# Patient Record
Sex: FEMALE | Race: WHITE | NOT HISPANIC OR LATINO | Employment: OTHER | URBAN - NONMETROPOLITAN AREA
[De-identification: names, ages, dates, MRNs, and addresses within clinical notes are randomized per-mention and may not be internally consistent; named-entity substitution may affect disease eponyms.]

---

## 2017-02-06 RX ORDER — GABAPENTIN 100 MG/1
CAPSULE ORAL
Qty: 90 CAPSULE | Refills: 0 | Status: SHIPPED | OUTPATIENT
Start: 2017-02-06 | End: 2017-03-03 | Stop reason: SDUPTHER

## 2017-02-16 RX ORDER — PAROXETINE 10 MG/1
TABLET, FILM COATED ORAL
Qty: 90 TABLET | Refills: 0 | OUTPATIENT
Start: 2017-02-16

## 2017-02-16 RX ORDER — HYDROCHLOROTHIAZIDE 12.5 MG/1
TABLET ORAL
Qty: 90 TABLET | Refills: 0 | OUTPATIENT
Start: 2017-02-16

## 2017-03-03 ENCOUNTER — OFFICE VISIT (OUTPATIENT)
Dept: CARDIOLOGY | Facility: CLINIC | Age: 62
End: 2017-03-03

## 2017-03-03 VITALS
SYSTOLIC BLOOD PRESSURE: 144 MMHG | DIASTOLIC BLOOD PRESSURE: 76 MMHG | OXYGEN SATURATION: 98 % | HEIGHT: 63 IN | HEART RATE: 65 BPM | WEIGHT: 287.6 LBS | BODY MASS INDEX: 50.96 KG/M2

## 2017-03-03 DIAGNOSIS — E78.2 MIXED HYPERLIPIDEMIA: ICD-10-CM

## 2017-03-03 DIAGNOSIS — R51.9 FREQUENT HEADACHES: ICD-10-CM

## 2017-03-03 DIAGNOSIS — F41.9 ANXIETY: ICD-10-CM

## 2017-03-03 DIAGNOSIS — I10 ESSENTIAL HYPERTENSION: Primary | ICD-10-CM

## 2017-03-03 DIAGNOSIS — F32.9 REACTIVE DEPRESSION: ICD-10-CM

## 2017-03-03 DIAGNOSIS — R41.3 MEMORY LOSS: ICD-10-CM

## 2017-03-03 DIAGNOSIS — E11.9 TYPE 2 DIABETES MELLITUS WITHOUT COMPLICATION, WITHOUT LONG-TERM CURRENT USE OF INSULIN (HCC): ICD-10-CM

## 2017-03-03 PROCEDURE — 99214 OFFICE O/P EST MOD 30 MIN: CPT | Performed by: INTERNAL MEDICINE

## 2017-03-03 RX ORDER — GABAPENTIN 100 MG/1
100 CAPSULE ORAL DAILY
Qty: 90 CAPSULE | Refills: 0 | Status: SHIPPED | OUTPATIENT
Start: 2017-03-03 | End: 2017-10-09

## 2017-03-03 RX ORDER — HYDROCHLOROTHIAZIDE 12.5 MG/1
12.5 TABLET ORAL DAILY
Qty: 90 TABLET | Refills: 0 | Status: SHIPPED | OUTPATIENT
Start: 2017-03-03 | End: 2017-06-11 | Stop reason: SDUPTHER

## 2017-03-03 RX ORDER — METOPROLOL SUCCINATE 100 MG/1
100 TABLET, EXTENDED RELEASE ORAL DAILY
Qty: 90 TABLET | Refills: 0 | Status: SHIPPED | OUTPATIENT
Start: 2017-03-03 | End: 2017-06-07 | Stop reason: SDUPTHER

## 2017-03-03 RX ORDER — ERGOCALCIFEROL 1.25 MG/1
50000 CAPSULE ORAL WEEKLY
Qty: 12 CAPSULE | Refills: 1 | Status: SHIPPED | OUTPATIENT
Start: 2017-03-03 | End: 2017-08-05 | Stop reason: SDUPTHER

## 2017-03-03 RX ORDER — VALSARTAN 160 MG/1
160 TABLET ORAL 2 TIMES DAILY
Qty: 180 TABLET | Refills: 0 | Status: SHIPPED | OUTPATIENT
Start: 2017-03-03 | End: 2017-03-20 | Stop reason: HOSPADM

## 2017-03-03 RX ORDER — IBUPROFEN 200 MG
200 TABLET ORAL EVERY 6 HOURS PRN
COMMUNITY
End: 2017-03-23

## 2017-03-03 RX ORDER — ATORVASTATIN CALCIUM 40 MG/1
40 TABLET, FILM COATED ORAL DAILY
Qty: 90 TABLET | Refills: 3 | Status: SHIPPED | OUTPATIENT
Start: 2017-03-03 | End: 2017-10-09 | Stop reason: SDUPTHER

## 2017-03-03 RX ORDER — PROMETHAZINE HYDROCHLORIDE 25 MG/1
25 TABLET ORAL EVERY 6 HOURS PRN
COMMUNITY
End: 2017-03-20

## 2017-03-03 RX ORDER — PAROXETINE 10 MG/1
10 TABLET, FILM COATED ORAL EVERY MORNING
Qty: 90 TABLET | Refills: 0 | Status: SHIPPED | OUTPATIENT
Start: 2017-03-03 | End: 2017-06-07 | Stop reason: SDUPTHER

## 2017-03-03 NOTE — PROGRESS NOTES
subjective     Chief Complaint   Patient presents with   • Leg Swelling   • Migraine     History of Present Illness  Hyperlipidemia  Patient has not been able to lose any weight. She is trying to diet but is having a lot of difficulty.  Her sugar is uncontrolled and along with that lipids are also significantly abnormal.  Patient is taking Lipitor 20 mg daily that'll be increased.  She is not having any side effect of Lipitor.    HYPERTENSION  Ronna Wayne has long-standing essential hypertension for years. she is taking medications regularly. There are no medication side effects. Blood pressure is very well controlled. There has been no headache nausea chest pain. There has been no syncopal or presyncopal episode. she denies episodes of hypo-tension or accelerated hypertension.  Patient does have some headaches she has history of migraine but part of the headache could be due to blood pressure.  Patient was advised to check blood pressure closely and may need to adjust medications.  Patient thinks her blood pressure has been doing fairly well.      GERD  Ronna Wayne has gastroesophageal reflux disorder however she significantly better on medications. There is no nausea or vomiting. No hematemesis or melena. No abdominal pain. Mild epigastric heartburns are noted. Appetite is good bowel movements are normal.    Obesity  Patient is quite a bit overweight.  She is trying to lose weight but has not been very successful.  Patient thinks most of it is swelling.  There is some dependent edema but that is probably secondary to obesity.    She is diabetic and seeing endocrinologist.  Her sugar is running quite high hemoglobin A1c is over 10 and blood sugar over 200.    Patient Active Problem List   Diagnosis   • Frequent headaches   • Memory loss   • Essential hypertension   • Hyperlipidemia   • Diabetes mellitus type 2 insulin-dependent   • Sleep apnea   • Glaucoma   • Anxiety   • GERD (gastroesophageal reflux  disease)   • Morbid obesity   • Depression       Social History   Substance Use Topics   • Smoking status: Former Smoker     Packs/day: 1.50     Years: 8.00     Types: Cigarettes     Quit date: 1976   • Smokeless tobacco: Never Used   • Alcohol use No      Comment: former social drinker not had anything in 25 + years       Allergies   Allergen Reactions   • Codeine    • Sulfa Antibiotics          Current Outpatient Prescriptions:   •  atorvastatin (LIPITOR) 40 MG tablet, Take 1 tablet by mouth Daily., Disp: 90 tablet, Rfl: 3  •  dexlansoprazole (DEXILANT) 60 MG capsule, Take 60 mg by mouth daily., Disp: , Rfl:   •  gabapentin (NEURONTIN) 100 MG capsule, Take 1 capsule by mouth Daily., Disp: 90 capsule, Rfl: 0  •  hydrochlorothiazide (HYDRODIURIL) 12.5 MG tablet, Take 1 tablet by mouth Daily., Disp: 90 tablet, Rfl: 0  •  ibuprofen (ADVIL,MOTRIN) 200 MG tablet, Take 200 mg by mouth Every 6 (Six) Hours As Needed for mild pain (1-3)., Disp: , Rfl:   •  insulin aspart (NovoLOG) 100 UNIT/ML injection, Inject  under the skin 3 (three) times a day before meals. 10 units at breakfast 12 units at lunch and 14 units at dinner  , Disp: , Rfl:   •  insulin detemir (LEVEMIR) 100 UNIT/ML injection, Inject 50 Units under the skin Every Night., Disp: , Rfl:   •  Lactobacillus (REPHRESH PRO-B) capsule, Take  by mouth daily., Disp: , Rfl:   •  metoprolol succinate XL (TOPROL-XL) 100 MG 24 hr tablet, Take 1 tablet by mouth Daily., Disp: 90 tablet, Rfl: 0  •  PARoxetine (PAXIL) 10 MG tablet, Take 1 tablet by mouth Every Morning., Disp: 90 tablet, Rfl: 0  •  promethazine (PHENERGAN) 25 MG tablet, Take 25 mg by mouth Every 6 (Six) Hours As Needed for nausea or vomiting., Disp: , Rfl:   •  saccharomyces boulardii (FLORASTOR) 250 MG capsule, Take 250 mg by mouth daily., Disp: , Rfl:   •  SAXagliptin-MetFORMIN ER (KOMBIGLYZE XR) 2.5-1000 MG tablet sustained-release 24 hour, Take 2.5-1,000 mg by mouth 2 (Two) Times a Day., Disp: , Rfl:   •   "valsartan (DIOVAN) 160 MG tablet, Take 1 tablet by mouth 2 (Two) Times a Day., Disp: 180 tablet, Rfl: 0  •  vitamin D (ERGOCALCIFEROL) 85688 UNITS capsule capsule, Take 1 capsule by mouth 1 (One) Time Per Week., Disp: 12 capsule, Rfl: 1      The following portions of the patient's history were reviewed and updated as appropriate: allergies, current medications, past family history, past medical history, past social history, past surgical history and problem list.    Review of Systems   Constitution: Positive for weakness, malaise/fatigue and weight gain.   HENT: Negative.    Eyes: Negative.    Cardiovascular: Positive for leg swelling.   Respiratory: Negative.    Hematologic/Lymphatic: Negative.    Skin: Negative.    Musculoskeletal: Positive for arthritis, back pain and myalgias.   Gastrointestinal: Negative.    Genitourinary: Negative.    Psychiatric/Behavioral: The patient is nervous/anxious.           Objective:     Visit Vitals   • /76 (BP Location: Left arm, Patient Position: Sitting)   • Pulse 65   • Ht 63\" (160 cm)   • Wt 287 lb 9.6 oz (130 kg)   • SpO2 98%   • BMI 50.95 kg/m2     Physical Exam   Constitutional: She appears well-developed and well-nourished.   Obesity   HENT:   Head: Normocephalic and atraumatic.   Mouth/Throat: Oropharynx is clear and moist.   Eyes: Conjunctivae and EOM are normal. Pupils are equal, round, and reactive to light. No scleral icterus.   Neck: Normal range of motion. Neck supple. No JVD present. No tracheal deviation present. No thyromegaly present.   Cardiovascular: Normal rate, regular rhythm, normal heart sounds and intact distal pulses.  Exam reveals no friction rub.    No murmur heard.  Pulmonary/Chest: Effort normal and breath sounds normal. No respiratory distress. She has no wheezes. She has no rales. She exhibits no tenderness.   Abdominal: Soft. Bowel sounds are normal. She exhibits no distension and no mass. There is no tenderness. There is no rebound and no " guarding.   Musculoskeletal: Normal range of motion. She exhibits no edema, tenderness or deformity.   Lymphadenopathy:     She has no cervical adenopathy.   Neurological: She is alert. She has normal reflexes. No cranial nerve deficit. She exhibits normal muscle tone. Coordination normal.   Skin: Skin is warm and dry.   Psychiatric: She has a normal mood and affect. Her behavior is normal. Judgment and thought content normal.         Lab Review      Procedures     I personally viewed and interpreted the patient's LAB data         Assessment:     1. Essential hypertension    2. Mixed hyperlipidemia    3. Frequent headaches    4. Memory loss    5. Type 2 diabetes mellitus without complication, without long-term current use of insulin    6. Anxiety    7. Reactive depression          Plan:      Blood pressure is fairly well controlled.  It is 144/76 today which is mildly elevated most of the time it is below 140 systolic.  Patient will continue current medications which is Diovan hydrochlorothiazide and metoprolol.    Patient is quite obese  Weight loss was again emphasized.  Patient also has some generalized edema    Hyperlipidemia  Cholesterol is 132 with LDL of 51.  Triglyceride elevated because very poorly controlled diabetes and obesity.    Vitamin D is low  Patient was advised to DC vitamin D3 and start vitamin D2 50,000 weekly    Hemoglobin A1c is 10.4 and blood sugar is 201.  Patient is planning to see an endocrinologist next week.  Patient may be better off with the Victoza.    Aggressive risk factor modification and weight loss was stressed.  Vitamin B12 is normal without B12 replacement    Patient also says that eye doctor told her she has allergies.  She was advised to take Zyrtec 5 mg daily when necessary.  She was advised to stop it if she gets dry eyes.      Return in about 3 months (around 6/3/2017).

## 2017-03-16 ENCOUNTER — HOSPITAL ENCOUNTER (EMERGENCY)
Facility: HOSPITAL | Age: 62
Discharge: HOME | End: 2017-03-16
Attending: EMERGENCY MEDICINE | Admitting: EMERGENCY MEDICINE

## 2017-03-16 VITALS
HEIGHT: 63 IN | TEMPERATURE: 98.8 F | OXYGEN SATURATION: 94 % | BODY MASS INDEX: 50.68 KG/M2 | DIASTOLIC BLOOD PRESSURE: 94 MMHG | RESPIRATION RATE: 18 BRPM | WEIGHT: 286 LBS | HEART RATE: 81 BPM | SYSTOLIC BLOOD PRESSURE: 141 MMHG

## 2017-03-16 DIAGNOSIS — M43.6 TORTICOLLIS: Primary | ICD-10-CM

## 2017-03-16 LAB
BASOPHILS # BLD AUTO: 0.06 10*3/MM3 (ref 0–0.3)
BASOPHILS NFR BLD AUTO: 0.6 % (ref 0–2)
DEPRECATED RDW RBC AUTO: 44.3 FL (ref 37–54)
EOSINOPHIL # BLD AUTO: 0.06 10*3/MM3 (ref 0–0.7)
EOSINOPHIL NFR BLD AUTO: 0.6 % (ref 0–5)
ERYTHROCYTE [DISTWIDTH] IN BLOOD BY AUTOMATED COUNT: 12.8 % (ref 11.5–14.5)
HCT VFR BLD AUTO: 41.2 % (ref 37–47)
HGB BLD-MCNC: 13.6 G/DL (ref 12–16)
IMM GRANULOCYTES # BLD: 0.03 10*3/MM3 (ref 0–0.03)
IMM GRANULOCYTES NFR BLD: 0.3 % (ref 0–0.5)
LYMPHOCYTES # BLD AUTO: 3.77 10*3/MM3 (ref 1–3)
LYMPHOCYTES NFR BLD AUTO: 38 % (ref 21–51)
MCH RBC QN AUTO: 32.3 PG (ref 27–33)
MCHC RBC AUTO-ENTMCNC: 33 G/DL (ref 33–37)
MCV RBC AUTO: 97.9 FL (ref 80–94)
MONOCYTES # BLD AUTO: 0.97 10*3/MM3 (ref 0.1–0.9)
MONOCYTES NFR BLD AUTO: 9.8 % (ref 0–10)
NEUTROPHILS # BLD AUTO: 5.03 10*3/MM3 (ref 1.4–6.5)
NEUTROPHILS NFR BLD AUTO: 50.7 % (ref 30–70)
PLATELET # BLD AUTO: 201 10*3/MM3 (ref 130–400)
PMV BLD AUTO: 10.6 FL (ref 6–10)
RBC # BLD AUTO: 4.21 10*6/MM3 (ref 4.2–5.4)
WBC NRBC COR # BLD: 9.92 10*3/MM3 (ref 4.5–12.5)

## 2017-03-16 PROCEDURE — 99284 EMERGENCY DEPT VISIT MOD MDM: CPT

## 2017-03-16 PROCEDURE — 25010000002 HYDROMORPHONE PER 4 MG: Performed by: EMERGENCY MEDICINE

## 2017-03-16 PROCEDURE — 96372 THER/PROPH/DIAG INJ SC/IM: CPT

## 2017-03-16 PROCEDURE — 25010000002 KETOROLAC TROMETHAMINE PER 15 MG: Performed by: EMERGENCY MEDICINE

## 2017-03-16 PROCEDURE — 85025 COMPLETE CBC W/AUTO DIFF WBC: CPT | Performed by: EMERGENCY MEDICINE

## 2017-03-16 RX ORDER — ONDANSETRON 4 MG/1
4 TABLET, ORALLY DISINTEGRATING ORAL ONCE
Status: COMPLETED | OUTPATIENT
Start: 2017-03-16 | End: 2017-03-16

## 2017-03-16 RX ORDER — CYCLOBENZAPRINE HCL 10 MG
10 TABLET ORAL ONCE
Status: COMPLETED | OUTPATIENT
Start: 2017-03-16 | End: 2017-03-16

## 2017-03-16 RX ORDER — KETOROLAC TROMETHAMINE 30 MG/ML
60 INJECTION, SOLUTION INTRAMUSCULAR; INTRAVENOUS ONCE
Status: COMPLETED | OUTPATIENT
Start: 2017-03-16 | End: 2017-03-16

## 2017-03-16 RX ORDER — CYCLOBENZAPRINE HCL 10 MG
10 TABLET ORAL 3 TIMES DAILY PRN
Qty: 15 TABLET | Refills: 0 | Status: SHIPPED | OUTPATIENT
Start: 2017-03-16 | End: 2017-03-20

## 2017-03-16 RX ORDER — NAPROXEN 500 MG/1
500 TABLET ORAL 2 TIMES DAILY PRN
Qty: 12 TABLET | Refills: 0 | Status: SHIPPED | OUTPATIENT
Start: 2017-03-16 | End: 2020-09-01

## 2017-03-16 RX ADMIN — HYDROMORPHONE HYDROCHLORIDE 1 MG: 1 INJECTION, SOLUTION INTRAMUSCULAR; INTRAVENOUS; SUBCUTANEOUS at 20:52

## 2017-03-16 RX ADMIN — ONDANSETRON 4 MG: 4 TABLET, ORALLY DISINTEGRATING ORAL at 20:49

## 2017-03-16 RX ADMIN — KETOROLAC TROMETHAMINE 60 MG: 60 INJECTION, SOLUTION INTRAMUSCULAR at 20:50

## 2017-03-16 RX ADMIN — CYCLOBENZAPRINE HYDROCHLORIDE 10 MG: 10 TABLET, FILM COATED ORAL at 19:46

## 2017-03-16 NOTE — ED PROVIDER NOTES
Subjective   Patient is a 62 y.o. female presenting with neck pain.   History provided by:  Patient  Neck Pain   Pain location:  R side  Quality:  Aching and cramping  Pain severity:  Moderate  Onset quality:  Gradual  Duration:  2 days  Timing:  Constant  Context comment:  Awoke 2 days ago, neck sore ever since  Worsened by:  Twisting and bending  Associated symptoms: no bladder incontinence, no chest pain, no fever, no leg pain and no tingling        Review of Systems   Constitutional: Negative.  Negative for fever.   HENT: Negative.         Pain right neck     Respiratory: Negative.    Cardiovascular: Negative.  Negative for chest pain.   Gastrointestinal: Negative.  Negative for abdominal pain.   Endocrine: Negative.    Genitourinary: Negative.  Negative for bladder incontinence and dysuria.   Musculoskeletal: Positive for neck pain.   Skin: Negative.    Neurological: Negative.  Negative for tingling.   Psychiatric/Behavioral: Negative.    All other systems reviewed and are negative.      Past Medical History   Diagnosis Date   • Anxiety    • B12 deficiency    • BPV (benign positional vertigo)    • Diabetes mellitus    • Diarrhea    • Fibromyalgia    • GERD (gastroesophageal reflux disease)    • Glaucoma    • Headache    • History of EKG 06/04/2015     BORDERLINE   • Hyperlipidemia    • Hypertension    • Obesity    • Sleep apnea    • Weakness of left arm        Allergies   Allergen Reactions   • Codeine    • Sulfa Antibiotics        Past Surgical History   Procedure Laterality Date   • Gallbladder surgery     • Urethra surgery     • Cataract extraction Bilateral    • Cholecystectomy     • Appendectomy         Family History   Problem Relation Age of Onset   • Thyroid disease Mother    • Diabetes Mother    • Hyperlipidemia Father    • Hypertension Father    • Heart attack Father    • Alcohol abuse Maternal Aunt    • Cancer Maternal Grandfather      PROSTATE   • Stroke Paternal Grandmother    • Stroke Paternal  Grandfather    • Hypertension Other    • Hypertension Sister    • Breast cancer Sister    • Obesity Sister    • Hypertension Sister    • Hyperlipidemia Sister    • Hyperlipidemia Sister    • Hypertension Sister    • Arthritis Sister    • Obesity Sister    • Thyroid disease Sister        Social History     Social History   • Marital status:      Spouse name: N/A   • Number of children: N/A   • Years of education: N/A     Social History Main Topics   • Smoking status: Former Smoker     Packs/day: 1.50     Years: 8.00     Types: Cigarettes     Quit date: 1976   • Smokeless tobacco: Never Used   • Alcohol use No      Comment: former social drinker not had anything in 25 + years   • Drug use: No   • Sexual activity: Not Asked     Other Topics Concern   • None     Social History Narrative   • None           Objective   Physical Exam   Constitutional: She is oriented to person, place, and time. She appears well-developed and well-nourished. No distress.   HENT:   Head: Normocephalic and atraumatic.   Right Ear: External ear normal.   Left Ear: External ear normal.   Nose: Nose normal.   Tender rigth neck     Eyes: Conjunctivae and EOM are normal. Pupils are equal, round, and reactive to light.   Neck: Normal range of motion. Neck supple. No JVD present. No tracheal deviation present.   Cardiovascular: Normal rate, regular rhythm and normal heart sounds.    No murmur heard.  Pulmonary/Chest: Effort normal and breath sounds normal. No respiratory distress. She has no wheezes.   Abdominal: Soft. Bowel sounds are normal. There is no tenderness.   Musculoskeletal: Normal range of motion. She exhibits no edema or deformity.   Neurological: She is alert and oriented to person, place, and time. No cranial nerve deficit.   Skin: Skin is warm and dry. No rash noted. She is not diaphoretic. No erythema. No pallor.   Psychiatric: She has a normal mood and affect. Her behavior is normal. Thought content normal.   Nursing note  and vitals reviewed.      Procedures         ED Course  ED Course                  MDM    Final diagnoses:   Torticollis            Topher Logan MD  03/17/17 [id number]

## 2017-03-20 ENCOUNTER — OFFICE VISIT (OUTPATIENT)
Dept: CARDIOLOGY | Facility: CLINIC | Age: 62
End: 2017-03-20

## 2017-03-20 VITALS
OXYGEN SATURATION: 98 % | HEIGHT: 63 IN | SYSTOLIC BLOOD PRESSURE: 136 MMHG | HEART RATE: 70 BPM | WEIGHT: 289.4 LBS | BODY MASS INDEX: 51.28 KG/M2 | DIASTOLIC BLOOD PRESSURE: 74 MMHG

## 2017-03-20 DIAGNOSIS — E78.2 MIXED HYPERLIPIDEMIA: ICD-10-CM

## 2017-03-20 DIAGNOSIS — R51.9 FREQUENT HEADACHES: ICD-10-CM

## 2017-03-20 DIAGNOSIS — F41.9 ANXIETY: ICD-10-CM

## 2017-03-20 DIAGNOSIS — I10 ESSENTIAL HYPERTENSION: Primary | ICD-10-CM

## 2017-03-20 DIAGNOSIS — E11.9 TYPE 2 DIABETES MELLITUS WITHOUT COMPLICATION, WITHOUT LONG-TERM CURRENT USE OF INSULIN (HCC): ICD-10-CM

## 2017-03-20 DIAGNOSIS — E66.01 MORBID OBESITY, UNSPECIFIED OBESITY TYPE (HCC): ICD-10-CM

## 2017-03-20 DIAGNOSIS — K21.9 GASTROESOPHAGEAL REFLUX DISEASE WITHOUT ESOPHAGITIS: ICD-10-CM

## 2017-03-20 DIAGNOSIS — G47.33 OBSTRUCTIVE SLEEP APNEA SYNDROME: ICD-10-CM

## 2017-03-20 PROCEDURE — 99213 OFFICE O/P EST LOW 20 MIN: CPT | Performed by: INTERNAL MEDICINE

## 2017-03-20 RX ORDER — CYCLOBENZAPRINE HCL 10 MG
5 TABLET ORAL 3 TIMES DAILY PRN
Qty: 15 TABLET | Refills: 0 | Status: SHIPPED | OUTPATIENT
Start: 2017-03-20 | End: 2017-10-09

## 2017-03-20 RX ORDER — CEFDINIR 300 MG/1
300 CAPSULE ORAL 2 TIMES DAILY
COMMUNITY
End: 2017-10-09

## 2017-03-20 NOTE — PROGRESS NOTES
subjective     Chief Complaint   Patient presents with   • Neck Pain     recently went to ER     History of Present Illness  Patient is 62 years old white female who recently went to the emergency room where she was found to have white high blood pressure.  Blood pressure was around 192/106.  She was advised to take Diovan HCT.  Apparently she had not been taking it for quite some time after she took the medication her blood pressure returned to normal but she had quite fast heartbeat and she was quite concerned after that she did not take any more medication    Patient also had the stiffness in the neck she was diagnosed as having torticollis and was given Flexeril she has been taking Flexeril 10 mg 3 times a day.  She complains of headaches.  Patient then went to see walk-in clinic and she was told that she should be checked for meningitis.  There is no neck stiffness no fever no chills.  No neurological signs.     Hyperlipidemia  Patient has not been able to lose any weight. She is trying to diet but is having a lot of difficulty.  Her sugar is uncontrolled and along with that lipids are also significantly abnormal.  Patient is taking Lipitor 20 mg daily that'll be increased.  She is not having any side effect of Lipitor.     HYPERTENSION  Ronna Wayne has long-standing essential hypertension for years. she is taking medications regularly. There are no medication side effects. Blood pressure is very well controlled. There has been no headache nausea chest pain. There has been no syncopal or presyncopal episode. she denies episodes of hypo-tension or accelerated hypertension. Patient does have some headaches she has history of migraine but part of the headache could be due to blood pressure. Patient was advised to check blood pressure closely and may need to adjust medications. Patient thinks her blood pressure has been doing fairly well.      GERD  Ronna Wayne has gastroesophageal reflux disorder  however she significantly better on medications. There is no nausea or vomiting. No hematemesis or melena. No abdominal pain. Mild epigastric heartburns are noted. Appetite is good bowel movements are normal.     Obesity  Patient is quite a bit overweight. She is trying to lose weight but has not been very successful. Patient thinks most of it is swelling. There is some dependent edema but that is probably secondary to obesity.     She is diabetic and seeing endocrinologist. Her sugar is running quite high hemoglobin A1c is over 10 and blood sugar 168.  Patient is seeing Dr. Abrams    Patient Active Problem List   Diagnosis   • Frequent headaches   • Memory loss   • Essential hypertension   • Hyperlipidemia   • Diabetes mellitus type 2 insulin-dependent   • Sleep apnea   • Glaucoma   • Anxiety   • GERD (gastroesophageal reflux disease)   • Morbid obesity   • Depression       Social History   Substance Use Topics   • Smoking status: Former Smoker     Packs/day: 1.50     Years: 8.00     Types: Cigarettes     Quit date: 1976   • Smokeless tobacco: Never Used   • Alcohol use No      Comment: former social drinker not had anything in 25 + years       Allergies   Allergen Reactions   • Codeine    • Sulfa Antibiotics          Current Outpatient Prescriptions:   •  atorvastatin (LIPITOR) 40 MG tablet, Take 1 tablet by mouth Daily., Disp: 90 tablet, Rfl: 3  •  cefdinir (OMNICEF) 300 MG capsule, Take 300 mg by mouth 2 (Two) Times a Day., Disp: , Rfl:   •  cyclobenzaprine (FLEXERIL) 10 MG tablet, Take 0.5 tablets by mouth 3 (Three) Times a Day As Needed for Muscle Spasms for up to 15 doses., Disp: 15 tablet, Rfl: 0  •  dexlansoprazole (DEXILANT) 60 MG capsule, Take 60 mg by mouth daily., Disp: , Rfl:   •  gabapentin (NEURONTIN) 100 MG capsule, Take 1 capsule by mouth Daily., Disp: 90 capsule, Rfl: 0  •  hydrochlorothiazide (HYDRODIURIL) 12.5 MG tablet, Take 1 tablet by mouth Daily., Disp: 90 tablet, Rfl: 0  •  ibuprofen  (ADVIL,MOTRIN) 200 MG tablet, Take 200 mg by mouth Every 6 (Six) Hours As Needed for mild pain (1-3)., Disp: , Rfl:   •  insulin aspart (NovoLOG) 100 UNIT/ML injection, Inject  under the skin 3 (three) times a day before meals. 10 units at breakfast 12 units at lunch and 14 units at dinner  , Disp: , Rfl:   •  insulin detemir (LEVEMIR) 100 UNIT/ML injection, Inject 65 Units under the skin Every Night., Disp: , Rfl:   •  Lactobacillus (REPHRESH PRO-B) capsule, Take  by mouth daily., Disp: , Rfl:   •  metoprolol succinate XL (TOPROL-XL) 100 MG 24 hr tablet, Take 1 tablet by mouth Daily., Disp: 90 tablet, Rfl: 0  •  naproxen (EC NAPROSYN) 500 MG EC tablet, Take 1 tablet by mouth 2 (Two) Times a Day As Needed for Mild Pain (1-3)., Disp: 12 tablet, Rfl: 0  •  PARoxetine (PAXIL) 10 MG tablet, Take 1 tablet by mouth Every Morning., Disp: 90 tablet, Rfl: 0  •  saccharomyces boulardii (FLORASTOR) 250 MG capsule, Take 250 mg by mouth daily., Disp: , Rfl:   •  SAXagliptin-MetFORMIN ER (KOMBIGLYZE XR) 2.5-1000 MG tablet sustained-release 24 hour, Take 2.5-1,000 mg by mouth 2 (Two) Times a Day., Disp: , Rfl:   •  vitamin D (ERGOCALCIFEROL) 82253 UNITS capsule capsule, Take 1 capsule by mouth 1 (One) Time Per Week., Disp: 12 capsule, Rfl: 1      The following portions of the patient's history were reviewed and updated as appropriate: allergies, current medications, past family history, past medical history, past social history, past surgical history and problem list.    Review of Systems   Constitution: Positive for weight gain.   HENT: Positive for headaches.    Eyes: Negative.    Cardiovascular: Negative.    Respiratory: Negative.    Skin: Negative.    Musculoskeletal: Positive for arthritis, muscle cramps, myalgias, neck pain and stiffness.   Gastrointestinal: Negative.    Genitourinary: Negative.    Neurological: Positive for dizziness and loss of balance.   Psychiatric/Behavioral: The patient is nervous/anxious.          "  Objective:     Visit Vitals   • /74 (BP Location: Left arm, Patient Position: Sitting)   • Pulse 70   • Ht 63\" (160 cm)   • Wt 289 lb 6.4 oz (131 kg)   • SpO2 98%   • BMI 51.26 kg/m2     Physical Exam   Constitutional: She appears well-developed and well-nourished.   HENT:   Head: Normocephalic and atraumatic.   Mouth/Throat: Oropharynx is clear and moist.   Eyes: Conjunctivae and EOM are normal. Pupils are equal, round, and reactive to light. No scleral icterus.   Neck: Normal range of motion. Neck supple. No JVD present. No tracheal deviation present. No thyromegaly present.   Cardiovascular: Normal rate, regular rhythm, normal heart sounds and intact distal pulses.  Exam reveals no friction rub.    No murmur heard.  Pulmonary/Chest: Effort normal and breath sounds normal. No respiratory distress. She has no wheezes. She has no rales. She exhibits no tenderness.   Abdominal: Soft. Bowel sounds are normal. She exhibits no distension and no mass. There is no tenderness. There is no rebound and no guarding.   Musculoskeletal: Normal range of motion. She exhibits no edema, tenderness or deformity.   Lymphadenopathy:     She has no cervical adenopathy.   Neurological: She is alert. She has normal reflexes. No cranial nerve deficit. She exhibits normal muscle tone. Coordination normal.   Skin: Skin is warm and dry.   Psychiatric: She has a normal mood and affect. Her behavior is normal. Judgment and thought content normal.         Lab Review  Lab Results   Component Value Date     05/28/2016    K 4.2 05/28/2016     05/28/2016    BUN 14 05/28/2016    CREATININE 0.81 05/28/2016    GLUCOSE 168 (H) 05/28/2016    CALCIUM 9.6 05/28/2016    ALT 46 (H) 05/28/2016    ALKPHOS 75 05/28/2016    LABIL2 1.0 (L) 05/28/2016     Lab Results   Component Value Date    CKTOTAL 36 05/28/2016     Lab Results   Component Value Date    WBC 9.92 03/16/2017    HGB 13.6 03/16/2017    HCT 41.2 03/16/2017     03/16/2017 "       Lab Results   Component Value Date    CHLPL 226 (H) 05/28/2016     Lab Results   Component Value Date    TRIG 195 (H) 05/28/2016    HDL 48 (L) 05/28/2016    VLDL 39 (H) 05/28/2016         Procedures     I personally viewed and interpreted the patient's LAB data         Assessment:     1. Essential hypertension    2. Mixed hyperlipidemia    3. Anxiety    4. Type 2 diabetes mellitus without complication, without long-term current use of insulin    5. Frequent headaches    6. Gastroesophageal reflux disease without esophagitis    7. Morbid obesity, unspecified obesity type    8. Obstructive sleep apnea syndrome          Plan:      blood pressure is still elevated.  It is 144/90  Patient was advised to take Diovan 40 mg daily and then adjusted depending on the blood pressure readings.    Flexeril was decreased to 5 mg 3 times a day and then she will stop it because of extrapyramidal symptom possibility    CT scan of the head was arranged    Phenergan was discontinued and patient was advised to take Zofran if needed but she stated that she does not need any nausea medicine.    weight loss was again stressed.        No Follow-up on file.

## 2017-03-22 ENCOUNTER — HOSPITAL ENCOUNTER (OUTPATIENT)
Dept: CT IMAGING | Facility: HOSPITAL | Age: 62
Discharge: HOME | End: 2017-03-22
Attending: INTERNAL MEDICINE | Admitting: INTERNAL MEDICINE

## 2017-03-22 DIAGNOSIS — R51.9 FREQUENT HEADACHES: ICD-10-CM

## 2017-03-22 PROCEDURE — 70450 CT HEAD/BRAIN W/O DYE: CPT

## 2017-03-22 PROCEDURE — 70450 CT HEAD/BRAIN W/O DYE: CPT | Performed by: RADIOLOGY

## 2017-03-24 ENCOUNTER — TELEPHONE (OUTPATIENT)
Dept: CARDIOLOGY | Facility: CLINIC | Age: 62
End: 2017-03-24

## 2017-05-19 ENCOUNTER — OFFICE VISIT (OUTPATIENT)
Dept: CARDIOLOGY | Facility: CLINIC | Age: 62
End: 2017-05-19

## 2017-05-19 VITALS
WEIGHT: 282 LBS | DIASTOLIC BLOOD PRESSURE: 85 MMHG | HEART RATE: 70 BPM | HEIGHT: 63 IN | BODY MASS INDEX: 49.96 KG/M2 | OXYGEN SATURATION: 97 % | SYSTOLIC BLOOD PRESSURE: 150 MMHG

## 2017-05-19 DIAGNOSIS — R51.9 FREQUENT HEADACHES: ICD-10-CM

## 2017-05-19 DIAGNOSIS — E66.01 MORBID OBESITY, UNSPECIFIED OBESITY TYPE (HCC): ICD-10-CM

## 2017-05-19 DIAGNOSIS — K21.9 GASTROESOPHAGEAL REFLUX DISEASE WITHOUT ESOPHAGITIS: ICD-10-CM

## 2017-05-19 DIAGNOSIS — E78.2 MIXED HYPERLIPIDEMIA: ICD-10-CM

## 2017-05-19 DIAGNOSIS — E11.9 TYPE 2 DIABETES MELLITUS WITHOUT COMPLICATION, WITHOUT LONG-TERM CURRENT USE OF INSULIN (HCC): ICD-10-CM

## 2017-05-19 DIAGNOSIS — F32.9 REACTIVE DEPRESSION: ICD-10-CM

## 2017-05-19 DIAGNOSIS — F41.9 ANXIETY: ICD-10-CM

## 2017-05-19 DIAGNOSIS — G47.33 OBSTRUCTIVE SLEEP APNEA SYNDROME: ICD-10-CM

## 2017-05-19 DIAGNOSIS — I10 ESSENTIAL HYPERTENSION: Primary | ICD-10-CM

## 2017-05-19 PROCEDURE — 99214 OFFICE O/P EST MOD 30 MIN: CPT | Performed by: INTERNAL MEDICINE

## 2017-05-19 RX ORDER — VALSARTAN 160 MG/1
80 TABLET ORAL DAILY
Qty: 90 TABLET | Refills: 3 | COMMUNITY
Start: 2017-05-19 | End: 2017-10-09 | Stop reason: SDUPTHER

## 2017-06-07 RX ORDER — METOPROLOL SUCCINATE 100 MG/1
TABLET, EXTENDED RELEASE ORAL
Qty: 90 TABLET | Refills: 0 | Status: SHIPPED | OUTPATIENT
Start: 2017-06-07 | End: 2017-10-09 | Stop reason: SDUPTHER

## 2017-06-07 RX ORDER — PAROXETINE 10 MG/1
TABLET, FILM COATED ORAL
Qty: 90 TABLET | Refills: 0 | Status: SHIPPED | OUTPATIENT
Start: 2017-06-07 | End: 2017-10-09 | Stop reason: SDUPTHER

## 2017-06-12 RX ORDER — HYDROCHLOROTHIAZIDE 12.5 MG/1
TABLET ORAL
Qty: 90 TABLET | Refills: 0 | Status: SHIPPED | OUTPATIENT
Start: 2017-06-12 | End: 2017-10-09 | Stop reason: SDUPTHER

## 2017-08-07 RX ORDER — PAROXETINE 10 MG/1
TABLET, FILM COATED ORAL
Qty: 90 TABLET | Refills: 0 | OUTPATIENT
Start: 2017-08-07

## 2017-08-07 RX ORDER — ERGOCALCIFEROL 1.25 MG/1
CAPSULE ORAL
Qty: 12 CAPSULE | Refills: 0 | Status: SHIPPED | OUTPATIENT
Start: 2017-08-07 | End: 2018-03-22 | Stop reason: SDUPTHER

## 2017-08-28 RX ORDER — METOPROLOL SUCCINATE 100 MG/1
TABLET, EXTENDED RELEASE ORAL
Qty: 90 TABLET | Refills: 0 | OUTPATIENT
Start: 2017-08-28

## 2017-09-11 RX ORDER — HYDROCHLOROTHIAZIDE 12.5 MG/1
TABLET ORAL
Qty: 90 TABLET | Refills: 0 | OUTPATIENT
Start: 2017-09-11

## 2017-10-09 ENCOUNTER — OFFICE VISIT (OUTPATIENT)
Dept: CARDIOLOGY | Facility: CLINIC | Age: 62
End: 2017-10-09

## 2017-10-09 VITALS
DIASTOLIC BLOOD PRESSURE: 82 MMHG | HEIGHT: 63 IN | HEART RATE: 59 BPM | BODY MASS INDEX: 48.69 KG/M2 | WEIGHT: 274.8 LBS | SYSTOLIC BLOOD PRESSURE: 136 MMHG | OXYGEN SATURATION: 96 %

## 2017-10-09 DIAGNOSIS — I10 ESSENTIAL HYPERTENSION: Primary | ICD-10-CM

## 2017-10-09 DIAGNOSIS — Z79.4 TYPE 2 DIABETES MELLITUS WITHOUT COMPLICATION, WITH LONG-TERM CURRENT USE OF INSULIN (HCC): ICD-10-CM

## 2017-10-09 DIAGNOSIS — F32.9 REACTIVE DEPRESSION: ICD-10-CM

## 2017-10-09 DIAGNOSIS — E11.9 TYPE 2 DIABETES MELLITUS WITHOUT COMPLICATION, WITH LONG-TERM CURRENT USE OF INSULIN (HCC): ICD-10-CM

## 2017-10-09 DIAGNOSIS — E78.2 MIXED HYPERLIPIDEMIA: ICD-10-CM

## 2017-10-09 DIAGNOSIS — IMO0001 CLASS 3 OBESITY WITHOUT SERIOUS COMORBIDITY WITH BODY MASS INDEX (BMI) OF 45.0 TO 49.9 IN ADULT, UNSPECIFIED OBESITY TYPE: ICD-10-CM

## 2017-10-09 PROBLEM — B37.31 VAGINAL CANDIDIASIS: Status: ACTIVE | Noted: 2017-10-09

## 2017-10-09 PROCEDURE — 99214 OFFICE O/P EST MOD 30 MIN: CPT | Performed by: INTERNAL MEDICINE

## 2017-10-09 RX ORDER — METOPROLOL SUCCINATE 100 MG/1
100 TABLET, EXTENDED RELEASE ORAL DAILY
Qty: 90 TABLET | Refills: 0 | Status: SHIPPED | OUTPATIENT
Start: 2017-10-09 | End: 2018-02-23 | Stop reason: SDUPTHER

## 2017-10-09 RX ORDER — ATORVASTATIN CALCIUM 40 MG/1
20 TABLET, FILM COATED ORAL DAILY
Qty: 90 TABLET | Refills: 3 | OUTPATIENT
Start: 2017-10-09 | End: 2020-09-01

## 2017-10-09 RX ORDER — ATORVASTATIN CALCIUM 40 MG/1
40 TABLET, FILM COATED ORAL DAILY
Qty: 90 TABLET | Refills: 3 | Status: SHIPPED | OUTPATIENT
Start: 2017-10-09 | End: 2017-10-09

## 2017-10-09 RX ORDER — VALSARTAN 160 MG/1
80 TABLET ORAL DAILY
Qty: 90 TABLET | Refills: 3 | COMMUNITY
Start: 2017-10-09 | End: 2018-03-22 | Stop reason: SDUPTHER

## 2017-10-09 RX ORDER — HYDROCHLOROTHIAZIDE 12.5 MG/1
12.5 TABLET ORAL DAILY
Qty: 90 TABLET | Refills: 0 | Status: SHIPPED | OUTPATIENT
Start: 2017-10-09 | End: 2018-02-16 | Stop reason: SDUPTHER

## 2017-10-09 RX ORDER — FLUCONAZOLE 200 MG/1
200 TABLET ORAL DAILY
Qty: 6 TABLET | Refills: 3 | Status: SHIPPED | OUTPATIENT
Start: 2017-10-09 | End: 2020-09-01

## 2017-10-09 RX ORDER — DEXLANSOPRAZOLE 60 MG/1
60 CAPSULE, DELAYED RELEASE ORAL DAILY
Qty: 30 CAPSULE | Refills: 3 | Status: SHIPPED | OUTPATIENT
Start: 2017-10-09 | End: 2018-01-25 | Stop reason: SDUPTHER

## 2017-10-09 RX ORDER — PAROXETINE 10 MG/1
10 TABLET, FILM COATED ORAL EVERY MORNING
Qty: 90 TABLET | Refills: 0 | Status: SHIPPED | OUTPATIENT
Start: 2017-10-09 | End: 2020-09-01

## 2017-10-09 NOTE — PROGRESS NOTES
subjective     Chief Complaint   Patient presents with   • Follow-up   • Hypertension   • Fatigue   • Diabetes     History of Present Illness  Patient complains of being tired sleepy and lack of energy.  She is taking Neurontin and Paxil that could be causing it.    She used to take Flexeril which was stopped.    Patient is diabetic and states that sugar is running very high it has been over 470 at home.  She follows the diabetic specialist in [place].  She was advised to contact them meanwhile medications were adjusted.  Patient needs to lose weight.    Hypertension  Patient states that her blood pressure is is much better with valsartan 80 mg daily, Toprol- daily and hydrochlorothiazide 12.5 mg daily.  She is checking her blood pressure closely and is running around 130 to 140 systolic.  Patient is asymptomatic.  She denies any headaches.       Hyperlipidemia  Patient has not been able to lose any weight. She is trying to diet but is having a lot of difficulty. lipids are also significantly abnormal.  Patient is taking Lipitor 40 mg daily.it was increased last visit from 20 mg.  Patient is tolerating it very well .She is not having any side effect of Lipitor.  Cholesterol good at 107.     Obesity  Patient is quite a bit overweight. She is trying to lose weight but has not been very successful. Patient thinks most of it is swelling. There is some dependent edema but that is probably secondary to obesity.    Past Surgical History:   Procedure Laterality Date   • APPENDECTOMY     • CATARACT EXTRACTION Bilateral    • CHOLECYSTECTOMY     • GALLBLADDER SURGERY     • URETHRA SURGERY       Family History   Problem Relation Age of Onset   • Thyroid disease Mother    • Diabetes Mother    • Hyperlipidemia Father    • Hypertension Father    • Heart attack Father    • Alcohol abuse Maternal Aunt    • Cancer Maternal Grandfather      PROSTATE   • Stroke Paternal Grandmother    • Stroke Paternal Grandfather    •  Hypertension Other    • Hypertension Sister    • Breast cancer Sister    • Obesity Sister    • Hypertension Sister    • Hyperlipidemia Sister    • Hyperlipidemia Sister    • Hypertension Sister    • Arthritis Sister    • Obesity Sister    • Thyroid disease Sister      Past Medical History:   Diagnosis Date   • Anxiety    • B12 deficiency    • BPV (benign positional vertigo)    • Diabetes mellitus    • Diarrhea    • Fibromyalgia    • GERD (gastroesophageal reflux disease)    • Glaucoma    • Headache    • History of EKG 06/04/2015    BORDERLINE   • Hyperlipidemia    • Hypertension    • Obesity    • Sleep apnea    • Weakness of left arm      Patient Active Problem List   Diagnosis   • Frequent headaches   • Memory loss   • Essential hypertension   • Hyperlipidemia   • Diabetes mellitus type 2 insulin-dependent   • Sleep apnea   • Glaucoma   • Anxiety   • GERD (gastroesophageal reflux disease)   • Morbid obesity   • Depression   • Vaginal candidiasis       Social History   Substance Use Topics   • Smoking status: Former Smoker     Packs/day: 1.50     Years: 8.00     Types: Cigarettes     Quit date: 1976   • Smokeless tobacco: Never Used   • Alcohol use No      Comment: former social drinker not had anything in 25 + years       Allergies   Allergen Reactions   • Codeine    • Sulfa Antibiotics        Current Outpatient Prescriptions on File Prior to Visit   Medication Sig   • insulin aspart (NovoLOG) 100 UNIT/ML injection Inject  under the skin 3 (three) times a day before meals. 10 units at breakfast 12 units at lunch and 14 units at dinner     • insulin detemir (LEVEMIR) 100 UNIT/ML injection Inject 65 Units under the skin Every Night.   • Lactobacillus (REPHRESH PRO-B) capsule Take  by mouth daily.   • naproxen (EC NAPROSYN) 500 MG EC tablet Take 1 tablet by mouth 2 (Two) Times a Day As Needed for Mild Pain (1-3).   • saccharomyces boulardii (FLORASTOR) 250 MG capsule Take 250 mg by mouth daily.   • vitamin D  "(ERGOCALCIFEROL) 96301 UNITS capsule capsule TAKE 1 CAPSULE BY MOUTH 1 TIME EVERY WEEK     No current facility-administered medications on file prior to visit.          The following portions of the patient's history were reviewed and updated as appropriate: allergies, current medications, past family history, past medical history, past social history, past surgical history and problem list.    Review of Systems   Constitution: Positive for weakness, malaise/fatigue and weight gain.   HENT: Negative.  Negative for congestion.    Eyes: Negative.    Cardiovascular: Positive for dyspnea on exertion and leg swelling. Negative for chest pain, cyanosis, irregular heartbeat, near-syncope, orthopnea, palpitations, paroxysmal nocturnal dyspnea and syncope.   Respiratory: Positive for shortness of breath. Negative for sputum production and wheezing.    Hematologic/Lymphatic: Negative.    Skin: Negative.    Musculoskeletal: Positive for arthritis, back pain, myalgias and stiffness.   Gastrointestinal: Negative.    Genitourinary: Negative.    Neurological: Negative for headaches.   Psychiatric/Behavioral: The patient is nervous/anxious.           Objective:     /82 (BP Location: Left arm, Patient Position: Sitting)  Pulse 59  Ht 63\" (160 cm)  Wt 274 lb 12.8 oz (125 kg)  SpO2 96%  BMI 48.68 kg/m2  Physical Exam   Constitutional: She appears well-developed and well-nourished. No distress.   Obesity   HENT:   Head: Normocephalic and atraumatic.   Mouth/Throat: Oropharynx is clear and moist. No oropharyngeal exudate.   Eyes: Conjunctivae and EOM are normal. Pupils are equal, round, and reactive to light. No scleral icterus.   Neck: Normal range of motion. Neck supple. No JVD present. No tracheal deviation present. No thyromegaly present.   Cardiovascular: Normal rate, regular rhythm, normal heart sounds and intact distal pulses.  PMI is not displaced.  Exam reveals no gallop, no friction rub and no decreased pulses.  "   No murmur heard.  Pulses:       Carotid pulses are 3+ on the right side, and 3+ on the left side.       Radial pulses are 3+ on the right side, and 3+ on the left side.   Pulmonary/Chest: Effort normal and breath sounds normal. No respiratory distress. She has no wheezes. She has no rales. She exhibits no tenderness.   Abdominal: Soft. Bowel sounds are normal. She exhibits no distension, no abdominal bruit and no mass. There is no splenomegaly or hepatomegaly. There is no tenderness. There is no rebound and no guarding.   Musculoskeletal: Normal range of motion. She exhibits no edema, tenderness or deformity.   Lymphadenopathy:     She has no cervical adenopathy.   Neurological: She is alert. She has normal reflexes. No cranial nerve deficit. She exhibits normal muscle tone. Coordination normal.   Skin: Skin is warm and dry. No rash noted. She is not diaphoretic. No erythema.   Psychiatric: She has a normal mood and affect. Her behavior is normal. Judgment and thought content normal.         Lab Review        Procedures       I personally viewed and interpreted the patient's LAB data         Assessment:     1. Essential hypertension    2. Mixed hyperlipidemia    3. Class 3 obesity without serious comorbidity with body mass index (BMI) of 45.0 to 49.9 in adult, unspecified obesity type    4. Type 2 diabetes mellitus without complication, with long-term current use of insulin    5. Reactive depression          Plan:   Blood pressure is very well controlled patient will continue current dose of the Diovan, hydrochlorothiazide and metoprolol.  Lipids much better   decrease lipitor 20  Weight loss was again discussed.    Patient was advised to DC Neurontin that could be causing her to be sleepy she or he has stopped Flexeril.    Diabetic control is poor with hemoglobin A1c over 10.  She was encouraged to return to an endocrinologist for further discussion.  Meanwhile she needs to lose weight and medications adjustment  was discussed.  Follow-up scheduled        No Follow-up on file.

## 2017-12-22 RX ORDER — METOPROLOL SUCCINATE 100 MG/1
TABLET, EXTENDED RELEASE ORAL
Qty: 90 TABLET | Refills: 0 | Status: SHIPPED | OUTPATIENT
Start: 2017-12-22 | End: 2020-09-30

## 2018-01-25 RX ORDER — DEXLANSOPRAZOLE 60 MG/1
CAPSULE, DELAYED RELEASE ORAL
Qty: 30 CAPSULE | Refills: 0 | Status: SHIPPED | OUTPATIENT
Start: 2018-01-25 | End: 2018-03-14 | Stop reason: CLARIF

## 2018-02-01 RX ORDER — METOPROLOL SUCCINATE 100 MG/1
TABLET, EXTENDED RELEASE ORAL
Qty: 90 TABLET | Refills: 0 | OUTPATIENT
Start: 2018-02-01

## 2018-02-16 RX ORDER — HYDROCHLOROTHIAZIDE 12.5 MG/1
TABLET ORAL
Qty: 90 TABLET | Refills: 0 | Status: SHIPPED | OUTPATIENT
Start: 2018-02-16 | End: 2018-05-02 | Stop reason: SDUPTHER

## 2018-02-23 RX ORDER — METOPROLOL SUCCINATE 100 MG/1
100 TABLET, EXTENDED RELEASE ORAL DAILY
Qty: 30 TABLET | Refills: 0 | Status: SHIPPED | OUTPATIENT
Start: 2018-02-23 | End: 2018-03-18 | Stop reason: SDUPTHER

## 2018-02-28 ENCOUNTER — OFFICE VISIT (OUTPATIENT)
Dept: CARDIOLOGY | Facility: CLINIC | Age: 63
End: 2018-02-28

## 2018-02-28 VITALS
DIASTOLIC BLOOD PRESSURE: 82 MMHG | BODY MASS INDEX: 48.9 KG/M2 | HEART RATE: 81 BPM | WEIGHT: 276 LBS | OXYGEN SATURATION: 94 % | HEIGHT: 63 IN | SYSTOLIC BLOOD PRESSURE: 138 MMHG

## 2018-02-28 DIAGNOSIS — K21.9 GASTROESOPHAGEAL REFLUX DISEASE WITHOUT ESOPHAGITIS: ICD-10-CM

## 2018-02-28 DIAGNOSIS — IMO0001 CLASS 3 OBESITY WITHOUT SERIOUS COMORBIDITY WITH BODY MASS INDEX (BMI) OF 45.0 TO 49.9 IN ADULT, UNSPECIFIED OBESITY TYPE: ICD-10-CM

## 2018-02-28 DIAGNOSIS — E78.2 MIXED HYPERLIPIDEMIA: ICD-10-CM

## 2018-02-28 DIAGNOSIS — I10 ESSENTIAL HYPERTENSION: Primary | ICD-10-CM

## 2018-02-28 DIAGNOSIS — Z79.4 TYPE 2 DIABETES MELLITUS WITHOUT COMPLICATION, WITH LONG-TERM CURRENT USE OF INSULIN (HCC): ICD-10-CM

## 2018-02-28 DIAGNOSIS — E11.9 TYPE 2 DIABETES MELLITUS WITHOUT COMPLICATION, WITH LONG-TERM CURRENT USE OF INSULIN (HCC): ICD-10-CM

## 2018-02-28 PROCEDURE — 99213 OFFICE O/P EST LOW 20 MIN: CPT | Performed by: INTERNAL MEDICINE

## 2018-02-28 RX ORDER — INSULIN GLARGINE 300 U/ML
INJECTION, SOLUTION SUBCUTANEOUS
Refills: 11 | COMMUNITY
Start: 2018-02-26 | End: 2020-09-01

## 2018-02-28 RX ORDER — DULAGLUTIDE 1.5 MG/.5ML
INJECTION, SOLUTION SUBCUTANEOUS
Refills: 11 | COMMUNITY
Start: 2018-02-26 | End: 2020-09-01 | Stop reason: SDUPTHER

## 2018-02-28 NOTE — PROGRESS NOTES
subjective     Chief Complaint   Patient presents with   • Follow-up   • Hypertension   • Hyperlipidemia   • Diabetes     History of Present Illness    Patient is 63 years old white female who is a here for follow-up.  She is quite a bit overweight and has a lot of trouble trying to control it.  Patient is not very active due to aching and hurting all over.  Patient has fibromyalgia and cannot do exercise.    Blood pressure is fairly well controlled.  She is taking Diovan and metoprolol.    She has hyperlipidemia and is taking Lipitor.  It causes her muscle aches but she is tolerating it very well.  She is able to take only 20 mg daily.    She is also diabetic under care of for the diabetic specialist.  He recently had lab work including A1c by report is not available as yet.      Patient was supposed to have lab work for me also including lipid panel and CMP but she did not get a chance to have the labs checked.    Anxiety and depression.  She is requiring Paxil.  Not much better but she is coping with it.  Does not wish to have psychiatric counseling.    GI symptoms are better with the dexilant.  Patient still wishes to have Diflucan available because she uses it only when necessary basis for vagina candidiasis      Past Surgical History:   Procedure Laterality Date   • APPENDECTOMY     • CATARACT EXTRACTION Bilateral    • CHOLECYSTECTOMY     • GALLBLADDER SURGERY     • URETHRA SURGERY       Family History   Problem Relation Age of Onset   • Thyroid disease Mother    • Diabetes Mother    • Hyperlipidemia Father    • Hypertension Father    • Heart attack Father    • Alcohol abuse Maternal Aunt    • Cancer Maternal Grandfather      PROSTATE   • Stroke Paternal Grandmother    • Stroke Paternal Grandfather    • Hypertension Other    • Hypertension Sister    • Breast cancer Sister    • Obesity Sister    • Hypertension Sister    • Hyperlipidemia Sister    • Hyperlipidemia Sister    • Hypertension Sister    • Arthritis  Sister    • Obesity Sister    • Thyroid disease Sister      Past Medical History:   Diagnosis Date   • Anxiety    • B12 deficiency    • BPV (benign positional vertigo)    • Diabetes mellitus    • Diarrhea    • Fibromyalgia    • GERD (gastroesophageal reflux disease)    • Glaucoma    • Headache    • History of EKG 06/04/2015    BORDERLINE   • Hyperlipidemia    • Hypertension    • Obesity    • Sleep apnea    • Weakness of left arm      Patient Active Problem List   Diagnosis   • Frequent headaches   • Memory loss   • Essential hypertension   • Hyperlipidemia   • Diabetes mellitus type 2 insulin-dependent   • Sleep apnea   • Glaucoma   • Anxiety   • GERD (gastroesophageal reflux disease)   • Morbid obesity   • Depression   • Vaginal candidiasis       Social History   Substance Use Topics   • Smoking status: Former Smoker     Packs/day: 1.50     Years: 8.00     Types: Cigarettes     Quit date: 1976   • Smokeless tobacco: Never Used   • Alcohol use No      Comment: former social drinker not had anything in 25 + years       Allergies   Allergen Reactions   • Codeine GI Intolerance     Increased heart rate     • Sulfa Antibiotics GI Intolerance     Heart rate increase        Current Outpatient Prescriptions on File Prior to Visit   Medication Sig   • atorvastatin (LIPITOR) 40 MG tablet Take 0.5 tablets by mouth Daily.   • DEXILANT 60 MG capsule TAKE ONE CAPSULE BY MOUTH EVERY DAY   • fluconazole (DIFLUCAN) 200 MG tablet Take 1 tablet by mouth Daily.   • hydrochlorothiazide (HYDRODIURIL) 12.5 MG tablet TAKE 1 TABLET BY MOUTH DAILY   • insulin aspart (NovoLOG) 100 UNIT/ML injection Inject  under the skin 3 (three) times a day before meals. 10 units at breakfast 12 units at lunch and 14 units at dinner     • insulin detemir (LEVEMIR) 100 UNIT/ML injection Inject 65 Units under the skin Every Night.   • Lactobacillus (REPHRESH PRO-B) capsule Take  by mouth daily.   • metFORMIN (GLUCOPHAGE) 1000 MG tablet Take 1 tablet by mouth  "2 (Two) Times a Day With Meals.   • metoprolol succinate XL (TOPROL-XL) 100 MG 24 hr tablet TAKE 1 TABLET BY MOUTH DAILY   • metoprolol succinate XL (TOPROL-XL) 100 MG 24 hr tablet Take 1 tablet by mouth Daily.   • naproxen (EC NAPROSYN) 500 MG EC tablet Take 1 tablet by mouth 2 (Two) Times a Day As Needed for Mild Pain (1-3).   • PARoxetine (PAXIL) 10 MG tablet Take 1 tablet by mouth Every Morning.   • saccharomyces boulardii (FLORASTOR) 250 MG capsule Take 250 mg by mouth daily.   • valsartan (DIOVAN) 160 MG tablet Take 0.5 tablets by mouth Daily.   • vitamin D (ERGOCALCIFEROL) 35924 UNITS capsule capsule TAKE 1 CAPSULE BY MOUTH 1 TIME EVERY WEEK   • [DISCONTINUED] Dulaglutide (TRULICITY) 0.75 MG/0.5ML solution pen-injector Inject 0.5 mL under the skin 1 (One) Time Per Week.     No current facility-administered medications on file prior to visit.          The following portions of the patient's history were reviewed and updated as appropriate: allergies, current medications, past family history, past medical history, past social history, past surgical history and problem list.    Review of Systems   Constitution: Positive for weakness, malaise/fatigue and weight gain.   HENT: Negative.  Negative for congestion.    Eyes: Negative.    Cardiovascular: Positive for dyspnea on exertion. Negative for chest pain, cyanosis, irregular heartbeat, leg swelling, near-syncope, orthopnea, palpitations, paroxysmal nocturnal dyspnea and syncope.   Respiratory: Negative.  Negative for shortness of breath.    Hematologic/Lymphatic: Negative.    Skin: Negative.    Musculoskeletal: Positive for arthritis, back pain and myalgias.   Gastrointestinal: Negative.    Genitourinary: Negative.    Neurological: Negative for headaches.   Psychiatric/Behavioral: Positive for depression. The patient is nervous/anxious.           Objective:     /82  Pulse 81  Ht 160 cm (62.99\")  Wt 125 kg (276 lb)  SpO2 94%  BMI 48.9 kg/m2  Physical " Exam   Constitutional: She appears well-developed and well-nourished. No distress.   HENT:   Head: Normocephalic and atraumatic.   Mouth/Throat: Oropharynx is clear and moist. No oropharyngeal exudate.   Eyes: Conjunctivae and EOM are normal. Pupils are equal, round, and reactive to light. No scleral icterus.   Neck: Normal range of motion. Neck supple. No JVD present. No tracheal deviation present. No thyromegaly present.   Cardiovascular: Normal rate, regular rhythm, normal heart sounds and intact distal pulses.  PMI is not displaced.  Exam reveals no gallop, no friction rub and no decreased pulses.    No murmur heard.  Pulses:       Carotid pulses are 3+ on the right side, and 3+ on the left side.       Radial pulses are 3+ on the right side, and 3+ on the left side.   Pulmonary/Chest: Effort normal and breath sounds normal. No respiratory distress. She has no wheezes. She has no rales. She exhibits no tenderness.   Abdominal: Soft. Bowel sounds are normal. She exhibits no distension, no abdominal bruit and no mass. There is no splenomegaly or hepatomegaly. There is no tenderness. There is no rebound and no guarding.   Musculoskeletal: Normal range of motion. She exhibits no edema, tenderness or deformity.   Lymphadenopathy:     She has no cervical adenopathy.   Neurological: She is alert. She has normal reflexes. No cranial nerve deficit. She exhibits normal muscle tone. Coordination normal.   Skin: Skin is warm and dry. No rash noted. She is not diaphoretic. No erythema.   Psychiatric: She has a normal mood and affect. Her behavior is normal. Judgment and thought content normal.         Lab Review      Procedures       I personally viewed and interpreted the patient's LAB data         Assessment:     1. Essential hypertension    2. Mixed hyperlipidemia    3. Class 3 obesity without serious comorbidity with body mass index (BMI) of 45.0 to 49.9 in adult, unspecified obesity type    4. Type 2 diabetes mellitus  without complication, with long-term current use of insulin    5. Gastroesophageal reflux disease without esophagitis          Plan:      Blood pressure is very well controlled.  Patient will continue Diovan, hydrochlorothiazide and Toprol.    Palpitations  also controlled with Toprol.  No change in therapy.    She is tolerating Lipitor.  No lab work available.  She will have lab work done today or tomorrow.  Dexilant, Diflucan and Paxil was also continued.    Weight loss and healthy lifestyle again emphasized.  Follow-up scheduled        Return in about 3 months (around 5/28/2018).

## 2018-03-14 ENCOUNTER — TELEPHONE (OUTPATIENT)
Dept: CARDIOLOGY | Facility: CLINIC | Age: 63
End: 2018-03-14

## 2018-03-14 DIAGNOSIS — K21.9 GASTROESOPHAGEAL REFLUX DISEASE WITHOUT ESOPHAGITIS: Primary | ICD-10-CM

## 2018-03-14 RX ORDER — PANTOPRAZOLE SODIUM 40 MG/1
40 TABLET, DELAYED RELEASE ORAL DAILY
Qty: 90 TABLET | Refills: 0 | Status: SHIPPED | OUTPATIENT
Start: 2018-03-14 | End: 2018-06-11 | Stop reason: SDUPTHER

## 2018-03-14 RX ORDER — PANTOPRAZOLE SODIUM 40 MG/1
40 GRANULE, DELAYED RELEASE ORAL
Qty: 90 EACH | Refills: 0 | Status: CANCELLED | OUTPATIENT
Start: 2018-03-14

## 2018-03-14 NOTE — TELEPHONE ENCOUNTER
----- Message from Agatha Miller MD sent at 3/14/2018  1:05 PM EDT -----  Regarding: RE: Meds   Protonix 40 mg daily and GI consult with her GI doctor  ----- Message -----  From: Francheska Mcneil MA  Sent: 3/14/2018  12:59 PM  To: Agatha Miller MD  Subject: Meds                                             Insurance will not pay for Dexilant. What would you like to change it to?

## 2018-03-19 RX ORDER — METOPROLOL SUCCINATE 100 MG/1
100 TABLET, EXTENDED RELEASE ORAL DAILY
Qty: 30 TABLET | Refills: 5 | Status: SHIPPED | OUTPATIENT
Start: 2018-03-19 | End: 2020-09-01 | Stop reason: SDUPTHER

## 2018-03-22 RX ORDER — VALSARTAN 160 MG/1
80 TABLET ORAL DAILY
Qty: 45 TABLET | Refills: 0 | Status: SHIPPED | OUTPATIENT
Start: 2018-03-22 | End: 2020-09-01

## 2018-03-22 RX ORDER — ERGOCALCIFEROL 1.25 MG/1
50000 CAPSULE ORAL WEEKLY
Qty: 12 CAPSULE | Refills: 0 | Status: SHIPPED | OUTPATIENT
Start: 2018-03-22 | End: 2018-05-30 | Stop reason: SDUPTHER

## 2018-05-02 RX ORDER — HYDROCHLOROTHIAZIDE 12.5 MG/1
TABLET ORAL
Qty: 90 TABLET | Refills: 0 | Status: SHIPPED | OUTPATIENT
Start: 2018-05-02 | End: 2018-08-26 | Stop reason: SDUPTHER

## 2018-05-30 RX ORDER — ERGOCALCIFEROL 1.25 MG/1
CAPSULE ORAL
Qty: 12 CAPSULE | Refills: 0 | Status: SHIPPED | OUTPATIENT
Start: 2018-05-30 | End: 2018-11-20 | Stop reason: SDUPTHER

## 2018-06-11 RX ORDER — PANTOPRAZOLE SODIUM 40 MG/1
40 TABLET, DELAYED RELEASE ORAL DAILY
Qty: 90 TABLET | Refills: 0 | Status: SHIPPED | OUTPATIENT
Start: 2018-06-11 | End: 2020-09-01

## 2018-08-27 RX ORDER — HYDROCHLOROTHIAZIDE 12.5 MG/1
TABLET ORAL
Qty: 30 TABLET | Refills: 0 | Status: SHIPPED | OUTPATIENT
Start: 2018-08-27 | End: 2020-09-17 | Stop reason: SDUPTHER

## 2018-09-28 RX ORDER — METOPROLOL SUCCINATE 100 MG/1
TABLET, EXTENDED RELEASE ORAL
Qty: 30 TABLET | Refills: 3 | Status: SHIPPED | OUTPATIENT
Start: 2018-09-28 | End: 2020-12-28 | Stop reason: SDUPTHER

## 2018-11-21 RX ORDER — ERGOCALCIFEROL 1.25 MG/1
CAPSULE ORAL
Qty: 4 CAPSULE | Refills: 0 | Status: SHIPPED | OUTPATIENT
Start: 2018-11-21 | End: 2019-02-05 | Stop reason: SDUPTHER

## 2019-01-21 RX ORDER — ERGOCALCIFEROL 1.25 MG/1
CAPSULE ORAL
Qty: 4 CAPSULE | Refills: 0 | OUTPATIENT
Start: 2019-01-21

## 2019-01-28 RX ORDER — ERGOCALCIFEROL 1.25 MG/1
CAPSULE ORAL
Qty: 4 CAPSULE | Refills: 0 | OUTPATIENT
Start: 2019-01-28

## 2019-02-04 RX ORDER — ERGOCALCIFEROL 1.25 MG/1
CAPSULE ORAL
Qty: 4 CAPSULE | Refills: 0 | OUTPATIENT
Start: 2019-02-04

## 2019-02-05 RX ORDER — ERGOCALCIFEROL 1.25 MG/1
CAPSULE ORAL
Qty: 4 CAPSULE | Refills: 0 | Status: SHIPPED | OUTPATIENT
Start: 2019-02-05 | End: 2019-02-26 | Stop reason: SDUPTHER

## 2019-02-25 RX ORDER — ERGOCALCIFEROL 1.25 MG/1
CAPSULE ORAL
Qty: 4 CAPSULE | Refills: 0 | OUTPATIENT
Start: 2019-02-25

## 2019-02-27 RX ORDER — ERGOCALCIFEROL 1.25 MG/1
CAPSULE ORAL
Qty: 4 CAPSULE | Refills: 0 | Status: SHIPPED | OUTPATIENT
Start: 2019-02-27 | End: 2019-03-25 | Stop reason: SDUPTHER

## 2019-03-18 RX ORDER — ERGOCALCIFEROL 1.25 MG/1
CAPSULE ORAL
Qty: 4 CAPSULE | Refills: 0 | OUTPATIENT
Start: 2019-03-18

## 2019-03-25 RX ORDER — ERGOCALCIFEROL 1.25 MG/1
CAPSULE ORAL
Qty: 4 CAPSULE | Refills: 0 | Status: SHIPPED | OUTPATIENT
Start: 2019-03-25 | End: 2020-09-30

## 2019-04-22 RX ORDER — ERGOCALCIFEROL 1.25 MG/1
CAPSULE ORAL
Qty: 4 CAPSULE | Refills: 0 | OUTPATIENT
Start: 2019-04-22

## 2020-09-01 ENCOUNTER — OFFICE VISIT (OUTPATIENT)
Dept: FAMILY MEDICINE CLINIC | Facility: CLINIC | Age: 65
End: 2020-09-01

## 2020-09-01 VITALS
SYSTOLIC BLOOD PRESSURE: 180 MMHG | WEIGHT: 273 LBS | BODY MASS INDEX: 48.37 KG/M2 | HEART RATE: 58 BPM | DIASTOLIC BLOOD PRESSURE: 100 MMHG | OXYGEN SATURATION: 97 % | TEMPERATURE: 96.9 F | HEIGHT: 63 IN

## 2020-09-01 DIAGNOSIS — E78.2 MIXED HYPERLIPIDEMIA: ICD-10-CM

## 2020-09-01 DIAGNOSIS — R51.9 FREQUENT HEADACHES: ICD-10-CM

## 2020-09-01 DIAGNOSIS — Z79.4 TYPE 2 DIABETES MELLITUS WITH HYPERGLYCEMIA, WITH LONG-TERM CURRENT USE OF INSULIN (HCC): ICD-10-CM

## 2020-09-01 DIAGNOSIS — Z79.4 TYPE 2 DIABETES MELLITUS WITH DIABETIC NEUROPATHY, WITH LONG-TERM CURRENT USE OF INSULIN (HCC): ICD-10-CM

## 2020-09-01 DIAGNOSIS — I10 ESSENTIAL HYPERTENSION: Primary | ICD-10-CM

## 2020-09-01 DIAGNOSIS — E55.9 VITAMIN D DEFICIENCY: ICD-10-CM

## 2020-09-01 DIAGNOSIS — I63.49 CEREBROVASCULAR ACCIDENT (CVA) DUE TO EMBOLISM OF OTHER CEREBRAL ARTERY (HCC): ICD-10-CM

## 2020-09-01 DIAGNOSIS — K21.9 GASTROESOPHAGEAL REFLUX DISEASE WITHOUT ESOPHAGITIS: ICD-10-CM

## 2020-09-01 DIAGNOSIS — E11.65 TYPE 2 DIABETES MELLITUS WITH HYPERGLYCEMIA, WITH LONG-TERM CURRENT USE OF INSULIN (HCC): ICD-10-CM

## 2020-09-01 DIAGNOSIS — E66.01 CLASS 3 SEVERE OBESITY DUE TO EXCESS CALORIES WITH SERIOUS COMORBIDITY IN ADULT, UNSPECIFIED BMI (HCC): ICD-10-CM

## 2020-09-01 DIAGNOSIS — E11.40 TYPE 2 DIABETES MELLITUS WITH DIABETIC NEUROPATHY, WITH LONG-TERM CURRENT USE OF INSULIN (HCC): ICD-10-CM

## 2020-09-01 PROBLEM — R29.898 WEAKNESS OF LEFT ARM: Status: ACTIVE | Noted: 2020-09-01

## 2020-09-01 PROBLEM — B37.31 VAGINAL CANDIDIASIS: Status: RESOLVED | Noted: 2017-10-09 | Resolved: 2020-09-01

## 2020-09-01 PROBLEM — I63.9 CEREBROVASCULAR ACCIDENT (CVA) DUE TO EMBOLISM: Status: ACTIVE | Noted: 2020-09-01

## 2020-09-01 PROCEDURE — 99204 OFFICE O/P NEW MOD 45 MIN: CPT | Performed by: NURSE PRACTITIONER

## 2020-09-01 RX ORDER — LOSARTAN POTASSIUM 50 MG/1
50 TABLET ORAL DAILY
COMMUNITY
End: 2020-11-24 | Stop reason: SDUPTHER

## 2020-09-01 RX ORDER — ATORVASTATIN CALCIUM 20 MG/1
20 TABLET, FILM COATED ORAL DAILY
COMMUNITY
End: 2021-03-29 | Stop reason: SDUPTHER

## 2020-09-01 RX ORDER — ASPIRIN 81 MG/1
81 TABLET ORAL DAILY
COMMUNITY
End: 2021-03-29 | Stop reason: SDUPTHER

## 2020-09-01 RX ORDER — INSULIN ASPART 100 [IU]/ML
15 INJECTION, SUSPENSION SUBCUTANEOUS 3 TIMES DAILY
COMMUNITY
End: 2020-09-30

## 2020-09-01 RX ORDER — INSULIN GLARGINE 100 [IU]/ML
68 INJECTION, SOLUTION SUBCUTANEOUS NIGHTLY
COMMUNITY
End: 2020-12-08 | Stop reason: SDUPTHER

## 2020-09-01 RX ORDER — PANTOPRAZOLE SODIUM 40 MG/1
40 TABLET, DELAYED RELEASE ORAL DAILY
COMMUNITY
End: 2020-09-30 | Stop reason: SDUPTHER

## 2020-09-01 NOTE — ASSESSMENT & PLAN NOTE
Check bp at least daily and keep a log. Report any elevation over 150/90 or less than 100/60. Eat a low salt diet. Work on weight loss.

## 2020-09-01 NOTE — PROGRESS NOTES
Subjective   Ronna Wayne is a 65 y.o. female.     No chief complaint on file.     Diabetes  GERD  Hypertension    History of Present Illness:  Patient has been staying in [facility] for 2 years to visit with her son and grandsons.    Stroke one year ago in April. Says she feels tired all the time and hungry all the time. Wants to sleep more than usual. Not seeing cardiology at this time or neurology. Not had Lipitor for over a month.     Dm -not controlled.  States she does not always take all of her medication because she is afraid her sugar will be low.  At times her blood sugar is 400.  She is not following a diabetic diet at this time.  Her currently prescribed medications include NovoLog 70/30 15 units 3 times daily with meals, Basaglar 70 units daily, metformin 1000 mg twice daily.  She does report that she feels tired and weak all the time.  States she can just sit down and fall asleep.  She does need to go avoid often.  Thirsty all the time.  No recent labs.  Not been to the doctor in a while.    Morbid obesity-patient reports that her weight is fairly stable around 270-275 pounds.  Her BMI is greater than 48.  She does not follow a particular diet.    Essential hypertension-blood pressure is elevated today upon initial reading.  Patient reports that she takes Cozaar, metoprolol.  She does eat a high salt high fat diet.  Does have a blood pressure monitor at home but has not really been checking it.    GERD-worse after a large meal    Frequent headaches- usually after she eats.  All over kind of pressure feeling.        The following portions of the patient's history and ROS were reviewed and updated as appropriate per provider:  Allergies, current medications, past family history, past medical history, past social history, past surgical history and problem list.    Review of Systems   Constitutional: Positive for activity change and fatigue. Negative for appetite change, chills and fever.  "  HENT: Negative for congestion, sinus pressure, sinus pain, sneezing, sore throat and trouble swallowing.    Eyes: Negative for pain, discharge and itching.   Respiratory: Negative for cough, chest tightness, shortness of breath and wheezing.    Cardiovascular: Positive for leg swelling (Feet swell when she is up on them for any length of time). Negative for chest pain and palpitations.   Gastrointestinal: Negative for abdominal pain, anal bleeding, blood in stool, constipation, diarrhea and vomiting.   Endocrine: Positive for polydipsia, polyphagia and polyuria. Negative for cold intolerance and heat intolerance.   Genitourinary: Negative for difficulty urinating, dysuria, frequency and urgency.   Musculoskeletal: Positive for arthralgias and back pain. Negative for myalgias and neck pain.   Skin: Negative.    Allergic/Immunologic: Positive for environmental allergies and immunocompromised state. Negative for food allergies.   Neurological: Negative for dizziness, seizures, speech difficulty, light-headedness and headaches.   Hematological: Negative.    Psychiatric/Behavioral: Positive for sleep disturbance (Sleepy all the time). Negative for dysphoric mood, self-injury and suicidal ideas.       Objective     Repeat bp 160/90     /100   Pulse 58   Temp 96.9 °F (36.1 °C) (Temporal)   Ht 160 cm (63\")   Wt 124 kg (273 lb)   SpO2 97%   BMI 48.36 kg/m²   Admission on 03/16/2017, Discharged on 03/16/2017   Component Date Value Ref Range Status   • WBC 03/16/2017 9.92  4.50 - 12.50 10*3/mm3 Final   • RBC 03/16/2017 4.21  4.20 - 5.40 10*6/mm3 Final   • Hemoglobin 03/16/2017 13.6  12.0 - 16.0 g/dL Final   • Hematocrit 03/16/2017 41.2  37.0 - 47.0 % Final   • MCV 03/16/2017 97.9* 80.0 - 94.0 fL Final   • MCH 03/16/2017 32.3  27.0 - 33.0 pg Final   • MCHC 03/16/2017 33.0  33.0 - 37.0 g/dL Final   • RDW 03/16/2017 12.8  11.5 - 14.5 % Final   • RDW-SD 03/16/2017 44.3  37.0 - 54.0 fl Final   • MPV 03/16/2017 10.6* " 6.0 - 10.0 fL Final   • Platelets 03/16/2017 201  130 - 400 10*3/mm3 Final   • Neutrophil % 03/16/2017 50.7  30.0 - 70.0 % Final   • Lymphocyte % 03/16/2017 38.0  21.0 - 51.0 % Final   • Monocyte % 03/16/2017 9.8  0.0 - 10.0 % Final   • Eosinophil % 03/16/2017 0.6  0.0 - 5.0 % Final   • Basophil % 03/16/2017 0.6  0.0 - 2.0 % Final   • Immature Grans % 03/16/2017 0.3  0.0 - 0.5 % Final   • Neutrophils, Absolute 03/16/2017 5.03  1.40 - 6.50 10*3/mm3 Final   • Lymphocytes, Absolute 03/16/2017 3.77* 1.00 - 3.00 10*3/mm3 Final   • Monocytes, Absolute 03/16/2017 0.97* 0.10 - 0.90 10*3/mm3 Final   • Eosinophils, Absolute 03/16/2017 0.06  0.00 - 0.70 10*3/mm3 Final   • Basophils, Absolute 03/16/2017 0.06  0.00 - 0.30 10*3/mm3 Final   • Immature Grans, Absolute 03/16/2017 0.03  0.00 - 0.03 10*3/mm3 Final       Physical Exam   Constitutional: She is oriented to person, place, and time. She appears well-developed and well-nourished. She is cooperative. She does not have a sickly appearance. She does not appear ill. No distress.   Elevated blood pressure on initial reading at 180/100.  Reevaluated per provider at 160/90 following sitting in exam room for 15 minutes.   HENT:   Head: Atraumatic. Hair is normal.   Right Ear: Hearing, tympanic membrane, external ear and ear canal normal.   Left Ear: Hearing, tympanic membrane, external ear and ear canal normal.   Nose: Nose normal. Right sinus exhibits no frontal sinus tenderness. Left sinus exhibits no frontal sinus tenderness.   Mouth/Throat: Oropharynx is clear and moist. No oropharyngeal exudate.   Mask removed for brief oral exam   Eyes: Pupils are equal, round, and reactive to light. Conjunctivae and lids are normal. Right eye exhibits no discharge. Left eye exhibits no discharge. Right conjunctiva is not injected. Left conjunctiva is not injected.   Neck: Neck supple. No spinous process tenderness and no muscular tenderness present. Carotid bruit is not present. No  thyromegaly present.   Cardiovascular: Normal rate, regular rhythm, normal heart sounds and normal pulses.   No murmur heard.  Pulmonary/Chest: Effort normal and breath sounds normal. No respiratory distress. She has no wheezes. She exhibits no tenderness.   Abdominal: Soft. Normal appearance and bowel sounds are normal. She exhibits no mass. There is no tenderness.   Musculoskeletal:        Right ankle: She exhibits swelling. She exhibits normal pulse.        Left ankle: She exhibits swelling. She exhibits normal pulse.        Lumbar back: She exhibits tenderness.   No stiffness in hips, low back and knees.  Mild nonpitting edema in both feet and ankles    Ronna had a diabetic foot exam performed today.  Vascular Status -  Her right foot exhibits abnormal foot edema. Her right foot exhibits normal foot vasculature . Her left foot exhibits abnormal foot edema. Her left foot exhibits normal foot vasculature .  Skin Integrity  -  Her right foot skin is intact.Her left foot skin is intact..  Lymphadenopathy:        Head (right side): No submental and no submandibular adenopathy present.        Head (left side): No submental and no submandibular adenopathy present.     She has no cervical adenopathy.   Neurological: She is alert and oriented to person, place, and time. She is not disoriented. No cranial nerve deficit or sensory deficit.   Skin: Skin is warm, dry and intact. Capillary refill takes less than 2 seconds. No rash noted. She is not diaphoretic. No cyanosis or erythema. No pallor. Nails show no clubbing.   Psychiatric: She has a normal mood and affect. Her speech is normal and behavior is normal. Judgment and thought content normal. Cognition and memory are normal.       Assessment/Plan      Problem List Items Addressed This Visit        Cardiovascular and Mediastinum    Essential hypertension - Primary    Current Assessment & Plan     Check bp at least daily and keep a log. Report any elevation over 150/90  or less than 100/60. Eat a low salt diet. Work on weight loss.          Relevant Medications    losartan (COZAAR) 50 MG tablet    aspirin 81 MG EC tablet    Other Relevant Orders    Ambulatory Referral to Cardiology    CBC & Differential    Comprehensive Metabolic Panel    TSH    Hemoglobin A1c    Vitamin D 25 Hydroxy    Vitamin B12    Lipid Panel    MicroAlbumin, Urine, Random - Urine, Clean Catch    Hyperlipidemia    Relevant Medications    atorvastatin (LIPITOR) 20 MG tablet    Other Relevant Orders    CBC & Differential    Comprehensive Metabolic Panel    TSH    Hemoglobin A1c    Vitamin D 25 Hydroxy    Vitamin B12    Lipid Panel    MicroAlbumin, Urine, Random - Urine, Clean Catch    Cerebrovascular accident (CVA) due to embolism (CMS/[facility])       Digestive    GERD (gastroesophageal reflux disease)    Relevant Medications    pantoprazole (PROTONIX) 40 MG EC tablet    Probiotic Product (PRO-BIOTIC BLEND PO)    Other Relevant Orders    CBC & Differential    Comprehensive Metabolic Panel    TSH    Hemoglobin A1c    Vitamin D 25 Hydroxy    Vitamin B12    Lipid Panel    MicroAlbumin, Urine, Random - Urine, Clean Catch    Morbid obesity    Current Assessment & Plan     Obesity is unchanged.  Discussed the patient's BMI.  The BMI is above average; BMI management plan is completed.  General weight loss/lifestyle modification strategies discussed (elicit support from others; identify saboteurs; non-food rewards, etc).  Informal exercise measures discussed, e.g. taking stairs instead of elevator.         Relevant Orders    CBC & Differential    Comprehensive Metabolic Panel    TSH    Hemoglobin A1c    Vitamin D 25 Hydroxy    Vitamin B12    Lipid Panel    MicroAlbumin, Urine, Random - Urine, Clean Catch       Endocrine    Type 2 diabetes mellitus with hyperglycemia, with long-term current use of insulin (CMS/[facility])    Current Assessment & Plan     Diabetes is worsening.   Reminded to bring in blood sugar diary at next  visit.  Dietary recommendations for ADA diet.  Regular aerobic exercise.  Discussed ways to avoid symptomatic hypoglycemia.  Discussed sick day management.  Discussed foot care.  Diabetes educator referral.  Diabetes will be reassessed a few weeks.    Avoid potatoes, sweets and breads           Relevant Medications    insulin aspart prot-insulin aspart (novoLOG 70/30) (70-30) 100 UNIT/ML injection    Insulin Glargine (BASAGLAR KWIKPEN) 100 UNIT/ML injection pen    aspirin 81 MG EC tablet    Empagliflozin (Jardiance) 10 MG tablet       Nervous and Auditory    Frequent headaches      Other Visit Diagnoses     Vitamin D deficiency        Relevant Orders    Vitamin D 25 Hydroxy          Has bp machine and will start keeping a daily log. Will report any reading > 140/90. Start back on HCTZ. Low salt diet recommended.        Patient's Body mass index is 48.36 kg/m². BMI is above normal parameters. Recommendations include: exercise counseling and nutrition counseling.    Ronna Wayne  reports that she quit smoking about 44 years ago. Her smoking use included cigarettes. She has a 12.00 pack-year smoking history. She has never used smokeless tobacco.. I have educated her on the risk of diseases from using tobacco products such as cancer, COPD and heart diease.     Pt has been educated/instructed on diabetic care and protocols.  Diabetic diet instructions provided.  Medication regimen reviewed.  Discussed possible side effects and interactions of current medications.  Sick day rules reviewed. Continue to monitor blood sugar and report abnormal readings as discussed today. Understanding stated by patient.     Pt has been instructed today regarding low fat heart smart diet. Weight management and routine exercise has been recommended. Avoid high fat foods, starchy foods and processed foods. Increase lean meats, fresh vegetables and fresh fruits.      I have discussed diagnosis in detail today allowing time for questions  and answers. Patient is aware of reasons to seek urgent or emergent medical care as well as reasons to return to the clinic for evaluation. Possible side effects, interactions and progression of symptoms discussed as well. Patient / family states understanding.   Emotional support and active listening provided.       Refer for diabetic education.  Strict diabetic diet encouraged.  1800-calorie a day diabetic diet sheet provided.  Carb counting with diabetes education sheet provided.  Fasting labs this week.  Add Jardiance.      Like to see her back in 2-4 weeks, sooner if needed.  We will request medical records and further adjust her medications as needed.          This document has been electronically signed by:  BOSTON Estrada, NP-C

## 2020-09-01 NOTE — ASSESSMENT & PLAN NOTE
Diabetes is worsening.   Reminded to bring in blood sugar diary at next visit.  Dietary recommendations for ADA diet.  Regular aerobic exercise.  Discussed ways to avoid symptomatic hypoglycemia.  Discussed sick day management.  Discussed foot care.  Diabetes educator referral.  Diabetes will be reassessed a few weeks.    Avoid potatoes, sweets and breads

## 2020-09-04 ENCOUNTER — LAB (OUTPATIENT)
Dept: FAMILY MEDICINE CLINIC | Facility: CLINIC | Age: 65
End: 2020-09-04

## 2020-09-04 DIAGNOSIS — K21.9 GASTROESOPHAGEAL REFLUX DISEASE WITHOUT ESOPHAGITIS: ICD-10-CM

## 2020-09-04 DIAGNOSIS — I10 ESSENTIAL HYPERTENSION: ICD-10-CM

## 2020-09-04 DIAGNOSIS — E66.01 CLASS 3 SEVERE OBESITY DUE TO EXCESS CALORIES WITH SERIOUS COMORBIDITY IN ADULT, UNSPECIFIED BMI (HCC): ICD-10-CM

## 2020-09-04 DIAGNOSIS — E78.2 MIXED HYPERLIPIDEMIA: ICD-10-CM

## 2020-09-04 DIAGNOSIS — E55.9 VITAMIN D DEFICIENCY: ICD-10-CM

## 2020-09-04 DIAGNOSIS — Z79.4 TYPE 2 DIABETES MELLITUS WITH DIABETIC NEUROPATHY, WITH LONG-TERM CURRENT USE OF INSULIN (HCC): ICD-10-CM

## 2020-09-04 DIAGNOSIS — E11.40 TYPE 2 DIABETES MELLITUS WITH DIABETIC NEUROPATHY, WITH LONG-TERM CURRENT USE OF INSULIN (HCC): ICD-10-CM

## 2020-09-04 LAB
25(OH)D3 SERPL-MCNC: 27.9 NG/ML (ref 30–100)
ALBUMIN SERPL-MCNC: 3.8 G/DL (ref 3.5–5.2)
ALBUMIN UR-MCNC: 2.4 MG/DL
ALBUMIN/GLOB SERPL: 1.1 G/DL
ALP SERPL-CCNC: 79 U/L (ref 39–117)
ALT SERPL W P-5'-P-CCNC: 24 U/L (ref 1–33)
ANION GAP SERPL CALCULATED.3IONS-SCNC: 12.2 MMOL/L (ref 5–15)
AST SERPL-CCNC: 36 U/L (ref 1–32)
BASOPHILS # BLD AUTO: 0.04 10*3/MM3 (ref 0–0.2)
BASOPHILS NFR BLD AUTO: 0.7 % (ref 0–1.5)
BILIRUB SERPL-MCNC: 0.7 MG/DL (ref 0–1.2)
BUN SERPL-MCNC: 23 MG/DL (ref 8–23)
BUN/CREAT SERPL: 20.2 (ref 7–25)
CALCIUM SPEC-SCNC: 9.7 MG/DL (ref 8.6–10.5)
CHLORIDE SERPL-SCNC: 104 MMOL/L (ref 98–107)
CHOLEST SERPL-MCNC: 188 MG/DL (ref 0–200)
CO2 SERPL-SCNC: 21.8 MMOL/L (ref 22–29)
CREAT SERPL-MCNC: 1.14 MG/DL (ref 0.57–1)
DEPRECATED RDW RBC AUTO: 48.1 FL (ref 37–54)
EOSINOPHIL # BLD AUTO: 0.06 10*3/MM3 (ref 0–0.4)
EOSINOPHIL NFR BLD AUTO: 1 % (ref 0.3–6.2)
ERYTHROCYTE [DISTWIDTH] IN BLOOD BY AUTOMATED COUNT: 13.7 % (ref 12.3–15.4)
GFR SERPL CREATININE-BSD FRML MDRD: 48 ML/MIN/1.73
GLOBULIN UR ELPH-MCNC: 3.6 GM/DL
GLUCOSE SERPL-MCNC: 94 MG/DL (ref 65–99)
HBA1C MFR BLD: 13.1 % (ref 4.8–5.6)
HCT VFR BLD AUTO: 38.6 % (ref 34–46.6)
HDLC SERPL-MCNC: 44 MG/DL (ref 40–60)
HGB BLD-MCNC: 13.1 G/DL (ref 12–15.9)
IMM GRANULOCYTES # BLD AUTO: 0.02 10*3/MM3 (ref 0–0.05)
IMM GRANULOCYTES NFR BLD AUTO: 0.3 % (ref 0–0.5)
LDLC SERPL CALC-MCNC: 113 MG/DL (ref 0–100)
LDLC/HDLC SERPL: 2.57 {RATIO}
LYMPHOCYTES # BLD AUTO: 2.66 10*3/MM3 (ref 0.7–3.1)
LYMPHOCYTES NFR BLD AUTO: 43.9 % (ref 19.6–45.3)
MCH RBC QN AUTO: 32.4 PG (ref 26.6–33)
MCHC RBC AUTO-ENTMCNC: 33.9 G/DL (ref 31.5–35.7)
MCV RBC AUTO: 95.5 FL (ref 79–97)
MONOCYTES # BLD AUTO: 0.79 10*3/MM3 (ref 0.1–0.9)
MONOCYTES NFR BLD AUTO: 13 % (ref 5–12)
NEUTROPHILS NFR BLD AUTO: 2.49 10*3/MM3 (ref 1.7–7)
NEUTROPHILS NFR BLD AUTO: 41.1 % (ref 42.7–76)
NRBC BLD AUTO-RTO: 0 /100 WBC (ref 0–0.2)
PLATELET # BLD AUTO: 151 10*3/MM3 (ref 140–450)
PMV BLD AUTO: 10.9 FL (ref 6–12)
POTASSIUM SERPL-SCNC: 4.6 MMOL/L (ref 3.5–5.2)
PROT SERPL-MCNC: 7.4 G/DL (ref 6–8.5)
RBC # BLD AUTO: 4.04 10*6/MM3 (ref 3.77–5.28)
SODIUM SERPL-SCNC: 138 MMOL/L (ref 136–145)
TRIGL SERPL-MCNC: 154 MG/DL (ref 0–150)
TSH SERPL DL<=0.05 MIU/L-ACNC: 2.4 UIU/ML (ref 0.27–4.2)
VIT B12 BLD-MCNC: 751 PG/ML (ref 211–946)
VLDLC SERPL-MCNC: 30.8 MG/DL (ref 5–40)
WBC # BLD AUTO: 6.06 10*3/MM3 (ref 3.4–10.8)

## 2020-09-04 PROCEDURE — 82043 UR ALBUMIN QUANTITATIVE: CPT | Performed by: NURSE PRACTITIONER

## 2020-09-04 PROCEDURE — 85025 COMPLETE CBC W/AUTO DIFF WBC: CPT | Performed by: NURSE PRACTITIONER

## 2020-09-04 PROCEDURE — 83036 HEMOGLOBIN GLYCOSYLATED A1C: CPT | Performed by: NURSE PRACTITIONER

## 2020-09-04 PROCEDURE — 82607 VITAMIN B-12: CPT | Performed by: NURSE PRACTITIONER

## 2020-09-04 PROCEDURE — 80061 LIPID PANEL: CPT | Performed by: NURSE PRACTITIONER

## 2020-09-04 PROCEDURE — 84443 ASSAY THYROID STIM HORMONE: CPT | Performed by: NURSE PRACTITIONER

## 2020-09-04 PROCEDURE — 80053 COMPREHEN METABOLIC PANEL: CPT | Performed by: NURSE PRACTITIONER

## 2020-09-04 PROCEDURE — 82306 VITAMIN D 25 HYDROXY: CPT | Performed by: NURSE PRACTITIONER

## 2020-09-08 ENCOUNTER — OFFICE VISIT (OUTPATIENT)
Dept: FAMILY MEDICINE CLINIC | Facility: CLINIC | Age: 65
End: 2020-09-08

## 2020-09-08 VITALS
HEIGHT: 63 IN | SYSTOLIC BLOOD PRESSURE: 122 MMHG | DIASTOLIC BLOOD PRESSURE: 70 MMHG | HEART RATE: 65 BPM | TEMPERATURE: 96.6 F | WEIGHT: 273 LBS | OXYGEN SATURATION: 95 % | BODY MASS INDEX: 48.37 KG/M2

## 2020-09-08 DIAGNOSIS — E09.65 DRUG OR CHEMICAL INDUCED DIABETES MELLITUS, UNCONTROLLED, WITH HYPERGLYCEMIA (HCC): ICD-10-CM

## 2020-09-08 DIAGNOSIS — E78.2 MIXED HYPERLIPIDEMIA: ICD-10-CM

## 2020-09-08 DIAGNOSIS — E11.65 TYPE 2 DIABETES MELLITUS WITH HYPERGLYCEMIA, WITH LONG-TERM CURRENT USE OF INSULIN (HCC): Primary | Chronic | ICD-10-CM

## 2020-09-08 DIAGNOSIS — R94.4 DECREASED CALCULATED GFR: Primary | ICD-10-CM

## 2020-09-08 DIAGNOSIS — R39.9 LOWER URINARY TRACT SYMPTOMS (LUTS): ICD-10-CM

## 2020-09-08 DIAGNOSIS — I10 ESSENTIAL HYPERTENSION: Chronic | ICD-10-CM

## 2020-09-08 DIAGNOSIS — Z79.4 TYPE 2 DIABETES MELLITUS WITH HYPERGLYCEMIA, WITH LONG-TERM CURRENT USE OF INSULIN (HCC): Primary | Chronic | ICD-10-CM

## 2020-09-08 LAB
BILIRUB BLD-MCNC: NEGATIVE MG/DL
CLARITY, POC: CLEAR
COLOR UR: YELLOW
GLUCOSE UR STRIP-MCNC: ABNORMAL MG/DL
KETONES UR QL: NEGATIVE
LEUKOCYTE EST, POC: NEGATIVE
NITRITE UR-MCNC: NEGATIVE MG/ML
PH UR: 6 [PH] (ref 5–8)
PROT UR STRIP-MCNC: NEGATIVE MG/DL
RBC # UR STRIP: NEGATIVE /UL
SP GR UR: 1.02 (ref 1–1.03)
UROBILINOGEN UR QL: NORMAL

## 2020-09-08 PROCEDURE — 99214 OFFICE O/P EST MOD 30 MIN: CPT | Performed by: NURSE PRACTITIONER

## 2020-09-08 PROCEDURE — 81003 URINALYSIS AUTO W/O SCOPE: CPT | Performed by: NURSE PRACTITIONER

## 2020-09-08 NOTE — PROGRESS NOTES
History of Present Illness   Ronna Wayne is a 65 y.o. female who presents to the office today pertaining to her DM, type 2. In addition, she has HTN and Dyslipidemia. She is also c/o urinary tract symptoms today.    Diabetes   She presents for her initial diabetes visit. She has type 2 diabetes mellitus. No MedicAlert identification noted. The initial diagnosis of diabetes was made 10 years ago. Hypoglycemia symptoms include dizziness, headaches, hunger, mood changes, nervousness/anxiousness, sleepiness and tremors. Pertinent negatives for hypoglycemia include no confusion, seizures or speech difficulty. Associated symptoms include fatigue, foot paresthesias, polydipsia, polyphagia, polyuria, visual change and weakness. Pertinent negatives for diabetes include no weight loss. Pertinent negatives for hypoglycemia complications include no blackouts, no hospitalization, no nocturnal hypoglycemia, no required assistance and no required glucagon injection. Symptoms are worsening. Diabetic complications include heart disease and peripheral neuropathy. Pertinent negatives for diabetic complications include no impotence or nephropathy. Risk factors for coronary artery disease include dyslipidemia, family history, hypertension, obesity and sedentary lifestyle. Current diabetic treatment includes diet, insulin injections and oral agent (dual therapy). She is compliant with treatment most of the time. She is currently taking insulin pre-breakfast, pre-lunch, pre-dinner and at bedtime. Insulin injections are given by patient. Rotation sites for injection include the abdominal wall. Her weight is increasing steadily. She is following a diabetic diet. Meal planning includes carbohydrate counting. She has not had a previous visit with a dietitian. She never participates in exercise. She monitors blood glucose at home 3-4 x per day. Blood glucose monitoring compliance is fair. Her home blood glucose trend is fluctuating  "minimally. Her breakfast blood glucose is taken between 7-8 am. Her breakfast blood glucose range is generally  mg/dl. Her lunch blood glucose is taken between 10-11 am. Her lunch blood glucose range is generally 110-130 mg/dl. Her dinner blood glucose is taken between 4-5 pm. Her dinner blood glucose range is generally  mg/dl. Her highest blood glucose is >200 mg/dl. Her overall blood glucose range is >200 mg/dl. She does not see a podiatrist.Eye exam is not current.   Lab Results   Component Value Date    HGBA1C 13.10 (H) 09/04/2020     Hypertension   The current episode started more than 1 year ago. The problem is controlled. Associated symptoms include headaches, malaise/fatigue and peripheral edema. Risk factors for coronary artery disease include diabetes mellitus, dyslipidemia, obesity and post-menopausal state. Current antihypertension treatment includes angiotensin blockers.   Lab Results   Component Value Date    CREATININE 1.14 (H) 09/04/2020     Hyperlipidemia   The current episode started more than 1 year ago. Current antihyperlipidemic treatment includes statins. Risk factors for coronary artery disease include diabetes mellitus, dyslipidemia, hypertension, obesity and post-menopausal.   Lab Results   Component Value Date    CHOL 188 09/04/2020    CHLPL 226 (H) 05/28/2016    TRIG 154 (H) 09/04/2020    HDL 44 09/04/2020     (H) 09/04/2020     Vitals:    09/08/20 1102   BP: 122/70   BP Location: Left arm   Patient Position: Sitting   Cuff Size: Adult   Pulse: 65   Temp: 96.6 °F (35.9 °C)   TempSrc: Temporal   SpO2: 95%   Weight: 124 kg (273 lb)   Height: 160 cm (62.99\")      The following portions of the patient's history were reviewed and updated as appropriate: allergies, current medications, past family history, past medical history, past social history, past surgical history and problem list.    Review of Systems   Constitutional: Positive for activity change (Trying to increase), " fatigue and malaise/fatigue. Negative for weight loss.   Eyes: Positive for visual disturbance.   Cardiovascular: Positive for leg swelling.   Gastrointestinal: Positive for nausea. Negative for vomiting.   Endocrine: Positive for polydipsia, polyphagia and polyuria.   Genitourinary: Positive for dysuria, frequency and urgency. Negative for decreased urine volume, flank pain and impotence.   Musculoskeletal: Positive for arthralgias.   Skin: Negative for color change and rash.   Neurological: Positive for dizziness, tremors, weakness, numbness and headaches. Negative for seizures and speech difficulty.   Hematological: Bruises/bleeds easily.   Psychiatric/Behavioral: Negative for confusion. The patient is nervous/anxious.      Physical Exam   Constitutional: She is oriented to person, place, and time. She appears well-developed and well-nourished. No distress.   Pleasant; in good spirits.Wearing appropriate face mask   HENT:   Head: Normocephalic.   Nose: Nose normal.   Eyes: Pupils are equal, round, and reactive to light. Conjunctivae are normal. Right eye exhibits no discharge. Left eye exhibits no discharge.   Neck: Neck supple. No JVD present. No thyromegaly present.   Cardiovascular: Normal rate, regular rhythm and normal heart sounds. Exam reveals no friction rub.   No murmur heard.  Pulmonary/Chest: Effort normal and breath sounds normal. No respiratory distress. She has no wheezes. She has no rales.   Abdominal: Soft. Bowel sounds are normal. She exhibits no distension. There is no tenderness. There is no rebound and no guarding.   Musculoskeletal: She exhibits edema (Bilateral ankles).   Lymphadenopathy:     She has no cervical adenopathy.   Neurological: She is alert and oriented to person, place, and time.   Skin: Skin is warm and dry. Capillary refill takes less than 2 seconds. No rash noted. No erythema.   Psychiatric: She has a normal mood and affect. Her behavior is normal. Judgment and thought  content normal.   Vitals reviewed.    Results for orders placed or performed in visit on 09/08/20   POC Urinalysis Dipstick, Automated   Result Value Ref Range    Color Yellow Yellow, Straw, Dark Yellow, Nichelle    Clarity, UA Clear Clear    Specific Gravity  1.025 1.005 - 1.030    pH, Urine 6.0 5.0 - 8.0    Leukocytes Negative Negative    Nitrite, UA Negative Negative    Protein, POC Negative Negative mg/dL    Glucose, UA 3+ (A) Negative, 1000 mg/dL (3+) mg/dL    Ketones, UA Negative Negative    Urobilinogen, UA Normal Normal    Bilirubin Negative Negative    Blood, UA Negative Negative       Assessment/Plan     Patient's Body mass index is 48.37 kg/m². BMI is above normal parameters. Recommendations include: exercise counseling and nutrition counseling.   (Normal BMI:  18.5-24.9, OW 25-29.9, Obesity 30 or greater)    Problems Addressed this Visit        Cardiovascular and Mediastinum    Essential hypertension    Hyperlipidemia       Endocrine    Type 2 diabetes mellitus with hyperglycemia, with long-term current use of insulin (CMS/[facility]) - Primary      Other Visit Diagnoses     Lower urinary tract symptoms (LUTS)        Reviewed Urinalysis with her    Relevant Orders    POC Urinalysis Dipstick, Automated (Completed)      Findings and recommendations discussed with Ronna. Spent time with her discussing her current diabetes self management skills and the focus she would like for today. She is having a difficult time with her Carbohydrates and choices. Provided her resources including Making a Healthy Nutrition Plan. Discussed Carbohydrates and Non Carbohydrate foods with her. Her desire is to make lifestyle changes at this time. I recommended 30 Grams of Carbohydrates per her desire to shed pounds. She is very motivated. Blood pressure is at goal today. Lipid panel is not but expect improvement due to her lifestyle modifications and Cardiovascular risk reduction factors she is focusing on to change. Reviewed her  Urinalysis and discussed impact of Jardiance which she just started on her urine. She will f/u with her PCP at the end of September and with me in October; sooner if problems/concerns occur.     This document has been electronically signed by BOSTON Mena, TIFFANY-BC, SHIRLENEE  September 10, 2020 16:46

## 2020-09-08 NOTE — PATIENT INSTRUCTIONS
Type 2 Diabetes Mellitus, Self Care, Adult  When you have type 2 diabetes (type 2 diabetes mellitus), you must make sure your blood sugar (glucose) stays in a healthy range. You can do this with:  · Nutrition.  · Exercise.  · Lifestyle changes.  · Medicines or insulin, if needed.  · Support from your doctors and others.  How to stay aware of blood sugar    · Check your blood sugar level every day, as often as told.  · Have your A1c (hemoglobin A1c) level checked two or more times a year. Have it checked more often if your doctor tells you to.  Your doctor will set personal treatment goals for you. Generally, you should have these blood sugar levels:  · Before meals (preprandial):  mg/dL (4.4-7.2 mmol/L).  · After meals (postprandial): below 180 mg/dL (10 mmol/L).  · A1c level: less than 7%.  How to manage high and low blood sugar  Signs of high blood sugar  High blood sugar is called hyperglycemia. Know the signs of high blood sugar. Signs may include:  · Feeling:  ? Thirsty.  ? Hungry.  ? Very tired.  · Needing to pee (urinate) more than usual.  · Blurry vision.  Signs of low blood sugar  Low blood sugar is called hypoglycemia. This is when blood sugar is at or below 70 mg/dL (3.9 mmol/L). Signs may include:  · Feeling:  ? Hungry.  ? Worried or nervous (anxious).  ? Sweaty and clammy.  ? Confused.  ? Dizzy.  ? Sleepy.  ? Sick to your stomach (nauseous).  · Having:  ? A fast heartbeat.  ? A headache.  ? A change in your vision.  ? Jerky movements that you cannot control (seizure).  ? Tingling or no feeling (numbness) around your mouth, lips, or tongue.  · Having trouble with:  ? Moving (coordination).  ? Sleeping.  ? Passing out (fainting).  ? Getting upset easily (irritability).  Treating low blood sugar  To treat low blood sugar, eat or drink something sugary right away. If you can think clearly and swallow safely, follow the 15:15 rule:  · Take 15 grams of a fast-acting carb (carbohydrate). Talk with your  doctor about how much you should take.  · Some fast-acting carbs are:  ? Sugar tablets (glucose pills). Take 3-4 pills.  ? 6-8 pieces of hard candy.  ? 4-6 oz (120-150 mL) of fruit juice.  ? 4-6 oz (120-150 mL) of regular (not diet) soda.  ? 1 Tbsp (15 mL) honey or sugar.  · Check your blood sugar 15 minutes after you take the carb.  · If your blood sugar is still at or below 70 mg/dL (3.9 mmol/L), take 15 grams of a carb again.  · If your blood sugar does not go above 70 mg/dL (3.9 mmol/L) after 3 tries, get help right away.  · After your blood sugar goes back to normal, eat a meal or a snack within 1 hour.  Treating very low blood sugar  If your blood sugar is at or below 54 mg/dL (3 mmol/L), you have very low blood sugar (severe hypoglycemia). This is an emergency. Do not wait to see if the symptoms will go away. Get medical help right away. Call your local emergency services (911 in the [place]  If you have very low blood sugar and you cannot eat or drink, you may need a glucagon shot (injection). A family member or friend should learn how to check your blood sugar and how to give you a glucagon shot. Ask your doctor if you need to have a glucagon shot kit at home.  Follow these instructions at home:  Medicine  · Take insulin and diabetes medicines as told.  · If your doctor says you should take more or less insulin and medicines, do this exactly as told.  · Do not run out of insulin or medicines.  Having diabetes can raise your risk for other long-term conditions. These include heart disease and kidney disease. Your doctor may prescribe medicines to help you not have these problems.  Food    · Make healthy food choices. These include:  ? Chicken, fish, egg whites, and beans.  ? Oats, whole wheat, bulgur, brown rice, quinoa, and millet.  ? Fresh fruits and vegetables.  ? Low-fat dairy products.  ? Nuts, avocado, olive oil, and canola oil.  · Meet with a  (dietitian). He or she can help you make an  eating plan that is right for you.  · Follow instructions from your doctor about what you cannot eat or drink.  · Drink enough fluid to keep your pee (urine) pale yellow.  · Keep track of carbs that you eat. Do this by reading food labels and learning food serving sizes.  · Follow your sick day plan when you cannot eat or drink normally. Make this plan with your doctor so it is ready to use.  Activity  · Exercise 3 or more times a week.  · Do not go more than 2 days without exercising.  · Talk with your doctor before you start a new exercise. Your doctor may need to tell you to change:  ? How much insulin or medicines you take.  ? How much food you eat.  Lifestyle  · Do not use any tobacco products. These include cigarettes, chewing tobacco, and e-cigarettes. If you need help quitting, ask your doctor.  · Ask your doctor how much alcohol is safe for you.  · Learn to deal with stress. If you need help with this, ask your doctor.  Body care    · Stay up to date with your shots (immunizations).  · Have your eyes and feet checked by a doctor as often as told.  · Check your skin and feet every day. Check for cuts, bruises, redness, blisters, or sores.  · Brush your teeth and gums two times a day. Floss one or more times a day.  · Go to the dentist one or more times every 6 months.  · Stay at a healthy weight.  General instructions  · Take over-the-counter and prescription medicines only as told by your doctor.  · Share your diabetes care plan with:  ? Your work or school.  ? People you live with.  · Carry a card or wear jewelry that says you have diabetes.  · Keep all follow-up visits as told by your doctor. This is important.  Questions to ask your doctor  · Do I need to meet with a diabetes educator?  · Where can I find a support group for people with diabetes?  Where to find more information  To learn more about diabetes, visit:  · American Diabetes Association: www.diabetes.org  · American Association of Diabetes  Educators: www.diabeteseducator.org  Summary  · When you have type 2 diabetes, you must make sure your blood sugar (glucose) stays in a healthy range.  · Check your blood sugar every day, as often as told.  · Having diabetes can raise your risk for other conditions. Your doctor may prescribe medicines to help you not have these problems.  · Keep all follow-up visits as told by your doctor. This is important.  This information is not intended to replace advice given to you by your health care provider. Make sure you discuss any questions you have with your health care provider.  Document Released: 04/10/2017 Document Revised: 06/10/2019 Document Reviewed: 01/20/2017  [employer] Patient Education © 2020 [employer]    Hypoglycemia  Hypoglycemia is when the sugar (glucose) level in your blood is too low. Signs of low blood sugar may include:  · Feeling:  ? Hungry.  ? Worried or nervous (anxious).  ? Sweaty and clammy.  ? Confused.  ? Dizzy.  ? Sleepy.  ? Sick to your stomach (nauseous).  · Having:  ? A fast heartbeat.  ? A headache.  ? A change in your vision.  ? Tingling or no feeling (numbness) around your mouth, lips, or tongue.  ? Jerky movements that you cannot control (seizure).  · Having trouble with:  ? Moving (coordination).  ? Sleeping.  ? Passing out (fainting).  ? Getting upset easily (irritability).  Low blood sugar can happen to people who have diabetes and people who do not have diabetes. Low blood sugar can happen quickly, and it can be an emergency.  Treating low blood sugar  Low blood sugar is often treated by eating or drinking something sugary right away, such as:  · Fruit juice, 4-6 oz (120-150 mL).  · Regular soda (not diet soda), 4-6 oz (120-150 mL).  · Low-fat milk, 4 oz (120 mL).  · Several pieces of hard candy.  · Sugar or honey, 1 Tbsp (15 mL).  Treating low blood sugar if you have diabetes  If you can think clearly and swallow safely, follow the 15:15 rule:  · Take 15 grams of a fast-acting  carb (carbohydrate). Talk with your doctor about how much you should take.  · Always keep a source of fast-acting carb with you, such as:  ? Sugar tablets (glucose pills). Take 3-4 pills.  ? 6-8 pieces of hard candy.  ? 4-6 oz (120-150 mL) of fruit juice.  ? 4-6 oz (120-150 mL) of regular (not diet) soda.  ? 1 Tbsp (15 mL) honey or sugar.  · Check your blood sugar 15 minutes after you take the carb.  · If your blood sugar is still at or below 70 mg/dL (3.9 mmol/L), take 15 grams of a carb again.  · If your blood sugar does not go above 70 mg/dL (3.9 mmol/L) after 3 tries, get help right away.  · After your blood sugar goes back to normal, eat a meal or a snack within 1 hour.    Treating very low blood sugar  If your blood sugar is at or below 54 mg/dL (3 mmol/L), you have very low blood sugar (severe hypoglycemia). This may also cause:  · Passing out.  · Jerky movements you cannot control (seizure).  · Losing consciousness (coma).  This is an emergency. Do not wait to see if the symptoms will go away. Get medical help right away. Call your local emergency services (911 in the [place] Do not drive yourself to the hospital.  If you have very low blood sugar and you cannot eat or drink, you may need a glucagon shot (injection). A family member or friend should learn how to check your blood sugar and how to give you a glucagon shot. Ask your doctor if you need to have a glucagon shot kit at home.  Follow these instructions at home:  General instructions  · Take over-the-counter and prescription medicines only as told by your doctor.  · Stay aware of your blood sugar as told by your doctor.  · Limit alcohol intake to no more than 1 drink a day for nonpregnant women and 2 drinks a day for men. One drink equals 12 oz of beer (355 mL), 5 oz of wine (148 mL), or 1½ oz of hard liquor (44 mL).  · Keep all follow-up visits as told by your doctor. This is important.  If you have diabetes:    · Follow your diabetes care plan as  told by your doctor. Make sure you:  ? Know the signs of low blood sugar.  ? Take your medicines as told.  ? Follow your exercise and meal plan.  ? Eat on time. Do not skip meals.  ? Check your blood sugar as often as told by your doctor. Always check it before and after exercise.  ? Follow your sick day plan when you cannot eat or drink normally. Make this plan ahead of time with your doctor.  · Share your diabetes care plan with:  ? Your work or school.  ? People you live with.  · Check your pee (urine) for ketones:  ? When you are sick.  ? As told by your doctor.  · Carry a card or wear jewelry that says you have diabetes.  Contact a doctor if:  · You have trouble keeping your blood sugar in your target range.  · You have low blood sugar often.  Get help right away if:  · You still have symptoms after you eat or drink something sugary.  · Your blood sugar is at or below 54 mg/dL (3 mmol/L).  · You have jerky movements that you cannot control.  · You pass out.  These symptoms may be an emergency. Do not wait to see if the symptoms will go away. Get medical help right away. Call your local emergency services (911 in the [place] Do not drive yourself to the hospital.  Summary  · Hypoglycemia happens when the level of sugar (glucose) in your blood is too low.  · Low blood sugar can happen to people who have diabetes and people who do not have diabetes. Low blood sugar can happen quickly, and it can be an emergency.  · Make sure you know the signs of low blood sugar and know how to treat it.  · Always keep a source of sugar (fast-acting carb) with you to treat low blood sugar.  This information is not intended to replace advice given to you by your health care provider. Make sure you discuss any questions you have with your health care provider.  Document Released: 03/14/2011 Document Revised: 04/09/2020 Document Reviewed: 01/20/2017  [employer] Patient Education © 2020 [employer]

## 2020-09-16 DIAGNOSIS — E09.65 DRUG OR CHEMICAL INDUCED DIABETES MELLITUS, UNCONTROLLED, WITH HYPERGLYCEMIA (HCC): ICD-10-CM

## 2020-09-16 DIAGNOSIS — E09.65 DRUG OR CHEMICAL INDUCED DIABETES MELLITUS, UNCONTROLLED, WITH HYPERGLYCEMIA (HCC): Primary | ICD-10-CM

## 2020-09-16 RX ORDER — LANCETS
EACH MISCELLANEOUS
Qty: 204 EACH | Refills: 6 | Status: SHIPPED | OUTPATIENT
Start: 2020-09-16 | End: 2020-09-16 | Stop reason: SDUPTHER

## 2020-09-16 RX ORDER — LANCETS
EACH MISCELLANEOUS
Qty: 200 EACH | Refills: 6 | Status: SHIPPED | OUTPATIENT
Start: 2020-09-16 | End: 2020-09-17 | Stop reason: SDUPTHER

## 2020-09-17 DIAGNOSIS — I10 ESSENTIAL HYPERTENSION: ICD-10-CM

## 2020-09-17 DIAGNOSIS — Z79.4 TYPE 2 DIABETES MELLITUS WITH HYPERGLYCEMIA, WITH LONG-TERM CURRENT USE OF INSULIN (HCC): Primary | ICD-10-CM

## 2020-09-17 DIAGNOSIS — E09.65 DRUG OR CHEMICAL INDUCED DIABETES MELLITUS, UNCONTROLLED, WITH HYPERGLYCEMIA (HCC): ICD-10-CM

## 2020-09-17 DIAGNOSIS — E11.65 TYPE 2 DIABETES MELLITUS WITH HYPERGLYCEMIA, WITH LONG-TERM CURRENT USE OF INSULIN (HCC): Primary | ICD-10-CM

## 2020-09-17 RX ORDER — LANCETS
EACH MISCELLANEOUS
Qty: 200 EACH | Refills: 6 | Status: ON HOLD | OUTPATIENT
Start: 2020-09-17 | End: 2021-05-02

## 2020-09-17 RX ORDER — HYDROCHLOROTHIAZIDE 12.5 MG/1
12.5 TABLET ORAL DAILY
Qty: 30 TABLET | Refills: 3 | Status: SHIPPED | OUTPATIENT
Start: 2020-09-17 | End: 2021-01-18

## 2020-09-22 ENCOUNTER — TRANSCRIBE ORDERS (OUTPATIENT)
Dept: ADMINISTRATIVE | Facility: HOSPITAL | Age: 65
End: 2020-09-22

## 2020-09-22 DIAGNOSIS — N18.9 CHRONIC KIDNEY DISEASE, UNSPECIFIED CKD STAGE: Primary | ICD-10-CM

## 2020-09-30 ENCOUNTER — OFFICE VISIT (OUTPATIENT)
Dept: FAMILY MEDICINE CLINIC | Facility: CLINIC | Age: 65
End: 2020-09-30

## 2020-09-30 VITALS
TEMPERATURE: 96.8 F | HEIGHT: 63 IN | DIASTOLIC BLOOD PRESSURE: 80 MMHG | SYSTOLIC BLOOD PRESSURE: 122 MMHG | HEART RATE: 55 BPM | WEIGHT: 271 LBS | BODY MASS INDEX: 48.02 KG/M2 | OXYGEN SATURATION: 91 %

## 2020-09-30 DIAGNOSIS — Z12.31 ENCOUNTER FOR SCREENING MAMMOGRAM FOR MALIGNANT NEOPLASM OF BREAST: ICD-10-CM

## 2020-09-30 DIAGNOSIS — Z79.4 TYPE 2 DIABETES MELLITUS WITH HYPERGLYCEMIA, WITH LONG-TERM CURRENT USE OF INSULIN (HCC): Primary | ICD-10-CM

## 2020-09-30 DIAGNOSIS — E55.9 VITAMIN D DEFICIENCY: ICD-10-CM

## 2020-09-30 DIAGNOSIS — E66.01 CLASS 3 SEVERE OBESITY DUE TO EXCESS CALORIES WITH SERIOUS COMORBIDITY IN ADULT, UNSPECIFIED BMI (HCC): ICD-10-CM

## 2020-09-30 DIAGNOSIS — H40.10X0 OPEN-ANGLE GLAUCOMA OF BOTH EYES, UNSPECIFIED GLAUCOMA STAGE, UNSPECIFIED OPEN-ANGLE GLAUCOMA TYPE: ICD-10-CM

## 2020-09-30 DIAGNOSIS — Z00.00 ENCOUNTER FOR MEDICARE ANNUAL WELLNESS EXAM: ICD-10-CM

## 2020-09-30 DIAGNOSIS — E11.65 TYPE 2 DIABETES MELLITUS WITH HYPERGLYCEMIA, WITH LONG-TERM CURRENT USE OF INSULIN (HCC): Primary | ICD-10-CM

## 2020-09-30 DIAGNOSIS — Z12.39 SCREENING FOR BREAST CANCER: ICD-10-CM

## 2020-09-30 DIAGNOSIS — I10 ESSENTIAL HYPERTENSION: ICD-10-CM

## 2020-09-30 DIAGNOSIS — E78.2 MIXED HYPERLIPIDEMIA: ICD-10-CM

## 2020-09-30 DIAGNOSIS — K21.9 GASTROESOPHAGEAL REFLUX DISEASE WITHOUT ESOPHAGITIS: ICD-10-CM

## 2020-09-30 DIAGNOSIS — Z12.11 SCREENING FOR COLON CANCER: ICD-10-CM

## 2020-09-30 DIAGNOSIS — Z23 ENCOUNTER FOR VACCINATION: ICD-10-CM

## 2020-09-30 PROCEDURE — G0439 PPPS, SUBSEQ VISIT: HCPCS | Performed by: NURSE PRACTITIONER

## 2020-09-30 PROCEDURE — 90732 PPSV23 VACC 2 YRS+ SUBQ/IM: CPT | Performed by: NURSE PRACTITIONER

## 2020-09-30 PROCEDURE — G0009 ADMIN PNEUMOCOCCAL VACCINE: HCPCS | Performed by: NURSE PRACTITIONER

## 2020-09-30 RX ORDER — SODIUM BICARBONATE 325 MG/1
325 TABLET ORAL 2 TIMES DAILY
COMMUNITY
End: 2020-11-24

## 2020-09-30 RX ORDER — SODIUM BICARBONATE 325 MG/1
325 TABLET ORAL 4 TIMES DAILY
COMMUNITY
End: 2020-11-24

## 2020-09-30 RX ORDER — PANTOPRAZOLE SODIUM 40 MG/1
40 TABLET, DELAYED RELEASE ORAL DAILY
Qty: 90 TABLET | Refills: 3 | Status: SHIPPED | OUTPATIENT
Start: 2020-09-30 | End: 2021-03-29 | Stop reason: SDUPTHER

## 2020-10-06 ENCOUNTER — HOSPITAL ENCOUNTER (OUTPATIENT)
Dept: ULTRASOUND IMAGING | Facility: HOSPITAL | Age: 65
Discharge: HOME | End: 2020-10-06
Admitting: INTERNAL MEDICINE

## 2020-10-06 DIAGNOSIS — N18.9 CHRONIC KIDNEY DISEASE, UNSPECIFIED CKD STAGE: ICD-10-CM

## 2020-10-06 PROCEDURE — 76775 US EXAM ABDO BACK WALL LIM: CPT | Performed by: RADIOLOGY

## 2020-10-06 PROCEDURE — 76775 US EXAM ABDO BACK WALL LIM: CPT

## 2020-10-21 ENCOUNTER — FLU SHOT (OUTPATIENT)
Dept: FAMILY MEDICINE CLINIC | Facility: CLINIC | Age: 65
End: 2020-10-21

## 2020-10-21 DIAGNOSIS — Z23 NEED FOR INFLUENZA VACCINATION: ICD-10-CM

## 2020-10-21 PROCEDURE — 90694 VACC AIIV4 NO PRSRV 0.5ML IM: CPT | Performed by: NURSE PRACTITIONER

## 2020-10-21 PROCEDURE — G0008 ADMIN INFLUENZA VIRUS VAC: HCPCS | Performed by: NURSE PRACTITIONER

## 2020-10-27 ENCOUNTER — TRANSCRIBE ORDERS (OUTPATIENT)
Dept: ADMINISTRATIVE | Facility: HOSPITAL | Age: 65
End: 2020-10-27

## 2020-10-27 ENCOUNTER — LAB (OUTPATIENT)
Dept: LAB | Facility: HOSPITAL | Age: 65
End: 2020-10-27

## 2020-10-27 DIAGNOSIS — N18.9 CHRONIC KIDNEY DISEASE, UNSPECIFIED CKD STAGE: Primary | ICD-10-CM

## 2020-10-27 DIAGNOSIS — N18.9 CHRONIC KIDNEY DISEASE, UNSPECIFIED CKD STAGE: ICD-10-CM

## 2020-10-27 LAB
ALBUMIN SERPL-MCNC: 3.9 G/DL (ref 3.5–5.2)
ALBUMIN UR-MCNC: 1.7 MG/DL
ALBUMIN/GLOB SERPL: 1.1 G/DL
ALP SERPL-CCNC: 75 U/L (ref 39–117)
ALT SERPL W P-5'-P-CCNC: 25 U/L (ref 1–33)
ANION GAP SERPL CALCULATED.3IONS-SCNC: 9.1 MMOL/L (ref 5–15)
AST SERPL-CCNC: 33 U/L (ref 1–32)
BACTERIA UR QL AUTO: ABNORMAL /HPF
BILIRUB SERPL-MCNC: 0.5 MG/DL (ref 0–1.2)
BILIRUB UR QL STRIP: NEGATIVE
BUN SERPL-MCNC: 19 MG/DL (ref 8–23)
BUN/CREAT SERPL: 21.3 (ref 7–25)
CALCIUM SPEC-SCNC: 9.3 MG/DL (ref 8.6–10.5)
CHLORIDE SERPL-SCNC: 105 MMOL/L (ref 98–107)
CLARITY UR: CLEAR
CO2 SERPL-SCNC: 25.9 MMOL/L (ref 22–29)
COLOR UR: YELLOW
CREAT SERPL-MCNC: 0.89 MG/DL (ref 0.57–1)
CREAT UR-MCNC: 97 MG/DL
CREAT UR-MCNC: 97 MG/DL
GFR SERPL CREATININE-BSD FRML MDRD: 64 ML/MIN/1.73
GLOBULIN UR ELPH-MCNC: 3.5 GM/DL
GLUCOSE SERPL-MCNC: 155 MG/DL (ref 65–99)
GLUCOSE UR STRIP-MCNC: ABNORMAL MG/DL
HGB UR QL STRIP.AUTO: NEGATIVE
HYALINE CASTS UR QL AUTO: ABNORMAL /LPF
KETONES UR QL STRIP: NEGATIVE
LEUKOCYTE ESTERASE UR QL STRIP.AUTO: ABNORMAL
MICROALBUMIN/CREAT UR: 17.5 MG/G
NITRITE UR QL STRIP: NEGATIVE
PH UR STRIP.AUTO: 6 [PH] (ref 5–8)
POTASSIUM SERPL-SCNC: 4.4 MMOL/L (ref 3.5–5.2)
PROT SERPL-MCNC: 7.4 G/DL (ref 6–8.5)
PROT UR QL STRIP: ABNORMAL
PROT UR-MCNC: 17 MG/DL
PROT/CREAT UR: 175.3 MG/G CREA (ref 0–200)
RBC # UR: ABNORMAL /HPF
REF LAB TEST METHOD: ABNORMAL
SODIUM SERPL-SCNC: 140 MMOL/L (ref 136–145)
SP GR UR STRIP: >=1.03 (ref 1–1.03)
SQUAMOUS #/AREA URNS HPF: ABNORMAL /HPF
UROBILINOGEN UR QL STRIP: ABNORMAL
WBC UR QL AUTO: ABNORMAL /HPF

## 2020-10-27 PROCEDURE — 84156 ASSAY OF PROTEIN URINE: CPT

## 2020-10-27 PROCEDURE — 80053 COMPREHEN METABOLIC PANEL: CPT

## 2020-10-27 PROCEDURE — 82043 UR ALBUMIN QUANTITATIVE: CPT

## 2020-10-27 PROCEDURE — 82570 ASSAY OF URINE CREATININE: CPT

## 2020-10-27 PROCEDURE — 36415 COLL VENOUS BLD VENIPUNCTURE: CPT

## 2020-10-27 PROCEDURE — 81001 URINALYSIS AUTO W/SCOPE: CPT

## 2020-10-28 ENCOUNTER — OFFICE VISIT (OUTPATIENT)
Dept: FAMILY MEDICINE CLINIC | Facility: CLINIC | Age: 65
End: 2020-10-28

## 2020-10-28 VITALS
HEART RATE: 61 BPM | DIASTOLIC BLOOD PRESSURE: 80 MMHG | SYSTOLIC BLOOD PRESSURE: 130 MMHG | OXYGEN SATURATION: 93 % | TEMPERATURE: 97.3 F | BODY MASS INDEX: 48.9 KG/M2 | HEIGHT: 63 IN | WEIGHT: 276 LBS

## 2020-10-28 DIAGNOSIS — Z79.4 TYPE 2 DIABETES MELLITUS WITH DIABETIC NEUROPATHY, WITH LONG-TERM CURRENT USE OF INSULIN (HCC): Primary | Chronic | ICD-10-CM

## 2020-10-28 DIAGNOSIS — I10 ESSENTIAL HYPERTENSION: Chronic | ICD-10-CM

## 2020-10-28 DIAGNOSIS — E78.2 MIXED HYPERLIPIDEMIA: Chronic | ICD-10-CM

## 2020-10-28 DIAGNOSIS — E11.40 TYPE 2 DIABETES MELLITUS WITH DIABETIC NEUROPATHY, WITH LONG-TERM CURRENT USE OF INSULIN (HCC): Primary | Chronic | ICD-10-CM

## 2020-10-28 PROCEDURE — 99214 OFFICE O/P EST MOD 30 MIN: CPT | Performed by: NURSE PRACTITIONER

## 2020-10-28 RX ORDER — EMPAGLIFLOZIN 10 MG/1
20 TABLET, FILM COATED ORAL EVERY MORNING
Qty: 30 TABLET | Refills: 5 | COMMUNITY
Start: 2020-10-28 | End: 2020-11-10 | Stop reason: ALTCHOICE

## 2020-10-28 RX ORDER — PROCHLORPERAZINE 25 MG/1
1 SUPPOSITORY RECTAL DAILY
Qty: 1 DEVICE | Refills: 0 | Status: SHIPPED | OUTPATIENT
Start: 2020-10-28 | End: 2021-04-15

## 2020-10-28 RX ORDER — PROCHLORPERAZINE 25 MG/1
SUPPOSITORY RECTAL
Qty: 3 EACH | Refills: 3 | Status: SHIPPED | OUTPATIENT
Start: 2020-10-28 | End: 2021-04-15

## 2020-10-28 NOTE — PROGRESS NOTES
History of Present Illness   Ronna Wayne is a 65 y.o. female who presents to the office today pertaining to her DM, type 2. In addition, she has HTN and Dyslipidemia. She does bring in a log of her blood sugars and blood pressure. These will be reviewed and scanned into her chart.     Diabetes   She presents for follow up diabetes visit. She has type 2 diabetes mellitus.  The initial diagnosis of diabetes was made 10 years ago. Hypoglycemia symptoms include dizziness, headaches, hunger, mood changes, nervousness/anxiousness, sleepiness and tremors. Pertinent negatives for hypoglycemia include no confusion, seizures or speech difficulty. Associated symptoms include fatigue, foot paresthesias, polydipsia, polyphagia, polyuria, visual change and weakness. Pertinent negatives for diabetes include no weight loss. Pertinent negatives for hypoglycemia complications include no blackouts, no hospitalization, no nocturnal hypoglycemia, no required assistance and no required glucagon injection.  Diabetic complications include heart disease and peripheral neuropathy. Risk factors for coronary artery disease include dyslipidemia, family history, hypertension, obesity and sedentary lifestyle. Current diabetes treatment includes diet, insulin injections and oral agents. She is compliant with treatment most of the time. She is currently taking insulin pre-breakfast, pre-lunch, pre-dinner and at bedtime. Insulin injections are given by patient. Rotation sites for injection include the abdominal wall.  Meal planning includes carbohydrate counting. She never participates in exercise. She monitors blood glucose at home 4 x per day. Blood glucose monitoring compliance is good although it is very painful for her.  Her home blood glucose trend is fluctuating minimally. Her breakfast blood glucose is taken between 7-8 am. Her breakfast blood glucose range is generally  mg/dl. Her lunch blood glucose is taken between 10-11  "am. Her lunch blood glucose range is generally 110-130 mg/dl. Her dinner blood glucose is taken between 4-5 pm. Her dinner blood glucose range is generally  mg/dl. Her highest blood glucose is >200 mg/dl. She does not see a podiatrist.   Lab Results   Component Value Date    HGBA1C 13.10 (H) 09/04/2020     Hypertension   The current episode started more than 1 year ago. The problem is controlled. Associated symptoms include headaches, malaise/fatigue and peripheral edema. Risk factors for coronary artery disease include diabetes mellitus, dyslipidemia, obesity and post-menopausal state. Current antihypertension treatment includes angiotensin blockers.   Lab Results   Component Value Date    CREATININE 1.14 (H) 09/04/2020     Hyperlipidemia   The current episode started more than 1 year ago. Current antihyperlipidemic treatment includes statins. Risk factors for coronary artery disease include diabetes mellitus, dyslipidemia, hypertension, obesity and post-menopausal.   Lab Results   Component Value Date    CHOL 188 09/04/2020    TRIG 154 (H) 09/04/2020    HDL 44 09/04/2020     (H) 09/04/2020     Vitals:    10/28/20 1055   BP: 130/80   BP Location: Right arm   Patient Position: Sitting   Cuff Size: Adult   Pulse: 61   Temp: 97.3 °F (36.3 °C)   TempSrc: Temporal   SpO2: 93%   Weight: 125 kg (276 lb)   Height: 160 cm (62.99\")      The following portions of the patient's history were reviewed and updated as appropriate: allergies, current medications, past family history, past medical history, past social history, past surgical history and problem list.    Review of Systems   Constitutional: Positive for fatigue. Negative for activity change, appetite change, chills, diaphoresis and fever.   HENT: Negative.    Eyes: Positive for visual disturbance.   Respiratory: Negative for cough, shortness of breath and wheezing.    Cardiovascular: Positive for leg swelling. Negative for chest pain and palpitations.   "   Gastrointestinal: Negative for nausea and vomiting.   Endocrine: Positive for polydipsia, polyphagia and polyuria. Negative for cold intolerance and heat intolerance.   Musculoskeletal: Positive for arthralgias.   Skin: Negative for color change and rash.   Neurological: Positive for dizziness, tremors, weakness, numbness and headache. Negative for seizures, speech difficulty and confusion.   Psychiatric/Behavioral: Positive for sleep disturbance. The patient is nervous/anxious.       Physical Exam  Vitals signs reviewed.   Constitutional:       General: She is not in acute distress.     Appearance: She is well-developed.      Comments: Pleasant; in good spirits. Wearing appropriate face covering.    HENT:      Head: Normocephalic.      Nose: Nose normal.   Eyes:      General: No scleral icterus.     Conjunctiva/sclera: Conjunctivae normal.      Pupils: Pupils are equal, round, and reactive to light.   Neck:      Musculoskeletal: Neck supple.      Thyroid: No thyromegaly.      Vascular: No JVD.   Cardiovascular:      Rate and Rhythm: Normal rate and regular rhythm.      Heart sounds: Normal heart sounds. No murmur. No friction rub.   Pulmonary:      Effort: Pulmonary effort is normal. No respiratory distress.      Breath sounds: Normal breath sounds. No wheezing or rales.   Abdominal:      General: Bowel sounds are normal. There is no distension.      Palpations: Abdomen is soft.      Tenderness: There is no abdominal tenderness. There is no guarding or rebound.   Musculoskeletal:      Right lower leg: No edema.      Left lower leg: No edema.   Lymphadenopathy:      Cervical: No cervical adenopathy.   Skin:     General: Skin is warm and dry.      Capillary Refill: Capillary refill takes less than 2 seconds.      Findings: No erythema or rash.   Neurological:      Mental Status: She is alert and oriented to person, place, and time.   Psychiatric:         Mood and Affect: Mood normal.         Speech: Speech normal.          Behavior: Behavior is cooperative.         Thought Content: Thought content normal.         Cognition and Memory: Cognition normal.         Judgment: Judgment normal.       Comprehensive Metabolic Panel    Specimen: Blood   Result Value Ref Range    Glucose 155 (H) 65 - 99 mg/dL    BUN 19 8 - 23 mg/dL    Creatinine 0.89 0.57 - 1.00 mg/dL    Sodium 140 136 - 145 mmol/L    Potassium 4.4 3.5 - 5.2 mmol/L    Chloride 105 98 - 107 mmol/L    CO2 25.9 22.0 - 29.0 mmol/L    Calcium 9.3 8.6 - 10.5 mg/dL    Total Protein 7.4 6.0 - 8.5 g/dL    Albumin 3.90 3.50 - 5.20 g/dL    ALT (SGPT) 25 1 - 33 U/L    AST (SGOT) 33 (H) 1 - 32 U/L    Alkaline Phosphatase 75 39 - 117 U/L    Total Bilirubin 0.5 0.0 - 1.2 mg/dL    eGFR Non African Amer 64 >60 mL/min/1.73    Globulin 3.5 gm/dL    A/G Ratio 1.1 g/dL    BUN/Creatinine Ratio 21.3 7.0 - 25.0    Anion Gap 9.1 5.0 - 15.0 mmol/L    Urobilinogen, UA 0.2 E.U./dL 0.2 - 1.0 E.U./dL     Assessment/Plan     Problems Addressed this Visit        Cardiovascular and Mediastinum    Essential hypertension    Hyperlipidemia      Other Visit Diagnoses     Type 2 diabetes mellitus with diabetic neuropathy, with long-term current use of insulin (CMS/[facility])  (Chronic)   -  Primary    Findings and recommendations discussed with Ronna. Treatment options reviewed. She is tolerating the Jardiance very well at this time.     Relevant Medications    Continuous Blood Gluc Sensor (Dexcom G6 Sensor)    Continuous Blood Gluc  (Dexcom G6 ) device    Empagliflozin (Jardiance) 10 MG tablet      Diagnoses       Codes Comments    Type 2 diabetes mellitus with diabetic neuropathy, with long-term current use of insulin (CMS/[facility])    -  Primary ICD-10-CM: E11.40, Z79.4  ICD-9-CM: 250.60, 357.2, V58.67 Findings and recommendations discussed with Ronna. Treatment options reviewed. She is tolerating the Jardiance very well at this time.     Essential hypertension     ICD-10-CM: I10  ICD-9-CM: 401.9   She will continue her Cozaar and Lopressor. In addition, she is takinga low dose of HCTZ which may be discontinued with increase in Jardiance.      Mixed hyperlipidemia     ICD-10-CM: E78.2  ICD-9-CM: 272.2 Lipitor to be continued        Findings and recommendations discussed with Ronna. Treatment options reviewed. She is tolerating the Jardiance very well at this time. Nephrology is wanting her to increase this. Samples of Jardiance 10 mg given that she can add to her current dose of 10 mg and see if she can tolerate it. She has discontinued the Metformin. I feel she is an excellent candidate for a DexCom which was ordered. We also discussed OmniPod insulin pump therapy. At this time, her dose if quite high for this but will consider. She will continue her Cozaar and Lopressor. In addition, she is taking a low dose of HCTZ which may be discontinued with increase in Jardiance.  Cardiovascular risk factor reduction including nutrition and exercise as much as tolerated encouraged.She will f/u with me in November; sooner if problems/concerns occur.     This document has been electronically signed by BOSTON Mena, TIFFANY-BC, CDE  October 28, 2020 11:18 EDT

## 2020-11-11 DIAGNOSIS — Z79.4 TYPE 2 DIABETES MELLITUS WITH HYPERGLYCEMIA, WITH LONG-TERM CURRENT USE OF INSULIN (HCC): Primary | ICD-10-CM

## 2020-11-11 DIAGNOSIS — E11.65 TYPE 2 DIABETES MELLITUS WITH HYPERGLYCEMIA, WITH LONG-TERM CURRENT USE OF INSULIN (HCC): Primary | ICD-10-CM

## 2020-11-11 RX ORDER — EMPAGLIFLOZIN 25 MG/1
1 TABLET, FILM COATED ORAL EVERY MORNING
Qty: 30 TABLET | Refills: 3 | Status: SHIPPED | OUTPATIENT
Start: 2020-11-11 | End: 2021-01-05

## 2020-11-24 ENCOUNTER — OFFICE VISIT (OUTPATIENT)
Dept: FAMILY MEDICINE CLINIC | Facility: CLINIC | Age: 65
End: 2020-11-24

## 2020-11-24 VITALS
HEART RATE: 69 BPM | HEIGHT: 63 IN | DIASTOLIC BLOOD PRESSURE: 90 MMHG | SYSTOLIC BLOOD PRESSURE: 150 MMHG | TEMPERATURE: 96.4 F | OXYGEN SATURATION: 98 % | WEIGHT: 272 LBS | BODY MASS INDEX: 48.2 KG/M2

## 2020-11-24 DIAGNOSIS — I10 ESSENTIAL HYPERTENSION: ICD-10-CM

## 2020-11-24 DIAGNOSIS — E78.2 MIXED HYPERLIPIDEMIA: Chronic | ICD-10-CM

## 2020-11-24 DIAGNOSIS — I10 ESSENTIAL HYPERTENSION: Chronic | ICD-10-CM

## 2020-11-24 DIAGNOSIS — Z79.4 TYPE 2 DIABETES MELLITUS WITH HYPERGLYCEMIA, WITH LONG-TERM CURRENT USE OF INSULIN (HCC): Primary | Chronic | ICD-10-CM

## 2020-11-24 DIAGNOSIS — E11.65 TYPE 2 DIABETES MELLITUS WITH HYPERGLYCEMIA, WITH LONG-TERM CURRENT USE OF INSULIN (HCC): Primary | Chronic | ICD-10-CM

## 2020-11-24 PROCEDURE — 99214 OFFICE O/P EST MOD 30 MIN: CPT | Performed by: NURSE PRACTITIONER

## 2020-11-24 RX ORDER — LOSARTAN POTASSIUM 50 MG/1
50 TABLET ORAL DAILY
Qty: 30 TABLET | Refills: 5 | Status: SHIPPED | OUTPATIENT
Start: 2020-11-24 | End: 2021-03-29 | Stop reason: SDUPTHER

## 2020-11-24 NOTE — PROGRESS NOTES
"  History of Present Illness   Ronna Wayne is a 65 y.o. female who presents to the office today pertaining to her DM, type 2. In addition, she has HTN and Dyslipidemia. She does not bring in a log of her blood sugars and blood pressure readings in today but reports her am blood sugars continue to be over 150 mg/dL. Her Jardiance was recently increased to 25 mg. Her blood pressure readings have been running \"good\". She has not taken her blood pressure medications this morning plus she did not sleep well.     Diabetes   She presents for follow up diabetes visit. She has type 2 diabetes mellitus.  The initial diagnosis of diabetes was made 10 years ago. .Risk factors for coronary artery disease include dyslipidemia, family history, hypertension, obesity and sedentary lifestyle. Current diabetes treatment includes diet, insulin injections and oral agents. She is compliant with treatment most of the time. She is currently taking insulin(70/30) pre-breakfast, pre-lunch, pre-dinner and at bedtime. Insulin injections are given by patient. Rotation sites for injection include the abdominal wall.  Meal planning includes carbohydrate counting. She never participates in exercise. She monitors blood glucose at home 4 x per day. Blood glucose monitoring compliance is good although it is very painful for her.  Her home blood glucose trend is fluctuating minimally. Her breakfast blood glucose is taken between 7-8 am. Her breakfast blood glucose range is generally above 150 mg/dl. Her highest blood glucose is >200 mg/dl. She does not see a podiatrist. She has not had any communication with [facility].  Lab Results   Component Value Date    HGBA1C 13.10 (H) 09/04/2020     Hypertension   The current episode started more than 1 year ago. The problem is controlled. Associated symptoms include headaches, malaise/fatigue and peripheral edema. Risk factors for coronary artery disease include diabetes mellitus, dyslipidemia, obesity and " "post-menopausal state. Current antihypertension treatment includes angiotensin blockers. Home blood pressure have been in range.   Lab Results   Component Value Date    CREATININE 1.14 (H) 09/04/2020     Hyperlipidemia   The current episode started more than 1 year ago. Current antihyperlipidemic treatment includes statins. Risk factors for coronary artery disease include diabetes mellitus, dyslipidemia, hypertension, obesity and post-menopausal.   Lab Results   Component Value Date    CHOL 188 09/04/2020    TRIG 154 (H) 09/04/2020    HDL 44 09/04/2020     (H) 09/04/2020     Vitals:    11/24/20 0851   BP: 150/90   Pulse: 69   Temp: 96.4 °F (35.8 °C)   TempSrc: Temporal   SpO2: 98%   Weight: 123 kg (272 lb)   Height: 160 cm (62.99\")        The following portions of the patient's history were reviewed and updated as appropriate: allergies, current medications, past family history, past medical history, past social history, past surgical history and problem list.    Review of Systems   Constitutional: Positive for appetite change and fatigue. Negative for activity change, chills and fever.   Eyes: Positive for visual disturbance. Negative for pain, discharge, redness and itching.   Respiratory: Negative for cough, shortness of breath and wheezing.    Cardiovascular: Positive for leg swelling (Intermittent). Negative for chest pain and palpitations.   Gastrointestinal: Positive for GERD. Negative for nausea and vomiting.   Endocrine: Positive for polyphagia. Negative for cold intolerance, heat intolerance, polydipsia and polyuria.   Musculoskeletal: Positive for arthralgias.   Skin: Negative for color change and rash.   Neurological: Positive for weakness and headache. Negative for dizziness, syncope, speech difficulty and light-headedness.   Psychiatric/Behavioral: Positive for sleep disturbance and stress. The patient is nervous/anxious.       Physical Exam  Vitals signs reviewed.   Constitutional:       " General: She is not in acute distress.     Appearance: She is well-developed.      Comments: Pleasant; wearing appropriate face covering   HENT:      Head: Normocephalic.      Nose: Nose normal.   Eyes:      General: No scleral icterus.        Right eye: No discharge.         Left eye: No discharge.      Conjunctiva/sclera: Conjunctivae normal.      Pupils: Pupils are equal, round, and reactive to light.   Neck:      Musculoskeletal: Neck supple.      Thyroid: No thyromegaly.      Vascular: No JVD.   Cardiovascular:      Rate and Rhythm: Normal rate and regular rhythm.      Heart sounds: Normal heart sounds. No murmur. No friction rub.   Pulmonary:      Effort: Pulmonary effort is normal. No respiratory distress.      Breath sounds: Normal breath sounds. No decreased breath sounds, wheezing, rhonchi or rales.   Abdominal:      General: Bowel sounds are normal. There is no distension.      Palpations: Abdomen is soft.      Tenderness: There is no abdominal tenderness. There is no guarding or rebound.   Musculoskeletal:      Right lower leg: No edema.      Left lower leg: No edema.      Comments: Gait is slow and cautious   Lymphadenopathy:      Cervical: No cervical adenopathy.   Skin:     General: Skin is warm and dry.      Capillary Refill: Capillary refill takes less than 2 seconds.      Findings: No erythema or rash.   Neurological:      Mental Status: She is alert and oriented to person, place, and time.   Psychiatric:         Mood and Affect: Mood normal.         Speech: Speech normal.         Behavior: Behavior is cooperative.         Thought Content: Thought content normal.         Judgment: Judgment normal.       Assessment/Plan     Problems Addressed this Visit        Cardiovascular and Mediastinum    Essential hypertension    Hyperlipidemia       Endocrine    Type 2 diabetes mellitus with hyperglycemia, with long-term current use of insulin (CMS/[facility]) - Primary      Diagnoses       Codes Comments    Type 2  diabetes mellitus with hyperglycemia, with long-term current use of insulin (CMS/[facility])    -  Primary ICD-10-CM: E11.65, Z79.4  ICD-9-CM: 250.00, 790.29, V58.67 Findings and recommendations discussed with Ronna. She increase Basaglar to 68 units. Provided her the [facility] # for her to contact them    Essential hypertension     ICD-10-CM: I10  ICD-9-CM: 401.9 Reports her bp was 117/70  at home    Mixed hyperlipidemia     ICD-10-CM: E78.2  ICD-9-CM: 272.2 Continue Lipitor for cardiovascular risk reduction benefits.      Findings and recommendations discussed with Ronna. She increase Basaglar to 68 units. Provided her the [facility] # for her to contact them. Discussed insulin pump and information regarding OmniPod provided which she will consider.  Reports her bp was 117/70  at home Continue Lipitor for cardiovascular risk reduction benefits.She will f/u with me in December; with labs prior to her appointment sooner if problems/concerns occur.     This document has been electronically signed by BOSTON Mena, TIFFANY-BC, AGGIE  November 24, 2020 12:07 EST

## 2020-11-24 NOTE — PATIENT INSTRUCTIONS
Type 2 Diabetes Mellitus, Self Care, Adult  Caring for yourself after you have been diagnosed with type 2 diabetes (type 2 diabetes mellitus) means keeping your blood sugar (glucose) under control with a balance of:  · Nutrition.  · Exercise.  · Lifestyle changes.  · Medicines or insulin, if necessary.  · Support from your team of health care providers and others.  The following information explains what you need to know to manage your diabetes at home.  What are the risks?  Having diabetes can put you at risk for other long-term (chronic) conditions, such as heart disease and kidney disease. Your health care provider may prescribe medicines to help prevent complications from diabetes. These medicines may include:  · Aspirin.  · Medicine to lower cholesterol.  · Medicine to control blood pressure.  How to monitor blood glucose    · Check your blood glucose every day, as often as told by your health care provider.  · Have your A1c (hemoglobin A1c) level checked two or more times a year, or as often as told by your health care provider.  Your health care provider will set individualized treatment goals for you. Generally, the goal of treatment is to maintain the following blood glucose levels:  · Before meals (preprandial):  mg/dL (4.4-7.2 mmol/L).  · After meals (postprandial): below 180 mg/dL (10 mmol/L).  · A1c level: less than 7%.  How to manage hyperglycemia and hypoglycemia  Hyperglycemia symptoms  Hyperglycemia, also called high blood glucose, occurs when blood glucose is too high. Make sure you know the early signs of hyperglycemia, such as:  · Increased thirst.  · Hunger.  · Feeling very tired.  · Needing to urinate more often than usual.  · Blurry vision.  Hypoglycemia symptoms  Hypoglycemia, also called low blood glucose, occurs with a blood glucose level at or below 70 mg/dL (3.9 mmol/L). The risk for hypoglycemia increases during or after exercise, during sleep, during illness, and when skipping  meals or not eating for a long time (fasting).  It is important to know the symptoms of hypoglycemia and treat it right away. Always have a 15-gram rapid-acting carbohydrate snack with you to treat low blood glucose. Family members and close friends should also know the symptoms and should understand how to treat hypoglycemia, in case you are not able to treat yourself. Symptoms may include:  · Hunger.  · Anxiety.  · Sweating and feeling clammy.  · Confusion.  · Dizziness or feeling light-headed.  · Sleepiness.  · Nausea.  · Increased heart rate.  · Headache.  · Blurry vision.  · Irritability.  · A change in coordination.  · Tingling or numbness around the mouth, lips, or tongue.  · Restless sleep.  · Fainting.  · Seizure.  Treating hypoglycemia  If you are alert and able to swallow safely, follow the 15:15 rule:  · Take 15 grams of a rapid-acting carbohydrate. Talk with your health care provider about how much you should take.  · Rapid-acting options include:  ? Glucose pills (take 15 grams).  ? 6-8 pieces of hard candy.  ? 4-6 oz (120-150 mL) of fruit juice.  ? 4-6 oz (120-150 mL) of regular (not diet) soda.  ? 1 Tbsp (15 mL) honey or sugar.  · Check your blood glucose 15 minutes after you take the carbohydrate.  · If the repeat blood glucose level is still at or below 70 mg/dL (3.9 mmol/L), take 15 grams of a carbohydrate again.  · If your blood glucose level does not increase above 70 mg/dL (3.9 mmol/L) after 3 tries, seek emergency medical care.  · After your blood glucose level returns to normal, eat a meal or a snack within 1 hour.  Treating severe hypoglycemia  Severe hypoglycemia is when your blood glucose level is at or below 54 mg/dL (3 mmol/L). Severe hypoglycemia is an emergency. Do not wait to see if the symptoms will go away. Get medical help right away. Call your local emergency services (911 in the [place]  If you have severe hypoglycemia and you cannot eat or drink, you may need an injection of  glucagon. A family member or close friend should learn how to check your blood glucose and how to give you a glucagon injection. Ask your health care provider if you need to have an emergency glucagon injection kit available.  Severe hypoglycemia may need to be treated in a hospital. The treatment may include getting glucose through an IV. You may also need treatment for the cause of your hypoglycemia.  Follow these instructions at home:  Take diabetes medicines as told  · If your health care provider prescribed insulin or diabetes medicines, take them every day.  · Do not run out of insulin or other diabetes medicines that you take. Plan ahead so you always have these available.  · If you use insulin, adjust your dosage based on how physically active you are and what foods you eat. Your health care provider will tell you how to adjust your dosage.  Make healthy food choices    The things that you eat and drink affect your blood glucose and your insulin dosage. Making good choices helps to control your diabetes and prevent other health problems. A healthy meal plan includes eating lean proteins, complex carbohydrates, fresh fruits and vegetables, low-fat dairy products, and healthy fats.  Make an appointment to see a diet and nutrition specialist (registered dietitian) to help you create an eating plan that is right for you. Make sure that you:  · Follow instructions from your health care provider about eating or drinking restrictions.  · Drink enough fluid to keep your urine pale yellow.  · Keep a record of the carbohydrates that you eat. Do this by reading food labels and learning the standard serving sizes of foods.  · Follow your sick day plan whenever you cannot eat or drink as usual. Make this plan in advance with your health care provider.    Stay active  Exercise regularly, as told by your health care provider. This may include:  · Stretching and doing strength exercises, such as yoga or weightlifting, 2 or  more times a week.  · Doing 150 minutes or more of moderate-intensity or vigorous-intensity exercise each week. This could be brisk walking, biking, or water aerobics.  ? Spread out your activity over 3 or more days of the week.  ? Do not go more than 2 days in a row without doing some kind of physical activity.  When you start a new exercise or activity, work with your health care provider to adjust your insulin, medicines, or food intake as needed.  Make healthy lifestyle choices  · Do not use any tobacco products, such as cigarettes, chewing tobacco, and e-cigarettes. If you need help quitting, ask your health care provider.  · If your health care provider says that alcohol is safe for you, limit alcohol intake to no more than 1 drink per day for nonpregnant women and 2 drinks per day for men. One drink equals 12 oz of beer (355 mL), 5 oz of wine (148 mL), or 1½ oz of hard liquor (44 mL).  · Learn to manage stress. If you need help with this, ask your health care provider.  Care for your body    · Keep your immunizations up to date. In addition to getting vaccinations as told by your health care provider, it is recommended that you get vaccinated against the following illnesses:  ? The flu (influenza). Get a flu shot every year.  ? Pneumonia.  ? Hepatitis B.  · Schedule an eye exam soon after your diagnosis, and then one time every year after that.  · Check your skin and feet every day for cuts, bruises, redness, blisters, or sores. Schedule a foot exam with your health care provider once every year.  · Brush your teeth and gums two times a day, and floss one or more times a day. Visit your dentist one or more times every 6 months.  · Maintain a healthy weight.  General instructions  · Take over-the-counter and prescription medicines only as told by your health care provider.  · Share your diabetes management plan with people in your workplace, school, and household.  · Carry a medical alert card or wear medical  alert [place].  · Keep all follow-up visits as told by your health care provider. This is important.  Questions to ask your health care provider  · Do I need to meet with a diabetes educator?  · Where can I find a support group for people with diabetes?  Where to find more information  For more information about diabetes, visit:  · American Diabetes Association (ADA): www.diabetes.org  · American Association of Diabetes Educators (AADE): www.diabeteseducator.org  Summary  · Caring for yourself after you have been diagnosed with (type 2 diabetes mellitus) means keeping your blood sugar (glucose) under control with a balance of nutrition, exercise, lifestyle changes, and medicine.  · Check your blood glucose every day, as often as told by your health care provider.  · Having diabetes can put you at risk for other long-term (chronic) conditions, such as heart disease and kidney disease. Your health care provider may prescribe medicines to help prevent complications from diabetes.  · Keep all follow-up visits as told by your health care provider. This is important.  This information is not intended to replace advice given to you by your health care provider. Make sure you discuss any questions you have with your health care provider.  Document Revised: 06/10/2019 Document Reviewed: 01/20/2017  [employer] Patient Education © 2020 [employer]  INCREASE BASAGLAR TO 68 UNITS AND THEN IN ONE WEEK IF YOUR BLOOD SUGARS CONTINUE TO BE ABOVE 150 MG IN THE MORNING INCREASE TO 70 UNITS.   CALL [facility]

## 2020-11-24 NOTE — TELEPHONE ENCOUNTER
----- Message from Ronna Wayne sent at 11/24/2020  1:50 PM EST -----  Regarding: Non-Urgent Medical Question  Contact: [phone]  I need a prescription for Losartan potassium 50 MG Tabs.  I'm out and no refill on my current prescription. Than you

## 2020-12-03 ENCOUNTER — APPOINTMENT (OUTPATIENT)
Dept: MAMMOGRAPHY | Facility: HOSPITAL | Age: 65
End: 2020-12-03

## 2020-12-08 ENCOUNTER — LAB (OUTPATIENT)
Dept: FAMILY MEDICINE CLINIC | Facility: CLINIC | Age: 65
End: 2020-12-08

## 2020-12-08 DIAGNOSIS — I10 ESSENTIAL HYPERTENSION: ICD-10-CM

## 2020-12-08 DIAGNOSIS — Z79.4 TYPE 2 DIABETES MELLITUS WITH HYPERGLYCEMIA, WITH LONG-TERM CURRENT USE OF INSULIN (HCC): ICD-10-CM

## 2020-12-08 DIAGNOSIS — E55.9 VITAMIN D DEFICIENCY: ICD-10-CM

## 2020-12-08 DIAGNOSIS — E78.2 MIXED HYPERLIPIDEMIA: ICD-10-CM

## 2020-12-08 DIAGNOSIS — E66.01 CLASS 3 SEVERE OBESITY DUE TO EXCESS CALORIES WITH SERIOUS COMORBIDITY IN ADULT, UNSPECIFIED BMI (HCC): ICD-10-CM

## 2020-12-08 DIAGNOSIS — E11.40 TYPE 2 DIABETES MELLITUS WITH DIABETIC NEUROPATHY, WITH LONG-TERM CURRENT USE OF INSULIN (HCC): ICD-10-CM

## 2020-12-08 DIAGNOSIS — E11.65 TYPE 2 DIABETES MELLITUS WITH HYPERGLYCEMIA, WITH LONG-TERM CURRENT USE OF INSULIN (HCC): ICD-10-CM

## 2020-12-08 DIAGNOSIS — H40.10X0 OPEN-ANGLE GLAUCOMA OF BOTH EYES, UNSPECIFIED GLAUCOMA STAGE, UNSPECIFIED OPEN-ANGLE GLAUCOMA TYPE: ICD-10-CM

## 2020-12-08 DIAGNOSIS — Z79.4 TYPE 2 DIABETES MELLITUS WITH DIABETIC NEUROPATHY, WITH LONG-TERM CURRENT USE OF INSULIN (HCC): ICD-10-CM

## 2020-12-08 DIAGNOSIS — K21.9 GASTROESOPHAGEAL REFLUX DISEASE WITHOUT ESOPHAGITIS: ICD-10-CM

## 2020-12-08 LAB
25(OH)D3 SERPL-MCNC: 30.3 NG/ML (ref 30–100)
ALBUMIN SERPL-MCNC: 3.8 G/DL (ref 3.5–5.2)
ALBUMIN/GLOB SERPL: 1 G/DL
ALP SERPL-CCNC: 83 U/L (ref 39–117)
ALT SERPL W P-5'-P-CCNC: 19 U/L (ref 1–33)
ANION GAP SERPL CALCULATED.3IONS-SCNC: 12.4 MMOL/L (ref 5–15)
AST SERPL-CCNC: 26 U/L (ref 1–32)
BASOPHILS # BLD AUTO: 0.07 10*3/MM3 (ref 0–0.2)
BASOPHILS NFR BLD AUTO: 1 % (ref 0–1.5)
BILIRUB SERPL-MCNC: 0.5 MG/DL (ref 0–1.2)
BUN SERPL-MCNC: 28 MG/DL (ref 8–23)
BUN/CREAT SERPL: 27.2 (ref 7–25)
CALCIUM SPEC-SCNC: 9.5 MG/DL (ref 8.6–10.5)
CHLORIDE SERPL-SCNC: 99 MMOL/L (ref 98–107)
CHOLEST SERPL-MCNC: 153 MG/DL (ref 0–200)
CO2 SERPL-SCNC: 24.6 MMOL/L (ref 22–29)
CREAT SERPL-MCNC: 1.03 MG/DL (ref 0.57–1)
DEPRECATED RDW RBC AUTO: 45.5 FL (ref 37–54)
EOSINOPHIL # BLD AUTO: 0.09 10*3/MM3 (ref 0–0.4)
EOSINOPHIL NFR BLD AUTO: 1.3 % (ref 0.3–6.2)
ERYTHROCYTE [DISTWIDTH] IN BLOOD BY AUTOMATED COUNT: 13.4 % (ref 12.3–15.4)
GFR SERPL CREATININE-BSD FRML MDRD: 54 ML/MIN/1.73
GLOBULIN UR ELPH-MCNC: 3.9 GM/DL
GLUCOSE SERPL-MCNC: 178 MG/DL (ref 65–99)
HBA1C MFR BLD: 7.66 % (ref 4.8–5.6)
HCT VFR BLD AUTO: 39.2 % (ref 34–46.6)
HDLC SERPL-MCNC: 53 MG/DL (ref 40–60)
HGB BLD-MCNC: 13 G/DL (ref 12–15.9)
IMM GRANULOCYTES # BLD AUTO: 0.02 10*3/MM3 (ref 0–0.05)
IMM GRANULOCYTES NFR BLD AUTO: 0.3 % (ref 0–0.5)
LDLC SERPL CALC-MCNC: 71 MG/DL (ref 0–100)
LDLC/HDLC SERPL: 1.22 {RATIO}
LYMPHOCYTES # BLD AUTO: 2.98 10*3/MM3 (ref 0.7–3.1)
LYMPHOCYTES NFR BLD AUTO: 44 % (ref 19.6–45.3)
MCH RBC QN AUTO: 31.1 PG (ref 26.6–33)
MCHC RBC AUTO-ENTMCNC: 33.2 G/DL (ref 31.5–35.7)
MCV RBC AUTO: 93.8 FL (ref 79–97)
MONOCYTES # BLD AUTO: 0.86 10*3/MM3 (ref 0.1–0.9)
MONOCYTES NFR BLD AUTO: 12.7 % (ref 5–12)
NEUTROPHILS NFR BLD AUTO: 2.75 10*3/MM3 (ref 1.7–7)
NEUTROPHILS NFR BLD AUTO: 40.7 % (ref 42.7–76)
NRBC BLD AUTO-RTO: 0 /100 WBC (ref 0–0.2)
PLATELET # BLD AUTO: 152 10*3/MM3 (ref 140–450)
PMV BLD AUTO: 11.1 FL (ref 6–12)
POTASSIUM SERPL-SCNC: 4.4 MMOL/L (ref 3.5–5.2)
PROT SERPL-MCNC: 7.7 G/DL (ref 6–8.5)
RBC # BLD AUTO: 4.18 10*6/MM3 (ref 3.77–5.28)
SODIUM SERPL-SCNC: 136 MMOL/L (ref 136–145)
TRIGL SERPL-MCNC: 176 MG/DL (ref 0–150)
TSH SERPL DL<=0.05 MIU/L-ACNC: 5.04 UIU/ML (ref 0.27–4.2)
VIT B12 BLD-MCNC: 640 PG/ML (ref 211–946)
VLDLC SERPL-MCNC: 29 MG/DL (ref 5–40)
WBC # BLD AUTO: 6.77 10*3/MM3 (ref 3.4–10.8)

## 2020-12-08 PROCEDURE — 85025 COMPLETE CBC W/AUTO DIFF WBC: CPT | Performed by: NURSE PRACTITIONER

## 2020-12-08 PROCEDURE — 80053 COMPREHEN METABOLIC PANEL: CPT | Performed by: NURSE PRACTITIONER

## 2020-12-08 PROCEDURE — 82306 VITAMIN D 25 HYDROXY: CPT | Performed by: NURSE PRACTITIONER

## 2020-12-08 PROCEDURE — 80061 LIPID PANEL: CPT | Performed by: NURSE PRACTITIONER

## 2020-12-08 PROCEDURE — 83036 HEMOGLOBIN GLYCOSYLATED A1C: CPT | Performed by: NURSE PRACTITIONER

## 2020-12-08 PROCEDURE — 82607 VITAMIN B-12: CPT | Performed by: NURSE PRACTITIONER

## 2020-12-08 PROCEDURE — 84443 ASSAY THYROID STIM HORMONE: CPT | Performed by: NURSE PRACTITIONER

## 2020-12-08 RX ORDER — INSULIN GLARGINE 100 [IU]/ML
68 INJECTION, SOLUTION SUBCUTANEOUS NIGHTLY
Qty: 30 PEN | Refills: 11 | Status: SHIPPED | OUTPATIENT
Start: 2020-12-08 | End: 2021-03-29

## 2020-12-08 NOTE — TELEPHONE ENCOUNTER
----- Message from Ronna Wayne sent at 12/7/2020 10:20 PM EST -----  Regarding: Prescription Question  Contact: [phone]  I need a prescription for my Basaglar and novolog I have one pen left thank you

## 2020-12-09 DIAGNOSIS — Z79.4 TYPE 2 DIABETES MELLITUS WITH HYPERGLYCEMIA, WITH LONG-TERM CURRENT USE OF INSULIN (HCC): Primary | ICD-10-CM

## 2020-12-09 DIAGNOSIS — E11.65 TYPE 2 DIABETES MELLITUS WITH HYPERGLYCEMIA, WITH LONG-TERM CURRENT USE OF INSULIN (HCC): Primary | ICD-10-CM

## 2020-12-09 DIAGNOSIS — E55.9 VITAMIN D DEFICIENCY: ICD-10-CM

## 2020-12-09 DIAGNOSIS — E11.65 TYPE 2 DIABETES MELLITUS WITH HYPERGLYCEMIA, WITH LONG-TERM CURRENT USE OF INSULIN (HCC): ICD-10-CM

## 2020-12-09 DIAGNOSIS — I10 ESSENTIAL HYPERTENSION: Primary | ICD-10-CM

## 2020-12-09 DIAGNOSIS — Z79.4 TYPE 2 DIABETES MELLITUS WITH HYPERGLYCEMIA, WITH LONG-TERM CURRENT USE OF INSULIN (HCC): ICD-10-CM

## 2020-12-09 DIAGNOSIS — E78.2 MIXED HYPERLIPIDEMIA: ICD-10-CM

## 2020-12-09 RX ORDER — LEVOTHYROXINE SODIUM 0.07 MG/1
75 TABLET ORAL DAILY
Qty: 90 TABLET | Refills: 1 | Status: SHIPPED | OUTPATIENT
Start: 2020-12-09 | End: 2021-03-29

## 2020-12-09 RX ORDER — ICOSAPENT ETHYL 1000 MG/1
CAPSULE ORAL
Qty: 120 CAPSULE | Refills: 5 | Status: SHIPPED | OUTPATIENT
Start: 2020-12-09 | End: 2021-03-29 | Stop reason: SDUPTHER

## 2020-12-23 ENCOUNTER — TELEPHONE (OUTPATIENT)
Dept: FAMILY MEDICINE CLINIC | Facility: CLINIC | Age: 65
End: 2020-12-23

## 2020-12-23 NOTE — TELEPHONE ENCOUNTER
Called and patient was made aware ----- Message from Herman Patel MD sent at 12/21/2020  9:25 AM EST -----  Regarding: FW: Non-Urgent Medical Question  Contact: [phone]  As long as she has not experienced any side effects I think it's ok for her to continue the muscle relaxant short term  ----- Message -----  From: Petty Richardson  Sent: 12/21/2020   8:07 AM EST  To: Herman Patel MD  Subject: FW: Non-Urgent Medical Question                  [facility] out of office, patient wanting to know if muscle relaxers are safe to take with current medication   ----- Message -----  From: Ronna Wayne  Sent: 12/20/2020   3:06 PM EST  To: Joaquín Dillon [facility]  Subject: Non-Urgent Medical Question                      I have a crick in my neck I had this before and the ER DR said to use a heat pad and gave me muscle relaxers I only took a few until it got better I was wondering if I could take some with the meds I am on now

## 2020-12-28 RX ORDER — METOPROLOL SUCCINATE 100 MG/1
100 TABLET, EXTENDED RELEASE ORAL DAILY
Qty: 30 TABLET | Refills: 0 | Status: SHIPPED | OUTPATIENT
Start: 2020-12-28 | End: 2021-01-26 | Stop reason: SDUPTHER

## 2020-12-28 NOTE — TELEPHONE ENCOUNTER
----- Message from Ronna Wayne sent at 12/24/2020 12:21 PM EST -----  Regarding: Non-Urgent Medical Question  Contact: [phone]  I'm out of my blood pressure meds could you please send in new prescription metoprolol succ Er 100 mg.   [address] [facility] [place] [phone].   thank you

## 2021-01-05 DIAGNOSIS — Z79.4 TYPE 2 DIABETES MELLITUS WITH HYPERGLYCEMIA, WITH LONG-TERM CURRENT USE OF INSULIN (HCC): Primary | ICD-10-CM

## 2021-01-05 DIAGNOSIS — E11.65 TYPE 2 DIABETES MELLITUS WITH HYPERGLYCEMIA, WITH LONG-TERM CURRENT USE OF INSULIN (HCC): Primary | ICD-10-CM

## 2021-01-05 RX ORDER — DAPAGLIFLOZIN 10 MG/1
10 TABLET, FILM COATED ORAL EVERY MORNING
Qty: 30 TABLET | Refills: 0 | Status: SHIPPED | OUTPATIENT
Start: 2021-01-05 | End: 2021-01-23 | Stop reason: SDUPTHER

## 2021-01-17 DIAGNOSIS — I10 ESSENTIAL HYPERTENSION: ICD-10-CM

## 2021-01-18 RX ORDER — HYDROCHLOROTHIAZIDE 12.5 MG/1
TABLET ORAL
Qty: 30 TABLET | Refills: 2 | Status: SHIPPED | OUTPATIENT
Start: 2021-01-18 | End: 2021-01-26 | Stop reason: SDUPTHER

## 2021-01-22 RX ORDER — EMPAGLIFLOZIN 25 MG/1
25 TABLET, FILM COATED ORAL ONCE
Qty: 1 TABLET | Refills: 0 | COMMUNITY
Start: 2021-01-22 | End: 2021-01-22

## 2021-01-23 RX ORDER — EMPAGLIFLOZIN 25 MG/1
25 TABLET, FILM COATED ORAL DAILY
Qty: 30 TABLET | Refills: 5 | COMMUNITY
Start: 2021-01-23 | End: 2021-02-19 | Stop reason: CLARIF

## 2021-01-26 DIAGNOSIS — I10 ESSENTIAL HYPERTENSION: ICD-10-CM

## 2021-01-26 RX ORDER — METOPROLOL SUCCINATE 100 MG/1
100 TABLET, EXTENDED RELEASE ORAL DAILY
Qty: 30 TABLET | Refills: 2 | Status: SHIPPED | OUTPATIENT
Start: 2021-01-26 | End: 2021-03-29 | Stop reason: SDUPTHER

## 2021-01-26 RX ORDER — HYDROCHLOROTHIAZIDE 12.5 MG/1
12.5 TABLET ORAL DAILY
Qty: 30 TABLET | Refills: 2 | Status: SHIPPED | OUTPATIENT
Start: 2021-01-26 | End: 2021-03-29 | Stop reason: SDUPTHER

## 2021-02-19 DIAGNOSIS — Z79.4 TYPE 2 DIABETES MELLITUS WITH HYPERGLYCEMIA, WITH LONG-TERM CURRENT USE OF INSULIN (HCC): Primary | ICD-10-CM

## 2021-02-19 DIAGNOSIS — E11.65 TYPE 2 DIABETES MELLITUS WITH HYPERGLYCEMIA, WITH LONG-TERM CURRENT USE OF INSULIN (HCC): Primary | ICD-10-CM

## 2021-02-19 RX ORDER — DAPAGLIFLOZIN 10 MG/1
1 TABLET, FILM COATED ORAL EVERY MORNING
Qty: 42 TABLET | Refills: 0 | COMMUNITY
Start: 2021-02-19 | End: 2021-03-29

## 2021-02-22 DIAGNOSIS — Z23 IMMUNIZATION DUE: ICD-10-CM

## 2021-02-24 ENCOUNTER — LAB (OUTPATIENT)
Dept: LAB | Facility: HOSPITAL | Age: 66
End: 2021-02-24

## 2021-02-24 ENCOUNTER — TRANSCRIBE ORDERS (OUTPATIENT)
Dept: ADMINISTRATIVE | Facility: HOSPITAL | Age: 66
End: 2021-02-24

## 2021-02-24 DIAGNOSIS — N18.9 CHRONIC KIDNEY DISEASE, UNSPECIFIED CKD STAGE: Primary | ICD-10-CM

## 2021-02-24 DIAGNOSIS — N18.9 CHRONIC KIDNEY DISEASE, UNSPECIFIED CKD STAGE: ICD-10-CM

## 2021-02-24 LAB
ALBUMIN SERPL-MCNC: 3.85 G/DL (ref 3.5–5.2)
ALBUMIN UR-MCNC: 1.5 MG/DL
ALBUMIN/GLOB SERPL: 1 G/DL
ALP SERPL-CCNC: 93 U/L (ref 39–117)
ALT SERPL W P-5'-P-CCNC: 23 U/L (ref 1–33)
ANION GAP SERPL CALCULATED.3IONS-SCNC: 12.2 MMOL/L (ref 5–15)
AST SERPL-CCNC: 28 U/L (ref 1–32)
BACTERIA UR QL AUTO: ABNORMAL /HPF
BILIRUB SERPL-MCNC: 0.6 MG/DL (ref 0–1.2)
BILIRUB UR QL STRIP: NEGATIVE
BUN SERPL-MCNC: 21 MG/DL (ref 8–23)
BUN/CREAT SERPL: 21.6 (ref 7–25)
CALCIUM SPEC-SCNC: 9.8 MG/DL (ref 8.6–10.5)
CHLORIDE SERPL-SCNC: 101 MMOL/L (ref 98–107)
CLARITY UR: CLEAR
CO2 SERPL-SCNC: 24.8 MMOL/L (ref 22–29)
COLOR UR: YELLOW
CREAT SERPL-MCNC: 0.97 MG/DL (ref 0.57–1)
CREAT UR-MCNC: 82.6 MG/DL
CREAT UR-MCNC: 82.6 MG/DL
GFR SERPL CREATININE-BSD FRML MDRD: 57 ML/MIN/1.73
GLOBULIN UR ELPH-MCNC: 4 GM/DL
GLUCOSE SERPL-MCNC: 178 MG/DL (ref 65–99)
GLUCOSE UR STRIP-MCNC: ABNORMAL MG/DL
HGB UR QL STRIP.AUTO: NEGATIVE
HYALINE CASTS UR QL AUTO: ABNORMAL /LPF
KETONES UR QL STRIP: NEGATIVE
LEUKOCYTE ESTERASE UR QL STRIP.AUTO: NEGATIVE
MICROALBUMIN/CREAT UR: 18.2 MG/G
NITRITE UR QL STRIP: NEGATIVE
PH UR STRIP.AUTO: 5.5 [PH] (ref 5–8)
POTASSIUM SERPL-SCNC: 3.7 MMOL/L (ref 3.5–5.2)
PROT SERPL-MCNC: 7.8 G/DL (ref 6–8.5)
PROT UR QL STRIP: NEGATIVE
PROT UR-MCNC: 18 MG/DL
PROT/CREAT UR: 217.9 MG/G CREA (ref 0–200)
RBC # UR: ABNORMAL /HPF
REF LAB TEST METHOD: ABNORMAL
SODIUM SERPL-SCNC: 138 MMOL/L (ref 136–145)
SP GR UR STRIP: >1.03 (ref 1–1.03)
SQUAMOUS #/AREA URNS HPF: ABNORMAL /HPF
UROBILINOGEN UR QL STRIP: ABNORMAL
WBC UR QL AUTO: ABNORMAL /HPF

## 2021-02-24 PROCEDURE — 82570 ASSAY OF URINE CREATININE: CPT

## 2021-02-24 PROCEDURE — 84156 ASSAY OF PROTEIN URINE: CPT

## 2021-02-24 PROCEDURE — 82043 UR ALBUMIN QUANTITATIVE: CPT

## 2021-02-24 PROCEDURE — 80053 COMPREHEN METABOLIC PANEL: CPT

## 2021-02-24 PROCEDURE — 81001 URINALYSIS AUTO W/SCOPE: CPT

## 2021-02-24 PROCEDURE — 36415 COLL VENOUS BLD VENIPUNCTURE: CPT

## 2021-03-08 ENCOUNTER — PATIENT MESSAGE (OUTPATIENT)
Dept: FAMILY MEDICINE CLINIC | Facility: CLINIC | Age: 66
End: 2021-03-08

## 2021-03-08 ENCOUNTER — IMMUNIZATION (OUTPATIENT)
Dept: VACCINE CLINIC | Facility: HOSPITAL | Age: 66
End: 2021-03-08

## 2021-03-08 DIAGNOSIS — Z23 IMMUNIZATION DUE: ICD-10-CM

## 2021-03-08 PROCEDURE — 0001A: CPT | Performed by: INTERNAL MEDICINE

## 2021-03-08 PROCEDURE — 91300 HC SARSCOV02 VAC 30MCG/0.3ML IM: CPT | Performed by: INTERNAL MEDICINE

## 2021-03-09 ENCOUNTER — APPOINTMENT (OUTPATIENT)
Dept: GENERAL RADIOLOGY | Facility: HOSPITAL | Age: 66
End: 2021-03-09

## 2021-03-09 ENCOUNTER — HOSPITAL ENCOUNTER (EMERGENCY)
Facility: HOSPITAL | Age: 66
Discharge: HOME | End: 2021-03-09
Attending: FAMILY MEDICINE | Admitting: FAMILY MEDICINE

## 2021-03-09 ENCOUNTER — APPOINTMENT (OUTPATIENT)
Dept: CT IMAGING | Facility: HOSPITAL | Age: 66
End: 2021-03-09

## 2021-03-09 VITALS
TEMPERATURE: 98.5 F | BODY MASS INDEX: 47.31 KG/M2 | HEART RATE: 53 BPM | WEIGHT: 267 LBS | HEIGHT: 63 IN | DIASTOLIC BLOOD PRESSURE: 70 MMHG | RESPIRATION RATE: 18 BRPM | OXYGEN SATURATION: 96 % | SYSTOLIC BLOOD PRESSURE: 138 MMHG

## 2021-03-09 DIAGNOSIS — I47.1 SVT (SUPRAVENTRICULAR TACHYCARDIA) (HCC): Primary | ICD-10-CM

## 2021-03-09 LAB
ALBUMIN SERPL-MCNC: 3.8 G/DL (ref 3.5–5.2)
ALBUMIN/GLOB SERPL: 0.9 G/DL
ALP SERPL-CCNC: 90 U/L (ref 39–117)
ALT SERPL W P-5'-P-CCNC: 20 U/L (ref 1–33)
ANION GAP SERPL CALCULATED.3IONS-SCNC: 15 MMOL/L (ref 5–15)
APTT PPP: 31.4 SECONDS (ref 25.6–35.3)
AST SERPL-CCNC: 27 U/L (ref 1–32)
BASOPHILS # BLD AUTO: 0.07 10*3/MM3 (ref 0–0.2)
BASOPHILS NFR BLD AUTO: 1 % (ref 0–1.5)
BILIRUB SERPL-MCNC: 0.7 MG/DL (ref 0–1.2)
BILIRUB UR QL STRIP: NEGATIVE
BUN SERPL-MCNC: 22 MG/DL (ref 8–23)
BUN/CREAT SERPL: 18.5 (ref 7–25)
CALCIUM SPEC-SCNC: 9.8 MG/DL (ref 8.6–10.5)
CHLORIDE SERPL-SCNC: 102 MMOL/L (ref 98–107)
CLARITY UR: CLEAR
CO2 SERPL-SCNC: 21 MMOL/L (ref 22–29)
COLOR UR: YELLOW
CREAT SERPL-MCNC: 1.19 MG/DL (ref 0.57–1)
DEPRECATED RDW RBC AUTO: 47.5 FL (ref 37–54)
EOSINOPHIL # BLD AUTO: 0.08 10*3/MM3 (ref 0–0.4)
EOSINOPHIL NFR BLD AUTO: 1.1 % (ref 0.3–6.2)
ERYTHROCYTE [DISTWIDTH] IN BLOOD BY AUTOMATED COUNT: 14 % (ref 12.3–15.4)
GFR SERPL CREATININE-BSD FRML MDRD: 45 ML/MIN/1.73
GLOBULIN UR ELPH-MCNC: 4.1 GM/DL
GLUCOSE SERPL-MCNC: 210 MG/DL (ref 65–99)
GLUCOSE UR STRIP-MCNC: ABNORMAL MG/DL
HCT VFR BLD AUTO: 46.1 % (ref 34–46.6)
HGB BLD-MCNC: 15.1 G/DL (ref 12–15.9)
HGB UR QL STRIP.AUTO: NEGATIVE
IMM GRANULOCYTES # BLD AUTO: 0.01 10*3/MM3 (ref 0–0.05)
IMM GRANULOCYTES NFR BLD AUTO: 0.1 % (ref 0–0.5)
INR PPP: 1.09 (ref 0.9–1.1)
KETONES UR QL STRIP: NEGATIVE
LEUKOCYTE ESTERASE UR QL STRIP.AUTO: NEGATIVE
LIPASE SERPL-CCNC: 59 U/L (ref 13–60)
LYMPHOCYTES # BLD AUTO: 2.87 10*3/MM3 (ref 0.7–3.1)
LYMPHOCYTES NFR BLD AUTO: 40.5 % (ref 19.6–45.3)
MCH RBC QN AUTO: 30.8 PG (ref 26.6–33)
MCHC RBC AUTO-ENTMCNC: 32.8 G/DL (ref 31.5–35.7)
MCV RBC AUTO: 93.9 FL (ref 79–97)
MONOCYTES # BLD AUTO: 0.7 10*3/MM3 (ref 0.1–0.9)
MONOCYTES NFR BLD AUTO: 9.9 % (ref 5–12)
NEUTROPHILS NFR BLD AUTO: 3.36 10*3/MM3 (ref 1.7–7)
NEUTROPHILS NFR BLD AUTO: 47.4 % (ref 42.7–76)
NITRITE UR QL STRIP: NEGATIVE
NRBC BLD AUTO-RTO: 0 /100 WBC (ref 0–0.2)
NT-PROBNP SERPL-MCNC: 240.4 PG/ML (ref 0–900)
PH UR STRIP.AUTO: 5.5 [PH] (ref 5–8)
PLATELET # BLD AUTO: 166 10*3/MM3 (ref 140–450)
PMV BLD AUTO: 10.7 FL (ref 6–12)
POTASSIUM SERPL-SCNC: 3.9 MMOL/L (ref 3.5–5.2)
PROT SERPL-MCNC: 7.9 G/DL (ref 6–8.5)
PROT UR QL STRIP: NEGATIVE
PROTHROMBIN TIME: 14 SECONDS (ref 11.9–14.1)
QT INTERVAL: 282 MS
QTC INTERVAL: 468 MS
RBC # BLD AUTO: 4.91 10*6/MM3 (ref 3.77–5.28)
SODIUM SERPL-SCNC: 138 MMOL/L (ref 136–145)
SP GR UR STRIP: 1.02 (ref 1–1.03)
TROPONIN T SERPL-MCNC: <0.01 NG/ML (ref 0–0.03)
TROPONIN T SERPL-MCNC: <0.01 NG/ML (ref 0–0.03)
TSH SERPL DL<=0.05 MIU/L-ACNC: 1.47 UIU/ML (ref 0.27–4.2)
UROBILINOGEN UR QL STRIP: ABNORMAL
WBC # BLD AUTO: 7.09 10*3/MM3 (ref 3.4–10.8)

## 2021-03-09 PROCEDURE — 93010 ELECTROCARDIOGRAM REPORT: CPT | Performed by: INTERNAL MEDICINE

## 2021-03-09 PROCEDURE — 93005 ELECTROCARDIOGRAM TRACING: CPT | Performed by: FAMILY MEDICINE

## 2021-03-09 PROCEDURE — 96374 THER/PROPH/DIAG INJ IV PUSH: CPT

## 2021-03-09 PROCEDURE — 25010000002 ADENOSINE PER 6 MG: Performed by: FAMILY MEDICINE

## 2021-03-09 PROCEDURE — 85025 COMPLETE CBC W/AUTO DIFF WBC: CPT | Performed by: FAMILY MEDICINE

## 2021-03-09 PROCEDURE — 84443 ASSAY THYROID STIM HORMONE: CPT | Performed by: FAMILY MEDICINE

## 2021-03-09 PROCEDURE — 85730 THROMBOPLASTIN TIME PARTIAL: CPT | Performed by: FAMILY MEDICINE

## 2021-03-09 PROCEDURE — 85610 PROTHROMBIN TIME: CPT | Performed by: FAMILY MEDICINE

## 2021-03-09 PROCEDURE — 81003 URINALYSIS AUTO W/O SCOPE: CPT | Performed by: FAMILY MEDICINE

## 2021-03-09 PROCEDURE — 25010000002 IOPAMIDOL 61 % SOLUTION: Performed by: FAMILY MEDICINE

## 2021-03-09 PROCEDURE — 74177 CT ABD & PELVIS W/CONTRAST: CPT | Performed by: RADIOLOGY

## 2021-03-09 PROCEDURE — 99284 EMERGENCY DEPT VISIT MOD MDM: CPT

## 2021-03-09 PROCEDURE — 83690 ASSAY OF LIPASE: CPT | Performed by: FAMILY MEDICINE

## 2021-03-09 PROCEDURE — 36415 COLL VENOUS BLD VENIPUNCTURE: CPT

## 2021-03-09 PROCEDURE — 71045 X-RAY EXAM CHEST 1 VIEW: CPT

## 2021-03-09 PROCEDURE — 80053 COMPREHEN METABOLIC PANEL: CPT | Performed by: FAMILY MEDICINE

## 2021-03-09 PROCEDURE — 83880 ASSAY OF NATRIURETIC PEPTIDE: CPT | Performed by: FAMILY MEDICINE

## 2021-03-09 PROCEDURE — 71045 X-RAY EXAM CHEST 1 VIEW: CPT | Performed by: RADIOLOGY

## 2021-03-09 PROCEDURE — 74177 CT ABD & PELVIS W/CONTRAST: CPT

## 2021-03-09 PROCEDURE — 84484 ASSAY OF TROPONIN QUANT: CPT | Performed by: FAMILY MEDICINE

## 2021-03-09 RX ORDER — SODIUM CHLORIDE 0.9 % (FLUSH) 0.9 %
10 SYRINGE (ML) INJECTION AS NEEDED
Status: DISCONTINUED | OUTPATIENT
Start: 2021-03-09 | End: 2021-03-09 | Stop reason: HOSPADM

## 2021-03-09 RX ORDER — ADENOSINE 3 MG/ML
6 INJECTION, SOLUTION INTRAVENOUS ONCE
Status: COMPLETED | OUTPATIENT
Start: 2021-03-09 | End: 2021-03-09

## 2021-03-09 RX ORDER — ADENOSINE 3 MG/ML
12 INJECTION, SOLUTION INTRAVENOUS ONCE
Status: DISCONTINUED | OUTPATIENT
Start: 2021-03-09 | End: 2021-03-09

## 2021-03-09 RX ADMIN — SODIUM CHLORIDE 1000 ML: 9 INJECTION, SOLUTION INTRAVENOUS at 11:22

## 2021-03-09 RX ADMIN — IOPAMIDOL 88 ML: 612 INJECTION, SOLUTION INTRAVENOUS at 14:04

## 2021-03-09 RX ADMIN — ADENOSINE 6 MG: 3 INJECTION, SOLUTION INTRAVENOUS at 11:22

## 2021-03-09 NOTE — ED PROVIDER NOTES
Subjective   67 yo female with history of fibromyalgia, GerD, HTN, HLD, sleep apnes prsentss with palpitations. Pt reports that 4 years ago while living in [place] she had an episode of SVT  Was followed by cardiology but hasnt established care since moving down here. Woke up and felt the same way she did when it happened before      History provided by:  Patient  Palpitations  Palpitations quality:  Regular  Onset quality:  Sudden  Timing:  Constant  Progression:  Unchanged  Chronicity:  Recurrent  Relieved by:  Nothing  Worsened by:  Nothing  Ineffective treatments:  None tried  Associated symptoms: no chest pain    Risk factors: diabetes mellitus and hx of thyroid disease        Review of Systems   Constitutional: Negative.  Negative for fever.   HENT: Negative.    Respiratory: Negative.    Cardiovascular: Positive for palpitations. Negative for chest pain.   Gastrointestinal: Negative.  Negative for abdominal pain.   Endocrine: Negative.    Genitourinary: Negative.  Negative for dysuria.   Skin: Negative.    Neurological: Negative.    Psychiatric/Behavioral: Negative.    All other systems reviewed and are negative.      Past Medical History:   Diagnosis Date   • Anxiety    • B12 deficiency    • BPV (benign positional vertigo)    • Diarrhea    • Fibromyalgia    • GERD (gastroesophageal reflux disease)    • Glaucoma    • Headache    • History of EKG 06/04/2015    BORDERLINE   • Hyperlipidemia    • Hypertension    • Obesity    • Sleep apnea    • Weakness of left arm        Allergies   Allergen Reactions   • Codeine GI Intolerance     Increased heart rate     • Sulfa Antibiotics GI Intolerance     Heart rate increase        Past Surgical History:   Procedure Laterality Date   • APPENDECTOMY     • CATARACT EXTRACTION Bilateral    • CHOLECYSTECTOMY     • GALLBLADDER SURGERY     • URETHRA SURGERY         Family History   Problem Relation Age of Onset   • Thyroid disease Mother    • Diabetes Mother    • Hyperlipidemia  Father    • Hypertension Father    • Heart attack Father    • Alcohol abuse Maternal Aunt    • Cancer Maternal Grandfather         PROSTATE   • Stroke Paternal Grandmother    • Stroke Paternal Grandfather    • Hypertension Other    • Hypertension Sister    • Breast cancer Sister    • Obesity Sister    • Hypertension Sister    • Hyperlipidemia Sister    • Hyperlipidemia Sister    • Hypertension Sister    • Arthritis Sister    • Obesity Sister    • Thyroid disease Sister        Social History     Socioeconomic History   • Marital status:      Spouse name: Not on file   • Number of children: Not on file   • Years of education: Not on file   • Highest education level: Not on file   Tobacco Use   • Smoking status: Former Smoker     Packs/day: 1.50     Years: 8.00     Pack years: 12.00     Types: Cigarettes     Quit date:      Years since quittin.2   • Smokeless tobacco: Never Used   Substance and Sexual Activity   • Alcohol use: No     Comment: former social drinker not had anything in 25 + years   • Drug use: No           Objective   Physical Exam  Vitals and nursing note reviewed.   Constitutional:       Appearance: Normal appearance. She is obese.   HENT:      Head: Normocephalic.      Right Ear: Tympanic membrane normal.      Left Ear: Tympanic membrane normal.      Nose: Nose normal.      Mouth/Throat:      Mouth: Mucous membranes are moist.   Eyes:      Extraocular Movements: Extraocular movements intact.      Pupils: Pupils are equal, round, and reactive to light.   Cardiovascular:      Rate and Rhythm: Regular rhythm. Tachycardia present.      Pulses: Normal pulses.   Pulmonary:      Effort: Pulmonary effort is normal.   Abdominal:      General: Abdomen is flat.   Musculoskeletal:      Cervical back: Normal range of motion.   Skin:     General: Skin is warm.      Capillary Refill: Capillary refill takes less than 2 seconds.   Neurological:      General: No focal deficit present.      Mental  Status: She is alert and oriented to person, place, and time.   Psychiatric:         Mood and Affect: Mood normal.         Behavior: Behavior normal.         Thought Content: Thought content normal.         Judgment: Judgment normal.         Chemical Cardioversion    Date/Time: 3/9/2021 11:23 AM  Performed by: Blanca Denton DO  Authorized by: Blanca Denton DO     Consent:     Consent obtained:  Verbal and written    Consent given by:  Patient    Risks discussed:  Death, induced arrhythmia and pain    Alternatives discussed:  No treatment  Pre-procedure details:     Cardioversion basis:  Emergent    Rhythm:  Supraventricular tachycardia  Attempt one:     Cardioversion medication:  Adenosine 6mg      Medication outcome:  Conversion to normal sinus rhythm  Post-procedure details:     Patient status:  Awake    Patient tolerance of procedure:  Tolerated well, no immediate complications               ED Course  ED Course as of Mar 10 0724   Tue Mar 09, 2021   1110 KG interpretation time is 1109 supraventricular tachycardia 166 bpm QRS durations 84 QT is 282 QTC is 468 there is ST changes however felt secondary to rate    []   1132 EKG interpretation time is 1132 normal sinus rhythm 78 bpm QRS duration is 86 QT is 390 QTC is 444 no evidence of acute ST elevation or depression    []      ED Course User Index  [MH] Blanca Denton DO                                           MDM  Number of Diagnoses or Management Options  SVT (supraventricular tachycardia) (CMS/HCC): new and requires workup     Amount and/or Complexity of Data Reviewed  Clinical lab tests: ordered and reviewed  Tests in the radiology section of CPT®: ordered and reviewed  Tests in the medicine section of CPT®: reviewed and ordered  Independent visualization of images, tracings, or specimens: yes    Risk of Complications, Morbidity, and/or Mortality  Presenting problems: high  Diagnostic procedures: high  Management options:  high    Critical Care  Total time providing critical care: 30-74 minutes      Final diagnoses:   SVT (supraventricular tachycardia) (CMS/[facility])            Blanca Denton DO  03/10/21 0724

## 2021-03-09 NOTE — ED NOTES
PROVIDER AT BEDSIDE,  PT RECEIVED ADENOSINE 6MG RAPID ADMINISTRATION. PT RESPONDED CONVERTING TO NSR WITH RATE OF 78.  PT IMMEDIATELY PAIN FREE, SOB SUBSIDED WITH DECREASING HEART RATE.      Monet Villeda, RN  03/09/21 2320

## 2021-03-10 LAB
QT INTERVAL: 390 MS
QTC INTERVAL: 444 MS

## 2021-03-22 ENCOUNTER — LAB (OUTPATIENT)
Dept: FAMILY MEDICINE CLINIC | Facility: CLINIC | Age: 66
End: 2021-03-22

## 2021-03-22 DIAGNOSIS — Z79.4 TYPE 2 DIABETES MELLITUS WITH HYPERGLYCEMIA, WITH LONG-TERM CURRENT USE OF INSULIN (HCC): ICD-10-CM

## 2021-03-22 DIAGNOSIS — E55.9 VITAMIN D DEFICIENCY: ICD-10-CM

## 2021-03-22 DIAGNOSIS — I10 ESSENTIAL HYPERTENSION: ICD-10-CM

## 2021-03-22 DIAGNOSIS — E11.65 TYPE 2 DIABETES MELLITUS WITH HYPERGLYCEMIA, WITH LONG-TERM CURRENT USE OF INSULIN (HCC): ICD-10-CM

## 2021-03-22 DIAGNOSIS — E78.2 MIXED HYPERLIPIDEMIA: ICD-10-CM

## 2021-03-22 LAB
25(OH)D3 SERPL-MCNC: 24.5 NG/ML
ALBUMIN SERPL-MCNC: 3.5 G/DL (ref 3.5–5.2)
ALBUMIN UR-MCNC: 3.2 MG/DL
ALBUMIN/GLOB SERPL: 1 G/DL
ALP SERPL-CCNC: 80 U/L (ref 39–117)
ALT SERPL W P-5'-P-CCNC: 19 U/L (ref 1–33)
ANION GAP SERPL CALCULATED.3IONS-SCNC: 8.1 MMOL/L (ref 5–15)
AST SERPL-CCNC: 29 U/L (ref 1–32)
BASOPHILS # BLD AUTO: 0.05 10*3/MM3 (ref 0–0.2)
BASOPHILS NFR BLD AUTO: 0.8 % (ref 0–1.5)
BILIRUB SERPL-MCNC: 0.6 MG/DL (ref 0–1.2)
BUN SERPL-MCNC: 17 MG/DL (ref 8–23)
BUN/CREAT SERPL: 23.3 (ref 7–25)
CALCIUM SPEC-SCNC: 9.1 MG/DL (ref 8.6–10.5)
CHLORIDE SERPL-SCNC: 102 MMOL/L (ref 98–107)
CHOLEST SERPL-MCNC: 223 MG/DL (ref 0–200)
CO2 SERPL-SCNC: 25.9 MMOL/L (ref 22–29)
CREAT SERPL-MCNC: 0.73 MG/DL (ref 0.57–1)
DEPRECATED RDW RBC AUTO: 50.3 FL (ref 37–54)
EOSINOPHIL # BLD AUTO: 0.08 10*3/MM3 (ref 0–0.4)
EOSINOPHIL NFR BLD AUTO: 1.3 % (ref 0.3–6.2)
ERYTHROCYTE [DISTWIDTH] IN BLOOD BY AUTOMATED COUNT: 14.2 % (ref 12.3–15.4)
GFR SERPL CREATININE-BSD FRML MDRD: 80 ML/MIN/1.73
GLOBULIN UR ELPH-MCNC: 3.6 GM/DL
GLUCOSE SERPL-MCNC: 216 MG/DL (ref 65–99)
HBA1C MFR BLD: 8.79 % (ref 4.8–5.6)
HCT VFR BLD AUTO: 40.5 % (ref 34–46.6)
HDLC SERPL-MCNC: 60 MG/DL (ref 40–60)
HGB BLD-MCNC: 13.1 G/DL (ref 12–15.9)
LDLC SERPL CALC-MCNC: 139 MG/DL (ref 0–100)
LDLC/HDLC SERPL: 2.26 {RATIO}
LYMPHOCYTES # BLD AUTO: 3.01 10*3/MM3 (ref 0.7–3.1)
LYMPHOCYTES NFR BLD AUTO: 47.4 % (ref 19.6–45.3)
MCH RBC QN AUTO: 31.2 PG (ref 26.6–33)
MCHC RBC AUTO-ENTMCNC: 32.3 G/DL (ref 31.5–35.7)
MCV RBC AUTO: 96.4 FL (ref 79–97)
MONOCYTES # BLD AUTO: 0.75 10*3/MM3 (ref 0.1–0.9)
MONOCYTES NFR BLD AUTO: 11.8 % (ref 5–12)
NEUTROPHILS NFR BLD AUTO: 2.45 10*3/MM3 (ref 1.7–7)
NEUTROPHILS NFR BLD AUTO: 38.5 % (ref 42.7–76)
PLATELET # BLD AUTO: 129 10*3/MM3 (ref 140–450)
PMV BLD AUTO: 11.5 FL (ref 6–12)
POTASSIUM SERPL-SCNC: 4.1 MMOL/L (ref 3.5–5.2)
PROT SERPL-MCNC: 7.1 G/DL (ref 6–8.5)
RBC # BLD AUTO: 4.2 10*6/MM3 (ref 3.77–5.28)
SODIUM SERPL-SCNC: 136 MMOL/L (ref 136–145)
TRIGL SERPL-MCNC: 136 MG/DL (ref 0–150)
TSH SERPL DL<=0.05 MIU/L-ACNC: 4.24 UIU/ML (ref 0.27–4.2)
VIT B12 BLD-MCNC: 690 PG/ML (ref 211–946)
VLDLC SERPL-MCNC: 24 MG/DL (ref 5–40)
WBC # BLD AUTO: 6.35 10*3/MM3 (ref 3.4–10.8)

## 2021-03-22 PROCEDURE — 82306 VITAMIN D 25 HYDROXY: CPT | Performed by: NURSE PRACTITIONER

## 2021-03-22 PROCEDURE — 82607 VITAMIN B-12: CPT | Performed by: NURSE PRACTITIONER

## 2021-03-22 PROCEDURE — 84443 ASSAY THYROID STIM HORMONE: CPT | Performed by: NURSE PRACTITIONER

## 2021-03-22 PROCEDURE — 83036 HEMOGLOBIN GLYCOSYLATED A1C: CPT | Performed by: NURSE PRACTITIONER

## 2021-03-22 PROCEDURE — 85025 COMPLETE CBC W/AUTO DIFF WBC: CPT | Performed by: NURSE PRACTITIONER

## 2021-03-22 PROCEDURE — 80061 LIPID PANEL: CPT | Performed by: NURSE PRACTITIONER

## 2021-03-22 PROCEDURE — 80053 COMPREHEN METABOLIC PANEL: CPT | Performed by: NURSE PRACTITIONER

## 2021-03-22 PROCEDURE — 82043 UR ALBUMIN QUANTITATIVE: CPT | Performed by: NURSE PRACTITIONER

## 2021-03-29 ENCOUNTER — TELEPHONE (OUTPATIENT)
Dept: FAMILY MEDICINE CLINIC | Facility: CLINIC | Age: 66
End: 2021-03-29

## 2021-03-29 ENCOUNTER — OFFICE VISIT (OUTPATIENT)
Dept: FAMILY MEDICINE CLINIC | Facility: CLINIC | Age: 66
End: 2021-03-29

## 2021-03-29 ENCOUNTER — IMMUNIZATION (OUTPATIENT)
Dept: VACCINE CLINIC | Facility: HOSPITAL | Age: 66
End: 2021-03-29

## 2021-03-29 VITALS — BODY MASS INDEX: 47.31 KG/M2 | WEIGHT: 267 LBS | RESPIRATION RATE: 18 BRPM | HEIGHT: 63 IN

## 2021-03-29 DIAGNOSIS — G47.33 OBSTRUCTIVE SLEEP APNEA SYNDROME: ICD-10-CM

## 2021-03-29 DIAGNOSIS — K21.00 GASTROESOPHAGEAL REFLUX DISEASE WITH ESOPHAGITIS WITHOUT HEMORRHAGE: ICD-10-CM

## 2021-03-29 DIAGNOSIS — E55.9 VITAMIN D DEFICIENCY: ICD-10-CM

## 2021-03-29 DIAGNOSIS — E11.65 TYPE 2 DIABETES MELLITUS WITH HYPERGLYCEMIA, WITH LONG-TERM CURRENT USE OF INSULIN (HCC): ICD-10-CM

## 2021-03-29 DIAGNOSIS — K21.9 GASTROESOPHAGEAL REFLUX DISEASE WITHOUT ESOPHAGITIS: ICD-10-CM

## 2021-03-29 DIAGNOSIS — Z79.4 TYPE 2 DIABETES MELLITUS WITH DIABETIC NEUROPATHY, WITH LONG-TERM CURRENT USE OF INSULIN (HCC): ICD-10-CM

## 2021-03-29 DIAGNOSIS — E66.01 CLASS 2 SEVERE OBESITY DUE TO EXCESS CALORIES WITH SERIOUS COMORBIDITY IN ADULT, UNSPECIFIED BMI (HCC): ICD-10-CM

## 2021-03-29 DIAGNOSIS — E11.40 TYPE 2 DIABETES MELLITUS WITH DIABETIC NEUROPATHY, WITH LONG-TERM CURRENT USE OF INSULIN (HCC): ICD-10-CM

## 2021-03-29 DIAGNOSIS — E78.2 MIXED HYPERLIPIDEMIA: ICD-10-CM

## 2021-03-29 DIAGNOSIS — Z79.4 TYPE 2 DIABETES MELLITUS WITH HYPERGLYCEMIA, WITH LONG-TERM CURRENT USE OF INSULIN (HCC): ICD-10-CM

## 2021-03-29 DIAGNOSIS — I10 ESSENTIAL HYPERTENSION: Primary | ICD-10-CM

## 2021-03-29 PROCEDURE — 91300 HC SARSCOV02 VAC 30MCG/0.3ML IM: CPT | Performed by: INTERNAL MEDICINE

## 2021-03-29 PROCEDURE — 99214 OFFICE O/P EST MOD 30 MIN: CPT | Performed by: NURSE PRACTITIONER

## 2021-03-29 PROCEDURE — 0002A: CPT | Performed by: INTERNAL MEDICINE

## 2021-03-29 RX ORDER — ASPIRIN 81 MG/1
81 TABLET ORAL DAILY
Qty: 90 TABLET | Refills: 3 | Status: SHIPPED | OUTPATIENT
Start: 2021-03-29 | End: 2023-02-06

## 2021-03-29 RX ORDER — ATORVASTATIN CALCIUM 20 MG/1
20 TABLET, FILM COATED ORAL DAILY
Qty: 90 TABLET | Refills: 3 | Status: ON HOLD | OUTPATIENT
Start: 2021-03-29 | End: 2021-08-05

## 2021-03-29 RX ORDER — INSULIN GLARGINE 100 [IU]/ML
INJECTION, SOLUTION SUBCUTANEOUS
Qty: 30 PEN | Refills: 11 | Status: ON HOLD | OUTPATIENT
Start: 2021-03-29 | End: 2021-05-02

## 2021-03-29 RX ORDER — EMPAGLIFLOZIN 25 MG/1
1 TABLET, FILM COATED ORAL EVERY MORNING
Qty: 30 TABLET | Refills: 11 | Status: SHIPPED | OUTPATIENT
Start: 2021-03-29 | End: 2021-04-13 | Stop reason: SDUPTHER

## 2021-03-29 RX ORDER — HYDROCHLOROTHIAZIDE 12.5 MG/1
12.5 TABLET ORAL DAILY
Qty: 30 TABLET | Refills: 2 | Status: SHIPPED | OUTPATIENT
Start: 2021-03-29 | End: 2021-04-26 | Stop reason: SDUPTHER

## 2021-03-29 RX ORDER — LOSARTAN POTASSIUM 50 MG/1
50 TABLET ORAL DAILY
Qty: 30 TABLET | Refills: 5 | Status: ON HOLD | OUTPATIENT
Start: 2021-03-29 | End: 2022-07-07

## 2021-03-29 RX ORDER — METOPROLOL SUCCINATE 100 MG/1
100 TABLET, EXTENDED RELEASE ORAL DAILY
Qty: 30 TABLET | Refills: 2 | Status: SHIPPED | OUTPATIENT
Start: 2021-03-29 | End: 2021-04-26 | Stop reason: SDUPTHER

## 2021-03-29 RX ORDER — PANTOPRAZOLE SODIUM 40 MG/1
40 TABLET, DELAYED RELEASE ORAL DAILY
Qty: 90 TABLET | Refills: 3 | Status: ON HOLD | OUTPATIENT
Start: 2021-03-29 | End: 2021-08-05

## 2021-03-29 RX ORDER — LEVOTHYROXINE SODIUM 0.1 MG/1
100 TABLET ORAL DAILY
Qty: 90 TABLET | Refills: 3 | Status: SHIPPED | OUTPATIENT
Start: 2021-03-29 | End: 2021-06-23 | Stop reason: DRUGHIGH

## 2021-03-29 RX ORDER — ICOSAPENT ETHYL 1000 MG/1
CAPSULE ORAL
Qty: 120 CAPSULE | Refills: 5 | Status: ON HOLD | OUTPATIENT
Start: 2021-03-29 | End: 2021-05-02

## 2021-03-29 NOTE — PROGRESS NOTES
Subjective   Ronna Wayne is a 66 y.o. female.     No chief complaint on file.    Chief complaint  Go over labs  Diabetes      History of Present Illness:  Female multiple chronic health conditions patient has hypertension, hyperlipidemia, uncontrolled type 2 diabetes, morbid obesity, glaucoma, GERD, anxiety, frequent headaches, previous CVA with some memory loss and sleep apnea.  Patient is morbidly obese with a current weight of 267 pounds and BMI greater than 47.  She does not follow any certain diet.  Patient's diabetes is uncontrolled.  She was having better control with her insulin and Jardiance.  Was unable to afford Jardiance as it was over $400 a month.  She was then given some samples of Farxiga which caused her to have some urinary symptoms and pressure.  Patient stopped Farxiga as it was causing side effects and she could not afford it either as it was going to be over 400 hours a month with her insurance as well.  She has since had a fasting glucose levels over 200 and A1c well over 8.  Patient does have hyperlipidemia and hypertension.  Her blood pressure has been little better controlled.  She does watch her blood pressure closely and is compliant with her medications.  Her GERD is moderately controlled with Protonix.  She does have exacerbation if she eats certain foods that are spicy or misses a dose of Protonix.  Headaches are worse when her glucose is not controlled or if her blood pressure is up.  Not daily.  She does have sleep apnea with CPAP.    The following portions of the patient's history and ROS were reviewed and updated as appropriate per provider:  Allergies, current medications, past family history, past medical history, past social history, past surgical history and problem list.    Review of Systems   Constitutional: Negative for activity change, appetite change, chills, fatigue and fever.   HENT: Negative for ear pain, facial swelling, hearing loss, sinus pressure, sore  "throat, trouble swallowing and voice change.    Eyes: Negative for pain, discharge and visual disturbance.   Respiratory: Negative for apnea, cough, chest tightness, shortness of breath and wheezing.    Cardiovascular: Negative for chest pain, palpitations and leg swelling.   Gastrointestinal: Negative for abdominal pain, blood in stool, constipation, diarrhea, nausea and vomiting.   Endocrine: Negative.    Genitourinary: Positive for frequency. Negative for difficulty urinating, dysuria and flank pain.   Musculoskeletal: Positive for arthralgias, back pain and joint swelling (Feet at times). Negative for neck stiffness.   Skin: Negative.  Negative for color change.   Allergic/Immunologic: Positive for environmental allergies. Negative for food allergies and immunocompromised state.   Neurological: Positive for numbness (Feet). Negative for dizziness and headaches.   Hematological: Negative.    Psychiatric/Behavioral: Negative for confusion, dysphoric mood, self-injury and suicidal ideas. The patient is not nervous/anxious.        Objective     Resp 18   Ht 160 cm (63\")   Wt 121 kg (267 lb)   BMI 47.30 kg/m²   Lab on 03/22/2021   Component Date Value Ref Range Status   • Glucose 03/22/2021 216* 65 - 99 mg/dL Final   • BUN 03/22/2021 17  8 - 23 mg/dL Final   • Creatinine 03/22/2021 0.73  0.57 - 1.00 mg/dL Final   • Sodium 03/22/2021 136  136 - 145 mmol/L Final   • Potassium 03/22/2021 4.1  3.5 - 5.2 mmol/L Final   • Chloride 03/22/2021 102  98 - 107 mmol/L Final   • CO2 03/22/2021 25.9  22.0 - 29.0 mmol/L Final   • Calcium 03/22/2021 9.1  8.6 - 10.5 mg/dL Final   • Total Protein 03/22/2021 7.1  6.0 - 8.5 g/dL Final   • Albumin 03/22/2021 3.50  3.50 - 5.20 g/dL Final   • ALT (SGPT) 03/22/2021 19  1 - 33 U/L Final   • AST (SGOT) 03/22/2021 29  1 - 32 U/L Final   • Alkaline Phosphatase 03/22/2021 80  39 - 117 U/L Final   • Total Bilirubin 03/22/2021 0.6  0.0 - 1.2 mg/dL Final   • eGFR Non African Amer 03/22/2021 80  " >60 mL/min/1.73 Final   • Globulin 03/22/2021 3.6  gm/dL Final   • A/G Ratio 03/22/2021 1.0  g/dL Final   • BUN/Creatinine Ratio 03/22/2021 23.3  7.0 - 25.0 Final   • Anion Gap 03/22/2021 8.1  5.0 - 15.0 mmol/L Final   • TSH 03/22/2021 4.240* 0.270 - 4.200 uIU/mL Final   • Hemoglobin A1C 03/22/2021 8.79* 4.80 - 5.60 % Final   • 25 Hydroxy, Vitamin D 03/22/2021 24.5  ng/ml Final   • Vitamin B-12 03/22/2021 690  211 - 946 pg/mL Final   • Total Cholesterol 03/22/2021 223* 0 - 200 mg/dL Final   • Triglycerides 03/22/2021 136  0 - 150 mg/dL Final   • HDL Cholesterol 03/22/2021 60  40 - 60 mg/dL Final   • LDL Cholesterol  03/22/2021 139* 0 - 100 mg/dL Final   • VLDL Cholesterol 03/22/2021 24  5 - 40 mg/dL Final   • LDL/HDL Ratio 03/22/2021 2.26   Final   • Microalbumin, Urine 03/22/2021 3.2  mg/dL Final   • WBC 03/22/2021 6.35  3.40 - 10.80 10*3/mm3 Final   • RBC 03/22/2021 4.20  3.77 - 5.28 10*6/mm3 Final   • Hemoglobin 03/22/2021 13.1  12.0 - 15.9 g/dL Final   • Hematocrit 03/22/2021 40.5  34.0 - 46.6 % Final   • MCV 03/22/2021 96.4  79.0 - 97.0 fL Final   • MCH 03/22/2021 31.2  26.6 - 33.0 pg Final   • MCHC 03/22/2021 32.3  31.5 - 35.7 g/dL Final   • RDW 03/22/2021 14.2  12.3 - 15.4 % Final   • RDW-SD 03/22/2021 50.3  37.0 - 54.0 fl Final   • MPV 03/22/2021 11.5  6.0 - 12.0 fL Final   • Platelets 03/22/2021 129* 140 - 450 10*3/mm3 Final   • Neutrophil % 03/22/2021 38.5* 42.7 - 76.0 % Final   • Lymphocyte % 03/22/2021 47.4* 19.6 - 45.3 % Final   • Monocyte % 03/22/2021 11.8  5.0 - 12.0 % Final   • Eosinophil % 03/22/2021 1.3  0.3 - 6.2 % Final   • Basophil % 03/22/2021 0.8  0.0 - 1.5 % Final   • Neutrophils, Absolute 03/22/2021 2.45  1.70 - 7.00 10*3/mm3 Final   • Lymphocytes, Absolute 03/22/2021 3.01  0.70 - 3.10 10*3/mm3 Final   • Monocytes, Absolute 03/22/2021 0.75  0.10 - 0.90 10*3/mm3 Final   • Eosinophils, Absolute 03/22/2021 0.08  0.00 - 0.40 10*3/mm3 Final   • Basophils, Absolute 03/22/2021 0.05  0.00 - 0.20  10*3/mm3 Final       Physical Exam  Vitals reviewed.   Constitutional:       General: She is not in acute distress.     Appearance: Normal appearance. She is obese. She is not ill-appearing, toxic-appearing or diaphoretic.   HENT:      Head: Atraumatic.      Right Ear: Hearing and external ear normal.      Left Ear: Hearing and external ear normal.      Nose:      Comments: External nose normal      Mouth/Throat:      Lips: Pink. No lesions.   Eyes:      General: Lids are normal. Vision grossly intact. Gaze aligned appropriately.   Pulmonary:      Effort: No accessory muscle usage or respiratory distress.   Musculoskeletal:         General: Normal range of motion.      Cervical back: Normal range of motion.   Skin:     Coloration: Skin is not cyanotic or pale.      Nails: There is no clubbing.   Neurological:      Mental Status: She is alert and oriented to person, place, and time.   Psychiatric:         Attention and Perception: Attention normal.         Mood and Affect: Mood and affect normal.         Speech: Speech normal.         Behavior: Behavior normal. Behavior is cooperative.         Thought Content: Thought content normal.         Cognition and Memory: Cognition and memory normal.         Assessment/Plan     Problem List Items Addressed This Visit        Cardiac and Vasculature    Essential hypertension - Primary    Relevant Medications    aspirin 81 MG EC tablet    hydroCHLOROthiazide (HYDRODIURIL) 12.5 MG tablet    losartan (COZAAR) 50 MG tablet    metoprolol succinate XL (TOPROL-XL) 100 MG 24 hr tablet    Other Relevant Orders    CBC & Differential    Comprehensive Metabolic Panel    TSH    Hemoglobin A1c    Vitamin D 25 Hydroxy    Vitamin B12    Lipid Panel    MicroAlbumin, Urine, Random - Urine, Clean Catch    Hyperlipidemia    Relevant Medications    atorvastatin (LIPITOR) 20 MG tablet    icosapent ethyl (Vascepa) 1 g capsule capsule    Other Relevant Orders    CBC & Differential    Comprehensive  Metabolic Panel    TSH    Hemoglobin A1c    Vitamin D 25 Hydroxy    Vitamin B12    Lipid Panel    MicroAlbumin, Urine, Random - Urine, Clean Catch       Endocrine and Metabolic    Type 2 diabetes mellitus with hyperglycemia, with long-term current use of insulin (CMS/[facility])    Relevant Medications    Insulin Glargine (BASAGLAR KWIKPEN) 100 UNIT/ML injection pen    Empagliflozin (Jardiance) 25 MG tablet    aspirin 81 MG EC tablet    insulin aspart protamine & aspart (NOVOLOG) 70/30 100 unit/mL    Other Relevant Orders    CBC & Differential    Comprehensive Metabolic Panel    TSH    Hemoglobin A1c    Vitamin D 25 Hydroxy    Vitamin B12    Lipid Panel    MicroAlbumin, Urine, Random - Urine, Clean Catch    Morbid obesity    Relevant Orders    CBC & Differential    Comprehensive Metabolic Panel    TSH    Hemoglobin A1c    Vitamin D 25 Hydroxy    Vitamin B12    Lipid Panel    MicroAlbumin, Urine, Random - Urine, Clean Catch       Gastrointestinal Abdominal     GERD (gastroesophageal reflux disease)    Relevant Medications    pantoprazole (PROTONIX) 40 MG EC tablet    Other Relevant Orders    CBC & Differential    Comprehensive Metabolic Panel    TSH    Hemoglobin A1c    Vitamin D 25 Hydroxy    Vitamin B12    Lipid Panel    MicroAlbumin, Urine, Random - Urine, Clean Catch    CBC & Differential    Comprehensive Metabolic Panel    TSH    Hemoglobin A1c    Vitamin D 25 Hydroxy    Vitamin B12    Lipid Panel    MicroAlbumin, Urine, Random - Urine, Clean Catch       Sleep    Sleep apnea    Relevant Orders    CBC & Differential    Comprehensive Metabolic Panel    TSH    Hemoglobin A1c    Vitamin D 25 Hydroxy    Vitamin B12    Lipid Panel    MicroAlbumin, Urine, Random - Urine, Clean Catch      Other Visit Diagnoses     Type 2 diabetes mellitus with diabetic neuropathy, with long-term current use of insulin (CMS/[facility])        Relevant Medications    Insulin Glargine (BASAGLAR KWIKPEN) 100 UNIT/ML injection pen    Empagliflozin  (Jardiance) 25 MG tablet    aspirin 81 MG EC tablet    insulin aspart protamine & aspart (NOVOLOG) 70/30 100 unit/mL    Other Relevant Orders    CBC & Differential    Comprehensive Metabolic Panel    TSH    Hemoglobin A1c    Vitamin D 25 Hydroxy    Vitamin B12    Lipid Panel    MicroAlbumin, Urine, Random - Urine, Clean Catch    Vitamin D deficiency        Relevant Orders    Vitamin D 25 Hydroxy        I have reviewed medication list and discussed with patient the possible side effects and interactions.  We have discussed purpose for medication, route, dosage, frequency.  Refill routine medications.    Recent labs reviewed and discussed with patient.    Pt has been educated/instructed on diabetic care and protocols.  Diabetic diet instructions provided.  Medication regimen reviewed.  Discussed possible side effects and interactions of current medications.  Sick day rules reviewed. Continue to monitor blood sugar and report abnormal readings as discussed today. Understanding stated by patient.       Pt has been instructed today regarding low fat heart smart diet. Weight management and routine exercise has been recommended. Avoid high fat foods, starchy foods and processed foods. Increase lean meats, fresh vegetables and fresh fruits.          Patient's Body mass index is 47.3 kg/m². BMI is above normal parameters. Recommendations include: exercise counseling and nutrition counseling.    We are going to increase her Synthroid up to 100 mcg daily.  She has just filled a 75 mcg bottle and wishes to complete that bottle before increasing medication due to cost.  I have agreed to this as long as she monitors for signs and symptoms of worsening hypothyroidism.       I have advised staff to work on a preauthorization for Jardiance 25 mg daily.  Patient had much better glucose control with this medication.  Patient stopped Farxiga due to to some urinary symptoms.  Farxiga was not helping to decrease her blood sugar.   Patient's A1c is above 8 and her fasting glucose ranging above 200.  I have made some insulin adjustments today.  We will keep her on 68 units of Basaglar at bedtime and add an additional 20 units of Basaglar in the morning.  Have advised patient to let us know if for some reason she cannot obtain her medications as we may have samples.  Patient states that Jardiance was going to be $400 a month and at times her insulin is also almost $400 for her copayment each month.  Patient is retired and on a fixed income and cannot afford this cost.    I have discussed diagnosis in detail today allowing time for questions and answers. Patient is aware of reasons to seek urgent or emergent medical care as well as reasons to return to the clinic for evaluation. Possible side effects, interactions and progression of symptoms discussed as well. Patient / family states understanding.   Emotional support and active listening provided.       Follow up in 3 months. Routine labs fasting one week prior to next office visit. Return sooner if needed.       This was an audio and video enabled telemedicine encounter.        This document has been electronically signed by:  BOSTON Estrada, NP-C

## 2021-03-29 NOTE — TELEPHONE ENCOUNTER
Pt is aware of this information.       ----- Message from BOSTON Bocanegra sent at 3/29/2021  2:03 PM EDT -----  Please let the patient know, she thinks is $400 so she did not get it and her glucose is running well over 200 fasting.  ----- Message -----  From: Jackelyn Gerard MA  Sent: 3/29/2021   1:43 PM EDT  To: BOSTON Bocanegra    No PA required. Copay is 35 dollars.  ----- Message -----  From: Sinai Sierra APRN  Sent: 3/29/2021  12:37 PM EDT  To: Jackelyn Gerard MA    Please get Wilfredo GRESHAM for her uncontrolled dm. She did well with samples in the past. Now that is off her sugar is very much elevated. She can not take farxiga due to urinary retention and problems.            Niacinamide Pregnancy And Lactation Text: These medications are considered safe during pregnancy.

## 2021-04-13 ENCOUNTER — OFFICE VISIT (OUTPATIENT)
Dept: FAMILY MEDICINE CLINIC | Facility: CLINIC | Age: 66
End: 2021-04-13

## 2021-04-13 VITALS
BODY MASS INDEX: 48.02 KG/M2 | WEIGHT: 271 LBS | HEIGHT: 63 IN | TEMPERATURE: 96.9 F | SYSTOLIC BLOOD PRESSURE: 140 MMHG | DIASTOLIC BLOOD PRESSURE: 72 MMHG | HEART RATE: 64 BPM | OXYGEN SATURATION: 97 %

## 2021-04-13 DIAGNOSIS — Z79.4 TYPE 2 DIABETES MELLITUS WITH HYPERGLYCEMIA, WITH LONG-TERM CURRENT USE OF INSULIN (HCC): ICD-10-CM

## 2021-04-13 DIAGNOSIS — E11.65 TYPE 2 DIABETES MELLITUS WITH HYPERGLYCEMIA, WITH LONG-TERM CURRENT USE OF INSULIN (HCC): ICD-10-CM

## 2021-04-13 DIAGNOSIS — Z13.820 ENCOUNTER FOR SCREENING FOR OSTEOPOROSIS: ICD-10-CM

## 2021-04-13 DIAGNOSIS — Z12.31 ENCOUNTER FOR SCREENING MAMMOGRAM FOR MALIGNANT NEOPLASM OF BREAST: ICD-10-CM

## 2021-04-13 DIAGNOSIS — Z86.010 HISTORY OF COLON POLYPS: Primary | ICD-10-CM

## 2021-04-13 DIAGNOSIS — R93.7 ABNORMAL BONE DENSITY SCREENING: ICD-10-CM

## 2021-04-13 DIAGNOSIS — Z01.419 ENCOUNTER FOR ROUTINE GYNECOLOGICAL EXAMINATION WITH PAPANICOLAOU SMEAR OF CERVIX: ICD-10-CM

## 2021-04-13 DIAGNOSIS — E11.40 TYPE 2 DIABETES MELLITUS WITH DIABETIC NEUROPATHY, WITH LONG-TERM CURRENT USE OF INSULIN (HCC): ICD-10-CM

## 2021-04-13 DIAGNOSIS — Z79.4 TYPE 2 DIABETES MELLITUS WITH DIABETIC NEUROPATHY, WITH LONG-TERM CURRENT USE OF INSULIN (HCC): ICD-10-CM

## 2021-04-13 PROCEDURE — 99214 OFFICE O/P EST MOD 30 MIN: CPT | Performed by: NURSE PRACTITIONER

## 2021-04-13 RX ORDER — EMPAGLIFLOZIN 25 MG/1
1 TABLET, FILM COATED ORAL EVERY MORNING
Qty: 14 TABLET | Refills: 0 | COMMUNITY
Start: 2021-04-13 | End: 2021-06-29 | Stop reason: SDUPTHER

## 2021-04-13 NOTE — PATIENT INSTRUCTIONS
Type 2 Diabetes Mellitus, Self Care, Adult  Caring for yourself after you have been diagnosed with type 2 diabetes (type 2 diabetes mellitus) means keeping your blood sugar (glucose) under control with a balance of:  · Nutrition.  · Exercise.  · Lifestyle changes.  · Medicines or insulin, if necessary.  · Support from your team of health care providers and others.  The following information explains what you need to know to manage your diabetes at home.  What are the risks?  Having diabetes can put you at risk for other long-term (chronic) conditions, such as heart disease and kidney disease. Your health care provider may prescribe medicines to help prevent complications from diabetes. These medicines may include:  · Aspirin.  · Medicine to lower cholesterol.  · Medicine to control blood pressure.  How to monitor blood glucose    · Check your blood glucose every day, as often as told by your health care provider.  · Have your A1c (hemoglobin A1c) level checked two or more times a year, or as often as told by your health care provider.  Your health care provider will set individualized treatment goals for you. Generally, the goal of treatment is to maintain the following blood glucose levels:  · Before meals (preprandial):  mg/dL (4.4-7.2 mmol/L).  · After meals (postprandial): below 180 mg/dL (10 mmol/L).  · A1c level: less than 7%.  How to manage hyperglycemia and hypoglycemia  Hyperglycemia symptoms  Hyperglycemia, also called high blood glucose, occurs when blood glucose is too high. Make sure you know the early signs of hyperglycemia, such as:  · Increased thirst.  · Hunger.  · Feeling very tired.  · Needing to urinate more often than usual.  · Blurry vision.  Hypoglycemia symptoms  Hypoglycemia, also called low blood glucose, occurs with a blood glucose level at or below 70 mg/dL (3.9 mmol/L). The risk for hypoglycemia increases during or after exercise, during sleep, during illness, and when skipping  meals or not eating for a long time (fasting).  It is important to know the symptoms of hypoglycemia and treat it right away. Always have a 15-gram rapid-acting carbohydrate snack with you to treat low blood glucose. Family members and close friends should also know the symptoms and should understand how to treat hypoglycemia, in case you are not able to treat yourself. Symptoms may include:  · Hunger.  · Anxiety.  · Sweating and feeling clammy.  · Confusion.  · Dizziness or feeling light-headed.  · Sleepiness.  · Nausea.  · Increased heart rate.  · Headache.  · Blurry vision.  · Irritability.  · A change in coordination.  · Tingling or numbness around the mouth, lips, or tongue.  · Restless sleep.  · Fainting.  · Seizure.  Treating hypoglycemia  If you are alert and able to swallow safely, follow the 15:15 rule:  · Take 15 grams of a rapid-acting carbohydrate. Talk with your health care provider about how much you should take.  · Rapid-acting options include:  ? Glucose pills (take 15 grams).  ? 6-8 pieces of hard candy.  ? 4-6 oz (120-150 mL) of fruit juice.  ? 4-6 oz (120-150 mL) of regular (not diet) soda.  ? 1 Tbsp (15 mL) honey or sugar.  · Check your blood glucose 15 minutes after you take the carbohydrate.  · If the repeat blood glucose level is still at or below 70 mg/dL (3.9 mmol/L), take 15 grams of a carbohydrate again.  · If your blood glucose level does not increase above 70 mg/dL (3.9 mmol/L) after 3 tries, seek emergency medical care.  · After your blood glucose level returns to normal, eat a meal or a snack within 1 hour.  Treating severe hypoglycemia  Severe hypoglycemia is when your blood glucose level is at or below 54 mg/dL (3 mmol/L). Severe hypoglycemia is an emergency. Do not wait to see if the symptoms will go away. Get medical help right away. Call your local emergency services (911 in the [place]  If you have severe hypoglycemia and you cannot eat or drink, you may need an injection of  glucagon. A family member or close friend should learn how to check your blood glucose and how to give you a glucagon injection. Ask your health care provider if you need to have an emergency glucagon injection kit available.  Severe hypoglycemia may need to be treated in a hospital. The treatment may include getting glucose through an IV. You may also need treatment for the cause of your hypoglycemia.  Follow these instructions at home:  Take diabetes medicines as told  · If your health care provider prescribed insulin or diabetes medicines, take them every day.  · Do not run out of insulin or other diabetes medicines that you take. Plan ahead so you always have these available.  · If you use insulin, adjust your dosage based on how physically active you are and what foods you eat. Your health care provider will tell you how to adjust your dosage.  Make healthy food choices    The things that you eat and drink affect your blood glucose and your insulin dosage. Making good choices helps to control your diabetes and prevent other health problems. A healthy meal plan includes eating lean proteins, complex carbohydrates, fresh fruits and vegetables, low-fat dairy products, and healthy fats.  Make an appointment to see a diet and nutrition specialist (registered dietitian) to help you create an eating plan that is right for you. Make sure that you:  · Follow instructions from your health care provider about eating or drinking restrictions.  · Drink enough fluid to keep your urine pale yellow.  · Keep a record of the carbohydrates that you eat. Do this by reading food labels and learning the standard serving sizes of foods.  · Follow your sick day plan whenever you cannot eat or drink as usual. Make this plan in advance with your health care provider.    Stay active  Exercise regularly, as told by your health care provider. This may include:  · Stretching and doing strength exercises, such as yoga or weightlifting, 2 or  more times a week.  · Doing 150 minutes or more of moderate-intensity or vigorous-intensity exercise each week. This could be brisk walking, biking, or water aerobics.  ? Spread out your activity over 3 or more days of the week.  ? Do not go more than 2 days in a row without doing some kind of physical activity.  When you start a new exercise or activity, work with your health care provider to adjust your insulin, medicines, or food intake as needed.  Make healthy lifestyle choices  · Do not use any tobacco products, such as cigarettes, chewing tobacco, and e-cigarettes. If you need help quitting, ask your health care provider.  · If your health care provider says that alcohol is safe for you, limit alcohol intake to no more than 1 drink per day for nonpregnant women and 2 drinks per day for men. One drink equals 12 oz of beer (355 mL), 5 oz of wine (148 mL), or 1½ oz of hard liquor (44 mL).  · Learn to manage stress. If you need help with this, ask your health care provider.  Care for your body    · Keep your immunizations up to date. In addition to getting vaccinations as told by your health care provider, it is recommended that you get vaccinated against the following illnesses:  ? The flu (influenza). Get a flu shot every year.  ? Pneumonia.  ? Hepatitis B.  · Schedule an eye exam soon after your diagnosis, and then one time every year after that.  · Check your skin and feet every day for cuts, bruises, redness, blisters, or sores. Schedule a foot exam with your health care provider once every year.  · Brush your teeth and gums two times a day, and floss one or more times a day. Visit your dentist one or more times every 6 months.  · Maintain a healthy weight.  General instructions  · Take over-the-counter and prescription medicines only as told by your health care provider.  · Share your diabetes management plan with people in your workplace, school, and household.  · Carry a medical alert card or wear medical  alert [place].  · Keep all follow-up visits as told by your health care provider. This is important.  Questions to ask your health care provider  · Do I need to meet with a diabetes educator?  · Where can I find a support group for people with diabetes?  Where to find more information  For more information about diabetes, visit:  · American Diabetes Association (ADA): www.diabetes.org  · American Association of Diabetes Educators (AADE): www.diabeteseducator.org  Summary  · Caring for yourself after you have been diagnosed with (type 2 diabetes mellitus) means keeping your blood sugar (glucose) under control with a balance of nutrition, exercise, lifestyle changes, and medicine.  · Check your blood glucose every day, as often as told by your health care provider.  · Having diabetes can put you at risk for other long-term (chronic) conditions, such as heart disease and kidney disease. Your health care provider may prescribe medicines to help prevent complications from diabetes.  · Keep all follow-up visits as told by your health care provider. This is important.  This information is not intended to replace advice given to you by your health care provider. Make sure you discuss any questions you have with your health care provider.    INCREASE BASAGLAR TO 70 UNITS AT NIGHT  INCREASE MEAL TIME  INSULIN BY TWO UNITS   Document Revised: 06/10/2019 Document Reviewed: 01/20/2017  Elsevier Patient Education © 2020 [employer]

## 2021-04-13 NOTE — PROGRESS NOTES
History of Present Illness  Ronna Wayne is a 66 y.o. female who presents to the office today pertaining to her DM, type 2. In addition, she has HTN and Dyslipidemia. Today, she is c/o left lower abdominal pain which started over a month ago. Ronna did have an ED visit on 03/09/2021 for SVT. This visit has been reviewed.   Diabetes  She has type 2 diabetes mellitus. No MedicAlert identification noted. The initial diagnosis of diabetes was made 8 years ago. Hypoglycemia symptoms include dizziness, headaches, hunger, mood changes, nervousness/anxiousness, sleepiness and tremors. Associated symptoms include fatigue, foot paresthesias, polydipsia, polyphagia, polyuria, visual change and weakness. Pertinent negatives for diabetes include no blurred vision, no chest pain, no foot ulcerations and no weight loss. Pertinent negatives for hypoglycemia complications include no blackouts, no hospitalization, no nocturnal hypoglycemia, no required assistance and no required glucagon injection. Symptoms are worsening. Diabetic complications include a CVA, nephropathy, peripheral neuropathy and PVD. Pertinent negatives for diabetic complications include no impotence or retinopathy. Risk factors for coronary artery disease include dyslipidemia, family history, hypertension, obesity and sedentary lifestyle. Current diabetic treatment includes diet, insulin injections and oral agent (monotherapy). She is compliant with treatment most of the time. She is currently taking insulin pre-breakfast, pre-lunch, pre-dinner and at bedtime. Insulin injections are given by patient. Rotation sites for injection include the abdominal wall. Her weight is fluctuating minimally. She is following a diabetic diet. Meal planning includes ADA exchanges, avoidance of concentrated sweets and carbohydrate counting. She has not had a previous visit with a dietitian. She rarely participates in exercise. She monitors blood glucose at home 3-4 x per  day. Blood glucose monitoring compliance is fair. Her home blood glucose trend is fluctuating minimally. Her breakfast blood glucose is taken between 6-7 am. Her breakfast blood glucose range is generally 140-180 mg/dl. Her lunch blood glucose is taken between 12-1 pm. Her lunch blood glucose range is generally 140-180 mg/dl. Her dinner blood glucose is taken between 3-4 pm. Her dinner blood glucose range is generally 180-200 mg/dl. Her highest blood glucose is 180-200 mg/dl. Her overall blood glucose range is >200 mg/dl. She does not see a podiatrist.Eye exam is current.   Lab Results   Component Value Date    HGBA1C 8.79 (H) 03/22/2021     Hypertension   The current episode started more than 1 year ago. The problem is controlled. Associated symptoms include headaches, malaise/fatigue and peripheral edema. Risk factors for coronary artery disease include diabetes mellitus, dyslipidemia, obesity and post-menopausal state. Current antihypertension treatment includes angiotensin blockers. Home blood pressure have been in range.     Lab Results   Component Value Date    GLUCOSE 216 (H) 03/22/2021    BUN 17 03/22/2021    CREATININE 0.73 03/22/2021    EGFRIFNONA 80 03/22/2021    BCR 23.3 03/22/2021    K 4.1 03/22/2021    CO2 25.9 03/22/2021    CALCIUM 9.1 03/22/2021    ALBUMIN 3.50 03/22/2021    AST 29 03/22/2021    ALT 19 03/22/2021     Dyslipidemia   The current episode started more than 1 year ago. Current antihyperlipidemic treatment includes statins. Risk factors for coronary artery disease include diabetes mellitus, dyslipidemia, hypertension, obesity and post-menopausal.     Lab Results   Component Value Date    CHOL 223 (H) 03/22/2021    TRIG 136 03/22/2021    HDL 60 03/22/2021     (H) 03/22/2021     Abdominal Pain  This is a new problem. The current episode started more than 1 month ago. The pain is located in the LLQ.  Pertinent negatives include no diarrhea, fever, nausea, vomiting or weight loss. She  "does have Colon polyps and is past due for a colonoscopy.      The following portions of the patient's history were reviewed and updated as appropriate: allergies, current medications, past family history, past medical history, past social history, past surgical history and problem list.    Review of Systems   Constitutional: Positive for fatigue. Negative for activity change, appetite change, chills, fever, unexpected weight change and weight loss.   Eyes: Positive for visual disturbance. Negative for blurred vision.   Respiratory: Negative for cough, shortness of breath and wheezing.    Cardiovascular: Positive for leg swelling. Negative for chest pain and palpitations.   Gastrointestinal: Positive for abdominal pain. Negative for constipation, diarrhea, nausea and vomiting.   Endocrine: Positive for polydipsia, polyphagia and polyuria. Negative for cold intolerance and heat intolerance.   Genitourinary: Negative for impotence.   Skin: Negative for color change, pallor and rash.   Neurological: Positive for dizziness, weakness and headaches. Negative for seizures, speech difficulty and light-headedness.   Hematological: Negative for adenopathy. Bruises/bleeds easily.   Psychiatric/Behavioral: Positive for sleep disturbance. Negative for confusion, decreased concentration and suicidal ideas.   All other systems reviewed and are negative.    Vital signs:  /72 (BP Location: Left arm, Patient Position: Sitting, Cuff Size: Adult)   Pulse 64   Temp 96.9 °F (36.1 °C) (Temporal)   Ht 160 cm (62.99\")   Wt 123 kg (271 lb)   SpO2 97%   BMI 48.02 kg/m²     Physical Exam  Vitals reviewed.   Constitutional:       General: She is not in acute distress.     Appearance: She is well-developed.      Comments: Pleasant; Wearing appropriate face covering   HENT:      Head: Normocephalic.      Nose: Nose normal.   Eyes:      General: No scleral icterus.        Right eye: No discharge.         Left eye: No discharge.      " Conjunctiva/sclera: Conjunctivae normal.   Neck:      Thyroid: No thyromegaly.      Vascular: No JVD.   Cardiovascular:      Rate and Rhythm: Normal rate and regular rhythm.      Heart sounds: Normal heart sounds. No murmur heard.   No friction rub.   Pulmonary:      Effort: Pulmonary effort is normal. No respiratory distress.      Breath sounds: Normal breath sounds. No decreased breath sounds, wheezing, rhonchi or rales.   Abdominal:      General: Bowel sounds are normal. There is no distension.      Palpations: Abdomen is soft.      Tenderness: There is abdominal tenderness in the left upper quadrant. There is no guarding or rebound.   Musculoskeletal:      Cervical back: Neck supple.   Lymphadenopathy:      Cervical: No cervical adenopathy.   Skin:     General: Skin is warm and dry.      Capillary Refill: Capillary refill takes less than 2 seconds.   Neurological:      Mental Status: She is alert and oriented to person, place, and time.      Cranial Nerves: Cranial nerves are intact.   Psychiatric:         Mood and Affect: Mood and affect normal.         Speech: Speech normal.         Behavior: Behavior is cooperative.         Thought Content: Thought content normal.         Cognition and Memory: Cognition normal.       Assessment/Plan     Diagnoses and all orders for this visit:    1. History of colon polyps (Primary)  -     Ambulatory Referral to Gastroenterology    2. Type 2 diabetes mellitus with diabetic neuropathy, with long-term current use of insulin (CMS/HCC)  -     Empagliflozin (Jardiance) 25 MG tablet; Take 25 mg by mouth Every Morning.  Dispense: 14 tablet; Refill: 0    3. Type 2 diabetes mellitus with hyperglycemia, with long-term current use of insulin (CMS/HCC)  -     Empagliflozin (Jardiance) 25 MG tablet; Take 25 mg by mouth Every Morning.  Dispense: 14 tablet; Refill: 0    4. Encounter for screening mammogram for malignant neoplasm of breast  -     Mammo Screening Bilateral With CAD;  Future    5. Encounter for screening for osteoporosis    6. Abnormal bone density screening  -     DEXA Bone Density Axial; Future    7. Encounter for routine gynecological examination with Papanicolaou smear of cervix  -     Ambulatory Referral to Gynecology    Follow Up: In June; sooner if problems/concerns occur    Findings and recommendations discussed with Ronna. Treatment options reviewed. Referral to Gastroenterology for Colonoscopy and recommendations. Referral to GYN for routine Pap. Mammogram and DEXA ordered. Cardiovascular risk reduction modifications reinforced including nutrition and activity recommendations. She will follow up in June sooner if problems/concerns occur.She will increase her Basaglar to 70 units nightly and increase her Meal time insulin by two units.     Ronna  was given instructions and counseling regarding her condition or for health maintenance advice. Please see specific information pulled into the AVS if appropriate    This document has been electronically signed by:

## 2021-04-26 DIAGNOSIS — I10 ESSENTIAL HYPERTENSION: Primary | ICD-10-CM

## 2021-04-26 RX ORDER — HYDROCHLOROTHIAZIDE 12.5 MG/1
12.5 TABLET ORAL DAILY
Qty: 30 TABLET | Refills: 2 | Status: SHIPPED | OUTPATIENT
Start: 2021-04-26 | End: 2021-05-03 | Stop reason: HOSPADM

## 2021-04-26 RX ORDER — METOPROLOL SUCCINATE 100 MG/1
100 TABLET, EXTENDED RELEASE ORAL DAILY
Qty: 30 TABLET | Refills: 2 | Status: SHIPPED | OUTPATIENT
Start: 2021-04-26 | End: 2021-11-02

## 2021-05-01 ENCOUNTER — HOSPITAL ENCOUNTER (OUTPATIENT)
Facility: HOSPITAL | Age: 66
Setting detail: OBSERVATION
Discharge: HOME | End: 2021-05-03
Attending: FAMILY MEDICINE | Admitting: INTERNAL MEDICINE

## 2021-05-01 ENCOUNTER — APPOINTMENT (OUTPATIENT)
Dept: GENERAL RADIOLOGY | Facility: HOSPITAL | Age: 66
End: 2021-05-01

## 2021-05-01 DIAGNOSIS — E83.42 HYPOMAGNESEMIA: ICD-10-CM

## 2021-05-01 DIAGNOSIS — I47.1 SVT (SUPRAVENTRICULAR TACHYCARDIA) (HCC): Primary | ICD-10-CM

## 2021-05-01 LAB
ALBUMIN SERPL-MCNC: 3.63 G/DL (ref 3.5–5.2)
ALBUMIN/GLOB SERPL: 0.9 G/DL
ALP SERPL-CCNC: 107 U/L (ref 39–117)
ALT SERPL W P-5'-P-CCNC: 29 U/L (ref 1–33)
ANION GAP SERPL CALCULATED.3IONS-SCNC: 14.4 MMOL/L (ref 5–15)
AST SERPL-CCNC: 47 U/L (ref 1–32)
BASOPHILS # BLD AUTO: 0.06 10*3/MM3 (ref 0–0.2)
BASOPHILS NFR BLD AUTO: 0.7 % (ref 0–1.5)
BILIRUB SERPL-MCNC: 0.7 MG/DL (ref 0–1.2)
BUN SERPL-MCNC: 20 MG/DL (ref 8–23)
BUN/CREAT SERPL: 16.4 (ref 7–25)
CALCIUM SPEC-SCNC: 9.4 MG/DL (ref 8.6–10.5)
CHLORIDE SERPL-SCNC: 101 MMOL/L (ref 98–107)
CO2 SERPL-SCNC: 19.6 MMOL/L (ref 22–29)
CREAT SERPL-MCNC: 1.22 MG/DL (ref 0.57–1)
DEPRECATED RDW RBC AUTO: 50.9 FL (ref 37–54)
EOSINOPHIL # BLD AUTO: 0.09 10*3/MM3 (ref 0–0.4)
EOSINOPHIL NFR BLD AUTO: 1 % (ref 0.3–6.2)
ERYTHROCYTE [DISTWIDTH] IN BLOOD BY AUTOMATED COUNT: 13.8 % (ref 12.3–15.4)
GFR SERPL CREATININE-BSD FRML MDRD: 44 ML/MIN/1.73
GLOBULIN UR ELPH-MCNC: 4.3 GM/DL
GLUCOSE SERPL-MCNC: 152 MG/DL (ref 65–99)
HCT VFR BLD AUTO: 45.9 % (ref 34–46.6)
HGB BLD-MCNC: 14.6 G/DL (ref 12–15.9)
IMM GRANULOCYTES # BLD AUTO: 0.02 10*3/MM3 (ref 0–0.05)
IMM GRANULOCYTES NFR BLD AUTO: 0.2 % (ref 0–0.5)
LYMPHOCYTES # BLD AUTO: 4.04 10*3/MM3 (ref 0.7–3.1)
LYMPHOCYTES NFR BLD AUTO: 45 % (ref 19.6–45.3)
MCH RBC QN AUTO: 31.7 PG (ref 26.6–33)
MCHC RBC AUTO-ENTMCNC: 31.8 G/DL (ref 31.5–35.7)
MCV RBC AUTO: 99.6 FL (ref 79–97)
MONOCYTES # BLD AUTO: 1.12 10*3/MM3 (ref 0.1–0.9)
MONOCYTES NFR BLD AUTO: 12.5 % (ref 5–12)
NEUTROPHILS NFR BLD AUTO: 3.64 10*3/MM3 (ref 1.7–7)
NEUTROPHILS NFR BLD AUTO: 40.6 % (ref 42.7–76)
NRBC BLD AUTO-RTO: 0 /100 WBC (ref 0–0.2)
NT-PROBNP SERPL-MCNC: 779.7 PG/ML (ref 0–900)
PLATELET # BLD AUTO: 147 10*3/MM3 (ref 140–450)
PMV BLD AUTO: 10.5 FL (ref 6–12)
POTASSIUM SERPL-SCNC: 4.4 MMOL/L (ref 3.5–5.2)
PROT SERPL-MCNC: 7.9 G/DL (ref 6–8.5)
RBC # BLD AUTO: 4.61 10*6/MM3 (ref 3.77–5.28)
SODIUM SERPL-SCNC: 135 MMOL/L (ref 136–145)
T4 FREE SERPL-MCNC: 1.49 NG/DL (ref 0.93–1.7)
TROPONIN T SERPL-MCNC: <0.01 NG/ML (ref 0–0.03)
TSH SERPL DL<=0.05 MIU/L-ACNC: 0.46 UIU/ML (ref 0.27–4.2)
WBC # BLD AUTO: 8.97 10*3/MM3 (ref 3.4–10.8)

## 2021-05-01 PROCEDURE — 84443 ASSAY THYROID STIM HORMONE: CPT | Performed by: FAMILY MEDICINE

## 2021-05-01 PROCEDURE — 83880 ASSAY OF NATRIURETIC PEPTIDE: CPT | Performed by: FAMILY MEDICINE

## 2021-05-01 PROCEDURE — 93005 ELECTROCARDIOGRAM TRACING: CPT | Performed by: FAMILY MEDICINE

## 2021-05-01 PROCEDURE — 80053 COMPREHEN METABOLIC PANEL: CPT | Performed by: FAMILY MEDICINE

## 2021-05-01 PROCEDURE — 84439 ASSAY OF FREE THYROXINE: CPT | Performed by: FAMILY MEDICINE

## 2021-05-01 PROCEDURE — 80074 ACUTE HEPATITIS PANEL: CPT | Performed by: PHYSICIAN ASSISTANT

## 2021-05-01 PROCEDURE — 83735 ASSAY OF MAGNESIUM: CPT | Performed by: HOSPITALIST

## 2021-05-01 PROCEDURE — 99284 EMERGENCY DEPT VISIT MOD MDM: CPT

## 2021-05-01 PROCEDURE — 85025 COMPLETE CBC W/AUTO DIFF WBC: CPT | Performed by: FAMILY MEDICINE

## 2021-05-01 PROCEDURE — 25010000002 ADENOSINE PER 6 MG

## 2021-05-01 PROCEDURE — 71045 X-RAY EXAM CHEST 1 VIEW: CPT

## 2021-05-01 PROCEDURE — 36415 COLL VENOUS BLD VENIPUNCTURE: CPT

## 2021-05-01 PROCEDURE — 85379 FIBRIN DEGRADATION QUANT: CPT | Performed by: FAMILY MEDICINE

## 2021-05-01 PROCEDURE — 85610 PROTHROMBIN TIME: CPT | Performed by: FAMILY MEDICINE

## 2021-05-01 PROCEDURE — 96374 THER/PROPH/DIAG INJ IV PUSH: CPT

## 2021-05-01 PROCEDURE — 84484 ASSAY OF TROPONIN QUANT: CPT | Performed by: FAMILY MEDICINE

## 2021-05-01 PROCEDURE — 83036 HEMOGLOBIN GLYCOSYLATED A1C: CPT | Performed by: HOSPITALIST

## 2021-05-01 RX ORDER — ADENOSINE 3 MG/ML
6 INJECTION, SOLUTION INTRAVENOUS ONCE
Status: COMPLETED | OUTPATIENT
Start: 2021-05-01 | End: 2021-05-01

## 2021-05-01 RX ORDER — SODIUM CHLORIDE 0.9 % (FLUSH) 0.9 %
10 SYRINGE (ML) INJECTION AS NEEDED
Status: DISCONTINUED | OUTPATIENT
Start: 2021-05-01 | End: 2021-05-03 | Stop reason: HOSPADM

## 2021-05-01 RX ORDER — ADENOSINE 3 MG/ML
INJECTION, SOLUTION INTRAVENOUS
Status: COMPLETED
Start: 2021-05-01 | End: 2021-05-01

## 2021-05-01 RX ADMIN — SODIUM CHLORIDE 1000 ML: 9 INJECTION, SOLUTION INTRAVENOUS at 23:37

## 2021-05-01 RX ADMIN — ADENOSINE 6 MG: 3 INJECTION, SOLUTION INTRAVENOUS at 22:38

## 2021-05-02 ENCOUNTER — APPOINTMENT (OUTPATIENT)
Dept: NUCLEAR MEDICINE | Facility: HOSPITAL | Age: 66
End: 2021-05-02

## 2021-05-02 ENCOUNTER — APPOINTMENT (OUTPATIENT)
Dept: CARDIOLOGY | Facility: HOSPITAL | Age: 66
End: 2021-05-02

## 2021-05-02 ENCOUNTER — APPOINTMENT (OUTPATIENT)
Dept: ULTRASOUND IMAGING | Facility: HOSPITAL | Age: 66
End: 2021-05-02

## 2021-05-02 ENCOUNTER — APPOINTMENT (OUTPATIENT)
Dept: CT IMAGING | Facility: HOSPITAL | Age: 66
End: 2021-05-02

## 2021-05-02 PROBLEM — I47.10 SVT (SUPRAVENTRICULAR TACHYCARDIA): Status: ACTIVE | Noted: 2021-05-02

## 2021-05-02 PROBLEM — I47.1 SVT (SUPRAVENTRICULAR TACHYCARDIA): Status: ACTIVE | Noted: 2021-05-02

## 2021-05-02 LAB
ALBUMIN SERPL-MCNC: 3.44 G/DL (ref 3.5–5.2)
ALBUMIN/GLOB SERPL: 0.9 G/DL
ALP SERPL-CCNC: 88 U/L (ref 39–117)
ALT SERPL W P-5'-P-CCNC: 28 U/L (ref 1–33)
ANION GAP SERPL CALCULATED.3IONS-SCNC: 10.7 MMOL/L (ref 5–15)
AST SERPL-CCNC: 47 U/L (ref 1–32)
BASOPHILS # BLD AUTO: 0.08 10*3/MM3 (ref 0–0.2)
BASOPHILS NFR BLD AUTO: 0.8 % (ref 0–1.5)
BH CV ECHO MEAS - ACS: 1.3 CM
BH CV ECHO MEAS - AO MAX PG: 18.1 MMHG
BH CV ECHO MEAS - AO MEAN PG: 10 MMHG
BH CV ECHO MEAS - AO ROOT AREA (BSA CORRECTED): 1.4
BH CV ECHO MEAS - AO ROOT AREA: 8 CM^2
BH CV ECHO MEAS - AO ROOT DIAM: 3.2 CM
BH CV ECHO MEAS - AO V2 MAX: 213 CM/SEC
BH CV ECHO MEAS - AO V2 MEAN: 142 CM/SEC
BH CV ECHO MEAS - AO V2 VTI: 56.7 CM
BH CV ECHO MEAS - BSA(HAYCOCK): 2.4 M^2
BH CV ECHO MEAS - BSA: 2.2 M^2
BH CV ECHO MEAS - BZI_BMI: 48.7 KILOGRAMS/M^2
BH CV ECHO MEAS - BZI_METRIC_HEIGHT: 160 CM
BH CV ECHO MEAS - BZI_METRIC_WEIGHT: 124.7 KG
BH CV ECHO MEAS - EDV(CUBED): 103.8 ML
BH CV ECHO MEAS - EDV(MOD-SP4): 58.3 ML
BH CV ECHO MEAS - EDV(TEICH): 102.4 ML
BH CV ECHO MEAS - EF(CUBED): 91.1 %
BH CV ECHO MEAS - EF(MOD-SP4): 72.6 %
BH CV ECHO MEAS - EF(TEICH): 85.9 %
BH CV ECHO MEAS - ESV(CUBED): 9.3 ML
BH CV ECHO MEAS - ESV(MOD-SP4): 16 ML
BH CV ECHO MEAS - ESV(TEICH): 14.4 ML
BH CV ECHO MEAS - FS: 55.3 %
BH CV ECHO MEAS - IVS/LVPW: 1
BH CV ECHO MEAS - IVSD: 1.1 CM
BH CV ECHO MEAS - LA DIMENSION: 3.3 CM
BH CV ECHO MEAS - LA/AO: 1
BH CV ECHO MEAS - LV DIASTOLIC VOL/BSA (35-75): 26.3 ML/M^2
BH CV ECHO MEAS - LV MASS(C)D: 187.5 GRAMS
BH CV ECHO MEAS - LV MASS(C)DI: 84.7 GRAMS/M^2
BH CV ECHO MEAS - LV SYSTOLIC VOL/BSA (12-30): 7.2 ML/M^2
BH CV ECHO MEAS - LVIDD: 4.7 CM
BH CV ECHO MEAS - LVIDS: 2.1 CM
BH CV ECHO MEAS - LVLD AP4: 7.2 CM
BH CV ECHO MEAS - LVLS AP4: 5.6 CM
BH CV ECHO MEAS - LVOT AREA (M): 3.5 CM^2
BH CV ECHO MEAS - LVOT AREA: 3.5 CM^2
BH CV ECHO MEAS - LVOT DIAM: 2.1 CM
BH CV ECHO MEAS - LVPWD: 1.1 CM
BH CV ECHO MEAS - MV A MAX VEL: 69.2 CM/SEC
BH CV ECHO MEAS - MV E MAX VEL: 122 CM/SEC
BH CV ECHO MEAS - MV E/A: 1.8
BH CV ECHO MEAS - RAP SYSTOLE: 10 MMHG
BH CV ECHO MEAS - RVSP: 34.8 MMHG
BH CV ECHO MEAS - SI(AO): 206 ML/M^2
BH CV ECHO MEAS - SI(CUBED): 42.7 ML/M^2
BH CV ECHO MEAS - SI(MOD-SP4): 19.1 ML/M^2
BH CV ECHO MEAS - SI(TEICH): 39.7 ML/M^2
BH CV ECHO MEAS - SV(AO): 456 ML
BH CV ECHO MEAS - SV(CUBED): 94.6 ML
BH CV ECHO MEAS - SV(MOD-SP4): 42.3 ML
BH CV ECHO MEAS - SV(TEICH): 88 ML
BH CV ECHO MEAS - TR MAX VEL: 249 CM/SEC
BH CV NUCLEAR PRIOR STUDY: 3
BH CV REST NUCLEAR ISOTOPE DOSE: 10.8 MCI
BH CV STRESS BP STAGE 1: NORMAL
BH CV STRESS BP STAGE 2: NORMAL
BH CV STRESS COMMENTS STAGE 1: NORMAL
BH CV STRESS COMMENTS STAGE 2: NORMAL
BH CV STRESS DOSE REGADENOSON STAGE 1: 0.4
BH CV STRESS DURATION MIN STAGE 1: 0
BH CV STRESS DURATION MIN STAGE 2: 4
BH CV STRESS DURATION SEC STAGE 1: 10
BH CV STRESS DURATION SEC STAGE 2: 0
BH CV STRESS HR STAGE 1: 68
BH CV STRESS HR STAGE 2: 60
BH CV STRESS NUCLEAR ISOTOPE DOSE: 32.1 MCI
BH CV STRESS PROTOCOL 1: NORMAL
BH CV STRESS RECOVERY BP: NORMAL MMHG
BH CV STRESS RECOVERY HR: 52 BPM
BH CV STRESS STAGE 1: 1
BH CV STRESS STAGE 2: 2
BILIRUB SERPL-MCNC: 0.7 MG/DL (ref 0–1.2)
BUN SERPL-MCNC: 20 MG/DL (ref 8–23)
BUN/CREAT SERPL: 19.6 (ref 7–25)
CALCIUM SPEC-SCNC: 9.1 MG/DL (ref 8.6–10.5)
CHLORIDE SERPL-SCNC: 104 MMOL/L (ref 98–107)
CO2 SERPL-SCNC: 20.3 MMOL/L (ref 22–29)
CREAT SERPL-MCNC: 1.02 MG/DL (ref 0.57–1)
D DIMER PPP FEU-MCNC: 0.64 MCGFEU/ML (ref 0–0.5)
DEPRECATED RDW RBC AUTO: 52.2 FL (ref 37–54)
EOSINOPHIL # BLD AUTO: 0.08 10*3/MM3 (ref 0–0.4)
EOSINOPHIL NFR BLD AUTO: 0.8 % (ref 0.3–6.2)
ERYTHROCYTE [DISTWIDTH] IN BLOOD BY AUTOMATED COUNT: 14 % (ref 12.3–15.4)
FLUAV RNA RESP QL NAA+PROBE: NOT DETECTED
FLUBV RNA RESP QL NAA+PROBE: NOT DETECTED
GFR SERPL CREATININE-BSD FRML MDRD: 54 ML/MIN/1.73
GLOBULIN UR ELPH-MCNC: 3.8 GM/DL
GLUCOSE BLDC GLUCOMTR-MCNC: 127 MG/DL (ref 70–130)
GLUCOSE BLDC GLUCOMTR-MCNC: 182 MG/DL (ref 70–130)
GLUCOSE BLDC GLUCOMTR-MCNC: 217 MG/DL (ref 70–130)
GLUCOSE BLDC GLUCOMTR-MCNC: 223 MG/DL (ref 70–130)
GLUCOSE SERPL-MCNC: 136 MG/DL (ref 65–99)
HBA1C MFR BLD: 8.3 % (ref 4.8–5.6)
HCT VFR BLD AUTO: 44.7 % (ref 34–46.6)
HGB BLD-MCNC: 14.1 G/DL (ref 12–15.9)
HOLD SPECIMEN: NORMAL
HOLD SPECIMEN: NORMAL
IMM GRANULOCYTES # BLD AUTO: 0.03 10*3/MM3 (ref 0–0.05)
IMM GRANULOCYTES NFR BLD AUTO: 0.3 % (ref 0–0.5)
INR PPP: 1.05 (ref 0.9–1.1)
LV EF 2D ECHO EST: 55 %
LV EF NUC BP: 81 %
LYMPHOCYTES # BLD AUTO: 4.27 10*3/MM3 (ref 0.7–3.1)
LYMPHOCYTES NFR BLD AUTO: 42.4 % (ref 19.6–45.3)
MAGNESIUM SERPL-MCNC: 1.3 MG/DL (ref 1.6–2.4)
MAXIMAL PREDICTED HEART RATE: 154 BPM
MAXIMAL PREDICTED HEART RATE: 154 BPM
MCH RBC QN AUTO: 31.8 PG (ref 26.6–33)
MCHC RBC AUTO-ENTMCNC: 31.5 G/DL (ref 31.5–35.7)
MCV RBC AUTO: 100.9 FL (ref 79–97)
MONOCYTES # BLD AUTO: 1.11 10*3/MM3 (ref 0.1–0.9)
MONOCYTES NFR BLD AUTO: 11 % (ref 5–12)
NEUTROPHILS NFR BLD AUTO: 4.5 10*3/MM3 (ref 1.7–7)
NEUTROPHILS NFR BLD AUTO: 44.7 % (ref 42.7–76)
NRBC BLD AUTO-RTO: 0 /100 WBC (ref 0–0.2)
PERCENT MAX PREDICTED HR: 44.16 %
PLATELET # BLD AUTO: 153 10*3/MM3 (ref 140–450)
PMV BLD AUTO: 10.6 FL (ref 6–12)
POTASSIUM SERPL-SCNC: 4.5 MMOL/L (ref 3.5–5.2)
PROT SERPL-MCNC: 7.2 G/DL (ref 6–8.5)
PROTHROMBIN TIME: 13.5 SECONDS (ref 11.9–14.1)
RBC # BLD AUTO: 4.43 10*6/MM3 (ref 3.77–5.28)
SARS-COV-2 RNA RESP QL NAA+PROBE: NOT DETECTED
SODIUM SERPL-SCNC: 135 MMOL/L (ref 136–145)
STRESS BASELINE BP: NORMAL MMHG
STRESS BASELINE HR: 52 BPM
STRESS PERCENT HR: 52 %
STRESS POST PEAK BP: NORMAL MMHG
STRESS POST PEAK HR: 68 BPM
STRESS TARGET HR: 131 BPM
STRESS TARGET HR: 131 BPM
TROPONIN T SERPL-MCNC: <0.01 NG/ML (ref 0–0.03)
WBC # BLD AUTO: 10.07 10*3/MM3 (ref 3.4–10.8)
WHOLE BLOOD HOLD SPECIMEN: NORMAL
WHOLE BLOOD HOLD SPECIMEN: NORMAL

## 2021-05-02 PROCEDURE — 82962 GLUCOSE BLOOD TEST: CPT

## 2021-05-02 PROCEDURE — 93970 EXTREMITY STUDY: CPT | Performed by: RADIOLOGY

## 2021-05-02 PROCEDURE — A9500 TC99M SESTAMIBI: HCPCS | Performed by: INTERNAL MEDICINE

## 2021-05-02 PROCEDURE — 94799 UNLISTED PULMONARY SVC/PX: CPT

## 2021-05-02 PROCEDURE — 80053 COMPREHEN METABOLIC PANEL: CPT | Performed by: HOSPITALIST

## 2021-05-02 PROCEDURE — G0378 HOSPITAL OBSERVATION PER HR: HCPCS

## 2021-05-02 PROCEDURE — 94760 N-INVAS EAR/PLS OXIMETRY 1: CPT

## 2021-05-02 PROCEDURE — 85025 COMPLETE CBC W/AUTO DIFF WBC: CPT | Performed by: HOSPITALIST

## 2021-05-02 PROCEDURE — 0 IOPAMIDOL PER 1 ML: Performed by: INTERNAL MEDICINE

## 2021-05-02 PROCEDURE — 93306 TTE W/DOPPLER COMPLETE: CPT | Performed by: INTERNAL MEDICINE

## 2021-05-02 PROCEDURE — 94660 CPAP INITIATION&MGMT: CPT

## 2021-05-02 PROCEDURE — 84484 ASSAY OF TROPONIN QUANT: CPT | Performed by: FAMILY MEDICINE

## 2021-05-02 PROCEDURE — 0 TECHNETIUM SESTAMIBI: Performed by: INTERNAL MEDICINE

## 2021-05-02 PROCEDURE — 96372 THER/PROPH/DIAG INJ SC/IM: CPT

## 2021-05-02 PROCEDURE — 25010000002 HEPARIN (PORCINE) PER 1000 UNITS: Performed by: HOSPITALIST

## 2021-05-02 PROCEDURE — 78452 HT MUSCLE IMAGE SPECT MULT: CPT

## 2021-05-02 PROCEDURE — 93017 CV STRESS TEST TRACING ONLY: CPT

## 2021-05-02 PROCEDURE — 99220 PR INITIAL OBSERVATION CARE/DAY 70 MINUTES: CPT | Performed by: PHYSICIAN ASSISTANT

## 2021-05-02 PROCEDURE — C9803 HOPD COVID-19 SPEC COLLECT: HCPCS

## 2021-05-02 PROCEDURE — 93018 CV STRESS TEST I&R ONLY: CPT | Performed by: INTERNAL MEDICINE

## 2021-05-02 PROCEDURE — 93005 ELECTROCARDIOGRAM TRACING: CPT | Performed by: FAMILY MEDICINE

## 2021-05-02 PROCEDURE — 25010000002 REGADENOSON 0.4 MG/5ML SOLUTION: Performed by: INTERNAL MEDICINE

## 2021-05-02 PROCEDURE — 87636 SARSCOV2 & INF A&B AMP PRB: CPT | Performed by: FAMILY MEDICINE

## 2021-05-02 PROCEDURE — 93306 TTE W/DOPPLER COMPLETE: CPT

## 2021-05-02 PROCEDURE — 78452 HT MUSCLE IMAGE SPECT MULT: CPT | Performed by: INTERNAL MEDICINE

## 2021-05-02 PROCEDURE — 63710000001 INSULIN ASPART PER 5 UNITS: Performed by: HOSPITALIST

## 2021-05-02 PROCEDURE — 99204 OFFICE O/P NEW MOD 45 MIN: CPT | Performed by: PHYSICIAN ASSISTANT

## 2021-05-02 PROCEDURE — 93970 EXTREMITY STUDY: CPT

## 2021-05-02 PROCEDURE — 71275 CT ANGIOGRAPHY CHEST: CPT

## 2021-05-02 RX ORDER — LOSARTAN POTASSIUM 50 MG/1
50 TABLET ORAL DAILY
Status: CANCELLED | OUTPATIENT
Start: 2021-05-02

## 2021-05-02 RX ORDER — INSULIN ASPART 100 [IU]/ML
12-17 INJECTION, SUSPENSION SUBCUTANEOUS TAKE AS DIRECTED
Status: CANCELLED | OUTPATIENT
Start: 2021-05-02

## 2021-05-02 RX ORDER — LOSARTAN POTASSIUM 50 MG/1
50 TABLET ORAL DAILY
Status: DISCONTINUED | OUTPATIENT
Start: 2021-05-02 | End: 2021-05-03 | Stop reason: HOSPADM

## 2021-05-02 RX ORDER — PANTOPRAZOLE SODIUM 40 MG/1
40 TABLET, DELAYED RELEASE ORAL DAILY
Status: DISCONTINUED | OUTPATIENT
Start: 2021-05-02 | End: 2021-05-03 | Stop reason: HOSPADM

## 2021-05-02 RX ORDER — METOPROLOL SUCCINATE 50 MG/1
100 TABLET, EXTENDED RELEASE ORAL DAILY
Status: DISCONTINUED | OUTPATIENT
Start: 2021-05-02 | End: 2021-05-03 | Stop reason: HOSPADM

## 2021-05-02 RX ORDER — MAGNESIUM SULFATE HEPTAHYDRATE 40 MG/ML
2 INJECTION, SOLUTION INTRAVENOUS AS NEEDED
Status: DISCONTINUED | OUTPATIENT
Start: 2021-05-02 | End: 2021-05-03 | Stop reason: HOSPADM

## 2021-05-02 RX ORDER — NITROGLYCERIN 0.4 MG/1
0.4 TABLET SUBLINGUAL
Status: DISCONTINUED | OUTPATIENT
Start: 2021-05-02 | End: 2021-05-03 | Stop reason: HOSPADM

## 2021-05-02 RX ORDER — SODIUM CHLORIDE 0.9 % (FLUSH) 0.9 %
10 SYRINGE (ML) INJECTION EVERY 12 HOURS SCHEDULED
Status: DISCONTINUED | OUTPATIENT
Start: 2021-05-02 | End: 2021-05-03 | Stop reason: HOSPADM

## 2021-05-02 RX ORDER — ACETAMINOPHEN 325 MG/1
650 TABLET ORAL EVERY 6 HOURS PRN
Status: DISCONTINUED | OUTPATIENT
Start: 2021-05-02 | End: 2021-05-03 | Stop reason: HOSPADM

## 2021-05-02 RX ORDER — HEPARIN SODIUM 5000 [USP'U]/ML
5000 INJECTION, SOLUTION INTRAVENOUS; SUBCUTANEOUS EVERY 12 HOURS SCHEDULED
Status: DISCONTINUED | OUTPATIENT
Start: 2021-05-02 | End: 2021-05-03 | Stop reason: HOSPADM

## 2021-05-02 RX ORDER — LEVOTHYROXINE SODIUM 0.1 MG/1
100 TABLET ORAL DAILY
Status: DISCONTINUED | OUTPATIENT
Start: 2021-05-02 | End: 2021-05-03 | Stop reason: HOSPADM

## 2021-05-02 RX ORDER — ATORVASTATIN CALCIUM 20 MG/1
20 TABLET, FILM COATED ORAL DAILY
Status: CANCELLED | OUTPATIENT
Start: 2021-05-02

## 2021-05-02 RX ORDER — METOPROLOL SUCCINATE 50 MG/1
100 TABLET, EXTENDED RELEASE ORAL DAILY
Status: CANCELLED | OUTPATIENT
Start: 2021-05-02

## 2021-05-02 RX ORDER — INSULIN ASPART 100 [IU]/ML
12-17 INJECTION, SUSPENSION SUBCUTANEOUS TAKE AS DIRECTED
COMMUNITY
End: 2021-06-29

## 2021-05-02 RX ORDER — HYDROCHLOROTHIAZIDE 12.5 MG/1
12.5 TABLET ORAL DAILY
Status: DISCONTINUED | OUTPATIENT
Start: 2021-05-02 | End: 2021-05-03

## 2021-05-02 RX ORDER — HYDROCHLOROTHIAZIDE 12.5 MG/1
12.5 TABLET ORAL DAILY
Status: CANCELLED | OUTPATIENT
Start: 2021-05-02

## 2021-05-02 RX ORDER — INSULIN GLARGINE 100 [IU]/ML
70 INJECTION, SOLUTION SUBCUTANEOUS EVERY MORNING
COMMUNITY
End: 2021-05-05 | Stop reason: SDUPTHER

## 2021-05-02 RX ORDER — ASPIRIN 81 MG/1
81 TABLET ORAL DAILY
Status: CANCELLED | OUTPATIENT
Start: 2021-05-02

## 2021-05-02 RX ORDER — PANTOPRAZOLE SODIUM 40 MG/1
40 TABLET, DELAYED RELEASE ORAL DAILY
Status: CANCELLED | OUTPATIENT
Start: 2021-05-02

## 2021-05-02 RX ORDER — LEVOTHYROXINE SODIUM 0.1 MG/1
100 TABLET ORAL DAILY
Status: CANCELLED | OUTPATIENT
Start: 2021-05-02

## 2021-05-02 RX ORDER — ASPIRIN 81 MG/1
81 TABLET ORAL DAILY
Status: DISCONTINUED | OUTPATIENT
Start: 2021-05-02 | End: 2021-05-03 | Stop reason: HOSPADM

## 2021-05-02 RX ORDER — ATORVASTATIN CALCIUM 40 MG/1
40 TABLET, FILM COATED ORAL NIGHTLY
Status: DISCONTINUED | OUTPATIENT
Start: 2021-05-02 | End: 2021-05-03 | Stop reason: HOSPADM

## 2021-05-02 RX ORDER — MELATONIN
1000 DAILY
Status: CANCELLED | OUTPATIENT
Start: 2021-05-02

## 2021-05-02 RX ORDER — OMEGA-3S/DHA/EPA/FISH OIL/D3 300MG-1000
1000 CAPSULE ORAL DAILY
Status: DISCONTINUED | OUTPATIENT
Start: 2021-05-02 | End: 2021-05-03 | Stop reason: HOSPADM

## 2021-05-02 RX ORDER — SODIUM CHLORIDE 0.9 % (FLUSH) 0.9 %
10 SYRINGE (ML) INJECTION AS NEEDED
Status: DISCONTINUED | OUTPATIENT
Start: 2021-05-02 | End: 2021-05-03 | Stop reason: HOSPADM

## 2021-05-02 RX ORDER — MAGNESIUM SULFATE 1 G/100ML
1 INJECTION INTRAVENOUS AS NEEDED
Status: DISCONTINUED | OUTPATIENT
Start: 2021-05-02 | End: 2021-05-03 | Stop reason: HOSPADM

## 2021-05-02 RX ORDER — MAGNESIUM SULFATE HEPTAHYDRATE 40 MG/ML
4 INJECTION, SOLUTION INTRAVENOUS AS NEEDED
Status: DISCONTINUED | OUTPATIENT
Start: 2021-05-02 | End: 2021-05-03 | Stop reason: HOSPADM

## 2021-05-02 RX ORDER — DEXTROSE MONOHYDRATE 25 G/50ML
25 INJECTION, SOLUTION INTRAVENOUS
Status: DISCONTINUED | OUTPATIENT
Start: 2021-05-02 | End: 2021-05-03 | Stop reason: HOSPADM

## 2021-05-02 RX ORDER — NICOTINE POLACRILEX 4 MG
15 LOZENGE BUCCAL
Status: DISCONTINUED | OUTPATIENT
Start: 2021-05-02 | End: 2021-05-03 | Stop reason: HOSPADM

## 2021-05-02 RX ORDER — MELATONIN
2000 DAILY
COMMUNITY

## 2021-05-02 RX ADMIN — ATORVASTATIN CALCIUM 40 MG: 40 TABLET, FILM COATED ORAL at 21:13

## 2021-05-02 RX ADMIN — ACETAMINOPHEN 650 MG: 325 TABLET, FILM COATED ORAL at 15:08

## 2021-05-02 RX ADMIN — TECHNETIUM TC 99M SESTAMIBI 1 DOSE: 1 INJECTION INTRAVENOUS at 11:55

## 2021-05-02 RX ADMIN — ASPIRIN 81 MG: 81 TABLET, COATED ORAL at 13:13

## 2021-05-02 RX ADMIN — INSULIN ASPART 3 UNITS: 100 INJECTION, SOLUTION INTRAVENOUS; SUBCUTANEOUS at 17:47

## 2021-05-02 RX ADMIN — IOPAMIDOL 70 ML: 755 INJECTION, SOLUTION INTRAVENOUS at 09:08

## 2021-05-02 RX ADMIN — REGADENOSON 0.4 MG: 0.08 INJECTION, SOLUTION INTRAVENOUS at 11:55

## 2021-05-02 RX ADMIN — HEPARIN SODIUM 5000 UNITS: 5000 INJECTION INTRAVENOUS; SUBCUTANEOUS at 21:13

## 2021-05-02 RX ADMIN — SODIUM CHLORIDE, PRESERVATIVE FREE 10 ML: 5 INJECTION INTRAVENOUS at 21:13

## 2021-05-02 RX ADMIN — TECHNETIUM TC 99M SESTAMIBI 1 DOSE: 1 INJECTION INTRAVENOUS at 10:35

## 2021-05-03 ENCOUNTER — READMISSION MANAGEMENT (OUTPATIENT)
Dept: CALL CENTER | Facility: HOSPITAL | Age: 66
End: 2021-05-03

## 2021-05-03 VITALS
RESPIRATION RATE: 18 BRPM | BODY MASS INDEX: 48.27 KG/M2 | SYSTOLIC BLOOD PRESSURE: 142 MMHG | HEIGHT: 63 IN | TEMPERATURE: 97.7 F | HEART RATE: 58 BPM | OXYGEN SATURATION: 96 % | WEIGHT: 272.4 LBS | DIASTOLIC BLOOD PRESSURE: 65 MMHG

## 2021-05-03 LAB
ALBUMIN SERPL-MCNC: 3.06 G/DL (ref 3.5–5.2)
ALBUMIN/GLOB SERPL: 0.9 G/DL
ALP SERPL-CCNC: 65 U/L (ref 39–117)
ALT SERPL W P-5'-P-CCNC: 21 U/L (ref 1–33)
ANION GAP SERPL CALCULATED.3IONS-SCNC: 10.7 MMOL/L (ref 5–15)
AST SERPL-CCNC: 30 U/L (ref 1–32)
BASOPHILS # BLD AUTO: 0.07 10*3/MM3 (ref 0–0.2)
BASOPHILS NFR BLD AUTO: 1.1 % (ref 0–1.5)
BILIRUB SERPL-MCNC: 0.7 MG/DL (ref 0–1.2)
BUN SERPL-MCNC: 18 MG/DL (ref 8–23)
BUN/CREAT SERPL: 22 (ref 7–25)
CALCIUM SPEC-SCNC: 8.8 MG/DL (ref 8.6–10.5)
CHLORIDE SERPL-SCNC: 108 MMOL/L (ref 98–107)
CO2 SERPL-SCNC: 21.3 MMOL/L (ref 22–29)
CREAT SERPL-MCNC: 0.82 MG/DL (ref 0.57–1)
DEPRECATED RDW RBC AUTO: 50.4 FL (ref 37–54)
EOSINOPHIL # BLD AUTO: 0.09 10*3/MM3 (ref 0–0.4)
EOSINOPHIL NFR BLD AUTO: 1.5 % (ref 0.3–6.2)
ERYTHROCYTE [DISTWIDTH] IN BLOOD BY AUTOMATED COUNT: 13.8 % (ref 12.3–15.4)
GFR SERPL CREATININE-BSD FRML MDRD: 70 ML/MIN/1.73
GLOBULIN UR ELPH-MCNC: 3.3 GM/DL
GLUCOSE BLDC GLUCOMTR-MCNC: 157 MG/DL (ref 70–130)
GLUCOSE BLDC GLUCOMTR-MCNC: 245 MG/DL (ref 70–130)
GLUCOSE SERPL-MCNC: 118 MG/DL (ref 65–99)
HAV IGM SERPL QL IA: NORMAL
HBV CORE IGM SERPL QL IA: NORMAL
HBV SURFACE AG SERPL QL IA: NORMAL
HCT VFR BLD AUTO: 39.4 % (ref 34–46.6)
HCV AB SER DONR QL: NORMAL
HGB BLD-MCNC: 12.3 G/DL (ref 12–15.9)
IMM GRANULOCYTES # BLD AUTO: 0.02 10*3/MM3 (ref 0–0.05)
IMM GRANULOCYTES NFR BLD AUTO: 0.3 % (ref 0–0.5)
LYMPHOCYTES # BLD AUTO: 2.83 10*3/MM3 (ref 0.7–3.1)
LYMPHOCYTES NFR BLD AUTO: 46.5 % (ref 19.6–45.3)
MAGNESIUM SERPL-MCNC: 1.3 MG/DL (ref 1.6–2.4)
MAGNESIUM SERPL-MCNC: 1.7 MG/DL (ref 1.6–2.4)
MCH RBC QN AUTO: 31.4 PG (ref 26.6–33)
MCHC RBC AUTO-ENTMCNC: 31.2 G/DL (ref 31.5–35.7)
MCV RBC AUTO: 100.5 FL (ref 79–97)
MONOCYTES # BLD AUTO: 0.72 10*3/MM3 (ref 0.1–0.9)
MONOCYTES NFR BLD AUTO: 11.8 % (ref 5–12)
NEUTROPHILS NFR BLD AUTO: 2.36 10*3/MM3 (ref 1.7–7)
NEUTROPHILS NFR BLD AUTO: 38.8 % (ref 42.7–76)
NRBC BLD AUTO-RTO: 0 /100 WBC (ref 0–0.2)
PLATELET # BLD AUTO: 123 10*3/MM3 (ref 140–450)
PMV BLD AUTO: 10.7 FL (ref 6–12)
POTASSIUM SERPL-SCNC: 3.8 MMOL/L (ref 3.5–5.2)
PROT SERPL-MCNC: 6.4 G/DL (ref 6–8.5)
RBC # BLD AUTO: 3.92 10*6/MM3 (ref 3.77–5.28)
SODIUM SERPL-SCNC: 140 MMOL/L (ref 136–145)
WBC # BLD AUTO: 6.09 10*3/MM3 (ref 3.4–10.8)

## 2021-05-03 PROCEDURE — 25010000002 MAGNESIUM SULFATE IN D5W 1G/100ML (PREMIX) 1-5 GM/100ML-% SOLUTION: Performed by: INTERNAL MEDICINE

## 2021-05-03 PROCEDURE — G0378 HOSPITAL OBSERVATION PER HR: HCPCS

## 2021-05-03 PROCEDURE — 94760 N-INVAS EAR/PLS OXIMETRY 1: CPT

## 2021-05-03 PROCEDURE — 94660 CPAP INITIATION&MGMT: CPT

## 2021-05-03 PROCEDURE — 80053 COMPREHEN METABOLIC PANEL: CPT | Performed by: INTERNAL MEDICINE

## 2021-05-03 PROCEDURE — 99217 PR OBSERVATION CARE DISCHARGE MANAGEMENT: CPT | Performed by: PHYSICIAN ASSISTANT

## 2021-05-03 PROCEDURE — 83735 ASSAY OF MAGNESIUM: CPT | Performed by: INTERNAL MEDICINE

## 2021-05-03 PROCEDURE — 63710000001 INSULIN DETEMIR PER 5 UNITS: Performed by: INTERNAL MEDICINE

## 2021-05-03 PROCEDURE — 96372 THER/PROPH/DIAG INJ SC/IM: CPT

## 2021-05-03 PROCEDURE — 63710000001 INSULIN ASPART PER 5 UNITS: Performed by: HOSPITALIST

## 2021-05-03 PROCEDURE — 94799 UNLISTED PULMONARY SVC/PX: CPT

## 2021-05-03 PROCEDURE — 99214 OFFICE O/P EST MOD 30 MIN: CPT | Performed by: NURSE PRACTITIONER

## 2021-05-03 PROCEDURE — 83735 ASSAY OF MAGNESIUM: CPT | Performed by: PHYSICIAN ASSISTANT

## 2021-05-03 PROCEDURE — 85025 COMPLETE CBC W/AUTO DIFF WBC: CPT | Performed by: INTERNAL MEDICINE

## 2021-05-03 PROCEDURE — 25010000002 HEPARIN (PORCINE) PER 1000 UNITS: Performed by: HOSPITALIST

## 2021-05-03 PROCEDURE — 82962 GLUCOSE BLOOD TEST: CPT

## 2021-05-03 RX ORDER — MAGNESIUM SULFATE 1 G/100ML
1 INJECTION INTRAVENOUS
Status: COMPLETED | OUTPATIENT
Start: 2021-05-03 | End: 2021-05-03

## 2021-05-03 RX ADMIN — MAGNESIUM SULFATE HEPTAHYDRATE 1 G: 1 INJECTION, SOLUTION INTRAVENOUS at 08:00

## 2021-05-03 RX ADMIN — PANTOPRAZOLE SODIUM 40 MG: 40 TABLET, DELAYED RELEASE ORAL at 08:01

## 2021-05-03 RX ADMIN — CANAGLIFLOZIN 300 MG: 300 TABLET, FILM COATED ORAL at 08:01

## 2021-05-03 RX ADMIN — CHOLECALCIFEROL TAB 10 MCG (400 UNIT) 1000 UNITS: 10 TAB at 09:52

## 2021-05-03 RX ADMIN — MAGNESIUM GLUCONATE 500 MG ORAL TABLET 400 MG: 500 TABLET ORAL at 08:00

## 2021-05-03 RX ADMIN — INSULIN DETEMIR 10 UNITS: 100 INJECTION, SOLUTION SUBCUTANEOUS at 08:07

## 2021-05-03 RX ADMIN — INSULIN ASPART 2 UNITS: 100 INJECTION, SOLUTION INTRAVENOUS; SUBCUTANEOUS at 08:01

## 2021-05-03 RX ADMIN — MAGNESIUM SULFATE HEPTAHYDRATE 1 G: 1 INJECTION, SOLUTION INTRAVENOUS at 09:51

## 2021-05-03 RX ADMIN — SODIUM CHLORIDE, PRESERVATIVE FREE 10 ML: 5 INJECTION INTRAVENOUS at 08:01

## 2021-05-03 RX ADMIN — HEPARIN SODIUM 5000 UNITS: 5000 INJECTION INTRAVENOUS; SUBCUTANEOUS at 08:01

## 2021-05-03 RX ADMIN — LEVOTHYROXINE SODIUM 100 MCG: 100 TABLET ORAL at 08:01

## 2021-05-03 RX ADMIN — ACETAMINOPHEN 650 MG: 325 TABLET, FILM COATED ORAL at 06:24

## 2021-05-03 RX ADMIN — MAGNESIUM SULFATE HEPTAHYDRATE 1 G: 1 INJECTION, SOLUTION INTRAVENOUS at 11:00

## 2021-05-03 RX ADMIN — LOSARTAN POTASSIUM 50 MG: 50 TABLET, FILM COATED ORAL at 08:01

## 2021-05-03 RX ADMIN — INSULIN ASPART 3 UNITS: 100 INJECTION, SOLUTION INTRAVENOUS; SUBCUTANEOUS at 10:59

## 2021-05-03 RX ADMIN — ASPIRIN 81 MG: 81 TABLET, COATED ORAL at 08:01

## 2021-05-04 ENCOUNTER — TRANSITIONAL CARE MANAGEMENT TELEPHONE ENCOUNTER (OUTPATIENT)
Dept: CALL CENTER | Facility: HOSPITAL | Age: 66
End: 2021-05-04

## 2021-05-04 NOTE — OUTREACH NOTE
Prep Survey      Responses   [facility] facility patient discharged from?  [facility]   Is LACE score < 7 ?  Yes   Emergency Room discharge w/ pulse ox?  No   Eligibility  [facility]   Date of Admission  05/01/21   Date of Discharge  05/03/21   Discharge Disposition  Home or Self Care   Discharge diagnosis  recurrent SVT, cardiomegaly, cirrhosis, hypomagnesemia   Does the patient have one of the following disease processes/diagnoses(primary or secondary)?  Other   Does the patient have Home health ordered?  No   Is there a DME ordered?  No   Prep survey completed?  Yes          Ofelia Michelle RN

## 2021-05-04 NOTE — OUTREACH NOTE
Call Center TCM Note      Responses   [facility] patient discharged from?  [facility]   Does the patient have one of the following disease processes/diagnoses(primary or secondary)?  Other   TCM attempt successful?  Yes   Call start time  0931   Call end time  0934   Discharge diagnosis  recurrent SVT, cardiomegaly, cirrhosis, hypomagnesemia   Person spoke with today (if not patient) and relationship  Patient   Meds reviewed with patient/caregiver?  Yes   Is the patient having any side effects they believe may be caused by any medication additions or changes?  No   Does the patient have all medications ordered at discharge?  Yes   Is the patient taking all medications as directed (includes completed medication regime)?  Yes   Does the patient have a primary care provider?   Yes   Does the patient have an appointment with their PCP within 7 days of discharge?  Yes   Comments regarding PCP  Hospital DC FU 5/6/21 at 3:45   Has the patient kept scheduled appointments due by today?  N/A   Psychosocial issues?  No   Did the patient receive a copy of their discharge instructions?  Yes   Nursing interventions  Reviewed instructions with patient   What is the patient's perception of their health status since discharge?  Improving   Is the patient/caregiver able to teach back signs and symptoms related to disease process for when to call PCP?  Yes   Is the patient/caregiver able to teach back signs and symptoms related to disease process for when to call 911?  Yes   Is the patient/caregiver able to teach back the hierarchy of who to call/visit for symptoms/problems? PCP, Specialist, Home health nurse, Urgent Care, ED, 911  Yes   If the patient is a current smoker, are they able to teach back resources for cessation?  Not a smoker   TCM call completed?  Yes   Wrap up additional comments  Pt states she is doing well, and shares she is up moving around. Pt has hosp. fu appt 5/6/21. No questions/concerns at this time.           Corinna Shah RN    5/4/2021, 09:36 EDT

## 2021-05-05 DIAGNOSIS — Z79.4 TYPE 2 DIABETES MELLITUS WITH HYPERGLYCEMIA, WITH LONG-TERM CURRENT USE OF INSULIN (HCC): Primary | ICD-10-CM

## 2021-05-05 DIAGNOSIS — E11.65 TYPE 2 DIABETES MELLITUS WITH HYPERGLYCEMIA, WITH LONG-TERM CURRENT USE OF INSULIN (HCC): Primary | ICD-10-CM

## 2021-05-05 LAB
QT INTERVAL: 278 MS
QT INTERVAL: 368 MS
QTC INTERVAL: 414 MS
QTC INTERVAL: 461 MS

## 2021-05-05 RX ORDER — INSULIN GLARGINE 100 [IU]/ML
70 INJECTION, SOLUTION SUBCUTANEOUS EVERY MORNING
Qty: 7 PEN | Refills: 2 | Status: SHIPPED | OUTPATIENT
Start: 2021-05-05 | End: 2021-06-29

## 2021-05-06 ENCOUNTER — OFFICE VISIT (OUTPATIENT)
Dept: FAMILY MEDICINE CLINIC | Facility: CLINIC | Age: 66
End: 2021-05-06

## 2021-05-06 DIAGNOSIS — I47.1 SVT (SUPRAVENTRICULAR TACHYCARDIA) (HCC): Chronic | ICD-10-CM

## 2021-05-06 DIAGNOSIS — K21.00 GASTROESOPHAGEAL REFLUX DISEASE WITH ESOPHAGITIS WITHOUT HEMORRHAGE: Chronic | ICD-10-CM

## 2021-05-06 DIAGNOSIS — E78.5 DYSLIPIDEMIA: Chronic | ICD-10-CM

## 2021-05-06 DIAGNOSIS — E11.65 TYPE 2 DIABETES MELLITUS WITH HYPERGLYCEMIA, WITH LONG-TERM CURRENT USE OF INSULIN (HCC): Chronic | ICD-10-CM

## 2021-05-06 DIAGNOSIS — Z79.4 TYPE 2 DIABETES MELLITUS WITH HYPERGLYCEMIA, WITH LONG-TERM CURRENT USE OF INSULIN (HCC): Chronic | ICD-10-CM

## 2021-05-06 DIAGNOSIS — I10 ESSENTIAL HYPERTENSION: Chronic | ICD-10-CM

## 2021-05-06 DIAGNOSIS — Z09 HOSPITAL DISCHARGE FOLLOW-UP: Primary | ICD-10-CM

## 2021-05-06 DIAGNOSIS — E83.42 HYPOMAGNESEMIA: ICD-10-CM

## 2021-05-06 PROCEDURE — 99496 TRANSJ CARE MGMT HIGH F2F 7D: CPT | Performed by: NURSE PRACTITIONER

## 2021-05-06 PROCEDURE — 80053 COMPREHEN METABOLIC PANEL: CPT | Performed by: NURSE PRACTITIONER

## 2021-05-06 PROCEDURE — 36415 COLL VENOUS BLD VENIPUNCTURE: CPT | Performed by: NURSE PRACTITIONER

## 2021-05-06 PROCEDURE — 83735 ASSAY OF MAGNESIUM: CPT | Performed by: NURSE PRACTITIONER

## 2021-05-07 LAB
ALBUMIN SERPL-MCNC: 3.8 G/DL (ref 3.5–5.2)
ALBUMIN/GLOB SERPL: 1 G/DL
ALP SERPL-CCNC: 81 U/L (ref 39–117)
ALT SERPL W P-5'-P-CCNC: 24 U/L (ref 1–33)
ANION GAP SERPL CALCULATED.3IONS-SCNC: 11.8 MMOL/L (ref 5–15)
AST SERPL-CCNC: 36 U/L (ref 1–32)
BILIRUB SERPL-MCNC: 0.5 MG/DL (ref 0–1.2)
BUN SERPL-MCNC: 16 MG/DL (ref 8–23)
BUN/CREAT SERPL: 16.2 (ref 7–25)
CALCIUM SPEC-SCNC: 9.3 MG/DL (ref 8.6–10.5)
CHLORIDE SERPL-SCNC: 106 MMOL/L (ref 98–107)
CO2 SERPL-SCNC: 23.2 MMOL/L (ref 22–29)
CREAT SERPL-MCNC: 0.99 MG/DL (ref 0.57–1)
GFR SERPL CREATININE-BSD FRML MDRD: 56 ML/MIN/1.73
GLOBULIN UR ELPH-MCNC: 3.7 GM/DL
GLUCOSE SERPL-MCNC: 105 MG/DL (ref 65–99)
MAGNESIUM SERPL-MCNC: 1.4 MG/DL (ref 1.6–2.4)
POTASSIUM SERPL-SCNC: 4.1 MMOL/L (ref 3.5–5.2)
PROT SERPL-MCNC: 7.5 G/DL (ref 6–8.5)
SODIUM SERPL-SCNC: 141 MMOL/L (ref 136–145)

## 2021-05-09 VITALS
RESPIRATION RATE: 14 BRPM | BODY MASS INDEX: 48.37 KG/M2 | OXYGEN SATURATION: 97 % | TEMPERATURE: 97.1 F | WEIGHT: 273 LBS | HEIGHT: 63 IN | HEART RATE: 55 BPM | DIASTOLIC BLOOD PRESSURE: 66 MMHG | SYSTOLIC BLOOD PRESSURE: 126 MMHG

## 2021-05-09 PROBLEM — E55.9 VITAMIN D DEFICIENCY: Status: ACTIVE | Noted: 2021-05-09

## 2021-05-11 ENCOUNTER — HOSPITAL ENCOUNTER (OUTPATIENT)
Dept: MAMMOGRAPHY | Facility: HOSPITAL | Age: 66
Discharge: HOME | End: 2021-05-11

## 2021-05-11 ENCOUNTER — HOSPITAL ENCOUNTER (OUTPATIENT)
Dept: BONE DENSITY | Facility: HOSPITAL | Age: 66
Discharge: HOME | End: 2021-05-11

## 2021-05-11 DIAGNOSIS — I47.1 SVT (SUPRAVENTRICULAR TACHYCARDIA) (HCC): Primary | ICD-10-CM

## 2021-05-11 DIAGNOSIS — Z12.31 ENCOUNTER FOR SCREENING MAMMOGRAM FOR MALIGNANT NEOPLASM OF BREAST: ICD-10-CM

## 2021-05-11 DIAGNOSIS — R93.7 ABNORMAL BONE DENSITY SCREENING: ICD-10-CM

## 2021-05-11 PROCEDURE — 77067 SCR MAMMO BI INCL CAD: CPT

## 2021-05-11 PROCEDURE — 77080 DXA BONE DENSITY AXIAL: CPT | Performed by: RADIOLOGY

## 2021-05-11 PROCEDURE — 77063 BREAST TOMOSYNTHESIS BI: CPT

## 2021-05-11 PROCEDURE — 77063 BREAST TOMOSYNTHESIS BI: CPT | Performed by: RADIOLOGY

## 2021-05-11 PROCEDURE — 77067 SCR MAMMO BI INCL CAD: CPT | Performed by: RADIOLOGY

## 2021-05-11 PROCEDURE — 77080 DXA BONE DENSITY AXIAL: CPT

## 2021-05-12 ENCOUNTER — HOSPITAL ENCOUNTER (OUTPATIENT)
Dept: ULTRASOUND IMAGING | Facility: HOSPITAL | Age: 66
Discharge: HOME | End: 2021-05-12

## 2021-05-12 ENCOUNTER — HOSPITAL ENCOUNTER (OUTPATIENT)
Dept: MAMMOGRAPHY | Facility: HOSPITAL | Age: 66
Discharge: HOME | End: 2021-05-12

## 2021-05-12 DIAGNOSIS — R92.8 ABNORMAL MAMMOGRAM: ICD-10-CM

## 2021-05-12 DIAGNOSIS — R92.8 ABNORMAL MAMMOGRAM OF RIGHT BREAST: ICD-10-CM

## 2021-05-12 PROCEDURE — G0279 TOMOSYNTHESIS, MAMMO: HCPCS | Performed by: RADIOLOGY

## 2021-05-12 PROCEDURE — 76642 ULTRASOUND BREAST LIMITED: CPT | Performed by: RADIOLOGY

## 2021-05-12 PROCEDURE — G0279 TOMOSYNTHESIS, MAMMO: HCPCS

## 2021-05-12 PROCEDURE — 77065 DX MAMMO INCL CAD UNI: CPT | Performed by: RADIOLOGY

## 2021-05-12 PROCEDURE — 77065 DX MAMMO INCL CAD UNI: CPT

## 2021-05-12 PROCEDURE — 76642 ULTRASOUND BREAST LIMITED: CPT

## 2021-05-13 ENCOUNTER — LAB (OUTPATIENT)
Dept: FAMILY MEDICINE CLINIC | Facility: CLINIC | Age: 66
End: 2021-05-13

## 2021-05-13 DIAGNOSIS — R79.9 ABNORMAL BLOOD CHEMISTRY: ICD-10-CM

## 2021-05-13 PROCEDURE — 36415 COLL VENOUS BLD VENIPUNCTURE: CPT | Performed by: NURSE PRACTITIONER

## 2021-05-13 PROCEDURE — 83735 ASSAY OF MAGNESIUM: CPT | Performed by: NURSE PRACTITIONER

## 2021-05-15 DIAGNOSIS — E83.42 HYPOMAGNESEMIA: Primary | ICD-10-CM

## 2021-05-18 RX ORDER — MAGNESIUM OXIDE 400 MG/1
400 TABLET ORAL 2 TIMES DAILY
Qty: 60 TABLET | Refills: 0 | Status: SHIPPED | OUTPATIENT
Start: 2021-05-18 | End: 2021-06-01

## 2021-05-19 ENCOUNTER — HOSPITAL ENCOUNTER (OUTPATIENT)
Dept: MAMMOGRAPHY | Facility: HOSPITAL | Age: 66
Discharge: HOME | End: 2021-05-19

## 2021-05-19 ENCOUNTER — HOSPITAL ENCOUNTER (OUTPATIENT)
Dept: ULTRASOUND IMAGING | Facility: HOSPITAL | Age: 66
Discharge: HOME | End: 2021-05-19

## 2021-05-19 DIAGNOSIS — R92.8 ABNORMAL MAMMOGRAM OF RIGHT BREAST: ICD-10-CM

## 2021-05-19 PROCEDURE — 77065 DX MAMMO INCL CAD UNI: CPT | Performed by: RADIOLOGY

## 2021-05-19 PROCEDURE — A4648 IMPLANTABLE TISSUE MARKER: HCPCS

## 2021-05-19 PROCEDURE — 19083 BX BREAST 1ST LESION US IMAG: CPT | Performed by: RADIOLOGY

## 2021-05-21 LAB — LAB AP CASE REPORT: NORMAL

## 2021-05-24 ENCOUNTER — TELEPHONE (OUTPATIENT)
Dept: MAMMOGRAPHY | Facility: HOSPITAL | Age: 66
End: 2021-05-24

## 2021-05-27 DIAGNOSIS — Z79.4 TYPE 2 DIABETES MELLITUS WITH HYPERGLYCEMIA, WITH LONG-TERM CURRENT USE OF INSULIN (HCC): Primary | ICD-10-CM

## 2021-05-27 DIAGNOSIS — E11.65 TYPE 2 DIABETES MELLITUS WITH HYPERGLYCEMIA, WITH LONG-TERM CURRENT USE OF INSULIN (HCC): Primary | ICD-10-CM

## 2021-06-01 ENCOUNTER — LAB (OUTPATIENT)
Dept: FAMILY MEDICINE CLINIC | Facility: CLINIC | Age: 66
End: 2021-06-01

## 2021-06-01 DIAGNOSIS — E83.42 HYPOMAGNESEMIA: ICD-10-CM

## 2021-06-01 LAB — MAGNESIUM SERPL-MCNC: 1.8 MG/DL (ref 1.6–2.4)

## 2021-06-01 PROCEDURE — 83735 ASSAY OF MAGNESIUM: CPT | Performed by: NURSE PRACTITIONER

## 2021-06-02 ENCOUNTER — OFFICE VISIT (OUTPATIENT)
Dept: SURGERY | Facility: CLINIC | Age: 66
End: 2021-06-02

## 2021-06-02 ENCOUNTER — NURSE NAVIGATOR (OUTPATIENT)
Dept: ONCOLOGY | Facility: CLINIC | Age: 66
End: 2021-06-02

## 2021-06-02 VITALS
SYSTOLIC BLOOD PRESSURE: 110 MMHG | DIASTOLIC BLOOD PRESSURE: 78 MMHG | BODY MASS INDEX: 49.61 KG/M2 | HEART RATE: 60 BPM | WEIGHT: 280 LBS | HEIGHT: 63 IN

## 2021-06-02 DIAGNOSIS — Z17.0 MALIGNANT NEOPLASM OF UPPER-OUTER QUADRANT OF RIGHT BREAST IN FEMALE, ESTROGEN RECEPTOR POSITIVE (HCC): Primary | ICD-10-CM

## 2021-06-02 DIAGNOSIS — C50.411 MALIGNANT NEOPLASM OF UPPER-OUTER QUADRANT OF RIGHT BREAST IN FEMALE, ESTROGEN RECEPTOR POSITIVE (HCC): Primary | ICD-10-CM

## 2021-06-02 PROCEDURE — 99203 OFFICE O/P NEW LOW 30 MIN: CPT | Performed by: SURGERY

## 2021-06-02 NOTE — PROGRESS NOTES
Nurse Navigator met patient on this date during consultation with Dr. Parrish.  Patient is a 65 y/o female diagnosed with breast cancer.  The Breast Cancer Treatment Handbook provided.  Questions answered and reassurance given.  Introduced patient to the Nurse Navigator role and contact information provided.  Patient aware to call with any questions or concerns.  Nurse Navigator will continue to follow and assist as needed.

## 2021-06-02 NOTE — PROGRESS NOTES
Subjective   Ronna Wayne is a 66 y.o. female is being seen for consultation today at the request of Mague Acosta APRN    Ronna Wayne is a 66 y.o. female With newly diagnosed right breast cancer.  In the upper outer quadrant of the breast she was noted on mammogram to have a 4 cm mass.  Biopsy shows ER positive CT positive HER-2 negative breast cancer.  The cancer is invasive mammary carcinoma with mixed ductal and lobular features.  It is easily palpable and there is no evidence of muscle involvement or howard disease on examination.  No skin change or nipple discharge.  She has a family history of a sister with breast cancer 10 years ago.      Past Medical History:   Diagnosis Date   • Anxiety    • B12 deficiency    • BPV (benign positional vertigo)    • Diarrhea    • Fibromyalgia    • GERD (gastroesophageal reflux disease)    • Glaucoma    • Headache    • Hyperlipidemia    • Obesity    • Sleep apnea    • Weakness of left arm        Family History   Problem Relation Age of Onset   • Thyroid disease Mother    • Diabetes Mother    • Hyperlipidemia Father    • Hypertension Father    • Heart attack Father    • Alcohol abuse Maternal Aunt    • Cancer Maternal Grandfather         PROSTATE   • Stroke Paternal Grandmother    • Stroke Paternal Grandfather    • Hypertension Other    • Hypertension Sister    • Breast cancer Sister 40   • Obesity Sister    • Hypertension Sister    • Hyperlipidemia Sister    • Hyperlipidemia Sister    • Hypertension Sister    • Arthritis Sister    • Obesity Sister    • Thyroid disease Sister        Social History     Socioeconomic History   • Marital status:      Spouse name: Not on file   • Number of children: Not on file   • Years of education: Not on file   • Highest education level: Not on file   Tobacco Use   • Smoking status: Former Smoker     Packs/day: 1.50     Years: 8.00     Pack years: 12.00     Types: Cigarettes     Quit date: 1976     Years since  "quittin.4   • Smokeless tobacco: Never Used   Vaping Use   • Vaping Use: Never used   Substance and Sexual Activity   • Alcohol use: No     Comment: former social drinker not had anything in 25 + years   • Drug use: No       Past Surgical History:   Procedure Laterality Date   • APPENDECTOMY     • BREAST CYST ASPIRATION     • CATARACT EXTRACTION Bilateral    • CHOLECYSTECTOMY     • GALLBLADDER SURGERY     • URETHRA SURGERY         Review of Systems   Constitutional: Negative for activity change, appetite change, chills and fever.   HENT: Negative for sore throat and trouble swallowing.    Eyes: Negative for visual disturbance.   Respiratory: Negative for cough and shortness of breath.    Cardiovascular: Negative for chest pain and palpitations.   Gastrointestinal: Negative for abdominal distention, abdominal pain, blood in stool, constipation, diarrhea, nausea and vomiting.   Endocrine: Negative for cold intolerance and heat intolerance.   Genitourinary: Negative for dysuria.   Musculoskeletal: Negative for joint swelling.   Skin: Negative for color change, rash and wound.   Allergic/Immunologic: Negative for immunocompromised state.   Neurological: Negative for dizziness, seizures, weakness and headaches.   Hematological: Negative for adenopathy. Does not bruise/bleed easily.   Psychiatric/Behavioral: Negative for agitation and confusion.         /78   Pulse 60   Ht 160 cm (62.99\")   Wt 127 kg (280 lb)   BMI 49.61 kg/m²   Objective   Physical Exam  Vitals reviewed.   Constitutional:       Appearance: She is well-developed.   HENT:      Head: Normocephalic and atraumatic.   Eyes:      General: No scleral icterus.     Conjunctiva/sclera: Conjunctivae normal.      Pupils: Pupils are equal, round, and reactive to light.   Neck:      Thyroid: No thyromegaly.      Vascular: No JVD.      Trachea: No tracheal deviation.   Cardiovascular:      Rate and Rhythm: Normal rate and regular rhythm.      Heart " sounds: No murmur heard.   No friction rub. No gallop.    Pulmonary:      Effort: Pulmonary effort is normal. No retractions.      Breath sounds: Normal breath sounds.   Chest:      Chest wall: No tenderness.      Breasts:         Right: Mass present.            Comments: 4 cm firm mass upper outer quadrant right breast  Abdominal:      General: Bowel sounds are normal. There is no distension.      Palpations: Abdomen is soft. There is no hepatomegaly, splenomegaly or mass.      Tenderness: There is no abdominal tenderness.      Hernia: No hernia is present.   Musculoskeletal:         General: No deformity. Normal range of motion.      Cervical back: Neck supple.   Lymphadenopathy:      Cervical: No cervical adenopathy.   Skin:     General: Skin is warm and dry.      Findings: No rash.   Neurological:      Mental Status: She is alert and oriented to person, place, and time.               Assessment   Diagnoses and all orders for this visit:    1. Malignant neoplasm of upper-outer quadrant of right breast in female, estrogen receptor positive (CMS/HCC) (Primary)  -     MRI Breast Bilateral With & Without Contrast; Future      Ronna Wayne is a 66 y.o. female with 4 cm upper outer quadrant ER positive [place] positive HER-2 negative cancer of the right breast.  Her cancer has mixed ductal and lobular features.  Due to the lobular features the patient will undergo MRI of the breast to see true extent of mass to aid in surgical planning.  She will be presented at tumor board next week and following tumor board discussion we will move forward with continued management of her newly diagnosed breast cancer.    Patient's Body mass index is 49.61 kg/m². indicating that she is morbidly obese (BMI > 40 or > 35 with obesity - related health condition). Obesity-related health conditions include the following: hypertension, diabetes mellitus, dyslipidemias and GERD. Obesity is unchanged. BMI is is above average; BMI management  plan is completed. We discussed portion control and increasing exercise..

## 2021-06-09 ENCOUNTER — OFFICE VISIT (OUTPATIENT)
Dept: SURGERY | Facility: CLINIC | Age: 66
End: 2021-06-09

## 2021-06-09 VITALS — HEIGHT: 63 IN | WEIGHT: 280 LBS | BODY MASS INDEX: 49.61 KG/M2

## 2021-06-09 DIAGNOSIS — C50.411 MALIGNANT NEOPLASM OF UPPER-OUTER QUADRANT OF RIGHT BREAST IN FEMALE, ESTROGEN RECEPTOR POSITIVE (HCC): Primary | ICD-10-CM

## 2021-06-09 DIAGNOSIS — E83.42 HYPOMAGNESEMIA: Primary | ICD-10-CM

## 2021-06-09 DIAGNOSIS — Z79.4 TYPE 2 DIABETES MELLITUS WITH HYPERGLYCEMIA, WITH LONG-TERM CURRENT USE OF INSULIN (HCC): ICD-10-CM

## 2021-06-09 DIAGNOSIS — Z17.0 MALIGNANT NEOPLASM OF UPPER-OUTER QUADRANT OF RIGHT BREAST IN FEMALE, ESTROGEN RECEPTOR POSITIVE (HCC): Primary | ICD-10-CM

## 2021-06-09 DIAGNOSIS — E11.65 TYPE 2 DIABETES MELLITUS WITH HYPERGLYCEMIA, WITH LONG-TERM CURRENT USE OF INSULIN (HCC): ICD-10-CM

## 2021-06-09 PROCEDURE — 99213 OFFICE O/P EST LOW 20 MIN: CPT | Performed by: SURGERY

## 2021-06-09 NOTE — PROGRESS NOTES
Subjective   Ronna Wayne is a 66 y.o. female is being seen for consultation today at the request of Mague Acosta APRN    Ronna Wayne is a 66 y.o. female With newly diagnosed right breast cancer.  In the upper outer quadrant of the breast she was noted on mammogram to have a 4 cm mass.  Biopsy shows ER positive [place] positive HER-2 negative breast cancer.  The cancer is invasive mammary carcinoma with mixed ductal and lobular features.  It is easily palpable and there is no evidence of muscle involvement or howard disease on examination.  No skin change or nipple discharge.  She has a family history of a sister with breast cancer 10 years ago.      Past Medical History:   Diagnosis Date   • Anxiety    • B12 deficiency    • BPV (benign positional vertigo)    • Diarrhea    • Fibromyalgia    • GERD (gastroesophageal reflux disease)    • Glaucoma    • Headache    • Hyperlipidemia    • Obesity    • Sleep apnea    • Weakness of left arm        Family History   Problem Relation Age of Onset   • Thyroid disease Mother    • Diabetes Mother    • Hyperlipidemia Father    • Hypertension Father    • Heart attack Father    • Alcohol abuse Maternal Aunt    • Cancer Maternal Grandfather         PROSTATE   • Stroke Paternal Grandmother    • Stroke Paternal Grandfather    • Hypertension Other    • Hypertension Sister    • Breast cancer Sister 40   • Obesity Sister    • Hypertension Sister    • Hyperlipidemia Sister    • Hyperlipidemia Sister    • Hypertension Sister    • Arthritis Sister    • Obesity Sister    • Thyroid disease Sister        Social History     Socioeconomic History   • Marital status:      Spouse name: Not on file   • Number of children: Not on file   • Years of education: Not on file   • Highest education level: Not on file   Tobacco Use   • Smoking status: Former Smoker     Packs/day: 1.50     Years: 8.00     Pack years: 12.00     Types: Cigarettes     Quit date: 1976     Years since  "quittin.4   • Smokeless tobacco: Never Used   Vaping Use   • Vaping Use: Never used   Substance and Sexual Activity   • Alcohol use: No     Comment: former social drinker not had anything in 25 + years   • Drug use: No       Past Surgical History:   Procedure Laterality Date   • APPENDECTOMY     • BREAST CYST ASPIRATION     • CATARACT EXTRACTION Bilateral    • CHOLECYSTECTOMY     • GALLBLADDER SURGERY     • URETHRA SURGERY         Review of Systems   Constitutional: Negative for activity change, appetite change, chills and fever.   HENT: Negative for sore throat and trouble swallowing.    Eyes: Negative for visual disturbance.   Respiratory: Negative for cough and shortness of breath.    Cardiovascular: Negative for chest pain and palpitations.   Gastrointestinal: Negative for abdominal distention, abdominal pain, blood in stool, constipation, diarrhea, nausea and vomiting.   Endocrine: Negative for cold intolerance and heat intolerance.   Genitourinary: Negative for dysuria.   Musculoskeletal: Negative for joint swelling.   Skin: Negative for color change, rash and wound.   Allergic/Immunologic: Negative for immunocompromised state.   Neurological: Negative for dizziness, seizures, weakness and headaches.   Hematological: Negative for adenopathy. Does not bruise/bleed easily.   Psychiatric/Behavioral: Negative for agitation and confusion.         Ht 160 cm (62.99\")   Wt 127 kg (280 lb)   BMI 49.61 kg/m²   Objective   Physical Exam  Vitals reviewed.   Constitutional:       Appearance: She is well-developed.   HENT:      Head: Normocephalic and atraumatic.   Eyes:      General: No scleral icterus.     Conjunctiva/sclera: Conjunctivae normal.      Pupils: Pupils are equal, round, and reactive to light.   Neck:      Thyroid: No thyromegaly.      Vascular: No JVD.      Trachea: No tracheal deviation.   Cardiovascular:      Rate and Rhythm: Normal rate and regular rhythm.      Heart sounds: No murmur heard.   No " friction rub. No gallop.    Pulmonary:      Effort: Pulmonary effort is normal. No retractions.      Breath sounds: Normal breath sounds.   Chest:      Chest wall: No tenderness.      Breasts:         Right: Mass present.            Comments: 4 cm firm mass upper outer quadrant right breast  Abdominal:      General: Bowel sounds are normal. There is no distension.      Palpations: Abdomen is soft. There is no hepatomegaly, splenomegaly or mass.      Tenderness: There is no abdominal tenderness.      Hernia: No hernia is present.   Musculoskeletal:         General: No deformity. Normal range of motion.      Cervical back: Neck supple.   Lymphadenopathy:      Cervical: No cervical adenopathy.   Skin:     General: Skin is warm and dry.      Findings: No rash.   Neurological:      Mental Status: She is alert and oriented to person, place, and time.               Assessment   Diagnoses and all orders for this visit:    1. Malignant neoplasm of upper-outer quadrant of right breast in female, estrogen receptor positive (CMS/HCC) (Primary)      Ronna Wayne is a 66 y.o. female with 4 cm upper outer quadrant ER positive [place] positive HER-2 negative cancer of the right breast.  Her cancer has mixed ductal and lobular features.  Due to the lobular features the patient will undergo MRI of the breast to see true extent of mass to aid in surgical planning.  MRI is scheduled for later this month and following MRI final management will be determined.    Patient's Body mass index is 49.61 kg/m². indicating that she is morbidly obese (BMI > 40 or > 35 with obesity - related health condition). Obesity-related health conditions include the following: hypertension, diabetes mellitus, dyslipidemias and GERD. Obesity is unchanged. BMI is is above average; BMI management plan is completed. We discussed portion control and increasing exercise..

## 2021-06-19 ENCOUNTER — APPOINTMENT (OUTPATIENT)
Dept: GENERAL RADIOLOGY | Facility: HOSPITAL | Age: 66
End: 2021-06-19

## 2021-06-19 ENCOUNTER — HOSPITAL ENCOUNTER (EMERGENCY)
Facility: HOSPITAL | Age: 66
Discharge: HOME | End: 2021-06-19
Attending: FAMILY MEDICINE | Admitting: FAMILY MEDICINE

## 2021-06-19 VITALS
WEIGHT: 280 LBS | HEIGHT: 63 IN | BODY MASS INDEX: 49.61 KG/M2 | HEART RATE: 54 BPM | DIASTOLIC BLOOD PRESSURE: 67 MMHG | SYSTOLIC BLOOD PRESSURE: 133 MMHG | RESPIRATION RATE: 20 BRPM | TEMPERATURE: 98 F | OXYGEN SATURATION: 99 %

## 2021-06-19 DIAGNOSIS — E83.42 HYPOMAGNESEMIA: ICD-10-CM

## 2021-06-19 DIAGNOSIS — I47.1 SVT (SUPRAVENTRICULAR TACHYCARDIA) (HCC): Primary | ICD-10-CM

## 2021-06-19 LAB
ALBUMIN SERPL-MCNC: 3.78 G/DL (ref 3.5–5.2)
ALBUMIN/GLOB SERPL: 1 G/DL
ALP SERPL-CCNC: 81 U/L (ref 39–117)
ALT SERPL W P-5'-P-CCNC: 20 U/L (ref 1–33)
ANION GAP SERPL CALCULATED.3IONS-SCNC: 11.2 MMOL/L (ref 5–15)
AST SERPL-CCNC: 28 U/L (ref 1–32)
BASOPHILS # BLD AUTO: 0.04 10*3/MM3 (ref 0–0.2)
BASOPHILS NFR BLD AUTO: 0.6 % (ref 0–1.5)
BILIRUB SERPL-MCNC: 0.8 MG/DL (ref 0–1.2)
BUN SERPL-MCNC: 14 MG/DL (ref 8–23)
BUN/CREAT SERPL: 14.9 (ref 7–25)
CALCIUM SPEC-SCNC: 9.4 MG/DL (ref 8.6–10.5)
CHLORIDE SERPL-SCNC: 101 MMOL/L (ref 98–107)
CO2 SERPL-SCNC: 23.8 MMOL/L (ref 22–29)
CREAT SERPL-MCNC: 0.94 MG/DL (ref 0.57–1)
DEPRECATED RDW RBC AUTO: 47.8 FL (ref 37–54)
EOSINOPHIL # BLD AUTO: 0.05 10*3/MM3 (ref 0–0.4)
EOSINOPHIL NFR BLD AUTO: 0.8 % (ref 0.3–6.2)
ERYTHROCYTE [DISTWIDTH] IN BLOOD BY AUTOMATED COUNT: 13.3 % (ref 12.3–15.4)
FLUAV RNA RESP QL NAA+PROBE: NOT DETECTED
FLUBV RNA RESP QL NAA+PROBE: NOT DETECTED
GFR SERPL CREATININE-BSD FRML MDRD: 60 ML/MIN/1.73
GLOBULIN UR ELPH-MCNC: 3.9 GM/DL
GLUCOSE SERPL-MCNC: 218 MG/DL (ref 65–99)
HCT VFR BLD AUTO: 41.9 % (ref 34–46.6)
HGB BLD-MCNC: 14.1 G/DL (ref 12–15.9)
HOLD SPECIMEN: NORMAL
HOLD SPECIMEN: NORMAL
IMM GRANULOCYTES # BLD AUTO: 0.01 10*3/MM3 (ref 0–0.05)
IMM GRANULOCYTES NFR BLD AUTO: 0.2 % (ref 0–0.5)
INR PPP: 1.05 (ref 0.9–1.1)
LYMPHOCYTES # BLD AUTO: 3.18 10*3/MM3 (ref 0.7–3.1)
LYMPHOCYTES NFR BLD AUTO: 47.8 % (ref 19.6–45.3)
MAGNESIUM SERPL-MCNC: 1.3 MG/DL (ref 1.6–2.4)
MAGNESIUM SERPL-MCNC: 1.9 MG/DL (ref 1.6–2.4)
MCH RBC QN AUTO: 32.3 PG (ref 26.6–33)
MCHC RBC AUTO-ENTMCNC: 33.7 G/DL (ref 31.5–35.7)
MCV RBC AUTO: 95.9 FL (ref 79–97)
MONOCYTES # BLD AUTO: 0.76 10*3/MM3 (ref 0.1–0.9)
MONOCYTES NFR BLD AUTO: 11.4 % (ref 5–12)
NEUTROPHILS NFR BLD AUTO: 2.61 10*3/MM3 (ref 1.7–7)
NEUTROPHILS NFR BLD AUTO: 39.2 % (ref 42.7–76)
NRBC BLD AUTO-RTO: 0 /100 WBC (ref 0–0.2)
NT-PROBNP SERPL-MCNC: 297.5 PG/ML (ref 0–900)
PLATELET # BLD AUTO: 148 10*3/MM3 (ref 140–450)
PMV BLD AUTO: 10.9 FL (ref 6–12)
POTASSIUM SERPL-SCNC: 4.1 MMOL/L (ref 3.5–5.2)
PROT SERPL-MCNC: 7.7 G/DL (ref 6–8.5)
PROTHROMBIN TIME: 14.1 SECONDS (ref 12.8–14.5)
QT INTERVAL: 292 MS
QT INTERVAL: 300 MS
QT INTERVAL: 382 MS
QTC INTERVAL: 420 MS
QTC INTERVAL: 472 MS
QTC INTERVAL: 492 MS
RBC # BLD AUTO: 4.37 10*6/MM3 (ref 3.77–5.28)
SARS-COV-2 RNA RESP QL NAA+PROBE: NOT DETECTED
SODIUM SERPL-SCNC: 136 MMOL/L (ref 136–145)
T4 FREE SERPL-MCNC: 1.6 NG/DL (ref 0.93–1.7)
TROPONIN T SERPL-MCNC: <0.01 NG/ML (ref 0–0.03)
TROPONIN T SERPL-MCNC: <0.01 NG/ML (ref 0–0.03)
TSH SERPL DL<=0.05 MIU/L-ACNC: 0.22 UIU/ML (ref 0.27–4.2)
WBC # BLD AUTO: 6.65 10*3/MM3 (ref 3.4–10.8)
WHOLE BLOOD HOLD SPECIMEN: NORMAL

## 2021-06-19 PROCEDURE — 87636 SARSCOV2 & INF A&B AMP PRB: CPT | Performed by: FAMILY MEDICINE

## 2021-06-19 PROCEDURE — 25010000002 MAGNESIUM SULFATE 2 GM/50ML SOLUTION: Performed by: FAMILY MEDICINE

## 2021-06-19 PROCEDURE — 85610 PROTHROMBIN TIME: CPT | Performed by: FAMILY MEDICINE

## 2021-06-19 PROCEDURE — 96366 THER/PROPH/DIAG IV INF ADDON: CPT

## 2021-06-19 PROCEDURE — 83735 ASSAY OF MAGNESIUM: CPT | Performed by: FAMILY MEDICINE

## 2021-06-19 PROCEDURE — 84443 ASSAY THYROID STIM HORMONE: CPT | Performed by: FAMILY MEDICINE

## 2021-06-19 PROCEDURE — 84439 ASSAY OF FREE THYROXINE: CPT | Performed by: FAMILY MEDICINE

## 2021-06-19 PROCEDURE — 99283 EMERGENCY DEPT VISIT LOW MDM: CPT

## 2021-06-19 PROCEDURE — 93010 ELECTROCARDIOGRAM REPORT: CPT | Performed by: SPECIALIST

## 2021-06-19 PROCEDURE — 93005 ELECTROCARDIOGRAM TRACING: CPT | Performed by: FAMILY MEDICINE

## 2021-06-19 PROCEDURE — 83880 ASSAY OF NATRIURETIC PEPTIDE: CPT | Performed by: FAMILY MEDICINE

## 2021-06-19 PROCEDURE — 71045 X-RAY EXAM CHEST 1 VIEW: CPT

## 2021-06-19 PROCEDURE — 96375 TX/PRO/DX INJ NEW DRUG ADDON: CPT

## 2021-06-19 PROCEDURE — 25010000002 ADENOSINE PER 6 MG: Performed by: FAMILY MEDICINE

## 2021-06-19 PROCEDURE — 84484 ASSAY OF TROPONIN QUANT: CPT | Performed by: FAMILY MEDICINE

## 2021-06-19 PROCEDURE — 80053 COMPREHEN METABOLIC PANEL: CPT | Performed by: FAMILY MEDICINE

## 2021-06-19 PROCEDURE — 96365 THER/PROPH/DIAG IV INF INIT: CPT

## 2021-06-19 PROCEDURE — 85025 COMPLETE CBC W/AUTO DIFF WBC: CPT | Performed by: FAMILY MEDICINE

## 2021-06-19 PROCEDURE — 71045 X-RAY EXAM CHEST 1 VIEW: CPT | Performed by: RADIOLOGY

## 2021-06-19 PROCEDURE — 93005 ELECTROCARDIOGRAM TRACING: CPT | Performed by: PHYSICIAN ASSISTANT

## 2021-06-19 RX ORDER — ADENOSINE 3 MG/ML
6 INJECTION, SOLUTION INTRAVENOUS ONCE
Status: COMPLETED | OUTPATIENT
Start: 2021-06-19 | End: 2021-06-19

## 2021-06-19 RX ORDER — SODIUM CHLORIDE 0.9 % (FLUSH) 0.9 %
10 SYRINGE (ML) INJECTION AS NEEDED
Status: DISCONTINUED | OUTPATIENT
Start: 2021-06-19 | End: 2021-06-19 | Stop reason: HOSPADM

## 2021-06-19 RX ORDER — MAGNESIUM SULFATE HEPTAHYDRATE 40 MG/ML
2 INJECTION, SOLUTION INTRAVENOUS ONCE
Status: COMPLETED | OUTPATIENT
Start: 2021-06-19 | End: 2021-06-19

## 2021-06-19 RX ADMIN — ADENOSINE 6 MG: 3 INJECTION, SOLUTION INTRAVENOUS at 13:08

## 2021-06-19 RX ADMIN — MAGNESIUM SULFATE HEPTAHYDRATE 2 G: 40 INJECTION, SOLUTION INTRAVENOUS at 15:09

## 2021-06-19 NOTE — ED NOTES
Post cardioversion EKG was done at 1311 and was given to Dr. Madrigal at bedside.      Zion Henriquez  06/19/21 9284

## 2021-06-19 NOTE — ED NOTES
Repeat EKG done at 1304 and given to Dr. Madrigal at bedside.      Zion Henriquez  06/19/21 5892

## 2021-06-19 NOTE — ED PROVIDER NOTES
Subjective   66-year-old female with history of SVT hyperlipidemia presents the emergency room complaints of palpitations.  Patient reports that she was seen in May for similar symptoms.  She states she was noted that time to have low magnesium.  She states that today she was sitting at home when she started feeling her heart race.  She states she feels short of breath with symptoms.  She denies nausea vomiting fever chills cough congestion.  She denies chest pain.  She feels like she is having palpitations described as a fast heart.  She does report that she did not take her water pill today she states that she felt her magnesium was going low she therefore did not take medication today.      Palpitations  Palpitations quality:  Fast  Onset quality:  Sudden  Timing:  Constant  Progression:  Unchanged  Chronicity:  Recurrent  Relieved by:  Nothing  Worsened by:  Nothing  Associated symptoms: shortness of breath    Associated symptoms: no chest pain, no cough, no diaphoresis, no leg pain, no lower extremity edema, no nausea, no near-syncope, no syncope, no vomiting and no weakness        Review of Systems   Constitutional: Negative for diaphoresis.   Respiratory: Positive for shortness of breath. Negative for cough.    Cardiovascular: Positive for palpitations. Negative for chest pain, syncope and near-syncope.   Gastrointestinal: Negative for nausea and vomiting.   Neurological: Negative for weakness.   All other systems reviewed and are negative.      Past Medical History:   Diagnosis Date   • Anxiety    • B12 deficiency    • BPV (benign positional vertigo)    • Diarrhea    • Fibromyalgia    • GERD (gastroesophageal reflux disease)    • Glaucoma    • Headache    • Hyperlipidemia    • Obesity    • Sleep apnea    • Weakness of left arm        Allergies   Allergen Reactions   • Codeine GI Intolerance     Increased heart rate     • Sulfa Antibiotics GI Intolerance     Heart rate increase        Past Surgical History:    Procedure Laterality Date   • APPENDECTOMY     • BREAST CYST ASPIRATION     • CATARACT EXTRACTION Bilateral    • CHOLECYSTECTOMY     • GALLBLADDER SURGERY     • URETHRA SURGERY         Family History   Problem Relation Age of Onset   • Thyroid disease Mother    • Diabetes Mother    • Hyperlipidemia Father    • Hypertension Father    • Heart attack Father    • Alcohol abuse Maternal Aunt    • Cancer Maternal Grandfather         PROSTATE   • Stroke Paternal Grandmother    • Stroke Paternal Grandfather    • Hypertension Other    • Hypertension Sister    • Breast cancer Sister 40   • Obesity Sister    • Hypertension Sister    • Hyperlipidemia Sister    • Hyperlipidemia Sister    • Hypertension Sister    • Arthritis Sister    • Obesity Sister    • Thyroid disease Sister        Social History     Socioeconomic History   • Marital status:      Spouse name: Not on file   • Number of children: Not on file   • Years of education: Not on file   • Highest education level: Not on file   Tobacco Use   • Smoking status: Former Smoker     Packs/day: 1.50     Years: 8.00     Pack years: 12.00     Types: Cigarettes     Quit date:      Years since quittin.4   • Smokeless tobacco: Never Used   Vaping Use   • Vaping Use: Never used   Substance and Sexual Activity   • Alcohol use: No     Comment: former social drinker not had anything in 25 + years   • Drug use: No           Objective   Physical Exam  Vitals and nursing note reviewed.   HENT:      Head: Normocephalic and atraumatic.      Comments: Moist mucous membranes     Nose: Nose normal.      Mouth/Throat:      Mouth: Mucous membranes are moist.   Eyes:      Conjunctiva/sclera: Conjunctivae normal.      Pupils: Pupils are equal, round, and reactive to light.   Neck:      Comments: No JVD.  Cardiovascular:      Rate and Rhythm: Tachycardia present.      Heart sounds: No murmur heard.     Pulmonary:      Effort: Pulmonary effort is normal.      Breath sounds: Normal  breath sounds. No wheezing, rhonchi or rales.   Abdominal:      General: Bowel sounds are normal.      Palpations: Abdomen is soft.      Tenderness: There is no abdominal tenderness. There is no guarding.   Musculoskeletal:      Cervical back: Neck supple.      Right lower leg: Edema present.      Left lower leg: Edema present.      Comments: Nonpitting edema lower extremities   Skin:     General: Skin is warm and dry.   Neurological:      Mental Status: She is alert and oriented to person, place, and time.      Cranial Nerves: No cranial nerve deficit.      Sensory: No sensory deficit.   Psychiatric:         Mood and Affect: Mood normal.         Chemical Cardioversion    Date/Time: 6/19/2021 1:21 PM  Performed by: Star Madrigal MD  Authorized by: Star Madrigal MD     Consent:     Consent obtained:  Verbal    Consent given by:  Patient    Risks discussed:  Death    Alternatives discussed:  No treatment, anti-coagulation medication, electrical cardioversion, delayed treatment, observation and alternative treatment  Pre-procedure details:     Cardioversion basis:  Emergent    Rhythm:  Supraventricular tachycardia  Attempt one:     Cardioversion medication:  Adenosine 6mg      Medication outcome:  Conversion to normal sinus rhythm  Post-procedure details:     Patient status:  Awake    Patient tolerance of procedure:  Tolerated well, no immediate complications               ED Course  ED Course as of Jun 20 0557   Sat Jun 19, 2021   1259 EKG SVT ventricular 162 QRS 92 .    [BB]   1313 No to be in SVT upon his presentation to the emergency room.  Patient was seen here in May for similar symptoms and converted with 6 mg of adenosine.  Have placed patient on cardiac monitoring subsequently given 6 mg of adenosine with subsequent conversion to normal sinus rhythm.    [BB]   1355 Magnesium 1.3 CMP unremarkable BNP unremarkable TSH 0.221 troponin unremarkable    [BB]   1441 Patient has remained in sinus  rhythm.  Patient to receive IV magnesium patient magnesium is low in the past when she went into SVT replacing IV at this time.    [BB]   1504 XR Chest 1 View    Result Date: 6/19/2021  No evidence of active or acute cardiopulmonary disease on today's chest radiograph.  This report was finalized on 6/19/2021 1:46 PM by Dr. Robbie Yadav MD.          [BB]   1629 Patient negative troponin x2 sets.    [BB]   1651 Have spoken to Dr. Andrade with cardiology and discussed case he states given patient's recent stress test and echo that were unremarkable as well as patient's history of SVT patient can be given IV magnesium and recheck if magnesium improves patient can be discharged home to follow-up this week.  He does not feel patient needs to be admitted to the hospital patient with 2 sets cardiac enzymes returned negative.    [BB]   1838 Patient repeat magnesium 1.9.  Patient hemodynamically stable.  Patient is to follow-up with Dr. Andrade with cardiology.  Have discussed to resume her magnesium to twice a day dosing.  Have discussed warning signs symptoms that warrant return to the emergency room patient verbalized understanding    [BB]      ED Course User Index  [BB] Star Madrigal MD                                           [facility]    Final diagnoses:   SVT (supraventricular tachycardia) (CMS/[facility])   Hypomagnesemia       ED Disposition  ED Disposition     ED Disposition Condition Comment    Discharge Stable           Mague Acosta, APRN  [address] [facility] [address] [phone]          Branden Andrade MD  [address] [facility] [address] [phone]    In 2 days           Medication List      Changed    magnesium oxide 400 tablet tablet  Commonly known as: MAG-OX  Take 1 tablet by mouth 2 (Two) Times a Day for 30 days.  What changed: when to take this           Where to Get Your Medications      You can get these medications from any pharmacy    Bring a paper prescription for each of these  medications  · magnesium oxide 400 tablet tablet          Star Madrigal MD  06/20/21 0584

## 2021-06-22 ENCOUNTER — OFFICE VISIT (OUTPATIENT)
Dept: SURGERY | Facility: CLINIC | Age: 66
End: 2021-06-22

## 2021-06-22 ENCOUNTER — HOSPITAL ENCOUNTER (OUTPATIENT)
Dept: MRI IMAGING | Facility: HOSPITAL | Age: 66
Discharge: HOME | End: 2021-06-22
Admitting: SURGERY

## 2021-06-22 ENCOUNTER — LAB (OUTPATIENT)
Dept: FAMILY MEDICINE CLINIC | Facility: CLINIC | Age: 66
End: 2021-06-22

## 2021-06-22 VITALS — HEIGHT: 63 IN | BODY MASS INDEX: 49.61 KG/M2 | WEIGHT: 280 LBS

## 2021-06-22 DIAGNOSIS — K21.00 GASTROESOPHAGEAL REFLUX DISEASE WITH ESOPHAGITIS WITHOUT HEMORRHAGE: ICD-10-CM

## 2021-06-22 DIAGNOSIS — C50.411 MALIGNANT NEOPLASM OF UPPER-OUTER QUADRANT OF RIGHT BREAST IN FEMALE, ESTROGEN RECEPTOR POSITIVE (HCC): ICD-10-CM

## 2021-06-22 DIAGNOSIS — I10 ESSENTIAL HYPERTENSION: ICD-10-CM

## 2021-06-22 DIAGNOSIS — Z17.0 MALIGNANT NEOPLASM OF UPPER-OUTER QUADRANT OF RIGHT BREAST IN FEMALE, ESTROGEN RECEPTOR POSITIVE (HCC): ICD-10-CM

## 2021-06-22 DIAGNOSIS — K21.9 GASTROESOPHAGEAL REFLUX DISEASE WITHOUT ESOPHAGITIS: ICD-10-CM

## 2021-06-22 DIAGNOSIS — G47.33 OBSTRUCTIVE SLEEP APNEA SYNDROME: ICD-10-CM

## 2021-06-22 DIAGNOSIS — E66.01 CLASS 2 SEVERE OBESITY DUE TO EXCESS CALORIES WITH SERIOUS COMORBIDITY IN ADULT, UNSPECIFIED BMI (HCC): ICD-10-CM

## 2021-06-22 DIAGNOSIS — Z79.4 TYPE 2 DIABETES MELLITUS WITH DIABETIC NEUROPATHY, WITH LONG-TERM CURRENT USE OF INSULIN (HCC): ICD-10-CM

## 2021-06-22 DIAGNOSIS — C50.411 MALIGNANT NEOPLASM OF UPPER-OUTER QUADRANT OF RIGHT BREAST IN FEMALE, ESTROGEN RECEPTOR POSITIVE (HCC): Primary | ICD-10-CM

## 2021-06-22 DIAGNOSIS — E11.65 TYPE 2 DIABETES MELLITUS WITH HYPERGLYCEMIA, WITH LONG-TERM CURRENT USE OF INSULIN (HCC): ICD-10-CM

## 2021-06-22 DIAGNOSIS — Z79.4 TYPE 2 DIABETES MELLITUS WITH HYPERGLYCEMIA, WITH LONG-TERM CURRENT USE OF INSULIN (HCC): ICD-10-CM

## 2021-06-22 DIAGNOSIS — E55.9 VITAMIN D DEFICIENCY: ICD-10-CM

## 2021-06-22 DIAGNOSIS — Z17.0 MALIGNANT NEOPLASM OF UPPER-OUTER QUADRANT OF RIGHT BREAST IN FEMALE, ESTROGEN RECEPTOR POSITIVE (HCC): Primary | ICD-10-CM

## 2021-06-22 DIAGNOSIS — E11.40 TYPE 2 DIABETES MELLITUS WITH DIABETIC NEUROPATHY, WITH LONG-TERM CURRENT USE OF INSULIN (HCC): ICD-10-CM

## 2021-06-22 DIAGNOSIS — E78.2 MIXED HYPERLIPIDEMIA: ICD-10-CM

## 2021-06-22 DIAGNOSIS — E83.42 HYPOMAGNESEMIA: ICD-10-CM

## 2021-06-22 LAB
25(OH)D3 SERPL-MCNC: 25 NG/ML
ALBUMIN SERPL-MCNC: 3.9 G/DL (ref 3.5–5.2)
ALBUMIN UR-MCNC: 1.9 MG/DL
ALBUMIN/GLOB SERPL: 1.3 G/DL
ALP SERPL-CCNC: 67 U/L (ref 39–117)
ALT SERPL W P-5'-P-CCNC: 24 U/L (ref 1–33)
ANION GAP SERPL CALCULATED.3IONS-SCNC: 6.9 MMOL/L (ref 5–15)
AST SERPL-CCNC: 30 U/L (ref 1–32)
BILIRUB SERPL-MCNC: 0.8 MG/DL (ref 0–1.2)
BUN SERPL-MCNC: 13 MG/DL (ref 8–23)
BUN/CREAT SERPL: 16.5 (ref 7–25)
CALCIUM SPEC-SCNC: 9.1 MG/DL (ref 8.6–10.5)
CHLORIDE SERPL-SCNC: 106 MMOL/L (ref 98–107)
CHOLEST SERPL-MCNC: 192 MG/DL (ref 0–200)
CO2 SERPL-SCNC: 26.1 MMOL/L (ref 22–29)
CREAT SERPL-MCNC: 0.79 MG/DL (ref 0.57–1)
GFR SERPL CREATININE-BSD FRML MDRD: 73 ML/MIN/1.73
GLOBULIN UR ELPH-MCNC: 3 GM/DL
GLUCOSE SERPL-MCNC: 94 MG/DL (ref 65–99)
HBA1C MFR BLD: 7.2 % (ref 4.8–5.6)
HDLC SERPL-MCNC: 50 MG/DL (ref 40–60)
LDLC SERPL CALC-MCNC: 121 MG/DL (ref 0–100)
LDLC/HDLC SERPL: 2.36 {RATIO}
MAGNESIUM SERPL-MCNC: 1.7 MG/DL (ref 1.6–2.4)
POTASSIUM SERPL-SCNC: 4.4 MMOL/L (ref 3.5–5.2)
PROT SERPL-MCNC: 6.9 G/DL (ref 6–8.5)
SODIUM SERPL-SCNC: 139 MMOL/L (ref 136–145)
TRIGL SERPL-MCNC: 119 MG/DL (ref 0–150)
TSH SERPL DL<=0.05 MIU/L-ACNC: 0.2 UIU/ML (ref 0.27–4.2)
VLDLC SERPL-MCNC: 21 MG/DL (ref 5–40)

## 2021-06-22 PROCEDURE — 77049 MRI BREAST C-+ W/CAD BI: CPT | Performed by: RADIOLOGY

## 2021-06-22 PROCEDURE — 99213 OFFICE O/P EST LOW 20 MIN: CPT | Performed by: SURGERY

## 2021-06-22 PROCEDURE — 0 GADOBENATE DIMEGLUMINE 529 MG/ML SOLUTION: Performed by: SURGERY

## 2021-06-22 PROCEDURE — 80053 COMPREHEN METABOLIC PANEL: CPT | Performed by: NURSE PRACTITIONER

## 2021-06-22 PROCEDURE — 83735 ASSAY OF MAGNESIUM: CPT | Performed by: NURSE PRACTITIONER

## 2021-06-22 PROCEDURE — 84443 ASSAY THYROID STIM HORMONE: CPT | Performed by: NURSE PRACTITIONER

## 2021-06-22 PROCEDURE — 82306 VITAMIN D 25 HYDROXY: CPT | Performed by: NURSE PRACTITIONER

## 2021-06-22 PROCEDURE — C8908 MRI W/O FOL W/CONT, BREAST,: HCPCS

## 2021-06-22 PROCEDURE — 83036 HEMOGLOBIN GLYCOSYLATED A1C: CPT | Performed by: NURSE PRACTITIONER

## 2021-06-22 PROCEDURE — A9577 INJ MULTIHANCE: HCPCS | Performed by: SURGERY

## 2021-06-22 PROCEDURE — A9577 INJ MULTIHANCE: HCPCS

## 2021-06-22 PROCEDURE — 82043 UR ALBUMIN QUANTITATIVE: CPT | Performed by: NURSE PRACTITIONER

## 2021-06-22 PROCEDURE — C8937 CAD BREAST MRI: HCPCS

## 2021-06-22 PROCEDURE — 0 GADOBENATE DIMEGLUMINE 529 MG/ML SOLUTION

## 2021-06-22 PROCEDURE — 80061 LIPID PANEL: CPT | Performed by: NURSE PRACTITIONER

## 2021-06-22 RX ADMIN — GADOBENATE DIMEGLUMINE 20 ML: 529 INJECTION, SOLUTION INTRAVENOUS at 13:28

## 2021-06-22 NOTE — PROGRESS NOTES
Subjective   Ronna Wayne is a 66 y.o. female is being seen for consultation today at the request of Mague Acosta APRN    Ronna Wayne is a 66 y.o. female With newly diagnosed right breast cancer.  In the upper outer quadrant of the breast she was noted on mammogram to have a 4 cm mass.  Biopsy shows ER positive MS positive HER-2 negative breast cancer.  The cancer is invasive mammary carcinoma with mixed ductal and lobular features.  It is easily palpable and there is no evidence of muscle involvement or howard disease on examination.  No skin change or nipple discharge.  She has a family history of a sister with breast cancer 10 years ago.      Past Medical History:   Diagnosis Date   • Anxiety    • B12 deficiency    • BPV (benign positional vertigo)    • Diarrhea    • Fibromyalgia    • GERD (gastroesophageal reflux disease)    • Glaucoma    • Headache    • Hyperlipidemia    • Obesity    • Sleep apnea    • Weakness of left arm        Family History   Problem Relation Age of Onset   • Thyroid disease Mother    • Diabetes Mother    • Hyperlipidemia Father    • Hypertension Father    • Heart attack Father    • Alcohol abuse Maternal Aunt    • Cancer Maternal Grandfather         PROSTATE   • Stroke Paternal Grandmother    • Stroke Paternal Grandfather    • Hypertension Other    • Hypertension Sister    • Breast cancer Sister 40   • Obesity Sister    • Hypertension Sister    • Hyperlipidemia Sister    • Hyperlipidemia Sister    • Hypertension Sister    • Arthritis Sister    • Obesity Sister    • Thyroid disease Sister        Social History     Socioeconomic History   • Marital status:      Spouse name: Not on file   • Number of children: Not on file   • Years of education: Not on file   • Highest education level: Not on file   Tobacco Use   • Smoking status: Former Smoker     Packs/day: 1.50     Years: 8.00     Pack years: 12.00     Types: Cigarettes     Quit date: 1976     Years since  "quittin.5   • Smokeless tobacco: Never Used   Vaping Use   • Vaping Use: Never used   Substance and Sexual Activity   • Alcohol use: No     Comment: former social drinker not had anything in 25 + years   • Drug use: No       Past Surgical History:   Procedure Laterality Date   • APPENDECTOMY     • BREAST CYST ASPIRATION     • CATARACT EXTRACTION Bilateral    • CHOLECYSTECTOMY     • GALLBLADDER SURGERY     • URETHRA SURGERY         Review of Systems   Constitutional: Negative for activity change, appetite change, chills and fever.   HENT: Negative for sore throat and trouble swallowing.    Eyes: Negative for visual disturbance.   Respiratory: Negative for cough and shortness of breath.    Cardiovascular: Negative for chest pain and palpitations.   Gastrointestinal: Negative for abdominal distention, abdominal pain, blood in stool, constipation, diarrhea, nausea and vomiting.   Endocrine: Negative for cold intolerance and heat intolerance.   Genitourinary: Negative for dysuria.   Musculoskeletal: Negative for joint swelling.   Skin: Negative for color change, rash and wound.   Allergic/Immunologic: Negative for immunocompromised state.   Neurological: Negative for dizziness, seizures, weakness and headaches.   Hematological: Negative for adenopathy. Does not bruise/bleed easily.   Psychiatric/Behavioral: Negative for agitation and confusion.         Ht 160 cm (62.99\")   Wt 127 kg (280 lb)   BMI 49.61 kg/m²   Objective   Physical Exam  Vitals reviewed.   Constitutional:       Appearance: She is well-developed.   HENT:      Head: Normocephalic and atraumatic.   Eyes:      General: No scleral icterus.     Conjunctiva/sclera: Conjunctivae normal.      Pupils: Pupils are equal, round, and reactive to light.   Neck:      Thyroid: No thyromegaly.      Vascular: No JVD.      Trachea: No tracheal deviation.   Cardiovascular:      Rate and Rhythm: Normal rate and regular rhythm.      Heart sounds: No murmur heard.   No " friction rub. No gallop.    Pulmonary:      Effort: Pulmonary effort is normal. No retractions.      Breath sounds: Normal breath sounds.   Chest:      Chest wall: No tenderness.      Breasts:         Right: Mass present.            Comments: 4 cm firm mass upper outer quadrant right breast  Abdominal:      General: Bowel sounds are normal. There is no distension.      Palpations: Abdomen is soft. There is no hepatomegaly, splenomegaly or mass.      Tenderness: There is no abdominal tenderness.      Hernia: No hernia is present.   Musculoskeletal:         General: No deformity. Normal range of motion.      Cervical back: Neck supple.   Lymphadenopathy:      Cervical: No cervical adenopathy.   Skin:     General: Skin is warm and dry.      Findings: No rash.   Neurological:      Mental Status: She is alert and oriented to person, place, and time.               Assessment   There are no diagnoses linked to this encounter.  Ronna Wayne is a 66 y.o. female with 4 cm upper outer quadrant ER positive [place] positive HER-2 negative cancer of the right breast.  Her cancer has mixed ductal and lobular features.  MRI has been performed but final read is pending.  Initial view shows no real contraindication to lumpectomy and radiation.  This will be our tentative plan but she has recently developed some cardiac arrhythmias likely representing some form of tachycardia and will require evaluation by cardiology for this on 9 July.  She will follow-up after that appointment and we will schedule surgery.    Patient's Body mass index is 49.61 kg/m². indicating that she is morbidly obese (BMI > 40 or > 35 with obesity - related health condition). Obesity-related health conditions include the following: hypertension, diabetes mellitus, dyslipidemias and GERD. Obesity is unchanged. BMI is is above average; BMI management plan is completed. We discussed portion control and increasing exercise..

## 2021-06-23 DIAGNOSIS — E03.9 HYPOTHYROIDISM, UNSPECIFIED TYPE: Primary | ICD-10-CM

## 2021-06-23 RX ORDER — LEVOTHYROXINE SODIUM 88 UG/1
88 TABLET ORAL DAILY
Qty: 30 TABLET | Refills: 1 | Status: SHIPPED | OUTPATIENT
Start: 2021-06-23 | End: 2021-08-30

## 2021-06-24 ENCOUNTER — TELEPHONE (OUTPATIENT)
Dept: FAMILY MEDICINE CLINIC | Facility: CLINIC | Age: 66
End: 2021-06-24

## 2021-06-24 NOTE — TELEPHONE ENCOUNTER
----- Message from BOSTON Joseph sent at 6/23/2021  8:50 AM EDT -----  Please let Ronna know her Magnesium is good at 1.7. Her Thyroid is low so I would like to reduce her Synthroid to 88 mcg. I will send that to her pharmacy Will go other labs at her visit on the 29 th     I have spoke to ronna and told her results

## 2021-06-29 ENCOUNTER — OFFICE VISIT (OUTPATIENT)
Dept: FAMILY MEDICINE CLINIC | Facility: CLINIC | Age: 66
End: 2021-06-29

## 2021-06-29 VITALS
SYSTOLIC BLOOD PRESSURE: 140 MMHG | DIASTOLIC BLOOD PRESSURE: 70 MMHG | OXYGEN SATURATION: 98 % | HEART RATE: 56 BPM | TEMPERATURE: 96.9 F | HEIGHT: 63 IN | WEIGHT: 276 LBS | BODY MASS INDEX: 48.9 KG/M2

## 2021-06-29 DIAGNOSIS — E55.9 VITAMIN D DEFICIENCY: ICD-10-CM

## 2021-06-29 DIAGNOSIS — Z79.4 TYPE 2 DIABETES MELLITUS WITH HYPERGLYCEMIA, WITH LONG-TERM CURRENT USE OF INSULIN (HCC): ICD-10-CM

## 2021-06-29 DIAGNOSIS — E83.42 HYPOMAGNESEMIA: ICD-10-CM

## 2021-06-29 DIAGNOSIS — I47.1 SVT (SUPRAVENTRICULAR TACHYCARDIA) (HCC): Chronic | ICD-10-CM

## 2021-06-29 DIAGNOSIS — Z79.4 TYPE 2 DIABETES MELLITUS WITH DIABETIC NEUROPATHY, WITH LONG-TERM CURRENT USE OF INSULIN (HCC): ICD-10-CM

## 2021-06-29 DIAGNOSIS — E78.5 DYSLIPIDEMIA: Chronic | ICD-10-CM

## 2021-06-29 DIAGNOSIS — I10 ESSENTIAL HYPERTENSION: Chronic | ICD-10-CM

## 2021-06-29 DIAGNOSIS — E11.40 TYPE 2 DIABETES MELLITUS WITH DIABETIC NEUROPATHY, WITH LONG-TERM CURRENT USE OF INSULIN (HCC): ICD-10-CM

## 2021-06-29 DIAGNOSIS — E11.65 TYPE 2 DIABETES MELLITUS WITH HYPERGLYCEMIA, WITH LONG-TERM CURRENT USE OF INSULIN (HCC): ICD-10-CM

## 2021-06-29 DIAGNOSIS — Z79.4 TYPE 2 DIABETES MELLITUS WITH HYPERGLYCEMIA, WITH LONG-TERM CURRENT USE OF INSULIN (HCC): Primary | ICD-10-CM

## 2021-06-29 DIAGNOSIS — E11.65 TYPE 2 DIABETES MELLITUS WITH HYPERGLYCEMIA, WITH LONG-TERM CURRENT USE OF INSULIN (HCC): Primary | ICD-10-CM

## 2021-06-29 DIAGNOSIS — K21.00 GASTROESOPHAGEAL REFLUX DISEASE WITH ESOPHAGITIS WITHOUT HEMORRHAGE: Chronic | ICD-10-CM

## 2021-06-29 DIAGNOSIS — E03.8 OTHER SPECIFIED HYPOTHYROIDISM: Chronic | ICD-10-CM

## 2021-06-29 PROCEDURE — 99214 OFFICE O/P EST MOD 30 MIN: CPT | Performed by: NURSE PRACTITIONER

## 2021-06-29 RX ORDER — INSULIN GLARGINE 100 [IU]/ML
75 INJECTION, SOLUTION SUBCUTANEOUS EVERY MORNING
Qty: 7 PEN | Refills: 2 | COMMUNITY
Start: 2021-06-29 | End: 2021-07-14

## 2021-07-01 ENCOUNTER — PRIOR AUTHORIZATION (OUTPATIENT)
Dept: FAMILY MEDICINE CLINIC | Facility: CLINIC | Age: 66
End: 2021-07-01

## 2021-07-01 PROBLEM — E03.8 OTHER SPECIFIED HYPOTHYROIDISM: Status: ACTIVE | Noted: 2021-07-01

## 2021-07-01 RX ORDER — INSULIN LISPRO 100 [IU]/ML
INJECTION, SOLUTION INTRAVENOUS; SUBCUTANEOUS
Qty: 5 PEN | Refills: 1 | Status: SHIPPED | OUTPATIENT
Start: 2021-07-01 | End: 2021-07-01

## 2021-07-02 ENCOUNTER — TELEPHONE (OUTPATIENT)
Dept: FAMILY MEDICINE CLINIC | Facility: CLINIC | Age: 66
End: 2021-07-02

## 2021-07-02 DIAGNOSIS — Z79.4 TYPE 2 DIABETES MELLITUS WITH HYPERGLYCEMIA, WITH LONG-TERM CURRENT USE OF INSULIN (HCC): Primary | ICD-10-CM

## 2021-07-02 DIAGNOSIS — E11.65 TYPE 2 DIABETES MELLITUS WITH HYPERGLYCEMIA, WITH LONG-TERM CURRENT USE OF INSULIN (HCC): Primary | ICD-10-CM

## 2021-07-02 NOTE — TELEPHONE ENCOUNTER
"RICO FROM [facility] STATED THE INSTRUCTIONS THAT WERE SENT WITH THIS PRESCRIPTION WERE JUST \"PER SCALE.\" RICO STATED THEY NEED CLARIFICATION ON WHAT THE SCALE IS BEFORE THEY ARE ABLE TO FILL THIS PRESCRIPTION"

## 2021-07-02 NOTE — TELEPHONE ENCOUNTER
"Caller: [facility] - [place], [place] - [address] [facility] - [phone]  - [phone]     Relationship: Pharmacy    Best call back number: [phone]    Which medication are you concerned about: insulin aspart protamine & aspart (NOVOLOG) 70/30 100 unit/mL      Who prescribed you this medication: GERMAN ZURITA    What are your concerns: RICO FROM [facility] STATED THE INSTRUCTIONS THAT WERE SENT WITH THIS PRESCRIPTION WERE JUST \"PER SCALE.\" RICO STATED THEY NEED CLARIFICATION ON WHAT THE SCALE IS BEFORE THEY ARE ABLE TO FILL THIS PRESCRIPTION    "

## 2021-07-07 NOTE — PROGRESS NOTES
Cardiac Electrophysiology Outpatient Consult Note            [facility]    Consult Note     Ronna Wayne  [id number]  07/09/2021  [phone]     Primary Care Physician: Mague Acosta APRN    Referred By: Esdras Márquez PA-C    Subjective     Chief Complaint:   Diagnoses and all orders for this visit:    1. SVT (supraventricular tachycardia) (CMS/[facility]) (Primary)    2. Obstructive sleep apnea syndrome    3. Class 3 severe obesity due to excess calories with serious comorbidity and body mass index (BMI) of 45.0 to 49.9 in adult (CMS/[facility])      Chief Complaint   Patient presents with   • SVT (supraventricular tachycardia)     Problem List:      1. Paroxysmal SVT  a. History of recurrent SVT in the past requiring multiple ED presentations  b. Documented SVT with ED presentation 3/9/2021 requiring adenosine administration for conversion.  c. Recurrent SVT 5/1/2021 requiring ED presentation with IV adenosine administration for conversion.  d. Echo 5/2/2020: EF 51-55%, no significant VHD, LA 3.3 cm  e. Stress MPS 5/2/2021 without evidence of ischemia, EF 70%  f. Recurrent SVT 6/19/2020: Requiring ED presentation with IV adenosine administration for conversion.  2. Essential hypertension  3. Diabetes mellitus  4. Dyslipidemia  5. Hypothyroidism  6. Chronic renal insufficiency  7. Anxiety  8. CVA  9. GERD  10. Obesity  11. Sleep apnea  12. Right Breast cancer diagnosed 2021      History of Present Illness: Ms. Ronna Wayne is a 66-year-old female patient is seen in EP consultation today at the request of MP Moseley for further evaluation of her recurrent PSVT requiring frequent ED presentations.  Upon evaluation today patient reports initial episode of SVT occurred approximately 3 years ago [place].  Patient denied recurrent episodes until the last 6 months for which she has had to present to the ED multiple  times for IV adenosine administration.  She reports sudden onset tach palpitations that can last up to 10 minutes in duration not taking her to the ER.  She reports episodes occur approximately once every couple weeks currently.  Patient denies associated dizziness, presyncope, or syncope during episodes but does endorse increased shortness of breath.  Patient admits to history of sleep apnea has been compliant with her CPAP mask now for many years without regular sleep medicine follow-up.  She also admits that she currently wakes up several times during the night due to shortness of breath having to take her mask off and get a drink of water.  Patient additionally endorses a 20 pound weight gain over the past year.  Patient additionally reports she was recently diagnosed with right breast cancer with plan for upcoming lumpectomy with concern for her recurrent heart arrhythmia preventing her from having surgery.  Patient's blood pressure is elevated in office today with reports of controlled blood pressure 120-130 mmHg on a regular basis at home.  Patient with noted sinus bradycardia on EKG in office today which has limited beta-blocker therapy for her recurrent palpitations in the past.      Review of Systems:   Constitutional: No fevers or chills, no recent weight gain or weight loss or fatigue  Eyes: No visual loss, blurred vision, double vision, yellow sclerae.  ENT: No headaches, hearing loss, vertigo, congestion or sore throat.   Cardiovascular: Per HPI  Respiratory: No cough or wheezing, no sputum production, no hematemesis   Gastrointestinal: No abdominal pain, no nausea, vomiting, constipation, diarrhea, melena.   Genitourinary: No dysuria, hematuria or increased frequency.  Musculoskeletal:  No gait disturbance, weakness or joint pain or stiffness  Integumentary: No rashes, urticaria, ulcers or sores.   Neurological: No headache, dizziness, syncope, paralysis, ataxia  Psychiatric: No anxiety, or  depression  Endocrine: No diaphoresis, cold or heat intolerance. No polyuria or polydipsia.   Hematologic/Lymphatic: No anemia, abnormal bruising or bleeding.       Past Medical History:   Past Medical History:   Diagnosis Date   • Anxiety    • B12 deficiency    • BPV (benign positional vertigo)    • Breast cancer (CMS/HCC) 2021   • Diarrhea    • Fibromyalgia    • GERD (gastroesophageal reflux disease)    • Glaucoma    • Hyperlipidemia    • Obesity    • Sleep apnea    • Weakness of left arm        Past Surgical History:   Past Surgical History:   Procedure Laterality Date   • APPENDECTOMY     • BREAST CYST ASPIRATION     • CATARACT EXTRACTION Bilateral    • CHOLECYSTECTOMY     • GALLBLADDER SURGERY     • URETHRA SURGERY         Family History:   Family History   Problem Relation Age of Onset   • Thyroid disease Mother    • Diabetes Mother    • Hyperlipidemia Father    • Hypertension Father    • Heart attack Father    • Alcohol abuse Maternal Aunt    • Cancer Maternal Grandfather         PROSTATE   • Stroke Paternal Grandmother    • Stroke Paternal Grandfather    • Hypertension Other    • Hypertension Sister    • Breast cancer Sister 40   • Obesity Sister    • Hypertension Sister    • Hyperlipidemia Sister    • Hyperlipidemia Sister    • Hypertension Sister    • Arthritis Sister    • Obesity Sister    • Thyroid disease Sister        Social History:   Social History     Socioeconomic History   • Marital status:      Spouse name: Not on file   • Number of children: Not on file   • Years of education: Not on file   • Highest education level: Not on file   Tobacco Use   • Smoking status: Former Smoker     Packs/day: 1.50     Years: 8.00     Pack years: 12.00     Types: Cigarettes     Quit date:      Years since quittin.5   • Smokeless tobacco: Never Used   Vaping Use   • Vaping Use: Never used   Substance and Sexual Activity   • Alcohol use: No     Comment: former social drinker not had anything in 25  + years   • Drug use: No       Medications:     Current Outpatient Medications:   •  aspirin 81 MG EC tablet, Take 1 tablet by mouth Daily., Disp: 90 tablet, Rfl: 3  •  atorvastatin (LIPITOR) 20 MG tablet, Take 1 tablet by mouth Daily., Disp: 90 tablet, Rfl: 3  •  cholecalciferol (VITAMIN D3) 25 MCG (1000 UT) tablet, Take 1,000 Units by mouth Daily., Disp: , Rfl:   •  empagliflozin (Jardiance) 25 MG tablet tablet, Take 1 tablet by mouth Every Morning., Disp: 35 tablet, Rfl: 0  •  glucose blood test strip, 1 each by Other route 4 (Four) Times a Day., Disp: 150 each, Rfl: 12  •  hydroCHLOROthiazide (HYDRODIURIL) 12.5 MG tablet, Take 1 tablet by mouth As Needed., Disp: , Rfl:   •  insulin aspart protamine & aspart (NOVOLOG) 70/30 100 unit/mL, Maximum daily dose 50 units, Disp: 5 pen, Rfl: 5  •  Insulin Glargine (BASAGLAR KWIKPEN) 100 UNIT/ML injection pen, Inject 75 Units under the skin into the appropriate area as directed Every Morning., Disp: 7 pen, Rfl: 2  •  Insulin Pen Needle 32G X 4 MM misc, 1 Device 3 (Three) Times a Day., Disp: 100 each, Rfl: 2  •  levothyroxine (SYNTHROID, LEVOTHROID) 88 MCG tablet, Take 1 tablet by mouth Daily., Disp: 30 tablet, Rfl: 1  •  losartan (COZAAR) 50 MG tablet, Take 1 tablet by mouth Daily., Disp: 30 tablet, Rfl: 5  •  magnesium oxide (MAG-OX) 400 tablet tablet, Take 1 tablet by mouth 2 (Two) Times a Day for 30 days., Disp: 30 tablet, Rfl: 0  •  metoprolol succinate XL (TOPROL-XL) 100 MG 24 hr tablet, Take 1 tablet by mouth Daily., Disp: 30 tablet, Rfl: 2  •  pantoprazole (PROTONIX) 40 MG EC tablet, Take 1 tablet by mouth Daily. (Patient taking differently: Take 40 mg by mouth As Needed.), Disp: 90 tablet, Rfl: 3    Allergies:   Allergies   Allergen Reactions   • Codeine GI Intolerance     Increased heart rate     • Sulfa Antibiotics GI Intolerance     Heart rate increase        Objective   Vital Signs:   Vitals:    07/09/21 1148   BP: 158/84   BP Location: Left arm   Patient  "Position: Sitting   Pulse: 57   SpO2: 95%   Weight: 125 kg (276 lb)   Height: 160 cm (63\")       PHYSICAL EXAM  General appearance: Awake, alert, cooperative  Head: Normocephalic, without obvious abnormality, atraumatic  Eyes: Conjunctivae/corneas clear, EOMs intact  Neck: no adenopathy, no carotid bruit, no JVD and thyroid: not enlarged  Lungs: clear to auscultation bilaterally and no rhonchi or crackles\", ' symmetric  Heart: regular rate and rhythm, S1, S2 normal, no murmur, click, rub or gallop  Abdomen: Soft, non-tender, bowel sounds normal,  no organomegaly  Extremities: extremities normal, atraumatic, no cyanosis or edema  Skin: Skin color, turgor normal, no rashes or lesions  Neurologic: Grossly normal       Lab Results   Component Value Date    GLUCOSE 94 06/22/2021    CALCIUM 9.1 06/22/2021     06/22/2021    K 4.4 06/22/2021    CO2 26.1 06/22/2021     06/22/2021    BUN 13 06/22/2021    CREATININE 0.79 06/22/2021    EGFRIFNONA 73 06/22/2021    BCR 16.5 06/22/2021    ANIONGAP 6.9 06/22/2021     Lab Results   Component Value Date    WBC 6.65 06/19/2021    HGB 14.1 06/19/2021    HCT 41.9 06/19/2021    MCV 95.9 06/19/2021     06/19/2021     Lab Results   Component Value Date    INR 1.05 06/19/2021    INR 1.05 05/01/2021    INR 1.09 03/09/2021    PROTIME 14.1 06/19/2021    PROTIME 13.5 05/01/2021    PROTIME 14.0 03/09/2021     Lab Results   Component Value Date    TSH 0.199 (L) 06/22/2021       Cardiac Testing:      I personally viewed and interpreted the patient's EKG/Telemetry/lab data      ECG 12 Lead    Date/Time: 7/9/2021 12:22 PM  Performed by: Tad Al APRN  Authorized by: Tad Al APRN   Comparison: compared with previous ECG from 6/19/2021  Similar to previous ECG  Rhythm: sinus bradycardia  BPM: 51              Assessment & Plan    Diagnoses and all orders for this visit:    1. SVT (supraventricular tachycardia) (CMS/HCC) (Primary)    2. Obstructive sleep " apnea syndrome    3. Class 3 severe obesity due to excess calories with serious comorbidity and body mass index (BMI) of 45.0 to 49.9 in adult (CMS/[facility])         Diagnosis Plan   1. SVT (supraventricular tachycardia) (CMS/[facility])   Long history of SVT.  Has been having episodes up to 2 times a week for at least a decade.    A classic history for paroxysmal supraventricular tachycardia.    Recent diagnosis of right-sided breast cancer which sounds to be stage I from reports from the patient.  She is to undergo surgical excision.    This patient is entertaining a noncardiac surgery which is low risk.  She is a low risk patient for this surgery.  She should proceed without further testing.    Her history of paroxysmal supraventricular tachycardia does not confer an increased risk of perioperative cardiovascular complication.  Proceed without delay.    Follow up with me in 6 months   2. Obstructive sleep apnea syndrome   unrelated to SVT likely.  Continue treatment.   3. Class 3 severe obesity due to excess calories with serious comorbidity and body mass index (BMI) of 45.0 to 49.9 in adult (CMS/[facility])    readdress later on after breast cancer surgery.   4.    Body mass index is 48.89 kg/m².    I spent 29 minutes in consultation with this patient which included more than 65% of this time in direct face-to-face counseling, physical examination and discussion of my assessment and findings and shared decision making with the patient.  The remainder of the time not spent face to face was performing one, some or all of the following actions:  preparing to see this patient ( eg. Review of tests),  ordering medications, tests or procedures ), care coordination, discussion of the plan with other healthcare providers, documenting clinical information in Epic well as independently interpreting results and communicating results to patient, family and or caregiver.  All time noted occurred on the date of service.    Follow Up:         Scribed for Israel Steele DO by Tad Al, APRN . 7/9/2021  12:08 EDT       Thank you for allowing me to participate in the care of your patient. Please to not hesitate to contact me with additional questions or concerns.      Israel Steele DO, FAC, RS  Cardiac Electrophysiologist  [facility]

## 2021-07-09 ENCOUNTER — OFFICE VISIT (OUTPATIENT)
Dept: CARDIOLOGY | Facility: CLINIC | Age: 66
End: 2021-07-09

## 2021-07-09 VITALS
HEIGHT: 63 IN | SYSTOLIC BLOOD PRESSURE: 158 MMHG | WEIGHT: 276 LBS | DIASTOLIC BLOOD PRESSURE: 84 MMHG | HEART RATE: 57 BPM | OXYGEN SATURATION: 95 % | BODY MASS INDEX: 48.9 KG/M2

## 2021-07-09 DIAGNOSIS — I47.1 SVT (SUPRAVENTRICULAR TACHYCARDIA) (HCC): Primary | ICD-10-CM

## 2021-07-09 DIAGNOSIS — G47.33 OBSTRUCTIVE SLEEP APNEA SYNDROME: ICD-10-CM

## 2021-07-09 DIAGNOSIS — E66.01 CLASS 3 SEVERE OBESITY DUE TO EXCESS CALORIES WITH SERIOUS COMORBIDITY AND BODY MASS INDEX (BMI) OF 45.0 TO 49.9 IN ADULT (HCC): ICD-10-CM

## 2021-07-09 PROCEDURE — 93000 ELECTROCARDIOGRAM COMPLETE: CPT | Performed by: NURSE PRACTITIONER

## 2021-07-09 PROCEDURE — 99214 OFFICE O/P EST MOD 30 MIN: CPT | Performed by: INTERNAL MEDICINE

## 2021-07-09 RX ORDER — HYDROCHLOROTHIAZIDE 12.5 MG/1
12.5 TABLET ORAL DAILY PRN
COMMUNITY
Start: 2021-05-18 | End: 2022-03-08 | Stop reason: SDUPTHER

## 2021-07-13 ENCOUNTER — OFFICE VISIT (OUTPATIENT)
Dept: SURGERY | Facility: CLINIC | Age: 66
End: 2021-07-13

## 2021-07-13 VITALS — WEIGHT: 276 LBS | HEIGHT: 63 IN | BODY MASS INDEX: 48.9 KG/M2

## 2021-07-13 DIAGNOSIS — C50.411 MALIGNANT NEOPLASM OF UPPER-OUTER QUADRANT OF RIGHT BREAST IN FEMALE, ESTROGEN RECEPTOR POSITIVE (HCC): Primary | ICD-10-CM

## 2021-07-13 DIAGNOSIS — Z17.0 MALIGNANT NEOPLASM OF UPPER-OUTER QUADRANT OF RIGHT BREAST IN FEMALE, ESTROGEN RECEPTOR POSITIVE (HCC): Primary | ICD-10-CM

## 2021-07-13 PROCEDURE — 99214 OFFICE O/P EST MOD 30 MIN: CPT | Performed by: SURGERY

## 2021-07-13 NOTE — PROGRESS NOTES
Subjective   Ronna Wayne is a 66 y.o. female is being seen for consultation today at the request of Mague Acosta APRN    Ronna Wayne is a 66 y.o. female With newly diagnosed right breast cancer.  In the upper outer quadrant of the breast she was noted on mammogram to have a 4 cm mass.  Biopsy shows ER positive SC positive HER-2 negative breast cancer.  The cancer is invasive mammary carcinoma with mixed ductal and lobular features.  It is easily palpable and there is no evidence of muscle involvement or howard disease on examination.  No skin change or nipple discharge.  She has a family history of a sister with breast cancer 10 years ago.      Past Medical History:   Diagnosis Date   • Anxiety    • B12 deficiency    • BPV (benign positional vertigo)    • Diarrhea    • Fibromyalgia    • GERD (gastroesophageal reflux disease)    • Glaucoma    • Headache    • Hyperlipidemia    • Obesity    • Sleep apnea    • Weakness of left arm        Family History   Problem Relation Age of Onset   • Thyroid disease Mother    • Diabetes Mother    • Hyperlipidemia Father    • Hypertension Father    • Heart attack Father    • Alcohol abuse Maternal Aunt    • Cancer Maternal Grandfather         PROSTATE   • Stroke Paternal Grandmother    • Stroke Paternal Grandfather    • Hypertension Other    • Hypertension Sister    • Breast cancer Sister 40   • Obesity Sister    • Hypertension Sister    • Hyperlipidemia Sister    • Hyperlipidemia Sister    • Hypertension Sister    • Arthritis Sister    • Obesity Sister    • Thyroid disease Sister        Social History     Socioeconomic History   • Marital status:      Spouse name: Not on file   • Number of children: Not on file   • Years of education: Not on file   • Highest education level: Not on file   Tobacco Use   • Smoking status: Former Smoker     Packs/day: 1.50     Years: 8.00     Pack years: 12.00     Types: Cigarettes     Quit date: 1976     Years since  "quittin.5   • Smokeless tobacco: Never Used   Vaping Use   • Vaping Use: Never used   Substance and Sexual Activity   • Alcohol use: No     Comment: former social drinker not had anything in 25 + years   • Drug use: No       Past Surgical History:   Procedure Laterality Date   • APPENDECTOMY     • BREAST CYST ASPIRATION     • CATARACT EXTRACTION Bilateral    • CHOLECYSTECTOMY     • GALLBLADDER SURGERY     • URETHRA SURGERY         Review of Systems   Constitutional: Negative for activity change, appetite change, chills and fever.   HENT: Negative for sore throat and trouble swallowing.    Eyes: Negative for visual disturbance.   Respiratory: Negative for cough and shortness of breath.    Cardiovascular: Negative for chest pain and palpitations.   Gastrointestinal: Negative for abdominal distention, abdominal pain, blood in stool, constipation, diarrhea, nausea and vomiting.   Endocrine: Negative for cold intolerance and heat intolerance.   Genitourinary: Negative for dysuria.   Musculoskeletal: Negative for joint swelling.   Skin: Negative for color change, rash and wound.   Allergic/Immunologic: Negative for immunocompromised state.   Neurological: Negative for dizziness, seizures, weakness and headaches.   Hematological: Negative for adenopathy. Does not bruise/bleed easily.   Psychiatric/Behavioral: Negative for agitation and confusion.         Ht 160 cm (62.99\")   Wt 127 kg (280 lb)   BMI 49.61 kg/m²   Objective   Physical Exam  Vitals reviewed.   Constitutional:       Appearance: She is well-developed.   HENT:      Head: Normocephalic and atraumatic.   Eyes:      General: No scleral icterus.     Conjunctiva/sclera: Conjunctivae normal.      Pupils: Pupils are equal, round, and reactive to light.   Neck:      Thyroid: No thyromegaly.      Vascular: No JVD.      Trachea: No tracheal deviation.   Cardiovascular:      Rate and Rhythm: Normal rate and regular rhythm.      Heart sounds: No murmur heard.   No " friction rub. No gallop.    Pulmonary:      Effort: Pulmonary effort is normal. No retractions.      Breath sounds: Normal breath sounds.   Chest:      Chest wall: No tenderness.      Breasts:         Right: Mass present.            Comments: 4 cm firm mass upper outer quadrant right breast  Abdominal:      General: Bowel sounds are normal. There is no distension.      Palpations: Abdomen is soft. There is no hepatomegaly, splenomegaly or mass.      Tenderness: There is no abdominal tenderness.      Hernia: No hernia is present.   Musculoskeletal:         General: No deformity. Normal range of motion.      Cervical back: Neck supple.   Lymphadenopathy:      Cervical: No cervical adenopathy.   Skin:     General: Skin is warm and dry.      Findings: No rash.   Neurological:      Mental Status: She is alert and oriented to person, place, and time.               Assessment   There are no diagnoses linked to this encounter.  Ronna Wayne is a 66 y.o. female with 4 cm upper outer quadrant ER positive IA positive HER-2 negative cancer of the right breast.  Her cancer has mixed ductal and lobular features.  MRI has been performed but final read is pending.  Initial view shows no real contraindication to lumpectomy and radiation.  Cardiac clearance has been obtained and she will undergo right breast lumpectomy with sentinel lymph node biopsy.    Patient's Body mass index is 49.61 kg/m². indicating that she is morbidly obese (BMI > 40 or > 35 with obesity - related health condition). Obesity-related health conditions include the following: hypertension, diabetes mellitus, dyslipidemias and GERD. Obesity is unchanged. BMI is is above average; BMI management plan is completed. We discussed portion control and increasing exercise..

## 2021-07-14 DIAGNOSIS — E11.65 TYPE 2 DIABETES MELLITUS WITH HYPERGLYCEMIA, WITH LONG-TERM CURRENT USE OF INSULIN (HCC): ICD-10-CM

## 2021-07-14 DIAGNOSIS — Z79.4 TYPE 2 DIABETES MELLITUS WITH HYPERGLYCEMIA, WITH LONG-TERM CURRENT USE OF INSULIN (HCC): ICD-10-CM

## 2021-07-14 DIAGNOSIS — Z01.812 ENCOUNTER FOR PREPROCEDURE SCREENING LABORATORY TESTING FOR COVID-19: Primary | ICD-10-CM

## 2021-07-14 DIAGNOSIS — Z17.0 MALIGNANT NEOPLASM OF UPPER-OUTER QUADRANT OF RIGHT BREAST IN FEMALE, ESTROGEN RECEPTOR POSITIVE (HCC): Primary | ICD-10-CM

## 2021-07-14 DIAGNOSIS — C50.411 MALIGNANT NEOPLASM OF UPPER-OUTER QUADRANT OF RIGHT BREAST IN FEMALE, ESTROGEN RECEPTOR POSITIVE (HCC): Primary | ICD-10-CM

## 2021-07-14 DIAGNOSIS — Z20.822 ENCOUNTER FOR PREPROCEDURE SCREENING LABORATORY TESTING FOR COVID-19: Primary | ICD-10-CM

## 2021-07-14 RX ORDER — INSULIN GLARGINE 100 [IU]/ML
INJECTION, SOLUTION SUBCUTANEOUS
Qty: 15 ML | Refills: 1 | Status: ON HOLD | OUTPATIENT
Start: 2021-07-14 | End: 2021-08-05

## 2021-07-14 NOTE — TELEPHONE ENCOUNTER
The refill listed from June is a sample that was provided to the patient. So she does need a prescription sent to the pharmacy.

## 2021-07-15 ENCOUNTER — OFFICE VISIT (OUTPATIENT)
Dept: GASTROENTEROLOGY | Facility: CLINIC | Age: 66
End: 2021-07-15

## 2021-07-15 VITALS
HEART RATE: 51 BPM | HEIGHT: 63 IN | WEIGHT: 276 LBS | BODY MASS INDEX: 48.9 KG/M2 | DIASTOLIC BLOOD PRESSURE: 79 MMHG | SYSTOLIC BLOOD PRESSURE: 161 MMHG

## 2021-07-15 DIAGNOSIS — Z86.010 PERSONAL HISTORY OF COLONIC POLYPS: Primary | ICD-10-CM

## 2021-07-15 DIAGNOSIS — R10.32 LEFT LOWER QUADRANT ABDOMINAL PAIN: ICD-10-CM

## 2021-07-15 PROCEDURE — 99204 OFFICE O/P NEW MOD 45 MIN: CPT | Performed by: INTERNAL MEDICINE

## 2021-07-15 RX ORDER — POLYETHYLENE GLYCOL 3350 17 G/17G
POWDER, FOR SOLUTION ORAL
Qty: 510 G | Refills: 0 | Status: ON HOLD | OUTPATIENT
Start: 2021-07-15 | End: 2021-08-05

## 2021-07-15 NOTE — PROGRESS NOTES
Subjective     Ronna Wayne is a 66 y.o. female who presents to the office today as a consultation from BOSTON Mena for evaluation of Abdominal Pain        History of Present Illness:  The patient presents for evaluation of abdominal pain in the left lower quadrant.  She describes a burning pain and tearing pain which lasted about one month.  This occurred April of this year.  It has gradually gotten better to the point that it has disappeared.  She states her last colonoscopy was over 3 years ago. She has regular bowel movements.  She will occasionally have diarrhea since her gallbladder was removed.  No BRBPR.  She was diagnosed recently with breast cancer.  In August, she will have a lumpectomy and undergo radiation therapy.  She has history of colon polyps.  No family history of colon cancer.      Review of Systems:  Review of Systems   Constitutional: Negative for fever.   HENT: Negative for trouble swallowing.    Eyes: Negative.    Respiratory: Negative for chest tightness.    Cardiovascular: Negative for chest pain.   Gastrointestinal: Positive for abdominal distention and diarrhea. Negative for abdominal pain, anal bleeding, blood in stool, constipation, nausea and vomiting.   Endocrine: Negative.    Genitourinary: Negative for difficulty urinating.   Musculoskeletal: Positive for back pain.   Skin: Negative.    Allergic/Immunologic: Negative for environmental allergies and food allergies.   Neurological: Positive for headaches.   Hematological: Does not bruise/bleed easily.   Psychiatric/Behavioral: Negative.        Past Medical History:  Past Medical History:   Diagnosis Date   • Anxiety    • B12 deficiency    • BPV (benign positional vertigo)    • Breast cancer (CMS/[facility]) 05/2021   • Diarrhea    • Fibromyalgia    • GERD (gastroesophageal reflux disease)    • Glaucoma    • Hyperlipidemia    • Obesity    • Sleep apnea    • Weakness of left arm        Past Surgical History:  Past  Surgical History:   Procedure Laterality Date   • APPENDECTOMY     • BREAST CYST ASPIRATION     • CATARACT EXTRACTION Bilateral    • CHOLECYSTECTOMY     • GALLBLADDER SURGERY     • URETHRA SURGERY         Family History:  Family History   Problem Relation Age of Onset   • Thyroid disease Mother    • Diabetes Mother    • Hyperlipidemia Father    • Hypertension Father    • Heart attack Father    • Alcohol abuse Maternal Aunt    • Cancer Maternal Grandfather         PROSTATE   • Stroke Paternal Grandmother    • Stroke Paternal Grandfather    • Hypertension Other    • Hypertension Sister    • Breast cancer Sister 40   • Obesity Sister    • Hypertension Sister    • Hyperlipidemia Sister    • Hyperlipidemia Sister    • Hypertension Sister    • Arthritis Sister    • Obesity Sister    • Thyroid disease Sister        Social History:  Social History     Socioeconomic History   • Marital status:      Spouse name: Not on file   • Number of children: Not on file   • Years of education: Not on file   • Highest education level: Not on file   Tobacco Use   • Smoking status: Former Smoker     Packs/day: 1.50     Years: 8.00     Pack years: 12.00     Types: Cigarettes     Quit date:      Years since quittin.5   • Smokeless tobacco: Never Used   Vaping Use   • Vaping Use: Never used   Substance and Sexual Activity   • Alcohol use: No     Comment: former social drinker not had anything in 25 + years   • Drug use: No       Current Medication List:    Current Outpatient Medications:   •  aspirin 81 MG EC tablet, Take 1 tablet by mouth Daily., Disp: 90 tablet, Rfl: 3  •  atorvastatin (LIPITOR) 20 MG tablet, Take 1 tablet by mouth Daily., Disp: 90 tablet, Rfl: 3  •  cholecalciferol (VITAMIN D3) 25 MCG (1000 UT) tablet, Take 1,000 Units by mouth Daily., Disp: , Rfl:   •  empagliflozin (Jardiance) 25 MG tablet tablet, Take 1 tablet by mouth Every Morning., Disp: 35 tablet, Rfl: 0  •  glucose blood test strip, 1 each by Other  "route 4 (Four) Times a Day., Disp: 150 each, Rfl: 12  •  hydroCHLOROthiazide (HYDRODIURIL) 12.5 MG tablet, Take 1 tablet by mouth As Needed., Disp: , Rfl:   •  insulin aspart protamine & aspart (NOVOLOG) 70/30 100 unit/mL, Maximum daily dose 50 units, Disp: 5 pen, Rfl: 5  •  Insulin Glargine (BASAGLAR KWIKPEN) 100 UNIT/ML injection pen, INJECT 70 UNITS UNDER THE SKIN INTO THE APPROPRIATE AREA AS DIRECTED EVERY MORNING, Disp: 15 mL, Rfl: 1  •  Insulin Pen Needle 32G X 4 MM misc, 1 Device 3 (Three) Times a Day., Disp: 100 each, Rfl: 2  •  levothyroxine (SYNTHROID, LEVOTHROID) 88 MCG tablet, Take 1 tablet by mouth Daily., Disp: 30 tablet, Rfl: 1  •  losartan (COZAAR) 50 MG tablet, Take 1 tablet by mouth Daily., Disp: 30 tablet, Rfl: 5  •  magnesium oxide (MAG-OX) 400 tablet tablet, Take 1 tablet by mouth 2 (Two) Times a Day for 30 days., Disp: 30 tablet, Rfl: 0  •  metoprolol succinate XL (TOPROL-XL) 100 MG 24 hr tablet, Take 1 tablet by mouth Daily., Disp: 30 tablet, Rfl: 2  •  pantoprazole (PROTONIX) 40 MG EC tablet, Take 1 tablet by mouth Daily. (Patient taking differently: Take 40 mg by mouth As Needed.), Disp: 90 tablet, Rfl: 3  •  polyethylene glycol (MIRALAX) 17 GM/SCOOP powder, Take 255 g of MiraLAX with 32 ounces liquid then repeat 8 hours later for MiraLAX cleanout., Disp: 510 g, Rfl: 0    Allergies:   Codeine and Sulfa antibiotics    Vitals:  /79 (BP Location: Left arm, Patient Position: Sitting, Cuff Size: Adult)   Pulse 51   Ht 160 cm (63\")   Wt 125 kg (276 lb)   BMI 48.89 kg/m²     Physical Exam:  Physical Exam  Constitutional:       Appearance: She is obese.   HENT:      Head: Normocephalic and atraumatic.      Nose: Nose normal. No congestion or rhinorrhea.   Eyes:      General: No scleral icterus.     Extraocular Movements: Extraocular movements intact.      Conjunctiva/sclera: Conjunctivae normal.      Pupils: Pupils are equal, round, and reactive to light.   Cardiovascular:      Rate and " Rhythm: Normal rate and regular rhythm.      Pulses: Normal pulses.      Heart sounds: Normal heart sounds.   Pulmonary:      Effort: Pulmonary effort is normal.      Breath sounds: Normal breath sounds.   Abdominal:      General: Abdomen is flat. Bowel sounds are normal. There is no distension.      Palpations: Abdomen is soft. There is no shifting dullness, fluid wave, hepatomegaly, splenomegaly, mass or pulsatile mass.      Tenderness: There is no abdominal tenderness. There is no guarding or rebound.      Hernia: No hernia is present.      Comments: Abdominal scar   Musculoskeletal:         General: No swelling or tenderness.      Cervical back: Normal range of motion and neck supple.   Skin:     General: Skin is warm and dry.      Coloration: Skin is not jaundiced.   Neurological:      General: No focal deficit present.      Mental Status: She is alert and oriented to person, place, and time.   Psychiatric:         Mood and Affect: Mood normal.         Behavior: Behavior normal.         Results Review:  Lab Results:   Lab on 06/22/2021   Component Date Value Ref Range Status   • Glucose 06/22/2021 94  65 - 99 mg/dL Final   • BUN 06/22/2021 13  8 - 23 mg/dL Final   • Creatinine 06/22/2021 0.79  0.57 - 1.00 mg/dL Final   • Sodium 06/22/2021 139  136 - 145 mmol/L Final   • Potassium 06/22/2021 4.4  3.5 - 5.2 mmol/L Final   • Chloride 06/22/2021 106  98 - 107 mmol/L Final   • CO2 06/22/2021 26.1  22.0 - 29.0 mmol/L Final   • Calcium 06/22/2021 9.1  8.6 - 10.5 mg/dL Final   • Total Protein 06/22/2021 6.9  6.0 - 8.5 g/dL Final   • Albumin 06/22/2021 3.90  3.50 - 5.20 g/dL Final   • ALT (SGPT) 06/22/2021 24  1 - 33 U/L Final   • AST (SGOT) 06/22/2021 30  1 - 32 U/L Final   • Alkaline Phosphatase 06/22/2021 67  39 - 117 U/L Final   • Total Bilirubin 06/22/2021 0.8  0.0 - 1.2 mg/dL Final   • eGFR Non  Amer 06/22/2021 73  >60 mL/min/1.73 Final   • Globulin 06/22/2021 3.0  gm/dL Final   • A/G Ratio 06/22/2021 1.3   g/dL Final   • BUN/Creatinine Ratio 06/22/2021 16.5  7.0 - 25.0 Final   • Anion Gap 06/22/2021 6.9  5.0 - 15.0 mmol/L Final   • TSH 06/22/2021 0.199* 0.270 - 4.200 uIU/mL Final   • Hemoglobin A1C 06/22/2021 7.20* 4.80 - 5.60 % Final   • 25 Hydroxy, Vitamin D 06/22/2021 25.0  ng/ml Final   • Total Cholesterol 06/22/2021 192  0 - 200 mg/dL Final   • Triglycerides 06/22/2021 119  0 - 150 mg/dL Final   • HDL Cholesterol 06/22/2021 50  40 - 60 mg/dL Final   • LDL Cholesterol  06/22/2021 121* 0 - 100 mg/dL Final   • VLDL Cholesterol 06/22/2021 21  5 - 40 mg/dL Final   • LDL/HDL Ratio 06/22/2021 2.36   Final   • Microalbumin, Urine 06/22/2021 1.9  mg/dL Final   • Magnesium 06/22/2021 1.7  1.6 - 2.4 mg/dL Final   Admission on 06/19/2021, Discharged on 06/19/2021   Component Date Value Ref Range Status   • QT Interval 06/19/2021 382  ms Final   • QTC Interval 06/19/2021 420  ms Final   • Glucose 06/19/2021 218* 65 - 99 mg/dL Final   • BUN 06/19/2021 14  8 - 23 mg/dL Final   • Creatinine 06/19/2021 0.94  0.57 - 1.00 mg/dL Final   • Sodium 06/19/2021 136  136 - 145 mmol/L Final   • Potassium 06/19/2021 4.1  3.5 - 5.2 mmol/L Final   • Chloride 06/19/2021 101  98 - 107 mmol/L Final   • CO2 06/19/2021 23.8  22.0 - 29.0 mmol/L Final   • Calcium 06/19/2021 9.4  8.6 - 10.5 mg/dL Final   • Total Protein 06/19/2021 7.7  6.0 - 8.5 g/dL Final   • Albumin 06/19/2021 3.78  3.50 - 5.20 g/dL Final   • ALT (SGPT) 06/19/2021 20  1 - 33 U/L Final   • AST (SGOT) 06/19/2021 28  1 - 32 U/L Final   • Alkaline Phosphatase 06/19/2021 81  39 - 117 U/L Final   • Total Bilirubin 06/19/2021 0.8  0.0 - 1.2 mg/dL Final   • eGFR Non  Amer 06/19/2021 60* >60 mL/min/1.73 Final   • Globulin 06/19/2021 3.9  gm/dL Final   • A/G Ratio 06/19/2021 1.0  g/dL Final   • BUN/Creatinine Ratio 06/19/2021 14.9  7.0 - 25.0 Final   • Anion Gap 06/19/2021 11.2  5.0 - 15.0 mmol/L Final   • proBNP 06/19/2021 297.5  0.0 - 900.0 pg/mL Final   • Troponin T 06/19/2021  <0.010  0.000 - 0.030 ng/mL Final   • Protime 06/19/2021 14.1  12.8 - 14.5 Seconds Final    Note new Reference Range   • INR 06/19/2021 1.05  0.90 - 1.10 Final   • Magnesium 06/19/2021 1.3* 1.6 - 2.4 mg/dL Final   • TSH 06/19/2021 0.221* 0.270 - 4.200 uIU/mL Final   • Free T4 06/19/2021 1.60  0.93 - 1.70 ng/dL Final   • Extra Tube 06/19/2021 Hold for add-ons.   Final    Auto resulted.   • Extra Tube 06/19/2021 hold for add-on   Final    Auto resulted   • Extra Tube 06/19/2021 Hold for add-ons.   Final    Auto resulted.   • WBC 06/19/2021 6.65  3.40 - 10.80 10*3/mm3 Final   • RBC 06/19/2021 4.37  3.77 - 5.28 10*6/mm3 Final   • Hemoglobin 06/19/2021 14.1  12.0 - 15.9 g/dL Final   • Hematocrit 06/19/2021 41.9  34.0 - 46.6 % Final   • MCV 06/19/2021 95.9  79.0 - 97.0 fL Final   • MCH 06/19/2021 32.3  26.6 - 33.0 pg Final   • MCHC 06/19/2021 33.7  31.5 - 35.7 g/dL Final   • RDW 06/19/2021 13.3  12.3 - 15.4 % Final   • RDW-SD 06/19/2021 47.8  37.0 - 54.0 fl Final   • MPV 06/19/2021 10.9  6.0 - 12.0 fL Final   • Platelets 06/19/2021 148  140 - 450 10*3/mm3 Final   • Neutrophil % 06/19/2021 39.2* 42.7 - 76.0 % Final   • Lymphocyte % 06/19/2021 47.8* 19.6 - 45.3 % Final   • Monocyte % 06/19/2021 11.4  5.0 - 12.0 % Final   • Eosinophil % 06/19/2021 0.8  0.3 - 6.2 % Final   • Basophil % 06/19/2021 0.6  0.0 - 1.5 % Final   • Immature Grans % 06/19/2021 0.2  0.0 - 0.5 % Final   • Neutrophils, Absolute 06/19/2021 2.61  1.70 - 7.00 10*3/mm3 Final   • Lymphocytes, Absolute 06/19/2021 3.18* 0.70 - 3.10 10*3/mm3 Final   • Monocytes, Absolute 06/19/2021 0.76  0.10 - 0.90 10*3/mm3 Final   • Eosinophils, Absolute 06/19/2021 0.05  0.00 - 0.40 10*3/mm3 Final   • Basophils, Absolute 06/19/2021 0.04  0.00 - 0.20 10*3/mm3 Final   • Immature Grans, Absolute 06/19/2021 0.01  0.00 - 0.05 10*3/mm3 Final   • nRBC 06/19/2021 0.0  0.0 - 0.2 /100 WBC Final   • QT Interval 06/19/2021 300  ms Final   • QTC Interval 06/19/2021 492  ms Final   • QT  Interval 06/19/2021 292  ms Final   • QTC Interval 06/19/2021 472  ms Final   • COVID19 06/19/2021 Not Detected  Not Detected - Ref. Range Final   • Influenza A PCR 06/19/2021 Not Detected  Not Detected Final   • Influenza B PCR 06/19/2021 Not Detected  Not Detected Final   • Troponin T 06/19/2021 <0.010  0.000 - 0.030 ng/mL Final   • Magnesium 06/19/2021 1.9  1.6 - 2.4 mg/dL Final       Assessment/Plan     Visit Diagnoses:    ICD-10-CM ICD-9-CM   1. Personal history of colonic polyps  Z86.010 V12.72   2. Left lower quadrant abdominal pain  R10.32 789.04       Plan:  The patient will undergo colonoscopy due to personal history of colon polyps.  She has not had a colonoscopy in at least 6 years per her report.  She was getting 1 every 5 years per her report.  There is no family history colon cancer.  She did have an episode of left lower quadrant abdominal pain that lasted approximately 1 month but has gone away now.  She was recently diagnosed with breast cancer and undergo a lumpectomy with radiation therapy starting in August.    COLONOSCOPY (N/A)      MEDICATION ISSUES:  Discussed medication options and treatment plan of prescribed medication as well as the risks, benefits, and side effects including potential falls, possible impaired driving and metabolic adversities among others. Patient is agreeable to call the office with any worsening of symptoms or onset of side effects. Patient is agreeable to call 911 or go to the nearest ER should he/she begin having SI/HI.     MEDS ORDERED DURING VISIT:  New Medications Ordered This Visit   Medications   • polyethylene glycol (MIRALAX) 17 GM/SCOOP powder     Sig: Take 255 g of MiraLAX with 32 ounces liquid then repeat 8 hours later for MiraLAX cleanout.     Dispense:  510 g     Refill:  0       No follow-ups on file.             This document has been electronically signed by Marta Odonnell MD   July 15, 2021 10:18 EDT        Part of this note may be an  electronic transcription/translation of spoken language to printed text using the Dragon Dictation System.

## 2021-07-20 ENCOUNTER — LAB (OUTPATIENT)
Dept: FAMILY MEDICINE CLINIC | Facility: CLINIC | Age: 66
End: 2021-07-20

## 2021-07-20 DIAGNOSIS — E83.42 HYPOMAGNESEMIA: ICD-10-CM

## 2021-07-20 DIAGNOSIS — E03.8 OTHER SPECIFIED HYPOTHYROIDISM: Chronic | ICD-10-CM

## 2021-07-20 PROCEDURE — 84443 ASSAY THYROID STIM HORMONE: CPT | Performed by: NURSE PRACTITIONER

## 2021-07-20 PROCEDURE — 83735 ASSAY OF MAGNESIUM: CPT | Performed by: NURSE PRACTITIONER

## 2021-07-21 DIAGNOSIS — E83.42 HYPOMAGNESEMIA: Primary | ICD-10-CM

## 2021-07-21 LAB
MAGNESIUM SERPL-MCNC: 1.5 MG/DL (ref 1.6–2.4)
TSH SERPL DL<=0.05 MIU/L-ACNC: 1.27 UIU/ML (ref 0.27–4.2)

## 2021-07-21 RX ORDER — MAGNESIUM OXIDE 400 MG/1
400 TABLET ORAL 3 TIMES DAILY
Qty: 90 TABLET | Refills: 3 | Status: SHIPPED | OUTPATIENT
Start: 2021-07-21 | End: 2021-12-20

## 2021-07-28 ENCOUNTER — OFFICE VISIT (OUTPATIENT)
Dept: CARDIOLOGY | Facility: CLINIC | Age: 66
End: 2021-07-28

## 2021-07-28 VITALS
HEIGHT: 63 IN | SYSTOLIC BLOOD PRESSURE: 157 MMHG | HEART RATE: 55 BPM | WEIGHT: 279.8 LBS | TEMPERATURE: 97.3 F | DIASTOLIC BLOOD PRESSURE: 76 MMHG | BODY MASS INDEX: 49.58 KG/M2

## 2021-07-28 DIAGNOSIS — I47.1 SVT (SUPRAVENTRICULAR TACHYCARDIA) (HCC): Primary | ICD-10-CM

## 2021-07-28 DIAGNOSIS — I10 ESSENTIAL HYPERTENSION: ICD-10-CM

## 2021-07-28 DIAGNOSIS — E78.2 MIXED HYPERLIPIDEMIA: ICD-10-CM

## 2021-07-28 PROCEDURE — 99213 OFFICE O/P EST LOW 20 MIN: CPT | Performed by: PHYSICIAN ASSISTANT

## 2021-07-28 RX ORDER — AMLODIPINE BESYLATE 5 MG/1
5 TABLET ORAL DAILY
Qty: 30 TABLET | Refills: 11 | Status: ON HOLD | OUTPATIENT
Start: 2021-07-28 | End: 2022-07-07

## 2021-07-28 NOTE — PROGRESS NOTES
Mague Acosta, APRN  Ronna Wayne  1955 07/28/2021    Patient Active Problem List   Diagnosis   • Frequent headaches   • Memory loss   • Essential hypertension   • Hyperlipidemia   • Type 2 diabetes mellitus with hyperglycemia, with long-term current use of insulin (CMS/HCC)   • Obstructive sleep apnea syndrome   • Glaucoma   • Anxiety   • GERD (gastroesophageal reflux disease)   • Morbid obesity   • Weakness of left arm   • Cerebrovascular accident (CVA) due to embolism (CMS/HCC)   • SVT (supraventricular tachycardia) (CMS/HCC)   • Vitamin D deficiency   • B12 deficiency   • Malignant neoplasm of upper-outer quadrant of right breast in female, estrogen receptor positive (CMS/HCC)   • Other specified hypothyroidism       Dear Mague Acosta APRN:    Subjective     History of Present Illness:    Chief Complaint   Patient presents with   • Follow-up     pt was theresa in the ER on 6/19/21 and is here for follow up and states she is doing well. Bt was diagnosed with breast cancer in May and has surgery on 8/5/21   • Med Management     verbal       Ronna Wayne is a pleasant 66 y.o. female with a past medical history significant for recurrent paroxysmal SVT, essential hypertension, diabetes mellitus, history of CVA, and sleep apnea.  She was also recently diagnosed with right-sided breast cancer with scheduled lumpectomy on 8/5/2021.  She comes in today for routine cardiology follow-up.    Since patient was seen in the hospital she did follow-up with electrophysiology who is now following her for her SVT it appears that they are just monitoring her for now at least until her lumpectomy is performed. From cardiac standpoint she does deny any chest pains or shortness of breath. She denies any major episodes of SVT since she was last seen by EP. She also denies any dizziness, or syncope.    Allergies   Allergen Reactions   • Codeine GI Intolerance     Increased heart rate     • Sulfa  Antibiotics GI Intolerance     Heart rate increase    :      Current Outpatient Medications:   •  cholecalciferol (VITAMIN D3) 25 MCG (1000 UT) tablet, Take 1,000 Units by mouth Daily., Disp: , Rfl:   •  empagliflozin (Jardiance) 25 MG tablet tablet, Take 1 tablet by mouth Every Morning., Disp: 35 tablet, Rfl: 0  •  glucose blood test strip, 1 each by Other route 4 (Four) Times a Day., Disp: 150 each, Rfl: 12  •  insulin aspart protamine & aspart (NOVOLOG) 70/30 100 unit/mL, Maximum daily dose 50 units, Disp: 5 pen, Rfl: 5  •  Insulin Glargine (BASAGLAR KWIKPEN) 100 UNIT/ML injection pen, INJECT 70 UNITS UNDER THE SKIN INTO THE APPROPRIATE AREA AS DIRECTED EVERY MORNING, Disp: 15 mL, Rfl: 1  •  Insulin Pen Needle 32G X 4 MM misc, 1 Device 3 (Three) Times a Day., Disp: 100 each, Rfl: 2  •  levothyroxine (SYNTHROID, LEVOTHROID) 88 MCG tablet, Take 1 tablet by mouth Daily., Disp: 30 tablet, Rfl: 1  •  losartan (COZAAR) 50 MG tablet, Take 1 tablet by mouth Daily., Disp: 30 tablet, Rfl: 5  •  magnesium oxide (MAG-OX) 400 MG tablet, Take 1 tablet by mouth 3 (Three) Times a Day., Disp: 90 tablet, Rfl: 3  •  metoprolol succinate XL (TOPROL-XL) 100 MG 24 hr tablet, Take 1 tablet by mouth Daily., Disp: 30 tablet, Rfl: 2  •  pantoprazole (PROTONIX) 40 MG EC tablet, Take 1 tablet by mouth Daily. (Patient taking differently: Take 40 mg by mouth As Needed.), Disp: 90 tablet, Rfl: 3  •  polyethylene glycol (MIRALAX) 17 GM/SCOOP powder, Take 255 g of MiraLAX with 32 ounces liquid then repeat 8 hours later for MiraLAX cleanout., Disp: 510 g, Rfl: 0  •  amLODIPine (NORVASC) 5 MG tablet, Take 1 tablet by mouth Daily., Disp: 30 tablet, Rfl: 11  •  aspirin 81 MG EC tablet, Take 1 tablet by mouth Daily., Disp: 90 tablet, Rfl: 3  •  atorvastatin (LIPITOR) 20 MG tablet, Take 1 tablet by mouth Daily., Disp: 90 tablet, Rfl: 3  •  hydroCHLOROthiazide (HYDRODIURIL) 12.5 MG tablet, Take 1 tablet by mouth As Needed., Disp: , Rfl:     The  "following portions of the patient's history were reviewed and updated as appropriate: allergies, current medications, past family history, past medical history, past social history, past surgical history and problem list.    Social History     Tobacco Use   • Smoking status: Former Smoker     Packs/day: 1.50     Years: 8.00     Pack years: 12.00     Types: Cigarettes     Quit date:      Years since quittin.6   • Smokeless tobacco: Never Used   Vaping Use   • Vaping Use: Never used   Substance Use Topics   • Alcohol use: No     Comment: former social drinker not had anything in 25 + years   • Drug use: No         Objective   Vitals:    21 1046   BP: 157/76   Pulse: 55   Temp: 97.3 °F (36.3 °C)   Weight: 127 kg (279 lb 12.8 oz)   Height: 160 cm (62.99\")     Body mass index is 49.58 kg/m².    Constitutional:       General: Not in acute distress.     Appearance: Healthy appearance. Well-developed and not in distress. Not diaphoretic.   Eyes:      Conjunctiva/sclera: Conjunctivae normal.      Pupils: Pupils are equal, round, and reactive to light.   HENT:      Head: Normocephalic and atraumatic.   Neck:      Vascular: No carotid bruit or JVD.   Pulmonary:      Effort: Pulmonary effort is normal. No respiratory distress.      Breath sounds: Normal breath sounds.   Cardiovascular:      Normal rate. Regular rhythm.      Murmurs: There is a grade 3/6 harsh midsystolic murmur at the URSB, radiating to the neck.   Skin:     General: Skin is cool.   Neurological:      Mental Status: Alert, oriented to person, place, and time and oriented to person, place and time.         Lab Results   Component Value Date     2021    K 4.4 2021     2021    CO2 26.1 2021    BUN 13 2021    CREATININE 0.79 2021    GLUCOSE 94 2021    CALCIUM 9.1 2021    AST 30 2021    ALT 24 2021    ALKPHOS 67 2021    LABIL2 1.0 (L) 2016     Lab Results   Component " Value Date    CKTOTAL 36 05/28/2016     Lab Results   Component Value Date    WBC 6.65 06/19/2021    HGB 14.1 06/19/2021    HCT 41.9 06/19/2021     06/19/2021     Lab Results   Component Value Date    INR 1.05 06/19/2021    INR 1.05 05/01/2021    INR 1.09 03/09/2021     Lab Results   Component Value Date    MG 1.5 (L) 07/20/2021     Lab Results   Component Value Date    TSH 1.270 07/20/2021    CHLPL 226 (H) 05/28/2016    TRIG 119 06/22/2021    HDL 50 06/22/2021     (H) 06/22/2021      No results found for: BNP    During this visit the following were done:  Labs Reviewed []    Labs Ordered []    Radiology Reports Reviewed []    Radiology Ordered []    PCP Records Reviewed []    Referring Provider Records Reviewed []    ER Records Reviewed []    Hospital Records Reviewed []    History Obtained From Family []    Radiology Images Reviewed []    Other Reviewed []    Records Requested []       Procedures    Assessment/Plan    Diagnosis Plan   1. SVT (supraventricular tachycardia) (CMS/HCC)     2. Mixed hyperlipidemia     3. Essential hypertension              Recommendations:  1. Paroxysmal SVT  1. Denies any significant symptoms since she was last seen by EP who is now following her now. We will continue to monitor until after lumpectomy.  2. Essential hypertension  1. Patient no longer on HCTZ due to hypomagnesemia blood pressure has been elevated so we will start amlodipine 5 mg daily.      Return in about 6 months (around 1/28/2022).    As always, I appreciate very much the opportunity to participate in the cardiovascular care of your patients.      With Best Regards,    Esdras Márquez PA-C

## 2021-07-29 ENCOUNTER — LAB (OUTPATIENT)
Dept: FAMILY MEDICINE CLINIC | Facility: CLINIC | Age: 66
End: 2021-07-29

## 2021-07-30 ENCOUNTER — TELEPHONE (OUTPATIENT)
Dept: SURGERY | Facility: CLINIC | Age: 66
End: 2021-07-30

## 2021-08-03 ENCOUNTER — LAB (OUTPATIENT)
Dept: LAB | Facility: HOSPITAL | Age: 66
End: 2021-08-03

## 2021-08-03 ENCOUNTER — PRE-ADMISSION TESTING (OUTPATIENT)
Dept: PREADMISSION TESTING | Facility: HOSPITAL | Age: 66
End: 2021-08-03

## 2021-08-03 DIAGNOSIS — Z01.812 ENCOUNTER FOR PREPROCEDURE SCREENING LABORATORY TESTING FOR COVID-19: ICD-10-CM

## 2021-08-03 DIAGNOSIS — Z20.822 ENCOUNTER FOR PREPROCEDURE SCREENING LABORATORY TESTING FOR COVID-19: ICD-10-CM

## 2021-08-03 LAB
ANION GAP SERPL CALCULATED.3IONS-SCNC: 11.3 MMOL/L (ref 5–15)
BUN SERPL-MCNC: 18 MG/DL (ref 8–23)
BUN/CREAT SERPL: 22.2 (ref 7–25)
CALCIUM SPEC-SCNC: 9.9 MG/DL (ref 8.6–10.5)
CHLORIDE SERPL-SCNC: 102 MMOL/L (ref 98–107)
CO2 SERPL-SCNC: 24.7 MMOL/L (ref 22–29)
CREAT SERPL-MCNC: 0.81 MG/DL (ref 0.57–1)
DEPRECATED RDW RBC AUTO: 48.5 FL (ref 37–54)
ERYTHROCYTE [DISTWIDTH] IN BLOOD BY AUTOMATED COUNT: 13 % (ref 12.3–15.4)
GFR SERPL CREATININE-BSD FRML MDRD: 71 ML/MIN/1.73
GLUCOSE SERPL-MCNC: 220 MG/DL (ref 65–99)
HCT VFR BLD AUTO: 39.9 % (ref 34–46.6)
HGB BLD-MCNC: 13.5 G/DL (ref 12–15.9)
MCH RBC QN AUTO: 34 PG (ref 26.6–33)
MCHC RBC AUTO-ENTMCNC: 33.8 G/DL (ref 31.5–35.7)
MCV RBC AUTO: 100.5 FL (ref 79–97)
PLATELET # BLD AUTO: 143 10*3/MM3 (ref 140–450)
PMV BLD AUTO: 10.9 FL (ref 6–12)
POTASSIUM SERPL-SCNC: 4.8 MMOL/L (ref 3.5–5.2)
RBC # BLD AUTO: 3.97 10*6/MM3 (ref 3.77–5.28)
SODIUM SERPL-SCNC: 138 MMOL/L (ref 136–145)
WBC # BLD AUTO: 7.42 10*3/MM3 (ref 3.4–10.8)

## 2021-08-03 PROCEDURE — 36415 COLL VENOUS BLD VENIPUNCTURE: CPT

## 2021-08-03 PROCEDURE — C9803 HOPD COVID-19 SPEC COLLECT: HCPCS | Performed by: SURGERY

## 2021-08-03 PROCEDURE — 85027 COMPLETE CBC AUTOMATED: CPT

## 2021-08-03 PROCEDURE — U0004 COV-19 TEST NON-CDC HGH THRU: HCPCS | Performed by: SURGERY

## 2021-08-03 PROCEDURE — U0005 INFEC AGEN DETEC AMPLI PROBE: HCPCS | Performed by: SURGERY

## 2021-08-03 PROCEDURE — 80048 BASIC METABOLIC PNL TOTAL CA: CPT

## 2021-08-03 NOTE — DISCHARGE INSTRUCTIONS
TAKE the following medications the morning of surgery:    All heart or blood pressure medications    Please discontinue all blood thinners and anticoagulants (except aspirin) prior to surgery as per your surgeon and cardiologist instructions.  Aspirin may be continued up to the day prior to surgery.    HOLD all diabetic medications the morning of surgery as order by physician.    Please follow instructions on use of prep cloths provided by nurse. Return instruction sheet to pre-op nurse on day of surgery.    Arrival time for surgery on 8/5/21 will be given to you by Dr. Parrish's office.    A RESPONSIBLE PERSON MUST REMAIN IN THE WAITING ROOM DURING YOUR PROCEDURE AND A RESPONSIBLE  MUST BE AVAILABLE UPON YOUR DISCHARGE.    General Instructions:  • Do NOT eat or drink after midnight 8/4/21 which includes water, mints, or gum.  • You may brush your teeth. Dental appliances that are removable must be taken out day of surgery.  • Do NOT smoke, chew tobacco, or drink alcohol within 24 hours prior to surgery.  • Bring medications in original bottles, any inhalers and if applicable your C-PAP/BI-PAP machine  • Bring any papers given to you in the doctor’s office  • Wear clean, comfortable clothes and socks  • Do NOT wear contact lenses or make-up or dark nail polish.  Bring a case for your glasses if applicable.  • Bring crutches or walker if applicable  • Leave all other valuables and jewelry at home  • If you were given a blood bank armband, continue to wear it until discharged.    Preventing a Surgical Site Infection:  • Shower the night before surgery (unless instructed otherwise) using a fresh bar of anti-bacterial soap (such as Dial) and clean washcloth.  Dry with a clean towel and dress in clean clothing.  • For 2 to 3 days before surgery, avoid shaving with a razor near where you will have surgery because the razor can irritate skin and make it easier to develop an infection.  Ask your surgeon if you will be  receiving antibiotics prior to surgery.  • Make sure you, your family, and all healthcare providers clean their hands with soap and water or an alcohol-based hand  before caring for you or your wound.  • If at all possible, quit smoking as many days before surgery as you can.    Day of Surgery:  Upon arrival, a pre-op nurse and anesthesiologist will review your health history, obtain vital signs, and answer questions you may have.  The only belongings needed at this time will be your home medications and if applicable you C-PAP/BI-PAP machine.  If you are staying overnight, your family can leave the rest of your belongings in the car and bring them to your room later.  A pre-op nurse will start an IV and you may receive medication in preparation for surgery.  Due to patient privacy and limited space, only one member of your family will be able to accompany you in the pre-op area.  While you are in surgery your family should notify the waiting room  if they leave the waiting room area and provide a contact number.  Please be aware that surgery does come with discomfort.  We want to make every effort to control your discomfort so please discuss any uncontrolled symptoms with your nurse.  Your doctor will most likely have prescribed pain medications.  If you are going home after surgery you will receive individualized written care instructions before being discharged.  A responsible adult must drive you to and from the hospital on the day of surgery and stay with you for 24 hours.  If you are staying overnight following surgery, you will be transported to your hospital room following the recovery period.

## 2021-08-04 LAB — SARS-COV-2 RNA PNL SPEC NAA+PROBE: NOT DETECTED

## 2021-08-05 ENCOUNTER — ANESTHESIA EVENT (OUTPATIENT)
Dept: PERIOP | Facility: HOSPITAL | Age: 66
End: 2021-08-05

## 2021-08-05 ENCOUNTER — HOSPITAL ENCOUNTER (OUTPATIENT)
Facility: HOSPITAL | Age: 66
Discharge: HOME | End: 2021-08-06
Attending: SURGERY | Admitting: SURGERY

## 2021-08-05 ENCOUNTER — ANESTHESIA (OUTPATIENT)
Dept: PERIOP | Facility: HOSPITAL | Age: 66
End: 2021-08-05

## 2021-08-05 ENCOUNTER — HOSPITAL ENCOUNTER (OUTPATIENT)
Dept: NUCLEAR MEDICINE | Facility: HOSPITAL | Age: 66
Discharge: HOME | End: 2021-08-05

## 2021-08-05 DIAGNOSIS — Z17.0 MALIGNANT NEOPLASM OF UPPER-OUTER QUADRANT OF RIGHT BREAST IN FEMALE, ESTROGEN RECEPTOR POSITIVE (HCC): ICD-10-CM

## 2021-08-05 DIAGNOSIS — Z17.0 MALIGNANT NEOPLASM OF UPPER-OUTER QUADRANT OF RIGHT BREAST IN FEMALE, ESTROGEN RECEPTOR POSITIVE: ICD-10-CM

## 2021-08-05 DIAGNOSIS — C50.411 MALIGNANT NEOPLASM OF UPPER-OUTER QUADRANT OF RIGHT BREAST IN FEMALE, ESTROGEN RECEPTOR POSITIVE: ICD-10-CM

## 2021-08-05 DIAGNOSIS — C50.411 MALIGNANT NEOPLASM OF UPPER-OUTER QUADRANT OF RIGHT BREAST IN FEMALE, ESTROGEN RECEPTOR POSITIVE (HCC): ICD-10-CM

## 2021-08-05 LAB
DEPRECATED RDW RBC AUTO: 51.9 FL (ref 37–54)
DEPRECATED RDW RBC AUTO: 53.4 FL (ref 37–54)
ERYTHROCYTE [DISTWIDTH] IN BLOOD BY AUTOMATED COUNT: 13 % (ref 12.3–15.4)
ERYTHROCYTE [DISTWIDTH] IN BLOOD BY AUTOMATED COUNT: 13.1 % (ref 12.3–15.4)
GLUCOSE BLDC GLUCOMTR-MCNC: 157 MG/DL (ref 70–130)
GLUCOSE BLDC GLUCOMTR-MCNC: 162 MG/DL (ref 70–130)
GLUCOSE BLDC GLUCOMTR-MCNC: 209 MG/DL (ref 70–130)
GLUCOSE BLDC GLUCOMTR-MCNC: 211 MG/DL (ref 70–130)
GLUCOSE BLDC GLUCOMTR-MCNC: 232 MG/DL (ref 70–130)
HCT VFR BLD AUTO: 35.8 % (ref 34–46.6)
HCT VFR BLD AUTO: 36.2 % (ref 34–46.6)
HGB BLD-MCNC: 11 G/DL (ref 12–15.9)
HGB BLD-MCNC: 11.1 G/DL (ref 12–15.9)
MCH RBC QN AUTO: 33.4 PG (ref 26.6–33)
MCH RBC QN AUTO: 33.8 PG (ref 26.6–33)
MCHC RBC AUTO-ENTMCNC: 30.4 G/DL (ref 31.5–35.7)
MCHC RBC AUTO-ENTMCNC: 31 G/DL (ref 31.5–35.7)
MCV RBC AUTO: 107.8 FL (ref 79–97)
MCV RBC AUTO: 111.4 FL (ref 79–97)
PLATELET # BLD AUTO: 121 10*3/MM3 (ref 140–450)
PLATELET # BLD AUTO: 137 10*3/MM3 (ref 140–450)
PMV BLD AUTO: 10.7 FL (ref 6–12)
PMV BLD AUTO: 10.8 FL (ref 6–12)
RBC # BLD AUTO: 3.25 10*6/MM3 (ref 3.77–5.28)
RBC # BLD AUTO: 3.32 10*6/MM3 (ref 3.77–5.28)
WBC # BLD AUTO: 11.58 10*3/MM3 (ref 3.4–10.8)
WBC # BLD AUTO: 13.1 10*3/MM3 (ref 3.4–10.8)

## 2021-08-05 PROCEDURE — 25010000003 CEFAZOLIN PER 500 MG: Performed by: SURGERY

## 2021-08-05 PROCEDURE — 25010000002 ONDANSETRON PER 1 MG: Performed by: SURGERY

## 2021-08-05 PROCEDURE — 63710000001 INSULIN ASPART PER 5 UNITS: Performed by: SURGERY

## 2021-08-05 PROCEDURE — G0378 HOSPITAL OBSERVATION PER HR: HCPCS

## 2021-08-05 PROCEDURE — 25010000002 FENTANYL CITRATE (PF) 50 MCG/ML SOLUTION: Performed by: NURSE ANESTHETIST, CERTIFIED REGISTERED

## 2021-08-05 PROCEDURE — 82962 GLUCOSE BLOOD TEST: CPT

## 2021-08-05 PROCEDURE — A9541 TC99M SULFUR COLLOID: HCPCS | Performed by: SURGERY

## 2021-08-05 PROCEDURE — 38792 RA TRACER ID OF SENTINL NODE: CPT

## 2021-08-05 PROCEDURE — 19302 P-MASTECTOMY W/LN REMOVAL: CPT | Performed by: SURGERY

## 2021-08-05 PROCEDURE — 88331 PATH CONSLTJ SURG 1 BLK 1SPC: CPT | Performed by: PATHOLOGY

## 2021-08-05 PROCEDURE — C1889 IMPLANT/INSERT DEVICE, NOC: HCPCS | Performed by: SURGERY

## 2021-08-05 PROCEDURE — 85027 COMPLETE CBC AUTOMATED: CPT | Performed by: SURGERY

## 2021-08-05 PROCEDURE — 25010000002 ONDANSETRON PER 1 MG: Performed by: NURSE ANESTHETIST, CERTIFIED REGISTERED

## 2021-08-05 PROCEDURE — 10140 I&D HMTMA SEROMA/FLUID COLLJ: CPT | Performed by: SURGERY

## 2021-08-05 PROCEDURE — 25010000002 KETOROLAC TROMETHAMINE PER 15 MG: Performed by: NURSE ANESTHETIST, CERTIFIED REGISTERED

## 2021-08-05 PROCEDURE — 0 TECHNETIUM FILTERED SULFUR COLLOID: Performed by: SURGERY

## 2021-08-05 PROCEDURE — 88307 TISSUE EXAM BY PATHOLOGIST: CPT | Performed by: SURGERY

## 2021-08-05 PROCEDURE — 25010000002 PROPOFOL 10 MG/ML EMULSION: Performed by: NURSE ANESTHETIST, CERTIFIED REGISTERED

## 2021-08-05 PROCEDURE — 25010000002 SUCCINYLCHOLINE PER 20 MG: Performed by: NURSE ANESTHETIST, CERTIFIED REGISTERED

## 2021-08-05 PROCEDURE — 88342 IMHCHEM/IMCYTCHM 1ST ANTB: CPT | Performed by: SURGERY

## 2021-08-05 PROCEDURE — 38792 RA TRACER ID OF SENTINL NODE: CPT | Performed by: SURGERY

## 2021-08-05 DEVICE — LIGACLIP MCA MULTIPLE CLIP APPLIERS, 20 SMALL CLIPS
Type: IMPLANTABLE DEVICE | Site: BREAST | Status: FUNCTIONAL
Brand: LIGACLIP

## 2021-08-05 DEVICE — LIGACLIP MCA MULTIPLE CLIP APPLIERS, 20 MEDIUM CLIPS
Type: IMPLANTABLE DEVICE | Site: AXILLA | Status: FUNCTIONAL
Brand: LIGACLIP

## 2021-08-05 DEVICE — ABSORBABLE HEMOSTAT (OXIDIZED REGENERATED CELLULOSE, U.S.P.)
Type: IMPLANTABLE DEVICE | Site: BREAST | Status: FUNCTIONAL
Brand: SURGICEL

## 2021-08-05 RX ORDER — ATORVASTATIN CALCIUM 20 MG/1
20 TABLET, FILM COATED ORAL DAILY
Status: DISCONTINUED | OUTPATIENT
Start: 2021-08-05 | End: 2021-08-06 | Stop reason: HOSPADM

## 2021-08-05 RX ORDER — OXYCODONE HYDROCHLORIDE AND ACETAMINOPHEN 5; 325 MG/1; MG/1
1 TABLET ORAL ONCE AS NEEDED
Status: DISCONTINUED | OUTPATIENT
Start: 2021-08-05 | End: 2021-08-05 | Stop reason: HOSPADM

## 2021-08-05 RX ORDER — SODIUM CHLORIDE 0.9 % (FLUSH) 0.9 %
10 SYRINGE (ML) INJECTION AS NEEDED
Status: DISCONTINUED | OUTPATIENT
Start: 2021-08-05 | End: 2021-08-05 | Stop reason: HOSPADM

## 2021-08-05 RX ORDER — MEPERIDINE HYDROCHLORIDE 25 MG/ML
12.5 INJECTION INTRAMUSCULAR; INTRAVENOUS; SUBCUTANEOUS
Status: DISCONTINUED | OUTPATIENT
Start: 2021-08-05 | End: 2021-08-05 | Stop reason: HOSPADM

## 2021-08-05 RX ORDER — PANTOPRAZOLE SODIUM 40 MG/1
40 TABLET, DELAYED RELEASE ORAL DAILY
Status: DISCONTINUED | OUTPATIENT
Start: 2021-08-05 | End: 2021-08-06 | Stop reason: HOSPADM

## 2021-08-05 RX ORDER — ACETAMINOPHEN 325 MG/1
650 TABLET ORAL ONCE
Status: CANCELLED | OUTPATIENT
Start: 2021-08-05 | End: 2021-08-05

## 2021-08-05 RX ORDER — ONDANSETRON 2 MG/ML
INJECTION INTRAMUSCULAR; INTRAVENOUS AS NEEDED
Status: DISCONTINUED | OUTPATIENT
Start: 2021-08-05 | End: 2021-08-05 | Stop reason: SURG

## 2021-08-05 RX ORDER — HYDROCHLOROTHIAZIDE 12.5 MG/1
12.5 TABLET ORAL DAILY
Status: DISCONTINUED | OUTPATIENT
Start: 2021-08-05 | End: 2021-08-06 | Stop reason: HOSPADM

## 2021-08-05 RX ORDER — SODIUM CHLORIDE 0.9 % (FLUSH) 0.9 %
10 SYRINGE (ML) INJECTION EVERY 12 HOURS SCHEDULED
Status: DISCONTINUED | OUTPATIENT
Start: 2021-08-05 | End: 2021-08-05 | Stop reason: HOSPADM

## 2021-08-05 RX ORDER — SODIUM CHLORIDE, SODIUM LACTATE, POTASSIUM CHLORIDE, CALCIUM CHLORIDE 600; 310; 30; 20 MG/100ML; MG/100ML; MG/100ML; MG/100ML
100 INJECTION, SOLUTION INTRAVENOUS ONCE AS NEEDED
Status: DISCONTINUED | OUTPATIENT
Start: 2021-08-05 | End: 2021-08-05 | Stop reason: HOSPADM

## 2021-08-05 RX ORDER — MIDAZOLAM HYDROCHLORIDE 1 MG/ML
0.5 INJECTION INTRAMUSCULAR; INTRAVENOUS
Status: DISCONTINUED | OUTPATIENT
Start: 2021-08-05 | End: 2021-08-05 | Stop reason: HOSPADM

## 2021-08-05 RX ORDER — ACETAMINOPHEN 325 MG/1
650 TABLET ORAL EVERY 4 HOURS PRN
Status: DISCONTINUED | OUTPATIENT
Start: 2021-08-05 | End: 2021-08-06 | Stop reason: HOSPADM

## 2021-08-05 RX ORDER — FENTANYL CITRATE 50 UG/ML
50 INJECTION, SOLUTION INTRAMUSCULAR; INTRAVENOUS
Status: DISCONTINUED | OUTPATIENT
Start: 2021-08-05 | End: 2021-08-05 | Stop reason: HOSPADM

## 2021-08-05 RX ORDER — ACETAMINOPHEN 650 MG/1
650 SUPPOSITORY RECTAL EVERY 4 HOURS PRN
Status: DISCONTINUED | OUTPATIENT
Start: 2021-08-05 | End: 2021-08-06 | Stop reason: HOSPADM

## 2021-08-05 RX ORDER — SODIUM CHLORIDE, SODIUM LACTATE, POTASSIUM CHLORIDE, CALCIUM CHLORIDE 600; 310; 30; 20 MG/100ML; MG/100ML; MG/100ML; MG/100ML
125 INJECTION, SOLUTION INTRAVENOUS ONCE
Status: COMPLETED | OUTPATIENT
Start: 2021-08-05 | End: 2021-08-05

## 2021-08-05 RX ORDER — PROPOFOL 10 MG/ML
VIAL (ML) INTRAVENOUS AS NEEDED
Status: DISCONTINUED | OUTPATIENT
Start: 2021-08-05 | End: 2021-08-05 | Stop reason: SURG

## 2021-08-05 RX ORDER — INSULIN ASPART 100 [IU]/ML
5 INJECTION, SUSPENSION SUBCUTANEOUS
Status: CANCELLED | OUTPATIENT
Start: 2021-08-06

## 2021-08-05 RX ORDER — LIDOCAINE HYDROCHLORIDE AND EPINEPHRINE 10; 10 MG/ML; UG/ML
INJECTION, SOLUTION INFILTRATION; PERINEURAL AS NEEDED
Status: DISCONTINUED | OUTPATIENT
Start: 2021-08-05 | End: 2021-08-05 | Stop reason: HOSPADM

## 2021-08-05 RX ORDER — DROPERIDOL 2.5 MG/ML
0.62 INJECTION, SOLUTION INTRAMUSCULAR; INTRAVENOUS ONCE AS NEEDED
Status: DISCONTINUED | OUTPATIENT
Start: 2021-08-05 | End: 2021-08-05 | Stop reason: HOSPADM

## 2021-08-05 RX ORDER — LIDOCAINE HYDROCHLORIDE 20 MG/ML
INJECTION, SOLUTION INFILTRATION; PERINEURAL AS NEEDED
Status: DISCONTINUED | OUTPATIENT
Start: 2021-08-05 | End: 2021-08-05 | Stop reason: SURG

## 2021-08-05 RX ORDER — MAGNESIUM HYDROXIDE 1200 MG/15ML
LIQUID ORAL AS NEEDED
Status: DISCONTINUED | OUTPATIENT
Start: 2021-08-05 | End: 2021-08-05 | Stop reason: HOSPADM

## 2021-08-05 RX ORDER — HYDROCODONE BITARTRATE AND ACETAMINOPHEN 5; 325 MG/1; MG/1
1 TABLET ORAL EVERY 4 HOURS PRN
Status: DISCONTINUED | OUTPATIENT
Start: 2021-08-05 | End: 2021-08-06 | Stop reason: HOSPADM

## 2021-08-05 RX ORDER — NICOTINE POLACRILEX 4 MG
15 LOZENGE BUCCAL
Status: DISCONTINUED | OUTPATIENT
Start: 2021-08-05 | End: 2021-08-06 | Stop reason: HOSPADM

## 2021-08-05 RX ORDER — IBUPROFEN 400 MG/1
400 TABLET ORAL
Status: DISCONTINUED | OUTPATIENT
Start: 2021-08-05 | End: 2021-08-06 | Stop reason: HOSPADM

## 2021-08-05 RX ORDER — ONDANSETRON 2 MG/ML
4 INJECTION INTRAMUSCULAR; INTRAVENOUS AS NEEDED
Status: DISCONTINUED | OUTPATIENT
Start: 2021-08-05 | End: 2021-08-05 | Stop reason: HOSPADM

## 2021-08-05 RX ORDER — LEVOTHYROXINE SODIUM 88 UG/1
88 TABLET ORAL DAILY
Status: DISCONTINUED | OUTPATIENT
Start: 2021-08-05 | End: 2021-08-06 | Stop reason: HOSPADM

## 2021-08-05 RX ORDER — FENTANYL CITRATE 50 UG/ML
INJECTION, SOLUTION INTRAMUSCULAR; INTRAVENOUS AS NEEDED
Status: DISCONTINUED | OUTPATIENT
Start: 2021-08-05 | End: 2021-08-05 | Stop reason: SURG

## 2021-08-05 RX ORDER — KETOROLAC TROMETHAMINE 30 MG/ML
INJECTION, SOLUTION INTRAMUSCULAR; INTRAVENOUS AS NEEDED
Status: DISCONTINUED | OUTPATIENT
Start: 2021-08-05 | End: 2021-08-05 | Stop reason: SURG

## 2021-08-05 RX ORDER — FAMOTIDINE 10 MG/ML
INJECTION, SOLUTION INTRAVENOUS AS NEEDED
Status: DISCONTINUED | OUTPATIENT
Start: 2021-08-05 | End: 2021-08-05 | Stop reason: SURG

## 2021-08-05 RX ORDER — CEFAZOLIN SODIUM 2 G/50ML
2 SOLUTION INTRAVENOUS ONCE
Status: CANCELLED | OUTPATIENT
Start: 2021-08-05 | End: 2021-08-05

## 2021-08-05 RX ORDER — INSULIN ASPART 100 [IU]/ML
8 INJECTION, SUSPENSION SUBCUTANEOUS
COMMUNITY
End: 2022-10-30

## 2021-08-05 RX ORDER — LOSARTAN POTASSIUM 50 MG/1
50 TABLET ORAL DAILY
Status: DISCONTINUED | OUTPATIENT
Start: 2021-08-05 | End: 2021-08-06 | Stop reason: HOSPADM

## 2021-08-05 RX ORDER — SODIUM CHLORIDE, SODIUM LACTATE, POTASSIUM CHLORIDE, CALCIUM CHLORIDE 600; 310; 30; 20 MG/100ML; MG/100ML; MG/100ML; MG/100ML
100 INJECTION, SOLUTION INTRAVENOUS CONTINUOUS
Status: DISCONTINUED | OUTPATIENT
Start: 2021-08-05 | End: 2021-08-06 | Stop reason: HOSPADM

## 2021-08-05 RX ORDER — INSULIN ASPART 100 [IU]/ML
5 INJECTION, SUSPENSION SUBCUTANEOUS
COMMUNITY
End: 2022-10-30

## 2021-08-05 RX ORDER — IPRATROPIUM BROMIDE AND ALBUTEROL SULFATE 2.5; .5 MG/3ML; MG/3ML
3 SOLUTION RESPIRATORY (INHALATION) ONCE AS NEEDED
Status: DISCONTINUED | OUTPATIENT
Start: 2021-08-05 | End: 2021-08-05 | Stop reason: HOSPADM

## 2021-08-05 RX ORDER — ONDANSETRON 4 MG/1
4 TABLET, FILM COATED ORAL EVERY 6 HOURS PRN
Status: DISCONTINUED | OUTPATIENT
Start: 2021-08-05 | End: 2021-08-06 | Stop reason: HOSPADM

## 2021-08-05 RX ORDER — AMLODIPINE BESYLATE 5 MG/1
5 TABLET ORAL DAILY
Status: DISCONTINUED | OUTPATIENT
Start: 2021-08-05 | End: 2021-08-06 | Stop reason: HOSPADM

## 2021-08-05 RX ORDER — SODIUM CHLORIDE, SODIUM LACTATE, POTASSIUM CHLORIDE, CALCIUM CHLORIDE 600; 310; 30; 20 MG/100ML; MG/100ML; MG/100ML; MG/100ML
INJECTION, SOLUTION INTRAVENOUS CONTINUOUS PRN
Status: DISCONTINUED | OUTPATIENT
Start: 2021-08-05 | End: 2021-08-05 | Stop reason: SURG

## 2021-08-05 RX ORDER — INSULIN ASPART 100 [IU]/ML
10 INJECTION, SUSPENSION SUBCUTANEOUS
COMMUNITY
End: 2021-12-27 | Stop reason: SDUPTHER

## 2021-08-05 RX ORDER — SUCCINYLCHOLINE CHLORIDE 20 MG/ML
INJECTION INTRAMUSCULAR; INTRAVENOUS AS NEEDED
Status: DISCONTINUED | OUTPATIENT
Start: 2021-08-05 | End: 2021-08-05 | Stop reason: SURG

## 2021-08-05 RX ORDER — SODIUM CHLORIDE, SODIUM LACTATE, POTASSIUM CHLORIDE, CALCIUM CHLORIDE 600; 310; 30; 20 MG/100ML; MG/100ML; MG/100ML; MG/100ML
125 INJECTION, SOLUTION INTRAVENOUS ONCE
Status: DISCONTINUED | OUTPATIENT
Start: 2021-08-05 | End: 2021-08-05 | Stop reason: HOSPADM

## 2021-08-05 RX ORDER — INSULIN GLARGINE 100 [IU]/ML
75 INJECTION, SOLUTION SUBCUTANEOUS DAILY
COMMUNITY
End: 2021-10-04

## 2021-08-05 RX ORDER — ONDANSETRON 2 MG/ML
4 INJECTION INTRAMUSCULAR; INTRAVENOUS EVERY 6 HOURS PRN
Status: DISCONTINUED | OUTPATIENT
Start: 2021-08-05 | End: 2021-08-06 | Stop reason: HOSPADM

## 2021-08-05 RX ORDER — ASPIRIN 81 MG/1
81 TABLET ORAL DAILY
Status: DISCONTINUED | OUTPATIENT
Start: 2021-08-05 | End: 2021-08-06 | Stop reason: HOSPADM

## 2021-08-05 RX ORDER — HEPARIN SODIUM 5000 [USP'U]/ML
5000 INJECTION, SOLUTION INTRAVENOUS; SUBCUTANEOUS EVERY 8 HOURS SCHEDULED
Status: DISCONTINUED | OUTPATIENT
Start: 2021-08-06 | End: 2021-08-06 | Stop reason: HOSPADM

## 2021-08-05 RX ORDER — KETOROLAC TROMETHAMINE 30 MG/ML
30 INJECTION, SOLUTION INTRAMUSCULAR; INTRAVENOUS EVERY 6 HOURS PRN
Status: DISCONTINUED | OUTPATIENT
Start: 2021-08-05 | End: 2021-08-05 | Stop reason: HOSPADM

## 2021-08-05 RX ORDER — METOPROLOL SUCCINATE 50 MG/1
100 TABLET, EXTENDED RELEASE ORAL DAILY
Status: DISCONTINUED | OUTPATIENT
Start: 2021-08-05 | End: 2021-08-06 | Stop reason: HOSPADM

## 2021-08-05 RX ORDER — INSULIN ASPART 100 [IU]/ML
8 INJECTION, SUSPENSION SUBCUTANEOUS
Status: CANCELLED | OUTPATIENT
Start: 2021-08-05

## 2021-08-05 RX ORDER — INSULIN ASPART 100 [IU]/ML
10 INJECTION, SUSPENSION SUBCUTANEOUS
Status: CANCELLED | OUTPATIENT
Start: 2021-08-05

## 2021-08-05 RX ORDER — DEXTROSE MONOHYDRATE 25 G/50ML
25 INJECTION, SOLUTION INTRAVENOUS
Status: DISCONTINUED | OUTPATIENT
Start: 2021-08-05 | End: 2021-08-06 | Stop reason: HOSPADM

## 2021-08-05 RX ADMIN — FENTANYL CITRATE 25 MCG: 50 INJECTION INTRAMUSCULAR; INTRAVENOUS at 11:24

## 2021-08-05 RX ADMIN — LIDOCAINE HYDROCHLORIDE 40 MG: 20 INJECTION, SOLUTION INFILTRATION; PERINEURAL at 10:05

## 2021-08-05 RX ADMIN — EPHEDRINE SULFATE 10 MG: 50 INJECTION, SOLUTION INTRAVENOUS at 17:30

## 2021-08-05 RX ADMIN — FENTANYL CITRATE 25 MCG: 50 INJECTION INTRAMUSCULAR; INTRAVENOUS at 11:07

## 2021-08-05 RX ADMIN — EPHEDRINE SULFATE 10 MG: 50 INJECTION, SOLUTION INTRAVENOUS at 17:20

## 2021-08-05 RX ADMIN — EPHEDRINE SULFATE 10 MG: 50 INJECTION, SOLUTION INTRAVENOUS at 17:21

## 2021-08-05 RX ADMIN — FENTANYL CITRATE 25 MCG: 50 INJECTION INTRAMUSCULAR; INTRAVENOUS at 17:40

## 2021-08-05 RX ADMIN — FENTANYL CITRATE 50 MCG: 50 INJECTION INTRAMUSCULAR; INTRAVENOUS at 11:54

## 2021-08-05 RX ADMIN — FENTANYL CITRATE 25 MCG: 50 INJECTION INTRAMUSCULAR; INTRAVENOUS at 16:08

## 2021-08-05 RX ADMIN — SUCCINYLCHOLINE CHLORIDE 180 MG: 20 INJECTION, SOLUTION INTRAMUSCULAR; INTRAVENOUS at 16:59

## 2021-08-05 RX ADMIN — FENTANYL CITRATE 100 MCG: 50 INJECTION INTRAMUSCULAR; INTRAVENOUS at 10:05

## 2021-08-05 RX ADMIN — FAMOTIDINE 20 MG: 10 INJECTION INTRAVENOUS at 10:05

## 2021-08-05 RX ADMIN — MAGNESIUM GLUCONATE 500 MG ORAL TABLET 400 MG: 500 TABLET ORAL at 22:19

## 2021-08-05 RX ADMIN — CEFAZOLIN 1 G: 1 INJECTION, POWDER, FOR SOLUTION INTRAVENOUS at 10:02

## 2021-08-05 RX ADMIN — ONDANSETRON 4 MG: 2 INJECTION INTRAMUSCULAR; INTRAVENOUS at 14:48

## 2021-08-05 RX ADMIN — SODIUM CHLORIDE, POTASSIUM CHLORIDE, SODIUM LACTATE AND CALCIUM CHLORIDE: 600; 310; 30; 20 INJECTION, SOLUTION INTRAVENOUS at 10:00

## 2021-08-05 RX ADMIN — SODIUM CHLORIDE, POTASSIUM CHLORIDE, SODIUM LACTATE AND CALCIUM CHLORIDE: 600; 310; 30; 20 INJECTION, SOLUTION INTRAVENOUS at 16:27

## 2021-08-05 RX ADMIN — SODIUM CHLORIDE, POTASSIUM CHLORIDE, SODIUM LACTATE AND CALCIUM CHLORIDE 125 ML/HR: 600; 310; 30; 20 INJECTION, SOLUTION INTRAVENOUS at 08:32

## 2021-08-05 RX ADMIN — PROPOFOL 120 MG: 10 INJECTION, EMULSION INTRAVENOUS at 16:59

## 2021-08-05 RX ADMIN — INSULIN ASPART 4 UNITS: 100 INJECTION, SOLUTION INTRAVENOUS; SUBCUTANEOUS at 19:26

## 2021-08-05 RX ADMIN — HYDROCODONE BITARTRATE AND ACETAMINOPHEN 1 TABLET: 5; 325 TABLET ORAL at 14:43

## 2021-08-05 RX ADMIN — TECHNETIUM TC 99M SULFUR COLLOID 1 DOSE: KIT at 09:05

## 2021-08-05 RX ADMIN — IBUPROFEN 400 MG: 400 TABLET, FILM COATED ORAL at 22:19

## 2021-08-05 RX ADMIN — KETOROLAC TROMETHAMINE 30 MG: 30 INJECTION, SOLUTION INTRAMUSCULAR at 10:49

## 2021-08-05 RX ADMIN — PROPOFOL 150 MG: 10 INJECTION, EMULSION INTRAVENOUS at 10:05

## 2021-08-05 RX ADMIN — FENTANYL CITRATE 50 MCG: 50 INJECTION INTRAMUSCULAR; INTRAVENOUS at 16:51

## 2021-08-05 RX ADMIN — ONDANSETRON 4 MG: 2 INJECTION INTRAMUSCULAR; INTRAVENOUS at 21:39

## 2021-08-05 RX ADMIN — ONDANSETRON 4 MG: 2 INJECTION INTRAMUSCULAR; INTRAVENOUS at 10:05

## 2021-08-05 RX ADMIN — EPHEDRINE SULFATE 10 MG: 50 INJECTION, SOLUTION INTRAVENOUS at 16:57

## 2021-08-05 NOTE — ANESTHESIA PROCEDURE NOTES
Airway  Urgency: elective    Date/Time: 8/5/2021 5:00 PM  End Time:8/5/2021 5:00 PM  Airway not difficult    General Information and Staff    Patient location during procedure: OR  CRNA: Isaac Ferrer CRNA    Indications and Patient Condition  Indications for airway management: airway protection    Preoxygenated: yes  MILS maintained throughout  Mask difficulty assessment: 0 - not attempted    Final Airway Details  Final airway type: endotracheal airway      Successful airway: ETT  Cuffed: yes   Successful intubation technique: direct laryngoscopy and RSI  Facilitating devices/methods: cricoid pressure  Endotracheal tube insertion site: oral  Blade: Shashi  Blade size: 3  ETT size (mm): 7.0  Cormack-Lehane Classification: grade IIa - partial view of glottis  Placement verified by: chest auscultation, capnometry and palpation of cuff   Cuff volume (mL): 8  Measured from: lips  ETT/EBT  to lips (cm): 22  Number of attempts at approach: 1  Assessment: lips, teeth, and gum same as pre-op and atraumatic intubation

## 2021-08-05 NOTE — ANESTHESIA PREPROCEDURE EVALUATION
Anesthesia Evaluation     Patient summary reviewed and Nursing notes reviewed   history of anesthetic complications: PONV               Airway   Mallampati: II  TM distance: >3 FB  Neck ROM: full  Dental - normal exam     Pulmonary     breath sounds clear to auscultation  (+) sleep apnea on CPAP,   Cardiovascular   Exercise tolerance: good (4-7 METS)    Rhythm: regular  Rate: normal    (+) hypertension, hyperlipidemia,       Neuro/Psych  (+) CVA (residual left hand tremor 2017), headaches,     GI/Hepatic/Renal/Endo    (+) obesity, morbid obesity, GERD,  renal disease, diabetes mellitus type 1,     Musculoskeletal     Abdominal     Abdomen: soft.   Substance History      OB/GYN          Other      history of cancer        Phys Exam Other: Echo May 2021 shows ejection fraction of 51 to 55%              Anesthesia Plan    ASA 3     general     intravenous induction     Anesthetic plan, all risks, benefits, and alternatives have been provided, discussed and informed consent has been obtained with: patient.    Plan discussed with CRNA.

## 2021-08-05 NOTE — ANESTHESIA POSTPROCEDURE EVALUATION
Patient: Ronna Wayne    Procedure Summary     Date: 08/05/21 Room / Location: [facility] OR 02 /  [facility] OR    Anesthesia Start: 1000 Anesthesia Stop: 1200    Procedure: BREAST LUMPECTOMY WITH SENTINEL NODE BIOPSY (Right Breast) Diagnosis:       Malignant neoplasm of upper-outer quadrant of right breast in female, estrogen receptor positive (CMS/HCC)      (Malignant neoplasm of upper-outer quadrant of right breast in female, estrogen receptor positive (CMS/HCC) [C50.411, Z17.0])    Surgeons: Lee Parrish MD Provider: Stuart Gray MD    Anesthesia Type: general ASA Status: 3          Anesthesia Type: general    Vitals  No vitals data found for the desired time range.          Post Anesthesia Care and Evaluation    Patient location during evaluation: PHASE II  Patient participation: complete - patient participated  Level of consciousness: awake and alert  Pain score: 0  Pain management: adequate  Airway patency: patent  Anesthetic complications: No anesthetic complications    Cardiovascular status: acceptable  Respiratory status: acceptable  Hydration status: acceptable

## 2021-08-05 NOTE — ANESTHESIA PREPROCEDURE EVALUATION
Anesthesia Evaluation     Patient summary reviewed and Nursing notes reviewed   history of anesthetic complications: PONV  NPO Solid Status: Waived due to emergency  NPO Liquid Status: Waived due to emergency           Airway   Mallampati: II  TM distance: >3 FB  Neck ROM: full  Dental - normal exam     Pulmonary     breath sounds clear to auscultation  (+) sleep apnea on CPAP,   Cardiovascular   Exercise tolerance: good (4-7 METS)    Rhythm: regular  Rate: normal    (+) hypertension, hyperlipidemia,       Neuro/Psych  (+) CVA (residual left hand tremor 2017), headaches,     GI/Hepatic/Renal/Endo    (+) obesity, morbid obesity, GERD,  renal disease, diabetes mellitus type 1,     Musculoskeletal     Abdominal     Abdomen: soft.   Substance History      OB/GYN          Other      history of cancer        Phys Exam Other: Echo May 2021 shows ejection fraction of 51 to 55%                  Anesthesia Plan    ASA 3 - emergent     general     intravenous induction     Anesthetic plan, all risks, benefits, and alternatives have been provided, discussed and informed consent has been obtained with: patient.    Plan discussed with CRNA.

## 2021-08-06 ENCOUNTER — READMISSION MANAGEMENT (OUTPATIENT)
Dept: CALL CENTER | Facility: HOSPITAL | Age: 66
End: 2021-08-06

## 2021-08-06 ENCOUNTER — NURSE NAVIGATOR (OUTPATIENT)
Dept: ONCOLOGY | Facility: HOSPITAL | Age: 66
End: 2021-08-06

## 2021-08-06 VITALS
DIASTOLIC BLOOD PRESSURE: 50 MMHG | HEIGHT: 63 IN | SYSTOLIC BLOOD PRESSURE: 120 MMHG | OXYGEN SATURATION: 97 % | WEIGHT: 288 LBS | HEART RATE: 80 BPM | TEMPERATURE: 97.9 F | RESPIRATION RATE: 16 BRPM | BODY MASS INDEX: 51.03 KG/M2

## 2021-08-06 LAB
DEPRECATED RDW RBC AUTO: 50.3 FL (ref 37–54)
ERYTHROCYTE [DISTWIDTH] IN BLOOD BY AUTOMATED COUNT: 13.3 % (ref 12.3–15.4)
GLUCOSE BLDC GLUCOMTR-MCNC: 208 MG/DL (ref 70–130)
GLUCOSE BLDC GLUCOMTR-MCNC: 252 MG/DL (ref 70–130)
HCT VFR BLD AUTO: 29.1 % (ref 34–46.6)
HGB BLD-MCNC: 9.2 G/DL (ref 12–15.9)
MCH RBC QN AUTO: 33.3 PG (ref 26.6–33)
MCHC RBC AUTO-ENTMCNC: 31.6 G/DL (ref 31.5–35.7)
MCV RBC AUTO: 105.4 FL (ref 79–97)
PLATELET # BLD AUTO: 131 10*3/MM3 (ref 140–450)
PMV BLD AUTO: 10.8 FL (ref 6–12)
RBC # BLD AUTO: 2.76 10*6/MM3 (ref 3.77–5.28)
WBC # BLD AUTO: 9.36 10*3/MM3 (ref 3.4–10.8)

## 2021-08-06 PROCEDURE — 25010000002 ONDANSETRON PER 1 MG: Performed by: SURGERY

## 2021-08-06 PROCEDURE — 99024 POSTOP FOLLOW-UP VISIT: CPT | Performed by: SURGERY

## 2021-08-06 PROCEDURE — G0378 HOSPITAL OBSERVATION PER HR: HCPCS

## 2021-08-06 PROCEDURE — 85027 COMPLETE CBC AUTOMATED: CPT | Performed by: SURGERY

## 2021-08-06 PROCEDURE — 94799 UNLISTED PULMONARY SVC/PX: CPT

## 2021-08-06 PROCEDURE — 63710000001 INSULIN ASPART PER 5 UNITS: Performed by: SURGERY

## 2021-08-06 PROCEDURE — 25010000002 HEPARIN (PORCINE) PER 1000 UNITS: Performed by: SURGERY

## 2021-08-06 PROCEDURE — 82962 GLUCOSE BLOOD TEST: CPT

## 2021-08-06 RX ORDER — TRAMADOL HYDROCHLORIDE 50 MG/1
50 TABLET ORAL EVERY 8 HOURS PRN
Qty: 8 TABLET | Refills: 0 | Status: ON HOLD | OUTPATIENT
Start: 2021-08-06 | End: 2022-07-07

## 2021-08-06 RX ORDER — ACETAMINOPHEN 325 MG/1
650 TABLET ORAL EVERY 4 HOURS PRN
Qty: 30 TABLET | Refills: 0 | Status: ON HOLD | OUTPATIENT
Start: 2021-08-06 | End: 2022-12-18

## 2021-08-06 RX ORDER — IBUPROFEN 600 MG/1
600 TABLET ORAL EVERY 6 HOURS PRN
Qty: 30 TABLET | Refills: 0 | Status: ON HOLD | OUTPATIENT
Start: 2021-08-06 | End: 2022-12-18

## 2021-08-06 RX ADMIN — IBUPROFEN 400 MG: 400 TABLET, FILM COATED ORAL at 08:09

## 2021-08-06 RX ADMIN — ONDANSETRON 4 MG: 2 INJECTION INTRAMUSCULAR; INTRAVENOUS at 03:03

## 2021-08-06 RX ADMIN — HYDROCODONE BITARTRATE AND ACETAMINOPHEN 1 TABLET: 5; 325 TABLET ORAL at 01:12

## 2021-08-06 RX ADMIN — ATORVASTATIN CALCIUM 20 MG: 20 TABLET, FILM COATED ORAL at 08:09

## 2021-08-06 RX ADMIN — ASPIRIN 81 MG: 81 TABLET, COATED ORAL at 12:11

## 2021-08-06 RX ADMIN — IBUPROFEN 400 MG: 400 TABLET, FILM COATED ORAL at 01:12

## 2021-08-06 RX ADMIN — LEVOTHYROXINE SODIUM 88 MCG: 88 TABLET ORAL at 08:09

## 2021-08-06 RX ADMIN — PANTOPRAZOLE SODIUM 40 MG: 40 TABLET, DELAYED RELEASE ORAL at 08:09

## 2021-08-06 RX ADMIN — ONDANSETRON 4 MG: 2 INJECTION INTRAMUSCULAR; INTRAVENOUS at 09:26

## 2021-08-06 RX ADMIN — INSULIN ASPART 4 UNITS: 100 INJECTION, SOLUTION INTRAVENOUS; SUBCUTANEOUS at 12:08

## 2021-08-06 RX ADMIN — INSULIN ASPART 6 UNITS: 100 INJECTION, SOLUTION INTRAVENOUS; SUBCUTANEOUS at 08:09

## 2021-08-06 RX ADMIN — HEPARIN SODIUM 5000 UNITS: 5000 INJECTION INTRAVENOUS; SUBCUTANEOUS at 14:25

## 2021-08-06 NOTE — ANESTHESIA POSTPROCEDURE EVALUATION
Patient: Ronna Wayne    Procedure Summary     Date: 08/05/21 Room / Location: [facility] OR  /  [facility] OR    Anesthesia Start: 1651 Anesthesia Stop: 1748    Procedure: HEMATOMA EVACUATION TRUNK (Right Abdomen) Diagnosis:       Malignant neoplasm of upper-outer quadrant of right breast in female, estrogen receptor positive (CMS/HCC)      (Malignant neoplasm of upper-outer quadrant of right breast in female, estrogen receptor positive (CMS/HCC) [C50.411, Z17.0])    Surgeons: Lee Parrish MD Provider: Alvaro Titus MD    Anesthesia Type: general ASA Status: 3 - Emergent          Anesthesia Type: general    Vitals  Vitals Value Taken Time   /62 08/05/21 1824   Temp 97 °F (36.1 °C) 08/05/21 1824   Pulse 61 08/05/21 1824   Resp 14 08/05/21 1824   SpO2 99 % 08/05/21 1824           Post Anesthesia Care and Evaluation    Patient location during evaluation: bedside  Patient participation: complete - patient participated  Level of consciousness: awake and alert  Pain score: 1  Pain management: adequate  Airway patency: patent  Anesthetic complications: No anesthetic complications  PONV Status: none  Cardiovascular status: acceptable  Respiratory status: acceptable  Hydration status: acceptable

## 2021-08-07 NOTE — OUTREACH NOTE
Prep Survey      Responses   [facility] facility patient discharged from?  [facility]   Is LACE score < 7 ?  No   Emergency Room discharge w/ pulse ox?  No   Eligibility  [facility]   Date of Admission  08/05/21   Date of Discharge  08/06/21   Discharge Disposition  Home or Self Care   Discharge diagnosis  malignant neoplasm of righ breast, breast lumpectomy/axillary dissection & post op hematoma evacuation   Does the patient have one of the following disease processes/diagnoses(primary or secondary)?  General Surgery   Does the patient have Home health ordered?  No   Is there a DME ordered?  No   Prep survey completed?  Yes          Ofelia Michelle RN

## 2021-08-09 ENCOUNTER — TRANSITIONAL CARE MANAGEMENT TELEPHONE ENCOUNTER (OUTPATIENT)
Dept: CALL CENTER | Facility: HOSPITAL | Age: 66
End: 2021-08-09

## 2021-08-09 NOTE — OUTREACH NOTE
Call Center TCM Note      Responses   [facility] patient discharged from?  [facility]   Does the patient have one of the following disease processes/diagnoses(primary or secondary)?  General Surgery   TCM attempt successful?  No [Verbal release for self only]   Unsuccessful attempts  Attempt 1          Laurel Hutchinson RN    8/9/2021, 13:55 EDT

## 2021-08-09 NOTE — OUTREACH NOTE
Call Center TCM Note      Responses   [facility] patient discharged from?  [facility]   Does the patient have one of the following disease processes/diagnoses(primary or secondary)?  General Surgery   TCM attempt successful?  No   Unsuccessful attempts  Attempt 2          Laurel Hutchinson RN    8/9/2021, 16:56 EDT

## 2021-08-10 ENCOUNTER — TRANSITIONAL CARE MANAGEMENT TELEPHONE ENCOUNTER (OUTPATIENT)
Dept: CALL CENTER | Facility: HOSPITAL | Age: 66
End: 2021-08-10

## 2021-08-10 ENCOUNTER — HOSPITAL ENCOUNTER (OUTPATIENT)
Facility: HOSPITAL | Age: 66
Setting detail: HOSPITAL OUTPATIENT SURGERY
End: 2021-08-10
Attending: INTERNAL MEDICINE | Admitting: INTERNAL MEDICINE

## 2021-08-10 PROBLEM — Z86.0100 PERSONAL HISTORY OF COLONIC POLYPS: Status: ACTIVE | Noted: 2021-08-10

## 2021-08-10 PROBLEM — Z86.010 PERSONAL HISTORY OF COLONIC POLYPS: Status: ACTIVE | Noted: 2021-08-10

## 2021-08-10 NOTE — OUTREACH NOTE
Call Center TCM Note      Responses   [facility] patient discharged from?  [facility]   Does the patient have one of the following disease processes/diagnoses(primary or secondary)?  General Surgery   TCM attempt successful?  No [Verbal release for self only]   Unsuccessful attempts  Attempt 3          Laurel Hutchinson RN    8/10/2021, 12:48 EDT

## 2021-08-17 ENCOUNTER — OFFICE VISIT (OUTPATIENT)
Dept: SURGERY | Facility: CLINIC | Age: 66
End: 2021-08-17

## 2021-08-17 VITALS — WEIGHT: 288 LBS | BODY MASS INDEX: 51.03 KG/M2 | HEIGHT: 63 IN

## 2021-08-17 DIAGNOSIS — Z17.0 MALIGNANT NEOPLASM OF UPPER-OUTER QUADRANT OF RIGHT BREAST IN FEMALE, ESTROGEN RECEPTOR POSITIVE (HCC): Primary | ICD-10-CM

## 2021-08-17 DIAGNOSIS — C50.411 MALIGNANT NEOPLASM OF UPPER-OUTER QUADRANT OF RIGHT BREAST IN FEMALE, ESTROGEN RECEPTOR POSITIVE (HCC): Primary | ICD-10-CM

## 2021-08-17 PROCEDURE — 99024 POSTOP FOLLOW-UP VISIT: CPT | Performed by: SURGERY

## 2021-08-17 NOTE — PROGRESS NOTES
Subjective   Ronna Wayne is a 66 y.o. female  is here today for follow-up.         Ronna Wayne is a 66 y.o. female here for follow up after right breast lumpectomy with sentinel lymph node biopsy was positive requiring level 1 and 2 axillary howard dissection.  Postoperatively her course was complicated by postoperative day 0 hematoma development for which she was taken back to the operating room and ligation of a single bleeding vessel was performed.  The patient is doing well and her incisions have healed nicely.  VICKI drainage is serous and her drain will be removed in the office today due to being in place for 12 days.                        Assessment     Diagnoses and all orders for this visit:    1. Malignant neoplasm of upper-outer quadrant of right breast in female, estrogen receptor positive (CMS/HCC) (Primary)      Ronna Wayne is a 66 y.o. female status post right breast lumpectomy with axillary howard dissection.  The patient had a single 2.5 mm howard met with extranodal extension and the remaining lymph nodes were negative for malignancy.  Final excision margins on the lumpectomy specimen were negative.  The patient is doing well and will be referred for adjuvant hormone therapy and radiation therapy as well as possible chemotherapy.  Follow-up in 1 week.

## 2021-08-25 ENCOUNTER — OFFICE VISIT (OUTPATIENT)
Dept: SURGERY | Facility: CLINIC | Age: 66
End: 2021-08-25

## 2021-08-25 ENCOUNTER — CONSULT (OUTPATIENT)
Dept: ONCOLOGY | Facility: CLINIC | Age: 66
End: 2021-08-25

## 2021-08-25 VITALS
WEIGHT: 276.2 LBS | HEIGHT: 63 IN | TEMPERATURE: 96.9 F | HEART RATE: 68 BPM | BODY MASS INDEX: 48.94 KG/M2 | SYSTOLIC BLOOD PRESSURE: 148 MMHG | DIASTOLIC BLOOD PRESSURE: 71 MMHG | RESPIRATION RATE: 18 BRPM | OXYGEN SATURATION: 99 %

## 2021-08-25 VITALS — WEIGHT: 276 LBS | BODY MASS INDEX: 48.9 KG/M2 | HEIGHT: 63 IN

## 2021-08-25 DIAGNOSIS — Z17.0 MALIGNANT NEOPLASM OF UPPER-OUTER QUADRANT OF RIGHT BREAST IN FEMALE, ESTROGEN RECEPTOR POSITIVE (HCC): Primary | ICD-10-CM

## 2021-08-25 DIAGNOSIS — C50.411 MALIGNANT NEOPLASM OF UPPER-OUTER QUADRANT OF RIGHT BREAST IN FEMALE, ESTROGEN RECEPTOR POSITIVE (HCC): Primary | ICD-10-CM

## 2021-08-25 PROCEDURE — 99024 POSTOP FOLLOW-UP VISIT: CPT | Performed by: SURGERY

## 2021-08-25 PROCEDURE — 99205 OFFICE O/P NEW HI 60 MIN: CPT | Performed by: INTERNAL MEDICINE

## 2021-08-25 RX ORDER — IBUPROFEN 600 MG/1
600 TABLET ORAL EVERY 8 HOURS PRN
Qty: 60 TABLET | Refills: 2 | Status: SHIPPED | OUTPATIENT
Start: 2021-08-25 | End: 2022-04-08

## 2021-08-25 NOTE — PROGRESS NOTES
Name:  Ronna Wayne  :  1955  Date:  2021     REFERRING PHYSICIAN  Lee Parrish MD    PRIMARY CARE PROVIDER  Mague Acosta APRN    REASON FOR CONSULTATION  1. Malignant neoplasm of upper-outer quadrant of right breast in female, estrogen receptor positive (CMS/HCC)      CHIEF COMPLAINT  None.    Dear Lee,    HISTORY OF PRESENT ILLNESS:   Thank you for referring Ms. Wayne to our medical oncology clinic. As you are aware, she is a pleasant, 66 y.o., white female with a history of multiple medical problems, including hypertension, diabetes and sleep apnea who was diagnosed with an ER positive (95%), NM positive (95%), HER2/abida negative (1+ by IHC), invasive, mammary carcinoma (with mixed ductal and lobular features) of the upper, outer quadrant of the right breast in late Spring 2021. She was referred to you, and you performed a successful, right-sided lumpectomy on 2021. A sentinel node was positive; however, an additional twelve, excised, right-sided, axillary lymph nodes were uninvolved (). Final, surgical staging was consistent with stage [id number] disease (IIB). She has an appointment scheduled with [facility] radiation oncology in early September. She is now also referred to our clinic for further evaluation and management.    INTERIM HISTORY:  Ms. Wayne presents to clinic today for initial consultation by herself. She confirms the above history. Other than developing a post-operative hematoma (which has since resolved), she has recovered overall uneventfully from her lumpectomy a few weeks ago. She has no specific complaints in clinic today.    Past Medical History:   Diagnosis Date   • Anxiety    • B12 deficiency    • BPV (benign positional vertigo)    • Breast cancer (CMS/HCC) 2021   • Diabetes mellitus (CMS/HCC)    • Diarrhea    • Disease of thyroid gland    • Elevated cholesterol    • Fibromyalgia    • GERD (gastroesophageal reflux disease)    • Glaucoma    •  Heart murmur    • Hyperlipidemia    • Hypertension    • Kidney disease    • Obesity    • Peptic ulceration    • PONV (postoperative nausea and vomiting)    • Sleep apnea     c-pap   • Stroke (CMS/HCC)    • Weakness of left arm        Past Surgical History:   Procedure Laterality Date   • ABDOMINAL SURGERY     • APPENDECTOMY     • BREAST CYST ASPIRATION     • BREAST LUMPECTOMY WITH SENTINEL NODE BIOPSY Right 2021    Procedure: BREAST LUMPECTOMY WITH SENTINEL NODE BIOPSY;  Surgeon: Lee Parrish MD;  Location: [facility] OR;  Service: General;  Laterality: Right;   • CATARACT EXTRACTION Bilateral    • CHOLECYSTECTOMY     • COLONOSCOPY     • ENDOSCOPY     • HEMATOMA EVACUATION TRUNK Right 2021    Procedure: HEMATOMA EVACUATION TRUNK;  Surgeon: Lee Parrish MD;  Location: [facility] OR;  Service: General;  Laterality: Right;   • URETHRA SURGERY         Social History     Socioeconomic History   • Marital status:      Spouse name: Not on file   • Number of children: Not on file   • Years of education: Not on file   • Highest education level: Not on file   Tobacco Use   • Smoking status: Former Smoker     Packs/day: 1.50     Years: 8.00     Pack years: 12.00     Types: Cigarettes     Quit date:      Years since quittin.6   • Smokeless tobacco: Never Used   Vaping Use   • Vaping Use: Never used   Substance and Sexual Activity   • Alcohol use: No     Comment: former social drinker not had anything in 25 + years   • Drug use: No   • Sexual activity: Defer       Family History   Problem Relation Age of Onset   • Thyroid disease Mother    • Diabetes Mother    • Hyperlipidemia Father    • Hypertension Father    • Heart attack Father    • Alcohol abuse Maternal Aunt    • Cancer Maternal Grandfather         PROSTATE   • Stroke Paternal Grandmother    • Stroke Paternal Grandfather    • Hypertension Other    • Hypertension Sister    • Breast cancer Sister 40   • Obesity Sister    • Hypertension  Sister    • Hyperlipidemia Sister    • Hyperlipidemia Sister    • Hypertension Sister    • Arthritis Sister    • Obesity Sister    • Thyroid disease Sister        Allergies   Allergen Reactions   • Codeine GI Intolerance     Increased heart rate     • Sulfa Antibiotics GI Intolerance     Heart rate increase        Current Outpatient Medications   Medication Sig Dispense Refill   • acetaminophen (TYLENOL) 325 MG tablet Take 2 tablets by mouth Every 4 (Four) Hours As Needed for Mild Pain . 30 tablet 0   • amLODIPine (NORVASC) 5 MG tablet Take 1 tablet by mouth Daily. 30 tablet 11   • aspirin 81 MG EC tablet Take 1 tablet by mouth Daily. 90 tablet 3   • empagliflozin (Jardiance) 25 MG tablet tablet Take 1 tablet by mouth Every Morning. 35 tablet 0   • hydroCHLOROthiazide (HYDRODIURIL) 12.5 MG tablet Take 12.5 mg by mouth Daily As Needed (swelling).     • ibuprofen (ADVIL,MOTRIN) 600 MG tablet Take 1 tablet by mouth Every 6 (Six) Hours As Needed for Mild Pain . 30 tablet 0   • insulin aspart prot-insulin aspart (novoLOG 70/30) (70-30) 100 UNIT/ML injection Inject 5 Units under the skin into the appropriate area as directed Daily With Breakfast.     • insulin aspart prot-insulin aspart (novoLOG 70/30) (70-30) 100 UNIT/ML injection Inject 8 Units under the skin into the appropriate area as directed Daily With Lunch.     • insulin aspart prot-insulin aspart (novoLOG 70/30) (70-30) 100 UNIT/ML injection Inject 10 Units under the skin into the appropriate area as directed Daily With Dinner.     • Insulin Glargine (BASAGLAR KWIKPEN) 100 UNIT/ML injection pen Inject 75 Units under the skin into the appropriate area as directed Daily.     • levothyroxine (SYNTHROID, LEVOTHROID) 88 MCG tablet Take 1 tablet by mouth Daily. 30 tablet 1   • losartan (COZAAR) 50 MG tablet Take 1 tablet by mouth Daily. 30 tablet 5   • magnesium oxide (MAG-OX) 400 MG tablet Take 1 tablet by mouth 3 (Three) Times a Day. 90 tablet 3   • metoprolol  "succinate XL (TOPROL-XL) 100 MG 24 hr tablet Take 1 tablet by mouth Daily. 30 tablet 2   • traMADol (ULTRAM) 50 MG tablet Take 1 tablet by mouth Every 8 (Eight) Hours As Needed for Moderate Pain . 8 tablet 0   • cholecalciferol (VITAMIN D3) 25 MCG (1000 UT) tablet Take 1,000 Units by mouth Daily.       No current facility-administered medications for this visit.     REVIEW OF SYSTEMS  CONSTITUTIONAL:  No fever, chills, night sweats or fatigue.  EYES:  No blurry vision, diplopia or other vision changes.  ENT:  No hearing loss, nosebleeds or sore throat.  CARDIOVASCULAR:  No palpitations, arrhythmia, syncopal episodes or edema.  PULMONARY:  No hemoptysis, wheezing, chronic cough or shortness of breath.  BREASTS: As per the HPI above.  GASTROINTESTINAL:  No nausea or vomiting.  No constipation or diarrhea.  No abdominal pain.  GENITOURINARY:  No hematuria, kidney stones or frequent urination.  MUSCULOSKELETAL:  No joint or back pains.  INTEGUMENTARY: No rashes or pruritus.  ENDOCRINE:  No excessive thirst or hot flashes.  HEMATOLOGIC:  No history of free bleeding, spontaneous bleeding or clotting.  IMMUNOLOGIC:  No allergies or frequent infections.  NEUROLOGIC: No numbness, tingling, seizures or weakness.  PSYCHIATRIC:  No anxiety or depression.    PHYSICAL EXAMINATION  /71   Pulse 68   Temp 96.9 °F (36.1 °C) (Temporal)   Resp 18   Ht 160 cm (63\")   Wt 125 kg (276 lb 3.2 oz)   SpO2 99%   BMI 48.93 kg/m²     Pain Score:  Pain Score    08/25/21 1334   PainSc: 0-No pain       PHQ-Score Total:  PHQ-9 Total Score: 17    GENERAL:  A well-developed, well-nourished, obese, white female in no acute distress.  HEENT:  Pupils equally round and reactive to light.  Extraocular muscles intact.  CARDIOVASCULAR:  Regular rate and rhythm.  No murmurs, gallops or rubs.  LUNGS:  Clear to auscultation bilaterally.  BREASTS: Deferred today. Status post right-sided lumpectomy on 08/05/2021.  ABDOMEN:  Soft, nontender, " nondistended with positive bowel sounds.  EXTREMITIES:  No clubbing, cyanosis or edema bilaterally.  SKIN:  No rashes or petechiae.  NEURO:  Cranial nerves grossly intact.  No focal deficits.  PSYCH:  Alert and oriented x3.    LABORATORY  Lab Results   Component Value Date    WBC 9.36 08/06/2021    HGB 9.2 (L) 08/06/2021    HCT 29.1 (L) 08/06/2021    .4 (H) 08/06/2021     (L) 08/06/2021    NEUTROABS 2.61 06/19/2021       Lab Results   Component Value Date     08/03/2021    K 4.8 08/03/2021     08/03/2021    CO2 24.7 08/03/2021    BUN 18 08/03/2021    CREATININE 0.81 08/03/2021    GLUCOSE 220 (H) 08/03/2021    CALCIUM 9.9 08/03/2021    AST 30 06/22/2021    ALT 24 06/22/2021    ALKPHOS 67 06/22/2021    BILITOT 0.8 06/22/2021    PROTEINTOT 6.9 06/22/2021    ALBUMIN 3.90 06/22/2021     CBC (08/06/2021): WBCs: 9.36; HgB: 9.2; Hct: 29.1; platelets: 131    IMAGING  MRI breast bilateral with and without contrast (06/22/2021):  Impression:  1) Negative left breast MRI.  2) Biopsy proven malignancy in the right 9:00 region measuring 3.7 cm. There are no additional worrisome findings in the right breast.    PATHOLOGY  Breast, lumpectomy, right (08/05/2021):  Invasive mammary carcinoma with ductal and lobular features, margins uninvolved. Atypical, lobular hyperplasia with involvement of ducts. Dense stromal fibrosis. Greatest dimension of invasive focus: 31 mm. ER positive (95%), SC positive (95%), HER2 negative (1+ by IHC). [id number] (stage IIB).    Cosmos node, right #1 (08/05/2021):  Metastatic mammary carcinoma, 2.5 cmm extent, with extranodal extension (1/1).    Axillary contents, right (08/05/2021):  No malignancy identified in twelve axillary lymph nodes (0/12).    IMPRESSION AND PLAN  Ms. Wayne is a 66 y.o., white female with:  Breast carcinoma: Diagnosed in late Spring 2021 and status post a right-sided lumpectomy with right axillary evaluation on 08/05/2021. The final, surgical pathology  was consistent with stage IIB ([id number]), ER positive (95%), KS positive (95%), HER2 negative (1+ by IHC), invasive, mammary carcinoma (with ductal and lobular features) of the right breast. The surgical margins were clear; and, while a sentinel node was involved, an additional twelve lymph nodes were all negative for metastasis (1/13). She has been recovered well from her procedure over the course of the past ~three weeks. I had a long discussion with the patient in clinic today regarding this diagnosis and its prognosis. In short, her surgery went extremely well; and her prognosis is very good. That said, adjuvant therapy is now recommended in order to maximize her chances of cure. As she had a lumpectomy, whole breast radiation is definitely indicated (and she has an initial consultation with [facility] radiation oncology scheduled in early September). As her tumor is very strongly ER/KS positive, adjuvant endocrine therapy (with an aromatase inhibitor such as Arimidex, given her postmenopausal status) is also definitely indicated (which is typically started after radiation). However, prior to proceeding with these therapies, she is agreeable to having her lumpectomy specimen sent off for a 21-gene RT-PCR assay (MammaPrint), as whether or not chemotherapy will be recommended as the initial step in her adjuvant treatment, before XRT and Arimidex, will depend on these results (and whether or not her tumor is found to be high risk). We will see her back in clinic in two weeks with the MammaPrint results, at which time we will finalize the adjuvant treatment plans. The patient was in agreement with these plans.    It is a pleasure to participate in Ms. Wayne's care. Please do not hesitate to call with any questions or concerns that you may have.    A total of 60 minutes were spent coordinating this patient’s care in clinic today; more than 50% of this time was face-to-face with the patient, reviewing her medical history  and counseling on the current treatment and followup plan. All questions were answered to her satisfaction.    FOLLOW UP  MammaPrint requested on patient's recent lumpectomy specimen(s). With surgery as previously planned. With [facility] radiation oncology early next month for initial consultation, as previously planned. Return to our clinic in 2 weeks (with the results of MammaPrint).            This document was electronically signed by LEYDI Teresa MD August 25, 2021 14:01 EDT      CC: MD Marbin Ramirez MD Sherelene S. Fortney, APRN

## 2021-08-26 ENCOUNTER — PATIENT ROUNDING (BHMG ONLY) (OUTPATIENT)
Dept: ONCOLOGY | Facility: CLINIC | Age: 66
End: 2021-08-26

## 2021-08-26 NOTE — PROGRESS NOTES
August 26, 2021    Hello, may I speak with Ronna Wayne?    My name is Blanca Galvez    I am  with [facility]  [address] [facility]  [place] [id number] [phone].    Before we get started may I verify your date of birth? 1955    I am calling to officially welcome you to our practice and ask about your recent visit. Is this a good time to talk? yes    Tell me about your visit with us. What things went well?  She was very pleased with her visit, Dr. Teresa was very thorough and all of the staff was very nice.         We're always looking for ways to make our patients' experiences even better. Do you have recommendations on ways we may improve?  no    Overall were you satisfied with your first visit to our practice? yes       I appreciate you taking the time to speak with me today. Is there anything else I can do for you? no      Thank you, and have a great day.

## 2021-08-26 NOTE — PROGRESS NOTES
Subjective   Ronna Wayne is a 66 y.o. female  is here today for follow-up.         Ronna Wayne is a 66 y.o. female here for follow up after right breast lumpectomy with sentinel lymph node biopsy was positive requiring level 1 and 2 axillary howard dissection.  Postoperatively her course was complicated by postoperative day 0 hematoma development for which she was taken back to the operating room and ligation of a single bleeding vessel was performed.  The patient is doing well and her incisions have healed nicely.                          Assessment     Diagnoses and all orders for this visit:    1. Malignant neoplasm of upper-outer quadrant of right breast in female, estrogen receptor positive (CMS/HCC) (Primary)    Other orders  -     ibuprofen (ADVIL,MOTRIN) 600 MG tablet; Take 1 tablet by mouth Every 8 (Eight) Hours As Needed for Moderate Pain .  Dispense: 60 tablet; Refill: 2      Ronna Wayne is a 66 y.o. female status post right breast lumpectomy with axillary howard dissection.  The patient had a single 2.5 mm howard met with extranodal extension and the remaining lymph nodes were negative for malignancy.  Final excision margins on the lumpectomy specimen were negative.  The patient is doing well and will be referred for adjuvant hormone therapy and radiation therapy as well as possible chemotherapy.  Follow-up in 3 months.  She will return sooner if MammaPrint and discussion lead to adjuvant chemotherapy.

## 2021-08-30 ENCOUNTER — TELEPHONE (OUTPATIENT)
Dept: ONCOLOGY | Facility: CLINIC | Age: 66
End: 2021-08-30

## 2021-08-30 DIAGNOSIS — E03.9 HYPOTHYROIDISM, UNSPECIFIED TYPE: ICD-10-CM

## 2021-08-30 RX ORDER — LEVOTHYROXINE SODIUM 88 UG/1
TABLET ORAL
Qty: 30 TABLET | Refills: 1 | Status: SHIPPED | OUTPATIENT
Start: 2021-08-30 | End: 2021-11-30 | Stop reason: SDUPTHER

## 2021-08-30 NOTE — TELEPHONE ENCOUNTER
SUMMER CALLING FROM [place]      BEST CALL BACK NUMBER:[phone]      NEEDING TO VERIFY PT ,     VERIFIED WITH 1955      WITH MRN [id number]

## 2021-09-01 ENCOUNTER — OFFICE VISIT (OUTPATIENT)
Dept: RADIATION ONCOLOGY | Facility: HOSPITAL | Age: 66
End: 2021-09-01

## 2021-09-02 LAB
LAB AP CASE REPORT: NORMAL
Lab: NORMAL
PATH REPORT.GROSS SPEC: NORMAL

## 2021-09-08 ENCOUNTER — CONSULT (OUTPATIENT)
Dept: RADIATION ONCOLOGY | Facility: HOSPITAL | Age: 66
End: 2021-09-08

## 2021-09-08 VITALS
OXYGEN SATURATION: 95 % | DIASTOLIC BLOOD PRESSURE: 72 MMHG | SYSTOLIC BLOOD PRESSURE: 167 MMHG | WEIGHT: 282 LBS | BODY MASS INDEX: 49.96 KG/M2 | HEART RATE: 60 BPM | RESPIRATION RATE: 18 BRPM | TEMPERATURE: 97.3 F | HEIGHT: 63 IN

## 2021-09-08 DIAGNOSIS — C50.411 MALIGNANT NEOPLASM OF UPPER-OUTER QUADRANT OF RIGHT BREAST IN FEMALE, ESTROGEN RECEPTOR POSITIVE (HCC): ICD-10-CM

## 2021-09-08 DIAGNOSIS — Z17.0 MALIGNANT NEOPLASM OF UPPER-OUTER QUADRANT OF RIGHT BREAST IN FEMALE, ESTROGEN RECEPTOR POSITIVE (HCC): ICD-10-CM

## 2021-09-08 PROCEDURE — G0463 HOSPITAL OUTPT CLINIC VISIT: HCPCS | Performed by: RADIOLOGY

## 2021-09-08 PROCEDURE — 99204 OFFICE O/P NEW MOD 45 MIN: CPT | Performed by: RADIOLOGY

## 2021-09-08 NOTE — PROGRESS NOTES
New Patient Office Consult      Patient Name: Ronna Wayne  : 1955   MRN: [id number]     Requesting Physician: Lee Parrish MD    Chief Complaint:    Chief Complaint   Patient presents with   • Breast Cancer     right      Staging: Malignant neoplasm of upper-outer quadrant of right breast in female, estrogen receptor positive (CMS/HCC)  - IIB  - pT2, pN1, cM0       History of Present Illness: Ronna Wayne is a pleasant 66 y.o. female who is here today for consultation regarding radiation therapy to the right breast. 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     2021- right sided lumpectomy performed. 1 sentinel node was positive, therefore 12 more were biopsied and were all uninvolved (). Staging at this point was determined to be IIB ([id number]). She had a single 2.5 mm howard met with extranodal extension and the remaining lymph nodes were negative for malignancy. Final excision margins on the lumpectomy specimen were negative.    2021- patient MammaPrint results determined her to be low risk.       Subjective      Review of Systems:   Review of Systems   Constitutional: Negative.  Negative for fatigue.   HENT: Negative.    Eyes: Negative.    Respiratory: Negative.  Negative for cough, chest tightness and shortness of breath.    Cardiovascular: Negative.  Negative for chest pain.   Gastrointestinal: Negative.  Negative for nausea and vomiting.   Endocrine: Negative.    Genitourinary: Negative.    Musculoskeletal: Negative.    Skin: Positive for color change.        Surgical scar to right nipple and right axilla   Neurological: Negative.    Hematological: Negative.    Psychiatric/Behavioral: Negative.      Review of Systems   Constitutional: Negative.  Negative for fatigue.    HENT:  Negative.    Eyes: Negative.    Respiratory: Negative.  Negative for chest tightness, cough and shortness of breath.    Cardiovascular: Negative.  Negative for chest pain.   Gastrointestinal: Negative.  Negative for nausea and vomiting.   Endocrine: Negative.    Genitourinary: Negative.     Musculoskeletal: Negative.    Skin: Positive for color change.        Surgical scar to right nipple and right axilla   Neurological: Negative.    Hematological: Negative.    Psychiatric/Behavioral: Negative.        I have reviewed and confirmed the accuracy of the ROS as documented by the MA/LPN/RN BOSTON Damon     Past Oncology History:   Oncology/Hematology History   Malignant neoplasm of upper-outer quadrant of right breast in female, estrogen receptor positive (CMS/HCC)   6/2/2021 Initial Diagnosis    Malignant neoplasm of upper-outer quadrant of right breast in female, estrogen receptor positive (CMS/HCC)     8/25/2021 Cancer Staged    Staging form: Breast, AJCC 8th Edition  - Pathologic: pT2, pN1, cM0 - Signed by LEYDI Teresa MD on 8/25/2021          Past Radiation History:  No previous exposures to ionizing radiation therapy and there are not relative contraindications including connective tissue disorder.     Past Medical History:   Past Medical History:   Diagnosis Date   • Anxiety    • B12 deficiency    • BPV (benign positional vertigo)    • Breast cancer (CMS/HCC) 05/2021   • Diabetes mellitus (CMS/HCC)    • Diarrhea    • Disease of thyroid gland    • Elevated cholesterol    • Fibromyalgia    • GERD (gastroesophageal reflux disease)    • Glaucoma    • Heart murmur    • Hyperlipidemia    • Hypertension    • Kidney disease    • Obesity    • Peptic ulceration    • PONV (postoperative nausea and vomiting)    • Sleep apnea     c-pap   • Stroke (CMS/HCC)    • Weakness of left arm        Past Surgical History:   Past Surgical History:   Procedure Laterality Date   • ABDOMINAL SURGERY     •  APPENDECTOMY     • BREAST CYST ASPIRATION     • BREAST LUMPECTOMY WITH SENTINEL NODE BIOPSY Right 2021    Procedure: BREAST LUMPECTOMY WITH SENTINEL NODE BIOPSY;  Surgeon: Lee Parrish MD;  Location:  [facility];  Service: General;  Laterality: Right;   • CATARACT EXTRACTION Bilateral    • CHOLECYSTECTOMY     • COLONOSCOPY     • ENDOSCOPY     • HEMATOMA EVACUATION TRUNK Right 2021    Procedure: HEMATOMA EVACUATION TRUNK;  Surgeon: Lee Parrish MD;  Location:  [facility];  Service: General;  Laterality: Right;   • URETHRA SURGERY         Family History:   Family History   Problem Relation Age of Onset   • Thyroid disease Mother    • Diabetes Mother    • Hyperlipidemia Father    • Hypertension Father    • Heart attack Father    • Alcohol abuse Maternal Aunt    • Cancer Maternal Grandfather         PROSTATE   • Stroke Paternal Grandmother    • Stroke Paternal Grandfather    • Hypertension Other    • Hypertension Sister    • Breast cancer Sister 40   • Obesity Sister    • Hypertension Sister    • Hyperlipidemia Sister    • Hyperlipidemia Sister    • Hypertension Sister    • Arthritis Sister    • Obesity Sister    • Thyroid disease Sister        Social History:   Social History     Socioeconomic History   • Marital status:      Spouse name: Not on file   • Number of children: Not on file   • Years of education: Not on file   • Highest education level: Not on file   Tobacco Use   • Smoking status: Former Smoker     Packs/day: 1.50     Years: 8.00     Pack years: 12.00     Types: Cigarettes     Quit date:      Years since quittin.7   • Smokeless tobacco: Never Used   Vaping Use   • Vaping Use: Never used   Substance and Sexual Activity   • Alcohol use: No     Comment: former social drinker not had anything in 25 + years   • Drug use: No   • Sexual activity: Defer       Medications:     Current Outpatient Medications:   •  amLODIPine (NORVASC) 5 MG tablet, Take 1 tablet by mouth Daily.,  Disp: 30 tablet, Rfl: 11  •  aspirin 81 MG EC tablet, Take 1 tablet by mouth Daily., Disp: 90 tablet, Rfl: 3  •  cholecalciferol (VITAMIN D3) 25 MCG (1000 UT) tablet, Take 1,000 Units by mouth Daily., Disp: , Rfl:   •  empagliflozin (Jardiance) 25 MG tablet tablet, Take 1 tablet by mouth Every Morning., Disp: 35 tablet, Rfl: 0  •  hydroCHLOROthiazide (HYDRODIURIL) 12.5 MG tablet, Take 12.5 mg by mouth Daily As Needed (swelling)., Disp: , Rfl:   •  ibuprofen (ADVIL,MOTRIN) 600 MG tablet, Take 1 tablet by mouth Every 6 (Six) Hours As Needed for Mild Pain ., Disp: 30 tablet, Rfl: 0  •  ibuprofen (ADVIL,MOTRIN) 600 MG tablet, Take 1 tablet by mouth Every 8 (Eight) Hours As Needed for Moderate Pain ., Disp: 60 tablet, Rfl: 2  •  insulin aspart prot-insulin aspart (novoLOG 70/30) (70-30) 100 UNIT/ML injection, Inject 5 Units under the skin into the appropriate area as directed Daily With Breakfast., Disp: , Rfl:   •  insulin aspart prot-insulin aspart (novoLOG 70/30) (70-30) 100 UNIT/ML injection, Inject 8 Units under the skin into the appropriate area as directed Daily With Lunch., Disp: , Rfl:   •  insulin aspart prot-insulin aspart (novoLOG 70/30) (70-30) 100 UNIT/ML injection, Inject 10 Units under the skin into the appropriate area as directed Daily With Dinner., Disp: , Rfl:   •  Insulin Glargine (BASAGLAR KWIKPEN) 100 UNIT/ML injection pen, Inject 75 Units under the skin into the appropriate area as directed Daily., Disp: , Rfl:   •  levothyroxine (SYNTHROID, LEVOTHROID) 88 MCG tablet, TAKE ONE TABLET BY MOUTH DAILY, Disp: 30 tablet, Rfl: 1  •  losartan (COZAAR) 50 MG tablet, Take 1 tablet by mouth Daily., Disp: 30 tablet, Rfl: 5  •  magnesium oxide (MAG-OX) 400 MG tablet, Take 1 tablet by mouth 3 (Three) Times a Day., Disp: 90 tablet, Rfl: 3  •  metoprolol succinate XL (TOPROL-XL) 100 MG 24 hr tablet, Take 1 tablet by mouth Daily., Disp: 30 tablet, Rfl: 2  •  acetaminophen (TYLENOL) 325 MG tablet, Take 2 tablets  by mouth Every 4 (Four) Hours As Needed for Mild Pain ., Disp: 30 tablet, Rfl: 0  •  traMADol (ULTRAM) 50 MG tablet, Take 1 tablet by mouth Every 8 (Eight) Hours As Needed for Moderate Pain ., Disp: 8 tablet, Rfl: 0    Allergies:   Allergies   Allergen Reactions   • Codeine GI Intolerance     Increased heart rate     • Sulfa Antibiotics GI Intolerance     Heart rate increase        KPS: 90%     Results Review:   The following data was reviewed by: BOSTON Damon on 09/08/2021:  Common labs    Common Labsle 8/3/21 8/3/21 8/5/21 8/5/21 8/6/21    1205 1205 1553 2003    Glucose  220 (A)      BUN  18      Creatinine  0.81      eGFR Non African Am  71      Sodium  138      Potassium  4.8      Chloride  102      Calcium  9.9      WBC 7.42  13.10 (A) 11.58 (A) 9.36   Hemoglobin 13.5  11.1 (A) 11.0 (A) 9.2 (A)   Hematocrit 39.9  35.8 36.2 29.1 (A)   Platelets 143  137 (A) 121 (A) 131 (A)   (A) Abnormal value            Imaging:    Diagnostic Mammo 5/12/2021  IMPRESSION:  Findings highly suspicious for malignancy in the right  breast     BI-RADS CATEGORY:  5, HIGHLY SUGGESTIVE OF MALIGNANCY     RECOMMENDATION:  Recommend ultrasound-guided core biopsy of the right  breast.      MRI Breast Bilateral 6/22/2021  FINDINGS: There is minimal bilateral background contrast enhancement and  normal vascular enhancement.     There are no worrisome areas of contrast enhancement in the left breast.     Correlating to the biopsy-proven malignancy in the anterior right 9:00  region is an approximately 3.2 x 3.7 cm irregular rapidly enhancing mass  associated with artifact from a postbiopsy marking clip. There are no  additional worrisome areas of contrast enhancement in the right breast.     There is no axillary adenopathy by MRI criteria and no chest wall  abnormality is seen.       Pathology:  5/19/2021 8/5/2021 8/31/2021        Objective     Physical Exam:  Physical Exam  Vitals reviewed.   Constitutional:   "     General: She is not in acute distress.     Appearance: Normal appearance. She is normal weight. She is not ill-appearing.   HENT:      Head: Normocephalic.      Nose: Nose normal.      Mouth/Throat:      Mouth: Mucous membranes are moist.      Pharynx: Oropharynx is clear.   Eyes:      Extraocular Movements: Extraocular movements intact.      Conjunctiva/sclera: Conjunctivae normal.      Pupils: Pupils are equal, round, and reactive to light.   Cardiovascular:      Rate and Rhythm: Normal rate and regular rhythm.      Pulses: Normal pulses.   Pulmonary:      Effort: Pulmonary effort is normal. No respiratory distress.   Chest:      Breasts:         Left: Normal.      Comments: Surgical scar noted to the right breast and right axilla.                          Right breast larger than the left  Abdominal:      General: Abdomen is flat. There is no distension.      Palpations: Abdomen is soft. There is no mass.   Musculoskeletal:         General: No swelling or tenderness. Normal range of motion.      Cervical back: Normal range of motion.   Skin:     General: Skin is warm and dry.      Capillary Refill: Capillary refill takes less than 2 seconds.   Neurological:      General: No focal deficit present.      Mental Status: She is alert and oriented to person, place, and time. Mental status is at baseline.   Psychiatric:         Mood and Affect: Mood normal.         Behavior: Behavior normal.         Thought Content: Thought content normal.         Judgment: Judgment normal.         Vital Signs:   Vitals:    09/08/21 1049   BP: 167/72   Pulse: 60   Resp: 18   Temp: 97.3 °F (36.3 °C)   TempSrc: Temporal   SpO2: 95%   Weight: 128 kg (282 lb)   Height: 160 cm (63\")   PainSc: 0-No pain     Body mass index is 49.95 kg/m².       Assessment / Plan      Assessment/Plan:   Ms. Wayne is a 66 year old F with stage IIB ([id number]), ER positive (95%), VT positive (95%), HER2 negative (1+ by IHC), invasive, mammary carcinoma " (with ductal and lobular features) of the right breast. The surgical margins were clear; 1 sentinel node was involved with MARILYN, an additional twelve lymph nodes were all negative for metastasis (1/13).     Mammaprint showed low risk score. Spoke to , MARILYN is around 2mm, given her existing lymph edema, we agreed to hold radiation to her axilla.      Breast radiation is indicated, we will offer whole breast irradiation (WBI), which is the most frequently used type of radiation delivered for these tumors.  We will offer her a course of radiation to be 50 Gy with 2 Gray per fraction for 25 treatments to her right breast. Followed by a 10 Gray/2Gy per fraction boost to the lumpectomy cavity.     We have discussed about the side effects, which include but not limit to short term side effects such as tiredness and weakness, swelling of the breast, skin changes, and telangiectasia. Especially in her case she will be subject to increase chance of lymphedema in her left arm with her axillary dissection and radiation therapy.    We also recommended her to visit a physical therapist to address the issue of existing lymphedema. Referral was placed.       Follow Up:   CT SIM scheduled for September 27th, 2021  Covid swab to be performed on September 24th, 2021      Scribed for Dr. Marbin Prado MD by BOSTON Zapata 9/8/2021  11:41 EDT

## 2021-09-09 ENCOUNTER — OFFICE VISIT (OUTPATIENT)
Dept: ONCOLOGY | Facility: CLINIC | Age: 66
End: 2021-09-09

## 2021-09-09 VITALS
SYSTOLIC BLOOD PRESSURE: 168 MMHG | DIASTOLIC BLOOD PRESSURE: 74 MMHG | TEMPERATURE: 97.7 F | RESPIRATION RATE: 18 BRPM | BODY MASS INDEX: 49.68 KG/M2 | WEIGHT: 280.4 LBS | OXYGEN SATURATION: 96 % | HEIGHT: 63 IN | HEART RATE: 57 BPM

## 2021-09-09 DIAGNOSIS — C50.411 MALIGNANT NEOPLASM OF UPPER-OUTER QUADRANT OF RIGHT BREAST IN FEMALE, ESTROGEN RECEPTOR POSITIVE (HCC): Primary | ICD-10-CM

## 2021-09-09 DIAGNOSIS — Z17.0 MALIGNANT NEOPLASM OF UPPER-OUTER QUADRANT OF RIGHT BREAST IN FEMALE, ESTROGEN RECEPTOR POSITIVE (HCC): Primary | ICD-10-CM

## 2021-09-09 PROCEDURE — 99214 OFFICE O/P EST MOD 30 MIN: CPT | Performed by: INTERNAL MEDICINE

## 2021-09-09 NOTE — PROGRESS NOTES
Name:  Ronna Wayne  :  1955  Date:  2021     REFERRING PHYSICIAN  Lee Parrish MD    PRIMARY CARE PROVIDER  Mague Acosta APRN    REASON FOR FOLLOWUP  1. Malignant neoplasm of upper-outer quadrant of right breast in female, estrogen receptor positive (CMS/HCC)      CHIEF COMPLAINT  None.    Dear Lee,    HISTORY OF PRESENT ILLNESS:   I saw Ms. Wayne in follow up today in our medical oncology clinic. As you are aware, she is a pleasant, 66 y.o., white female with a history of multiple medical problems, including hypertension, diabetes and sleep apnea who was diagnosed with an ER positive (95%), VT positive (95%), HER2/abida negative (1+ by IHC), invasive, mammary carcinoma (with mixed ductal and lobular features) of the upper, outer quadrant of the right breast in late Spring 2021. She was referred to you, and you performed a successful, right-sided lumpectomy on 2021. A sentinel node was positive; however, an additional twelve, excised, right-sided, axillary lymph nodes were uninvolved (). Final, surgical staging was consistent with stage [id number] disease (IIB). She was subsequently referred to our clinic (and radiation oncology) for further evaluation and management.    INTERIM HISTORY:  Ms. Wayne returns to clinic today for followup by herself. Other than developing a post-operative hematoma (which has since resolved), she has recovered overall uneventfully from her lumpectomy last month. She again has no specific complaints.    Past Medical History:   Diagnosis Date   • Anxiety    • B12 deficiency    • BPV (benign positional vertigo)    • Breast cancer (CMS/HCC) 2021   • Diabetes mellitus (CMS/HCC)    • Diarrhea    • Disease of thyroid gland    • Elevated cholesterol    • Fibromyalgia    • GERD (gastroesophageal reflux disease)    • Glaucoma    • Heart murmur    • Hyperlipidemia    • Hypertension    • Kidney disease    • Obesity    • Peptic ulceration    •  PONV (postoperative nausea and vomiting)    • Sleep apnea     c-pap   • Stroke (CMS/HCC)    • Weakness of left arm        Past Surgical History:   Procedure Laterality Date   • ABDOMINAL SURGERY     • APPENDECTOMY     • BREAST CYST ASPIRATION     • BREAST LUMPECTOMY WITH SENTINEL NODE BIOPSY Right 2021    Procedure: BREAST LUMPECTOMY WITH SENTINEL NODE BIOPSY;  Surgeon: Lee Parrish MD;  Location: [facility] OR;  Service: General;  Laterality: Right;   • CATARACT EXTRACTION Bilateral    • CHOLECYSTECTOMY     • COLONOSCOPY     • ENDOSCOPY     • HEMATOMA EVACUATION TRUNK Right 2021    Procedure: HEMATOMA EVACUATION TRUNK;  Surgeon: Lee Parrish MD;  Location: [facility] OR;  Service: General;  Laterality: Right;   • URETHRA SURGERY         Social History     Socioeconomic History   • Marital status:      Spouse name: Not on file   • Number of children: Not on file   • Years of education: Not on file   • Highest education level: Not on file   Tobacco Use   • Smoking status: Former Smoker     Packs/day: 1.50     Years: 8.00     Pack years: 12.00     Types: Cigarettes     Quit date:      Years since quittin.7   • Smokeless tobacco: Never Used   Vaping Use   • Vaping Use: Never used   Substance and Sexual Activity   • Alcohol use: No     Comment: former social drinker not had anything in 25 + years   • Drug use: No   • Sexual activity: Defer       Family History   Problem Relation Age of Onset   • Thyroid disease Mother    • Diabetes Mother    • Hyperlipidemia Father    • Hypertension Father    • Heart attack Father    • Alcohol abuse Maternal Aunt    • Cancer Maternal Grandfather         PROSTATE   • Stroke Paternal Grandmother    • Stroke Paternal Grandfather    • Hypertension Other    • Hypertension Sister    • Breast cancer Sister 40   • Obesity Sister    • Hypertension Sister    • Hyperlipidemia Sister    • Hyperlipidemia Sister    • Hypertension Sister    • Arthritis Sister    •  Obesity Sister    • Thyroid disease Sister        Allergies   Allergen Reactions   • Codeine GI Intolerance     Increased heart rate     • Sulfa Antibiotics GI Intolerance     Heart rate increase        Current Outpatient Medications   Medication Sig Dispense Refill   • acetaminophen (TYLENOL) 325 MG tablet Take 2 tablets by mouth Every 4 (Four) Hours As Needed for Mild Pain . 30 tablet 0   • amLODIPine (NORVASC) 5 MG tablet Take 1 tablet by mouth Daily. 30 tablet 11   • aspirin 81 MG EC tablet Take 1 tablet by mouth Daily. 90 tablet 3   • cholecalciferol (VITAMIN D3) 25 MCG (1000 UT) tablet Take 1,000 Units by mouth Daily.     • empagliflozin (Jardiance) 25 MG tablet tablet Take 1 tablet by mouth Every Morning. 35 tablet 0   • hydroCHLOROthiazide (HYDRODIURIL) 12.5 MG tablet Take 12.5 mg by mouth Daily As Needed (swelling).     • ibuprofen (ADVIL,MOTRIN) 600 MG tablet Take 1 tablet by mouth Every 6 (Six) Hours As Needed for Mild Pain . 30 tablet 0   • ibuprofen (ADVIL,MOTRIN) 600 MG tablet Take 1 tablet by mouth Every 8 (Eight) Hours As Needed for Moderate Pain . 60 tablet 2   • insulin aspart prot-insulin aspart (novoLOG 70/30) (70-30) 100 UNIT/ML injection Inject 5 Units under the skin into the appropriate area as directed Daily With Breakfast.     • insulin aspart prot-insulin aspart (novoLOG 70/30) (70-30) 100 UNIT/ML injection Inject 8 Units under the skin into the appropriate area as directed Daily With Lunch.     • insulin aspart prot-insulin aspart (novoLOG 70/30) (70-30) 100 UNIT/ML injection Inject 10 Units under the skin into the appropriate area as directed Daily With Dinner.     • Insulin Glargine (BASAGLAR KWIKPEN) 100 UNIT/ML injection pen Inject 75 Units under the skin into the appropriate area as directed Daily.     • levothyroxine (SYNTHROID, LEVOTHROID) 88 MCG tablet TAKE ONE TABLET BY MOUTH DAILY 30 tablet 1   • losartan (COZAAR) 50 MG tablet Take 1 tablet by mouth Daily. 30 tablet 5   •  "magnesium oxide (MAG-OX) 400 MG tablet Take 1 tablet by mouth 3 (Three) Times a Day. 90 tablet 3   • metoprolol succinate XL (TOPROL-XL) 100 MG 24 hr tablet Take 1 tablet by mouth Daily. 30 tablet 2   • traMADol (ULTRAM) 50 MG tablet Take 1 tablet by mouth Every 8 (Eight) Hours As Needed for Moderate Pain . 8 tablet 0     No current facility-administered medications for this visit.     REVIEW OF SYSTEMS  CONSTITUTIONAL:  No fever, chills, night sweats or fatigue.  EYES:  No blurry vision, diplopia or other vision changes.  ENT:  No hearing loss, nosebleeds or sore throat.  CARDIOVASCULAR:  No palpitations, arrhythmia, syncopal episodes or edema.  PULMONARY:  No hemoptysis, wheezing, chronic cough or shortness of breath.  BREASTS: As per the HPI above.  GASTROINTESTINAL:  No nausea or vomiting.  No constipation or diarrhea.  No abdominal pain.  GENITOURINARY:  No hematuria, kidney stones or frequent urination.  MUSCULOSKELETAL:  No joint or back pains. Decreased range of motion in the right arm.  INTEGUMENTARY: No rashes or pruritus.  ENDOCRINE:  No excessive thirst or hot flashes.  HEMATOLOGIC:  No history of free bleeding, spontaneous bleeding or clotting.  IMMUNOLOGIC:  No allergies or frequent infections.  NEUROLOGIC: No numbness, tingling, seizures or weakness.  PSYCHIATRIC:  No anxiety or depression.    PHYSICAL EXAMINATION  /74   Pulse 57   Temp 97.7 °F (36.5 °C) (Temporal)   Resp 18   Ht 160 cm (63\")   Wt 127 kg (280 lb 6.4 oz)   SpO2 96%   BMI 49.67 kg/m²     Pain Score:  Pain Score    21 1331   PainSc: 0-No pain       PHQ-Score Total:  PHQ-9 Total Score:      ECO  GENERAL:  A well-developed, well-nourished, obese, white female in no acute distress.  HEENT:  Pupils equally round and reactive to light.  Extraocular muscles intact.  CARDIOVASCULAR:  Regular rate and rhythm.  No murmurs, gallops or rubs.  LUNGS:  Clear to auscultation bilaterally.  BREASTS: Deferred today. Status post " right-sided lumpectomy on 08/05/2021.  ABDOMEN:  Soft, nontender, nondistended with positive bowel sounds.  EXTREMITIES:  No clubbing, cyanosis or edema bilaterally. Unable to raise her right arm above her head (physical therapy is planned).  SKIN:  No rashes or petechiae.  NEURO:  Cranial nerves grossly intact. No focal deficits.  PSYCH:  Alert and oriented x3.    LABORATORY  Lab Results   Component Value Date    WBC 9.36 08/06/2021    HGB 9.2 (L) 08/06/2021    HCT 29.1 (L) 08/06/2021    .4 (H) 08/06/2021     (L) 08/06/2021    NEUTROABS 2.61 06/19/2021       Lab Results   Component Value Date     08/03/2021    K 4.8 08/03/2021     08/03/2021    CO2 24.7 08/03/2021    BUN 18 08/03/2021    CREATININE 0.81 08/03/2021    GLUCOSE 220 (H) 08/03/2021    CALCIUM 9.9 08/03/2021    AST 30 06/22/2021    ALT 24 06/22/2021    ALKPHOS 67 06/22/2021    BILITOT 0.8 06/22/2021    PROTEINTOT 6.9 06/22/2021    ALBUMIN 3.90 06/22/2021     CBC (08/06/2021): WBCs: 9.36; HgB: 9.2; Hct: 29.1; platelets: 131    IMAGING  MRI breast bilateral with and without contrast (06/22/2021):  Impression:  1) Negative left breast MRI.  2) Biopsy proven malignancy in the right 9:00 region measuring 3.7 cm. There are no additional worrisome findings in the right breast.    Bone densitometry (DEXA) scan (05/11/2021):  Impression: The BMD measured at the AP spine L1-L4 is 1.157 gm/cm2 with a T-score of -0.2. The patient is considered to be normal according to WHO criteria. Fracture risk is low.    PATHOLOGY  Breast, lumpectomy, right (08/05/2021):  Invasive mammary carcinoma with ductal and lobular features, margins uninvolved. Atypical, lobular hyperplasia with involvement of ducts. Dense stromal fibrosis. Greatest dimension of invasive focus: 31 mm. ER positive (95%), UT positive (95%), HER2 negative (1+ by IHC). [id number] (stage IIB).    [place] node, right #1 (08/05/2021):  Metastatic mammary carcinoma, 2.5 cmm extent, with  "extranodal extension (1/1).    Axillary contents, right (08/05/2021):  No malignancy identified in twelve axillary lymph nodes (0/12).    MammaPrint result (08/31/2021): Low risk.    IMPRESSION AND PLAN  Ms. Wayne is a 66 y.o., white female with:  Breast carcinoma: Diagnosed in late Spring 2021 and status post a right-sided lumpectomy with right axillary evaluation on 08/05/2021. The final, surgical pathology was consistent with stage IIB ([id number]), ER positive (95%), ID positive (95%), HER2 negative (1+ by IHC), invasive, mammary carcinoma (with ductal and lobular features) of the right breast. The surgical margins were clear; and, while a sentinel node was involved, an additional twelve lymph nodes were all negative for metastasis (1/13). I had another long discussion with the patient in clinic today regarding this diagnosis and its prognosis. In short, we again discussed how her surgery went extremely well; and her prognosis is very good. That said, adjuvant therapy is now recommended in order to maximize her chances of cure. As MammaPrint testing on her tumor was consistent with \"low risk\" disease, chemotherapy is not indicated (as the potential risks would drastically outweigh its very limited/nonexistant potential benefits in her case). As she had a lumpectomy, whole breast radiation is definitely indicated (and she is currently in the process of being evaluated/simulated for this treatment; it will likely start in ~two weeks as she needs to do some physical therapy first). As her tumor is very strongly ER/ID positive, adjuvant endocrine therapy (with an aromatase inhibitor such as Arimidex, given her postmenopausal status) is also definitely indicated (which our clinic will initiate after she completes the radiation). She will follow up with radiation oncology, as planned, for ongoing, whole breast radiation planning/treatment. We will see her back in our clinic in ~two months, a few towards the end or " shortly after the completion of XRT, with a CBC and CMP. At that time, adjuvant endocrine therapy with Arimidex will likely be further discussed and initiated. The patient was in agreement with these plans.    It is a pleasure to participate in Ms. Wayne's care. Please do not hesitate to call with any questions or concerns that you may have.    A total of 30 minutes were spent coordinating this patient’s care in clinic today; more than 50% of this time was face-to-face with the patient, reviewing her interim medical history, discussing the recent results of MammaPrint analysis on her tumor and counseling on the current treatment and followup plan. All questions were answered to her satisfaction.    FOLLOW UP  With surgery, as previously planned. With physical therapy, as planned. With [facility] [place] radiation oncology for ongoing adjuvant whole breast XRT planning/treatment. Return to our clinic in ~2 months, shortly after the completion of XRT, with a CBC and CMP.            This document was electronically signed by LEYDI Teresa MD September 9, 2021 13:36 EDT      CC: MD Marbin Ramirez MD Sherelene S. Fortney, APRN

## 2021-09-14 ENCOUNTER — APPOINTMENT (OUTPATIENT)
Dept: RADIATION ONCOLOGY | Facility: HOSPITAL | Age: 66
End: 2021-09-14

## 2021-09-15 ENCOUNTER — APPOINTMENT (OUTPATIENT)
Dept: RADIATION ONCOLOGY | Facility: HOSPITAL | Age: 66
End: 2021-09-15

## 2021-09-23 PROCEDURE — 77263 THER RADIOLOGY TX PLNG CPLX: CPT | Performed by: RADIOLOGY

## 2021-09-24 ENCOUNTER — LAB (OUTPATIENT)
Dept: LAB | Facility: HOSPITAL | Age: 66
End: 2021-09-24

## 2021-09-24 ENCOUNTER — TRANSCRIBE ORDERS (OUTPATIENT)
Dept: ADMINISTRATIVE | Facility: HOSPITAL | Age: 66
End: 2021-09-24

## 2021-09-24 DIAGNOSIS — Z01.818 PRE-OPERATIVE CLEARANCE: ICD-10-CM

## 2021-09-24 DIAGNOSIS — Z01.818 PRE-OPERATIVE CLEARANCE: Primary | ICD-10-CM

## 2021-09-24 LAB — SARS-COV-2 RNA PNL SPEC NAA+PROBE: NOT DETECTED

## 2021-09-24 PROCEDURE — U0004 COV-19 TEST NON-CDC HGH THRU: HCPCS

## 2021-09-24 PROCEDURE — C9803 HOPD COVID-19 SPEC COLLECT: HCPCS

## 2021-09-24 PROCEDURE — U0005 INFEC AGEN DETEC AMPLI PROBE: HCPCS

## 2021-09-27 ENCOUNTER — APPOINTMENT (OUTPATIENT)
Dept: RADIATION ONCOLOGY | Facility: HOSPITAL | Age: 66
End: 2021-09-27

## 2021-09-27 PROCEDURE — 77332 RADIATION TREATMENT AID(S): CPT | Performed by: RADIOLOGY

## 2021-09-27 PROCEDURE — 77290 THER RAD SIMULAJ FIELD CPLX: CPT | Performed by: RADIOLOGY

## 2021-09-28 ENCOUNTER — OFFICE VISIT (OUTPATIENT)
Dept: SURGERY | Facility: CLINIC | Age: 66
End: 2021-09-28

## 2021-09-28 VITALS — WEIGHT: 280 LBS | BODY MASS INDEX: 49.61 KG/M2 | HEIGHT: 63 IN

## 2021-09-28 DIAGNOSIS — Z17.0 MALIGNANT NEOPLASM OF UPPER-OUTER QUADRANT OF RIGHT BREAST IN FEMALE, ESTROGEN RECEPTOR POSITIVE (HCC): Primary | ICD-10-CM

## 2021-09-28 DIAGNOSIS — C50.411 MALIGNANT NEOPLASM OF UPPER-OUTER QUADRANT OF RIGHT BREAST IN FEMALE, ESTROGEN RECEPTOR POSITIVE (HCC): Primary | ICD-10-CM

## 2021-09-28 PROCEDURE — 99024 POSTOP FOLLOW-UP VISIT: CPT | Performed by: SURGERY

## 2021-09-28 NOTE — PROGRESS NOTES
Subjective   Ronna Wayne is a 66 y.o. female  is here today for follow-up.         Ronna Wayne is a 66 y.o. female here for follow up after right breast lumpectomy with sentinel lymph node biopsy was positive requiring level 1 and 2 axillary howard dissection.  Postoperatively her course was complicated by postoperative day 0 hematoma development for which she was taken back to the operating room and ligation of a single bleeding vessel was performed.  The patient is doing well and her incisions have healed nicely.                          Assessment     Diagnoses and all orders for this visit:    1. Malignant neoplasm of upper-outer quadrant of right breast in female, estrogen receptor positive (CMS/HCC) (Primary)      Ronna Wayne is a 66 y.o. female status post right breast lumpectomy with axillary howard dissection.  The patient had a single 2.5 mm howard met with extranodal extension and the remaining lymph nodes were negative for malignancy.  Final excision margins on the lumpectomy specimen were negative.  The patient is doing well and will continue adjuvant radiation therapy.  She does have a large seroma but due to the large area of resection this is within expected limits and she has known asymmetry with the right breast being larger than the left at baseline.  Follow-up in 3 months.

## 2021-09-30 PROCEDURE — 77295 3-D RADIOTHERAPY PLAN: CPT | Performed by: RADIOLOGY

## 2021-09-30 PROCEDURE — 77300 RADIATION THERAPY DOSE PLAN: CPT | Performed by: RADIOLOGY

## 2021-09-30 PROCEDURE — 77334 RADIATION TREATMENT AID(S): CPT | Performed by: RADIOLOGY

## 2021-10-01 ENCOUNTER — APPOINTMENT (OUTPATIENT)
Dept: RADIATION ONCOLOGY | Facility: HOSPITAL | Age: 66
End: 2021-10-01

## 2021-10-04 RX ORDER — INSULIN GLARGINE 100 [IU]/ML
70 INJECTION, SOLUTION SUBCUTANEOUS DAILY
Qty: 21 PEN | Refills: 0 | Status: SHIPPED | OUTPATIENT
Start: 2021-10-04 | End: 2021-12-27 | Stop reason: ALTCHOICE

## 2021-10-12 ENCOUNTER — RADIATION ONCOLOGY WEEKLY ASSESSMENT (OUTPATIENT)
Dept: RADIATION ONCOLOGY | Facility: HOSPITAL | Age: 66
End: 2021-10-12

## 2021-10-12 VITALS
TEMPERATURE: 97.3 F | DIASTOLIC BLOOD PRESSURE: 71 MMHG | OXYGEN SATURATION: 97 % | HEART RATE: 53 BPM | SYSTOLIC BLOOD PRESSURE: 163 MMHG | RESPIRATION RATE: 18 BRPM | WEIGHT: 280 LBS | BODY MASS INDEX: 49.61 KG/M2

## 2021-10-12 DIAGNOSIS — Z17.0 MALIGNANT NEOPLASM OF UPPER-OUTER QUADRANT OF RIGHT BREAST IN FEMALE, ESTROGEN RECEPTOR POSITIVE (HCC): Primary | ICD-10-CM

## 2021-10-12 DIAGNOSIS — C50.411 MALIGNANT NEOPLASM OF UPPER-OUTER QUADRANT OF RIGHT BREAST IN FEMALE, ESTROGEN RECEPTOR POSITIVE (HCC): Primary | ICD-10-CM

## 2021-10-12 PROCEDURE — 77427 RADIATION TX MANAGEMENT X5: CPT | Performed by: RADIOLOGY

## 2021-10-12 PROCEDURE — 77412 RADIATION TX DELIVERY LVL 3: CPT | Performed by: RADIOLOGY

## 2021-10-12 PROCEDURE — 77280 THER RAD SIMULAJ FIELD SMPL: CPT | Performed by: RADIOLOGY

## 2021-10-12 NOTE — PROGRESS NOTES
OTV Note      Patient Name: Ronna Wayne  : 1955   MRN: [id number]     Diagnosis:  Breast Cancer  Staging: Malignant neoplasm of upper-outer quadrant of right breast in female, estrogen receptor positive (HCC)  - No stage assigned  - pT2, pN1, cM0       This patient was seen today for an on treatment visit. The patient is receiving radiation treatment to the breast. The patient has received XRT Dose (cGy): 200 cGy in 1 fractions out of a planned dose of 6000 cGy in 30 fractions.       Subjective      Review of Systems:   Review of Systems   Constitutional: Negative.    HENT: Negative.  Negative for trouble swallowing.    Eyes: Negative.    Respiratory: Negative.  Negative for cough and shortness of breath.    Cardiovascular: Negative.  Negative for chest pain.   Gastrointestinal: Negative.  Negative for nausea and vomiting.   Endocrine: Negative.    Genitourinary: Negative.    Musculoskeletal: Negative.    Skin: Positive for color change.   Neurological: Negative.    Hematological: Negative.    Psychiatric/Behavioral: Negative.      Review of Systems   Constitutional: Negative.    HENT:  Negative.  Negative for trouble swallowing.    Eyes: Negative.    Respiratory: Negative.  Negative for cough and shortness of breath.    Cardiovascular: Negative.  Negative for chest pain.   Gastrointestinal: Negative.  Negative for nausea and vomiting.   Endocrine: Negative.    Genitourinary: Negative.     Musculoskeletal: Negative.    Skin: Positive for color change.   Neurological: Negative.    Hematological: Negative.    Psychiatric/Behavioral: Negative.        Medications:     Current Outpatient Medications:   •  acetaminophen (TYLENOL) 325 MG tablet, Take 2 tablets by mouth Every 4 (Four) Hours As Needed for Mild Pain ., Disp: 30 tablet, Rfl: 0  •  amLODIPine (NORVASC) 5 MG tablet, Take 1 tablet by mouth Daily., Disp: 30 tablet, Rfl: 11  •  aspirin 81 MG EC tablet, Take 1 tablet by mouth Daily., Disp: 90  tablet, Rfl: 3  •  cholecalciferol (VITAMIN D3) 25 MCG (1000 UT) tablet, Take 1,000 Units by mouth Daily., Disp: , Rfl:   •  empagliflozin (Jardiance) 25 MG tablet tablet, Take 1 tablet by mouth Every Morning., Disp: 35 tablet, Rfl: 0  •  hydroCHLOROthiazide (HYDRODIURIL) 12.5 MG tablet, Take 12.5 mg by mouth Daily As Needed (swelling)., Disp: , Rfl:   •  ibuprofen (ADVIL,MOTRIN) 600 MG tablet, Take 1 tablet by mouth Every 6 (Six) Hours As Needed for Mild Pain ., Disp: 30 tablet, Rfl: 0  •  ibuprofen (ADVIL,MOTRIN) 600 MG tablet, Take 1 tablet by mouth Every 8 (Eight) Hours As Needed for Moderate Pain ., Disp: 60 tablet, Rfl: 2  •  insulin aspart prot-insulin aspart (novoLOG 70/30) (70-30) 100 UNIT/ML injection, Inject 5 Units under the skin into the appropriate area as directed Daily With Breakfast., Disp: , Rfl:   •  insulin aspart prot-insulin aspart (novoLOG 70/30) (70-30) 100 UNIT/ML injection, Inject 8 Units under the skin into the appropriate area as directed Daily With Lunch., Disp: , Rfl:   •  insulin aspart prot-insulin aspart (novoLOG 70/30) (70-30) 100 UNIT/ML injection, Inject 10 Units under the skin into the appropriate area as directed Daily With Dinner., Disp: , Rfl:   •  Insulin Glargine (BASAGLAR KWIKPEN) 100 UNIT/ML injection pen, Inject 70 Units under the skin into the appropriate area as directed Daily for 90 days., Disp: 21 pen, Rfl: 0  •  levothyroxine (SYNTHROID, LEVOTHROID) 88 MCG tablet, TAKE ONE TABLET BY MOUTH DAILY, Disp: 30 tablet, Rfl: 1  •  losartan (COZAAR) 50 MG tablet, Take 1 tablet by mouth Daily., Disp: 30 tablet, Rfl: 5  •  magnesium oxide (MAG-OX) 400 MG tablet, Take 1 tablet by mouth 3 (Three) Times a Day., Disp: 90 tablet, Rfl: 3  •  metoprolol succinate XL (TOPROL-XL) 100 MG 24 hr tablet, Take 1 tablet by mouth Daily., Disp: 30 tablet, Rfl: 2  •  traMADol (ULTRAM) 50 MG tablet, Take 1 tablet by mouth Every 8 (Eight) Hours As Needed for Moderate Pain ., Disp: 8 tablet, Rfl:  0    Allergies:   Allergies   Allergen Reactions   • Codeine GI Intolerance     Increased heart rate     • Sulfa Antibiotics GI Intolerance     Heart rate increase          Objective       Vital Signs:   Vitals:    10/12/21 0900   BP: 163/71   BP Location: Left arm   Patient Position: Sitting   Pulse: 53   Resp: 18   Temp: 97.3 °F (36.3 °C)   TempSrc: Temporal   SpO2: 97%   Weight: 127 kg (280 lb)     Body mass index is 49.61 kg/m².     Current Total XRT Dose (cGY): XRT Dose (cGy): 200    Plan      Plan:   Patient was seen today for an on treatment visit. Patient is receiving radiation therapy to the right breast. Patient is stable and tolerating radiation therapy well with minimal side effects. Today was her first treatment. Her breast shows signs of slight erythema, pt states that her skin is very sensitive. Aquaohor samples given, instructed to start using Aquaphor. Continue with radiation therapy.       I have reviewed treatment setup notes, checked and approved the daily guidance images. I reviewed dose delivery, treatment parameters and deemed them appropriate. We plan to continue radiation therapy as prescribed.     I, Marbin Prado MD, personally performed the services described in this documentation as scribed by the above named individual in my presence, and it is both accurate and complete.  10/12/2021  13:13 EDT     Electronically signed by Marbin Prado MD, 10/12/21, 1:13 PM EDT.      Scribed for Dr. Marbin Prado MD by BOSTON Zapata 10/12/2021  09:49 EDT

## 2021-10-13 PROCEDURE — 77412 RADIATION TX DELIVERY LVL 3: CPT | Performed by: RADIOLOGY

## 2021-10-14 PROCEDURE — 77336 RADIATION PHYSICS CONSULT: CPT | Performed by: RADIOLOGY

## 2021-10-14 PROCEDURE — 77412 RADIATION TX DELIVERY LVL 3: CPT | Performed by: RADIOLOGY

## 2021-10-15 PROCEDURE — 77412 RADIATION TX DELIVERY LVL 3: CPT

## 2021-10-18 PROCEDURE — 77412 RADIATION TX DELIVERY LVL 3: CPT | Performed by: RADIOLOGY

## 2021-10-19 ENCOUNTER — RADIATION ONCOLOGY WEEKLY ASSESSMENT (OUTPATIENT)
Dept: RADIATION ONCOLOGY | Facility: HOSPITAL | Age: 66
End: 2021-10-19

## 2021-10-19 VITALS
RESPIRATION RATE: 16 BRPM | BODY MASS INDEX: 49.26 KG/M2 | DIASTOLIC BLOOD PRESSURE: 71 MMHG | TEMPERATURE: 98 F | SYSTOLIC BLOOD PRESSURE: 151 MMHG | OXYGEN SATURATION: 97 % | WEIGHT: 278 LBS | HEART RATE: 60 BPM

## 2021-10-19 DIAGNOSIS — Z17.0 MALIGNANT NEOPLASM OF UPPER-OUTER QUADRANT OF RIGHT BREAST IN FEMALE, ESTROGEN RECEPTOR POSITIVE (HCC): Primary | ICD-10-CM

## 2021-10-19 DIAGNOSIS — C50.411 MALIGNANT NEOPLASM OF UPPER-OUTER QUADRANT OF RIGHT BREAST IN FEMALE, ESTROGEN RECEPTOR POSITIVE (HCC): Primary | ICD-10-CM

## 2021-10-19 PROCEDURE — 77417 THER RADIOLOGY PORT IMAGE(S): CPT | Performed by: RADIOLOGY

## 2021-10-19 PROCEDURE — 77412 RADIATION TX DELIVERY LVL 3: CPT | Performed by: RADIOLOGY

## 2021-10-19 PROCEDURE — 77427 RADIATION TX MANAGEMENT X5: CPT | Performed by: RADIOLOGY

## 2021-10-19 NOTE — PROGRESS NOTES
OTV Note      Patient Name: Ronna Wayne  : 1955   MRN: [id number]     Diagnosis: Breast Cancer   Staging: Malignant neoplasm of upper-outer quadrant of right breast in female, estrogen receptor positive (HCC)  - pT2, pN1, cM0       This patient was seen today for an on treatment visit. The patient is receiving radiation treatment to the breast. The patient has received XRT Dose (cGy): 1200 cGy in 6 fractions out of a planned dose of 5000 cGy in 25 fractions.       Subjective      Review of Systems:   Review of Systems   Constitutional: Negative.    HENT: Negative.  Negative for trouble swallowing.    Eyes: Negative.    Respiratory: Negative.  Negative for cough and shortness of breath.    Cardiovascular: Negative.  Negative for chest pain.   Gastrointestinal: Negative.    Endocrine: Negative.    Genitourinary: Negative.    Musculoskeletal: Negative.    Skin: Negative.  Negative for itching and rash.        Erythema present   Neurological: Negative.    Hematological: Negative.    Psychiatric/Behavioral: Negative.      Review of Systems   Constitutional: Negative.    HENT:  Negative.  Negative for trouble swallowing.    Eyes: Negative.    Respiratory: Negative.  Negative for cough and shortness of breath.    Cardiovascular: Negative.  Negative for chest pain.   Gastrointestinal: Negative.    Endocrine: Negative.    Genitourinary: Negative.     Musculoskeletal: Negative.    Skin: Negative.  Negative for itching and rash.        Erythema present   Neurological: Negative.    Hematological: Negative.    Psychiatric/Behavioral: Negative.        Medications:     Current Outpatient Medications:   •  acetaminophen (TYLENOL) 325 MG tablet, Take 2 tablets by mouth Every 4 (Four) Hours As Needed for Mild Pain ., Disp: 30 tablet, Rfl: 0  •  amLODIPine (NORVASC) 5 MG tablet, Take 1 tablet by mouth Daily., Disp: 30 tablet, Rfl: 11  •  aspirin 81 MG EC tablet, Take 1 tablet by mouth Daily., Disp: 90 tablet,  Rfl: 3  •  cholecalciferol (VITAMIN D3) 25 MCG (1000 UT) tablet, Take 1,000 Units by mouth Daily., Disp: , Rfl:   •  empagliflozin (Jardiance) 25 MG tablet tablet, Take 1 tablet by mouth Every Morning., Disp: 35 tablet, Rfl: 0  •  hydroCHLOROthiazide (HYDRODIURIL) 12.5 MG tablet, Take 12.5 mg by mouth Daily As Needed (swelling)., Disp: , Rfl:   •  ibuprofen (ADVIL,MOTRIN) 600 MG tablet, Take 1 tablet by mouth Every 6 (Six) Hours As Needed for Mild Pain ., Disp: 30 tablet, Rfl: 0  •  ibuprofen (ADVIL,MOTRIN) 600 MG tablet, Take 1 tablet by mouth Every 8 (Eight) Hours As Needed for Moderate Pain ., Disp: 60 tablet, Rfl: 2  •  insulin aspart prot-insulin aspart (novoLOG 70/30) (70-30) 100 UNIT/ML injection, Inject 5 Units under the skin into the appropriate area as directed Daily With Breakfast., Disp: , Rfl:   •  insulin aspart prot-insulin aspart (novoLOG 70/30) (70-30) 100 UNIT/ML injection, Inject 8 Units under the skin into the appropriate area as directed Daily With Lunch., Disp: , Rfl:   •  insulin aspart prot-insulin aspart (novoLOG 70/30) (70-30) 100 UNIT/ML injection, Inject 10 Units under the skin into the appropriate area as directed Daily With Dinner., Disp: , Rfl:   •  Insulin Glargine (BASAGLAR KWIKPEN) 100 UNIT/ML injection pen, Inject 70 Units under the skin into the appropriate area as directed Daily for 90 days., Disp: 21 pen, Rfl: 0  •  levothyroxine (SYNTHROID, LEVOTHROID) 88 MCG tablet, TAKE ONE TABLET BY MOUTH DAILY, Disp: 30 tablet, Rfl: 1  •  losartan (COZAAR) 50 MG tablet, Take 1 tablet by mouth Daily., Disp: 30 tablet, Rfl: 5  •  magnesium oxide (MAG-OX) 400 MG tablet, Take 1 tablet by mouth 3 (Three) Times a Day., Disp: 90 tablet, Rfl: 3  •  metoprolol succinate XL (TOPROL-XL) 100 MG 24 hr tablet, Take 1 tablet by mouth Daily., Disp: 30 tablet, Rfl: 2  •  traMADol (ULTRAM) 50 MG tablet, Take 1 tablet by mouth Every 8 (Eight) Hours As Needed for Moderate Pain ., Disp: 8 tablet, Rfl:  0    Allergies:   Allergies   Allergen Reactions   • Codeine GI Intolerance     Increased heart rate     • Sulfa Antibiotics GI Intolerance     Heart rate increase          Objective       Vital Signs:   Vitals:    10/19/21 1100   BP: 151/71   BP Location: Left arm   Patient Position: Sitting   Pulse: 60   Resp: 16   Temp: 98 °F (36.7 °C)   TempSrc: Temporal   SpO2: 97%   Weight: 126 kg (278 lb)     Body mass index is 49.26 kg/m².     Current Total XRT Dose (cGY): XRT Dose (cGy): 1200    Plan      Plan:   Patient was seen today for an on treatment visit. Patient is receiving radiation therapy to the breast. Patient is stable and tolerating radiation therapy well with minimal side effects. Her breast shows signs of erythema and swelling, pt states that her skin is very sensitive. Pt is using Aquaphor, continue with this. Referral to lymphedema clinic for fluid cumulation in the right breast. Continue with radiation therapy.     I have reviewed treatment setup notes, checked and approved the daily guidance images. I reviewed dose delivery, treatment parameters and deemed them appropriate. We plan to continue radiation therapy as prescribed.     I, Marbin Prado MD, personally performed the services described in this documentation as scribed by the above named individual in my presence, and it is both accurate and complete.  10/19/2021  11:33 EDT     Electronically signed by Marbin Prado MD, 10/19/21, 11:33 AM EDT.      Scribed for Dr. Marbin Prado MD by BOSTON Zapata 10/19/2021  11:14 EDT

## 2021-10-20 PROCEDURE — 77336 RADIATION PHYSICS CONSULT: CPT | Performed by: RADIOLOGY

## 2021-10-20 PROCEDURE — 77412 RADIATION TX DELIVERY LVL 3: CPT | Performed by: RADIOLOGY

## 2021-10-21 PROCEDURE — 77412 RADIATION TX DELIVERY LVL 3: CPT | Performed by: RADIOLOGY

## 2021-10-22 PROCEDURE — 77412 RADIATION TX DELIVERY LVL 3: CPT | Performed by: RADIOLOGY

## 2021-10-25 PROCEDURE — 77412 RADIATION TX DELIVERY LVL 3: CPT | Performed by: RADIOLOGY

## 2021-10-26 ENCOUNTER — RADIATION ONCOLOGY WEEKLY ASSESSMENT (OUTPATIENT)
Dept: RADIATION ONCOLOGY | Facility: HOSPITAL | Age: 66
End: 2021-10-26

## 2021-10-26 VITALS
OXYGEN SATURATION: 97 % | RESPIRATION RATE: 18 BRPM | WEIGHT: 278.5 LBS | DIASTOLIC BLOOD PRESSURE: 72 MMHG | BODY MASS INDEX: 49.35 KG/M2 | SYSTOLIC BLOOD PRESSURE: 131 MMHG | TEMPERATURE: 97.3 F | HEART RATE: 54 BPM

## 2021-10-26 DIAGNOSIS — C50.411 MALIGNANT NEOPLASM OF UPPER-OUTER QUADRANT OF RIGHT BREAST IN FEMALE, ESTROGEN RECEPTOR POSITIVE (HCC): Primary | ICD-10-CM

## 2021-10-26 DIAGNOSIS — Z17.0 MALIGNANT NEOPLASM OF UPPER-OUTER QUADRANT OF RIGHT BREAST IN FEMALE, ESTROGEN RECEPTOR POSITIVE (HCC): Primary | ICD-10-CM

## 2021-10-26 PROCEDURE — 77412 RADIATION TX DELIVERY LVL 3: CPT | Performed by: RADIOLOGY

## 2021-10-26 PROCEDURE — 77417 THER RADIOLOGY PORT IMAGE(S): CPT | Performed by: RADIOLOGY

## 2021-10-26 PROCEDURE — 77427 RADIATION TX MANAGEMENT X5: CPT | Performed by: RADIOLOGY

## 2021-10-26 NOTE — PROGRESS NOTES
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 The patient is receiving radiation treatment to the breast. The patient has received XRT Dose (cGy): 2200 cGy in 11 fractions out of a planned dose of 5000 cGy in 25 fractions.       Subjective      Review of Systems:   Review of Systems   Constitutional: Negative.    HENT: Negative.  Negative for trouble swallowing.    Eyes: Negative.    Respiratory: Negative.  Negative for cough and shortness of breath.    Cardiovascular: Negative.  Negative for chest pain.   Gastrointestinal: Negative.  Negative for nausea and vomiting.   Endocrine: Negative.    Genitourinary: Negative.  Negative for breast discharge, breast lump and breast pain.   Musculoskeletal: Negative.    Skin: Positive for color change.   Neurological: Negative.    Hematological: Negative.    Psychiatric/Behavioral: Negative.      Review of Systems   Constitutional: Negative.    HENT:  Negative.  Negative for trouble swallowing.    Eyes: Negative.    Respiratory: Negative.  Negative for cough and shortness of breath.    Cardiovascular: Negative.  Negative for chest pain.   Gastrointestinal: Negative.  Negative for nausea and vomiting.   Endocrine: Negative.    Genitourinary: Negative.     Musculoskeletal: Negative.    Skin: Positive for color change.   Neurological: Negative.    Hematological: Negative.    Psychiatric/Behavioral: Negative.        Medications:     Current Outpatient Medications:   •  acetaminophen (TYLENOL) 325 MG tablet, Take 2 tablets by mouth Every 4 (Four) Hours As Needed for Mild Pain ., Disp: 30 tablet, Rfl: 0  •  amLODIPine (NORVASC) 5 MG tablet, Take 1 tablet by mouth Daily., Disp: 30 tablet, Rfl: 11  •   aspirin 81 MG EC tablet, Take 1 tablet by mouth Daily., Disp: 90 tablet, Rfl: 3  •  cholecalciferol (VITAMIN D3) 25 MCG (1000 UT) tablet, Take 1,000 Units by mouth Daily., Disp: , Rfl:   •  empagliflozin (Jardiance) 25 MG tablet tablet, Take 1 tablet by mouth Every Morning., Disp: 35 tablet, Rfl: 0  •  hydroCHLOROthiazide (HYDRODIURIL) 12.5 MG tablet, Take 12.5 mg by mouth Daily As Needed (swelling)., Disp: , Rfl:   •  ibuprofen (ADVIL,MOTRIN) 600 MG tablet, Take 1 tablet by mouth Every 6 (Six) Hours As Needed for Mild Pain ., Disp: 30 tablet, Rfl: 0  •  ibuprofen (ADVIL,MOTRIN) 600 MG tablet, Take 1 tablet by mouth Every 8 (Eight) Hours As Needed for Moderate Pain ., Disp: 60 tablet, Rfl: 2  •  insulin aspart prot-insulin aspart (novoLOG 70/30) (70-30) 100 UNIT/ML injection, Inject 5 Units under the skin into the appropriate area as directed Daily With Breakfast., Disp: , Rfl:   •  insulin aspart prot-insulin aspart (novoLOG 70/30) (70-30) 100 UNIT/ML injection, Inject 8 Units under the skin into the appropriate area as directed Daily With Lunch., Disp: , Rfl:   •  insulin aspart prot-insulin aspart (novoLOG 70/30) (70-30) 100 UNIT/ML injection, Inject 10 Units under the skin into the appropriate area as directed Daily With Dinner., Disp: , Rfl:   •  Insulin Glargine (BASAGLAR KWIKPEN) 100 UNIT/ML injection pen, Inject 70 Units under the skin into the appropriate area as directed Daily for 90 days., Disp: 21 pen, Rfl: 0  •  levothyroxine (SYNTHROID, LEVOTHROID) 88 MCG tablet, TAKE ONE TABLET BY MOUTH DAILY, Disp: 30 tablet, Rfl: 1  •  losartan (COZAAR) 50 MG tablet, Take 1 tablet by mouth Daily., Disp: 30 tablet, Rfl: 5  •  magnesium oxide (MAG-OX) 400 MG tablet, Take 1 tablet by mouth 3 (Three) Times a Day., Disp: 90 tablet, Rfl: 3  •  metoprolol succinate XL (TOPROL-XL) 100 MG 24 hr tablet, Take 1 tablet by mouth Daily., Disp: 30 tablet, Rfl: 2  •  traMADol (ULTRAM) 50 MG tablet, Take 1 tablet by mouth Every 8  (Eight) Hours As Needed for Moderate Pain ., Disp: 8 tablet, Rfl: 0    Allergies:   Allergies   Allergen Reactions   • Codeine GI Intolerance     Increased heart rate     • Sulfa Antibiotics GI Intolerance     Heart rate increase          Objective       Vital Signs:   Vitals:    10/26/21 1100   BP: 131/72   BP Location: Left arm   Patient Position: Sitting   Pulse: 54   Resp: 18   Temp: 97.3 °F (36.3 °C)   TempSrc: Temporal   SpO2: 97%   Weight: 126 kg (278 lb 8 oz)     Body mass index is 49.35 kg/m².     Current Total XRT Dose (cGY): XRT Dose (cGy): 2200    Plan      Plan:   Patient was seen today for an on treatment visit. Patient is receiving radiation therapy to the breast. Patient is stable and tolerating radiation therapy well with minimal side effects. Her breast shows signs of erythema and swelling, pt states that her skin is very sensitive. Pt is using Aquaphor, continue with this. Pt is awaiting a call from the lymphedema clinic for an appointment. Will check on the status of this today. Continue with radiation therapy.     I have reviewed treatment setup notes, checked and approved the daily guidance images. I reviewed dose delivery, treatment parameters and deemed them appropriate. We plan to continue radiation therapy as prescribed.       Scribed for Dr. Marbin Prado MD by BOSTON Zapata 10/26/2021  11:24 EDT

## 2021-10-27 PROCEDURE — 77412 RADIATION TX DELIVERY LVL 3: CPT | Performed by: RADIOLOGY

## 2021-10-27 PROCEDURE — 77336 RADIATION PHYSICS CONSULT: CPT | Performed by: RADIOLOGY

## 2021-10-28 PROCEDURE — 77412 RADIATION TX DELIVERY LVL 3: CPT | Performed by: RADIOLOGY

## 2021-10-29 PROCEDURE — 77412 RADIATION TX DELIVERY LVL 3: CPT

## 2021-11-01 ENCOUNTER — APPOINTMENT (OUTPATIENT)
Dept: RADIATION ONCOLOGY | Facility: HOSPITAL | Age: 66
End: 2021-11-01

## 2021-11-01 DIAGNOSIS — I10 ESSENTIAL HYPERTENSION: ICD-10-CM

## 2021-11-01 PROCEDURE — 77412 RADIATION TX DELIVERY LVL 3: CPT

## 2021-11-02 ENCOUNTER — RADIATION ONCOLOGY WEEKLY ASSESSMENT (OUTPATIENT)
Dept: RADIATION ONCOLOGY | Facility: HOSPITAL | Age: 66
End: 2021-11-02

## 2021-11-02 VITALS
WEIGHT: 279 LBS | DIASTOLIC BLOOD PRESSURE: 75 MMHG | RESPIRATION RATE: 18 BRPM | HEART RATE: 60 BPM | SYSTOLIC BLOOD PRESSURE: 188 MMHG | OXYGEN SATURATION: 97 % | TEMPERATURE: 97.8 F | BODY MASS INDEX: 49.44 KG/M2

## 2021-11-02 DIAGNOSIS — Z17.0 MALIGNANT NEOPLASM OF UPPER-OUTER QUADRANT OF RIGHT BREAST IN FEMALE, ESTROGEN RECEPTOR POSITIVE (HCC): Primary | ICD-10-CM

## 2021-11-02 DIAGNOSIS — C50.411 MALIGNANT NEOPLASM OF UPPER-OUTER QUADRANT OF RIGHT BREAST IN FEMALE, ESTROGEN RECEPTOR POSITIVE (HCC): Primary | ICD-10-CM

## 2021-11-02 PROCEDURE — 77387 GUIDANCE FOR RADJ TX DLVR: CPT | Performed by: RADIOLOGY

## 2021-11-02 PROCEDURE — 77412 RADIATION TX DELIVERY LVL 3: CPT | Performed by: RADIOLOGY

## 2021-11-02 RX ORDER — METOPROLOL SUCCINATE 100 MG/1
TABLET, EXTENDED RELEASE ORAL
Qty: 30 TABLET | Refills: 2 | Status: SHIPPED | OUTPATIENT
Start: 2021-11-02 | End: 2022-02-21

## 2021-11-02 NOTE — PROGRESS NOTES
OTV Note      Patient Name: Ronna Wayne  : 1955   MRN: [id number]     Diagnosis:  Breast Cancer   Staging: Malignant neoplasm of upper-outer quadrant of right breast in female, estrogen receptor positive (HCC)  - No stage assigned  - pT2, pN1, cM0       This patient was seen today for an on treatment visit. The patient is receiving radiation treatment to the breast. The patient has received XRT Dose (cGy): 3200 cGy in 16 fractions out of a planned dose of 6000 cGy in 30 fractions.       Subjective      Review of Systems:   Review of Systems   Constitutional: Negative.    HENT: Negative.  Negative for trouble swallowing.    Eyes: Negative.    Respiratory: Negative.  Negative for cough, chest tightness and shortness of breath.    Cardiovascular: Negative.  Negative for chest pain.   Gastrointestinal: Negative.    Endocrine: Negative.    Genitourinary: Negative.  Positive for breast pain. Negative for breast discharge and breast lump.   Musculoskeletal: Negative.    Skin: Positive for color change.   Neurological: Negative.    Hematological: Negative.    Psychiatric/Behavioral: Negative.      Review of Systems   Constitutional: Negative.    HENT:  Negative.  Negative for trouble swallowing.    Eyes: Negative.    Respiratory: Negative.  Negative for chest tightness, cough and shortness of breath.    Cardiovascular: Negative.  Negative for chest pain.   Gastrointestinal: Negative.    Endocrine: Negative.    Genitourinary: Negative.     Musculoskeletal: Negative.    Skin: Positive for color change.   Neurological: Negative.    Hematological: Negative.    Psychiatric/Behavioral: Negative.        Medications:     Current Outpatient Medications:   •  acetaminophen (TYLENOL) 325 MG tablet, Take 2 tablets by mouth Every 4 (Four) Hours As Needed for Mild Pain ., Disp: 30 tablet, Rfl: 0  •  amLODIPine (NORVASC) 5 MG tablet, Take 1 tablet by mouth Daily., Disp: 30 tablet, Rfl: 11  •  aspirin 81 MG EC  tablet, Take 1 tablet by mouth Daily., Disp: 90 tablet, Rfl: 3  •  cholecalciferol (VITAMIN D3) 25 MCG (1000 UT) tablet, Take 1,000 Units by mouth Daily., Disp: , Rfl:   •  empagliflozin (Jardiance) 25 MG tablet tablet, Take 1 tablet by mouth Every Morning., Disp: 35 tablet, Rfl: 0  •  hydroCHLOROthiazide (HYDRODIURIL) 12.5 MG tablet, Take 12.5 mg by mouth Daily As Needed (swelling)., Disp: , Rfl:   •  ibuprofen (ADVIL,MOTRIN) 600 MG tablet, Take 1 tablet by mouth Every 6 (Six) Hours As Needed for Mild Pain ., Disp: 30 tablet, Rfl: 0  •  ibuprofen (ADVIL,MOTRIN) 600 MG tablet, Take 1 tablet by mouth Every 8 (Eight) Hours As Needed for Moderate Pain ., Disp: 60 tablet, Rfl: 2  •  insulin aspart prot-insulin aspart (novoLOG 70/30) (70-30) 100 UNIT/ML injection, Inject 5 Units under the skin into the appropriate area as directed Daily With Breakfast., Disp: , Rfl:   •  insulin aspart prot-insulin aspart (novoLOG 70/30) (70-30) 100 UNIT/ML injection, Inject 8 Units under the skin into the appropriate area as directed Daily With Lunch., Disp: , Rfl:   •  insulin aspart prot-insulin aspart (novoLOG 70/30) (70-30) 100 UNIT/ML injection, Inject 10 Units under the skin into the appropriate area as directed Daily With Dinner., Disp: , Rfl:   •  Insulin Glargine (BASAGLAR KWIKPEN) 100 UNIT/ML injection pen, Inject 70 Units under the skin into the appropriate area as directed Daily for 90 days., Disp: 21 pen, Rfl: 0  •  levothyroxine (SYNTHROID, LEVOTHROID) 88 MCG tablet, TAKE ONE TABLET BY MOUTH DAILY, Disp: 30 tablet, Rfl: 1  •  losartan (COZAAR) 50 MG tablet, Take 1 tablet by mouth Daily., Disp: 30 tablet, Rfl: 5  •  magnesium oxide (MAG-OX) 400 MG tablet, Take 1 tablet by mouth 3 (Three) Times a Day., Disp: 90 tablet, Rfl: 3  •  metoprolol succinate XL (TOPROL-XL) 100 MG 24 hr tablet, Take 1 tablet by mouth Daily., Disp: 30 tablet, Rfl: 2  •  traMADol (ULTRAM) 50 MG tablet, Take 1 tablet by mouth Every 8 (Eight) Hours As  Needed for Moderate Pain ., Disp: 8 tablet, Rfl: 0    Allergies:   Allergies   Allergen Reactions   • Codeine GI Intolerance     Increased heart rate     • Sulfa Antibiotics GI Intolerance     Heart rate increase          Objective       Vital Signs:   Vitals:    11/02/21 1100   BP: (!) 188/75   BP Location: Right arm   Patient Position: Sitting   Pulse: 60   Resp: 18   Temp: 97.8 °F (36.6 °C)   TempSrc: Temporal   SpO2: 97%   Weight: 127 kg (279 lb)     Body mass index is 49.44 kg/m².     Current Total XRT Dose (cGY): XRT Dose (cGy): 3200    Plan      Plan:   Patient was seen today for an on treatment visit. Patient is receiving radiation therapy to the breast. Patient is stable and tolerating radiation therapy well with minimal side effects. Her breast shows signs of erythema and swelling, pt states that her skin is very sensitive. Pt is using Aquaphor, continue with this.  Recommended Aquaphor spray for easier application. Pt is still awaiting a call from the lymphedema clinic for an appointment.  She states that she had does have some breast pain and back pain on that side at times when using the arm.  The skin on her back looks good today. Continue with radiation therapy.     I have reviewed treatment setup notes, checked and approved the daily guidance images. I reviewed dose delivery, treatment parameters and deemed them appropriate. We plan to continue radiation therapy as prescribed.       Scribed for Dr. Marbin Prado MD by Christina Tovar, B[place]ON 11/2/2021  11:58 EDT     I, Crescencio Junior MD, have evaluated this patient.  I agree with the above documentation and plan.    11/02/2021 12:09 EDT.

## 2021-11-03 PROCEDURE — 77412 RADIATION TX DELIVERY LVL 3: CPT

## 2021-11-03 PROCEDURE — 77336 RADIATION PHYSICS CONSULT: CPT | Performed by: RADIOLOGY

## 2021-11-09 ENCOUNTER — RADIATION ONCOLOGY WEEKLY ASSESSMENT (OUTPATIENT)
Dept: RADIATION ONCOLOGY | Facility: HOSPITAL | Age: 66
End: 2021-11-09

## 2021-11-09 ENCOUNTER — LAB (OUTPATIENT)
Dept: ONCOLOGY | Facility: CLINIC | Age: 66
End: 2021-11-09

## 2021-11-09 ENCOUNTER — OFFICE VISIT (OUTPATIENT)
Dept: ONCOLOGY | Facility: CLINIC | Age: 66
End: 2021-11-09

## 2021-11-09 VITALS
DIASTOLIC BLOOD PRESSURE: 69 MMHG | RESPIRATION RATE: 18 BRPM | TEMPERATURE: 98 F | SYSTOLIC BLOOD PRESSURE: 160 MMHG | HEART RATE: 55 BPM | OXYGEN SATURATION: 96 %

## 2021-11-09 VITALS
RESPIRATION RATE: 18 BRPM | HEIGHT: 63 IN | OXYGEN SATURATION: 99 % | DIASTOLIC BLOOD PRESSURE: 77 MMHG | WEIGHT: 277.2 LBS | BODY MASS INDEX: 49.12 KG/M2 | TEMPERATURE: 96.9 F | SYSTOLIC BLOOD PRESSURE: 155 MMHG | HEART RATE: 68 BPM

## 2021-11-09 DIAGNOSIS — Z17.0 MALIGNANT NEOPLASM OF UPPER-OUTER QUADRANT OF RIGHT BREAST IN FEMALE, ESTROGEN RECEPTOR POSITIVE (HCC): ICD-10-CM

## 2021-11-09 DIAGNOSIS — C50.411 MALIGNANT NEOPLASM OF UPPER-OUTER QUADRANT OF RIGHT BREAST IN FEMALE, ESTROGEN RECEPTOR POSITIVE (HCC): Primary | ICD-10-CM

## 2021-11-09 DIAGNOSIS — Z17.0 MALIGNANT NEOPLASM OF UPPER-OUTER QUADRANT OF RIGHT BREAST IN FEMALE, ESTROGEN RECEPTOR POSITIVE (HCC): Primary | ICD-10-CM

## 2021-11-09 DIAGNOSIS — C50.411 MALIGNANT NEOPLASM OF UPPER-OUTER QUADRANT OF RIGHT BREAST IN FEMALE, ESTROGEN RECEPTOR POSITIVE (HCC): ICD-10-CM

## 2021-11-09 LAB
ALBUMIN SERPL-MCNC: 3.49 G/DL (ref 3.5–5.2)
ALBUMIN/GLOB SERPL: 0.8 G/DL
ALP SERPL-CCNC: 105 U/L (ref 39–117)
ALT SERPL W P-5'-P-CCNC: 19 U/L (ref 1–33)
ANION GAP SERPL CALCULATED.3IONS-SCNC: 11.2 MMOL/L (ref 5–15)
AST SERPL-CCNC: 29 U/L (ref 1–32)
BASOPHILS # BLD AUTO: 0.07 10*3/MM3 (ref 0–0.2)
BASOPHILS NFR BLD AUTO: 1.1 % (ref 0–1.5)
BILIRUB SERPL-MCNC: 0.5 MG/DL (ref 0–1.2)
BUN SERPL-MCNC: 19 MG/DL (ref 8–23)
BUN/CREAT SERPL: 22.6 (ref 7–25)
CALCIUM SPEC-SCNC: 9.5 MG/DL (ref 8.6–10.5)
CHLORIDE SERPL-SCNC: 101 MMOL/L (ref 98–107)
CO2 SERPL-SCNC: 21.8 MMOL/L (ref 22–29)
CREAT SERPL-MCNC: 0.84 MG/DL (ref 0.57–1)
DEPRECATED RDW RBC AUTO: 50.6 FL (ref 37–54)
EOSINOPHIL # BLD AUTO: 0.07 10*3/MM3 (ref 0–0.4)
EOSINOPHIL NFR BLD AUTO: 1.1 % (ref 0.3–6.2)
ERYTHROCYTE [DISTWIDTH] IN BLOOD BY AUTOMATED COUNT: 15.3 % (ref 12.3–15.4)
GFR SERPL CREATININE-BSD FRML MDRD: 68 ML/MIN/1.73
GLOBULIN UR ELPH-MCNC: 4.6 GM/DL
GLUCOSE SERPL-MCNC: 285 MG/DL (ref 65–99)
HCT VFR BLD AUTO: 36.1 % (ref 34–46.6)
HGB BLD-MCNC: 11.9 G/DL (ref 12–15.9)
IMM GRANULOCYTES # BLD AUTO: 0.03 10*3/MM3 (ref 0–0.05)
IMM GRANULOCYTES NFR BLD AUTO: 0.5 % (ref 0–0.5)
LYMPHOCYTES # BLD AUTO: 2.19 10*3/MM3 (ref 0.7–3.1)
LYMPHOCYTES NFR BLD AUTO: 33 % (ref 19.6–45.3)
MCH RBC QN AUTO: 29.8 PG (ref 26.6–33)
MCHC RBC AUTO-ENTMCNC: 33 G/DL (ref 31.5–35.7)
MCV RBC AUTO: 90.5 FL (ref 79–97)
MONOCYTES # BLD AUTO: 0.89 10*3/MM3 (ref 0.1–0.9)
MONOCYTES NFR BLD AUTO: 13.4 % (ref 5–12)
NEUTROPHILS NFR BLD AUTO: 3.38 10*3/MM3 (ref 1.7–7)
NEUTROPHILS NFR BLD AUTO: 50.9 % (ref 42.7–76)
NRBC BLD AUTO-RTO: 0 /100 WBC (ref 0–0.2)
PLATELET # BLD AUTO: 160 10*3/MM3 (ref 140–450)
PMV BLD AUTO: 10.5 FL (ref 6–12)
POTASSIUM SERPL-SCNC: 4.6 MMOL/L (ref 3.5–5.2)
PROT SERPL-MCNC: 8.1 G/DL (ref 6–8.5)
RBC # BLD AUTO: 3.99 10*6/MM3 (ref 3.77–5.28)
SODIUM SERPL-SCNC: 134 MMOL/L (ref 136–145)
WBC # BLD AUTO: 6.63 10*3/MM3 (ref 3.4–10.8)

## 2021-11-09 PROCEDURE — 80053 COMPREHEN METABOLIC PANEL: CPT | Performed by: INTERNAL MEDICINE

## 2021-11-09 PROCEDURE — 85025 COMPLETE CBC W/AUTO DIFF WBC: CPT | Performed by: INTERNAL MEDICINE

## 2021-11-09 PROCEDURE — 77427 RADIATION TX MANAGEMENT X5: CPT | Performed by: RADIOLOGY

## 2021-11-09 PROCEDURE — 99214 OFFICE O/P EST MOD 30 MIN: CPT | Performed by: INTERNAL MEDICINE

## 2021-11-09 NOTE — PROGRESS NOTES
Name:  Ronna aWyne  :  1955  Date:  2021     REFERRING PHYSICIAN  Lee Parrish MD    PRIMARY CARE PROVIDER  Mague Acosta APRN    REASON FOR FOLLOWUP  1. Malignant neoplasm of upper-outer quadrant of right breast in female, estrogen receptor positive (HCC)      CHIEF COMPLAINT  Rash/erythema from ongoing, adjuvant, localized XRT.    Dear Lee,    HISTORY OF PRESENT ILLNESS:   I saw Ms. Wayne in follow up today in our medical oncology clinic. As you are aware, she is a pleasant, 66 y.o., white female with a history of multiple medical problems, including hypertension, diabetes and sleep apnea who was diagnosed with an ER positive (95%), MA positive (95%), HER2/abida negative (1+ by IHC), invasive, mammary carcinoma (with mixed ductal and lobular features) of the upper, outer quadrant of the right breast in late Spring 2021. She was referred to you, and you performed a successful, right-sided lumpectomy on 2021. A sentinel node was positive; however, an additional twelve, excised, right-sided, axillary lymph nodes were uninvolved (). Final, surgical staging was consistent with stage [id number] disease (IIB). She was subsequently referred to our clinic (and radiation oncology) for further evaluation and management.    INTERIM HISTORY:  Ms. Wayne returns to clinic today for followup by herself. She is currently in the middle of adjuvant, whole breast radiation. She has a little over two (2) weeks remaining. Her treatment was held for the past three days due to a rash/worsening erythema, but she is hopeful that she will be able to get started back on her treatment today (she is scheduled to follow up with rad/onc after her visit here today). She again has no other specific complaints.    Past Medical History:   Diagnosis Date   • Anxiety    • B12 deficiency    • BPV (benign positional vertigo)    • Breast cancer (HCC) 2021   • Diabetes mellitus (HCC)    • Diarrhea    •  Disease of thyroid gland    • Elevated cholesterol    • Fibromyalgia    • GERD (gastroesophageal reflux disease)    • Glaucoma    • Heart murmur    • Hyperlipidemia    • Hypertension    • Kidney disease    • Obesity    • Peptic ulceration    • PONV (postoperative nausea and vomiting)    • Sleep apnea     c-pap   • Stroke (HCC)    • Weakness of left arm        Past Surgical History:   Procedure Laterality Date   • ABDOMINAL SURGERY     • APPENDECTOMY     • BREAST CYST ASPIRATION     • BREAST LUMPECTOMY WITH SENTINEL NODE BIOPSY Right 2021    Procedure: BREAST LUMPECTOMY WITH SENTINEL NODE BIOPSY;  Surgeon: Lee Parrish MD;  Location: [facility];  Service: General;  Laterality: Right;   • CATARACT EXTRACTION Bilateral    • CHOLECYSTECTOMY     • COLONOSCOPY     • ENDOSCOPY     • HEMATOMA EVACUATION TRUNK Right 2021    Procedure: HEMATOMA EVACUATION TRUNK;  Surgeon: Lee Parrish MD;  Location: [facility];  Service: General;  Laterality: Right;   • URETHRA SURGERY         Social History     Socioeconomic History   • Marital status:    Tobacco Use   • Smoking status: Former Smoker     Packs/day: 1.50     Years: 8.00     Pack years: 12.00     Types: Cigarettes     Quit date:      Years since quittin.8   • Smokeless tobacco: Never Used   Vaping Use   • Vaping Use: Never used   Substance and Sexual Activity   • Alcohol use: No     Comment: former social drinker not had anything in 25 + years   • Drug use: No   • Sexual activity: Defer       Family History   Problem Relation Age of Onset   • Thyroid disease Mother    • Diabetes Mother    • Hyperlipidemia Father    • Hypertension Father    • Heart attack Father    • Alcohol abuse Maternal Aunt    • Cancer Maternal Grandfather         PROSTATE   • Stroke Paternal Grandmother    • Stroke Paternal Grandfather    • Hypertension Other    • Hypertension Sister    • Breast cancer Sister 40   • Obesity Sister    • Hypertension Sister    •  Hyperlipidemia Sister    • Hyperlipidemia Sister    • Hypertension Sister    • Arthritis Sister    • Obesity Sister    • Thyroid disease Sister        Allergies   Allergen Reactions   • Codeine GI Intolerance     Increased heart rate     • Sulfa Antibiotics GI Intolerance     Heart rate increase        Current Outpatient Medications   Medication Sig Dispense Refill   • acetaminophen (TYLENOL) 325 MG tablet Take 2 tablets by mouth Every 4 (Four) Hours As Needed for Mild Pain . 30 tablet 0   • amLODIPine (NORVASC) 5 MG tablet Take 1 tablet by mouth Daily. 30 tablet 11   • aspirin 81 MG EC tablet Take 1 tablet by mouth Daily. 90 tablet 3   • cholecalciferol (VITAMIN D3) 25 MCG (1000 UT) tablet Take 1,000 Units by mouth Daily.     • empagliflozin (Jardiance) 25 MG tablet tablet Take 1 tablet by mouth Every Morning. 35 tablet 0   • hydroCHLOROthiazide (HYDRODIURIL) 12.5 MG tablet Take 12.5 mg by mouth Daily As Needed (swelling).     • ibuprofen (ADVIL,MOTRIN) 600 MG tablet Take 1 tablet by mouth Every 6 (Six) Hours As Needed for Mild Pain . 30 tablet 0   • ibuprofen (ADVIL,MOTRIN) 600 MG tablet Take 1 tablet by mouth Every 8 (Eight) Hours As Needed for Moderate Pain . 60 tablet 2   • insulin aspart prot-insulin aspart (novoLOG 70/30) (70-30) 100 UNIT/ML injection Inject 5 Units under the skin into the appropriate area as directed Daily With Breakfast.     • insulin aspart prot-insulin aspart (novoLOG 70/30) (70-30) 100 UNIT/ML injection Inject 8 Units under the skin into the appropriate area as directed Daily With Lunch.     • insulin aspart prot-insulin aspart (novoLOG 70/30) (70-30) 100 UNIT/ML injection Inject 10 Units under the skin into the appropriate area as directed Daily With Dinner.     • Insulin Glargine (BASAGLAR KWIKPEN) 100 UNIT/ML injection pen Inject 70 Units under the skin into the appropriate area as directed Daily for 90 days. 21 pen 0   • levothyroxine (SYNTHROID, LEVOTHROID) 88 MCG tablet TAKE ONE  "TABLET BY MOUTH DAILY 30 tablet 1   • losartan (COZAAR) 50 MG tablet Take 1 tablet by mouth Daily. 30 tablet 5   • magnesium oxide (MAG-OX) 400 MG tablet Take 1 tablet by mouth 3 (Three) Times a Day. 90 tablet 3   • metoprolol succinate XL (TOPROL-XL) 100 MG 24 hr tablet TAKE ONE TABLET BY MOUTH DAILY 30 tablet 2   • silver sulfadiazine (SILVADENE, SSD) 1 % cream Apply 1 application topically to the appropriate area as directed 2 (Two) Times a Day. 1 each 3   • traMADol (ULTRAM) 50 MG tablet Take 1 tablet by mouth Every 8 (Eight) Hours As Needed for Moderate Pain . 8 tablet 0     No current facility-administered medications for this visit.     REVIEW OF SYSTEMS  CONSTITUTIONAL:  No fever, chills, night sweats or fatigue.  EYES:  No blurry vision, diplopia or other vision changes.  ENT:  No hearing loss, nosebleeds or sore throat.  CARDIOVASCULAR:  No palpitations, arrhythmia, syncopal episodes or edema.  PULMONARY:  No hemoptysis, wheezing, chronic cough or shortness of breath.  BREASTS: As per the HPI above.  GASTROINTESTINAL:  No nausea or vomiting.  No constipation or diarrhea.  No abdominal pain.  GENITOURINARY:  No hematuria, kidney stones or frequent urination.  MUSCULOSKELETAL:  No joint or back pains. Decreased range of motion in the right arm.  INTEGUMENTARY: As per the HPI above.  ENDOCRINE:  No excessive thirst or hot flashes.  HEMATOLOGIC:  No history of free bleeding, spontaneous bleeding or clotting.  IMMUNOLOGIC:  No allergies or frequent infections.  NEUROLOGIC: No numbness, tingling, seizures or weakness.  PSYCHIATRIC:  No anxiety or depression.    PHYSICAL EXAMINATION  /77   Pulse 68   Temp 96.9 °F (36.1 °C) (Temporal)   Resp 18   Ht 160 cm (63\")   Wt 126 kg (277 lb 3.2 oz)   SpO2 99%   BMI 49.10 kg/m²     Pain Score:  Pain Score    21 1011   PainSc:   5   PainLoc: Arm       PHQ-Score Total:  PHQ-9 Total Score:      ECO  GENERAL:  A well-developed, well-nourished, obese, " white female in no acute distress.  HEENT:  Pupils equally round and reactive to light.  Extraocular muscles intact.  CARDIOVASCULAR:  Regular rate and rhythm.  No murmurs, gallops or rubs.  LUNGS:  Clear to auscultation bilaterally.  BREASTS: Deferred today. Status post right-sided lumpectomy on 08/05/2021.  ABDOMEN:  Soft, nontender, nondistended with positive bowel sounds.  EXTREMITIES:  No clubbing, cyanosis or edema bilaterally. Unable to raise her right arm above her head (physical therapy is planned).  SKIN:  Rash/erythema with ongoing, localized XRT.  NEURO:  Cranial nerves grossly intact. No focal deficits.  PSYCH:  Alert and oriented x3.    LABORATORY  Lab Results   Component Value Date    WBC 6.63 11/09/2021    HGB 11.9 (L) 11/09/2021    HCT 36.1 11/09/2021    MCV 90.5 11/09/2021     11/09/2021    NEUTROABS 3.38 11/09/2021       Lab Results   Component Value Date     08/03/2021    K 4.8 08/03/2021     08/03/2021    CO2 24.7 08/03/2021    BUN 18 08/03/2021    CREATININE 0.81 08/03/2021    GLUCOSE 220 (H) 08/03/2021    CALCIUM 9.9 08/03/2021    AST 30 06/22/2021    ALT 24 06/22/2021    ALKPHOS 67 06/22/2021    BILITOT 0.8 06/22/2021    PROTEINTOT 6.9 06/22/2021    ALBUMIN 3.90 06/22/2021     CBC (11/09/2021): WBCs: 6.63; HgB: 11.9; Hct: 36.1; platelets: 160  CBC (08/06/2021): WBCs: 9.36; HgB: 9.2; Hct: 29.1; platelets: 131    IMAGING  MRI breast bilateral with and without contrast (06/22/2021):  Impression:  1) Negative left breast MRI.  2) Biopsy proven malignancy in the right 9:00 region measuring 3.7 cm. There are no additional worrisome findings in the right breast.    Bone densitometry (DEXA) scan (05/11/2021):  Impression: The BMD measured at the AP spine L1-L4 is 1.157 gm/cm2 with a T-score of -0.2. The patient is considered to be normal according to WHO criteria. Fracture risk is low.    PATHOLOGY  Breast, lumpectomy, right (08/05/2021):  Invasive mammary carcinoma with ductal and  "lobular features, margins uninvolved. Atypical, lobular hyperplasia with involvement of ducts. Dense stromal fibrosis. Greatest dimension of invasive focus: 31 mm. ER positive (95%), VA positive (95%), HER2 negative (1+ by IHC). [id number] (stage IIB).    [place] node, right #1 (08/05/2021):  Metastatic mammary carcinoma, 2.5 cmm extent, with extranodal extension (1/1).    Axillary contents, right (08/05/2021):  No malignancy identified in twelve axillary lymph nodes (0/12).    MammaPrint result (08/31/2021): Low risk.    IMPRESSION AND PLAN  Ms. Wayne is a 66 y.o., white female with:  Breast carcinoma: Diagnosed in late Spring 2021 and status post a right-sided lumpectomy with right axillary evaluation on 08/05/2021. The final, surgical pathology was consistent with stage IIB ([id number]), ER positive (95%), VA positive (95%), HER2 negative (1+ by IHC), invasive, mammary carcinoma (with ductal and lobular features) of the right breast. The surgical margins were clear; and, while a sentinel node was involved, an additional twelve lymph nodes were all negative for metastasis (1/13). I had another long discussion with the patient in clinic today regarding this diagnosis and its prognosis. In short, we again discussed how her surgery went extremely well; and her prognosis is very good. That said, adjuvant therapy is now recommended in order to maximize her chances of cure. As MammaPrint testing on her tumor was consistent with \"low risk\" disease, chemotherapy is not indicated (as the potential risks would drastically outweigh its very limited/nonexistant potential benefits in her case). As she had a lumpectomy, whole breast radiation was/is definitely indicated; and this treatment is now ongoing (and she has a little over two weeks remaining). As her tumor is very strongly ER/VA positive, adjuvant endocrine therapy (with an aromatase inhibitor such as Arimidex, given her postmenopausal status) is also definitely " indicated (which our clinic will initiate after she completes the radiation). She will follow up with radiation oncology, as planned, for ongoing, whole breast radiation. We will see her back in our clinic in three weeks, shortly after the completion of XRT, with a CBC and CMP. At that time, adjuvant endocrine therapy with Arimidex will be further discussed and initiated. The patient was in agreement with these plans.    It is a pleasure to participate in Ms. Wayne's care. Please do not hesitate to call with any questions or concerns that you may have.    A total of 30 minutes were spent coordinating this patient’s care in clinic today; more than 50% of this time was face-to-face with the patient, reviewing her interim medical history and counseling on the current treatment and followup plan. All questions were answered to her satisfaction.    FOLLOW UP  With surgery, as previously planned. With physical therapy, as planned. With [facility] radiation oncology for ongoing adjuvant whole breast XRT. Return to our clinic in ~3 weeks, shortly after the completion of XRT, with a CBC and CMP.            This document was electronically signed by LEYDI Teresa MD November 9, 2021 10:32 EST      CC: MD Marbin Ramirez MD Sherelene S. Fortney, APRN

## 2021-11-09 NOTE — PROGRESS NOTES
OTV Note      Patient Name: Ronna Wayne  : 1955   MRN: [id number]     Diagnosis: Breast Cancer   Staging: Malignant neoplasm of upper-outer quadrant of right breast in female, estrogen receptor positive (HCC)  - No stage assigned  - pT2, pN1, cM0     This patient was seen today for an on treatment visit. The patient is receiving radiation treatment to the breast. The patient has received  3600 cGy in 18 fractions out of a planned dose of 5000 cGy in 30 fractions.       Subjective      Review of Systems:   Review of Systems   Constitutional: Negative.    HENT: Negative.  Negative for trouble swallowing.    Eyes: Negative.    Respiratory: Negative.  Negative for cough, chest tightness and shortness of breath.    Cardiovascular: Negative.  Negative for chest pain.   Gastrointestinal: Negative.    Endocrine: Negative.    Genitourinary: Negative.  Negative for breast discharge, breast lump and breast pain.   Musculoskeletal: Negative.    Skin: Positive for color change, itching and rash.        Open area under axilla, erythema, burning   Neurological: Negative.    Hematological: Negative.    Psychiatric/Behavioral: Negative.      Review of Systems   Constitutional: Negative.    HENT:  Negative.  Negative for trouble swallowing.    Eyes: Negative.    Respiratory: Negative.  Negative for chest tightness, cough and shortness of breath.    Cardiovascular: Negative.  Negative for chest pain.   Gastrointestinal: Negative.    Endocrine: Negative.    Genitourinary: Negative.     Musculoskeletal: Negative.    Skin: Positive for color change, itching and rash.        Open area under axilla, erythema, burning   Neurological: Negative.    Hematological: Negative.    Psychiatric/Behavioral: Negative.        Medications:     Current Outpatient Medications:   •  acetaminophen (TYLENOL) 325 MG tablet, Take 2 tablets by mouth Every 4 (Four) Hours As Needed for Mild Pain ., Disp: 30 tablet, Rfl: 0  •  amLODIPine  (NORVASC) 5 MG tablet, Take 1 tablet by mouth Daily., Disp: 30 tablet, Rfl: 11  •  aspirin 81 MG EC tablet, Take 1 tablet by mouth Daily., Disp: 90 tablet, Rfl: 3  •  cholecalciferol (VITAMIN D3) 25 MCG (1000 UT) tablet, Take 1,000 Units by mouth Daily., Disp: , Rfl:   •  empagliflozin (Jardiance) 25 MG tablet tablet, Take 1 tablet by mouth Every Morning., Disp: 35 tablet, Rfl: 0  •  hydroCHLOROthiazide (HYDRODIURIL) 12.5 MG tablet, Take 12.5 mg by mouth Daily As Needed (swelling)., Disp: , Rfl:   •  ibuprofen (ADVIL,MOTRIN) 600 MG tablet, Take 1 tablet by mouth Every 6 (Six) Hours As Needed for Mild Pain ., Disp: 30 tablet, Rfl: 0  •  ibuprofen (ADVIL,MOTRIN) 600 MG tablet, Take 1 tablet by mouth Every 8 (Eight) Hours As Needed for Moderate Pain ., Disp: 60 tablet, Rfl: 2  •  insulin aspart prot-insulin aspart (novoLOG 70/30) (70-30) 100 UNIT/ML injection, Inject 5 Units under the skin into the appropriate area as directed Daily With Breakfast., Disp: , Rfl:   •  insulin aspart prot-insulin aspart (novoLOG 70/30) (70-30) 100 UNIT/ML injection, Inject 8 Units under the skin into the appropriate area as directed Daily With Lunch., Disp: , Rfl:   •  insulin aspart prot-insulin aspart (novoLOG 70/30) (70-30) 100 UNIT/ML injection, Inject 10 Units under the skin into the appropriate area as directed Daily With Dinner., Disp: , Rfl:   •  Insulin Glargine (BASAGLAR KWIKPEN) 100 UNIT/ML injection pen, Inject 70 Units under the skin into the appropriate area as directed Daily for 90 days., Disp: 21 pen, Rfl: 0  •  levothyroxine (SYNTHROID, LEVOTHROID) 88 MCG tablet, TAKE ONE TABLET BY MOUTH DAILY, Disp: 30 tablet, Rfl: 1  •  losartan (COZAAR) 50 MG tablet, Take 1 tablet by mouth Daily., Disp: 30 tablet, Rfl: 5  •  magnesium oxide (MAG-OX) 400 MG tablet, Take 1 tablet by mouth 3 (Three) Times a Day., Disp: 90 tablet, Rfl: 3  •  metoprolol succinate XL (TOPROL-XL) 100 MG 24 hr tablet, TAKE ONE TABLET BY MOUTH DAILY, Disp: 30  tablet, Rfl: 2  •  silver sulfadiazine (SILVADENE, SSD) 1 % cream, Apply 1 application topically to the appropriate area as directed 2 (Two) Times a Day., Disp: 1 each, Rfl: 3  •  traMADol (ULTRAM) 50 MG tablet, Take 1 tablet by mouth Every 8 (Eight) Hours As Needed for Moderate Pain ., Disp: 8 tablet, Rfl: 0    Allergies:   Allergies   Allergen Reactions   • Codeine GI Intolerance     Increased heart rate     • Sulfa Antibiotics GI Intolerance     Heart rate increase          Objective       Vital Signs:   Vitals:    11/09/21 1058   BP: 160/69   BP Location: Left arm   Patient Position: Sitting   Pulse: 55   Resp: 18   Temp: 98 °F (36.7 °C)   TempSrc: Temporal   SpO2: 96%     There is no height or weight on file to calculate BMI.     Current Total XRT Dose (cGY):  5000    Plan      Plan:   Patient was seen today for an on treatment visit. Patient is receiving radiation therapy to the breast. Patient is stable and tolerating radiation therapy well with minimal side effects. Her breast shows signs of erythema and swelling, pt states that her skin is very sensitive. Pt is using Silvadene cream that was prescribed to her after her OTV last week.  She had showed me this on the treatment table at her visit last Thursday. At this time she had a red rash, increased erythema, and an open area under the axilla on the affected side.  I sent her home on this day to give her skin a break from radiation. Today her breast and axilla look better, however, she still has an open area under the right axilla and her skin is still very erythemic and itching.  Instructed the patient to continue using Silvadene on the open area in the axilla.  Use hydrocortisone cream on the rest of the breast, and apply cornstarch under the skin fold of the right breast.  She has had a several day break plus the weekend from radiation due to the increased irritation of the skin.  We will treat her today but decrease the size of her treatment field.   Patient is agreeable to this.  Pt is still awaiting a call from the lymphedema clinic for an appointment.  She states that she had does have some breast pain and back pain on that side at times when using the arm.  The skin on her back looks good today.Continue with radiation therapy.     I have reviewed treatment setup notes, checked and approved the daily guidance images. I reviewed dose delivery, treatment parameters and deemed them appropriate. We plan to continue radiation therapy as prescribed.     I, Marbin Prado MD, personally performed the services described in this documentation as scribed by the above named individual in my presence, and it is both accurate and complete.  11/9/2021  11:31 EST     Electronically signed by Marbin Prado MD, 11/09/21, 11:31 AM EST.      Scribed for Dr. Marbin Prado MD by BOSTON Zapata 11/9/2021  11:11 EST

## 2021-11-10 PROCEDURE — 77334 RADIATION TREATMENT AID(S): CPT | Performed by: RADIOLOGY

## 2021-11-10 PROCEDURE — 77307 TELETHX ISODOSE PLAN CPLX: CPT | Performed by: RADIOLOGY

## 2021-11-11 PROCEDURE — 77412 RADIATION TX DELIVERY LVL 3: CPT | Performed by: RADIOLOGY

## 2021-11-11 PROCEDURE — 77280 THER RAD SIMULAJ FIELD SMPL: CPT | Performed by: RADIOLOGY

## 2021-11-12 PROCEDURE — 77412 RADIATION TX DELIVERY LVL 3: CPT

## 2021-11-15 PROCEDURE — 77412 RADIATION TX DELIVERY LVL 3: CPT | Performed by: RADIOLOGY

## 2021-11-15 PROCEDURE — G6002 STEREOSCOPIC X-RAY GUIDANCE: HCPCS | Performed by: RADIOLOGY

## 2021-11-15 PROCEDURE — 77387 GUIDANCE FOR RADJ TX DLVR: CPT | Performed by: RADIOLOGY

## 2021-11-16 ENCOUNTER — TELEPHONE (OUTPATIENT)
Dept: FAMILY MEDICINE CLINIC | Facility: CLINIC | Age: 66
End: 2021-11-16

## 2021-11-16 ENCOUNTER — RADIATION ONCOLOGY WEEKLY ASSESSMENT (OUTPATIENT)
Dept: RADIATION ONCOLOGY | Facility: HOSPITAL | Age: 66
End: 2021-11-16

## 2021-11-16 VITALS
SYSTOLIC BLOOD PRESSURE: 139 MMHG | OXYGEN SATURATION: 98 % | WEIGHT: 277.2 LBS | RESPIRATION RATE: 18 BRPM | HEART RATE: 58 BPM | TEMPERATURE: 97.1 F | BODY MASS INDEX: 49.1 KG/M2 | DIASTOLIC BLOOD PRESSURE: 70 MMHG

## 2021-11-16 DIAGNOSIS — E55.9 VITAMIN D DEFICIENCY: ICD-10-CM

## 2021-11-16 DIAGNOSIS — E11.65 TYPE 2 DIABETES MELLITUS WITH HYPERGLYCEMIA, WITH LONG-TERM CURRENT USE OF INSULIN (HCC): ICD-10-CM

## 2021-11-16 DIAGNOSIS — Z79.4 TYPE 2 DIABETES MELLITUS WITH HYPERGLYCEMIA, WITH LONG-TERM CURRENT USE OF INSULIN (HCC): ICD-10-CM

## 2021-11-16 DIAGNOSIS — I10 ESSENTIAL HYPERTENSION: Primary | ICD-10-CM

## 2021-11-16 DIAGNOSIS — E78.2 MIXED HYPERLIPIDEMIA: ICD-10-CM

## 2021-11-16 DIAGNOSIS — C50.411 MALIGNANT NEOPLASM OF UPPER-OUTER QUADRANT OF RIGHT BREAST IN FEMALE, ESTROGEN RECEPTOR POSITIVE (HCC): Primary | ICD-10-CM

## 2021-11-16 DIAGNOSIS — K21.00 GASTROESOPHAGEAL REFLUX DISEASE WITH ESOPHAGITIS WITHOUT HEMORRHAGE: ICD-10-CM

## 2021-11-16 DIAGNOSIS — E53.8 B12 DEFICIENCY: ICD-10-CM

## 2021-11-16 DIAGNOSIS — Z17.0 MALIGNANT NEOPLASM OF UPPER-OUTER QUADRANT OF RIGHT BREAST IN FEMALE, ESTROGEN RECEPTOR POSITIVE (HCC): Primary | ICD-10-CM

## 2021-11-16 DIAGNOSIS — E83.42 HYPOMAGNESEMIA: ICD-10-CM

## 2021-11-16 PROCEDURE — 77412 RADIATION TX DELIVERY LVL 3: CPT | Performed by: RADIOLOGY

## 2021-11-16 PROCEDURE — 77427 RADIATION TX MANAGEMENT X5: CPT | Performed by: RADIOLOGY

## 2021-11-16 NOTE — PROGRESS NOTES
OTV Note      Patient Name: Ronna Wayne  : 1955   MRN: [id number]     Diagnosis:  Breast Cancer  Staging: Malignant neoplasm of upper-outer quadrant of right breast in female, estrogen receptor positive (HCC)  - No stage assigned  - pT2, pN1, cM0     This patient was seen today for an on treatment visit. The patient is receiving radiation treatment to the breast. The patient has received XRT Dose (cGy): 4200 cGy in 21 fractions out of a planned dose of 6000 cGy in 30 fractions.       Subjective      Review of Systems:   Review of Systems   Constitutional: Negative.    HENT: Negative.  Negative for trouble swallowing.    Eyes: Negative.    Respiratory: Negative.  Negative for cough, chest tightness and shortness of breath.    Cardiovascular: Negative.  Negative for chest pain.   Gastrointestinal: Negative.    Endocrine: Negative.    Genitourinary: Negative.  Positive for breast pain. Negative for breast discharge and breast lump.   Musculoskeletal: Negative.    Skin: Positive for color change, dry skin, itching and bruise.   Neurological: Negative.    Hematological: Negative.    Psychiatric/Behavioral: Negative.      Review of Systems   Constitutional: Negative.    HENT:  Negative.  Negative for trouble swallowing.    Eyes: Negative.    Respiratory: Negative.  Negative for chest tightness, cough and shortness of breath.    Cardiovascular: Negative.  Negative for chest pain.   Gastrointestinal: Negative.    Endocrine: Negative.    Genitourinary: Negative.     Musculoskeletal: Negative.    Skin: Positive for color change, itching and wound.   Neurological: Negative.    Hematological: Negative.    Psychiatric/Behavioral: Negative.        Medications:     Current Outpatient Medications:   •  acetaminophen (TYLENOL) 325 MG tablet, Take 2 tablets by mouth Every 4 (Four) Hours As Needed for Mild Pain ., Disp: 30 tablet, Rfl: 0  •  amLODIPine (NORVASC) 5 MG tablet, Take 1 tablet by mouth Daily., Disp:  30 tablet, Rfl: 11  •  aspirin 81 MG EC tablet, Take 1 tablet by mouth Daily., Disp: 90 tablet, Rfl: 3  •  cholecalciferol (VITAMIN D3) 25 MCG (1000 UT) tablet, Take 1,000 Units by mouth Daily., Disp: , Rfl:   •  empagliflozin (Jardiance) 25 MG tablet tablet, Take 1 tablet by mouth Every Morning., Disp: 35 tablet, Rfl: 0  •  hydroCHLOROthiazide (HYDRODIURIL) 12.5 MG tablet, Take 12.5 mg by mouth Daily As Needed (swelling)., Disp: , Rfl:   •  ibuprofen (ADVIL,MOTRIN) 600 MG tablet, Take 1 tablet by mouth Every 6 (Six) Hours As Needed for Mild Pain ., Disp: 30 tablet, Rfl: 0  •  ibuprofen (ADVIL,MOTRIN) 600 MG tablet, Take 1 tablet by mouth Every 8 (Eight) Hours As Needed for Moderate Pain ., Disp: 60 tablet, Rfl: 2  •  insulin aspart prot-insulin aspart (novoLOG 70/30) (70-30) 100 UNIT/ML injection, Inject 5 Units under the skin into the appropriate area as directed Daily With Breakfast., Disp: , Rfl:   •  insulin aspart prot-insulin aspart (novoLOG 70/30) (70-30) 100 UNIT/ML injection, Inject 8 Units under the skin into the appropriate area as directed Daily With Lunch., Disp: , Rfl:   •  insulin aspart prot-insulin aspart (novoLOG 70/30) (70-30) 100 UNIT/ML injection, Inject 10 Units under the skin into the appropriate area as directed Daily With Dinner., Disp: , Rfl:   •  Insulin Glargine (BASAGLAR KWIKPEN) 100 UNIT/ML injection pen, Inject 70 Units under the skin into the appropriate area as directed Daily for 90 days., Disp: 21 pen, Rfl: 0  •  levothyroxine (SYNTHROID, LEVOTHROID) 88 MCG tablet, TAKE ONE TABLET BY MOUTH DAILY, Disp: 30 tablet, Rfl: 1  •  losartan (COZAAR) 50 MG tablet, Take 1 tablet by mouth Daily., Disp: 30 tablet, Rfl: 5  •  magnesium oxide (MAG-OX) 400 MG tablet, Take 1 tablet by mouth 3 (Three) Times a Day., Disp: 90 tablet, Rfl: 3  •  metoprolol succinate XL (TOPROL-XL) 100 MG 24 hr tablet, TAKE ONE TABLET BY MOUTH DAILY, Disp: 30 tablet, Rfl: 2  •  silver sulfadiazine (SILVADENE, SSD) 1 %  cream, Apply 1 application topically to the appropriate area as directed 2 (Two) Times a Day., Disp: 1 each, Rfl: 3  •  traMADol (ULTRAM) 50 MG tablet, Take 1 tablet by mouth Every 8 (Eight) Hours As Needed for Moderate Pain ., Disp: 8 tablet, Rfl: 0    Allergies:   Allergies   Allergen Reactions   • Codeine GI Intolerance     Increased heart rate     • Sulfa Antibiotics GI Intolerance     Heart rate increase          Objective       Vital Signs:   Vitals:    11/16/21 1100   BP: 139/70   BP Location: Right arm   Patient Position: Sitting   Pulse: 58   Resp: 18   Temp: 97.1 °F (36.2 °C)   TempSrc: Temporal   SpO2: 98%   Weight: 126 kg (277 lb 3.2 oz)     Body mass index is 49.1 kg/m².     Current Total XRT Dose (cGY): XRT Dose (cGy): 4200    Plan      Plan:   Patient was seen today for an on treatment visit. Patient is receiving radiation therapy to the breast. Patient is stable and tolerating radiation therapy well with minimal side effects.  Patient's breast continues to be erythemic, she also has an open spot in the right axilla that has healed since her last visit.  She is using Silvadene cream on this.  The skin fold of her right breast is worse at this visit, apply Silvadene cream to this as well as keeping dry. She was also instructed that the least amount of irritation possible to the area is best, therefore try not to wear a bra at home, use cornstarch under that breast, apply pillowcase to decrease friction under that breast, and use Silvadene to the area.  She is using hydrocortisone cream to the rest of the breast.  Slight breast pain at the nipple, she states that it feels more like a nerve, but it does not last very long and then goes away.  She continues to wait for a call from the lymphedema clinic for an appointment.  Continue with radiation therapy.       I have reviewed treatment setup notes, checked and approved the daily guidance images. I reviewed dose delivery, treatment parameters and deemed  them appropriate. We plan to continue radiation therapy as prescribed.     I, Marbin Prado MD, personally performed the services described in this documentation as scribed by the above named individual in my presence, and it is both accurate and complete.  11/16/2021  12:11 EST     Electronically signed by Marbin Prado MD, 11/16/21, 12:11 PM EST.    Scribed for Dr. Marbin Prado MD by BOSTON Zapata 11/16/2021  11:57 EST

## 2021-11-16 NOTE — TELEPHONE ENCOUNTER
Called patient and let her know that she had refills on her Basaglar at her pharmacy and they are getting it ready for her to . Patient is in understanding.

## 2021-11-17 PROCEDURE — 77412 RADIATION TX DELIVERY LVL 3: CPT | Performed by: RADIOLOGY

## 2021-11-17 PROCEDURE — 77336 RADIATION PHYSICS CONSULT: CPT | Performed by: RADIOLOGY

## 2021-11-18 ENCOUNTER — HOSPITAL ENCOUNTER (EMERGENCY)
Facility: HOSPITAL | Age: 66
Discharge: HOME | End: 2021-11-18
Attending: EMERGENCY MEDICINE | Admitting: EMERGENCY MEDICINE

## 2021-11-18 ENCOUNTER — APPOINTMENT (OUTPATIENT)
Dept: GENERAL RADIOLOGY | Facility: HOSPITAL | Age: 66
End: 2021-11-18

## 2021-11-18 ENCOUNTER — APPOINTMENT (OUTPATIENT)
Dept: CT IMAGING | Facility: HOSPITAL | Age: 66
End: 2021-11-18

## 2021-11-18 VITALS
WEIGHT: 280 LBS | BODY MASS INDEX: 54.97 KG/M2 | RESPIRATION RATE: 14 BRPM | HEART RATE: 64 BPM | SYSTOLIC BLOOD PRESSURE: 122 MMHG | TEMPERATURE: 98.4 F | OXYGEN SATURATION: 98 % | DIASTOLIC BLOOD PRESSURE: 58 MMHG | HEIGHT: 60 IN

## 2021-11-18 DIAGNOSIS — I47.1 SVT (SUPRAVENTRICULAR TACHYCARDIA) (HCC): Primary | ICD-10-CM

## 2021-11-18 DIAGNOSIS — E83.42 HYPOMAGNESEMIA: ICD-10-CM

## 2021-11-18 LAB
ALBUMIN SERPL-MCNC: 3.36 G/DL (ref 3.5–5.2)
ALBUMIN/GLOB SERPL: 0.7 G/DL
ALP SERPL-CCNC: 111 U/L (ref 39–117)
ALT SERPL W P-5'-P-CCNC: 22 U/L (ref 1–33)
ANION GAP SERPL CALCULATED.3IONS-SCNC: 13.2 MMOL/L (ref 5–15)
APTT PPP: 30.7 SECONDS (ref 25.5–35.4)
AST SERPL-CCNC: 36 U/L (ref 1–32)
BASOPHILS # BLD AUTO: 0.04 10*3/MM3 (ref 0–0.2)
BASOPHILS NFR BLD AUTO: 0.6 % (ref 0–1.5)
BILIRUB SERPL-MCNC: 0.5 MG/DL (ref 0–1.2)
BUN SERPL-MCNC: 20 MG/DL (ref 8–23)
BUN/CREAT SERPL: 22.5 (ref 7–25)
CALCIUM SPEC-SCNC: 9.1 MG/DL (ref 8.6–10.5)
CHLORIDE SERPL-SCNC: 100 MMOL/L (ref 98–107)
CO2 SERPL-SCNC: 20.8 MMOL/L (ref 22–29)
CREAT SERPL-MCNC: 0.89 MG/DL (ref 0.57–1)
D DIMER PPP FEU-MCNC: 1.1 MCGFEU/ML (ref 0–0.5)
DEPRECATED RDW RBC AUTO: 55.8 FL (ref 37–54)
EOSINOPHIL # BLD AUTO: 0.05 10*3/MM3 (ref 0–0.4)
EOSINOPHIL NFR BLD AUTO: 0.7 % (ref 0.3–6.2)
ERYTHROCYTE [DISTWIDTH] IN BLOOD BY AUTOMATED COUNT: 15.7 % (ref 12.3–15.4)
GFR SERPL CREATININE-BSD FRML MDRD: 63 ML/MIN/1.73
GLOBULIN UR ELPH-MCNC: 4.8 GM/DL
GLUCOSE SERPL-MCNC: 270 MG/DL (ref 65–99)
HCT VFR BLD AUTO: 39 % (ref 34–46.6)
HGB BLD-MCNC: 11.8 G/DL (ref 12–15.9)
IMM GRANULOCYTES # BLD AUTO: 0.02 10*3/MM3 (ref 0–0.05)
IMM GRANULOCYTES NFR BLD AUTO: 0.3 % (ref 0–0.5)
INR PPP: 1.02 (ref 0.9–1.1)
LYMPHOCYTES # BLD AUTO: 1.97 10*3/MM3 (ref 0.7–3.1)
LYMPHOCYTES NFR BLD AUTO: 29.4 % (ref 19.6–45.3)
MAGNESIUM SERPL-MCNC: 1.5 MG/DL (ref 1.6–2.4)
MCH RBC QN AUTO: 29.1 PG (ref 26.6–33)
MCHC RBC AUTO-ENTMCNC: 30.3 G/DL (ref 31.5–35.7)
MCV RBC AUTO: 96.3 FL (ref 79–97)
MONOCYTES # BLD AUTO: 1.05 10*3/MM3 (ref 0.1–0.9)
MONOCYTES NFR BLD AUTO: 15.7 % (ref 5–12)
NEUTROPHILS NFR BLD AUTO: 3.57 10*3/MM3 (ref 1.7–7)
NEUTROPHILS NFR BLD AUTO: 53.3 % (ref 42.7–76)
NRBC BLD AUTO-RTO: 0 /100 WBC (ref 0–0.2)
NT-PROBNP SERPL-MCNC: 244.6 PG/ML (ref 0–900)
PLATELET # BLD AUTO: 145 10*3/MM3 (ref 140–450)
PMV BLD AUTO: 9.9 FL (ref 6–12)
POTASSIUM SERPL-SCNC: 3.9 MMOL/L (ref 3.5–5.2)
PROT SERPL-MCNC: 8.2 G/DL (ref 6–8.5)
PROTHROMBIN TIME: 13.8 SECONDS (ref 12.8–14.5)
QT INTERVAL: 276 MS
QT INTERVAL: 376 MS
QTC INTERVAL: 428 MS
QTC INTERVAL: 457 MS
RBC # BLD AUTO: 4.05 10*6/MM3 (ref 3.77–5.28)
SODIUM SERPL-SCNC: 134 MMOL/L (ref 136–145)
T4 FREE SERPL-MCNC: 1.19 NG/DL (ref 0.93–1.7)
TROPONIN T SERPL-MCNC: <0.01 NG/ML (ref 0–0.03)
TSH SERPL DL<=0.05 MIU/L-ACNC: 2.83 UIU/ML (ref 0.27–4.2)
WBC NRBC COR # BLD: 6.7 10*3/MM3 (ref 3.4–10.8)

## 2021-11-18 PROCEDURE — 96365 THER/PROPH/DIAG IV INF INIT: CPT

## 2021-11-18 PROCEDURE — 25010000002 MAGNESIUM SULFATE IN D5W 1G/100ML (PREMIX) 1-5 GM/100ML-% SOLUTION: Performed by: EMERGENCY MEDICINE

## 2021-11-18 PROCEDURE — 85610 PROTHROMBIN TIME: CPT | Performed by: EMERGENCY MEDICINE

## 2021-11-18 PROCEDURE — 84443 ASSAY THYROID STIM HORMONE: CPT | Performed by: EMERGENCY MEDICINE

## 2021-11-18 PROCEDURE — 36415 COLL VENOUS BLD VENIPUNCTURE: CPT

## 2021-11-18 PROCEDURE — 71275 CT ANGIOGRAPHY CHEST: CPT

## 2021-11-18 PROCEDURE — 85730 THROMBOPLASTIN TIME PARTIAL: CPT | Performed by: EMERGENCY MEDICINE

## 2021-11-18 PROCEDURE — 25010000002 ADENOSINE PER 6 MG

## 2021-11-18 PROCEDURE — 85379 FIBRIN DEGRADATION QUANT: CPT | Performed by: EMERGENCY MEDICINE

## 2021-11-18 PROCEDURE — 84439 ASSAY OF FREE THYROXINE: CPT | Performed by: EMERGENCY MEDICINE

## 2021-11-18 PROCEDURE — 0 IOPAMIDOL PER 1 ML: Performed by: EMERGENCY MEDICINE

## 2021-11-18 PROCEDURE — 71045 X-RAY EXAM CHEST 1 VIEW: CPT

## 2021-11-18 PROCEDURE — 77412 RADIATION TX DELIVERY LVL 3: CPT | Performed by: RADIOLOGY

## 2021-11-18 PROCEDURE — 99283 EMERGENCY DEPT VISIT LOW MDM: CPT

## 2021-11-18 PROCEDURE — 93010 ELECTROCARDIOGRAM REPORT: CPT | Performed by: INTERNAL MEDICINE

## 2021-11-18 PROCEDURE — 83880 ASSAY OF NATRIURETIC PEPTIDE: CPT | Performed by: EMERGENCY MEDICINE

## 2021-11-18 PROCEDURE — 83735 ASSAY OF MAGNESIUM: CPT | Performed by: EMERGENCY MEDICINE

## 2021-11-18 PROCEDURE — 96375 TX/PRO/DX INJ NEW DRUG ADDON: CPT

## 2021-11-18 PROCEDURE — 80053 COMPREHEN METABOLIC PANEL: CPT | Performed by: EMERGENCY MEDICINE

## 2021-11-18 PROCEDURE — 85025 COMPLETE CBC W/AUTO DIFF WBC: CPT | Performed by: EMERGENCY MEDICINE

## 2021-11-18 PROCEDURE — 93005 ELECTROCARDIOGRAM TRACING: CPT | Performed by: EMERGENCY MEDICINE

## 2021-11-18 PROCEDURE — 84484 ASSAY OF TROPONIN QUANT: CPT | Performed by: EMERGENCY MEDICINE

## 2021-11-18 RX ORDER — MAGNESIUM SULFATE 1 G/100ML
1 INJECTION INTRAVENOUS ONCE
Status: COMPLETED | OUTPATIENT
Start: 2021-11-18 | End: 2021-11-18

## 2021-11-18 RX ORDER — ADENOSINE 3 MG/ML
INJECTION, SOLUTION INTRAVENOUS
Status: COMPLETED
Start: 2021-11-18 | End: 2021-11-18

## 2021-11-18 RX ORDER — SODIUM CHLORIDE 0.9 % (FLUSH) 0.9 %
10 SYRINGE (ML) INJECTION AS NEEDED
Status: DISCONTINUED | OUTPATIENT
Start: 2021-11-18 | End: 2021-11-18 | Stop reason: HOSPADM

## 2021-11-18 RX ORDER — ADENOSINE 3 MG/ML
INJECTION, SOLUTION INTRAVENOUS
Status: DISCONTINUED
Start: 2021-11-18 | End: 2021-11-18 | Stop reason: HOSPADM

## 2021-11-18 RX ORDER — ADENOSINE 3 MG/ML
6 INJECTION, SOLUTION INTRAVENOUS ONCE
Status: COMPLETED | OUTPATIENT
Start: 2021-11-18 | End: 2021-11-18

## 2021-11-18 RX ADMIN — ADENOSINE 6 MG: 3 INJECTION, SOLUTION INTRAVENOUS at 03:12

## 2021-11-18 RX ADMIN — MAGNESIUM SULFATE HEPTAHYDRATE 1 G: 1 INJECTION, SOLUTION INTRAVENOUS at 04:03

## 2021-11-18 RX ADMIN — IOPAMIDOL 80 ML: 755 INJECTION, SOLUTION INTRAVENOUS at 04:33

## 2021-11-18 NOTE — ED PROVIDER NOTES
Subjective   66-year-old female presents with SVT.  She reports a long history of similar over many years.  She denies other cardiac history.  She states that she went to bed in her usual health, and woke up around 0130 in SVT.  She reports associated palpitations and shortness of breath.  She denies chest pain.  She is currently being treated for right-sided breast cancer, recently had surgery and currently is on radiation.  She denies prior history of DVT/PE.  She has been told this might be related to caffeine intake but has only had one Coca-Cola today.      History provided by:  Patient and medical records   used: No        Review of Systems   Constitutional: Negative.  Negative for fever.   HENT: Negative.    Eyes: Negative.    Respiratory: Positive for shortness of breath. Negative for cough.    Cardiovascular: Positive for palpitations. Negative for chest pain.   Gastrointestinal: Negative.  Negative for abdominal pain.   Genitourinary: Negative.  Negative for dysuria.   Skin: Negative.    Neurological: Negative.    Psychiatric/Behavioral: Negative.    All other systems reviewed and are negative.      Past Medical History:   Diagnosis Date   • Anxiety    • B12 deficiency    • BPV (benign positional vertigo)    • Breast cancer (HCC) 05/2021   • Diabetes mellitus (HCC)    • Diarrhea    • Disease of thyroid gland    • Elevated cholesterol    • Fibromyalgia    • GERD (gastroesophageal reflux disease)    • Glaucoma    • Heart murmur    • Hyperlipidemia    • Hypertension    • Kidney disease    • Obesity    • Peptic ulceration    • PONV (postoperative nausea and vomiting)    • Sleep apnea     c-pap   • Stroke (HCC)    • Weakness of left arm        Allergies   Allergen Reactions   • Codeine GI Intolerance     Increased heart rate     • Sulfa Antibiotics GI Intolerance     Heart rate increase        Past Surgical History:   Procedure Laterality Date   • ABDOMINAL SURGERY     • APPENDECTOMY     •  BREAST CYST ASPIRATION     • BREAST LUMPECTOMY WITH SENTINEL NODE BIOPSY Right 2021    Procedure: BREAST LUMPECTOMY WITH SENTINEL NODE BIOPSY;  Surgeon: Lee Parrish MD;  Location:  [facility];  Service: General;  Laterality: Right;   • CATARACT EXTRACTION Bilateral    • CHOLECYSTECTOMY     • COLONOSCOPY     • ENDOSCOPY     • HEMATOMA EVACUATION TRUNK Right 2021    Procedure: HEMATOMA EVACUATION TRUNK;  Surgeon: Lee Parrish MD;  Location:  [facility];  Service: General;  Laterality: Right;   • URETHRA SURGERY         Family History   Problem Relation Age of Onset   • Thyroid disease Mother    • Diabetes Mother    • Hyperlipidemia Father    • Hypertension Father    • Heart attack Father    • Alcohol abuse Maternal Aunt    • Cancer Maternal Grandfather         PROSTATE   • Stroke Paternal Grandmother    • Stroke Paternal Grandfather    • Hypertension Other    • Hypertension Sister    • Breast cancer Sister 40   • Obesity Sister    • Hypertension Sister    • Hyperlipidemia Sister    • Hyperlipidemia Sister    • Hypertension Sister    • Arthritis Sister    • Obesity Sister    • Thyroid disease Sister        Social History     Socioeconomic History   • Marital status:    Tobacco Use   • Smoking status: Former Smoker     Packs/day: 1.50     Years: 8.00     Pack years: 12.00     Types: Cigarettes     Quit date:      Years since quittin.9   • Smokeless tobacco: Never Used   Vaping Use   • Vaping Use: Never used   Substance and Sexual Activity   • Alcohol use: No     Comment: former social drinker not had anything in 25 + years   • Drug use: No   • Sexual activity: Defer           Objective   Physical Exam  Vitals and nursing note reviewed.   Constitutional:       General: She is not in acute distress.     Appearance: She is well-developed. She is obese. She is not diaphoretic.   HENT:      Head: Normocephalic and atraumatic.      Right Ear: External ear normal.      Left Ear: External  ear normal.      Nose: Nose normal.   Eyes:      Conjunctiva/sclera: Conjunctivae normal.      Pupils: Pupils are equal, round, and reactive to light.   Neck:      Vascular: No JVD.      Trachea: No tracheal deviation.   Cardiovascular:      Rate and Rhythm: Regular rhythm. Tachycardia present.      Heart sounds: Normal heart sounds. No murmur heard.      Pulmonary:      Effort: Pulmonary effort is normal. No respiratory distress.      Breath sounds: Normal breath sounds. No wheezing.   Abdominal:      General: Bowel sounds are normal.      Palpations: Abdomen is soft.      Tenderness: There is no abdominal tenderness.   Musculoskeletal:         General: No deformity. Normal range of motion.      Cervical back: Normal range of motion and neck supple.   Skin:     General: Skin is warm and dry.      Coloration: Skin is not pale.      Findings: No erythema or rash.   Neurological:      Mental Status: She is alert and oriented to person, place, and time.      Cranial Nerves: No cranial nerve deficit.   Psychiatric:         Behavior: Behavior normal.         Thought Content: Thought content normal.         Procedures       CT Chest Pulmonary Embolism   Final Result      1. No PE or aortic dissection.   2. Negative for pneumonia.   3. New large fluid collection in the right breast, most likely a postoperative seroma or hematoma. Correlation and follow-up recommended.   4. Cirrhosis.   5. Atherosclerotic disease and coronary artery disease.      Signer Name: Saman Vigil MD    Signed: 11/18/2021 5:15 AM    Workstation [facility]: [facility]     [facility]      XR Chest 1 View   Final Result   No acute cardiopulmonary findings.      Signer Name: Saman Vigil MD    Signed: 11/18/2021 3:59 AM    Workstation Name: [facility]     [facility]            ED Course  ED Course as of 11/18/21 0534   u Nov 18, 2021   0301 EKG: SVT, rate 165, , QTc 457, NS ST, no STEMI. [DH]    0318 EKG: Normal sinus rhythm, rate 78, , QRS 80, QTc 428, no STEMI. [DH]      ED Course User Index  [DH] Pierre Ballard MD                                           [facility]  Number of Diagnoses or Management Options  Hypomagnesemia  SVT (supraventricular tachycardia) (HCC)  Diagnosis management comments: 66-year-old female presenting in SVT.  This terminated quickly with 6 mg of adenosine.  Since then patient has been feeling back to her usual state of health.  Work-up was obtained, notable for hypomagnesemia and elevated D-dimer.  CTA of the chest was negative for PE.  She does have a known right breast seroma which is postoperative and being followed by her surgeon and oncologist.  No other emergent conditions identified.  Patient is requesting to go home.  I see no indication to admit for this known illness.  She will follow-up with her PCP and EP doctor.       Amount and/or Complexity of Data Reviewed  Clinical lab tests: ordered and reviewed  Tests in the radiology section of CPT®: ordered and reviewed  Review and summarize past medical records: yes  Independent visualization of images, tracings, or specimens: yes        Final diagnoses:   SVT (supraventricular tachycardia) (HCC)   Hypomagnesemia       ED Disposition  ED Disposition     ED Disposition Condition Comment    Discharge Stable           Mague Acosta S, APRN  [address] [facility] [address] [phone]    Schedule an appointment as soon as possible for a visit       Israel Steele, DO  [address] [facility]  [address]  [place] [address] [phone]    Schedule an appointment as soon as possible for a visit            Medication List      No changes were made to your prescriptions during this visit.          Pierre Ballard MD  11/18/21 8968

## 2021-11-18 NOTE — ED NOTES
Pt pulled to room from triage in SVT. Dr. Ballard called to Bedside. Two large bore IVs obtained. Zoll's monitor placed and recording started. Cardiac bedside monitor applied. Pt A&Ox4. New orders received See Dalila Jernigan, RN  11/18/21 4944

## 2021-11-18 NOTE — ED NOTES
Pt received Adenosine per MD verbal order. MD at bedside. Pt converted from SVT to NSR after meds given. New EKG obtained per MD order.     Dalila Monreal RN  11/18/21 9075

## 2021-11-18 NOTE — ED NOTES
EKG performed during Triage, immediately given to Dr. Ballard.     Carri Milligan, RN  11/18/21 4062

## 2021-11-19 PROCEDURE — 77412 RADIATION TX DELIVERY LVL 3: CPT | Performed by: RADIOLOGY

## 2021-11-21 PROCEDURE — 77412 RADIATION TX DELIVERY LVL 3: CPT | Performed by: RADIOLOGY

## 2021-11-22 ENCOUNTER — RADIATION ONCOLOGY WEEKLY ASSESSMENT (OUTPATIENT)
Dept: RADIATION ONCOLOGY | Facility: HOSPITAL | Age: 66
End: 2021-11-22

## 2021-11-22 VITALS
TEMPERATURE: 97 F | WEIGHT: 281.6 LBS | SYSTOLIC BLOOD PRESSURE: 156 MMHG | BODY MASS INDEX: 55 KG/M2 | DIASTOLIC BLOOD PRESSURE: 77 MMHG | HEART RATE: 56 BPM | RESPIRATION RATE: 18 BRPM | OXYGEN SATURATION: 98 %

## 2021-11-22 DIAGNOSIS — Z17.0 MALIGNANT NEOPLASM OF UPPER-OUTER QUADRANT OF RIGHT BREAST IN FEMALE, ESTROGEN RECEPTOR POSITIVE (HCC): Primary | ICD-10-CM

## 2021-11-22 DIAGNOSIS — C50.411 MALIGNANT NEOPLASM OF UPPER-OUTER QUADRANT OF RIGHT BREAST IN FEMALE, ESTROGEN RECEPTOR POSITIVE (HCC): Primary | ICD-10-CM

## 2021-11-22 PROCEDURE — 77427 RADIATION TX MANAGEMENT X5: CPT | Performed by: RADIOLOGY

## 2021-11-22 PROCEDURE — 77412 RADIATION TX DELIVERY LVL 3: CPT | Performed by: RADIOLOGY

## 2021-11-22 NOTE — PROGRESS NOTES
OTV Note      Patient Name: Ronna Wayne  : 1955   MRN: [id number]     Diagnosis:  No chief complaint on file.  Breast cancer  Staging: Malignant neoplasm of upper-outer quadrant of right breast in female, estrogen receptor positive (HCC)  - No stage assigned  - pT2, pN1, cM0       This patient was seen today for an on treatment visit. The patient is receiving radiation treatment to the right breast  The patient has received XRT Dose (cGy): 5200 cGy in 26 fractions out of a planned dose of 6000 cGy in 30 fractions.       Subjective      Review of Systems:   Review of Systems  Review of Systems - Oncology    Medications:     Current Outpatient Medications:   •  acetaminophen (TYLENOL) 325 MG tablet, Take 2 tablets by mouth Every 4 (Four) Hours As Needed for Mild Pain ., Disp: 30 tablet, Rfl: 0  •  amLODIPine (NORVASC) 5 MG tablet, Take 1 tablet by mouth Daily., Disp: 30 tablet, Rfl: 11  •  aspirin 81 MG EC tablet, Take 1 tablet by mouth Daily., Disp: 90 tablet, Rfl: 3  •  cholecalciferol (VITAMIN D3) 25 MCG (1000 UT) tablet, Take 1,000 Units by mouth Daily., Disp: , Rfl:   •  empagliflozin (Jardiance) 25 MG tablet tablet, Take 1 tablet by mouth Every Morning., Disp: 35 tablet, Rfl: 0  •  hydroCHLOROthiazide (HYDRODIURIL) 12.5 MG tablet, Take 12.5 mg by mouth Daily As Needed (swelling)., Disp: , Rfl:   •  ibuprofen (ADVIL,MOTRIN) 600 MG tablet, Take 1 tablet by mouth Every 6 (Six) Hours As Needed for Mild Pain ., Disp: 30 tablet, Rfl: 0  •  ibuprofen (ADVIL,MOTRIN) 600 MG tablet, Take 1 tablet by mouth Every 8 (Eight) Hours As Needed for Moderate Pain ., Disp: 60 tablet, Rfl: 2  •  insulin aspart prot-insulin aspart (novoLOG 70/30) (70-30) 100 UNIT/ML injection, Inject 5 Units under the skin into the appropriate area as directed Daily With Breakfast., Disp: , Rfl:   •  insulin aspart prot-insulin aspart (novoLOG 70/30) (70-30) 100 UNIT/ML injection, Inject 8 Units under the skin into the  appropriate area as directed Daily With Lunch., Disp: , Rfl:   •  insulin aspart prot-insulin aspart (novoLOG 70/30) (70-30) 100 UNIT/ML injection, Inject 10 Units under the skin into the appropriate area as directed Daily With Dinner., Disp: , Rfl:   •  Insulin Glargine (BASAGLAR KWIKPEN) 100 UNIT/ML injection pen, Inject 70 Units under the skin into the appropriate area as directed Daily for 90 days., Disp: 21 pen, Rfl: 0  •  levothyroxine (SYNTHROID, LEVOTHROID) 88 MCG tablet, TAKE ONE TABLET BY MOUTH DAILY, Disp: 30 tablet, Rfl: 1  •  losartan (COZAAR) 50 MG tablet, Take 1 tablet by mouth Daily., Disp: 30 tablet, Rfl: 5  •  magnesium oxide (MAG-OX) 400 MG tablet, Take 1 tablet by mouth 3 (Three) Times a Day., Disp: 90 tablet, Rfl: 3  •  metoprolol succinate XL (TOPROL-XL) 100 MG 24 hr tablet, TAKE ONE TABLET BY MOUTH DAILY, Disp: 30 tablet, Rfl: 2  •  silver sulfadiazine (SILVADENE, SSD) 1 % cream, Apply 1 application topically to the appropriate area as directed 2 (Two) Times a Day., Disp: 1 each, Rfl: 3  •  traMADol (ULTRAM) 50 MG tablet, Take 1 tablet by mouth Every 8 (Eight) Hours As Needed for Moderate Pain ., Disp: 8 tablet, Rfl: 0    Allergies:   Allergies   Allergen Reactions   • Codeine GI Intolerance     Increased heart rate     • Sulfa Antibiotics GI Intolerance     Heart rate increase          Objective       Vital Signs:   Vitals:    11/22/21 1100   BP: 156/77   BP Location: Right arm   Patient Position: Sitting   Pulse: 56   Resp: 18   Temp: 97 °F (36.1 °C)   TempSrc: Temporal   SpO2: 98%   Weight: 128 kg (281 lb 9.6 oz)     Body mass index is 55 kg/m².     Current Total XRT Dose (cGY): XRT Dose (cGy): 5200    Plan      Plan:   Patient was seen today for an on treatment visit. Patient is receiving radiation therapy to the right breast. Patient is stable and tolerating radiation therapy well with minimal side effects.     Her breast shows signs of erythema and swelling, pt states that her skin is  very sensitive. Pt is using Silvadene cream that was prescribed to her after her OTV last week.  Use hydrocortisone cream on the rest of the breast, and apply cornstarch under the skin fold of the right breast.  The skin on her back looks good today.Continue with radiation therapy.        Continue with radiation therapy.       I have reviewed treatment setup notes, checked and approved the daily guidance images. I reviewed dose delivery, treatment parameters and deemed them appropriate. We plan to continue radiation therapy as prescribed.     I, Marbin Prado MD, personally performed the services described in this documentation as scribed by the above named individual in my presence, and it is both accurate and complete.  11/22/2021  12:06 EST     Electronically signed by Marbin Prado MD, 11/22/21, 12:06 PM EST.      Scribed for Dr. Marbin Prado MD by BOSTON Zapata 11/22/2021  12:04 EST

## 2021-11-23 PROCEDURE — 77336 RADIATION PHYSICS CONSULT: CPT | Performed by: RADIOLOGY

## 2021-11-23 PROCEDURE — 77412 RADIATION TX DELIVERY LVL 3: CPT | Performed by: RADIOLOGY

## 2021-11-24 PROCEDURE — 77412 RADIATION TX DELIVERY LVL 3: CPT | Performed by: RADIOLOGY

## 2021-11-29 ENCOUNTER — LAB (OUTPATIENT)
Dept: FAMILY MEDICINE CLINIC | Facility: CLINIC | Age: 66
End: 2021-11-29

## 2021-11-29 DIAGNOSIS — E83.42 HYPOMAGNESEMIA: ICD-10-CM

## 2021-11-29 DIAGNOSIS — K21.00 GASTROESOPHAGEAL REFLUX DISEASE WITH ESOPHAGITIS WITHOUT HEMORRHAGE: ICD-10-CM

## 2021-11-29 DIAGNOSIS — I10 ESSENTIAL HYPERTENSION: ICD-10-CM

## 2021-11-29 DIAGNOSIS — E78.2 MIXED HYPERLIPIDEMIA: ICD-10-CM

## 2021-11-29 DIAGNOSIS — E11.65 TYPE 2 DIABETES MELLITUS WITH HYPERGLYCEMIA, WITH LONG-TERM CURRENT USE OF INSULIN (HCC): ICD-10-CM

## 2021-11-29 DIAGNOSIS — E55.9 VITAMIN D DEFICIENCY: ICD-10-CM

## 2021-11-29 DIAGNOSIS — Z79.4 TYPE 2 DIABETES MELLITUS WITH HYPERGLYCEMIA, WITH LONG-TERM CURRENT USE OF INSULIN (HCC): ICD-10-CM

## 2021-11-29 LAB
25(OH)D3 SERPL-MCNC: 44.6 NG/ML (ref 30–100)
ALBUMIN SERPL-MCNC: 3.5 G/DL (ref 3.5–5.2)
ALBUMIN/GLOB SERPL: 0.8 G/DL
ALP SERPL-CCNC: 92 U/L (ref 39–117)
ALT SERPL W P-5'-P-CCNC: 19 U/L (ref 1–33)
ANION GAP SERPL CALCULATED.3IONS-SCNC: 9.4 MMOL/L (ref 5–15)
AST SERPL-CCNC: 31 U/L (ref 1–32)
BASOPHILS # BLD AUTO: 0.06 10*3/MM3 (ref 0–0.2)
BASOPHILS NFR BLD AUTO: 1.1 % (ref 0–1.5)
BILIRUB SERPL-MCNC: 0.5 MG/DL (ref 0–1.2)
BUN SERPL-MCNC: 16 MG/DL (ref 8–23)
BUN/CREAT SERPL: 19 (ref 7–25)
CALCIUM SPEC-SCNC: 9.4 MG/DL (ref 8.6–10.5)
CHLORIDE SERPL-SCNC: 100 MMOL/L (ref 98–107)
CHOLEST SERPL-MCNC: 207 MG/DL (ref 0–200)
CO2 SERPL-SCNC: 26.6 MMOL/L (ref 22–29)
CREAT SERPL-MCNC: 0.84 MG/DL (ref 0.57–1)
DEPRECATED RDW RBC AUTO: 54.6 FL (ref 37–54)
EOSINOPHIL # BLD AUTO: 0.05 10*3/MM3 (ref 0–0.4)
EOSINOPHIL NFR BLD AUTO: 0.9 % (ref 0.3–6.2)
ERYTHROCYTE [DISTWIDTH] IN BLOOD BY AUTOMATED COUNT: 16.2 % (ref 12.3–15.4)
GFR SERPL CREATININE-BSD FRML MDRD: 68 ML/MIN/1.73
GLOBULIN UR ELPH-MCNC: 4.3 GM/DL
GLUCOSE SERPL-MCNC: 180 MG/DL (ref 65–99)
HBA1C MFR BLD: 10.77 % (ref 4.8–5.6)
HCT VFR BLD AUTO: 39.1 % (ref 34–46.6)
HDLC SERPL-MCNC: 48 MG/DL (ref 40–60)
HGB BLD-MCNC: 12.8 G/DL (ref 12–15.9)
IMM GRANULOCYTES # BLD AUTO: 0.01 10*3/MM3 (ref 0–0.05)
IMM GRANULOCYTES NFR BLD AUTO: 0.2 % (ref 0–0.5)
LDLC SERPL CALC-MCNC: 132 MG/DL (ref 0–100)
LDLC/HDLC SERPL: 2.68 {RATIO}
LYMPHOCYTES # BLD AUTO: 1.98 10*3/MM3 (ref 0.7–3.1)
LYMPHOCYTES NFR BLD AUTO: 36.9 % (ref 19.6–45.3)
MAGNESIUM SERPL-MCNC: 1.6 MG/DL (ref 1.6–2.4)
MCH RBC QN AUTO: 30.3 PG (ref 26.6–33)
MCHC RBC AUTO-ENTMCNC: 32.7 G/DL (ref 31.5–35.7)
MCV RBC AUTO: 92.4 FL (ref 79–97)
MONOCYTES # BLD AUTO: 0.76 10*3/MM3 (ref 0.1–0.9)
MONOCYTES NFR BLD AUTO: 14.2 % (ref 5–12)
NEUTROPHILS NFR BLD AUTO: 2.5 10*3/MM3 (ref 1.7–7)
NEUTROPHILS NFR BLD AUTO: 46.7 % (ref 42.7–76)
NRBC BLD AUTO-RTO: 0 /100 WBC (ref 0–0.2)
PLATELET # BLD AUTO: 148 10*3/MM3 (ref 140–450)
PMV BLD AUTO: 11.1 FL (ref 6–12)
POTASSIUM SERPL-SCNC: 4.4 MMOL/L (ref 3.5–5.2)
PROT SERPL-MCNC: 7.8 G/DL (ref 6–8.5)
RBC # BLD AUTO: 4.23 10*6/MM3 (ref 3.77–5.28)
SODIUM SERPL-SCNC: 136 MMOL/L (ref 136–145)
TRIGL SERPL-MCNC: 153 MG/DL (ref 0–150)
TSH SERPL DL<=0.05 MIU/L-ACNC: 1.63 UIU/ML (ref 0.27–4.2)
VIT B12 BLD-MCNC: 596 PG/ML (ref 211–946)
VLDLC SERPL-MCNC: 27 MG/DL (ref 5–40)
WBC NRBC COR # BLD: 5.36 10*3/MM3 (ref 3.4–10.8)

## 2021-11-29 PROCEDURE — 80061 LIPID PANEL: CPT | Performed by: NURSE PRACTITIONER

## 2021-11-29 PROCEDURE — 83036 HEMOGLOBIN GLYCOSYLATED A1C: CPT | Performed by: NURSE PRACTITIONER

## 2021-11-29 PROCEDURE — 80053 COMPREHEN METABOLIC PANEL: CPT | Performed by: NURSE PRACTITIONER

## 2021-11-29 PROCEDURE — 82306 VITAMIN D 25 HYDROXY: CPT | Performed by: NURSE PRACTITIONER

## 2021-11-29 PROCEDURE — 85025 COMPLETE CBC W/AUTO DIFF WBC: CPT | Performed by: NURSE PRACTITIONER

## 2021-11-29 PROCEDURE — 82607 VITAMIN B-12: CPT | Performed by: NURSE PRACTITIONER

## 2021-11-29 PROCEDURE — 84443 ASSAY THYROID STIM HORMONE: CPT | Performed by: NURSE PRACTITIONER

## 2021-11-29 PROCEDURE — 77412 RADIATION TX DELIVERY LVL 3: CPT | Performed by: RADIOLOGY

## 2021-11-29 PROCEDURE — 83735 ASSAY OF MAGNESIUM: CPT | Performed by: NURSE PRACTITIONER

## 2021-11-30 ENCOUNTER — LAB (OUTPATIENT)
Dept: ONCOLOGY | Facility: CLINIC | Age: 66
End: 2021-11-30

## 2021-11-30 ENCOUNTER — OFFICE VISIT (OUTPATIENT)
Dept: FAMILY MEDICINE CLINIC | Facility: CLINIC | Age: 66
End: 2021-11-30

## 2021-11-30 ENCOUNTER — RADIATION ONCOLOGY WEEKLY ASSESSMENT (OUTPATIENT)
Dept: RADIATION ONCOLOGY | Facility: HOSPITAL | Age: 66
End: 2021-11-30

## 2021-11-30 ENCOUNTER — OFFICE VISIT (OUTPATIENT)
Dept: ONCOLOGY | Facility: CLINIC | Age: 66
End: 2021-11-30

## 2021-11-30 VITALS
OXYGEN SATURATION: 97 % | WEIGHT: 276.4 LBS | HEART RATE: 60 BPM | TEMPERATURE: 97.3 F | SYSTOLIC BLOOD PRESSURE: 158 MMHG | BODY MASS INDEX: 48.97 KG/M2 | RESPIRATION RATE: 18 BRPM | HEIGHT: 63 IN | DIASTOLIC BLOOD PRESSURE: 76 MMHG

## 2021-11-30 VITALS
DIASTOLIC BLOOD PRESSURE: 69 MMHG | WEIGHT: 280 LBS | TEMPERATURE: 97.1 F | SYSTOLIC BLOOD PRESSURE: 124 MMHG | RESPIRATION RATE: 18 BRPM | HEART RATE: 53 BPM | OXYGEN SATURATION: 98 % | BODY MASS INDEX: 54.68 KG/M2

## 2021-11-30 DIAGNOSIS — I63.49 CEREBROVASCULAR ACCIDENT (CVA) DUE TO EMBOLISM OF OTHER CEREBRAL ARTERY (HCC): ICD-10-CM

## 2021-11-30 DIAGNOSIS — Z79.4 TYPE 2 DIABETES MELLITUS WITH HYPERGLYCEMIA, WITH LONG-TERM CURRENT USE OF INSULIN (HCC): Chronic | ICD-10-CM

## 2021-11-30 DIAGNOSIS — E03.9 HYPOTHYROIDISM, UNSPECIFIED TYPE: Chronic | ICD-10-CM

## 2021-11-30 DIAGNOSIS — Z00.00 MEDICARE ANNUAL WELLNESS VISIT, SUBSEQUENT: Primary | ICD-10-CM

## 2021-11-30 DIAGNOSIS — E55.9 VITAMIN D DEFICIENCY: ICD-10-CM

## 2021-11-30 DIAGNOSIS — Z17.0 MALIGNANT NEOPLASM OF UPPER-OUTER QUADRANT OF RIGHT BREAST IN FEMALE, ESTROGEN RECEPTOR POSITIVE (HCC): ICD-10-CM

## 2021-11-30 DIAGNOSIS — C50.411 MALIGNANT NEOPLASM OF UPPER-OUTER QUADRANT OF RIGHT BREAST IN FEMALE, ESTROGEN RECEPTOR POSITIVE (HCC): Primary | ICD-10-CM

## 2021-11-30 DIAGNOSIS — Z17.0 MALIGNANT NEOPLASM OF UPPER-OUTER QUADRANT OF RIGHT BREAST IN FEMALE, ESTROGEN RECEPTOR POSITIVE (HCC): Primary | ICD-10-CM

## 2021-11-30 DIAGNOSIS — E53.8 B12 DEFICIENCY: ICD-10-CM

## 2021-11-30 DIAGNOSIS — C50.411 MALIGNANT NEOPLASM OF UPPER-OUTER QUADRANT OF RIGHT BREAST IN FEMALE, ESTROGEN RECEPTOR POSITIVE (HCC): ICD-10-CM

## 2021-11-30 DIAGNOSIS — H40.10X0 OPEN-ANGLE GLAUCOMA OF BOTH EYES, UNSPECIFIED GLAUCOMA STAGE, UNSPECIFIED OPEN-ANGLE GLAUCOMA TYPE: ICD-10-CM

## 2021-11-30 DIAGNOSIS — Z78.0 ASYMPTOMATIC MENOPAUSAL STATE: ICD-10-CM

## 2021-11-30 DIAGNOSIS — I10 ESSENTIAL HYPERTENSION: Chronic | ICD-10-CM

## 2021-11-30 DIAGNOSIS — I47.1 SVT (SUPRAVENTRICULAR TACHYCARDIA) (HCC): Chronic | ICD-10-CM

## 2021-11-30 DIAGNOSIS — E11.65 TYPE 2 DIABETES MELLITUS WITH HYPERGLYCEMIA, WITH LONG-TERM CURRENT USE OF INSULIN (HCC): Chronic | ICD-10-CM

## 2021-11-30 LAB
ALBUMIN SERPL-MCNC: 3.42 G/DL (ref 3.5–5.2)
ALBUMIN/GLOB SERPL: 0.7 G/DL
ALP SERPL-CCNC: 95 U/L (ref 39–117)
ALT SERPL W P-5'-P-CCNC: 19 U/L (ref 1–33)
ANION GAP SERPL CALCULATED.3IONS-SCNC: 13.5 MMOL/L (ref 5–15)
AST SERPL-CCNC: 34 U/L (ref 1–32)
BASOPHILS # BLD AUTO: 0.03 10*3/MM3 (ref 0–0.2)
BASOPHILS NFR BLD AUTO: 0.6 % (ref 0–1.5)
BILIRUB SERPL-MCNC: 0.6 MG/DL (ref 0–1.2)
BUN SERPL-MCNC: 21 MG/DL (ref 8–23)
BUN/CREAT SERPL: 21.6 (ref 7–25)
CALCIUM SPEC-SCNC: 9.9 MG/DL (ref 8.6–10.5)
CHLORIDE SERPL-SCNC: 98 MMOL/L (ref 98–107)
CO2 SERPL-SCNC: 20.5 MMOL/L (ref 22–29)
CREAT SERPL-MCNC: 0.97 MG/DL (ref 0.57–1)
DEPRECATED RDW RBC AUTO: 54.8 FL (ref 37–54)
EOSINOPHIL # BLD AUTO: 0.04 10*3/MM3 (ref 0–0.4)
EOSINOPHIL NFR BLD AUTO: 0.7 % (ref 0.3–6.2)
ERYTHROCYTE [DISTWIDTH] IN BLOOD BY AUTOMATED COUNT: 16.1 % (ref 12.3–15.4)
GFR SERPL CREATININE-BSD FRML MDRD: 57 ML/MIN/1.73
GLOBULIN UR ELPH-MCNC: 4.7 GM/DL
GLUCOSE SERPL-MCNC: 245 MG/DL (ref 65–99)
HCT VFR BLD AUTO: 41 % (ref 34–46.6)
HGB BLD-MCNC: 12.9 G/DL (ref 12–15.9)
IMM GRANULOCYTES # BLD AUTO: 0.01 10*3/MM3 (ref 0–0.05)
IMM GRANULOCYTES NFR BLD AUTO: 0.2 % (ref 0–0.5)
LYMPHOCYTES # BLD AUTO: 1.68 10*3/MM3 (ref 0.7–3.1)
LYMPHOCYTES NFR BLD AUTO: 31.3 % (ref 19.6–45.3)
MCH RBC QN AUTO: 29.6 PG (ref 26.6–33)
MCHC RBC AUTO-ENTMCNC: 31.5 G/DL (ref 31.5–35.7)
MCV RBC AUTO: 94 FL (ref 79–97)
MONOCYTES # BLD AUTO: 0.86 10*3/MM3 (ref 0.1–0.9)
MONOCYTES NFR BLD AUTO: 16 % (ref 5–12)
NEUTROPHILS NFR BLD AUTO: 2.75 10*3/MM3 (ref 1.7–7)
NEUTROPHILS NFR BLD AUTO: 51.2 % (ref 42.7–76)
NRBC BLD AUTO-RTO: 0 /100 WBC (ref 0–0.2)
PLATELET # BLD AUTO: 147 10*3/MM3 (ref 140–450)
PMV BLD AUTO: 9.8 FL (ref 6–12)
POTASSIUM SERPL-SCNC: 4.7 MMOL/L (ref 3.5–5.2)
PROT SERPL-MCNC: 8.1 G/DL (ref 6–8.5)
RBC # BLD AUTO: 4.36 10*6/MM3 (ref 3.77–5.28)
SODIUM SERPL-SCNC: 132 MMOL/L (ref 136–145)
WBC NRBC COR # BLD: 5.37 10*3/MM3 (ref 3.4–10.8)

## 2021-11-30 PROCEDURE — 99214 OFFICE O/P EST MOD 30 MIN: CPT | Performed by: INTERNAL MEDICINE

## 2021-11-30 PROCEDURE — 80053 COMPREHEN METABOLIC PANEL: CPT | Performed by: INTERNAL MEDICINE

## 2021-11-30 PROCEDURE — 85025 COMPLETE CBC W/AUTO DIFF WBC: CPT | Performed by: INTERNAL MEDICINE

## 2021-11-30 PROCEDURE — 1126F AMNT PAIN NOTED NONE PRSNT: CPT | Performed by: NURSE PRACTITIONER

## 2021-11-30 PROCEDURE — 77417 THER RADIOLOGY PORT IMAGE(S): CPT | Performed by: RADIOLOGY

## 2021-11-30 PROCEDURE — G0439 PPPS, SUBSEQ VISIT: HCPCS | Performed by: NURSE PRACTITIONER

## 2021-11-30 PROCEDURE — 1159F MED LIST DOCD IN RCRD: CPT | Performed by: NURSE PRACTITIONER

## 2021-11-30 PROCEDURE — 77412 RADIATION TX DELIVERY LVL 3: CPT | Performed by: RADIOLOGY

## 2021-11-30 PROCEDURE — 1170F FXNL STATUS ASSESSED: CPT | Performed by: NURSE PRACTITIONER

## 2021-11-30 RX ORDER — ANASTROZOLE 1 MG/1
1 TABLET ORAL DAILY
Qty: 30 TABLET | Refills: 11 | Status: SHIPPED | OUTPATIENT
Start: 2021-11-30 | End: 2022-08-09 | Stop reason: SDUPTHER

## 2021-11-30 RX ORDER — LEVOTHYROXINE SODIUM 88 UG/1
88 TABLET ORAL DAILY
Qty: 90 TABLET | Refills: 0 | Status: SHIPPED | OUTPATIENT
Start: 2021-11-30 | End: 2022-03-15

## 2021-11-30 NOTE — PROGRESS NOTES
OTV Note      Patient Name: Ronna Wayne  : 1955   MRN: [id number]     Diagnosis:    Chief Complaint   Patient presents with   • Breast Cancer      Staging: Malignant neoplasm of upper-outer quadrant of right breast in female, estrogen receptor positive (HCC)  - No stage assigned  - pT2, pN1, cM0       This patient was seen today for an on treatment visit. The patient is receiving radiation treatment to the right breast. The patient has received XRT Dose (cGy): 6000 cGy in 30 fractions out of a planned dose of 6000 cGy in 30 fractions.       Subjective      Review of Systems:   Review of Systems   Constitutional: Negative.    HENT: Negative.  Negative for trouble swallowing.    Eyes: Negative.    Respiratory: Negative.  Negative for cough and shortness of breath.    Cardiovascular: Negative.  Negative for chest pain.   Gastrointestinal: Negative.    Endocrine: Negative.    Genitourinary: Negative.  Negative for breast discharge, breast lump and breast pain.   Musculoskeletal: Negative.    Skin: Positive for color change and dry skin.   Neurological: Negative.    Hematological: Negative.    Psychiatric/Behavioral: Negative.      Review of Systems   Constitutional: Negative.    HENT:  Negative.  Negative for trouble swallowing.    Eyes: Negative.    Respiratory: Negative.  Negative for cough and shortness of breath.    Cardiovascular: Negative.  Negative for chest pain.   Gastrointestinal: Negative.    Endocrine: Negative.    Genitourinary: Negative.     Musculoskeletal: Negative.    Skin: Positive for color change.   Neurological: Negative.    Hematological: Negative.    Psychiatric/Behavioral: Negative.        Medications:     Current Outpatient Medications:   •  acetaminophen (TYLENOL) 325 MG tablet, Take 2 tablets by mouth Every 4 (Four) Hours As Needed for Mild Pain ., Disp: 30 tablet, Rfl: 0  •  amLODIPine (NORVASC) 5 MG tablet, Take 1 tablet by mouth Daily., Disp: 30 tablet, Rfl: 11  •   aspirin 81 MG EC tablet, Take 1 tablet by mouth Daily., Disp: 90 tablet, Rfl: 3  •  cholecalciferol (VITAMIN D3) 25 MCG (1000 UT) tablet, Take 1,000 Units by mouth Daily., Disp: , Rfl:   •  empagliflozin (Jardiance) 25 MG tablet tablet, Take 1 tablet by mouth Every Morning., Disp: 35 tablet, Rfl: 0  •  hydroCHLOROthiazide (HYDRODIURIL) 12.5 MG tablet, Take 12.5 mg by mouth Daily As Needed (swelling)., Disp: , Rfl:   •  ibuprofen (ADVIL,MOTRIN) 600 MG tablet, Take 1 tablet by mouth Every 6 (Six) Hours As Needed for Mild Pain ., Disp: 30 tablet, Rfl: 0  •  ibuprofen (ADVIL,MOTRIN) 600 MG tablet, Take 1 tablet by mouth Every 8 (Eight) Hours As Needed for Moderate Pain ., Disp: 60 tablet, Rfl: 2  •  insulin aspart prot-insulin aspart (novoLOG 70/30) (70-30) 100 UNIT/ML injection, Inject 5 Units under the skin into the appropriate area as directed Daily With Breakfast., Disp: , Rfl:   •  insulin aspart prot-insulin aspart (novoLOG 70/30) (70-30) 100 UNIT/ML injection, Inject 8 Units under the skin into the appropriate area as directed Daily With Lunch., Disp: , Rfl:   •  insulin aspart prot-insulin aspart (novoLOG 70/30) (70-30) 100 UNIT/ML injection, Inject 10 Units under the skin into the appropriate area as directed Daily With Dinner., Disp: , Rfl:   •  Insulin Glargine (BASAGLAR KWIKPEN) 100 UNIT/ML injection pen, Inject 70 Units under the skin into the appropriate area as directed Daily for 90 days., Disp: 21 pen, Rfl: 0  •  levothyroxine (SYNTHROID, LEVOTHROID) 88 MCG tablet, TAKE ONE TABLET BY MOUTH DAILY, Disp: 30 tablet, Rfl: 1  •  losartan (COZAAR) 50 MG tablet, Take 1 tablet by mouth Daily., Disp: 30 tablet, Rfl: 5  •  magnesium oxide (MAG-OX) 400 MG tablet, Take 1 tablet by mouth 3 (Three) Times a Day., Disp: 90 tablet, Rfl: 3  •  metoprolol succinate XL (TOPROL-XL) 100 MG 24 hr tablet, TAKE ONE TABLET BY MOUTH DAILY, Disp: 30 tablet, Rfl: 2  •  silver sulfadiazine (SILVADENE, SSD) 1 % cream, Apply 1  application topically to the appropriate area as directed 2 (Two) Times a Day., Disp: 1 each, Rfl: 3  •  traMADol (ULTRAM) 50 MG tablet, Take 1 tablet by mouth Every 8 (Eight) Hours As Needed for Moderate Pain ., Disp: 8 tablet, Rfl: 0    Allergies:   Allergies   Allergen Reactions   • Codeine GI Intolerance     Increased heart rate     • Sulfa Antibiotics GI Intolerance     Heart rate increase          Objective       Vital Signs:   Vitals:    11/30/21 1139   BP: 124/69   Pulse: 53   Resp: 18   Temp: 97.1 °F (36.2 °C)   TempSrc: Temporal   SpO2: 98%   Weight: 127 kg (280 lb)   PainSc: 0-No pain     Body mass index is 54.68 kg/m².     Current Total XRT Dose (cGY): XRT Dose (cGy): 6000    Plan      Plan:   Patient was seen today for an on treatment visit. Patient is receiving radiation therapy to the right breast. Patient is stable and tolerating radiation therapy well with minimal side effects.  No new complaints today.  Her breast is very erythemic in the treatment area, as well as peeling in the right upper outer quadrant, under the breast skin fold, and in the axilla.  Instructed her to continue using Silvadene cream and Aquaphor to the treatment area, and getting the treatment area exposed and open to air at home as much as possible.  Today is her last treatment, we will see her in 2 weeks for skin check follow-up.  Continue with radiation therapy.     I have reviewed treatment setup notes, checked and approved the daily guidance images. I reviewed dose delivery, treatment parameters and deemed them appropriate. We plan to continue radiation therapy as prescribed.       I, Marbin Prado MD, personally performed the services described in this documentation as scribed by the above named individual in my presence, and it is both accurate and complete.  11/30/2021  11:56 EST     Electronically signed by Marbin Prado MD, 11/30/21, 11:56 AM EST.    Scribed for Dr. Marbin Prado MD by BOSTON Zapata 11/30/2021  11:49  EST

## 2021-11-30 NOTE — PROGRESS NOTES
Name:  Ronna Wayne  :  1955  Date:  2021     REFERRING PHYSICIAN  Lee Parrish MD    PRIMARY CARE PROVIDER  Mague Acosta APRN    REASON FOR FOLLOWUP  1. Malignant neoplasm of upper-outer quadrant of right breast in female, estrogen receptor positive (HCC)    2. Asymptomatic menopausal state       CHIEF COMPLAINT  Rash/erythema from adjuvant, localized XRT (which she completed today).    Dear Lee,    HISTORY OF PRESENT ILLNESS:   I saw Ms. Wayne in follow up today in our medical oncology clinic. As you are aware, she is a pleasant, 66 y.o., white female with a history of multiple medical problems, including hypertension, diabetes and sleep apnea who was diagnosed with an ER positive (95%), MS positive (95%), HER2/abida negative (1+ by IHC), invasive, mammary carcinoma (with mixed ductal and lobular features) of the upper, outer quadrant of the right breast in late Spring 2021. She was referred to you, and you performed a successful, right-sided lumpectomy on 2021. A sentinel node was positive; however, an additional twelve, excised, right-sided, axillary lymph nodes were uninvolved (). Final, surgical staging was consistent with stage [id number] disease (IIB). She was subsequently referred to our clinic (and radiation oncology) for further evaluation and management.    INTERIM HISTORY:  Ms. Wayne returns to clinic today for followup by herself. She completed a ~thirty fraction course of adjuvant, whole breast radiation earlier today. While her treatments had to be delayed for one week due to some significant skin toxicities (blistering, etc.), she otherwise had no significant issues. She again has no new or other specific complaints and continues to feel overall well.    Past Medical History:   Diagnosis Date   • Anxiety    • B12 deficiency    • BPV (benign positional vertigo)    • Breast cancer (HCC) 2021   • Diabetes mellitus (HCC)    • Diarrhea    • Disease  of thyroid gland    • Elevated cholesterol    • Fibromyalgia    • GERD (gastroesophageal reflux disease)    • Glaucoma    • Heart murmur    • Hyperlipidemia    • Hypertension    • Kidney disease    • Obesity    • Peptic ulceration    • PONV (postoperative nausea and vomiting)    • Sleep apnea     c-pap   • Stroke (HCC)    • Weakness of left arm        Past Surgical History:   Procedure Laterality Date   • ABDOMINAL SURGERY     • APPENDECTOMY     • BREAST CYST ASPIRATION     • BREAST LUMPECTOMY WITH SENTINEL NODE BIOPSY Right 2021    Procedure: BREAST LUMPECTOMY WITH SENTINEL NODE BIOPSY;  Surgeon: Lee Parrish MD;  Location: [facility];  Service: General;  Laterality: Right;   • CATARACT EXTRACTION Bilateral    • CHOLECYSTECTOMY     • COLONOSCOPY     • ENDOSCOPY     • HEMATOMA EVACUATION TRUNK Right 2021    Procedure: HEMATOMA EVACUATION TRUNK;  Surgeon: Lee Parrish MD;  Location: [facility];  Service: General;  Laterality: Right;   • URETHRA SURGERY         Social History     Socioeconomic History   • Marital status:    Tobacco Use   • Smoking status: Former Smoker     Packs/day: 1.50     Years: 8.00     Pack years: 12.00     Types: Cigarettes     Quit date:      Years since quittin.9   • Smokeless tobacco: Never Used   Vaping Use   • Vaping Use: Never used   Substance and Sexual Activity   • Alcohol use: No     Comment: former social drinker not had anything in 25 + years   • Drug use: Yes     Types: Hydrocodone   • Sexual activity: Defer       Family History   Problem Relation Age of Onset   • Thyroid disease Mother    • Diabetes Mother    • Hyperlipidemia Father    • Hypertension Father    • Heart attack Father    • Alcohol abuse Maternal Aunt    • Cancer Maternal Grandfather         PROSTATE   • Stroke Paternal Grandmother    • Stroke Paternal Grandfather    • Hypertension Other    • Hypertension Sister    • Breast cancer Sister 40   • Obesity Sister    • Hypertension  Sister    • Hyperlipidemia Sister    • Hyperlipidemia Sister    • Hypertension Sister    • Arthritis Sister    • Obesity Sister    • Thyroid disease Sister        Allergies   Allergen Reactions   • Codeine GI Intolerance     Increased heart rate     • Sulfa Antibiotics GI Intolerance     Heart rate increase        Current Outpatient Medications   Medication Sig Dispense Refill   • acetaminophen (TYLENOL) 325 MG tablet Take 2 tablets by mouth Every 4 (Four) Hours As Needed for Mild Pain . 30 tablet 0   • amLODIPine (NORVASC) 5 MG tablet Take 1 tablet by mouth Daily. 30 tablet 11   • aspirin 81 MG EC tablet Take 1 tablet by mouth Daily. 90 tablet 3   • cholecalciferol (VITAMIN D3) 25 MCG (1000 UT) tablet Take 1,000 Units by mouth Daily.     • empagliflozin (Jardiance) 25 MG tablet tablet Take 1 tablet by mouth Every Morning. 35 tablet 0   • hydroCHLOROthiazide (HYDRODIURIL) 12.5 MG tablet Take 12.5 mg by mouth Daily As Needed (swelling).     • ibuprofen (ADVIL,MOTRIN) 600 MG tablet Take 1 tablet by mouth Every 6 (Six) Hours As Needed for Mild Pain . 30 tablet 0   • ibuprofen (ADVIL,MOTRIN) 600 MG tablet Take 1 tablet by mouth Every 8 (Eight) Hours As Needed for Moderate Pain . 60 tablet 2   • insulin aspart prot-insulin aspart (novoLOG 70/30) (70-30) 100 UNIT/ML injection Inject 5 Units under the skin into the appropriate area as directed Daily With Breakfast.     • insulin aspart prot-insulin aspart (novoLOG 70/30) (70-30) 100 UNIT/ML injection Inject 8 Units under the skin into the appropriate area as directed Daily With Lunch.     • insulin aspart prot-insulin aspart (novoLOG 70/30) (70-30) 100 UNIT/ML injection Inject 10 Units under the skin into the appropriate area as directed Daily With Dinner.     • Insulin Glargine (BASAGLAR KWIKPEN) 100 UNIT/ML injection pen Inject 70 Units under the skin into the appropriate area as directed Daily for 90 days. 21 pen 0   • levothyroxine (SYNTHROID, LEVOTHROID) 88 MCG tablet  "TAKE ONE TABLET BY MOUTH DAILY 30 tablet 1   • losartan (COZAAR) 50 MG tablet Take 1 tablet by mouth Daily. 30 tablet 5   • magnesium oxide (MAG-OX) 400 MG tablet Take 1 tablet by mouth 3 (Three) Times a Day. 90 tablet 3   • metoprolol succinate XL (TOPROL-XL) 100 MG 24 hr tablet TAKE ONE TABLET BY MOUTH DAILY 30 tablet 2   • silver sulfadiazine (SILVADENE, SSD) 1 % cream Apply 1 application topically to the appropriate area as directed 2 (Two) Times a Day. 1 each 3   • traMADol (ULTRAM) 50 MG tablet Take 1 tablet by mouth Every 8 (Eight) Hours As Needed for Moderate Pain . 8 tablet 0   • anastrozole (ARIMIDEX) 1 MG tablet Take 1 tablet by mouth Daily. 30 tablet 11     No current facility-administered medications for this visit.     REVIEW OF SYSTEMS  CONSTITUTIONAL:  No fever, chills, night sweats or fatigue.  EYES:  No blurry vision, diplopia or other vision changes.  ENT:  No hearing loss, nosebleeds or sore throat.  CARDIOVASCULAR:  No palpitations, arrhythmia, syncopal episodes or edema.  PULMONARY:  No hemoptysis, wheezing, chronic cough or shortness of breath.  BREASTS: As per the HPI above.  GASTROINTESTINAL:  No nausea or vomiting.  No constipation or diarrhea.  No abdominal pain.  GENITOURINARY:  No hematuria, kidney stones or frequent urination.  MUSCULOSKELETAL:  No joint or back pains. Decreased range of motion in the right arm.  INTEGUMENTARY: As per the HPI above.  ENDOCRINE:  No excessive thirst or hot flashes.  HEMATOLOGIC:  No history of free bleeding, spontaneous bleeding or clotting.  IMMUNOLOGIC:  No allergies or frequent infections.  NEUROLOGIC: No numbness, tingling, seizures or weakness.  PSYCHIATRIC:  No anxiety or depression.    PHYSICAL EXAMINATION  /76   Pulse 60   Temp 97.3 °F (36.3 °C) (Temporal)   Resp 18   Ht 160 cm (63\")   Wt 125 kg (276 lb 6.4 oz)   SpO2 97%   BMI 48.96 kg/m²     Pain Score:  Pain Score    11/30/21 1217   PainSc: 0-No pain       PHQ-Score Total:  PHQ-9 " Total Score:      ECO  GENERAL:  A well-developed, well-nourished, morbidly obese, white female in no acute distress.  HEENT:  Pupils equally round and reactive to light.  Extraocular muscles intact.  CARDIOVASCULAR:  Regular rate and rhythm.  No murmurs, gallops or rubs.  LUNGS:  Clear to auscultation bilaterally.  BREASTS: Deferred today. Status post right-sided lumpectomy on 2021.  ABDOMEN:  Soft, nontender, nondistended with positive bowel sounds.  EXTREMITIES:  No clubbing, cyanosis or edema bilaterally. Unable to raise her right arm above her head (physical therapy is planned).  SKIN:  Rash/erythema involving the skin of the right breast, as per the HPI above.  NEURO:  Cranial nerves grossly intact. No focal deficits.  PSYCH:  Alert and oriented x3.    LABORATORY  Lab Results   Component Value Date    WBC 5.37 2021    HGB 12.9 2021    HCT 41.0 2021    MCV 94.0 2021     2021    NEUTROABS 2.75 2021       Lab Results   Component Value Date     2021    K 4.4 2021     2021    CO2 26.6 2021    BUN 16 2021    CREATININE 0.84 2021    GLUCOSE 180 (H) 2021    CALCIUM 9.4 2021    AST 31 2021    ALT 19 2021    ALKPHOS 92 2021    BILITOT 0.5 2021    PROTEINTOT 7.8 2021    ALBUMIN 3.50 2021     CBC (2021): WBCs: 5.36; HgB: 12.8; Hct: 39.1; platelets: 148  CBC (2021): WBCs: 6.63; HgB: 11.9; Hct: 36.1; platelets: 160  CBC (2021): WBCs: 9.36; HgB: 9.2; Hct: 29.1; platelets: 131    IMAGING  MRI breast bilateral with and without contrast (2021):  Impression:  1) Negative left breast MRI.  2) Biopsy proven malignancy in the right 9:00 region measuring 3.7 cm. There are no additional worrisome findings in the right breast.    Bone densitometry (DEXA) scan (2021):  Impression: The BMD measured at the AP spine L1-L4 is 1.157 gm/cm2 with a T-score of -0.2.  "The patient is considered to be normal according to WHO criteria. Fracture risk is low.    PATHOLOGY  Breast, lumpectomy, right (08/05/2021):  Invasive mammary carcinoma with ductal and lobular features, margins uninvolved. Atypical, lobular hyperplasia with involvement of ducts. Dense stromal fibrosis. Greatest dimension of invasive focus: 31 mm. ER positive (95%), CT positive (95%), HER2 negative (1+ by IHC). [id number] (stage IIB).    [place] node, right #1 (08/05/2021):  Metastatic mammary carcinoma, 2.5 cmm extent, with extranodal extension (1/1).    Axillary contents, right (08/05/2021):  No malignancy identified in twelve axillary lymph nodes (0/12).    MammaPrint result (08/31/2021): Low risk.    IMPRESSION AND PLAN  Ms. Wayne is a 66 y.o., white female with:  Breast carcinoma: Diagnosed in late Spring 2021 and status post a right-sided lumpectomy with right axillary evaluation on 08/05/2021. The final, surgical pathology was consistent with stage IIB ([id number]), ER positive (95%), CT positive (95%), HER2 negative (1+ by IHC), invasive, mammary carcinoma (with ductal and lobular features) of the right breast. The surgical margins were clear; and, while a sentinel node was involved, an additional twelve lymph nodes were all negative for metastasis (1/13). I have had multiple, long discussions with the patient since the time of her initial consultation in our clinic (on 08/25/2021) regarding this diagnosis and its prognosis. In short, her surgery went extremely well; and her prognosis was/remains overall very good. That said, adjuvant treatment was/remains recommended in order to maximize her chances of cure. As MammaPrint testing on her tumor was consistent with \"low risk\" disease, chemotherapy was not indicated (as the potential risks would have drastically outweighed its very limited/nonexistant potential benefits in her case). As she had a lumpectomy, whole breast radiation was definitely indicated, and " she has now completed a thirty fraction course of this therapy (her last session was today, 11/30/2021). As her tumor is very strongly ER/AR positive, endocrine therapy (with an aromatase inhibitor, given her postmenopausal status) is now definitely indicated and strongly recommended as the final step in her adjuvant treatment. The potential risks vs. benefits of this medication were discussed at length, and a Rx for Arimidex 1 mg PO daily was provided. We will see her back in clinic in ~six weeks with a CBC and CMP. The patient was in agreement with these plans.    It is a pleasure to participate in Ms. Wayne's care. Please do not hesitate to call with any questions or concerns that you may have.    A total of 30 minutes were spent coordinating this patient’s care in clinic today; more than 50% of this time was face-to-face with the patient, reviewing her interim medical history and counseling on the current treatment and followup plan. All questions were answered to her satisfaction.    FOLLOW UP  New Rx for Arimidex 1 mg PO daily provided today. With surgery, as previously planned. With physical therapy, as planned. With [facility] radiation oncology, as previously planned. Return to our clinic in 6 weeks with a CBC and CMP.            This document was electronically signed by LEYDI Teresa MD November 30, 2021 12:37 EST      CC: MD Marbin Ramirez MD Sherelene S. Fortney, APRN

## 2021-12-01 ENCOUNTER — APPOINTMENT (OUTPATIENT)
Dept: RADIATION ONCOLOGY | Facility: HOSPITAL | Age: 66
End: 2021-12-01

## 2021-12-02 VITALS
HEIGHT: 63 IN | RESPIRATION RATE: 14 BRPM | OXYGEN SATURATION: 97 % | BODY MASS INDEX: 48.9 KG/M2 | SYSTOLIC BLOOD PRESSURE: 126 MMHG | WEIGHT: 276 LBS | DIASTOLIC BLOOD PRESSURE: 60 MMHG | HEART RATE: 56 BPM | TEMPERATURE: 98.2 F

## 2021-12-03 ENCOUNTER — TELEPHONE (OUTPATIENT)
Dept: FAMILY MEDICINE CLINIC | Facility: CLINIC | Age: 66
End: 2021-12-03

## 2021-12-03 NOTE — TELEPHONE ENCOUNTER
Called patient and let her know that she was approved for patient assistance for her Toujeo. I let her know that it will be shipped to the office and when it arrives, I will call her and let her know to come pick it up. Patient is in understanding.

## 2021-12-08 ENCOUNTER — TELEPHONE (OUTPATIENT)
Dept: FAMILY MEDICINE CLINIC | Facility: CLINIC | Age: 66
End: 2021-12-08

## 2021-12-08 DIAGNOSIS — E11.40 TYPE 2 DIABETES MELLITUS WITH DIABETIC NEUROPATHY, WITH LONG-TERM CURRENT USE OF INSULIN (HCC): ICD-10-CM

## 2021-12-08 DIAGNOSIS — E11.65 TYPE 2 DIABETES MELLITUS WITH HYPERGLYCEMIA, WITH LONG-TERM CURRENT USE OF INSULIN (HCC): Primary | ICD-10-CM

## 2021-12-08 DIAGNOSIS — Z79.4 TYPE 2 DIABETES MELLITUS WITH DIABETIC NEUROPATHY, WITH LONG-TERM CURRENT USE OF INSULIN (HCC): ICD-10-CM

## 2021-12-08 DIAGNOSIS — Z79.4 TYPE 2 DIABETES MELLITUS WITH HYPERGLYCEMIA, WITH LONG-TERM CURRENT USE OF INSULIN (HCC): Primary | ICD-10-CM

## 2021-12-08 RX ORDER — PROCHLORPERAZINE 25 MG/1
SUPPOSITORY RECTAL
Qty: 9 EACH | Refills: 3 | Status: SHIPPED | OUTPATIENT
Start: 2021-12-08 | End: 2022-12-18

## 2021-12-08 RX ORDER — PROCHLORPERAZINE 25 MG/1
1 SUPPOSITORY RECTAL DAILY
Qty: 1 EACH | Refills: 3 | Status: SHIPPED | OUTPATIENT
Start: 2021-12-08 | End: 2022-03-08

## 2021-12-08 RX ORDER — PROCHLORPERAZINE 25 MG/1
1 SUPPOSITORY RECTAL DAILY
Qty: 1 EACH | Refills: 0 | Status: SHIPPED | OUTPATIENT
Start: 2021-12-08 | End: 2022-12-18

## 2021-12-08 NOTE — TELEPHONE ENCOUNTER
Called patient and let her know that she can come by and  another sample of the Dexcom until we can send prescriptions for. Patient said she will come by and pick one up.

## 2021-12-13 ENCOUNTER — OFFICE VISIT (OUTPATIENT)
Dept: RADIATION ONCOLOGY | Facility: HOSPITAL | Age: 66
End: 2021-12-13

## 2021-12-13 VITALS
DIASTOLIC BLOOD PRESSURE: 53 MMHG | HEART RATE: 62 BPM | TEMPERATURE: 97.8 F | SYSTOLIC BLOOD PRESSURE: 138 MMHG | BODY MASS INDEX: 48.9 KG/M2 | OXYGEN SATURATION: 96 % | WEIGHT: 276 LBS | RESPIRATION RATE: 18 BRPM

## 2021-12-13 DIAGNOSIS — C50.411 MALIGNANT NEOPLASM OF UPPER-OUTER QUADRANT OF RIGHT BREAST IN FEMALE, ESTROGEN RECEPTOR POSITIVE (HCC): Primary | ICD-10-CM

## 2021-12-13 DIAGNOSIS — Z17.0 MALIGNANT NEOPLASM OF UPPER-OUTER QUADRANT OF RIGHT BREAST IN FEMALE, ESTROGEN RECEPTOR POSITIVE (HCC): Primary | ICD-10-CM

## 2021-12-13 PROCEDURE — G0463 HOSPITAL OUTPT CLINIC VISIT: HCPCS

## 2021-12-13 PROCEDURE — 99212-NC PR NO CHARGE CBC OFFICE OUTPATIENT VISIT 10 MINUTES

## 2021-12-13 NOTE — PROGRESS NOTES
Office Follow Up Note      Patient Name: Ronna Wayne  : 1955   MRN: [id number]     Requesting Physician: Lee Parrish MD    Chief Complaint:  Breast Cancer   Staging: Malignant neoplasm of upper-outer quadrant of right breast in female, estrogen receptor positive (HCC)  - IIB  - pT2, pN1, cM0       History of Present Illness: Ronna Wayne is a pleasant 66 y.o. female who is here today for follow up after completing radiation therapy.    Ronna Wayne is a pleasant 66 y.o. female who is here today for consultation regarding radiation therapy to the right breast. She was diagnosed with an ER positive (95%), TN positive (95%), HER2/abida negative (1+ by IHC), invasive, mammary carcinoma (with mixed ductal and lobular features) of the upper, outer quadrant of the right breast in late Spring 2021.      2021- right sided lumpectomy performed. 1 sentinel node was positive, therefore 12 more were biopsied and were all uninvolved (). Staging at this point was determined to be IIB ([id number]). She had a single 2.5 mm howard met with extranodal extension and the remaining lymph nodes were negative for malignancy. Final excision margins on the lumpectomy specimen were negative.     2021- patient MammaPrint results determined her to be low risk.       Interval History:   Ms. Wayne presents today for follow-up after completing radiation therapy.  She completed 5000 cGy in 25 fractions with a boost of 1000 cGy in 5 fractions for a total of 6000 cGy.  She started on 10/12/2021 and finished on 2021.  She is doing well today and her skin is healing.    Subjective      Review of Systems:   Review of Systems   Constitutional: Positive for fatigue.   HENT:  Negative.  Negative for sore throat and trouble swallowing.    Eyes: Negative.    Respiratory: Negative.  Negative for cough and shortness of breath.    Cardiovascular: Negative.  Negative for chest pain.    Endocrine: Negative.    Genitourinary: Negative.     Musculoskeletal: Negative.    Skin: Positive for color change.   Neurological: Negative.    Hematological: Negative.    Psychiatric/Behavioral: Negative.      Review of Systems   Constitutional: Positive for fatigue.   HENT: Negative.  Negative for sore throat and trouble swallowing.    Eyes: Negative.    Respiratory: Negative.  Negative for cough and shortness of breath.    Cardiovascular: Negative.  Negative for chest pain.   Endocrine: Negative.    Genitourinary: Negative.  Negative for breast discharge, breast lump and breast pain.   Musculoskeletal: Negative.    Skin: Positive for color change and dry skin. Negative for skin lesions.   Neurological: Negative.    Hematological: Negative.    Psychiatric/Behavioral: Negative.        I have reviewed and confirmed the accuracy of the ROS as documented by the MA/LPN/RN BOSTON Damon     The following portions of the patient's history were reviewed and updated as appropriate: allergies, current medications, past family history, past medical history, past social history, past surgical history and problem list.    Past Oncology History:   Oncology/Hematology History   Malignant neoplasm of upper-outer quadrant of right breast in female, estrogen receptor positive (HCC)   6/2/2021 Initial Diagnosis    Malignant neoplasm of upper-outer quadrant of right breast in female, estrogen receptor positive (CMS/HCC)     8/25/2021 Cancer Staged    Staging form: Breast, AJCC 8th Edition  - Pathologic: pT2, pN1, cM0 - Signed by LEYDI Teresa MD on 8/25/2021          KPS: 90%     Results Review:   The following data was reviewed by: BOSTON Damon on 12/13/2021:  Common labs    Common Labsle 11/18/21 11/18/21 11/29/21 11/29/21 11/29/21 11/29/21 11/30/21 11/30/21    0325 0325 0932 0932 0932 0932 1213 1213   Glucose  270 (A)   180 (A)   245 (A)   BUN  20   16   21   Creatinine  0.89   0.84   0.97   eGFR  Non  Am  63   68   57 (A)   Sodium  134 (A)   136   132 (A)   Potassium  3.9   4.4   4.7   Chloride  100   100   98   Calcium  9.1   9.4   9.9   Albumin  3.36 (A)   3.50   3.42 (A)   Total Bilirubin  0.5   0.5   0.6   Alkaline Phosphatase  111   92   95   AST (SGOT)  36 (A)   31   34 (A)   ALT (SGPT)  22   19   19   WBC 6.70  5.36    5.37    Hemoglobin 11.8 (A)  12.8    12.9    Hematocrit 39.0  39.1    41.0    Platelets 145  148    147    Total Cholesterol    207 (A)       Triglycerides    153 (A)       HDL Cholesterol    48       LDL Cholesterol     132 (A)       Hemoglobin A1C      10.77 (A)     (A) Abnormal value       Comments are available for some flowsheets but are not being displayed.           Covid Tests    Common Labsle 9/24/21   COVID19 Not Detected               Imaging:     Diagnostic Mammo 5/12/2021  IMPRESSION:  Findings highly suspicious for malignancy in the right  breast     BI-RADS CATEGORY:  5, HIGHLY SUGGESTIVE OF MALIGNANCY     RECOMMENDATION:  Recommend ultrasound-guided core biopsy of the right  breast.        MRI Breast Bilateral 6/22/2021  FINDINGS: There is minimal bilateral background contrast enhancement and  normal vascular enhancement.     There are no worrisome areas of contrast enhancement in the left breast.     Correlating to the biopsy-proven malignancy in the anterior right 9:00  region is an approximately 3.2 x 3.7 cm irregular rapidly enhancing mass  associated with artifact from a postbiopsy marking clip. There are no  additional worrisome areas of contrast enhancement in the right breast.     There is no axillary adenopathy by MRI criteria and no chest wall  abnormality is seen.     CT PE Chest- 11/18/2021  IMPRESSION:  1. No PE or aortic dissection.  2. Negative for pneumonia.  3. New large fluid collection in the right breast, most likely a postoperative seroma or hematoma. Correlation and follow-up recommended.  4. Cirrhosis.  5. Atherosclerotic disease and  coronary artery disease.          Pathology:    5/19/2021 8/5/2021 8/31/2021         Objective     Physical Exam:  Physical Exam  Vitals reviewed.   Constitutional:       General: She is not in acute distress.     Appearance: Normal appearance. She is normal weight. She is not ill-appearing.   HENT:      Head: Normocephalic.      Nose: Nose normal.      Mouth/Throat:      Mouth: Mucous membranes are moist.      Pharynx: Oropharynx is clear.   Eyes:      Extraocular Movements: Extraocular movements intact.      Conjunctiva/sclera: Conjunctivae normal.      Pupils: Pupils are equal, round, and reactive to light.   Cardiovascular:      Rate and Rhythm: Normal rate and regular rhythm.      Pulses: Normal pulses.      Heart sounds: Normal heart sounds.   Pulmonary:      Effort: Pulmonary effort is normal. No respiratory distress.      Breath sounds: Normal breath sounds.   Chest:   Breasts:      Right: Skin change present. No swelling, mass, nipple discharge or tenderness.      Left: Normal.        Comments: Erythema to the right breast and axilla. Small amount of peeling noted in this area as well. No open areas noted.   Abdominal:      General: Abdomen is flat. Bowel sounds are normal. There is no distension.      Palpations: Abdomen is soft. There is no mass.   Musculoskeletal:         General: No swelling or tenderness. Normal range of motion.      Cervical back: Normal range of motion.   Skin:     General: Skin is warm and dry.      Capillary Refill: Capillary refill takes less than 2 seconds.   Neurological:      General: No focal deficit present.      Mental Status: She is alert and oriented to person, place, and time. Mental status is at baseline.   Psychiatric:         Mood and Affect: Mood normal.         Behavior: Behavior normal.         Thought Content: Thought content normal.         Judgment: Judgment normal.         Vital Signs:   Vitals:    12/13/21 0924   BP: 138/53   Pulse:  62   Resp: 18   Temp: 97.8 °F (36.6 °C)   TempSrc: Temporal   SpO2: 96%   Weight: 125 kg (276 lb)   PainSc: 0-No pain     Body mass index is 48.9 kg/m².       Assessment / Plan      Assessment/Plan:   Ms. Wayne is a 66 year old F with stage IIB ([id number]), ER positive (95%), OK positive (95%), HER2 negative (1+ by IHC), invasive, mammary carcinoma (with ductal and lobular features) of the right breast. The surgical margins were clear; 1 sentinel node was involved with MARILYN, an additional twelve lymph nodes were all negative for metastasis (1/13).     Mammaprint showed low risk score. Spoke to , MARILYN is around 2mm, given her existing lymph edema, we agreed to hold radiation to her axilla.      Breast radiation was indicated, we offered whole breast irradiation (WBI) for a course of 50 Gy with 2 Gray per fraction for 25 treatments to her right breast. Followed by a 10 Gray/2Gy per fraction boost to the lumpectomy cavity.    Overall the patient did very well with treatment.  She did have to take about a week break toward the end of treatment due to skin irritation and burns with blistering to the right breast and axilla.  Today she has been using Silvadene cream and Aquaphor to the treatment area.  It looks much better than at her last OTV.  The skin is scale slightly erythemic and there is some peeling to the breast area, but the open area under her axilla has healed completely, as well as under the skin fold of her breast.  Continue using Silvadene and Aquaphor, continue using cornstarch under the skin fold of the breast to reduce friction.  Instructed to leave the breast and axilla open to air at home as much as possible.  No harsh soaps or scrubbing to the area.    She has no complaints of a cough, chest pain, shortness of breath, breast pain, swelling or lymphedema, breast discharge, or trouble swallowing.    She started Arimidex, she is doing well with this thus far.  She will see Dr. Teresa again on  1/11/2022 with labs.  She sees Dr. Parrish on 12/14/2021.  She will see us back in 3 months, earlier if needed.     Follow Up:   F/U in 3 months     Christina Tovar, BOSTON  12/13/21 10:03 EST

## 2021-12-14 ENCOUNTER — FLU SHOT (OUTPATIENT)
Dept: FAMILY MEDICINE CLINIC | Facility: CLINIC | Age: 66
End: 2021-12-14

## 2021-12-14 ENCOUNTER — OFFICE VISIT (OUTPATIENT)
Dept: SURGERY | Facility: CLINIC | Age: 66
End: 2021-12-14

## 2021-12-14 VITALS — WEIGHT: 276 LBS | BODY MASS INDEX: 48.9 KG/M2 | HEIGHT: 63 IN

## 2021-12-14 DIAGNOSIS — Z17.0 MALIGNANT NEOPLASM OF UPPER-OUTER QUADRANT OF RIGHT BREAST IN FEMALE, ESTROGEN RECEPTOR POSITIVE (HCC): Primary | ICD-10-CM

## 2021-12-14 DIAGNOSIS — Z23 NEED FOR INFLUENZA VACCINATION: Primary | ICD-10-CM

## 2021-12-14 DIAGNOSIS — C50.411 MALIGNANT NEOPLASM OF UPPER-OUTER QUADRANT OF RIGHT BREAST IN FEMALE, ESTROGEN RECEPTOR POSITIVE (HCC): Primary | ICD-10-CM

## 2021-12-14 PROCEDURE — 99213 OFFICE O/P EST LOW 20 MIN: CPT | Performed by: SURGERY

## 2021-12-14 PROCEDURE — 90686 IIV4 VACC NO PRSV 0.5 ML IM: CPT | Performed by: NURSE PRACTITIONER

## 2021-12-14 PROCEDURE — G0008 ADMIN INFLUENZA VIRUS VAC: HCPCS | Performed by: NURSE PRACTITIONER

## 2021-12-14 NOTE — PROGRESS NOTES
Subjective   Ronna Wayne is a 66 y.o. female  is here today for follow-up.         Ronna Wayne is a 66 y.o. female here for follow up after right breast lumpectomy with sentinel lymph node biopsy was positive requiring level 1 and 2 axillary howard dissection.  Postoperatively her course was complicated by postoperative day 0 hematoma development for which she was taken back to the operating room and ligation of a single bleeding vessel was performed.  The patient is doing well and her incisions have healed nicely.  Postradiation changes are within normal limits.                        Assessment     Diagnoses and all orders for this visit:    1. Malignant neoplasm of upper-outer quadrant of right breast in female, estrogen receptor positive (HCC) (Primary)      Ronna Wayne is a 66 y.o. female status post right breast lumpectomy with axillary howard dissection.  The patient had a single 2.5 mm howard met with extranodal extension and the remaining lymph nodes were negative for malignancy.  Final excision margins on the lumpectomy specimen were negative.  The patient is doing well has completed adjuvant radiation therapy.  Follow-up in 3 months.

## 2021-12-19 DIAGNOSIS — E83.42 HYPOMAGNESEMIA: ICD-10-CM

## 2021-12-27 DIAGNOSIS — E11.65 TYPE 2 DIABETES MELLITUS WITH HYPERGLYCEMIA, WITH LONG-TERM CURRENT USE OF INSULIN (HCC): Primary | ICD-10-CM

## 2021-12-27 DIAGNOSIS — Z79.4 TYPE 2 DIABETES MELLITUS WITH HYPERGLYCEMIA, WITH LONG-TERM CURRENT USE OF INSULIN (HCC): Primary | ICD-10-CM

## 2021-12-27 RX ORDER — INSULIN ASPART 100 [IU]/ML
INJECTION, SUSPENSION SUBCUTANEOUS
Qty: 150 ML | Refills: 2 | Status: SHIPPED | OUTPATIENT
Start: 2021-12-27 | End: 2022-09-29 | Stop reason: SDUPTHER

## 2021-12-27 RX ORDER — BLOOD SUGAR DIAGNOSTIC
1 STRIP MISCELLANEOUS 3 TIMES DAILY
Qty: 100 EACH | Refills: 5 | Status: SHIPPED | OUTPATIENT
Start: 2021-12-27 | End: 2022-12-18

## 2021-12-28 DIAGNOSIS — E83.42 HYPOMAGNESEMIA: Primary | ICD-10-CM

## 2022-01-07 ENCOUNTER — APPOINTMENT (OUTPATIENT)
Dept: OCCUPATIONAL THERAPY | Facility: HOSPITAL | Age: 67
End: 2022-01-07

## 2022-01-10 ENCOUNTER — HOSPITAL ENCOUNTER (OUTPATIENT)
Dept: OCCUPATIONAL THERAPY | Facility: HOSPITAL | Age: 67
Setting detail: THERAPIES SERIES
Discharge: HOME | End: 2022-01-10

## 2022-01-10 DIAGNOSIS — C77.3 CARCINOMA OF RIGHT BREAST METASTATIC TO AXILLARY LYMPH NODE: ICD-10-CM

## 2022-01-10 DIAGNOSIS — R29.898 WEAKNESS OF LEFT ARM: ICD-10-CM

## 2022-01-10 DIAGNOSIS — Z86.010 PERSONAL HISTORY OF COLONIC POLYPS: Primary | ICD-10-CM

## 2022-01-10 DIAGNOSIS — C50.911 CARCINOMA OF RIGHT BREAST METASTATIC TO AXILLARY LYMPH NODE: ICD-10-CM

## 2022-01-10 DIAGNOSIS — C50.411 MALIGNANT NEOPLASM OF UPPER-OUTER QUADRANT OF RIGHT BREAST IN FEMALE, ESTROGEN RECEPTOR POSITIVE: ICD-10-CM

## 2022-01-10 DIAGNOSIS — M79.89 SWELLING IN RIGHT ARMPIT: ICD-10-CM

## 2022-01-10 DIAGNOSIS — G47.33 OBSTRUCTIVE SLEEP APNEA SYNDROME: ICD-10-CM

## 2022-01-10 DIAGNOSIS — Z17.0 MALIGNANT NEOPLASM OF UPPER-OUTER QUADRANT OF RIGHT BREAST IN FEMALE, ESTROGEN RECEPTOR POSITIVE: ICD-10-CM

## 2022-01-10 DIAGNOSIS — R41.3 MEMORY LOSS: ICD-10-CM

## 2022-01-10 DIAGNOSIS — I89.0 LYMPHEDEMA OF BREAST: ICD-10-CM

## 2022-01-10 DIAGNOSIS — Z79.4 TYPE 2 DIABETES MELLITUS WITH HYPERGLYCEMIA, WITH LONG-TERM CURRENT USE OF INSULIN: ICD-10-CM

## 2022-01-10 DIAGNOSIS — E11.65 TYPE 2 DIABETES MELLITUS WITH HYPERGLYCEMIA, WITH LONG-TERM CURRENT USE OF INSULIN: ICD-10-CM

## 2022-01-10 PROCEDURE — 97167 OT EVAL HIGH COMPLEX 60 MIN: CPT

## 2022-01-10 NOTE — THERAPY EVALUATION
Outpatient Occupational Therapy Lymphedema Initial Evaluation  MITZI Krishna     Patient Name: Ronna Wayne  : 1955  MRN: [id number]  Today's Date: 1/10/2022      Visit Date: 01/10/2022    Patient Active Problem List   Diagnosis   • Frequent headaches   • Memory loss   • Essential hypertension   • Dyslipidemia   • Type 2 diabetes mellitus with hyperglycemia, with long-term current use of insulin (HCC)   • Obstructive sleep apnea syndrome   • Glaucoma   • Anxiety   • GERD (gastroesophageal reflux disease)   • Morbid obesity   • Weakness of left arm   • Cerebrovascular accident (CVA) due to embolism (HCC)   • SVT (supraventricular tachycardia) (HCC)   • Vitamin D deficiency   • B12 deficiency   • Malignant neoplasm of upper-outer quadrant of right breast in female, estrogen receptor positive (HCC)   • Other specified hypothyroidism   • Personal history of colonic polyps        Past Medical History:   Diagnosis Date   • Anxiety    • B12 deficiency    • BPV (benign positional vertigo)    • Breast cancer (HCC) 2021   • Diabetes mellitus (HCC)    • Diarrhea    • Disease of thyroid gland    • Elevated cholesterol    • Fibromyalgia    • GERD (gastroesophageal reflux disease)    • Glaucoma    • Heart murmur    • Hyperlipidemia    • Hypertension    • Kidney disease    • Obesity    • Peptic ulceration    • PONV (postoperative nausea and vomiting)    • Sleep apnea     c-pap   • Stroke (HCC)    • Weakness of left arm         Past Surgical History:   Procedure Laterality Date   • ABDOMINAL SURGERY     • APPENDECTOMY     • BREAST CYST ASPIRATION     • BREAST LUMPECTOMY WITH SENTINEL NODE BIOPSY Right 2021    Procedure: BREAST LUMPECTOMY WITH SENTINEL NODE BIOPSY;  Surgeon: Lee Parrish MD;  Location: [facility];  Service: General;  Laterality: Right;   • CATARACT EXTRACTION Bilateral    • CHOLECYSTECTOMY     • COLONOSCOPY     • ENDOSCOPY     • HEMATOMA EVACUATION TRUNK Right 2021    Procedure:  HEMATOMA EVACUATION TRUNK;  Surgeon: Lee Parrish MD;  Location:  [facility];  Service: General;  Laterality: Right;   • URETHRA SURGERY           Visit Dx:     ICD-10-CM ICD-9-CM   1. Personal history of colonic polyps  Z86.010 V12.72   2. Malignant neoplasm of upper-outer quadrant of right breast in female, estrogen receptor positive (HCC)  C50.411 174.4    Z17.0 V86.0   3. Weakness of left arm  R29.898 729.89   4. Carcinoma of right breast metastatic to axillary lymph node (HCC)  C50.911 174.9    C77.3 196.3   5. Obstructive sleep apnea syndrome  G47.33 327.23   6. Type 2 diabetes mellitus with hyperglycemia, with long-term current use of insulin (HCC)  E11.65 250.00    Z79.4 790.29     V58.67   7. Memory loss  R41.3 780.93   8. Lymphedema of breast  I89.0 457.1   9. Swelling in right armpit  M79.89 729.81        Patient History     Row Name 01/10/22 1100             History    Chief Complaint Burn; Difficulty with daily activities; Muscle weakness; Numbness; Tightness; Tinglings  -BC      Patient/Caregiver Goals Relieve pain; Return to prior level of function; Improve mobility; Improve strength  -BC      Current Tobacco Use N/A  -BC      Patient's Rating of General Health Fair  -BC      Hand Dominance right-handed  -BC      Patient seeing anyone else for problem(s)? No  -BC      How has patient tried to help current problem? Therapy  -BC      Are you or can you be pregnant No  -BC              Pain     Pain Location Breast  Axillary  -BC      Pain at Present 0  -BC      Pain at Best 0  -BC      Pain at Worst 8  -BC      Pain Frequency Several days a week  -BC      Pain Description Sharp; Shooting; Stabbing  -BC      What Performance Factors Make the Current Problem(s) WORSE? nothing  -BC      What Performance Factors Make the Current Problem(s) BETTER? Medication, re-positioning  -BC      Tolerance Time- Standing Imp.  -BC      Tolerance Time- Sitting Imp.  -BC      Tolerance Time- Walking Imp.  -BC       Tolerance Time- Lying WFL  -BC      Is your sleep disturbed? Yes  -BC      Is medication used to assist with sleep? Yes  Melatonin  -BC      Total hours of sleep per night 4  -BC      What position do you sleep in? Right sidelying  -BC      Difficulties at work? Diabled  -BC      Difficulties with ADL's? Yes  -BC      Difficulties with recreational activities? yes  -BC              Fall Risk Assessment    Any falls in the past year: No  -BC              Services    Prior Rehab/Home Health Experiences No  -BC      Are you currently receiving Home Health services No  -BC      Do you plan to receive Home Health services in the near future No  -BC              Daily Activities    Primary Language English  -BC      Are you able to read Yes  -BC      Are you able to write Yes  -BC      How does patient learn best? Listening; Reading; Demonstration; Pictures/Video  -BC      Teaching needs identified Home Exercise Program; Management of Condition  -BC      Patient is concerned about/has problems with Difficulty with self care (i.e. bathing, dressing, toileting:; Performing home management (household chores, shopping, care of dependents)  -BC      Does patient have problems with the following? Anxiety  -BC      Functional Status bathing; dressing  -BC      Explanation of Functional Status Problem Fatigue, balance, pain.  -BC      Recommended Referrals Occupational Therapy  -BC      Pt Participated in POC and Goals Yes  -BC              Safety    Are you being hurt, hit, or frightened by anyone at home or in your life? No  -BC      Are you being neglected by a caregiver No  -BC            User Key  (r) = Recorded By, (t) = Taken By, (c) = Cosigned By    Initials Name Provider Type    BC Gail Wilson OT Occupational Therapist                 Lymphedema     Row Name 01/10/22 1100             Subjective Pain    Able to rate subjective pain? yes  -BC      Pre-Treatment Pain Level 0  -BC      Subjective Pain Comment Pain is in  "R shoulder and underarm (axillary)  -BC              Subjective Comments    Subjective Comments Pt. presents this date for evaluation and treatment of swelling in RUE, LLE, abd., trunk.  -BC              Lymphedema Assessment    Lymphedema Classification LUE:; LLE:; Trunk:; Other:; secondary; stage 2 (Spontaneously Irreversible); primary  -BC      Lymphedema Cancer Related Sx left; axillary dissection; lumpectomy  -BC      Lymphedema Surgery Comments N/A  -BC      Positive Lymph Nodes # 1  -BC      Cancer Comments Have not staged at this time.  -BC      Radiation Therapy Received yes  -BC      Radiation Treatments #/Timeframe 30 tx. 5xweek  -BC      Adverse Radiation Reactions/Complication Fatigue, loss of hair.  -BC      Lymphedema Precautions kidney disease  -BC              Physical Concerns    The amount of pain associated with my lymphedema is: 3  -BC      The amount of limb heaviness associated with my lymphedema is: 3  -BC      The amount of skin tightness associated with my lymphedema is: 2  -BC      The size of my swollen limb(s) seems: 2  -BC      Lymphedema affects the movement of my swollen limb(s): 3  -BC      The strength in my swollen limb(s) is: 4  -BC              Psychosocial Concerns    Lymphedema affects my body image (i.e., \"how I think I look\"). 1  -BC      Lymphedema affects my socializing with others. 1  -BC      Lymphedema affects my intimate relations with spouse or partner (rate 0 if not applicable 0  -BC      Lymphedema \"gets me down\" (i.e., depression, frustration, or anger) 2  -BC      I must rely on others for help due to my lymphedema. 4  -BC      I know what to do to manage my lymphedema 4  -BC              Functional Concerns    Lymphedema affects my ability to perform self-care activities (i.e. eating, dressing, hygiene) 4  -BC      Lymphedema affects my ability to perform routine home or work-related activities. 4  -BC      Lymphedema affects my performance of preferred leisure " activities. 4  -BC      Lymphedema affects proper fit of clothing/shoes 3  -BC      Lymphedema affects my sleep 2  -BC              Posture/Observations    Posture- WNL Posture is WNL  -BC              General ROM    GENERAL ROM COMMENTS WFL  -BC              MMT (Manual Muscle Testing)    General MMT Comments grossly 3-/5  -BC              Lymphedema Edema Assessment    Ptting Edema Category By grade out of 4  -BC      Pitting Edema + 2/4; Mild  -BC      Stemmer Sign bilateral:; negative  -BC      Woodruff Hump negative; bilateral:  -BC      Edema Assessment Comment L hand;, chest  -BC              Skin Changes/Observations    Location/Assessment Upper Extremity; Lower Extremity; Head/Neck  -BC      Upper Extremity Conditions clean; dry; hairless  -BC      Upper Extremity Color/Pigment bilateral:; red; blanchable  -BC      Lower Extremity Conditions bilateral:; clean; dry; hairless  -BC      Lower Extremity Color/Pigment bilateral:; red; blanchable  -BC      Head/Neck Conditions intact; clean; dry  -BC      Head/Neck Color/Pigment red  -BC      Skin Observations Comment Pt. with very dry skin  -BC              Lymphedema Sensation    Lymphedema Sensation Reports RLE:; tingling; numbness  -BC      Lymphedema Sensation Tests light touch  -BC      Lymphedema Light Touch RLE:; mild impairment  -BC              Lymphedema Pulses/Capillary Refill    Lymphedema Pulses/Capillary Refill capillary refill  -BC      Capillary Refill lower extremity capillary refill  -BC      Lower Extremity Capillary Refill right:; left:; less than 3 seconds  -BC              Lymphedema Measurements    Measurement Type(s) Quick Girth; Circumferential  -BC      Quick Girth Areas Upper extremities; Lower extremities  -BC      Circumferential Areas Neck; Trunk  -BC              LUE Quick Girth (cm)    Axilla 43 cm  -BC      Mid upper arm 44 cm  -BC      Elbow 28.5 cm  -BC      Mid forearm 30.5 cm  -BC      Wrist crease 18 cm  -BC      Web space  20.3 cm  -BC      Met-heads 18.5 cm  -BC      Other 1 24.6 cm  -BC      Other 2 28.5 cm  -BC      LUE Quick Girth Total 255.9  -BC              RUE Quick Girth (cm)    Axilla 44 cm  -BC      Mid upper arm 44.5 cm  -BC      Elbow 29.2 cm  -BC      Mid forearm 31 cm  -BC      Wrist crease 17.2 cm  -BC      Web space 20 cm  -BC      Met-heads 18.5 cm  -BC      Other 1 20.4 cm  -BC      Other 2 26 cm  -BC      RUE Quick Girth Total 250.8  -BC              LLE Quick Girth (cm)    Met-heads 22 cm  -BC      Mid foot 24 cm  -BC      Smallest ankle 27.4 cm  -BC      Largest calf 49.5 cm  -BC      Tib tuberosity 48 cm  -BC      Mid patella 53.4 cm  -BC      Distal thigh 62.5 cm  -BC      Proximal thigh 66 cm  -BC      Other 1 33 cm  -BC      Other 2 45 cm  -BC              RLE Quick Girth (cm)    Met-heads 21.8 cm  -BC      Mid foot 23.5 cm  -BC      Smallest ankle 27.5 cm  -BC      Largest calf 49.5 cm  -BC      Tib tuberosity 48.8 cm  -BC      Mid patella 56 cm  -BC      Distal thigh 62.4 cm  -BC      Proximal thigh 65 cm  -BC      Other 1 36.2 cm  -BC      Other 2 46.8 cm  -BC      RLE Quick Girth Total 437.5  -BC              Neck Circumferential (cm)    Measurement Location 1 Neck  -BC      Neck 1 39.5 cm  -BC      Measurement Location 2 Axillary  -BC      Neck 2 109 cm  -BC      Measurement Location 3 Nipple line  -BC      Neck 3 128.5 cm  -BC      Measurement Location 4 below breast  -BC      Neck 4 109.8 cm  -BC      Neck Circumferential Total 386.8 cm  -BC              Trunk Circumferential (cm)    Measurement Location 1 Abdomen at navel  -BC      Trunk 1 136.5 cm  -BC      Measurement Location 2 Hip  -BC      Trunk 2 140.4 cm  -BC      Trunk Circumferential Total 276.9 cm  -BC              Lymphedema Life Impact Scale Totals    A.  Total Q1 - Q17 (Do not include Q18) 46  -BC      B.  Total number of questions answered (Q1-Q17) 17  -BC      C. Divide A by B 2.71  -BC      D. Multiple C by 25 67.75  -BC             User Key  (r) = Recorded By, (t) = Taken By, (c) = Cosigned By    Initials Name Provider Type    Gail Simms OT Occupational Therapist                        Therapy Education  Given: HEP  Program: New  How Provided: Verbal, Demonstration, Written  Provided to: Patient  Level of Understanding: Verbalized         OT Goals     Row Name 01/10/22 1500          OT Short Term Goals    STG Date to Achieve 02/10/22  -BC     STG 1 Pt. familiar w/precautions, skin care and self management of lymphedema.  -BC     STG 2 HEP to assist w/improved lymphatic flow.  -BC     STG 3 Self care instructions for home MLD for volume reduction.  -BC            Long Term Goals    LTG Date to Achieve 04/10/22  -BC     LTG 1 Pt. and/or care giver independent w/short stretch compression bandaging for volume reduction.  -BC     LTG 2 Decrease pitting edema to 1/4 for decreased risk of infection.  -BC     LTG 3 Independent with donning/doffing compression garment.  -BC     LTG 4 Independent with lymphedema management and HEP.  -BC            Time Calculation    OT Goal Re-Cert Due Date 04/10/22  -BC           User Key  (r) = Recorded By, (t) = Taken By, (c) = Cosigned By    Initials Name Provider Type    Gail Simms OT Occupational Therapist                 OT Assessment/Plan     Row Name 01/10/22 1540          OT Assessment    Functional Limitations Decreased safety during functional activities; Limitations in functional capacity and performance; Performance in self-care ADL; Limitation in home management  -BC     Impairments Edema; Endurance; Impaired lymphatic circulation; Pain  -BC     Assessment Comments Pt. referred to tx. by her radiologist to address swelling/pain in RUE, swelling in trunk, breast, and LLE.  -BC     Please refer to paper survey for additional self-reported information No  -BC     OT Diagnosis Lymphedema  -BC     OT Rehab Potential Good  -BC     Patient/caregiver participated in establishment of treatment  plan and goals Yes  -BC     Patient would benefit from skilled therapy intervention Yes  -BC            OT Plan    OT Frequency 2x/week  -BC     Predicted Duration of Therapy Intervention (OT) 90 days  -BC     Planned CPT's? --  lymphedema management  -BC     Planned Therapy Interventions (Optional Details) home exercise program; manual therapy techniques; stretching; patient/family education  -BC     OT Plan Comments Pt. would benefit from skilled services to address swelling, pain in RUE, LLE, abd. trunk and  R breast.  -BC           User Key  (r) = Recorded By, (t) = Taken By, (c) = Cosigned By    Initials Name Provider Type    BC Gail Wilson, KANDI Occupational Therapist                          Time Calculation:   OT Start Time: 1120  OT Stop Time: 1255  OT Time Calculation (min): 95 min  OT Non-Billable Time (min): 20 min     Therapy Charges for Today     Code Description Service Date Service Provider Modifiers Qty    [id number]  OT EVAL HIGH COMPLEXITY 3 1/10/2022 Gail Wilson OT GO 1                    Gail Wilson OT  1/10/2022

## 2022-01-11 ENCOUNTER — LAB (OUTPATIENT)
Dept: ONCOLOGY | Facility: CLINIC | Age: 67
End: 2022-01-11

## 2022-01-11 ENCOUNTER — OFFICE VISIT (OUTPATIENT)
Dept: ONCOLOGY | Facility: CLINIC | Age: 67
End: 2022-01-11

## 2022-01-11 VITALS
DIASTOLIC BLOOD PRESSURE: 74 MMHG | TEMPERATURE: 96.9 F | SYSTOLIC BLOOD PRESSURE: 145 MMHG | HEART RATE: 58 BPM | OXYGEN SATURATION: 98 % | RESPIRATION RATE: 18 BRPM

## 2022-01-11 DIAGNOSIS — C50.411 MALIGNANT NEOPLASM OF UPPER-OUTER QUADRANT OF RIGHT BREAST IN FEMALE, ESTROGEN RECEPTOR POSITIVE: Primary | ICD-10-CM

## 2022-01-11 DIAGNOSIS — Z17.0 MALIGNANT NEOPLASM OF UPPER-OUTER QUADRANT OF RIGHT BREAST IN FEMALE, ESTROGEN RECEPTOR POSITIVE: ICD-10-CM

## 2022-01-11 DIAGNOSIS — C50.411 MALIGNANT NEOPLASM OF UPPER-OUTER QUADRANT OF RIGHT BREAST IN FEMALE, ESTROGEN RECEPTOR POSITIVE: ICD-10-CM

## 2022-01-11 DIAGNOSIS — Z17.0 MALIGNANT NEOPLASM OF UPPER-OUTER QUADRANT OF RIGHT BREAST IN FEMALE, ESTROGEN RECEPTOR POSITIVE: Primary | ICD-10-CM

## 2022-01-11 LAB
ALBUMIN SERPL-MCNC: 3.42 G/DL (ref 3.5–5.2)
ALBUMIN/GLOB SERPL: 0.8 G/DL
ALP SERPL-CCNC: 97 U/L (ref 39–117)
ALT SERPL W P-5'-P-CCNC: 20 U/L (ref 1–33)
ANION GAP SERPL CALCULATED.3IONS-SCNC: 10.9 MMOL/L (ref 5–15)
AST SERPL-CCNC: 39 U/L (ref 1–32)
BASOPHILS # BLD AUTO: 0.05 10*3/MM3 (ref 0–0.2)
BASOPHILS NFR BLD AUTO: 0.7 % (ref 0–1.5)
BILIRUB SERPL-MCNC: 0.6 MG/DL (ref 0–1.2)
BUN SERPL-MCNC: 20 MG/DL (ref 8–23)
BUN/CREAT SERPL: 19.6 (ref 7–25)
CALCIUM SPEC-SCNC: 9.4 MG/DL (ref 8.6–10.5)
CHLORIDE SERPL-SCNC: 97 MMOL/L (ref 98–107)
CO2 SERPL-SCNC: 23.1 MMOL/L (ref 22–29)
CREAT SERPL-MCNC: 1.02 MG/DL (ref 0.57–1)
DEPRECATED RDW RBC AUTO: 50.4 FL (ref 37–54)
EOSINOPHIL # BLD AUTO: 0.06 10*3/MM3 (ref 0–0.4)
EOSINOPHIL NFR BLD AUTO: 0.8 % (ref 0.3–6.2)
ERYTHROCYTE [DISTWIDTH] IN BLOOD BY AUTOMATED COUNT: 14.7 % (ref 12.3–15.4)
GFR SERPL CREATININE-BSD FRML MDRD: 54 ML/MIN/1.73
GLOBULIN UR ELPH-MCNC: 4.5 GM/DL
GLUCOSE SERPL-MCNC: 326 MG/DL (ref 65–99)
HCT VFR BLD AUTO: 37.3 % (ref 34–46.6)
HGB BLD-MCNC: 12.5 G/DL (ref 12–15.9)
IMM GRANULOCYTES # BLD AUTO: 0.02 10*3/MM3 (ref 0–0.05)
IMM GRANULOCYTES NFR BLD AUTO: 0.3 % (ref 0–0.5)
LYMPHOCYTES # BLD AUTO: 3.07 10*3/MM3 (ref 0.7–3.1)
LYMPHOCYTES NFR BLD AUTO: 43.2 % (ref 19.6–45.3)
MCH RBC QN AUTO: 31.6 PG (ref 26.6–33)
MCHC RBC AUTO-ENTMCNC: 33.5 G/DL (ref 31.5–35.7)
MCV RBC AUTO: 94.4 FL (ref 79–97)
MONOCYTES # BLD AUTO: 0.76 10*3/MM3 (ref 0.1–0.9)
MONOCYTES NFR BLD AUTO: 10.7 % (ref 5–12)
NEUTROPHILS NFR BLD AUTO: 3.15 10*3/MM3 (ref 1.7–7)
NEUTROPHILS NFR BLD AUTO: 44.3 % (ref 42.7–76)
NRBC BLD AUTO-RTO: 0 /100 WBC (ref 0–0.2)
PLATELET # BLD AUTO: 152 10*3/MM3 (ref 140–450)
PMV BLD AUTO: 10.1 FL (ref 6–12)
POTASSIUM SERPL-SCNC: 4.4 MMOL/L (ref 3.5–5.2)
PROT SERPL-MCNC: 7.9 G/DL (ref 6–8.5)
RBC # BLD AUTO: 3.95 10*6/MM3 (ref 3.77–5.28)
SODIUM SERPL-SCNC: 131 MMOL/L (ref 136–145)
WBC NRBC COR # BLD: 7.11 10*3/MM3 (ref 3.4–10.8)

## 2022-01-11 PROCEDURE — 85025 COMPLETE CBC W/AUTO DIFF WBC: CPT | Performed by: INTERNAL MEDICINE

## 2022-01-11 PROCEDURE — 80053 COMPREHEN METABOLIC PANEL: CPT | Performed by: INTERNAL MEDICINE

## 2022-01-11 PROCEDURE — 99214 OFFICE O/P EST MOD 30 MIN: CPT | Performed by: INTERNAL MEDICINE

## 2022-01-11 NOTE — PROGRESS NOTES
Name:  Ronna Wayne  :  1955  Date:  2022     REFERRING PHYSICIAN  Lee Parrish MD    PRIMARY CARE PROVIDER  Mague Acosta APRN    REASON FOR FOLLOWUP  1. Malignant neoplasm of upper-outer quadrant of right breast in female, estrogen receptor positive (HCC)      CHIEF COMPLAINT  Swelling in the right armpit status post whole breast XRT.    Dear Lee,    HISTORY OF PRESENT ILLNESS:   I saw Ms. Wayne in follow up today in our medical oncology clinic. As you are aware, she is a pleasant, 66 y.o., white female with a history of multiple medical problems, including hypertension, diabetes and sleep apnea who was diagnosed with an ER positive (95%), OH positive (95%), HER2/abida negative (1+ by IHC), invasive, mammary carcinoma (with mixed ductal and lobular features) of the upper, outer quadrant of the right breast in late Spring 2021. She was referred to you, and you performed a successful, right-sided lumpectomy on 2021. A sentinel node was positive; however, an additional twelve, excised, right-sided, axillary lymph nodes were uninvolved (). Final, surgical staging was consistent with stage [id number] disease (IIB). She was subsequently referred to our clinic (and radiation oncology) for further evaluation and management. Adjuvant treatment with whole breast irradiation followed by (at least five years of) endocrine therapy were ultimately recommended. She completed a thirty-fraction course of the former by late 2021 and began the latter (daily Arimidex) by early 2021. She has been doing overall very well in routine followup, ever since then, with no evidence of residual/recurrent disease to date.    INTERIM HISTORY:  Ms. Wayne returns to clinic today for followup by herself. She began Arimidex in early 2021, has been on it for a total of nearly ~six (6) weeks now and is tolerating this medication overall very well, with no noticeable side  effects. Due to some swelling in her right armpit status post XRT, rad/onc referred her to our lymphedema clinic (and she is scheduled to start following with them shortly). Otherwise, she again has no new or other specific complaints and continues to feel overall well.    Past Medical History:   Diagnosis Date   • Anxiety    • B12 deficiency    • BPV (benign positional vertigo)    • Breast cancer (HCC) 2021   • Diabetes mellitus (HCC)    • Diarrhea    • Disease of thyroid gland    • Elevated cholesterol    • Fibromyalgia    • GERD (gastroesophageal reflux disease)    • Glaucoma    • Heart murmur    • Hyperlipidemia    • Hypertension    • Kidney disease    • Obesity    • Peptic ulceration    • PONV (postoperative nausea and vomiting)    • Sleep apnea     c-pap   • Stroke (HCC)    • Weakness of left arm        Past Surgical History:   Procedure Laterality Date   • ABDOMINAL SURGERY     • APPENDECTOMY     • BREAST CYST ASPIRATION     • BREAST LUMPECTOMY WITH SENTINEL NODE BIOPSY Right 2021    Procedure: BREAST LUMPECTOMY WITH SENTINEL NODE BIOPSY;  Surgeon: Lee Parrish MD;  Location: [facility];  Service: General;  Laterality: Right;   • CATARACT EXTRACTION Bilateral    • CHOLECYSTECTOMY     • COLONOSCOPY     • ENDOSCOPY     • HEMATOMA EVACUATION TRUNK Right 2021    Procedure: HEMATOMA EVACUATION TRUNK;  Surgeon: Lee Parrish MD;  Location: [facility];  Service: General;  Laterality: Right;   • URETHRA SURGERY         Social History     Socioeconomic History   • Marital status:    Tobacco Use   • Smoking status: Former Smoker     Packs/day: 1.50     Years: 8.00     Pack years: 12.00     Types: Cigarettes     Quit date:      Years since quittin.0   • Smokeless tobacco: Never Used   Vaping Use   • Vaping Use: Never used   Substance and Sexual Activity   • Alcohol use: No     Comment: former social drinker not had anything in 25 + years   • Sexual activity: Defer       Family  History   Problem Relation Age of Onset   • Thyroid disease Mother    • Diabetes Mother    • Hyperlipidemia Father    • Hypertension Father    • Heart attack Father    • Alcohol abuse Maternal Aunt    • Cancer Maternal Grandfather         PROSTATE   • Stroke Paternal Grandmother    • Stroke Paternal Grandfather    • Hypertension Other    • Hypertension Sister    • Breast cancer Sister 40   • Obesity Sister    • Hypertension Sister    • Hyperlipidemia Sister    • Hyperlipidemia Sister    • Hypertension Sister    • Arthritis Sister    • Obesity Sister    • Thyroid disease Sister        Allergies   Allergen Reactions   • Codeine GI Intolerance     Increased heart rate     • Sulfa Antibiotics GI Intolerance     Heart rate increase        Current Outpatient Medications   Medication Sig Dispense Refill   • acetaminophen (TYLENOL) 325 MG tablet Take 2 tablets by mouth Every 4 (Four) Hours As Needed for Mild Pain . 30 tablet 0   • amLODIPine (NORVASC) 5 MG tablet Take 1 tablet by mouth Daily. 30 tablet 11   • anastrozole (ARIMIDEX) 1 MG tablet Take 1 tablet by mouth Daily. 30 tablet 11   • aspirin 81 MG EC tablet Take 1 tablet by mouth Daily. 90 tablet 3   • cholecalciferol (VITAMIN D3) 25 MCG (1000 UT) tablet Take 1,000 Units by mouth Daily.     • Continuous Blood Gluc  (Dexcom G6 ) device 1 Device Daily. 1 each 0   • Continuous Blood Gluc Sensor (Dexcom G6 Sensor) Every 10 (Ten) Days. 9 each 3   • Continuous Blood Gluc Transmit (Dexcom G6 Transmitter) misc 1 Device Daily for 90 days. 1 each 3   • empagliflozin (Jardiance) 25 MG tablet tablet Take 1 tablet by mouth Every Morning. 35 tablet 0   • hydroCHLOROthiazide (HYDRODIURIL) 12.5 MG tablet Take 12.5 mg by mouth Daily As Needed (swelling).     • ibuprofen (ADVIL,MOTRIN) 600 MG tablet Take 1 tablet by mouth Every 6 (Six) Hours As Needed for Mild Pain . 30 tablet 0   • ibuprofen (ADVIL,MOTRIN) 600 MG tablet Take 1 tablet by mouth Every 8 (Eight) Hours As  "Needed for Moderate Pain . 60 tablet 2   • insulin aspart prot-insulin aspart (novoLOG 70/30) (70-30) 100 UNIT/ML injection Inject 5 Units under the skin into the appropriate area as directed Daily With Breakfast.     • insulin aspart prot-insulin aspart (novoLOG 70/30) (70-30) 100 UNIT/ML injection Inject 8 Units under the skin into the appropriate area as directed Daily With Lunch.     • insulin aspart prot-insulin aspart (novoLOG 70/30) (70-30) 100 UNIT/ML injection Per her routine regimen with maximum daily dose of 50 units 150 mL 2   • levothyroxine (SYNTHROID, LEVOTHROID) 88 MCG tablet Take 1 tablet by mouth Daily. 90 tablet 0   • losartan (COZAAR) 50 MG tablet Take 1 tablet by mouth Daily. 30 tablet 5   • MAGnesium-Oxide 400 (241.3 Mg) MG tablet tablet TAKE ONE TABLET BY MOUTH THREE TIMES A DAY 90 tablet 0   • metoprolol succinate XL (TOPROL-XL) 100 MG 24 hr tablet TAKE ONE TABLET BY MOUTH DAILY 30 tablet 2   • silver sulfadiazine (SILVADENE, SSD) 1 % cream Apply 1 application topically to the appropriate area as directed 2 (Two) Times a Day. 1 each 3   • Insulin Syringe-Needle U-100 30G X 5/16\" 0.3 ML misc 1 Device 3 (Three) Times a Day. 100 each 5   • traMADol (ULTRAM) 50 MG tablet Take 1 tablet by mouth Every 8 (Eight) Hours As Needed for Moderate Pain . 8 tablet 0     No current facility-administered medications for this visit.     REVIEW OF SYSTEMS  CONSTITUTIONAL:  No fever, chills, night sweats or fatigue.  EYES:  No blurry vision, diplopia or other vision changes.  ENT:  No hearing loss, nosebleeds or sore throat.  CARDIOVASCULAR:  No palpitations, arrhythmia, syncopal episodes or edema.  PULMONARY:  No hemoptysis, wheezing, chronic cough or shortness of breath.  BREASTS: As per the HPI above.  GASTROINTESTINAL:  No nausea or vomiting.  No constipation or diarrhea.  No abdominal pain.  GENITOURINARY:  No hematuria, kidney stones or frequent urination.  MUSCULOSKELETAL:  No joint or back pains. " Decreased range of motion in the right arm, about to start following with our lymphedema clinic, as per the HPI above.  INTEGUMENTARY: As per the HPI above.  ENDOCRINE:  No excessive thirst or hot flashes.  HEMATOLOGIC:  No history of free bleeding, spontaneous bleeding or clotting.  IMMUNOLOGIC:  No allergies or frequent infections.  NEUROLOGIC: No numbness, tingling, seizures or weakness.  PSYCHIATRIC:  No anxiety or depression.    PHYSICAL EXAMINATION  /74   Pulse 58   Temp 96.9 °F (36.1 °C) (Temporal)   Resp 18   SpO2 98%     Pain Score:  Pain Score    22 1429   PainSc: 0-No pain       PHQ-Score Total:  PHQ-9 Total Score:      ECO  GENERAL:  A well-developed, well-nourished, morbidly obese, white female in no acute distress.  HEENT:  Pupils equally round and reactive to light.  Extraocular muscles intact.  CARDIOVASCULAR:  Regular rate and rhythm.  No murmurs, gallops or rubs.  LUNGS:  Clear to auscultation bilaterally.  BREASTS: Deferred today. Status post right-sided lumpectomy on 2021.  ABDOMEN:  Soft, nontender, nondistended with positive bowel sounds.  EXTREMITIES:  No clubbing, cyanosis or edema bilaterally. Unable to raise her right arm above her head (physical therapy is planned).  SKIN:  Resolved rash/erythema involving the skin of the right breast.  NEURO:  Cranial nerves grossly intact. No focal deficits.  PSYCH:  Alert and oriented x3.    LABORATORY  Lab Results   Component Value Date    WBC 7.11 2022    HGB 12.5 2022    HCT 37.3 2022    MCV 94.4 2022     2022    NEUTROABS 3.15 2022       Lab Results   Component Value Date     (L) 2022    K 4.4 2022    CL 97 (L) 2022    CO2 23.1 2022    BUN 20 2022    CREATININE 1.02 (H) 2022    GLUCOSE 326 (H) 2022    CALCIUM 9.4 2022    AST 39 (H) 2022    ALT 20 2022    ALKPHOS 97 2022    BILITOT 0.6 2022    PROTEINTOT  7.9 01/11/2022    ALBUMIN 3.42 (L) 01/11/2022     CBC (01/11/2022): WBCs: 7.11; HgB: 12.5; Hct: 37.3; platelets: 152  CBC (11/29/2021): WBCs: 5.36; HgB: 12.8; Hct: 39.1; platelets: 148  CBC (11/09/2021): WBCs: 6.63; HgB: 11.9; Hct: 36.1; platelets: 160  CBC (08/06/2021): WBCs: 9.36; HgB: 9.2; Hct: 29.1; platelets: 131    IMAGING  MRI breast bilateral with and without contrast (06/22/2021):  Impression:  1) Negative left breast MRI.  2) Biopsy proven malignancy in the right 9:00 region measuring 3.7 cm. There are no additional worrisome findings in the right breast.    Bone densitometry (DEXA) scan (05/11/2021):  Impression: The BMD measured at the AP spine L1-L4 is 1.157 gm/cm2 with a T-score of -0.2. The patient is considered to be normal according to WHO criteria. Fracture risk is low.    PATHOLOGY  Breast, lumpectomy, right (08/05/2021):  Invasive mammary carcinoma with ductal and lobular features, margins uninvolved. Atypical, lobular hyperplasia with involvement of ducts. Dense stromal fibrosis. Greatest dimension of invasive focus: 31 mm. ER positive (95%), KY positive (95%), HER2 negative (1+ by IHC). [id number] (stage IIB).    [place] node, right #1 (08/05/2021):  Metastatic mammary carcinoma, 2.5 cmm extent, with extranodal extension (1/1).    Axillary contents, right (08/05/2021):  No malignancy identified in twelve axillary lymph nodes (0/12).    MammaPrint result (08/31/2021): Low risk.    IMPRESSION AND PLAN  Ms. Wayne is a 66 y.o., white female with:  1. Breast carcinoma: Diagnosed in late Spring 2021 and status post a right-sided lumpectomy with right axillary evaluation on 08/05/2021. The final, surgical pathology was consistent with stage IIB ([id number]), ER positive (95%), KY positive (95%), HER2 negative (1+ by IHC), invasive, mammary carcinoma (with ductal and lobular features) of the right breast. The surgical margins were clear; and, while one sentinel node was involved, an additional twelve  "lymph nodes were all negative for metastasis (1/13). I have had multiple, long discussions with the patient since the time of her initial consultation in our clinic (on 08/25/2021) regarding this diagnosis and its prognosis. In short, her surgery went extremely well; and her prognosis was/remains overall very good. That said, adjuvant treatment was/remains recommended in order to maximize her chances of cure. As MammaPrint testing on her tumor was consistent with \"low risk\" disease, chemotherapy was not indicated (as the potential risks would have drastically outweighed its very limited/nonexistant potential benefits in her case). As she had a lumpectomy, whole breast radiation was definitely indicated, and she completed a thirty fraction course of this therapy on 11/30/2021. As her tumor is very strongly ER/NC positive, endocrine therapy (with an aromatase inhibitor, given her postmenopausal status) was definitely indicated and strongly recommended as the final step in her adjuvant treatment. She was given a Rx for Arimidex 1 mg PO daily at that time. She has been on this therapy for a total of about ~six (6) weeks now and is tolerating it overall well, without any noticeable side effects. She will proceed with this current treatment plan. Meanwhile, I had a long discussion with the patient in clinic today regarding the current guidelines for the routine follow up of patients with a history of breast cancer. We discussed how, other than periodic clinic visits, continued annual mammograms of remaining breast tissue (a bilateral follow up exam will be performed this summer) and routine bone density monitoring (particularly since she is on an AI), there is no proven benefit to performing additional studies (including blood work, such as CBCs, LFTs or tumor markers, or imaging, such as CT, NM bone or PET scans) to identify metastatic recurrences before they become symptomatic (and are identified via the focused " investigation of new symptoms), as patient outcomes are not improved by doing so (she should continue to get routine CBCs, etc. through her PCP’s office as indicated, of course). We will therefore see her back in our clinic in six months for a wellness visit.  2. Bone health: The results from the patient's most recent DEXA scan (performed on 05/11/2021) are summarized above. Continue to monitor on Arimidex (we will repeat a DEXA in May 2023).  3. Lymphedema: Secondary to a combination of right-sided, axillary surgery and recent, adjuvant, localized XRT. Rad/onc has referred her to our lymphedema clinic. Continue to monitor.  The patient was in agreement with these plans.    It is a pleasure to participate in Ms. Wayne's care. Please do not hesitate to call with any questions or concerns that you may have.    A total of 30 minutes were spent coordinating this patient’s care in clinic today; more than 50% of this time was face-to-face with the patient, reviewing her interim medical history, discussing the results of the most recent DEXA scan and counseling on the current treatment and followup plan. All questions were answered to her satisfaction.    FOLLOW UP  Continue Arimidex 1 mg PO daily. With radiation oncology and surgery, as previously planned. With the lymphedema clinic and physical therapy, as planned. Return to our clinic in 6 months with a bilateral mammogram for a wellness visit.            This document was electronically signed by LEYDI Teresa MD January 11, 2022 15:20 EST      CC: MD Marbin Ramirez MD Sherelene S. Fortney, APRN

## 2022-01-14 ENCOUNTER — HOSPITAL ENCOUNTER (OUTPATIENT)
Dept: OCCUPATIONAL THERAPY | Facility: HOSPITAL | Age: 67
Setting detail: THERAPIES SERIES
Discharge: HOME | End: 2022-01-14

## 2022-01-14 DIAGNOSIS — Z86.010 PERSONAL HISTORY OF COLONIC POLYPS: Primary | ICD-10-CM

## 2022-01-14 DIAGNOSIS — M79.89 SWELLING IN RIGHT ARMPIT: ICD-10-CM

## 2022-01-14 DIAGNOSIS — R29.898 WEAKNESS OF LEFT ARM: ICD-10-CM

## 2022-01-14 DIAGNOSIS — I89.0 LYMPHEDEMA OF LEFT LOWER EXTREMITY: ICD-10-CM

## 2022-01-14 DIAGNOSIS — Z79.4 TYPE 2 DIABETES MELLITUS WITH HYPERGLYCEMIA, WITH LONG-TERM CURRENT USE OF INSULIN: ICD-10-CM

## 2022-01-14 DIAGNOSIS — C77.3 CARCINOMA OF RIGHT BREAST METASTATIC TO AXILLARY LYMPH NODE: ICD-10-CM

## 2022-01-14 DIAGNOSIS — I89.0 LYMPHEDEMA: ICD-10-CM

## 2022-01-14 DIAGNOSIS — I89.0 LYMPHEDEMA OF RIGHT UPPER EXTREMITY: ICD-10-CM

## 2022-01-14 DIAGNOSIS — I89.0 LYMPHEDEMA OF BREAST: ICD-10-CM

## 2022-01-14 DIAGNOSIS — C50.911 CARCINOMA OF RIGHT BREAST METASTATIC TO AXILLARY LYMPH NODE: ICD-10-CM

## 2022-01-14 DIAGNOSIS — Z17.0 MALIGNANT NEOPLASM OF UPPER-OUTER QUADRANT OF RIGHT BREAST IN FEMALE, ESTROGEN RECEPTOR POSITIVE: ICD-10-CM

## 2022-01-14 DIAGNOSIS — C50.411 MALIGNANT NEOPLASM OF UPPER-OUTER QUADRANT OF RIGHT BREAST IN FEMALE, ESTROGEN RECEPTOR POSITIVE: ICD-10-CM

## 2022-01-14 DIAGNOSIS — E11.65 TYPE 2 DIABETES MELLITUS WITH HYPERGLYCEMIA, WITH LONG-TERM CURRENT USE OF INSULIN: ICD-10-CM

## 2022-01-14 PROCEDURE — 97016 VASOPNEUMATIC DEVICE THERAPY: CPT

## 2022-01-14 PROCEDURE — 97139 UNLISTED THERAPEUTIC PX: CPT

## 2022-01-14 PROCEDURE — 97140 MANUAL THERAPY 1/> REGIONS: CPT

## 2022-01-14 NOTE — THERAPY TREATMENT NOTE
Outpatient Occupational Therapy Lymphedema Treatment Note  IMTZI Keller     Patient Name: Ronna Wayne  : 1955  MRN: [id number]  Today's Date: 2022      Visit Date: 2022    Patient Active Problem List   Diagnosis   • Frequent headaches   • Memory loss   • Essential hypertension   • Dyslipidemia   • Type 2 diabetes mellitus with hyperglycemia, with long-term current use of insulin (HCC)   • Obstructive sleep apnea syndrome   • Glaucoma   • Anxiety   • GERD (gastroesophageal reflux disease)   • Morbid obesity   • Weakness of left arm   • Cerebrovascular accident (CVA) due to embolism (HCC)   • SVT (supraventricular tachycardia) (HCC)   • Vitamin D deficiency   • B12 deficiency   • Malignant neoplasm of upper-outer quadrant of right breast in female, estrogen receptor positive (HCC)   • Other specified hypothyroidism   • Personal history of colonic polyps        Past Medical History:   Diagnosis Date   • Anxiety    • B12 deficiency    • BPV (benign positional vertigo)    • Breast cancer (HCC) 2021   • Diabetes mellitus (HCC)    • Diarrhea    • Disease of thyroid gland    • Elevated cholesterol    • Fibromyalgia    • GERD (gastroesophageal reflux disease)    • Glaucoma    • Heart murmur    • Hyperlipidemia    • Hypertension    • Kidney disease    • Obesity    • Peptic ulceration    • PONV (postoperative nausea and vomiting)    • Sleep apnea     c-pap   • Stroke (HCC)    • Weakness of left arm         Past Surgical History:   Procedure Laterality Date   • ABDOMINAL SURGERY     • APPENDECTOMY     • BREAST CYST ASPIRATION     • BREAST LUMPECTOMY WITH SENTINEL NODE BIOPSY Right 2021    Procedure: BREAST LUMPECTOMY WITH SENTINEL NODE BIOPSY;  Surgeon: Lee Parrish MD;  Location: [facility];  Service: General;  Laterality: Right;   • CATARACT EXTRACTION Bilateral    • CHOLECYSTECTOMY     • COLONOSCOPY     • ENDOSCOPY     • HEMATOMA EVACUATION TRUNK Right 2021    Procedure: HEMATOMA  EVACUATION TRUNK;  Surgeon: Lee Parrish MD;  Location: [facility] OR;  Service: General;  Laterality: Right;   • URETHRA SURGERY           Visit Dx:      ICD-10-CM ICD-9-CM   1. Personal history of colonic polyps  Z86.010 V12.72   2. Malignant neoplasm of upper-outer quadrant of right breast in female, estrogen receptor positive (HCC)  C50.411 174.4    Z17.0 V86.0   3. Weakness of left arm  R29.898 729.89   4. Carcinoma of right breast metastatic to axillary lymph node (HCC)  C50.911 174.9    C77.3 196.3   5. Type 2 diabetes mellitus with hyperglycemia, with long-term current use of insulin (HCC)  E11.65 250.00    Z79.4 790.29     V58.67   6. Lymphedema of breast  I89.0 457.1   7. Swelling in right armpit  M79.89 729.81   8. Lymphedema of left lower extremity  I89.0 457.1   9. Lymphedema  I89.0 457.1   10. Lymphedema of right upper extremity  I89.0 457.1        Lymphedema     Row Name 01/14/22 1300             Subjective Pain    Able to rate subjective pain? yes  -AB      Pre-Treatment Pain Level 0  -AB      Subjective Pain Comment denies pain  -AB              Subjective Comments    Subjective Comments Pt. presents for tx this date w/ no c/o pain voiced.  -AB              Lymphedema Assessment    Lymphedema Classification LLE:; Trunk:; Other:; secondary; stage 2 (Spontaneously Irreversible); primary; RUE:  -AB      Lymphedema Cancer Related Sx left; axillary dissection; lumpectomy  -AB      Positive Lymph Nodes # 1  -AB      Radiation Therapy Received yes  -AB      Lymphedema Precautions kidney disease  -AB              Posture/Observations    Posture- WNL Posture is WNL  -AB              Lymphedema Edema Assessment    Ptting Edema Category By grade out of 4  -AB      Pitting Edema + 2/4; Mild  -AB      Stemmer Sign bilateral:; negative  -AB      [place] Hump negative; bilateral:  -AB              Skin Changes/Observations    Location/Assessment Upper Extremity; Lower Extremity; Head/Neck; Upper Quadrant  -AB       Upper Extremity Conditions clean; dry; hairless  -AB      Upper Extremity Color/Pigment right:; hyperpigmented  -AB      Upper Quadrant Conditions dry; hairless  -AB      Upper Quadrant Color/Pigment fibrosis; P'eau d'orange; hyperpigmented  R breast  -AB      Lower Extremity Conditions bilateral:; clean; dry; hairless  -AB      Lower Extremity Color/Pigment left:; hyperpigmented  -AB      Head/Neck Conditions intact; clean; dry  -AB      Head/Neck Color/Pigment red  -AB              Lymphedema Sensation    Lymphedema Sensation Reports --  -AB      Lymphedema Sensation Tests --  -AB      Lymphedema Light Touch --  -AB              Lymphedema Pulses/Capillary Refill    Lymphedema Pulses/Capillary Refill capillary refill  -AB      Capillary Refill lower extremity capillary refill  -AB      Lower Extremity Capillary Refill right:; left:; less than 3 seconds  -AB              Lymphedema Measurements    Measurement Type(s) Quick Girth; Circumferential  -AB      Quick Girth Areas Upper extremities; Lower extremities  -AB      Circumferential Areas Neck; Trunk  -AB              RUE Quick Girth (cm)    Axilla 45 cm  -AB      Mid upper arm 46.1 cm  -AB      Elbow 29.4 cm  -AB      Mid forearm 30.7 cm  -AB      Wrist crease 17.5 cm  -AB      Web space 19.9 cm  -AB      Met-heads 18.7 cm  -AB      Other 1 21.4 cm  -AB      Other 2 26 cm  -AB      RUE Quick Girth Total 254.7  -AB              LLE Quick Girth (cm)    Met-heads 22 cm  -AB      Mid foot 23.2 cm  -AB      Smallest ankle 27.2 cm  -AB      Largest calf 50 cm  -AB      Tib tuberosity 51 cm  -AB      Mid patella 54.9 cm  -AB      Distal thigh 63.5 cm  -AB      Other 1 34.3 cm  -AB      Other 2 46.8 cm  -AB              Neck Circumferential (cm)    Measurement Location 1 Neck  -AB      Measurement Location 2 Axillary  -AB      Measurement Location 3 Nipple line  -AB      Measurement Location 4 below breast  -AB              Trunk Circumferential (cm)    Measurement  Location 1 Abdomen at navel  -AB      Measurement Location 2 Hip  -AB              Manual Lymphatic Drainage    Manual Lymphatic Drainage initial sequence; opened regional lymph nodes; opened anastamoses; extremity treatment  -AB      Initial Sequence short neck; supraclavicular; shoulder collectors; abdomen  -AB      Opened Regional Lymph Nodes axillary; inguinal; paraspinal  -AB      Opened Anastamoses anterior axillo-axillary; posterior axillo-axillary; anterior inguino-inguinal; posterior inguino-inguinal  -AB      Extremity Treatment MLD to full limb  -AB      MLD to Full Limb RUE, LLE  -AB      Manual Lymphatic Drainage Comments vibration to RUE axilla/breast  -AB      Manual Therapy MLD to RUE, LLE, abdomen, axillaries, inguinals, shoulder collectors, short neck, paraspinals  -AB              Compression/Skin Care    Compression/Skin Care skin care; wrapping location; bandaging  -AB      Skin Care moisturizing lotion applied  -AB      Wrapping Location lower extremity; upper extremity  -AB      Wrapping Location UE right:  wrist to axilla  -AB      Wrapping Location LE left:  ankle to knee  -AB      Bandage Layers short-stretch bandages (comment size/quantity); cotton elastic stocking- single layer (comment size); soft foam- 1/4 inch  -AB      Bandaging Comments tg  size F to RUE, multi-layer bandage to LLE  -AB      Bandaging Technique circumferential/spiral; light compression; moderate compression  -AB      Compression/Skin Care Comments compression pump to abdomen, RUE, LLE  -AB            User Key  (r) = Recorded By, (t) = Taken By, (c) = Cosigned By    Initials Name Provider Type    Willa Schwartz, OT Occupational Therapist                                      Therapy Education  Given: HEP, Edema management, Symptoms/condition management, Bandaging/dressing change  Program: Reinforced  How Provided: Verbal, Demonstration  Provided to: Patient  Level of Understanding: Verbalized                 Time Calculation:   OT Start Time: 1300  OT Stop Time: 1430  OT Time Calculation (min): 90 min  OT Non-Billable Time (min): 25 min  Total Timed Code Minutes- OT: 90 minute(s)     Therapy Charges for Today     Code Description Service Date Service Provider Modifiers Qty    [id number]  OT MANUAL THERAPY EA 15 MIN 1/14/2022 Willa Sanchez OT GO KX 4    [id number]  OT LYMPHEDEMA MANAGEMENT-15 MIN 1/14/2022 Willa Sanchez OT KX 1    [id number]  OT VASOPNEUMAT DEVIC 1 OR MORE AREAS 1/14/2022 Willa Sanchez OT GO KX 1                      Willa Sanchez OT  1/14/2022

## 2022-01-17 ENCOUNTER — APPOINTMENT (OUTPATIENT)
Dept: OCCUPATIONAL THERAPY | Facility: HOSPITAL | Age: 67
End: 2022-01-17

## 2022-01-18 RX ORDER — MAGNESIUM OXIDE 400 MG/1
TABLET ORAL
Qty: 90 TABLET | Refills: 0 | Status: SHIPPED | OUTPATIENT
Start: 2022-01-18 | End: 2022-02-21

## 2022-01-21 ENCOUNTER — APPOINTMENT (OUTPATIENT)
Dept: OCCUPATIONAL THERAPY | Facility: HOSPITAL | Age: 67
End: 2022-01-21

## 2022-01-24 ENCOUNTER — HOSPITAL ENCOUNTER (OUTPATIENT)
Dept: OCCUPATIONAL THERAPY | Facility: HOSPITAL | Age: 67
Setting detail: THERAPIES SERIES
Discharge: HOME | End: 2022-01-24

## 2022-01-24 DIAGNOSIS — G47.33 OBSTRUCTIVE SLEEP APNEA SYNDROME: ICD-10-CM

## 2022-01-24 DIAGNOSIS — C77.3 CARCINOMA OF RIGHT BREAST METASTATIC TO AXILLARY LYMPH NODE: ICD-10-CM

## 2022-01-24 DIAGNOSIS — C50.411 MALIGNANT NEOPLASM OF UPPER-OUTER QUADRANT OF RIGHT BREAST IN FEMALE, ESTROGEN RECEPTOR POSITIVE: ICD-10-CM

## 2022-01-24 DIAGNOSIS — I89.0 LYMPHEDEMA OF RIGHT UPPER EXTREMITY: ICD-10-CM

## 2022-01-24 DIAGNOSIS — M79.89 SWELLING IN RIGHT ARMPIT: ICD-10-CM

## 2022-01-24 DIAGNOSIS — Z79.4 TYPE 2 DIABETES MELLITUS WITH HYPERGLYCEMIA, WITH LONG-TERM CURRENT USE OF INSULIN: ICD-10-CM

## 2022-01-24 DIAGNOSIS — Z17.0 MALIGNANT NEOPLASM OF UPPER-OUTER QUADRANT OF RIGHT BREAST IN FEMALE, ESTROGEN RECEPTOR POSITIVE: ICD-10-CM

## 2022-01-24 DIAGNOSIS — I89.0 LYMPHEDEMA OF BREAST: ICD-10-CM

## 2022-01-24 DIAGNOSIS — I89.0 LYMPHEDEMA: Primary | ICD-10-CM

## 2022-01-24 DIAGNOSIS — C50.911 CARCINOMA OF RIGHT BREAST METASTATIC TO AXILLARY LYMPH NODE: ICD-10-CM

## 2022-01-24 DIAGNOSIS — E11.65 TYPE 2 DIABETES MELLITUS WITH HYPERGLYCEMIA, WITH LONG-TERM CURRENT USE OF INSULIN: ICD-10-CM

## 2022-01-24 DIAGNOSIS — I89.0 LYMPHEDEMA OF LEFT LOWER EXTREMITY: ICD-10-CM

## 2022-01-24 PROCEDURE — 97139 UNLISTED THERAPEUTIC PX: CPT

## 2022-01-24 PROCEDURE — 97140 MANUAL THERAPY 1/> REGIONS: CPT

## 2022-01-24 PROCEDURE — 97016 VASOPNEUMATIC DEVICE THERAPY: CPT

## 2022-01-24 NOTE — THERAPY TREATMENT NOTE
Outpatient Occupational Therapy Lymphedema Treatment Note  MITZI Keller     Patient Name: Ronna Wayne  : 1955  MRN: [id number]  Today's Date: 2022      Visit Date: 2022    Patient Active Problem List   Diagnosis   • Frequent headaches   • Memory loss   • Essential hypertension   • Dyslipidemia   • Type 2 diabetes mellitus with hyperglycemia, with long-term current use of insulin (HCC)   • Obstructive sleep apnea syndrome   • Glaucoma   • Anxiety   • GERD (gastroesophageal reflux disease)   • Morbid obesity   • Weakness of left arm   • Cerebrovascular accident (CVA) due to embolism (HCC)   • SVT (supraventricular tachycardia) (HCC)   • Vitamin D deficiency   • B12 deficiency   • Malignant neoplasm of upper-outer quadrant of right breast in female, estrogen receptor positive (HCC)   • Other specified hypothyroidism   • Personal history of colonic polyps        Past Medical History:   Diagnosis Date   • Anxiety    • B12 deficiency    • BPV (benign positional vertigo)    • Breast cancer (HCC) 2021   • Diabetes mellitus (HCC)    • Diarrhea    • Disease of thyroid gland    • Elevated cholesterol    • Fibromyalgia    • GERD (gastroesophageal reflux disease)    • Glaucoma    • Heart murmur    • Hyperlipidemia    • Hypertension    • Kidney disease    • Obesity    • Peptic ulceration    • PONV (postoperative nausea and vomiting)    • Sleep apnea     c-pap   • Stroke (HCC)    • Weakness of left arm         Past Surgical History:   Procedure Laterality Date   • ABDOMINAL SURGERY     • APPENDECTOMY     • BREAST CYST ASPIRATION     • BREAST LUMPECTOMY WITH SENTINEL NODE BIOPSY Right 2021    Procedure: BREAST LUMPECTOMY WITH SENTINEL NODE BIOPSY;  Surgeon: Lee Parrish MD;  Location: [facility];  Service: General;  Laterality: Right;   • CATARACT EXTRACTION Bilateral    • CHOLECYSTECTOMY     • COLONOSCOPY     • ENDOSCOPY     • HEMATOMA EVACUATION TRUNK Right 2021    Procedure: HEMATOMA  EVACUATION TRUNK;  Surgeon: Lee Parrish MD;  Location: [facility] OR;  Service: General;  Laterality: Right;   • URETHRA SURGERY           Visit Dx:      ICD-10-CM ICD-9-CM   1. Lymphedema  I89.0 457.1   2. Malignant neoplasm of upper-outer quadrant of right breast in female, estrogen receptor positive (HCC)  C50.411 174.4    Z17.0 V86.0   3. Carcinoma of right breast metastatic to axillary lymph node (HCC)  C50.911 174.9    C77.3 196.3   4. Type 2 diabetes mellitus with hyperglycemia, with long-term current use of insulin (HCC)  E11.65 250.00    Z79.4 790.29     V58.67   5. Lymphedema of breast  I89.0 457.1   6. Swelling in right armpit  M79.89 729.81   7. Lymphedema of left lower extremity  I89.0 457.1   8. Lymphedema of right upper extremity  I89.0 457.1   9. Obstructive sleep apnea syndrome  G47.33 327.23        Lymphedema     Row Name 01/24/22 1300             Subjective Pain    Able to rate subjective pain? yes  -AB      Pre-Treatment Pain Level 0  -AB      Subjective Pain Comment denies pain this date  -AB              Subjective Comments    Subjective Comments Pt. presents to tx w/ no new c/o voiced.  -AB              Lymphedema Assessment    Lymphedema Classification LLE:; Trunk:; Other:; secondary; stage 2 (Spontaneously Irreversible); primary; RUE:  -AB      Lymphedema Cancer Related Sx left; axillary dissection; lumpectomy  -AB      Positive Lymph Nodes # 1  -AB      Radiation Therapy Received yes  -AB      Lymphedema Precautions kidney disease  -AB              Posture/Observations    Posture- WNL Posture is WNL  -AB              Lymphedema Edema Assessment    Ptting Edema Category By grade out of 4  -AB      Pitting Edema + 2/4; Mild  -AB      Stemmer Sign bilateral:; negative  -AB      Valmeyer Hump negative; bilateral:  -AB              Skin Changes/Observations    Location/Assessment Upper Extremity; Lower Extremity; Head/Neck; Upper Quadrant  -AB      Upper Extremity Conditions clean; dry;  hairless  -AB      Upper Extremity Color/Pigment right:; hyperpigmented  -AB      Upper Quadrant Conditions dry; hairless  -AB      Upper Quadrant Color/Pigment fibrosis; P'eau d'orange; hyperpigmented  R breast  -AB      Lower Extremity Conditions bilateral:; clean; dry; hairless  -AB      Lower Extremity Color/Pigment left:; hyperpigmented  -AB      Head/Neck Conditions intact; clean; dry  -AB      Head/Neck Color/Pigment red  -AB              Lymphedema Pulses/Capillary Refill    Lymphedema Pulses/Capillary Refill capillary refill  -AB      Capillary Refill lower extremity capillary refill  -AB      Lower Extremity Capillary Refill right:; left:; less than 3 seconds  -AB              Lymphedema Measurements    Measurement Type(s) Quick Girth; Circumferential  -AB      Quick Girth Areas Upper extremities; Lower extremities  -AB      Circumferential Areas Neck; Trunk  -AB              RUE Quick Girth (cm)    Axilla 46.3 cm  -AB      Mid upper arm 48.5 cm  -AB      Elbow 30.5 cm  -AB      Mid forearm 30.9 cm  -AB      Wrist crease 17.6 cm  -AB      Web space 20.2 cm  -AB      Met-heads 18.9 cm  -AB      Other 1 21 cm  -AB      Other 2 26.4 cm  -AB      RUE Quick Girth Total 260.3  -AB              LLE Quick Girth (cm)    Met-heads 22 cm  -AB      Mid foot 24.7 cm  -AB      Smallest ankle 27.8 cm  -AB      Largest calf 50.2 cm  -AB      Tib tuberosity 48.4 cm  -AB      Mid patella 55.9 cm  -AB      Distal thigh 64.4 cm  -AB      Other 1 35.3 cm  -AB      Other 2 46.1 cm  -AB              Neck Circumferential (cm)    Measurement Location 1 Neck  -AB      Measurement Location 2 Axillary  -AB      Measurement Location 3 Nipple line  -AB      Measurement Location 4 below breast  -AB              Trunk Circumferential (cm)    Measurement Location 1 Abdomen at navel  -AB      Measurement Location 2 Hip  -AB              Manual Lymphatic Drainage    Manual Lymphatic Drainage initial sequence; opened regional lymph nodes;  opened anastamoses; extremity treatment  -AB      Initial Sequence short neck; supraclavicular; shoulder collectors; abdomen  -AB      Opened Regional Lymph Nodes axillary; inguinal; paraspinal  -AB      Opened Anastamoses anterior axillo-axillary; posterior axillo-axillary; anterior inguino-inguinal; posterior inguino-inguinal  -AB      Extremity Treatment MLD to full limb  -AB      MLD to Full Limb RUE, LLE  -AB      Manual Lymphatic Drainage Comments vibration to RUE  -AB      Manual Therapy MLD to RUE, LLE, abdomen, inguinals, shoulder collectors, axillaries  -AB              Compression/Skin Care    Compression/Skin Care skin care; wrapping location; bandaging  -AB      Skin Care moisturizing lotion applied  -AB      Wrapping Location lower extremity; upper extremity  -AB      Wrapping Location UE right:  wrist to axilla  -AB      Wrapping Location LE --  -AB      Bandage Layers short-stretch bandages (comment size/quantity); cotton elastic stocking- single layer (comment size); soft foam- 1/4 inch  -AB      Bandaging Technique circumferential/spiral; light compression; moderate compression  -AB      Compression/Skin Care Comments compression pump to abdomen, RUE, LLE  -AB            User Key  (r) = Recorded By, (t) = Taken By, (c) = Cosigned By    Initials Name Provider Type    Willa Schwartz, KANDI Occupational Therapist                                      Therapy Education  Given: HEP, Edema management, Symptoms/condition management, Bandaging/dressing change  Program: Reinforced  How Provided: Verbal, Demonstration  Provided to: Patient  Level of Understanding: Verbalized                Time Calculation:   OT Start Time: 1250  OT Stop Time: 1415  OT Time Calculation (min): 85 min  OT Non-Billable Time (min): 25 min  Total Timed Code Minutes- OT: 85 minute(s)     Therapy Charges for Today     Code Description Service Date Service Provider Modifiers Qty    04621372175  OT MANUAL THERAPY EA 15 MIN  1/24/2022 Willa Sanchez OT GO, KX 4    99047196535 HC OT LYMPHEDEMA MANAGEMENT-15 MIN 1/24/2022 Willa Sanchez OT KX 1    53916994772 HC OT VASOPNEUMAT DEVIC 1 OR MORE AREAS 1/24/2022 Willa Sanchez OT GO KX 1                      Willa Sanchez OT  1/24/2022

## 2022-01-28 ENCOUNTER — HOSPITAL ENCOUNTER (OUTPATIENT)
Dept: OCCUPATIONAL THERAPY | Facility: HOSPITAL | Age: 67
Setting detail: THERAPIES SERIES
Discharge: HOME | End: 2022-01-28

## 2022-01-28 ENCOUNTER — OFFICE VISIT (OUTPATIENT)
Dept: CARDIOLOGY | Facility: CLINIC | Age: 67
End: 2022-01-28

## 2022-01-28 VITALS
HEIGHT: 63 IN | DIASTOLIC BLOOD PRESSURE: 62 MMHG | SYSTOLIC BLOOD PRESSURE: 135 MMHG | TEMPERATURE: 97.8 F | BODY MASS INDEX: 49.15 KG/M2 | WEIGHT: 277.4 LBS | HEART RATE: 59 BPM

## 2022-01-28 DIAGNOSIS — I10 ESSENTIAL HYPERTENSION: ICD-10-CM

## 2022-01-28 DIAGNOSIS — I47.1 SVT (SUPRAVENTRICULAR TACHYCARDIA): ICD-10-CM

## 2022-01-28 DIAGNOSIS — M79.89 SWELLING IN RIGHT ARMPIT: ICD-10-CM

## 2022-01-28 DIAGNOSIS — Z79.4 TYPE 2 DIABETES MELLITUS WITH HYPERGLYCEMIA, WITH LONG-TERM CURRENT USE OF INSULIN: ICD-10-CM

## 2022-01-28 DIAGNOSIS — C77.3 CARCINOMA OF RIGHT BREAST METASTATIC TO AXILLARY LYMPH NODE: ICD-10-CM

## 2022-01-28 DIAGNOSIS — E78.5 DYSLIPIDEMIA: ICD-10-CM

## 2022-01-28 DIAGNOSIS — I89.0 LYMPHEDEMA OF RIGHT UPPER EXTREMITY: ICD-10-CM

## 2022-01-28 DIAGNOSIS — E11.65 TYPE 2 DIABETES MELLITUS WITH HYPERGLYCEMIA, WITH LONG-TERM CURRENT USE OF INSULIN: ICD-10-CM

## 2022-01-28 DIAGNOSIS — C50.911 CARCINOMA OF RIGHT BREAST METASTATIC TO AXILLARY LYMPH NODE: ICD-10-CM

## 2022-01-28 DIAGNOSIS — I89.0 LYMPHEDEMA OF BREAST: ICD-10-CM

## 2022-01-28 DIAGNOSIS — Z17.0 MALIGNANT NEOPLASM OF UPPER-OUTER QUADRANT OF RIGHT BREAST IN FEMALE, ESTROGEN RECEPTOR POSITIVE: ICD-10-CM

## 2022-01-28 DIAGNOSIS — I89.0 LYMPHEDEMA: Primary | ICD-10-CM

## 2022-01-28 DIAGNOSIS — C50.411 MALIGNANT NEOPLASM OF UPPER-OUTER QUADRANT OF RIGHT BREAST IN FEMALE, ESTROGEN RECEPTOR POSITIVE: ICD-10-CM

## 2022-01-28 DIAGNOSIS — E78.2 MIXED HYPERLIPIDEMIA: Primary | ICD-10-CM

## 2022-01-28 DIAGNOSIS — I89.0 LYMPHEDEMA OF LEFT LOWER EXTREMITY: ICD-10-CM

## 2022-01-28 PROCEDURE — 97139 UNLISTED THERAPEUTIC PX: CPT

## 2022-01-28 PROCEDURE — 99214 OFFICE O/P EST MOD 30 MIN: CPT | Performed by: PHYSICIAN ASSISTANT

## 2022-01-28 PROCEDURE — 97016 VASOPNEUMATIC DEVICE THERAPY: CPT

## 2022-01-28 PROCEDURE — 97140 MANUAL THERAPY 1/> REGIONS: CPT

## 2022-01-28 RX ORDER — ATORVASTATIN CALCIUM 10 MG/1
10 TABLET, FILM COATED ORAL DAILY
Qty: 30 TABLET | Refills: 3 | Status: SHIPPED | OUTPATIENT
Start: 2022-01-28 | End: 2022-10-27

## 2022-01-28 RX ORDER — LISINOPRIL 5 MG/1
1 TABLET ORAL DAILY
COMMUNITY
Start: 2021-08-02 | End: 2022-01-28 | Stop reason: ALTCHOICE

## 2022-01-28 RX ORDER — NYSTATIN 100000 [USP'U]/G
POWDER TOPICAL
Status: ON HOLD | COMMUNITY
Start: 2021-12-02 | End: 2022-12-18

## 2022-01-28 NOTE — PROGRESS NOTES
Mague Acosta APRN  Ronna Wayne  1955 01/28/2022    Patient Active Problem List   Diagnosis   • Frequent headaches   • Memory loss   • Essential hypertension   • Dyslipidemia   • Type 2 diabetes mellitus with hyperglycemia, with long-term current use of insulin (HCC)   • Obstructive sleep apnea syndrome   • Glaucoma   • Anxiety   • GERD (gastroesophageal reflux disease)   • Morbid obesity   • Weakness of left arm   • Cerebrovascular accident (CVA) due to embolism (HCC)   • SVT (supraventricular tachycardia) (HCC)   • Vitamin D deficiency   • B12 deficiency   • Malignant neoplasm of upper-outer quadrant of right breast in female, estrogen receptor positive (HCC)   • Other specified hypothyroidism   • Personal history of colonic polyps       Dear Mague Acosta APRN:    Subjective     History of Present Illness:    Chief Complaint   Patient presents with   • Follow-up     ROUTINE       Ronna Wayne is a pleasant 66 y.o. female with a past medical coronary artery calcification seen on CT chest on 11/18/2021, history significant for  recurrent paroxysmal SVT, essential hypertension, diabetes mellitus, history of CVA, and sleep apnea.  She was also recently diagnosed with right-sided breast cancer with scheduled lumpectomy on 8/5/2021.  She comes in today for routine cardiology follow-up.    Geraldine reports since she was last seen she was in the emergency department one time in November for SVT she did require adenosine which did successfully convert her back into normal sinus rhythm.  She does now have follow-up with electrophysiologist, Dr. Wadsworth.  She reports since that episode she has been doing well in regards to SVT denying any further episodes.  She is still being treated for breast cancer although she has now had lumpectomy as well as chemo and radiation.  She does deny significant chest pain but does have chest wall tenderness when she palpates her breasts.  She does  have chronic shortness of breath but does deny any recent worsening of this.    Allergies   Allergen Reactions   • Codeine GI Intolerance     Increased heart rate     • Sulfa Antibiotics GI Intolerance     Heart rate increase    :      Current Outpatient Medications:   •  acetaminophen (TYLENOL) 325 MG tablet, Take 2 tablets by mouth Every 4 (Four) Hours As Needed for Mild Pain ., Disp: 30 tablet, Rfl: 0  •  amLODIPine (NORVASC) 5 MG tablet, Take 1 tablet by mouth Daily., Disp: 30 tablet, Rfl: 11  •  anastrozole (ARIMIDEX) 1 MG tablet, Take 1 tablet by mouth Daily., Disp: 30 tablet, Rfl: 11  •  aspirin 81 MG EC tablet, Take 1 tablet by mouth Daily., Disp: 90 tablet, Rfl: 3  •  cholecalciferol (VITAMIN D3) 25 MCG (1000 UT) tablet, Take 1,000 Units by mouth Daily., Disp: , Rfl:   •  Continuous Blood Gluc  (Dexcom G6 ) device, 1 Device Daily., Disp: 1 each, Rfl: 0  •  Continuous Blood Gluc Sensor (Dexcom G6 Sensor), Every 10 (Ten) Days., Disp: 9 each, Rfl: 3  •  Continuous Blood Gluc Transmit (Dexcom G6 Transmitter) misc, 1 Device Daily for 90 days., Disp: 1 each, Rfl: 3  •  empagliflozin (Jardiance) 25 MG tablet tablet, Take 1 tablet by mouth Every Morning., Disp: 35 tablet, Rfl: 0  •  Folic Acid-Cholecalciferol 1-3775 MG-UNIT capsule, Take 1 capsule by mouth Daily., Disp: , Rfl:   •  hydroCHLOROthiazide (HYDRODIURIL) 12.5 MG tablet, Take 12.5 mg by mouth Daily As Needed (swelling)., Disp: , Rfl:   •  ibuprofen (ADVIL,MOTRIN) 600 MG tablet, Take 1 tablet by mouth Every 6 (Six) Hours As Needed for Mild Pain ., Disp: 30 tablet, Rfl: 0  •  ibuprofen (ADVIL,MOTRIN) 600 MG tablet, Take 1 tablet by mouth Every 8 (Eight) Hours As Needed for Moderate Pain ., Disp: 60 tablet, Rfl: 2  •  insulin aspart prot-insulin aspart (novoLOG 70/30) (70-30) 100 UNIT/ML injection, Inject 5 Units under the skin into the appropriate area as directed Daily With Breakfast., Disp: , Rfl:   •  insulin aspart prot-insulin aspart  "(novoLOG 70/30) (70-30) 100 UNIT/ML injection, Inject 8 Units under the skin into the appropriate area as directed Daily With Lunch., Disp: , Rfl:   •  insulin aspart prot-insulin aspart (novoLOG 70/30) (70-30) 100 UNIT/ML injection, Per her routine regimen with maximum daily dose of 50 units, Disp: 150 mL, Rfl: 2  •  levothyroxine (SYNTHROID, LEVOTHROID) 88 MCG tablet, Take 1 tablet by mouth Daily., Disp: 90 tablet, Rfl: 0  •  losartan (COZAAR) 50 MG tablet, Take 1 tablet by mouth Daily., Disp: 30 tablet, Rfl: 5  •  magnesium oxide (MAG-OX) 400 MG tablet, TAKE ONE TABLET BY MOUTH THREE TIMES A DAY, Disp: 90 tablet, Rfl: 0  •  metoprolol succinate XL (TOPROL-XL) 100 MG 24 hr tablet, TAKE ONE TABLET BY MOUTH DAILY, Disp: 30 tablet, Rfl: 2  •  nystatin 778032 UNIT/GM topical powder, , Disp: , Rfl:   •  SITagliptin (Januvia) 25 MG tablet, Take 1 tablet by mouth Daily., Disp: , Rfl:   •  atorvastatin (Lipitor) 10 MG tablet, Take 1 tablet by mouth Daily., Disp: 30 tablet, Rfl: 3  •  Insulin Syringe-Needle U-100 30G X 5/16\" 0.3 ML misc, 1 Device 3 (Three) Times a Day., Disp: 100 each, Rfl: 5  •  silver sulfadiazine (SILVADENE, SSD) 1 % cream, Apply 1 application topically to the appropriate area as directed 2 (Two) Times a Day., Disp: 1 each, Rfl: 3  •  traMADol (ULTRAM) 50 MG tablet, Take 1 tablet by mouth Every 8 (Eight) Hours As Needed for Moderate Pain ., Disp: 8 tablet, Rfl: 0    The following portions of the patient's history were reviewed and updated as appropriate: allergies, current medications, past family history, past medical history, past social history, past surgical history and problem list.    Social History     Tobacco Use   • Smoking status: Former Smoker     Packs/day: 1.50     Years: 8.00     Pack years: 12.00     Types: Cigarettes     Quit date:      Years since quittin.1   • Smokeless tobacco: Never Used   Vaping Use   • Vaping Use: Never used   Substance Use Topics   • Alcohol use: No     " "Comment: former social drinker not had anything in 25 + years   • Drug use: Not on file         Objective   Vitals:    01/28/22 0956   BP: 135/62   Pulse: 59   Temp: 97.8 °F (36.6 °C)   Weight: 126 kg (277 lb 6.4 oz)   Height: 160 cm (63\")     Body mass index is 49.14 kg/m².    Constitutional:       General: Not in acute distress.     Appearance: Healthy appearance. Well-developed and not in distress. Not diaphoretic.   Eyes:      Conjunctiva/sclera: Conjunctivae normal.      Pupils: Pupils are equal, round, and reactive to light.   HENT:      Head: Normocephalic and atraumatic.   Neck:      Vascular: No carotid bruit or JVD.   Pulmonary:      Effort: Pulmonary effort is normal. No respiratory distress.      Breath sounds: Normal breath sounds.   Cardiovascular:      Normal rate. Regular rhythm.      Murmurs: There is a grade 3/6 harsh midsystolic murmur at the URSB, radiating to the neck.   Skin:     General: Skin is cool.   Neurological:      Mental Status: Alert, oriented to person, place, and time and oriented to person, place and time.         Lab Results   Component Value Date     (L) 01/11/2022    K 4.4 01/11/2022    CL 97 (L) 01/11/2022    CO2 23.1 01/11/2022    BUN 20 01/11/2022    CREATININE 1.02 (H) 01/11/2022    GLUCOSE 326 (H) 01/11/2022    CALCIUM 9.4 01/11/2022    AST 39 (H) 01/11/2022    ALT 20 01/11/2022    ALKPHOS 97 01/11/2022    LABIL2 1.0 (L) 05/28/2016     Lab Results   Component Value Date    CKTOTAL 36 05/28/2016     Lab Results   Component Value Date    WBC 7.11 01/11/2022    HGB 12.5 01/11/2022    HCT 37.3 01/11/2022     01/11/2022     Lab Results   Component Value Date    INR 1.02 11/18/2021    INR 1.05 06/19/2021    INR 1.05 05/01/2021     Lab Results   Component Value Date    MG 1.6 11/29/2021     Lab Results   Component Value Date    TSH 1.630 11/29/2021    CHLPL 226 (H) 05/28/2016    TRIG 153 (H) 11/29/2021    HDL 48 11/29/2021     (H) 11/29/2021      No results " found for: BNP    During this visit the following were done:  Labs Reviewed []    Labs Ordered []    Radiology Reports Reviewed []    Radiology Ordered []    PCP Records Reviewed []    Referring Provider Records Reviewed []    ER Records Reviewed []    Hospital Records Reviewed []    History Obtained From Family []    Radiology Images Reviewed []    Other Reviewed []    Records Requested []       Procedures    Assessment/Plan    Diagnosis Plan   1. Mixed hyperlipidemia  Comprehensive Metabolic Panel    Lipid Panel   2. SVT (supraventricular tachycardia) (HCC)     3. Essential hypertension     4. Dyslipidemia              Recommendations:  1. Paroxysmal SVT  1. Patient denies any further episodes since November she is now scheduled to see electrophysiologist next week.  She is currently on metoprolol succinate 100 mg daily.  We will continue current therapy and await EP evaluation.  2. Coronary artery calcifications  1. Reviewed recent ER visit which did reveal coronary artery calcifications on CT scan.  She is currently on aspirin 81 mg but not on statin.  I will start Lipitor 10 mg she reports myalgias with 20 mg.  2. Will check CMP and lipid panel in 1 month.      No follow-ups on file.    As always, I appreciate very much the opportunity to participate in the cardiovascular care of your patients.      With Best Regards,    Esdras Márquez PA-C

## 2022-01-31 ENCOUNTER — APPOINTMENT (OUTPATIENT)
Dept: OCCUPATIONAL THERAPY | Facility: HOSPITAL | Age: 67
End: 2022-01-31

## 2022-02-03 ENCOUNTER — HOSPITAL ENCOUNTER (OUTPATIENT)
Dept: OCCUPATIONAL THERAPY | Facility: HOSPITAL | Age: 67
Setting detail: THERAPIES SERIES
Discharge: HOME | End: 2022-02-03

## 2022-02-03 DIAGNOSIS — E11.65 TYPE 2 DIABETES MELLITUS WITH HYPERGLYCEMIA, WITH LONG-TERM CURRENT USE OF INSULIN: ICD-10-CM

## 2022-02-03 DIAGNOSIS — I89.0 LYMPHEDEMA OF BREAST: ICD-10-CM

## 2022-02-03 DIAGNOSIS — C50.911 CARCINOMA OF RIGHT BREAST METASTATIC TO AXILLARY LYMPH NODE: ICD-10-CM

## 2022-02-03 DIAGNOSIS — R29.898 WEAKNESS OF LEFT ARM: ICD-10-CM

## 2022-02-03 DIAGNOSIS — C77.3 CARCINOMA OF RIGHT BREAST METASTATIC TO AXILLARY LYMPH NODE: ICD-10-CM

## 2022-02-03 DIAGNOSIS — I89.0 LYMPHEDEMA OF RIGHT UPPER EXTREMITY: ICD-10-CM

## 2022-02-03 DIAGNOSIS — I89.0 LYMPHEDEMA OF LEFT LOWER EXTREMITY: ICD-10-CM

## 2022-02-03 DIAGNOSIS — Z86.010 PERSONAL HISTORY OF COLONIC POLYPS: ICD-10-CM

## 2022-02-03 DIAGNOSIS — C50.411 MALIGNANT NEOPLASM OF UPPER-OUTER QUADRANT OF RIGHT BREAST IN FEMALE, ESTROGEN RECEPTOR POSITIVE: ICD-10-CM

## 2022-02-03 DIAGNOSIS — Z17.0 MALIGNANT NEOPLASM OF UPPER-OUTER QUADRANT OF RIGHT BREAST IN FEMALE, ESTROGEN RECEPTOR POSITIVE: ICD-10-CM

## 2022-02-03 DIAGNOSIS — I89.0 LYMPHEDEMA: Primary | ICD-10-CM

## 2022-02-03 DIAGNOSIS — M79.89 SWELLING IN RIGHT ARMPIT: ICD-10-CM

## 2022-02-03 DIAGNOSIS — Z79.4 TYPE 2 DIABETES MELLITUS WITH HYPERGLYCEMIA, WITH LONG-TERM CURRENT USE OF INSULIN: ICD-10-CM

## 2022-02-03 PROCEDURE — 97535 SELF CARE MNGMENT TRAINING: CPT

## 2022-02-03 PROCEDURE — 97140 MANUAL THERAPY 1/> REGIONS: CPT

## 2022-02-03 PROCEDURE — 97139 UNLISTED THERAPEUTIC PX: CPT

## 2022-02-03 PROCEDURE — 97016 VASOPNEUMATIC DEVICE THERAPY: CPT

## 2022-02-03 NOTE — THERAPY TREATMENT NOTE
Outpatient Occupational Therapy Lymphedema Treatment Note  MITZI Keller     Patient Name: Ronna Wayne  : 1955  MRN: [id number]  Today's Date: 2/3/2022      Visit Date: 2022    Patient Active Problem List   Diagnosis   • Frequent headaches   • Memory loss   • Essential hypertension   • Dyslipidemia   • Type 2 diabetes mellitus with hyperglycemia, with long-term current use of insulin (HCC)   • Obstructive sleep apnea syndrome   • Glaucoma   • Anxiety   • GERD (gastroesophageal reflux disease)   • Morbid obesity   • Weakness of left arm   • Cerebrovascular accident (CVA) due to embolism (HCC)   • SVT (supraventricular tachycardia) (HCC)   • Vitamin D deficiency   • B12 deficiency   • Malignant neoplasm of upper-outer quadrant of right breast in female, estrogen receptor positive (HCC)   • Other specified hypothyroidism   • Personal history of colonic polyps        Past Medical History:   Diagnosis Date   • Anxiety    • B12 deficiency    • BPV (benign positional vertigo)    • Breast cancer (HCC) 2021   • Diabetes mellitus (HCC)    • Diarrhea    • Disease of thyroid gland    • Elevated cholesterol    • Fibromyalgia    • GERD (gastroesophageal reflux disease)    • Glaucoma    • Heart murmur    • Hyperlipidemia    • Hypertension    • Kidney disease    • Obesity    • Peptic ulceration    • PONV (postoperative nausea and vomiting)    • Sleep apnea     c-pap   • Stroke (HCC)    • Weakness of left arm         Past Surgical History:   Procedure Laterality Date   • ABDOMINAL SURGERY     • APPENDECTOMY     • BREAST CYST ASPIRATION     • BREAST LUMPECTOMY WITH SENTINEL NODE BIOPSY Right 2021    Procedure: BREAST LUMPECTOMY WITH SENTINEL NODE BIOPSY;  Surgeon: Lee Parrish MD;  Location: [facility];  Service: General;  Laterality: Right;   • CATARACT EXTRACTION Bilateral    • CHOLECYSTECTOMY     • COLONOSCOPY     • ENDOSCOPY     • HEMATOMA EVACUATION TRUNK Right 2021    Procedure: HEMATOMA  "EVACUATION TRUNK;  Surgeon: Lee Parrish MD;  Location: [facility];  Service: General;  Laterality: Right;   • URETHRA SURGERY           Visit Dx:      ICD-10-CM ICD-9-CM   1. Lymphedema  I89.0 457.1   2. Malignant neoplasm of upper-outer quadrant of right breast in female, estrogen receptor positive (HCC)  C50.411 174.4    Z17.0 V86.0   3. Carcinoma of right breast metastatic to axillary lymph node (HCC)  C50.911 174.9    C77.3 196.3   4. Type 2 diabetes mellitus with hyperglycemia, with long-term current use of insulin (HCC)  E11.65 250.00    Z79.4 790.29     V58.67   5. Lymphedema of breast  I89.0 457.1   6. Swelling in right armpit  M79.89 729.81   7. Lymphedema of left lower extremity  I89.0 457.1   8. Lymphedema of right upper extremity  I89.0 457.1   9. Personal history of colonic polyps  Z86.010 V12.72   10. Weakness of left arm  R29.898 729.89        Lymphedema     Row Name 02/03/22 1300             Subjective Pain    Able to rate subjective pain? yes  -AB      Pre-Treatment Pain Level 0  -AB      Subjective Pain Comment denies pain  -AB              Subjective Comments    Subjective Comments Pt. presents for tx w/ no new c/o. She denies pain at time of treatment, however, she states that this AM her R breast and axilla were hurting. She also reports that her RLE is swollen, and states that it swells \"from time to time\".  -AB              Lymphedema Assessment    Lymphedema Classification LLE:; Trunk:; Other:; secondary; stage 2 (Spontaneously Irreversible); primary; RUE:  -AB      Lymphedema Cancer Related Sx left; axillary dissection; lumpectomy  -AB      Positive Lymph Nodes # 1  -AB      Radiation Therapy Received yes  -AB      Lymphedema Precautions kidney disease  -AB              Posture/Observations    Posture- WNL Posture is WNL  -AB              Lymphedema Edema Assessment    Ptting Edema Category By grade out of 4  -AB      Pitting Edema + 2/4; Mild  -AB      Stemmer Sign bilateral:; " negative  -AB      [place] Hump negative; bilateral:  -AB              Skin Changes/Observations    Location/Assessment Upper Extremity; Lower Extremity; Head/Neck; Upper Quadrant  -AB      Upper Extremity Conditions clean; dry; hairless  -AB      Upper Extremity Color/Pigment right:; hyperpigmented  -AB      Upper Quadrant Conditions dry; hairless  -AB      Upper Quadrant Color/Pigment fibrosis; P'eau d'orange; hyperpigmented  R breast  -AB      Lower Extremity Conditions bilateral:; clean; dry; hairless  -AB      Lower Extremity Color/Pigment left:; hyperpigmented  -AB      Head/Neck Conditions intact; clean; dry  -AB      Head/Neck Color/Pigment red  -AB              Lymphedema Pulses/Capillary Refill    Lymphedema Pulses/Capillary Refill capillary refill  -AB      Capillary Refill lower extremity capillary refill  -AB      Lower Extremity Capillary Refill right:; left:; less than 3 seconds  -AB              Lymphedema Measurements    Measurement Type(s) Quick Girth; Circumferential  -AB      Quick Girth Areas Upper extremities; Lower extremities  -AB      Circumferential Areas Neck; Trunk  -AB              RUE Quick Girth (cm)    Axilla 47.1 cm  -AB      Mid upper arm 50.9 cm  -AB      Elbow 31.3 cm  -AB      Mid forearm 32 cm  -AB      Wrist crease 17.9 cm  -AB      Web space 20.3 cm  -AB      Met-heads 19.1 cm  -AB      Other 1 23 cm  -AB      Other 2 28.6 cm  -AB      RUE Quick Girth Total 270.2  -AB              LLE Quick Girth (cm)    Met-heads 22.6 cm  -AB      Mid foot 25.1 cm  -AB      Smallest ankle 27.9 cm  -AB      Largest calf 51.5 cm  -AB      Tib tuberosity 50 cm  -AB      Mid patella 54.9 cm  -AB      Distal thigh 68.5 cm  -AB      Other 1 35.5 cm  -AB      Other 2 47.7 cm  -AB              RLE Quick Girth (cm)    Met-heads 22.6 cm  -AB      Mid foot 24.5 cm  -AB      Smallest ankle 28.3 cm  -AB      Largest calf 51 cm  -AB      Tib tuberosity 50.5 cm  -AB      Mid patella 58 cm  -AB      Distal  thigh 67.4 cm  -AB      Other 1 42.9 cm  -AB      Other 2 49 cm  -AB      RLE Quick Girth Total 394.2  -AB              Neck Circumferential (cm)    Measurement Location 1 Neck  -AB      Measurement Location 2 Axillary  -AB      Measurement Location 3 Nipple line  -AB      Measurement Location 4 below breast  -AB              Trunk Circumferential (cm)    Measurement Location 1 Abdomen at navel  -AB      Measurement Location 2 Hip  -AB              Manual Lymphatic Drainage    Manual Lymphatic Drainage initial sequence; opened regional lymph nodes; opened anastamoses; extremity treatment  -AB      Initial Sequence short neck; supraclavicular; shoulder collectors; abdomen  -AB      Opened Regional Lymph Nodes axillary; inguinal; paraspinal  -AB      Opened Anastamoses anterior axillo-axillary; posterior axillo-axillary; anterior inguino-inguinal; posterior inguino-inguinal  -AB      Extremity Treatment MLD to full limb  -AB      MLD to Full Limb RUE, BLE's  -AB      Manual Lymphatic Drainage Comments vibration to RUE, axilla, R breast  -AB      Manual Therapy MLD to RUE, BLE's, abdomen, axillary, inguinals, shoulder collectors  -AB              Compression/Skin Care    Compression/Skin Care skin care; wrapping location; bandaging  -AB      Skin Care moisturizing lotion applied  -AB      Wrapping Location lower extremity; upper extremity  -AB      Wrapping Location UE right:; hand to axilla  -AB      Bandage Layers short-stretch bandages (comment size/quantity); cotton elastic stocking- single layer (comment size); soft foam- 1/4 inch  -AB      Bandaging Technique circumferential/spiral; light compression; moderate compression  -AB      Compression/Skin Care Comments compression pump to abdomen, RLE, RUE  -AB            User Key  (r) = Recorded By, (t) = Taken By, (c) = Cosigned By    Initials Name Provider Type    Willa Schwartz, OT Occupational Therapist                                      Therapy  Education  Given: HEP, Edema management, Symptoms/condition management, Bandaging/dressing change  Program: Reinforced  How Provided: Verbal, Demonstration  Provided to: Patient  Level of Understanding: Verbalized                Time Calculation:   OT Start Time: 1250  OT Stop Time: 1430  OT Time Calculation (min): 100 min  OT Non-Billable Time (min): 25 min  Total Timed Code Minutes- OT: 100 minute(s)     Therapy Charges for Today     Code Description Service Date Service Provider Modifiers Qty    75483209727 HC OT MANUAL THERAPY EA 15 MIN 2/3/2022 Willa Sanchez OT GO, KX 4    43191476949  OT LYMPHEDEMA MANAGEMENT-15 MIN 2/3/2022 Willa Sanchez OT KX 1    [id number]  OT VASOPNEUMAT DEVIC 1 OR MORE AREAS 2/3/2022 Willa Sanchez OT CARO, KX 1    [id number]  OT SELF CARE/MGMT/TRAIN EA 15 MIN 2/3/2022 Willa Sanchez OT CARO, KX 1                      Willa Sanchez OT  2/3/2022

## 2022-02-07 ENCOUNTER — APPOINTMENT (OUTPATIENT)
Dept: OCCUPATIONAL THERAPY | Facility: HOSPITAL | Age: 67
End: 2022-02-07

## 2022-02-10 ENCOUNTER — HOSPITAL ENCOUNTER (OUTPATIENT)
Dept: OCCUPATIONAL THERAPY | Facility: HOSPITAL | Age: 67
Setting detail: THERAPIES SERIES
Discharge: HOME | End: 2022-02-10

## 2022-02-10 DIAGNOSIS — C77.3 CARCINOMA OF RIGHT BREAST METASTATIC TO AXILLARY LYMPH NODE: ICD-10-CM

## 2022-02-10 DIAGNOSIS — Z86.010 PERSONAL HISTORY OF COLONIC POLYPS: ICD-10-CM

## 2022-02-10 DIAGNOSIS — M79.89 SWELLING IN RIGHT ARMPIT: ICD-10-CM

## 2022-02-10 DIAGNOSIS — I89.0 LYMPHEDEMA OF BREAST: ICD-10-CM

## 2022-02-10 DIAGNOSIS — I89.0 LYMPHEDEMA OF LEFT LOWER EXTREMITY: ICD-10-CM

## 2022-02-10 DIAGNOSIS — R29.898 WEAKNESS OF LEFT ARM: ICD-10-CM

## 2022-02-10 DIAGNOSIS — I89.0 LYMPHEDEMA OF RIGHT UPPER EXTREMITY: ICD-10-CM

## 2022-02-10 DIAGNOSIS — C50.411 MALIGNANT NEOPLASM OF UPPER-OUTER QUADRANT OF RIGHT BREAST IN FEMALE, ESTROGEN RECEPTOR POSITIVE: ICD-10-CM

## 2022-02-10 DIAGNOSIS — I89.0 LYMPHEDEMA: Primary | ICD-10-CM

## 2022-02-10 DIAGNOSIS — Z79.4 TYPE 2 DIABETES MELLITUS WITH HYPERGLYCEMIA, WITH LONG-TERM CURRENT USE OF INSULIN: ICD-10-CM

## 2022-02-10 DIAGNOSIS — E11.65 TYPE 2 DIABETES MELLITUS WITH HYPERGLYCEMIA, WITH LONG-TERM CURRENT USE OF INSULIN: ICD-10-CM

## 2022-02-10 DIAGNOSIS — Z17.0 MALIGNANT NEOPLASM OF UPPER-OUTER QUADRANT OF RIGHT BREAST IN FEMALE, ESTROGEN RECEPTOR POSITIVE: ICD-10-CM

## 2022-02-10 DIAGNOSIS — C50.911 CARCINOMA OF RIGHT BREAST METASTATIC TO AXILLARY LYMPH NODE: ICD-10-CM

## 2022-02-10 PROCEDURE — 97139 UNLISTED THERAPEUTIC PX: CPT

## 2022-02-10 PROCEDURE — 97140 MANUAL THERAPY 1/> REGIONS: CPT

## 2022-02-10 PROCEDURE — 97016 VASOPNEUMATIC DEVICE THERAPY: CPT

## 2022-02-10 PROCEDURE — 97535 SELF CARE MNGMENT TRAINING: CPT

## 2022-02-10 NOTE — THERAPY PROGRESS REPORT/RE-CERT
Outpatient Occupational Therapy Lymphedema Progress Note  MITZI Keller     Patient Name: Ronna Wayne  : 1955  MRN: [id number]  Today's Date: 2/10/2022      Visit Date: 02/10/2022    Patient Active Problem List   Diagnosis   • Frequent headaches   • Memory loss   • Essential hypertension   • Dyslipidemia   • Type 2 diabetes mellitus with hyperglycemia, with long-term current use of insulin (HCC)   • Obstructive sleep apnea syndrome   • Glaucoma   • Anxiety   • GERD (gastroesophageal reflux disease)   • Morbid obesity   • Weakness of left arm   • Cerebrovascular accident (CVA) due to embolism (HCC)   • SVT (supraventricular tachycardia) (HCC)   • Vitamin D deficiency   • B12 deficiency   • Malignant neoplasm of upper-outer quadrant of right breast in female, estrogen receptor positive (HCC)   • Other specified hypothyroidism   • Personal history of colonic polyps        Past Medical History:   Diagnosis Date   • Anxiety    • B12 deficiency    • BPV (benign positional vertigo)    • Breast cancer (HCC) 2021   • Diabetes mellitus (HCC)    • Diarrhea    • Disease of thyroid gland    • Elevated cholesterol    • Fibromyalgia    • GERD (gastroesophageal reflux disease)    • Glaucoma    • Heart murmur    • Hyperlipidemia    • Hypertension    • Kidney disease    • Obesity    • Peptic ulceration    • PONV (postoperative nausea and vomiting)    • Sleep apnea     c-pap   • Stroke (HCC)    • Weakness of left arm         Past Surgical History:   Procedure Laterality Date   • ABDOMINAL SURGERY     • APPENDECTOMY     • BREAST CYST ASPIRATION     • BREAST LUMPECTOMY WITH SENTINEL NODE BIOPSY Right 2021    Procedure: BREAST LUMPECTOMY WITH SENTINEL NODE BIOPSY;  Surgeon: Lee Parrish MD;  Location: [facility];  Service: General;  Laterality: Right;   • CATARACT EXTRACTION Bilateral    • CHOLECYSTECTOMY     • COLONOSCOPY     • ENDOSCOPY     • HEMATOMA EVACUATION TRUNK Right 2021    Procedure: HEMATOMA  EVACUATION TRUNK;  Surgeon: Lee Parrish MD;  Location: [facility];  Service: General;  Laterality: Right;   • URETHRA SURGERY           Visit Dx:      ICD-10-CM ICD-9-CM   1. Lymphedema  I89.0 457.1   2. Malignant neoplasm of upper-outer quadrant of right breast in female, estrogen receptor positive (HCC)  C50.411 174.4    Z17.0 V86.0   3. Carcinoma of right breast metastatic to axillary lymph node (HCC)  C50.911 174.9    C77.3 196.3   4. Type 2 diabetes mellitus with hyperglycemia, with long-term current use of insulin (HCC)  E11.65 250.00    Z79.4 790.29     V58.67   5. Lymphedema of breast  I89.0 457.1   6. Swelling in right armpit  M79.89 729.81   7. Lymphedema of left lower extremity  I89.0 457.1   8. Lymphedema of right upper extremity  I89.0 457.1   9. Personal history of colonic polyps  Z86.010 V12.72   10. Weakness of left arm  R29.898 729.89        Lymphedema     Row Name 02/10/22 1300             Subjective Pain    Able to rate subjective pain? yes  -AB      Pre-Treatment Pain Level 0  -AB              Subjective Comments    Subjective Comments Pt. reports no pain this date, however, she reports some pain in axilla area w/ bandaging.  -AB              Lymphedema Assessment    Lymphedema Classification LLE:; Trunk:; Other:; secondary; stage 2 (Spontaneously Irreversible); primary; RUE:  -AB      Lymphedema Cancer Related Sx left; axillary dissection; lumpectomy  -AB      Positive Lymph Nodes # 1  -AB      Radiation Therapy Received yes  -AB      Lymphedema Precautions kidney disease  -AB              Posture/Observations    Posture- WNL Posture is WNL  -AB              Lymphedema Edema Assessment    Ptting Edema Category By grade out of 4  -AB      Pitting Edema + 2/4; Mild  -AB      Stemmer Sign bilateral:; negative  -AB      [place] Hump negative; bilateral:  -AB              Skin Changes/Observations    Location/Assessment Upper Extremity; Lower Extremity; Head/Neck; Upper Quadrant  -AB       Upper Extremity Conditions clean; dry; hairless  -AB      Upper Extremity Color/Pigment right:; hyperpigmented  -AB      Upper Quadrant Conditions dry; hairless  -AB      Upper Quadrant Color/Pigment fibrosis; P'eau d'orange; hyperpigmented  R breast  -AB      Lower Extremity Conditions bilateral:; clean; dry; hairless  -AB      Lower Extremity Color/Pigment left:; hyperpigmented  -AB      Head/Neck Conditions intact; clean; dry  -AB      Head/Neck Color/Pigment red  -AB              Lymphedema Pulses/Capillary Refill    Lymphedema Pulses/Capillary Refill capillary refill  -AB      Capillary Refill lower extremity capillary refill  -AB      Lower Extremity Capillary Refill right:; left:; less than 3 seconds  -AB              Lymphedema Measurements    Measurement Type(s) Quick Girth; Circumferential  -AB      Quick Girth Areas Upper extremities; Lower extremities  -AB      Circumferential Areas Neck; Trunk  -AB              RUE Quick Girth (cm)    Axilla 45.8 cm  -AB      Mid upper arm 45 cm  -AB      Elbow 31.7 cm  -AB      Mid forearm 32.2 cm  -AB      Wrist crease 17.7 cm  -AB      Web space 21.2 cm  -AB      Met-heads 19.1 cm  -AB      Other 1 22.6 cm  -AB      Other 2 26.9 cm  -AB      RUE Quick Girth Total 262.2  -AB              LLE Quick Girth (cm)    Met-heads 23.1 cm  -AB      Mid foot 24.6 cm  -AB      Smallest ankle 28.6 cm  -AB      Largest calf 51.6 cm  -AB      Tib tuberosity 51.9 cm  -AB      Mid patella 58.1 cm  -AB      Distal thigh 67.3 cm  -AB      Other 1 35.6 cm  -AB      Other 2 49.8 cm  -AB              Neck Circumferential (cm)    Measurement Location 1 Neck  -AB      Measurement Location 2 Axillary  -AB      Measurement Location 3 Nipple line  -AB      Measurement Location 4 below breast  -AB              Trunk Circumferential (cm)    Measurement Location 1 Abdomen at navel  -AB      Measurement Location 2 Hip  -AB              Manual Lymphatic Drainage    Manual Lymphatic Drainage initial  sequence; opened regional lymph nodes; opened anastamoses; extremity treatment  -AB      Initial Sequence short neck; supraclavicular; shoulder collectors; abdomen  -AB      Opened Regional Lymph Nodes axillary; inguinal; paraspinal  -AB      Opened Anastamoses anterior axillo-axillary; posterior axillo-axillary; anterior inguino-inguinal; posterior inguino-inguinal  -AB      Extremity Treatment MLD to full limb  -AB      MLD to Full Limb RUE, LLE  -AB      Manual Lymphatic Drainage Comments vibration to RUE, axilla, breast  -AB      Manual Therapy MLD to RUE, LLE, abdomen, inguinals, axillary, shoulder collectors  -AB              Compression/Skin Care    Compression/Skin Care skin care; wrapping location; bandaging  -AB      Skin Care moisturizing lotion applied  -AB      Wrapping Location lower extremity; upper extremity  -AB      Wrapping Location UE right:; hand to axilla  -AB      Bandage Layers short-stretch bandages (comment size/quantity); cotton elastic stocking- single layer (comment size); soft foam- 1/4 inch  -AB      Bandaging Technique circumferential/spiral; light compression  -AB      Compression/Skin Care Comments compression pump to abdomen, RUE, LLE  -AB            User Key  (r) = Recorded By, (t) = Taken By, (c) = Cosigned By    Initials Name Provider Type    Willa Schwartz OT Occupational Therapist                         OT Assessment/Plan     Row Name 02/10/22 8403          OT Plan    OT Plan Comments Pt. will benefit from continued skilled OT services to address ongoing s/s of lymphedema. Continue POC.  -AB           User Key  (r) = Recorded By, (t) = Taken By, (c) = Cosigned By    Initials Name Provider Type    Willa Schwartz OT Occupational Therapist                       OT Goals     Row Name 02/10/22 1500          OT Short Term Goals    STG Date to Achieve 03/10/22  -AB     STG 1 Pt. familiar w/precautions, skin care and self management of lymphedema.  -AB      STG 1 Progress Met  -AB     STG 2 HEP to assist w/improved lymphatic flow.  -AB     STG 2 Progress Ongoing  -AB     STG 3 Self care instructions for home MLD for volume reduction.  -AB     STG 3 Progress Met  -AB            Long Term Goals    LTG Date to Achieve 04/10/22  -AB     LTG 1 Pt. and/or care giver independent w/short stretch compression bandaging for volume reduction.  -AB     LTG 1 Progress Ongoing  -AB     LTG 2 Decrease pitting edema to 1/4 for decreased risk of infection.  -AB     LTG 2 Progress Ongoing  -AB     LTG 3 Independent with donning/doffing compression garment.  -AB     LTG 3 Progress Ongoing  -AB     LTG 4 Independent with lymphedema management and HEP.  -AB     LTG 4 Progress Ongoing  -AB            Time Calculation    OT Goal Re-Cert Due Date 04/10/22  -AB           User Key  (r) = Recorded By, (t) = Taken By, (c) = Cosigned By    Initials Name Provider Type    Willa Schwartz OT Occupational Therapist                Therapy Education  Given: Edema management, Symptoms/condition management, Bandaging/dressing change  Program: Reinforced  How Provided: Verbal, Demonstration  Provided to: Patient  Level of Understanding: Verbalized                Time Calculation:   OT Start Time: 1250  OT Stop Time: 1440  OT Time Calculation (min): 110 min  OT Non-Billable Time (min): 35 min  Total Timed Code Minutes- OT: 110 minute(s)     Therapy Charges for Today     Code Description Service Date Service Provider Modifiers Qty    56521255708 HC OT MANUAL THERAPY EA 15 MIN 2/10/2022 Willa Sanchez OT GO, KX 4    44269607746 HC OT LYMPHEDEMA MANAGEMENT-15 MIN 2/10/2022 Willa Sanchez OT KX 1    63761980947 HC OT VASOPNEUMAT DEVIC 1 OR MORE AREAS 2/10/2022 Willa Sanchez OT GO, KX 1    34164465475 HC OT SELF CARE/MGMT/TRAIN EA 15 MIN 2/10/2022 Willa Sanchez OT GO KX 1                      Willa Sanchez OT  2/10/2022

## 2022-02-11 ENCOUNTER — CLINICAL SUPPORT (OUTPATIENT)
Dept: ONCOLOGY | Facility: CLINIC | Age: 67
End: 2022-02-11

## 2022-02-11 ENCOUNTER — TELEPHONE (OUTPATIENT)
Dept: CARDIOLOGY | Facility: CLINIC | Age: 67
End: 2022-02-11

## 2022-02-11 VITALS
TEMPERATURE: 97.1 F | HEIGHT: 63 IN | SYSTOLIC BLOOD PRESSURE: 151 MMHG | OXYGEN SATURATION: 97 % | HEART RATE: 57 BPM | RESPIRATION RATE: 18 BRPM | WEIGHT: 281 LBS | DIASTOLIC BLOOD PRESSURE: 70 MMHG | BODY MASS INDEX: 49.79 KG/M2

## 2022-02-11 DIAGNOSIS — C50.411 MALIGNANT NEOPLASM OF UPPER-OUTER QUADRANT OF RIGHT BREAST IN FEMALE, ESTROGEN RECEPTOR POSITIVE: Primary | ICD-10-CM

## 2022-02-11 DIAGNOSIS — Z17.0 MALIGNANT NEOPLASM OF UPPER-OUTER QUADRANT OF RIGHT BREAST IN FEMALE, ESTROGEN RECEPTOR POSITIVE: Primary | ICD-10-CM

## 2022-02-11 PROCEDURE — 99213 OFFICE O/P EST LOW 20 MIN: CPT | Performed by: NURSE PRACTITIONER

## 2022-02-11 NOTE — PROGRESS NOTES
Name:  Ronna Wayne  :  1955  Date:  2022     REFERRING PHYSICIAN  Lee Parrish MD    PRIMARY CARE PROVIDER  Mague Acosta APRN    REASON FOR FOLLOWUP  1. Malignant neoplasm of upper-outer quadrant of right breast in female, estrogen receptor positive (HCC)      CHIEF COMPLAINT  Follow up Breast Cancer/Survivorship     Dear Lee,    HISTORY OF PRESENT ILLNESS:   I saw Ms. Wayne in follow up today in our medical oncology clinic. As you are aware, she is a pleasant, 67 y.o., white female with a history of multiple medical problems, including hypertension, diabetes and sleep apnea who was diagnosed with an ER positive (95%), NV positive (95%), HER2/abida negative (1+ by IHC), invasive, mammary carcinoma (with mixed ductal and lobular features) of the upper, outer quadrant of the right breast in late Spring 2021. She was referred to you, and you performed a successful, right-sided lumpectomy on 2021. A sentinel node was positive; however, an additional twelve, excised, right-sided, axillary lymph nodes were uninvolved (). Final, surgical staging was consistent with stage [id number] disease (IIB). She was subsequently referred to our clinic (and radiation oncology) for further evaluation and management. Adjuvant treatment with whole breast irradiation followed by (at least five years of) endocrine therapy were ultimately recommended. She completed a thirty-fraction course of the former by late 2021 and began the latter (daily Arimidex) by early 2021. She has been doing overall very well in routine followup, ever since then, with no evidence of residual/recurrent disease to date.    INTERIM HISTORY:  Ms. Wayne returns to clinic today for followup by herself. She began Arimidex in early 2021, has been on it for a total of nearly ~eight (8) weeks now and is tolerating this medication overall very well, with no noticeable side effects.She is  following up today to discuss survivorship care plan. Overall, she is doing well and denies any specific complaints today.     Past Medical History:   Diagnosis Date   • Anxiety    • B12 deficiency    • BPV (benign positional vertigo)    • Breast cancer (HCC) 2021   • Diabetes mellitus (HCC)    • Diarrhea    • Disease of thyroid gland    • Elevated cholesterol    • Fibromyalgia    • GERD (gastroesophageal reflux disease)    • Glaucoma    • Heart murmur    • Hyperlipidemia    • Hypertension    • Kidney disease    • Obesity    • Peptic ulceration    • PONV (postoperative nausea and vomiting)    • Sleep apnea     c-pap   • Stroke (HCC)    • Weakness of left arm        Past Surgical History:   Procedure Laterality Date   • ABDOMINAL SURGERY     • APPENDECTOMY     • BREAST CYST ASPIRATION     • BREAST LUMPECTOMY WITH SENTINEL NODE BIOPSY Right 2021    Procedure: BREAST LUMPECTOMY WITH SENTINEL NODE BIOPSY;  Surgeon: Lee Parrish MD;  Location: [facility];  Service: General;  Laterality: Right;   • CATARACT EXTRACTION Bilateral    • CHOLECYSTECTOMY     • COLONOSCOPY     • ENDOSCOPY     • HEMATOMA EVACUATION TRUNK Right 2021    Procedure: HEMATOMA EVACUATION TRUNK;  Surgeon: Lee Parrish MD;  Location: [facility];  Service: General;  Laterality: Right;   • URETHRA SURGERY         Social History     Socioeconomic History   • Marital status:    Tobacco Use   • Smoking status: Former Smoker     Packs/day: 1.50     Years: 8.00     Pack years: 12.00     Types: Cigarettes     Quit date:      Years since quittin.1   • Smokeless tobacco: Never Used   Vaping Use   • Vaping Use: Never used   Substance and Sexual Activity   • Alcohol use: No     Comment: former social drinker not had anything in 25 + years   • Sexual activity: Defer       Family History   Problem Relation Age of Onset   • Thyroid disease Mother    • Diabetes Mother    • Hyperlipidemia Father    • Hypertension Father    •  Heart attack Father    • Alcohol abuse Maternal Aunt    • Cancer Maternal Grandfather         PROSTATE   • Stroke Paternal Grandmother    • Stroke Paternal Grandfather    • Hypertension Other    • Hypertension Sister    • Breast cancer Sister 40   • Obesity Sister    • Hypertension Sister    • Hyperlipidemia Sister    • Hyperlipidemia Sister    • Hypertension Sister    • Arthritis Sister    • Obesity Sister    • Thyroid disease Sister        Allergies   Allergen Reactions   • Codeine GI Intolerance     Increased heart rate     • Sulfa Antibiotics GI Intolerance     Heart rate increase        Current Outpatient Medications   Medication Sig Dispense Refill   • acetaminophen (TYLENOL) 325 MG tablet Take 2 tablets by mouth Every 4 (Four) Hours As Needed for Mild Pain . 30 tablet 0   • amLODIPine (NORVASC) 5 MG tablet Take 1 tablet by mouth Daily. 30 tablet 11   • anastrozole (ARIMIDEX) 1 MG tablet Take 1 tablet by mouth Daily. 30 tablet 11   • aspirin 81 MG EC tablet Take 1 tablet by mouth Daily. 90 tablet 3   • atorvastatin (Lipitor) 10 MG tablet Take 1 tablet by mouth Daily. 30 tablet 3   • cholecalciferol (VITAMIN D3) 25 MCG (1000 UT) tablet Take 1,000 Units by mouth Daily.     • Continuous Blood Gluc  (Dexcom G6 ) device 1 Device Daily. 1 each 0   • Continuous Blood Gluc Sensor (Dexcom G6 Sensor) Every 10 (Ten) Days. 9 each 3   • Continuous Blood Gluc Transmit (Dexcom G6 Transmitter) misc 1 Device Daily for 90 days. 1 each 3   • empagliflozin (Jardiance) 25 MG tablet tablet Take 1 tablet by mouth Every Morning. 35 tablet 0   • Folic Acid-Cholecalciferol 1-3775 MG-UNIT capsule Take 1 capsule by mouth Daily.     • hydroCHLOROthiazide (HYDRODIURIL) 12.5 MG tablet Take 12.5 mg by mouth Daily As Needed (swelling).     • ibuprofen (ADVIL,MOTRIN) 600 MG tablet Take 1 tablet by mouth Every 6 (Six) Hours As Needed for Mild Pain . 30 tablet 0   • ibuprofen (ADVIL,MOTRIN) 600 MG tablet Take 1 tablet by mouth  "Every 8 (Eight) Hours As Needed for Moderate Pain . 60 tablet 2   • insulin aspart prot-insulin aspart (novoLOG 70/30) (70-30) 100 UNIT/ML injection Inject 5 Units under the skin into the appropriate area as directed Daily With Breakfast.     • insulin aspart prot-insulin aspart (novoLOG 70/30) (70-30) 100 UNIT/ML injection Inject 8 Units under the skin into the appropriate area as directed Daily With Lunch.     • insulin aspart prot-insulin aspart (novoLOG 70/30) (70-30) 100 UNIT/ML injection Per her routine regimen with maximum daily dose of 50 units 150 mL 2   • levothyroxine (SYNTHROID, LEVOTHROID) 88 MCG tablet Take 1 tablet by mouth Daily. 90 tablet 0   • losartan (COZAAR) 50 MG tablet Take 1 tablet by mouth Daily. 30 tablet 5   • magnesium oxide (MAG-OX) 400 MG tablet TAKE ONE TABLET BY MOUTH THREE TIMES A DAY 90 tablet 0   • metoprolol succinate XL (TOPROL-XL) 100 MG 24 hr tablet TAKE ONE TABLET BY MOUTH DAILY 30 tablet 2   • nystatin 963710 UNIT/GM topical powder      • SITagliptin (Januvia) 25 MG tablet Take 1 tablet by mouth Daily.     • Insulin Syringe-Needle U-100 30G X 5/16\" 0.3 ML misc 1 Device 3 (Three) Times a Day. 100 each 5   • silver sulfadiazine (SILVADENE, SSD) 1 % cream Apply 1 application topically to the appropriate area as directed 2 (Two) Times a Day. 1 each 3   • traMADol (ULTRAM) 50 MG tablet Take 1 tablet by mouth Every 8 (Eight) Hours As Needed for Moderate Pain . 8 tablet 0     No current facility-administered medications for this visit.     REVIEW OF SYSTEMS  CONSTITUTIONAL:  No fever, chills, night sweats or fatigue.  EYES:  No blurry vision, diplopia or other vision changes.  ENT:  No hearing loss, nosebleeds or sore throat.  CARDIOVASCULAR:  No palpitations, arrhythmia, syncopal episodes or edema.  PULMONARY:  No hemoptysis, wheezing, chronic cough or shortness of breath.  BREASTS: As per the HPI above.  GASTROINTESTINAL:  No nausea or vomiting.  No constipation or diarrhea.  No " "abdominal pain.  GENITOURINARY:  No hematuria, kidney stones or frequent urination.  MUSCULOSKELETAL:  No joint or back pains. Decreased range of motion in the right arm, about to start following with our lymphedema clinic, as per the HPI above.  INTEGUMENTARY: As per the HPI above.  ENDOCRINE:  No excessive thirst or hot flashes.  HEMATOLOGIC:  No history of free bleeding, spontaneous bleeding or clotting.  IMMUNOLOGIC:  No allergies or frequent infections.  NEUROLOGIC: No numbness, tingling, seizures or weakness.  PSYCHIATRIC:  No anxiety or depression.    PHYSICAL EXAMINATION  /70   Pulse 57   Temp 97.1 °F (36.2 °C) (Temporal)   Resp 18   Ht 160 cm (63\")   Wt 127 kg (281 lb)   SpO2 97%   BMI 49.78 kg/m²     Pain Score:  Pain Score    22 1408   PainSc: 0-No pain       PHQ-Score Total:  PHQ-9 Total Score:      ECO  GENERAL:  A well-developed, well-nourished, morbidly obese, white female in no acute distress.  HEENT:  Pupils equally round and reactive to light.  Extraocular muscles intact.  CARDIOVASCULAR:  Regular rate and rhythm.  No murmurs, gallops or rubs.  LUNGS:  Clear to auscultation bilaterally.  BREASTS: Deferred today. Status post right-sided lumpectomy on 2021.  ABDOMEN:  Soft, nontender, nondistended with positive bowel sounds.  EXTREMITIES:  No clubbing, cyanosis or edema bilaterally.  SKIN:  Warm, dry, intact.  NEURO:  Cranial nerves grossly intact. No focal deficits.  PSYCH:  Alert and oriented x3.    LABORATORY  Lab Results   Component Value Date    WBC 7.11 2022    HGB 12.5 2022    HCT 37.3 2022    MCV 94.4 2022     2022    NEUTROABS 3.15 2022       Lab Results   Component Value Date     (L) 2022    K 4.4 2022    CL 97 (L) 2022    CO2 23.1 2022    BUN 20 2022    CREATININE 1.02 (H) 2022    GLUCOSE 326 (H) 2022    CALCIUM 9.4 2022    AST 39 (H) 2022    ALT 20 2022 "    ALKPHOS 97 01/11/2022    BILITOT 0.6 01/11/2022    PROTEINTOT 7.9 01/11/2022    ALBUMIN 3.42 (L) 01/11/2022     CBC (01/11/2022): WBCs: 7.11; HgB: 12.5; Hct: 37.3; platelets: 152  CBC (11/29/2021): WBCs: 5.36; HgB: 12.8; Hct: 39.1; platelets: 148  CBC (11/09/2021): WBCs: 6.63; HgB: 11.9; Hct: 36.1; platelets: 160  CBC (08/06/2021): WBCs: 9.36; HgB: 9.2; Hct: 29.1; platelets: 131    IMAGING  MRI breast bilateral with and without contrast (06/22/2021):  Impression:  1) Negative left breast MRI.  2) Biopsy proven malignancy in the right 9:00 region measuring 3.7 cm. There are no additional worrisome findings in the right breast.    Bone densitometry (DEXA) scan (05/11/2021):  Impression: The BMD measured at the AP spine L1-L4 is 1.157 gm/cm2 with a T-score of -0.2. The patient is considered to be normal according to WHO criteria. Fracture risk is low.    PATHOLOGY  Breast, lumpectomy, right (08/05/2021):  Invasive mammary carcinoma with ductal and lobular features, margins uninvolved. Atypical, lobular hyperplasia with involvement of ducts. Dense stromal fibrosis. Greatest dimension of invasive focus: 31 mm. ER positive (95%), ID positive (95%), HER2 negative (1+ by IHC). [id number] (stage IIB).    [place] node, right #1 (08/05/2021):  Metastatic mammary carcinoma, 2.5 cmm extent, with extranodal extension (1/1).    Axillary contents, right (08/05/2021):  No malignancy identified in twelve axillary lymph nodes (0/12).    MammaPrint result (08/31/2021): Low risk.    IMPRESSION AND PLAN  Ms. Wayne is a 67 y.o., white female with:  1. Breast carcinoma: Diagnosed in late Spring 2021 and status post a right-sided lumpectomy with right axillary evaluation on 08/05/2021. The final, surgical pathology was consistent with stage IIB ([id number]), ER positive (95%), ID positive (95%), HER2 negative (1+ by IHC), invasive, mammary carcinoma (with ductal and lobular features) of the right breast. The surgical margins were clear;  "and, while one sentinel node was involved, an additional twelve lymph nodes were all negative for metastasis (1/13). Dr. Teresa has had multiple, long discussions with the patient since the time of her initial consultation in our clinic (on 08/25/2021) regarding this diagnosis and its prognosis. In short, her surgery went extremely well; and her prognosis was/remains overall very good. That said, adjuvant treatment was/remains recommended in order to maximize her chances of cure. As MammaPrint testing on her tumor was consistent with \"low risk\" disease, chemotherapy was not indicated (as the potential risks would have drastically outweighed its very limited/nonexistant potential benefits in her case). As she had a lumpectomy, whole breast radiation was definitely indicated, and she completed a thirty fraction course of this therapy on 11/30/2021. As her tumor is very strongly ER/AK positive, endocrine therapy (with an aromatase inhibitor, given her postmenopausal status) was definitely indicated and strongly recommended as the final step in her adjuvant treatment. She was given a Rx for Arimidex 1 mg PO daily at that time. She has been on this therapy for a total of about ~eight (8) weeks now and is tolerating it overall well, without any noticeable side effects. She will proceed with this current treatment plan. Meanwhile, Dr. Teresa had a long discussion with the patient in clinic regarding the current guidelines for the routine follow up of patients with a history of breast cancer. We discussed how, other than periodic clinic visits, continued annual mammograms of remaining breast tissue (a bilateral follow up exam will be performed this summer) and routine bone density monitoring (particularly since she is on an AI), there is no proven benefit to performing additional studies (including blood work, such as CBCs, LFTs or tumor markers, or imaging, such as CT, NM bone or PET scans) to identify metastatic recurrences " before they become symptomatic (and are identified via the focused investigation of new symptoms), as patient outcomes are not improved by doing so (she should continue to get routine CBCs, etc. through her PCP’s office as indicated, of course). Exam from today without cause for concern. We discussed her survivorship plan in clinic today and she was provided with a copy. We will see her back in our clinic in six months for a wellness visit.  2. Bone health: The results from the patient's most recent DEXA scan (performed on 05/11/2021) are summarized above. Continue to monitor on Arimidex (we will repeat a DEXA in May 2023).  3. Lymphedema: Secondary to a combination of right-sided, axillary surgery and recent, adjuvant, localized XRT. Rad/onc has referred her to our lymphedema clinic. Continue to monitor.  The patient was in agreement with these plans.    It is a pleasure to participate in Ms. Wayne's care. Please do not hesitate to call with any questions or concerns that you may have.    A total of 20 minutes were spent coordinating this patient’s care in clinic today; more than 50% of this time was face-to-face with the patient, reviewing her interim medical history and counseling on the current treatment and followup plan. All questions were answered to her satisfaction.    FOLLOW UP  Continue Arimidex 1 mg PO daily. With radiation oncology and surgery, as previously planned. With the lymphedema clinic and physical therapy, as planned. Return to our clinic in 6 months with a bilateral mammogram for a wellness visit.            This document was electronically signed by BOSTON Estrada February 14, 2022 10:13 EST      CC: MD Marbin Ramirez MD Sherelene S. Fortney, APRN

## 2022-02-11 NOTE — TELEPHONE ENCOUNTER
----- Message from Ronna Wayne sent at 2/10/2022  9:54 PM EST -----  Regarding: Meds  Dr Márquez I have taking the   Atorvastatin 10 mg for two weeks and I just can’t take them I have bad muscle pain and cramps you said to let you know and you would prescribe something else thank you and I have not done the labs yet because I was waiting to see how the meds worked

## 2022-02-14 ENCOUNTER — APPOINTMENT (OUTPATIENT)
Dept: OCCUPATIONAL THERAPY | Facility: HOSPITAL | Age: 67
End: 2022-02-14

## 2022-02-14 ENCOUNTER — TELEPHONE (OUTPATIENT)
Dept: CARDIOLOGY | Facility: CLINIC | Age: 67
End: 2022-02-14

## 2022-02-17 ENCOUNTER — HOSPITAL ENCOUNTER (OUTPATIENT)
Dept: OCCUPATIONAL THERAPY | Facility: HOSPITAL | Age: 67
Setting detail: THERAPIES SERIES
Discharge: HOME | End: 2022-02-17

## 2022-02-17 DIAGNOSIS — C77.3 CARCINOMA OF RIGHT BREAST METASTATIC TO AXILLARY LYMPH NODE: ICD-10-CM

## 2022-02-17 DIAGNOSIS — Z79.4 TYPE 2 DIABETES MELLITUS WITH HYPERGLYCEMIA, WITH LONG-TERM CURRENT USE OF INSULIN: ICD-10-CM

## 2022-02-17 DIAGNOSIS — C50.911 CARCINOMA OF RIGHT BREAST METASTATIC TO AXILLARY LYMPH NODE: ICD-10-CM

## 2022-02-17 DIAGNOSIS — I89.0 LYMPHEDEMA OF BREAST: ICD-10-CM

## 2022-02-17 DIAGNOSIS — Z17.0 MALIGNANT NEOPLASM OF UPPER-OUTER QUADRANT OF RIGHT BREAST IN FEMALE, ESTROGEN RECEPTOR POSITIVE: ICD-10-CM

## 2022-02-17 DIAGNOSIS — I89.0 LYMPHEDEMA: Primary | ICD-10-CM

## 2022-02-17 DIAGNOSIS — C50.411 MALIGNANT NEOPLASM OF UPPER-OUTER QUADRANT OF RIGHT BREAST IN FEMALE, ESTROGEN RECEPTOR POSITIVE: ICD-10-CM

## 2022-02-17 DIAGNOSIS — M79.89 SWELLING IN RIGHT ARMPIT: ICD-10-CM

## 2022-02-17 DIAGNOSIS — E11.65 TYPE 2 DIABETES MELLITUS WITH HYPERGLYCEMIA, WITH LONG-TERM CURRENT USE OF INSULIN: ICD-10-CM

## 2022-02-17 DIAGNOSIS — I89.0 LYMPHEDEMA OF LEFT LOWER EXTREMITY: ICD-10-CM

## 2022-02-17 DIAGNOSIS — I89.0 LYMPHEDEMA OF RIGHT UPPER EXTREMITY: ICD-10-CM

## 2022-02-17 PROCEDURE — 97139 UNLISTED THERAPEUTIC PX: CPT

## 2022-02-17 PROCEDURE — 97140 MANUAL THERAPY 1/> REGIONS: CPT

## 2022-02-17 PROCEDURE — 97016 VASOPNEUMATIC DEVICE THERAPY: CPT

## 2022-02-17 PROCEDURE — 97535 SELF CARE MNGMENT TRAINING: CPT

## 2022-02-17 NOTE — THERAPY TREATMENT NOTE
Outpatient Occupational Therapy Lymphedema Treatment Note  MITZI Keller     Patient Name: Ronna Wayne  : 1955  MRN: [id number]  Today's Date: 2022      Visit Date: 2022    Patient Active Problem List   Diagnosis   • Frequent headaches   • Memory loss   • Essential hypertension   • Dyslipidemia   • Type 2 diabetes mellitus with hyperglycemia, with long-term current use of insulin (HCC)   • Obstructive sleep apnea syndrome   • Glaucoma   • Anxiety   • GERD (gastroesophageal reflux disease)   • Morbid obesity   • Weakness of left arm   • Cerebrovascular accident (CVA) due to embolism (HCC)   • SVT (supraventricular tachycardia) (HCC)   • Vitamin D deficiency   • B12 deficiency   • Malignant neoplasm of upper-outer quadrant of right breast in female, estrogen receptor positive (HCC)   • Other specified hypothyroidism   • Personal history of colonic polyps        Past Medical History:   Diagnosis Date   • Anxiety    • B12 deficiency    • BPV (benign positional vertigo)    • Breast cancer (HCC) 2021   • Diabetes mellitus (HCC)    • Diarrhea    • Disease of thyroid gland    • Elevated cholesterol    • Fibromyalgia    • GERD (gastroesophageal reflux disease)    • Glaucoma    • Heart murmur    • Hyperlipidemia    • Hypertension    • Kidney disease    • Obesity    • Peptic ulceration    • PONV (postoperative nausea and vomiting)    • Sleep apnea     c-pap   • Stroke (HCC)    • Weakness of left arm         Past Surgical History:   Procedure Laterality Date   • ABDOMINAL SURGERY     • APPENDECTOMY     • BREAST CYST ASPIRATION     • BREAST LUMPECTOMY WITH SENTINEL NODE BIOPSY Right 2021    Procedure: BREAST LUMPECTOMY WITH SENTINEL NODE BIOPSY;  Surgeon: Lee Parrish MD;  Location: [facility];  Service: General;  Laterality: Right;   • CATARACT EXTRACTION Bilateral    • CHOLECYSTECTOMY     • COLONOSCOPY     • ENDOSCOPY     • HEMATOMA EVACUATION TRUNK Right 2021    Procedure: HEMATOMA  "EVACUATION TRUNK;  Surgeon: Lee Parrish MD;  Location: [facility] OR;  Service: General;  Laterality: Right;   • URETHRA SURGERY           Visit Dx:      ICD-10-CM ICD-9-CM   1. Lymphedema  I89.0 457.1   2. Malignant neoplasm of upper-outer quadrant of right breast in female, estrogen receptor positive (HCC)  C50.411 174.4    Z17.0 V86.0   3. Carcinoma of right breast metastatic to axillary lymph node (HCC)  C50.911 174.9    C77.3 196.3   4. Type 2 diabetes mellitus with hyperglycemia, with long-term current use of insulin (HCC)  E11.65 250.00    Z79.4 790.29     V58.67   5. Lymphedema of breast  I89.0 457.1   6. Swelling in right armpit  M79.89 729.81   7. Lymphedema of left lower extremity  I89.0 457.1   8. Lymphedema of right upper extremity  I89.0 457.1        Lymphedema     Row Name 02/17/22 1300             Subjective Pain    Able to rate subjective pain? yes  -AB      Pre-Treatment Pain Level 4  -AB      Subjective Pain Comment Sternum pain w/ SOB for the last couple of days, bilateral shoulder pain. BP: 138/65, HR: 54, O2: 97% on RA  Discussed w/ pt. s/s of MI. Pt. reports that she saw her cardiologists \"last week and he said everything was fine\". Therapist recommends a follow up w/ cardiologist or PCP.  -AB              Subjective Comments    Subjective Comments Pt. presents w/ no compression donned.  -AB              Lymphedema Assessment    Lymphedema Classification LLE:; Trunk:; Other:; secondary; stage 2 (Spontaneously Irreversible); primary; RUE:  -AB      Lymphedema Cancer Related Sx left; axillary dissection; lumpectomy  -AB      Positive Lymph Nodes # 1  -AB      Radiation Therapy Received yes  -AB      Lymphedema Precautions kidney disease  -AB              Posture/Observations    Posture- WNL Posture is WNL  -AB              Lymphedema Edema Assessment    Ptting Edema Category By grade out of 4  -AB      Pitting Edema + 2/4; Mild  -AB      Stemmer Sign bilateral:; negative  -AB      [place] " Hump negative; bilateral:  -AB              Skin Changes/Observations    Location/Assessment Upper Extremity; Lower Extremity; Head/Neck; Upper Quadrant  -AB      Upper Extremity Conditions clean; dry; hairless  -AB      Upper Extremity Color/Pigment right:; hyperpigmented  -AB      Upper Quadrant Conditions dry; hairless  -AB      Upper Quadrant Color/Pigment fibrosis; P'eau d'orange; hyperpigmented  R breast  -AB      Lower Extremity Conditions bilateral:; clean; dry; hairless  -AB      Lower Extremity Color/Pigment left:; hyperpigmented  -AB      Head/Neck Conditions intact; clean; dry  -AB      Head/Neck Color/Pigment red  -AB      Skin Observations Comment R breast fibrosis noted to be increased this date  -AB              Lymphedema Pulses/Capillary Refill    Lymphedema Pulses/Capillary Refill capillary refill  -AB      Capillary Refill lower extremity capillary refill  -AB      Lower Extremity Capillary Refill right:; left:; less than 3 seconds  -AB              Lymphedema Measurements    Measurement Type(s) Quick Girth; Circumferential  -AB      Quick Girth Areas Upper extremities; Lower extremities  -AB      Circumferential Areas Neck; Trunk  -AB              RUE Quick Girth (cm)    Axilla 50 cm  -AB      Mid upper arm 46.6 cm  -AB      Elbow 31 cm  -AB      Mid forearm 32.5 cm  -AB      Wrist crease 17.7 cm  -AB      Web space 20.2 cm  -AB      Met-heads 19 cm  -AB      Other 1 22.4 cm  -AB      Other 2 27.4 cm  -AB      RUE Quick Girth Total 266.8  -AB              LLE Quick Girth (cm)    Met-heads 23 cm  -AB      Mid foot 25.2 cm  -AB      Smallest ankle 27.4 cm  -AB      Largest calf 51.9 cm  -AB      Tib tuberosity 50.3 cm  -AB      Mid patella 55.6 cm  -AB      Other 1 37.3 cm  -AB      Other 2 48.3 cm  -AB              RLE Quick Girth (cm)    Met-heads 23.6 cm  -AB      Mid foot 25 cm  -AB      Smallest ankle 27.7 cm  -AB      Largest calf 52 cm  -AB      Tib tuberosity 52.4 cm  -AB      Mid patella  59.3 cm  -AB      Other 1 39 cm  -AB      Other 2 48.3 cm  -AB      RLE Quick Girth Total 327.3  -AB              Neck Circumferential (cm)    Measurement Location 1 Neck  -AB      Measurement Location 2 Axillary  -AB      Measurement Location 3 Nipple line  -AB      Measurement Location 4 below breast  -AB              Trunk Circumferential (cm)    Measurement Location 1 Abdomen at navel  -AB      Trunk 1 137 cm  -AB      Measurement Location 2 Hip  -AB      Trunk 2 140 cm  -AB      Trunk Circumferential Total 277 cm  -AB              Manual Lymphatic Drainage    Manual Lymphatic Drainage initial sequence; opened regional lymph nodes; opened anastamoses; extremity treatment  -AB      Initial Sequence short neck; supraclavicular; shoulder collectors; abdomen  -AB      Opened Regional Lymph Nodes axillary; inguinal; paraspinal  -AB      Opened Anastamoses anterior axillo-axillary; posterior axillo-axillary; anterior inguino-inguinal; posterior inguino-inguinal  -AB      Extremity Treatment MLD to full limb  -AB      MLD to Full Limb RUE, BLE's  -AB      Manual Lymphatic Drainage Comments vibration to RUE, axilla, breast  -AB      Manual Therapy MLD to RUE, BLE's, abdomen, axillary, inguinals, shoulder collectors, paraspinals  -AB              Compression/Skin Care    Compression/Skin Care skin care; wrapping location; bandaging  -AB      Skin Care moisturizing lotion applied  -AB      Wrapping Location lower extremity; upper extremity  -AB      Wrapping Location UE right:; hand to axilla  -AB      Bandage Layers short-stretch bandages (comment size/quantity); cotton elastic stocking- single layer (comment size); soft foam- 1/4 inch  -AB      Bandaging Technique circumferential/spiral; light compression; moderate compression  -AB      Compression/Skin Care Comments compression pump to abdomen, LLE, RUE  -AB            User Key  (r) = Recorded By, (t) = Taken By, (c) = Cosigned By    Initials Name Provider Type    AB  Willa Sanchez OT Occupational Therapist                         OT Assessment/Plan     Row Name 02/17/22 1100          OT Plan    OT Plan Comments Pt. has completed greater than 4 weeks of conservative therapy. She wears compression daily (20-30mmhg), performs exercise and elevation as often as possible, and follows a low sodium diet. Despite all of this she still struggles to maintain s/s of lymphedema and will require a compression pump for use at home.  -AB           User Key  (r) = Recorded By, (t) = Taken By, (c) = Cosigned By    Initials Name Provider Type    AB Willa Sanchez OT Occupational Therapist                          Therapy Education  Given: HEP, Edema management, Symptoms/condition management, Bandaging/dressing change  Program: Reinforced  How Provided: Verbal, Demonstration  Provided to: Patient  Level of Understanding: Verbalized                Time Calculation:   OT Start Time: 1255  OT Stop Time: 1440  OT Time Calculation (min): 105 min  OT Non-Billable Time (min): 25 min  Total Timed Code Minutes- OT: 105 minute(s)     Therapy Charges for Today     Code Description Service Date Service Provider Modifiers Qty    23055268627  OT MANUAL THERAPY EA 15 MIN 2/17/2022 Willa Sanchez OT GO, KX 4    03339979785 HC OT LYMPHEDEMA MANAGEMENT-15 MIN 2/17/2022 Willa Sanchez OT KX 1    [id number]  OT VASOPNEUMAT DEVIC 1 OR MORE AREAS 2/17/2022 Willa Sanchez OT GO, KX 1    55620892694 HC OT SELF CARE/MGMT/TRAIN EA 15 MIN 2/17/2022 Willa Sanchez OT GO, KX 1                      Willa Sanchez OT  2/17/2022

## 2022-02-19 DIAGNOSIS — I10 ESSENTIAL HYPERTENSION: ICD-10-CM

## 2022-02-21 ENCOUNTER — HOSPITAL ENCOUNTER (OUTPATIENT)
Dept: OCCUPATIONAL THERAPY | Facility: HOSPITAL | Age: 67
Setting detail: THERAPIES SERIES
Discharge: HOME | End: 2022-02-21

## 2022-02-21 DIAGNOSIS — C50.911 CARCINOMA OF RIGHT BREAST METASTATIC TO AXILLARY LYMPH NODE: ICD-10-CM

## 2022-02-21 DIAGNOSIS — I89.0 LYMPHEDEMA OF BREAST: ICD-10-CM

## 2022-02-21 DIAGNOSIS — R29.898 WEAKNESS OF LEFT ARM: ICD-10-CM

## 2022-02-21 DIAGNOSIS — I89.0 LYMPHEDEMA OF RIGHT UPPER EXTREMITY: ICD-10-CM

## 2022-02-21 DIAGNOSIS — Z79.4 TYPE 2 DIABETES MELLITUS WITH HYPERGLYCEMIA, WITH LONG-TERM CURRENT USE OF INSULIN: ICD-10-CM

## 2022-02-21 DIAGNOSIS — M79.89 SWELLING IN RIGHT ARMPIT: ICD-10-CM

## 2022-02-21 DIAGNOSIS — E11.65 TYPE 2 DIABETES MELLITUS WITH HYPERGLYCEMIA, WITH LONG-TERM CURRENT USE OF INSULIN: ICD-10-CM

## 2022-02-21 DIAGNOSIS — C50.411 MALIGNANT NEOPLASM OF UPPER-OUTER QUADRANT OF RIGHT BREAST IN FEMALE, ESTROGEN RECEPTOR POSITIVE: ICD-10-CM

## 2022-02-21 DIAGNOSIS — I89.0 LYMPHEDEMA: Primary | ICD-10-CM

## 2022-02-21 DIAGNOSIS — I89.0 LYMPHEDEMA OF LEFT LOWER EXTREMITY: ICD-10-CM

## 2022-02-21 DIAGNOSIS — C77.3 CARCINOMA OF RIGHT BREAST METASTATIC TO AXILLARY LYMPH NODE: ICD-10-CM

## 2022-02-21 DIAGNOSIS — Z17.0 MALIGNANT NEOPLASM OF UPPER-OUTER QUADRANT OF RIGHT BREAST IN FEMALE, ESTROGEN RECEPTOR POSITIVE: ICD-10-CM

## 2022-02-21 PROCEDURE — 97016 VASOPNEUMATIC DEVICE THERAPY: CPT

## 2022-02-21 PROCEDURE — 97139 UNLISTED THERAPEUTIC PX: CPT

## 2022-02-21 PROCEDURE — 97140 MANUAL THERAPY 1/> REGIONS: CPT

## 2022-02-21 RX ORDER — METOPROLOL SUCCINATE 100 MG/1
TABLET, EXTENDED RELEASE ORAL
Qty: 30 TABLET | Refills: 2 | Status: SHIPPED | OUTPATIENT
Start: 2022-02-21 | End: 2022-05-23

## 2022-02-21 RX ORDER — MAGNESIUM OXIDE 400 MG/1
TABLET ORAL
Qty: 90 TABLET | Refills: 0 | Status: SHIPPED | OUTPATIENT
Start: 2022-02-21 | End: 2022-03-22 | Stop reason: SDUPTHER

## 2022-02-21 NOTE — THERAPY TREATMENT NOTE
Outpatient Occupational Therapy Lymphedema Treatment Note  MITZI Keller     Patient Name: Ronna Wayne  : 1955  MRN: [id number]  Today's Date: 2022      Visit Date: 2022    Patient Active Problem List   Diagnosis   • Frequent headaches   • Memory loss   • Essential hypertension   • Dyslipidemia   • Type 2 diabetes mellitus with hyperglycemia, with long-term current use of insulin (HCC)   • Obstructive sleep apnea syndrome   • Glaucoma   • Anxiety   • GERD (gastroesophageal reflux disease)   • Morbid obesity   • Weakness of left arm   • Cerebrovascular accident (CVA) due to embolism (HCC)   • SVT (supraventricular tachycardia) (HCC)   • Vitamin D deficiency   • B12 deficiency   • Malignant neoplasm of upper-outer quadrant of right breast in female, estrogen receptor positive (HCC)   • Other specified hypothyroidism   • Personal history of colonic polyps        Past Medical History:   Diagnosis Date   • Anxiety    • B12 deficiency    • BPV (benign positional vertigo)    • Breast cancer (HCC) 2021   • Diabetes mellitus (HCC)    • Diarrhea    • Disease of thyroid gland    • Elevated cholesterol    • Fibromyalgia    • GERD (gastroesophageal reflux disease)    • Glaucoma    • Heart murmur    • Hyperlipidemia    • Hypertension    • Kidney disease    • Obesity    • Peptic ulceration    • PONV (postoperative nausea and vomiting)    • Sleep apnea     c-pap   • Stroke (HCC)    • Weakness of left arm         Past Surgical History:   Procedure Laterality Date   • ABDOMINAL SURGERY     • APPENDECTOMY     • BREAST CYST ASPIRATION     • BREAST LUMPECTOMY WITH SENTINEL NODE BIOPSY Right 2021    Procedure: BREAST LUMPECTOMY WITH SENTINEL NODE BIOPSY;  Surgeon: Lee Parrish MD;  Location: [facility];  Service: General;  Laterality: Right;   • CATARACT EXTRACTION Bilateral    • CHOLECYSTECTOMY     • COLONOSCOPY     • ENDOSCOPY     • HEMATOMA EVACUATION TRUNK Right 2021    Procedure: HEMATOMA  EVACUATION TRUNK;  Surgeon: Lee Parrish MD;  Location: [facility] OR;  Service: General;  Laterality: Right;   • URETHRA SURGERY           Visit Dx:      ICD-10-CM ICD-9-CM   1. Lymphedema  I89.0 457.1   2. Malignant neoplasm of upper-outer quadrant of right breast in female, estrogen receptor positive (HCC)  C50.411 174.4    Z17.0 V86.0   3. Carcinoma of right breast metastatic to axillary lymph node (HCC)  C50.911 174.9    C77.3 196.3   4. Type 2 diabetes mellitus with hyperglycemia, with long-term current use of insulin (HCC)  E11.65 250.00    Z79.4 790.29     V58.67   5. Lymphedema of breast  I89.0 457.1   6. Swelling in right armpit  M79.89 729.81   7. Lymphedema of left lower extremity  I89.0 457.1   8. Lymphedema of right upper extremity  I89.0 457.1   9. Weakness of left arm  R29.898 729.89        Lymphedema     Row Name 02/21/22 1400             Subjective Pain    Able to rate subjective pain? yes  -AB      Pre-Treatment Pain Level 0  -AB      Subjective Pain Comment denies pain this date  -AB              Subjective Comments    Subjective Comments Pt. presents w/ no c/o this date.  -AB              Lymphedema Assessment    Lymphedema Classification LLE:; Trunk:; Other:; secondary; stage 2 (Spontaneously Irreversible); primary; RUE:  -AB      Lymphedema Cancer Related Sx left; axillary dissection; lumpectomy  -AB      Positive Lymph Nodes # 1  -AB      Radiation Therapy Received yes  -AB      Lymphedema Precautions kidney disease  -AB              Posture/Observations    Posture- WNL Posture is WNL  -AB              Lymphedema Edema Assessment    Ptting Edema Category By grade out of 4  -AB      Pitting Edema + 2/4; Mild  -AB      Stemmer Sign bilateral:; negative  -AB      [place] Hump negative; bilateral:  -AB              Skin Changes/Observations    Location/Assessment Upper Extremity; Lower Extremity; Head/Neck; Upper Quadrant  -AB      Upper Extremity Conditions clean; dry; hairless  -AB       Upper Extremity Color/Pigment right:; hyperpigmented  -AB      Upper Quadrant Conditions dry; hairless  -AB      Upper Quadrant Color/Pigment fibrosis; P'eau d'orange; hyperpigmented  R breast  -AB      Lower Extremity Conditions bilateral:; clean; dry; hairless  -AB      Lower Extremity Color/Pigment left:; hyperpigmented  -AB      Head/Neck Conditions intact; clean; dry  -AB      Head/Neck Color/Pigment red  -AB              Lymphedema Pulses/Capillary Refill    Lymphedema Pulses/Capillary Refill capillary refill  -AB      Capillary Refill lower extremity capillary refill  -AB      Lower Extremity Capillary Refill right:; left:; less than 3 seconds  -AB              Lymphedema Measurements    Measurement Type(s) Quick Girth; Circumferential  -AB      Quick Girth Areas Upper extremities; Lower extremities  -AB      Circumferential Areas Neck; Trunk  -AB              RUE Quick Girth (cm)    Axilla 49.5 cm  -AB      Mid upper arm 50.5 cm  -AB      Elbow 30.5 cm  -AB      Mid forearm 31.3 cm  -AB      Wrist crease 17.8 cm  -AB      Web space 20.6 cm  -AB      Met-heads 19.2 cm  -AB      Other 1 21.6 cm  -AB      Other 2 25.3 cm  -AB      RUE Quick Girth Total 266.3  -AB              LLE Quick Girth (cm)    Met-heads 23.1 cm  -AB      Mid foot 24.3 cm  -AB      Smallest ankle 27.3 cm  -AB      Largest calf 50.9 cm  -AB      Tib tuberosity 49.1 cm  -AB      Mid patella 58.6 cm  -AB      Other 1 35 cm  -AB      Other 2 47.2 cm  -AB              Neck Circumferential (cm)    Measurement Location 1 Neck  -AB      Measurement Location 2 Axillary  -AB      Measurement Location 3 Nipple line  -AB      Measurement Location 4 below breast  -AB              Trunk Circumferential (cm)    Measurement Location 1 Abdomen at navel  -AB      Measurement Location 2 Hip  -AB              Manual Lymphatic Drainage    Manual Lymphatic Drainage initial sequence; opened regional lymph nodes; opened anastamoses; extremity treatment  -AB       Initial Sequence short neck; supraclavicular; shoulder collectors; abdomen  -AB      Opened Regional Lymph Nodes axillary; inguinal; paraspinal  -AB      Opened Anastamoses anterior axillo-axillary; posterior axillo-axillary; anterior inguino-inguinal; posterior inguino-inguinal  -AB      Extremity Treatment MLD to full limb  -AB      MLD to Full Limb RUE, LLE  -AB      Manual Therapy MLD to RUE, LLE, abdomen, inguinal, shoulder collectors, axillary  -AB              Compression/Skin Care    Compression/Skin Care skin care; wrapping location; bandaging  -AB      Skin Care moisturizing lotion applied  -AB      Wrapping Location upper extremity  -AB      Wrapping Location UE right:; hand to axilla  -AB      Bandage Layers short-stretch bandages (comment size/quantity); cotton elastic stocking- single layer (comment size); soft foam- 1/4 inch  -AB      Bandaging Technique circumferential/spiral; moderate compression  -AB      Compression/Skin Care Comments compression pump to RUE, abdomen, LLE  -AB            User Key  (r) = Recorded By, (t) = Taken By, (c) = Cosigned By    Initials Name Provider Type    AB Willa Sanchez OT Occupational Therapist                                      Therapy Education  Given: HEP, Edema management, Symptoms/condition management, Bandaging/dressing change  Program: Reinforced  How Provided: Verbal, Demonstration  Provided to: Patient  Level of Understanding: Verbalized                Time Calculation:   OT Start Time: 1340  OT Stop Time: 1505  OT Time Calculation (min): 85 min  OT Non-Billable Time (min): 25 min  Total Timed Code Minutes- OT: 85 minute(s)     Therapy Charges for Today     Code Description Service Date Service Provider Modifiers Qty    33962583583  OT MANUAL THERAPY EA 15 MIN 2/21/2022 Willa Sanchez OT GO, KX 4    [id number]  OT LYMPHEDEMA MANAGEMENT-15 MIN 2/21/2022 Willa Sanchez OT KX 1    [id number]  OT VASOPNEUMAT DEVIC 1 OR  MORE AREAS 2/21/2022 Willa Sanchez, OT GO, KX 1                      Willa Sanchez OT  2/21/2022

## 2022-02-24 ENCOUNTER — APPOINTMENT (OUTPATIENT)
Dept: OCCUPATIONAL THERAPY | Facility: HOSPITAL | Age: 67
End: 2022-02-24

## 2022-02-28 ENCOUNTER — HOSPITAL ENCOUNTER (OUTPATIENT)
Dept: OCCUPATIONAL THERAPY | Facility: HOSPITAL | Age: 67
Setting detail: THERAPIES SERIES
Discharge: HOME | End: 2022-02-28

## 2022-02-28 DIAGNOSIS — Z17.0 MALIGNANT NEOPLASM OF UPPER-OUTER QUADRANT OF RIGHT BREAST IN FEMALE, ESTROGEN RECEPTOR POSITIVE: ICD-10-CM

## 2022-02-28 DIAGNOSIS — I89.0 LYMPHEDEMA OF LEFT LOWER EXTREMITY: ICD-10-CM

## 2022-02-28 DIAGNOSIS — C50.411 MALIGNANT NEOPLASM OF UPPER-OUTER QUADRANT OF RIGHT BREAST IN FEMALE, ESTROGEN RECEPTOR POSITIVE: ICD-10-CM

## 2022-02-28 DIAGNOSIS — I89.0 LYMPHEDEMA: Primary | ICD-10-CM

## 2022-02-28 DIAGNOSIS — C50.911 CARCINOMA OF RIGHT BREAST METASTATIC TO AXILLARY LYMPH NODE: ICD-10-CM

## 2022-02-28 DIAGNOSIS — Z79.4 TYPE 2 DIABETES MELLITUS WITH HYPERGLYCEMIA, WITH LONG-TERM CURRENT USE OF INSULIN: ICD-10-CM

## 2022-02-28 DIAGNOSIS — I89.0 LYMPHEDEMA OF RIGHT UPPER EXTREMITY: ICD-10-CM

## 2022-02-28 DIAGNOSIS — M79.89 SWELLING IN RIGHT ARMPIT: ICD-10-CM

## 2022-02-28 DIAGNOSIS — E11.65 TYPE 2 DIABETES MELLITUS WITH HYPERGLYCEMIA, WITH LONG-TERM CURRENT USE OF INSULIN: ICD-10-CM

## 2022-02-28 DIAGNOSIS — I89.0 LYMPHEDEMA OF BREAST: ICD-10-CM

## 2022-02-28 DIAGNOSIS — C77.3 CARCINOMA OF RIGHT BREAST METASTATIC TO AXILLARY LYMPH NODE: ICD-10-CM

## 2022-02-28 PROCEDURE — 97139 UNLISTED THERAPEUTIC PX: CPT

## 2022-02-28 PROCEDURE — 97535 SELF CARE MNGMENT TRAINING: CPT

## 2022-02-28 PROCEDURE — 97140 MANUAL THERAPY 1/> REGIONS: CPT

## 2022-02-28 PROCEDURE — 97016 VASOPNEUMATIC DEVICE THERAPY: CPT

## 2022-03-04 ENCOUNTER — APPOINTMENT (OUTPATIENT)
Dept: OCCUPATIONAL THERAPY | Facility: HOSPITAL | Age: 67
End: 2022-03-04

## 2022-03-07 ENCOUNTER — APPOINTMENT (OUTPATIENT)
Dept: OCCUPATIONAL THERAPY | Facility: HOSPITAL | Age: 67
End: 2022-03-07

## 2022-03-08 ENCOUNTER — LAB (OUTPATIENT)
Dept: FAMILY MEDICINE CLINIC | Facility: CLINIC | Age: 67
End: 2022-03-08

## 2022-03-08 ENCOUNTER — OFFICE VISIT (OUTPATIENT)
Dept: FAMILY MEDICINE CLINIC | Facility: CLINIC | Age: 67
End: 2022-03-08

## 2022-03-08 DIAGNOSIS — Z79.4 TYPE 2 DIABETES MELLITUS WITH HYPERGLYCEMIA, WITH LONG-TERM CURRENT USE OF INSULIN: Primary | Chronic | ICD-10-CM

## 2022-03-08 DIAGNOSIS — Z79.4 TYPE 2 DIABETES MELLITUS WITH HYPERGLYCEMIA, WITH LONG-TERM CURRENT USE OF INSULIN: Chronic | ICD-10-CM

## 2022-03-08 DIAGNOSIS — E53.8 B12 DEFICIENCY: ICD-10-CM

## 2022-03-08 DIAGNOSIS — E78.2 MIXED HYPERLIPIDEMIA: ICD-10-CM

## 2022-03-08 DIAGNOSIS — G47.33 OBSTRUCTIVE SLEEP APNEA SYNDROME: Chronic | ICD-10-CM

## 2022-03-08 DIAGNOSIS — E03.9 HYPOTHYROIDISM, UNSPECIFIED TYPE: Chronic | ICD-10-CM

## 2022-03-08 DIAGNOSIS — E03.8 OTHER SPECIFIED HYPOTHYROIDISM: Chronic | ICD-10-CM

## 2022-03-08 DIAGNOSIS — F32.89 OTHER DEPRESSION: ICD-10-CM

## 2022-03-08 DIAGNOSIS — E55.9 VITAMIN D DEFICIENCY: ICD-10-CM

## 2022-03-08 DIAGNOSIS — E83.42 HYPOMAGNESEMIA: ICD-10-CM

## 2022-03-08 DIAGNOSIS — Z17.0 MALIGNANT NEOPLASM OF UPPER-OUTER QUADRANT OF RIGHT BREAST IN FEMALE, ESTROGEN RECEPTOR POSITIVE: ICD-10-CM

## 2022-03-08 DIAGNOSIS — E78.5 DYSLIPIDEMIA: Chronic | ICD-10-CM

## 2022-03-08 DIAGNOSIS — I10 ESSENTIAL HYPERTENSION: Chronic | ICD-10-CM

## 2022-03-08 DIAGNOSIS — E11.65 TYPE 2 DIABETES MELLITUS WITH HYPERGLYCEMIA, WITH LONG-TERM CURRENT USE OF INSULIN: Primary | Chronic | ICD-10-CM

## 2022-03-08 DIAGNOSIS — C50.411 MALIGNANT NEOPLASM OF UPPER-OUTER QUADRANT OF RIGHT BREAST IN FEMALE, ESTROGEN RECEPTOR POSITIVE: ICD-10-CM

## 2022-03-08 DIAGNOSIS — E11.65 TYPE 2 DIABETES MELLITUS WITH HYPERGLYCEMIA, WITH LONG-TERM CURRENT USE OF INSULIN: Chronic | ICD-10-CM

## 2022-03-08 DIAGNOSIS — K21.00 GASTROESOPHAGEAL REFLUX DISEASE WITH ESOPHAGITIS WITHOUT HEMORRHAGE: Chronic | ICD-10-CM

## 2022-03-08 PROCEDURE — 82607 VITAMIN B-12: CPT | Performed by: NURSE PRACTITIONER

## 2022-03-08 PROCEDURE — 99214 OFFICE O/P EST MOD 30 MIN: CPT | Performed by: NURSE PRACTITIONER

## 2022-03-08 PROCEDURE — 84443 ASSAY THYROID STIM HORMONE: CPT | Performed by: NURSE PRACTITIONER

## 2022-03-08 PROCEDURE — 82043 UR ALBUMIN QUANTITATIVE: CPT | Performed by: NURSE PRACTITIONER

## 2022-03-08 PROCEDURE — 83735 ASSAY OF MAGNESIUM: CPT | Performed by: NURSE PRACTITIONER

## 2022-03-08 PROCEDURE — 80053 COMPREHEN METABOLIC PANEL: CPT | Performed by: NURSE PRACTITIONER

## 2022-03-08 PROCEDURE — 85025 COMPLETE CBC W/AUTO DIFF WBC: CPT | Performed by: NURSE PRACTITIONER

## 2022-03-08 PROCEDURE — 80061 LIPID PANEL: CPT | Performed by: NURSE PRACTITIONER

## 2022-03-08 PROCEDURE — 36415 COLL VENOUS BLD VENIPUNCTURE: CPT | Performed by: NURSE PRACTITIONER

## 2022-03-08 PROCEDURE — 82306 VITAMIN D 25 HYDROXY: CPT | Performed by: NURSE PRACTITIONER

## 2022-03-08 PROCEDURE — 83036 HEMOGLOBIN GLYCOSYLATED A1C: CPT | Performed by: NURSE PRACTITIONER

## 2022-03-08 RX ORDER — HYDROCHLOROTHIAZIDE 12.5 MG/1
12.5 TABLET ORAL DAILY PRN
Qty: 30 TABLET | Refills: 3 | Status: SHIPPED | OUTPATIENT
Start: 2022-03-08 | End: 2022-07-05

## 2022-03-08 RX ORDER — FLUOXETINE 10 MG/1
10 CAPSULE ORAL DAILY
Qty: 30 CAPSULE | Refills: 0 | Status: SHIPPED | OUTPATIENT
Start: 2022-03-08 | End: 2022-04-05

## 2022-03-08 NOTE — PROGRESS NOTES
History of Present Illness  Ronna Wayne is a 67 y.o. female who presents to the clinic today to review labs and follow up pertaining to her DM, type 2, HTN, Dyslipidemia, GERD and vitamin deficiencies. In addition, she has been discussed with a malignant neoplasm of her upper outer quadrant of right breast.  Ronna shares that since her last visit her mother and sister both have  which she is having a difficult time especially with her sister's death.     Diabetes  She has type 2 diabetes mellitus. The initial diagnosis of diabetes was made . Diabetic complications include a CVA, nephropathy and PVD. Risk factors for coronary artery disease include dyslipidemia, family history, hypertension, obesity and sedentary lifestyle. Current diabetic treatment includes insulin injections and oral agent (monotherapy).  She is currently taking insulin pre-breakfast, pre-lunch and pre-dinner. Insulin injections are given by patient. Rotation sites for injection include the abdominal wall. She is following a diabetic diet. Meal planning includes ADA exchanges, avoidance of concentrated sweets and carbohydrate counting. She rarely participates in exercise. Blood glucose monitoring compliance is good.  Lab Results   Component Value Date    HGBA1C 7.20 (H) 2021     Hypertension   The current episode started more than 1 year ago. The problem is controlled. Associated symptoms include headaches, malaise/fatigue and peripheral edema. Risk factors for coronary artery disease include diabetes mellitus, dyslipidemia, obesity and post-menopausal state. Current antihypertension treatment includes angiotensin blockers. Home blood pressure have been in range.   Lab Results   Component Value Date    GLUCOSE 94 2021    BUN 13 2021    CREATININE 0.79 2021    EGFRIFNONA 73 2021    BCR 16.5 2021    K 4.4 2021    CO2 26.1 2021    CALCIUM 9.1 2021    ALBUMIN 3.90 2021     AST 30 06/22/2021    ALT 24 06/22/2021       Dyslipidemia   The current episode started more than 1 year ago. Current antihyperlipidemic treatment includes statins. Risk factors for coronary artery disease include diabetes mellitus, dyslipidemia, hypertension, obesity and post-menopausal.   Lab Results   Component Value Date    CHOL 192 06/22/2021    CHOL 223 (H) 03/22/2021    CHOL 153 12/08/2020     Lab Results   Component Value Date    TRIG 119 06/22/2021    TRIG 136 03/22/2021    TRIG 176 (H) 12/08/2020     Lab Results   Component Value Date    HDL 50 06/22/2021    HDL 60 03/22/2021    HDL 53 12/08/2020     Lab Results   Component Value Date     (H) 06/22/2021     (H) 03/22/2021    LDL 71 12/08/2020     The following portions of the patient's history were reviewed and updated as appropriate: allergies, current medications, past family history, past medical history, past social history, past surgical history and problem list.    Review of Systems   Constitutional: Positive for fatigue. Negative for activity change, appetite change, chills, fever and unexpected weight change.   HENT: Negative.    Eyes: Negative for visual disturbance.   Respiratory: Negative for cough, shortness of breath and wheezing.    Cardiovascular: Positive for leg swelling (Going to Lymphedema Clinic). Negative for chest pain and palpitations.   Gastrointestinal: Negative for nausea and vomiting.   Endocrine: Negative for cold intolerance, heat intolerance, polydipsia, polyphagia and polyuria.   Skin: Negative for color change and rash.   Neurological: Positive for weakness. Negative for dizziness, tremors, speech difficulty, light-headedness and headaches.   Hematological: Negative for adenopathy.   Psychiatric/Behavioral: Positive for decreased concentration and sleep disturbance. Negative for confusion and suicidal ideas. The patient is not nervous/anxious.    All other systems reviewed and are negative.    Vital signs:   "/80 (BP Location: Left arm, Patient Position: Sitting, Cuff Size: Adult)   Pulse 82   Temp 97.7 °F (36.5 °C) (Temporal)   Resp 14   Ht 160 cm (63\")   Wt 127 kg (279 lb)   SpO2 97%   BMI 49.42 kg/m²     Physical Exam  Vitals and nursing note reviewed.   Constitutional:       General: She is not in acute distress.     Appearance: She is well-developed.      Interventions: Face mask in place.      Comments: Pleasant; gait is slow.    HENT:      Head: Normocephalic.      Nose: Nose normal.   Eyes:      General: No scleral icterus.        Right eye: No discharge.         Left eye: No discharge.      Conjunctiva/sclera: Conjunctivae normal.   Neck:      Vascular: No JVD.   Cardiovascular:      Rate and Rhythm: Normal rate and regular rhythm.      Heart sounds: Normal heart sounds. No murmur heard.  Pulmonary:      Effort: Pulmonary effort is normal. No respiratory distress.      Breath sounds: Normal breath sounds. No wheezing or rales.   Abdominal:      Palpations: Abdomen is soft.      Tenderness: There is no abdominal tenderness. There is no guarding.   Musculoskeletal:      Cervical back: Neck supple.      Right lower leg: No edema.      Left lower leg: No edema.   Lymphadenopathy:      Cervical: No cervical adenopathy.   Skin:     General: Skin is warm and dry.      Capillary Refill: Capillary refill takes less than 2 seconds.   Neurological:      Mental Status: She is alert and oriented to person, place, and time.      Cranial Nerves: Cranial nerves are intact.      Gait: Gait abnormal.   Psychiatric:         Mood and Affect: Mood and affect normal.         Speech: Speech normal.         Behavior: Behavior is cooperative.         Thought Content: Thought content normal.         Cognition and Memory: Cognition normal.       Result Review :Had labs this morning and will discuss by phone   Patient's Body mass index is 49.42 kg/m². indicating that she is obese (BMI >30). Obesity-related health conditions " include the following: hypertension, coronary heart disease, diabetes mellitus, dyslipidemias, GERD and osteoarthritis. Obesity is Flucuating . BMI  is above average; BMI management plan is completed. Provided information on  portion control and increasing exercise..     Assessment/Plan     Diagnoses and all orders for this visit:    1. Type 2 diabetes mellitus with hyperglycemia, with long-term current use of insulin (HCC) (Primary)  Comments:  Continue Toujeo, 70/30 which I would like to eliminate at sometime, Jardiance. She is unsure if she is taking Januvia.     2. Essential hypertension  Comments:  She has not had her bp medication today. Encouraged her to take it as soon as she got home. She does monitor at home and she reports usually below 140/90  Orders:  -     hydroCHLOROthiazide (HYDRODIURIL) 12.5 MG tablet; Take 1 tablet by mouth Daily As Needed (swelling).  Dispense: 30 tablet; Refill: 3    3. Dyslipidemia  Comments:  Continue Atorvastatin     4. Other specified hypothyroidism  Comments:  Continue Levothyroxine     5. Obstructive sleep apnea syndrome  Comments:  Stable with CPap    6. Gastroesophageal reflux disease with esophagitis without hemorrhage  Comments:  Stable    7. Malignant neoplasm of upper-outer quadrant of right breast in female, estrogen receptor positive (HCC)  Comments:  F/U with Oncology    8. Other depression  Comments:  Treatment options reviewed. Will start Prozac  Orders:  -     FLUoxetine (PROzac) 10 MG capsule; Take 1 capsule by mouth Daily.  Dispense: 30 capsule; Refill: 0    9. Vitamin D deficiency  Comments:  Daily supplement      Follow Up In April    Findings and recommendations discussed with Ronna. Will review test results which she had this morning and discuss via phone.  Lifestyle modifications reinforced including nutrition and activity recommendations. She will follow up in April sooner if problems/concerns occur.  Ronna was given instructions and counseling  regarding her condition or for health maintenance advice. Please see specific information pulled into the AVS if appropriate      This document has been electronically signed by:

## 2022-03-09 LAB
25(OH)D3 SERPL-MCNC: 58.6 NG/ML (ref 30–100)
ALBUMIN SERPL-MCNC: 3.4 G/DL (ref 3.5–5.2)
ALBUMIN UR-MCNC: <1.2 MG/DL
ALBUMIN/GLOB SERPL: 0.8 G/DL
ALP SERPL-CCNC: 93 U/L (ref 39–117)
ALT SERPL W P-5'-P-CCNC: 24 U/L (ref 1–33)
ANION GAP SERPL CALCULATED.3IONS-SCNC: 13.2 MMOL/L (ref 5–15)
AST SERPL-CCNC: 34 U/L (ref 1–32)
BASOPHILS # BLD AUTO: 0.04 10*3/MM3 (ref 0–0.2)
BASOPHILS NFR BLD AUTO: 0.6 % (ref 0–1.5)
BILIRUB SERPL-MCNC: 0.6 MG/DL (ref 0–1.2)
BUN SERPL-MCNC: 14 MG/DL (ref 8–23)
BUN/CREAT SERPL: 16.9 (ref 7–25)
CALCIUM SPEC-SCNC: 9.5 MG/DL (ref 8.6–10.5)
CHLORIDE SERPL-SCNC: 104 MMOL/L (ref 98–107)
CHOLEST SERPL-MCNC: 169 MG/DL (ref 0–200)
CO2 SERPL-SCNC: 22.8 MMOL/L (ref 22–29)
CREAT SERPL-MCNC: 0.83 MG/DL (ref 0.57–1)
DEPRECATED RDW RBC AUTO: 55.1 FL (ref 37–54)
EGFRCR SERPLBLD CKD-EPI 2021: 77.4 ML/MIN/1.73
EOSINOPHIL # BLD AUTO: 0.05 10*3/MM3 (ref 0–0.4)
EOSINOPHIL NFR BLD AUTO: 0.8 % (ref 0.3–6.2)
ERYTHROCYTE [DISTWIDTH] IN BLOOD BY AUTOMATED COUNT: 14.3 % (ref 12.3–15.4)
GLOBULIN UR ELPH-MCNC: 4.4 GM/DL
GLUCOSE SERPL-MCNC: 168 MG/DL (ref 65–99)
HBA1C MFR BLD: 8.8 % (ref 4.8–5.6)
HCT VFR BLD AUTO: 41.6 % (ref 34–46.6)
HDLC SERPL-MCNC: 51 MG/DL (ref 40–60)
HGB BLD-MCNC: 13.5 G/DL (ref 12–15.9)
IMM GRANULOCYTES # BLD AUTO: 0.01 10*3/MM3 (ref 0–0.05)
IMM GRANULOCYTES NFR BLD AUTO: 0.2 % (ref 0–0.5)
LDLC SERPL CALC-MCNC: 98 MG/DL (ref 0–100)
LDLC/HDLC SERPL: 1.88 {RATIO}
LYMPHOCYTES # BLD AUTO: 2.64 10*3/MM3 (ref 0.7–3.1)
LYMPHOCYTES NFR BLD AUTO: 39.9 % (ref 19.6–45.3)
MAGNESIUM SERPL-MCNC: 1.8 MG/DL (ref 1.6–2.4)
MCH RBC QN AUTO: 33.6 PG (ref 26.6–33)
MCHC RBC AUTO-ENTMCNC: 32.5 G/DL (ref 31.5–35.7)
MCV RBC AUTO: 103.5 FL (ref 79–97)
MONOCYTES # BLD AUTO: 0.8 10*3/MM3 (ref 0.1–0.9)
MONOCYTES NFR BLD AUTO: 12.1 % (ref 5–12)
NEUTROPHILS NFR BLD AUTO: 3.07 10*3/MM3 (ref 1.7–7)
NEUTROPHILS NFR BLD AUTO: 46.4 % (ref 42.7–76)
NRBC BLD AUTO-RTO: 0 /100 WBC (ref 0–0.2)
PLATELET # BLD AUTO: 144 10*3/MM3 (ref 140–450)
PMV BLD AUTO: 11.3 FL (ref 6–12)
POTASSIUM SERPL-SCNC: 4.5 MMOL/L (ref 3.5–5.2)
PROT SERPL-MCNC: 7.8 G/DL (ref 6–8.5)
RBC # BLD AUTO: 4.02 10*6/MM3 (ref 3.77–5.28)
SODIUM SERPL-SCNC: 140 MMOL/L (ref 136–145)
TRIGL SERPL-MCNC: 110 MG/DL (ref 0–150)
TSH SERPL DL<=0.05 MIU/L-ACNC: 1.05 UIU/ML (ref 0.27–4.2)
VIT B12 BLD-MCNC: 637 PG/ML (ref 211–946)
VLDLC SERPL-MCNC: 20 MG/DL (ref 5–40)
WBC NRBC COR # BLD: 6.61 10*3/MM3 (ref 3.4–10.8)

## 2022-03-10 VITALS
SYSTOLIC BLOOD PRESSURE: 150 MMHG | HEART RATE: 82 BPM | WEIGHT: 279 LBS | HEIGHT: 63 IN | TEMPERATURE: 97.7 F | BODY MASS INDEX: 49.43 KG/M2 | RESPIRATION RATE: 14 BRPM | OXYGEN SATURATION: 97 % | DIASTOLIC BLOOD PRESSURE: 80 MMHG

## 2022-03-11 ENCOUNTER — HOSPITAL ENCOUNTER (OUTPATIENT)
Dept: OCCUPATIONAL THERAPY | Facility: HOSPITAL | Age: 67
Setting detail: THERAPIES SERIES
Discharge: HOME | End: 2022-03-11

## 2022-03-11 DIAGNOSIS — I89.0 LYMPHEDEMA OF LEFT LOWER EXTREMITY: ICD-10-CM

## 2022-03-11 DIAGNOSIS — I89.0 LYMPHEDEMA OF BREAST: ICD-10-CM

## 2022-03-11 DIAGNOSIS — C77.3 CARCINOMA OF RIGHT BREAST METASTATIC TO AXILLARY LYMPH NODE: ICD-10-CM

## 2022-03-11 DIAGNOSIS — E11.65 TYPE 2 DIABETES MELLITUS WITH HYPERGLYCEMIA, WITH LONG-TERM CURRENT USE OF INSULIN: ICD-10-CM

## 2022-03-11 DIAGNOSIS — C50.411 MALIGNANT NEOPLASM OF UPPER-OUTER QUADRANT OF RIGHT BREAST IN FEMALE, ESTROGEN RECEPTOR POSITIVE: ICD-10-CM

## 2022-03-11 DIAGNOSIS — I89.0 LYMPHEDEMA: Primary | ICD-10-CM

## 2022-03-11 DIAGNOSIS — Z79.4 TYPE 2 DIABETES MELLITUS WITH HYPERGLYCEMIA, WITH LONG-TERM CURRENT USE OF INSULIN: ICD-10-CM

## 2022-03-11 DIAGNOSIS — M79.89 SWELLING IN RIGHT ARMPIT: ICD-10-CM

## 2022-03-11 DIAGNOSIS — I89.0 LYMPHEDEMA OF RIGHT UPPER EXTREMITY: ICD-10-CM

## 2022-03-11 DIAGNOSIS — Z17.0 MALIGNANT NEOPLASM OF UPPER-OUTER QUADRANT OF RIGHT BREAST IN FEMALE, ESTROGEN RECEPTOR POSITIVE: ICD-10-CM

## 2022-03-11 DIAGNOSIS — C50.911 CARCINOMA OF RIGHT BREAST METASTATIC TO AXILLARY LYMPH NODE: ICD-10-CM

## 2022-03-11 PROCEDURE — 97016 VASOPNEUMATIC DEVICE THERAPY: CPT

## 2022-03-11 PROCEDURE — 97139 UNLISTED THERAPEUTIC PX: CPT

## 2022-03-11 PROCEDURE — 97140 MANUAL THERAPY 1/> REGIONS: CPT

## 2022-03-11 PROCEDURE — 97535 SELF CARE MNGMENT TRAINING: CPT

## 2022-03-11 NOTE — THERAPY PROGRESS REPORT/RE-CERT
Outpatient Occupational Therapy Lymphedema Progress Note  [facility]     Patient Name: Ronna Wayne  : 1955  MRN: [id number]  Today's Date: 3/11/2022      Visit Date: 2022    Patient Active Problem List   Diagnosis   • Frequent headaches   • Memory loss   • Essential hypertension   • Dyslipidemia   • Type 2 diabetes mellitus with hyperglycemia, with long-term current use of insulin (HCC)   • Obstructive sleep apnea syndrome   • Glaucoma   • Anxiety   • GERD (gastroesophageal reflux disease)   • Morbid obesity   • Weakness of left arm   • Cerebrovascular accident (CVA) due to embolism (HCC)   • SVT (supraventricular tachycardia) (HCC)   • Vitamin D deficiency   • B12 deficiency   • Malignant neoplasm of upper-outer quadrant of right breast in female, estrogen receptor positive (HCC)   • Other specified hypothyroidism   • Personal history of colonic polyps        Past Medical History:   Diagnosis Date   • Anxiety    • B12 deficiency    • BPV (benign positional vertigo)    • Breast cancer (HCC) 2021   • Diabetes mellitus (HCC)    • Diarrhea    • Disease of thyroid gland    • Elevated cholesterol    • Fibromyalgia    • GERD (gastroesophageal reflux disease)    • Glaucoma    • Heart murmur    • Hyperlipidemia    • Hypertension    • Kidney disease    • Obesity    • Peptic ulceration    • PONV (postoperative nausea and vomiting)    • Sleep apnea     c-pap   • Stroke (HCC)    • Weakness of left arm         Past Surgical History:   Procedure Laterality Date   • ABDOMINAL SURGERY     • APPENDECTOMY     • BREAST CYST ASPIRATION     • BREAST LUMPECTOMY WITH SENTINEL NODE BIOPSY Right 2021    Procedure: BREAST LUMPECTOMY WITH SENTINEL NODE BIOPSY;  Surgeon: Lee Parrish MD;  Location: [facility];  Service: General;  Laterality: Right;   • CATARACT EXTRACTION Bilateral    • CHOLECYSTECTOMY     • COLONOSCOPY     • ENDOSCOPY     • HEMATOMA EVACUATION TRUNK Right 2021    Procedure: HEMATOMA  EVACUATION TRUNK;  Surgeon: Lee Parrish MD;  Location: [facility] OR;  Service: General;  Laterality: Right;   • URETHRA SURGERY           Visit Dx:      ICD-10-CM ICD-9-CM   1. Lymphedema  I89.0 457.1   2. Malignant neoplasm of upper-outer quadrant of right breast in female, estrogen receptor positive (HCC)  C50.411 174.4    Z17.0 V86.0   3. Carcinoma of right breast metastatic to axillary lymph node (HCC)  C50.911 174.9    C77.3 196.3   4. Type 2 diabetes mellitus with hyperglycemia, with long-term current use of insulin (HCC)  E11.65 250.00    Z79.4 790.29     V58.67   5. Lymphedema of breast  I89.0 457.1   6. Swelling in right armpit  M79.89 729.81   7. Lymphedema of right upper extremity  I89.0 457.1   8. Lymphedema of left lower extremity  I89.0 457.1        Lymphedema     Row Name 03/11/22 1200             Subjective Pain    Able to rate subjective pain? yes  -BC      Pre-Treatment Pain Level 0  -BC      Subjective Pain Comment Denies pain  -BC              Subjective Comments    Subjective Comments Pt. reports receiving her pump yesterday.  She also states that she plans to start using pump tomorrow.  -BC              Lymphedema Assessment    Lymphedema Classification LLE:;Trunk:;Other:;secondary;stage 2 (Spontaneously Irreversible);primary;RUE:  -BC      Lymphedema Cancer Related Sx left;axillary dissection;lumpectomy  -BC      Positive Lymph Nodes # 1  -BC      Radiation Therapy Received yes  -BC      Lymphedema Precautions kidney disease  -BC              Posture/Observations    Posture- WNL Posture is WNL  -BC              Lymphedema Edema Assessment    Ptting Edema Category By grade out of 4  -BC      Pitting Edema + 2/4;Mild  -BC      Stemmer Sign bilateral:;negative  -BC      Nelson Hump negative;bilateral:  -BC              Skin Changes/Observations    Location/Assessment Upper Extremity;Lower Extremity;Head/Neck;Upper Quadrant  -BC      Upper Extremity Conditions clean;dry;hairless  -BC       Upper Extremity Color/Pigment right:;hyperpigmented  -BC      Upper Quadrant Conditions dry;hairless  -BC      Upper Quadrant Color/Pigment fibrosis;P'eau d'orange;hyperpigmented  R breast  -BC      Lower Extremity Conditions bilateral:;clean;dry;hairless  -BC      Lower Extremity Color/Pigment left:;hyperpigmented  -BC      Head/Neck Conditions intact;clean;dry  -BC      Head/Neck Color/Pigment red  -BC      Skin Observations Comment Pt. reports increased tightness/pain in R breast.  -BC              Lymphedema Pulses/Capillary Refill    Lymphedema Pulses/Capillary Refill capillary refill  -BC      Capillary Refill lower extremity capillary refill  -BC      Lower Extremity Capillary Refill right:;left:;less than 3 seconds  -BC              Lymphedema Measurements    Measurement Type(s) Quick Girth;Circumferential  -BC      Quick Girth Areas Upper extremities;Lower extremities  -BC      Circumferential Areas Neck;Trunk  -BC              RUE Quick Girth (cm)    Axilla 43.5 cm  -BC      Mid upper arm 46 cm  -BC      Elbow 31 cm  -BC      Mid forearm 31.5 cm  -BC      Wrist crease 18 cm  -BC      Web space 21.2 cm  -BC      Met-heads 19.6 cm  -BC      Other 1 23.1 cm  -BC      Other 2 29 cm  -BC      RUE Quick Girth Total 262.9  -BC              Neck Circumferential (cm)    Measurement Location 1 Neck  -BC      Measurement Location 2 Axillary  -BC      Measurement Location 3 Nipple line  -BC      Measurement Location 4 below breast  -BC              Trunk Circumferential (cm)    Measurement Location 1 Abdomen at navel  -BC      Measurement Location 2 Hip  -BC              Manual Lymphatic Drainage    Manual Lymphatic Drainage initial sequence;opened regional lymph nodes;opened anastamoses;extremity treatment  -BC      Initial Sequence short neck;supraclavicular;shoulder collectors;abdomen  -BC      Opened Regional Lymph Nodes axillary;inguinal;paraspinal  -BC      Opened Anastamoses anterior axillo-axillary;posterior  axillo-axillary;anterior inguino-inguinal;posterior inguino-inguinal  -BC      Extremity Treatment MLD to full limb;simple/brief MLD  -BC      MLD to Full Limb RUE  -BC      Manual Lymphatic Drainage Comments Vibration as tolerated R breast, UE, trunk  -BC      Manual Therapy MLD to RUE, abd., inguinals, paraspinals, short neck.  -BC              Compression/Skin Care    Compression/Skin Care skin care;wrapping location;bandaging  -BC      Skin Care moisturizing lotion applied  -BC      Wrapping Location upper extremity  -BC      Wrapping Location UE right:;hand to axilla  -BC      Bandage Layers short-stretch bandages (comment size/quantity);cotton elastic stocking- single layer (comment size);soft foam- 1/4 inch  -BC      Bandaging Technique circumferential/spiral;moderate compression  -BC      Compression/Skin Care Comments Compression pump x Abd., RUE.  -BC            User Key  (r) = Recorded By, (t) = Taken By, (c) = Cosigned By    Initials Name Provider Type    BC Gail Wilson OT Occupational Therapist                         OT Assessment/Plan     Row Name 03/11/22 8469          OT Assessment    Functional Limitations Decreased safety during functional activities;Limitations in functional capacity and performance;Performance in self-care ADL;Limitation in home management  -BC     Impairments Edema;Endurance;Impaired lymphatic circulation;Pain  -BC     Assessment Comments Pt. positioned in supported recline for manual lymph drainage, compression pump, skin care and multi layered bandaging of RUE.  -BC     Please refer to paper survey for additional self-reported information No  -BC     OT Diagnosis Lymphedema  -BC     OT Rehab Potential Good  -BC     Patient/caregiver participated in establishment of treatment plan and goals Yes  -BC     Patient would benefit from skilled therapy intervention Yes  -BC            OT Plan    OT Frequency 2x/week  -BC     Predicted Duration of Therapy Intervention (OT) 30  days  -BC     Planned CPT's? OT EVAL HIGH COMPLEXITY: 12022;OT RE-EVAL: 36935;OT THER ACT EA 15 MIN: 29554UQ;OT THER PROC EA 15 MIN: 22216OL;OT SELF CARE/MGMT/TRAIN 15 MIN: 32797;OT MANUAL THERAPY EA 15 MIN: 34338;OT VASOPNEUMATIC DEVICE: 06627  Lymphedema management  -BC     Planned Therapy Interventions (Optional Details) home exercise program;manual therapy techniques;stretching;patient/family education  -BC     OT Plan Comments Pt. hasmade fair progress w/POC and is demo improved skills to manage symptoms independently.  Pt. would benefit from cont. intervention to max. functional gains.  -BC           User Key  (r) = Recorded By, (t) = Taken By, (c) = Cosigned By    Initials Name Provider Type    Gail Simms, OT Occupational Therapist                       OT Goals     Row Name 03/11/22 1300          OT Short Term Goals    STG Date to Achieve 04/10/22  -BC     STG 1 Pt. familiar w/precautions, skin care and self management of lymphedema.  -BC     STG 1 Progress Met  -BC     STG 2 HEP to assist w/improved lymphatic flow.  -BC     STG 2 Progress Ongoing;Goal Revised  -BC     STG 2 Progress Comments Pt. unable to obtain full Ext. of R shoulder.  -BC     STG 3 Self care instructions for home MLD for volume reduction.  -BC     STG 3 Progress Met  -BC            Long Term Goals    LTG Date to Achieve 04/10/22  -BC     LTG 1 Pt. and/or care giver independent w/short stretch compression bandaging for volume reduction.  -BC     LTG 1 Progress Ongoing  -BC     LTG 2 Decrease pitting edema to 1/4 for decreased risk of infection.  -BC     LTG 2 Progress Partially Met  -BC     LTG 3 Independent with donning/doffing compression garment.  -BC     LTG 3 Progress Ongoing  -BC     LTG 4 Independent with lymphedema management and HEP.  -BC     LTG 4 Progress Ongoing  -BC           User Key  (r) = Recorded By, (t) = Taken By, (c) = Cosigned By    Initials Name Provider Type    Gail Simms OT Occupational Therapist                 Therapy Education  Given: HEP, Edema management, Symptoms/condition management, Bandaging/dressing change  Program: Reinforced  How Provided: Verbal, Demonstration  Provided to: Patient  Level of Understanding: Verbalized                Time Calculation:   OT Start Time: 1100  OT Stop Time: 1245  OT Time Calculation (min): 105 min  OT Non-Billable Time (min): 20 min     Therapy Charges for Today     Code Description Service Date Service Provider Modifiers Qty    49739997091 HC OT LYMPHEDEMA MANAGEMENT-15 MIN 3/11/2022 Gail Wilson OT KX 1    50236407962 HC OT MANUAL THERAPY EA 15 MIN 3/11/2022 Gail Wilson OT GO, KX 3    81197915575 HC OT SELF CARE/MGMT/TRAIN EA 15 MIN 3/11/2022 Gail Wilson OT GO, KX 2    81710366346 HC OT VASOPNEUMAT DEVIC 1 OR MORE AREAS 3/11/2022 Gail Wilson OT GO, KX 1                      Gail Wilson OT  3/11/2022

## 2022-03-14 ENCOUNTER — APPOINTMENT (OUTPATIENT)
Dept: OCCUPATIONAL THERAPY | Facility: HOSPITAL | Age: 67
End: 2022-03-14

## 2022-03-14 ENCOUNTER — OFFICE VISIT (OUTPATIENT)
Dept: RADIATION ONCOLOGY | Facility: HOSPITAL | Age: 67
End: 2022-03-14
Payer: MEDICARE

## 2022-03-14 ENCOUNTER — APPOINTMENT (OUTPATIENT)
Dept: RADIATION ONCOLOGY | Facility: HOSPITAL | Age: 67
End: 2022-03-14

## 2022-03-14 VITALS
BODY MASS INDEX: 48.89 KG/M2 | WEIGHT: 276 LBS | RESPIRATION RATE: 18 BRPM | HEART RATE: 58 BPM | DIASTOLIC BLOOD PRESSURE: 71 MMHG | OXYGEN SATURATION: 96 % | TEMPERATURE: 97.5 F | SYSTOLIC BLOOD PRESSURE: 148 MMHG

## 2022-03-14 DIAGNOSIS — C50.411 MALIGNANT NEOPLASM OF UPPER-OUTER QUADRANT OF RIGHT BREAST IN FEMALE, ESTROGEN RECEPTOR POSITIVE: ICD-10-CM

## 2022-03-14 DIAGNOSIS — Z17.0 MALIGNANT NEOPLASM OF UPPER-OUTER QUADRANT OF RIGHT BREAST IN FEMALE, ESTROGEN RECEPTOR POSITIVE: ICD-10-CM
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
## 2022-03-15 ENCOUNTER — OFFICE VISIT (OUTPATIENT)
Dept: SURGERY | Facility: CLINIC | Age: 67
End: 2022-03-15

## 2022-03-15 VITALS — HEIGHT: 63 IN | WEIGHT: 276 LBS | BODY MASS INDEX: 48.9 KG/M2

## 2022-03-15 DIAGNOSIS — E03.9 HYPOTHYROIDISM, UNSPECIFIED TYPE: Chronic | ICD-10-CM

## 2022-03-15 DIAGNOSIS — C50.411 MALIGNANT NEOPLASM OF UPPER-OUTER QUADRANT OF RIGHT BREAST IN FEMALE, ESTROGEN RECEPTOR POSITIVE: Primary | ICD-10-CM

## 2022-03-15 DIAGNOSIS — Z17.0 MALIGNANT NEOPLASM OF UPPER-OUTER QUADRANT OF RIGHT BREAST IN FEMALE, ESTROGEN RECEPTOR POSITIVE: Primary | ICD-10-CM

## 2022-03-15 PROCEDURE — 99213 OFFICE O/P EST LOW 20 MIN: CPT | Performed by: SURGERY

## 2022-03-15 RX ORDER — LEVOTHYROXINE SODIUM 88 UG/1
TABLET ORAL
Qty: 90 TABLET | Refills: 0 | Status: SHIPPED | OUTPATIENT
Start: 2022-03-15 | End: 2022-06-13

## 2022-03-15 NOTE — PROGRESS NOTES
Subjective   Ronna Wayne is a 67 y.o. female  is here today for follow-up.         Ronna Wayne is a 67 y.o. female here for follow up after right breast lumpectomy with sentinel lymph node biopsy was positive requiring level 1 and 2 axillary howard dissection.  Postoperatively her course was complicated by postoperative day 0 hematoma development for which she was taken back to the operating room and ligation of a single bleeding vessel was performed.  The patient is doing well and her incisions have healed nicely.  Postradiation changes are within normal limits and continue to improve.  She has a large seroma cavity which is expected.                        Assessment     Diagnoses and all orders for this visit:    1. Malignant neoplasm of upper-outer quadrant of right breast in female, estrogen receptor positive (HCC) (Primary)      Ronna Wayne is a 67 y.o. female status post right breast lumpectomy with axillary howard dissection.  The patient had a single 2.5 mm howard met with extranodal extension and the remaining lymph nodes were negative for malignancy.  Final excision margins on the lumpectomy specimen were negative.  The patient is doing well has completed adjuvant radiation therapy.  Follow-up in 3 months.

## 2022-03-18 ENCOUNTER — APPOINTMENT (OUTPATIENT)
Dept: OCCUPATIONAL THERAPY | Facility: HOSPITAL | Age: 67
End: 2022-03-18

## 2022-03-21 ENCOUNTER — HOSPITAL ENCOUNTER (OUTPATIENT)
Dept: OCCUPATIONAL THERAPY | Facility: HOSPITAL | Age: 67
Setting detail: THERAPIES SERIES
Discharge: HOME | End: 2022-03-21

## 2022-03-21 DIAGNOSIS — C50.911 CARCINOMA OF RIGHT BREAST METASTATIC TO AXILLARY LYMPH NODE: ICD-10-CM

## 2022-03-21 DIAGNOSIS — I89.0 LYMPHEDEMA: Primary | ICD-10-CM

## 2022-03-21 DIAGNOSIS — Z79.4 TYPE 2 DIABETES MELLITUS WITH HYPERGLYCEMIA, WITH LONG-TERM CURRENT USE OF INSULIN: ICD-10-CM

## 2022-03-21 DIAGNOSIS — I89.0 LYMPHEDEMA OF LEFT LOWER EXTREMITY: ICD-10-CM

## 2022-03-21 DIAGNOSIS — M79.89 SWELLING IN RIGHT ARMPIT: ICD-10-CM

## 2022-03-21 DIAGNOSIS — Z17.0 MALIGNANT NEOPLASM OF UPPER-OUTER QUADRANT OF RIGHT BREAST IN FEMALE, ESTROGEN RECEPTOR POSITIVE: ICD-10-CM

## 2022-03-21 DIAGNOSIS — E11.65 TYPE 2 DIABETES MELLITUS WITH HYPERGLYCEMIA, WITH LONG-TERM CURRENT USE OF INSULIN: ICD-10-CM

## 2022-03-21 DIAGNOSIS — I89.0 LYMPHEDEMA OF BREAST: ICD-10-CM

## 2022-03-21 DIAGNOSIS — C77.3 CARCINOMA OF RIGHT BREAST METASTATIC TO AXILLARY LYMPH NODE: ICD-10-CM

## 2022-03-21 DIAGNOSIS — C50.411 MALIGNANT NEOPLASM OF UPPER-OUTER QUADRANT OF RIGHT BREAST IN FEMALE, ESTROGEN RECEPTOR POSITIVE: ICD-10-CM

## 2022-03-21 DIAGNOSIS — I89.0 LYMPHEDEMA OF RIGHT UPPER EXTREMITY: ICD-10-CM

## 2022-03-21 PROCEDURE — 97139 UNLISTED THERAPEUTIC PX: CPT

## 2022-03-21 PROCEDURE — 97535 SELF CARE MNGMENT TRAINING: CPT

## 2022-03-21 PROCEDURE — 97140 MANUAL THERAPY 1/> REGIONS: CPT

## 2022-03-21 PROCEDURE — 97016 VASOPNEUMATIC DEVICE THERAPY: CPT

## 2022-03-21 NOTE — THERAPY TREATMENT NOTE
Outpatient Occupational Therapy Lymphedema Treatment Note  MITZI Keller     Patient Name: Ronna Wayne  : 1955  MRN: [id number]  Today's Date: 3/21/2022      Visit Date: 2022    Patient Active Problem List   Diagnosis   • Frequent headaches   • Memory loss   • Essential hypertension   • Dyslipidemia   • Type 2 diabetes mellitus with hyperglycemia, with long-term current use of insulin (HCC)   • Obstructive sleep apnea syndrome   • Glaucoma   • Anxiety   • GERD (gastroesophageal reflux disease)   • Morbid obesity   • Weakness of left arm   • Cerebrovascular accident (CVA) due to embolism (HCC)   • SVT (supraventricular tachycardia) (HCC)   • Vitamin D deficiency   • B12 deficiency   • Malignant neoplasm of upper-outer quadrant of right breast in female, estrogen receptor positive (HCC)   • Other specified hypothyroidism   • Personal history of colonic polyps        Past Medical History:   Diagnosis Date   • Anxiety    • B12 deficiency    • BPV (benign positional vertigo)    • Breast cancer (HCC) 2021   • Diabetes mellitus (HCC)    • Diarrhea    • Disease of thyroid gland    • Elevated cholesterol    • Fibromyalgia    • GERD (gastroesophageal reflux disease)    • Glaucoma    • Heart murmur    • Hyperlipidemia    • Hypertension    • Kidney disease    • Obesity    • Peptic ulceration    • PONV (postoperative nausea and vomiting)    • Sleep apnea     c-pap   • Stroke (HCC)    • Weakness of left arm         Past Surgical History:   Procedure Laterality Date   • ABDOMINAL SURGERY     • APPENDECTOMY     • BREAST CYST ASPIRATION     • BREAST LUMPECTOMY WITH SENTINEL NODE BIOPSY Right 2021    Procedure: BREAST LUMPECTOMY WITH SENTINEL NODE BIOPSY;  Surgeon: Lee Parrish MD;  Location: [facility];  Service: General;  Laterality: Right;   • CATARACT EXTRACTION Bilateral    • CHOLECYSTECTOMY     • COLONOSCOPY     • ENDOSCOPY     • HEMATOMA EVACUATION TRUNK Right 2021    Procedure: HEMATOMA  EVACUATION TRUNK;  Surgeon: Lee Parrish MD;  Location: [facility] OR;  Service: General;  Laterality: Right;   • URETHRA SURGERY           Visit Dx:      ICD-10-CM ICD-9-CM   1. Lymphedema  I89.0 457.1   2. Malignant neoplasm of upper-outer quadrant of right breast in female, estrogen receptor positive (HCC)  C50.411 174.4    Z17.0 V86.0   3. Carcinoma of right breast metastatic to axillary lymph node (HCC)  C50.911 174.9    C77.3 196.3   4. Type 2 diabetes mellitus with hyperglycemia, with long-term current use of insulin (HCC)  E11.65 250.00    Z79.4 790.29     V58.67   5. Lymphedema of breast  I89.0 457.1   6. Swelling in right armpit  M79.89 729.81   7. Lymphedema of right upper extremity  I89.0 457.1   8. Lymphedema of left lower extremity  I89.0 457.1        Lymphedema     Row Name 03/21/22 1100             Subjective Pain    Able to rate subjective pain? yes  -AB      Pre-Treatment Pain Level 3  -AB      Subjective Pain Comment R breast, back  -AB              Subjective Comments    Subjective Comments Pt. presents for treatment this date and reports 3/10 R breast pain that is radiating to her back. She states that pain has increased since beginning use of her basic pump.  -AB              Lymphedema Assessment    Lymphedema Classification LLE:;Trunk:;Other:;secondary;stage 2 (Spontaneously Irreversible);primary;RUE:  -AB      Lymphedema Cancer Related Sx left;axillary dissection;lumpectomy  -AB      Positive Lymph Nodes # 1  -AB      Radiation Therapy Received yes  -AB      Lymphedema Precautions kidney disease  -AB              Posture/Observations    Posture- WNL Posture is WNL  -AB              Lymphedema Edema Assessment    Ptting Edema Category By grade out of 4  -AB      Pitting Edema + 2/4;Mild  -AB      Stemmer Sign bilateral:;negative  -AB      [place] Hump negative;bilateral:  -AB              Skin Changes/Observations    Location/Assessment Upper Extremity;Lower Extremity;Head/Neck;Upper  Quadrant  -AB      Upper Extremity Conditions clean;dry;hairless  -AB      Upper Extremity Color/Pigment right:;hyperpigmented  -AB      Upper Quadrant Conditions dry;hairless  -AB      Upper Quadrant Color/Pigment fibrosis;P'eau d'orange;hyperpigmented  R breast  -AB      Lower Extremity Conditions bilateral:;clean;dry;hairless  -AB      Lower Extremity Color/Pigment left:;hyperpigmented  -AB      Head/Neck Conditions intact;clean;dry  -AB      Head/Neck Color/Pigment red  -AB      Skin Observations Comment Pt. reports increased pain in R breast  -AB              Lymphedema Pulses/Capillary Refill    Lymphedema Pulses/Capillary Refill capillary refill  -AB      Capillary Refill lower extremity capillary refill  -AB      Lower Extremity Capillary Refill right:;left:;less than 3 seconds  -AB              Lymphedema Measurements    Measurement Type(s) Quick Girth;Circumferential  -AB      Quick Girth Areas Upper extremities;Lower extremities  -AB      Circumferential Areas Neck;Trunk  -AB              RUE Quick Girth (cm)    Axilla 47 cm  -AB      Mid upper arm 48.6 cm  -AB      Elbow 29.4 cm  -AB      Mid forearm 31.2 cm  -AB      Wrist crease 17.9 cm  -AB      Web space 20.1 cm  -AB      Met-heads 18.9 cm  -AB      Other 1 20.9 cm  -AB      Other 2 26.6 cm  -AB      RUE Quick Girth Total 260.6  -AB              Neck Circumferential (cm)    Measurement Location 1 Neck  -AB      Neck 1 41.4 cm  -AB      Measurement Location 2 Axillary  -AB      Neck 2 118 cm  -AB      Measurement Location 3 Nipple line  -AB      Neck 3 129.5 cm  -AB      Measurement Location 4 below breast  -AB      Neck 4 124.7 cm  -AB      Neck Circumferential Total 413.6 cm  -AB              Trunk Circumferential (cm)    Measurement Location 1 Abdomen at navel  -AB      Trunk 1 148 cm  -AB      Measurement Location 2 Hip  -AB      Trunk Circumferential Total 148 cm  -AB              Manual Lymphatic Drainage    Manual Lymphatic Drainage initial  sequence;opened regional lymph nodes;opened anastamoses;extremity treatment  -AB      Initial Sequence short neck;supraclavicular;shoulder collectors;abdomen  -AB      Opened Regional Lymph Nodes axillary;inguinal;paraspinal  -AB      Opened Anastamoses anterior axillo-axillary;posterior axillo-axillary;anterior inguino-inguinal;posterior inguino-inguinal  -AB      Extremity Treatment MLD to full limb;simple/brief MLD  -AB      Simple/Brief MLD BLE's  -AB      MLD to Full Limb RUE  -AB      Manual Lymphatic Drainage Comments vibration as tolerated to R breast/axilla  -AB      Manual Therapy MLD to RUE, BLE's, abdomen, axillary, inguinals, shoulder collectors, paraspinals  -AB              Compression/Skin Care    Compression/Skin Care skin care;wrapping location;bandaging  -AB      Skin Care moisturizing lotion applied  -AB      Wrapping Location upper extremity  -AB      Wrapping Location UE right:;hand to axilla  -AB      Bandage Layers short-stretch bandages (comment size/quantity);cotton elastic stocking- single layer (comment size);soft foam- 1/4 inch  -AB      Bandaging Technique circumferential/spiral;moderate compression  -AB      Compression/Skin Care Comments compression pump to abdomen and RUE  -AB            User Key  (r) = Recorded By, (t) = Taken By, (c) = Cosigned By    Initials Name Provider Type    Willa Schwartz, OT Occupational Therapist                                      Therapy Education  Given: HEP, Edema management, Symptoms/condition management, Bandaging/dressing change  Program: Reinforced  How Provided: Verbal, Demonstration  Provided to: Patient  Level of Understanding: Verbalized                Time Calculation:   OT Start Time: 1050  OT Stop Time: 1230  OT Time Calculation (min): 100 min  OT Non-Billable Time (min): 25 min  Total Timed Code Minutes- OT: 100 minute(s)     Therapy Charges for Today     Code Description Service Date Service Provider Modifiers Qty     89933261921 HC OT MANUAL THERAPY EA 15 MIN 3/21/2022 Willa Sanchez OT CARO, KX 4    51962329269 HC OT LYMPHEDEMA MANAGEMENT-15 MIN 3/21/2022 Willa Sanchez, OT KX 1    47698437483 HC OT VASOPNEUMAT DEVIC 1 OR MORE AREAS 3/21/2022 Willa Sanchez OT GO, KX 1    02029726649 HC OT SELF CARE/MGMT/TRAIN EA 15 MIN 3/21/2022 Willa Sanchez OT CARO, KX 1                      Willa Sanchez OT  3/21/2022

## 2022-03-22 DIAGNOSIS — E83.42 HYPOMAGNESEMIA: Primary | ICD-10-CM

## 2022-03-22 RX ORDER — MAGNESIUM OXIDE 400 MG/1
1 TABLET ORAL 3 TIMES DAILY
Qty: 90 TABLET | Refills: 1 | Status: SHIPPED | OUTPATIENT
Start: 2022-03-22 | End: 2022-05-27

## 2022-03-25 ENCOUNTER — APPOINTMENT (OUTPATIENT)
Dept: OCCUPATIONAL THERAPY | Facility: HOSPITAL | Age: 67
End: 2022-03-25

## 2022-03-29 ENCOUNTER — APPOINTMENT (OUTPATIENT)
Dept: OCCUPATIONAL THERAPY | Facility: HOSPITAL | Age: 67
End: 2022-03-29

## 2022-03-29 ENCOUNTER — HOSPITAL ENCOUNTER (OUTPATIENT)
Dept: OCCUPATIONAL THERAPY | Facility: HOSPITAL | Age: 67
Setting detail: THERAPIES SERIES
Discharge: HOME | End: 2022-03-29

## 2022-03-29 DIAGNOSIS — Z79.4 TYPE 2 DIABETES MELLITUS WITH HYPERGLYCEMIA, WITH LONG-TERM CURRENT USE OF INSULIN: ICD-10-CM

## 2022-03-29 DIAGNOSIS — M79.89 SWELLING IN RIGHT ARMPIT: ICD-10-CM

## 2022-03-29 DIAGNOSIS — C50.911 CARCINOMA OF RIGHT BREAST METASTATIC TO AXILLARY LYMPH NODE: ICD-10-CM

## 2022-03-29 DIAGNOSIS — I89.0 LYMPHEDEMA OF RIGHT UPPER EXTREMITY: ICD-10-CM

## 2022-03-29 DIAGNOSIS — C77.3 CARCINOMA OF RIGHT BREAST METASTATIC TO AXILLARY LYMPH NODE: ICD-10-CM

## 2022-03-29 DIAGNOSIS — I89.0 LYMPHEDEMA OF LEFT LOWER EXTREMITY: ICD-10-CM

## 2022-03-29 DIAGNOSIS — I89.0 LYMPHEDEMA: Primary | ICD-10-CM

## 2022-03-29 DIAGNOSIS — C50.411 MALIGNANT NEOPLASM OF UPPER-OUTER QUADRANT OF RIGHT BREAST IN FEMALE, ESTROGEN RECEPTOR POSITIVE: ICD-10-CM

## 2022-03-29 DIAGNOSIS — Z17.0 MALIGNANT NEOPLASM OF UPPER-OUTER QUADRANT OF RIGHT BREAST IN FEMALE, ESTROGEN RECEPTOR POSITIVE: ICD-10-CM

## 2022-03-29 DIAGNOSIS — I89.0 LYMPHEDEMA OF BREAST: ICD-10-CM

## 2022-03-29 DIAGNOSIS — E11.65 TYPE 2 DIABETES MELLITUS WITH HYPERGLYCEMIA, WITH LONG-TERM CURRENT USE OF INSULIN: ICD-10-CM

## 2022-03-29 PROCEDURE — 97140 MANUAL THERAPY 1/> REGIONS: CPT

## 2022-03-29 PROCEDURE — 97016 VASOPNEUMATIC DEVICE THERAPY: CPT

## 2022-03-29 PROCEDURE — 97139 UNLISTED THERAPEUTIC PX: CPT

## 2022-03-29 NOTE — THERAPY TREATMENT NOTE
Outpatient Occupational Therapy Lymphedema Treatment Note  MITZI Keller     Patient Name: Ronna Wayne  : 1955  MRN: [id number]  Today's Date: 3/29/2022      Visit Date: 2022    Patient Active Problem List   Diagnosis   • Frequent headaches   • Memory loss   • Essential hypertension   • Dyslipidemia   • Type 2 diabetes mellitus with hyperglycemia, with long-term current use of insulin (HCC)   • Obstructive sleep apnea syndrome   • Glaucoma   • Anxiety   • GERD (gastroesophageal reflux disease)   • Morbid obesity   • Weakness of left arm   • Cerebrovascular accident (CVA) due to embolism (HCC)   • SVT (supraventricular tachycardia) (HCC)   • Vitamin D deficiency   • B12 deficiency   • Malignant neoplasm of upper-outer quadrant of right breast in female, estrogen receptor positive (HCC)   • Other specified hypothyroidism   • Personal history of colonic polyps        Past Medical History:   Diagnosis Date   • Anxiety    • B12 deficiency    • BPV (benign positional vertigo)    • Breast cancer (HCC) 2021   • Diabetes mellitus (HCC)    • Diarrhea    • Disease of thyroid gland    • Elevated cholesterol    • Fibromyalgia    • GERD (gastroesophageal reflux disease)    • Glaucoma    • Heart murmur    • Hyperlipidemia    • Hypertension    • Kidney disease    • Obesity    • Peptic ulceration    • PONV (postoperative nausea and vomiting)    • Sleep apnea     c-pap   • Stroke (HCC)    • Weakness of left arm         Past Surgical History:   Procedure Laterality Date   • ABDOMINAL SURGERY     • APPENDECTOMY     • BREAST CYST ASPIRATION     • BREAST LUMPECTOMY WITH SENTINEL NODE BIOPSY Right 2021    Procedure: BREAST LUMPECTOMY WITH SENTINEL NODE BIOPSY;  Surgeon: Lee Parrish MD;  Location: [facility];  Service: General;  Laterality: Right;   • CATARACT EXTRACTION Bilateral    • CHOLECYSTECTOMY     • COLONOSCOPY     • ENDOSCOPY     • HEMATOMA EVACUATION TRUNK Right 2021    Procedure: HEMATOMA  EVACUATION TRUNK;  Surgeon: Lee Parrish MD;  Location: [facility] OR;  Service: General;  Laterality: Right;   • URETHRA SURGERY           Visit Dx:      ICD-10-CM ICD-9-CM   1. Lymphedema  I89.0 457.1   2. Malignant neoplasm of upper-outer quadrant of right breast in female, estrogen receptor positive (HCC)  C50.411 174.4    Z17.0 V86.0   3. Carcinoma of right breast metastatic to axillary lymph node (HCC)  C50.911 174.9    C77.3 196.3   4. Type 2 diabetes mellitus with hyperglycemia, with long-term current use of insulin (HCC)  E11.65 250.00    Z79.4 790.29     V58.67   5. Lymphedema of breast  I89.0 457.1   6. Swelling in right armpit  M79.89 729.81   7. Lymphedema of right upper extremity  I89.0 457.1   8. Lymphedema of left lower extremity  I89.0 457.1        Lymphedema     Row Name 03/29/22 1300             Subjective Pain    Able to rate subjective pain? yes  -AB      Pre-Treatment Pain Level 3  -AB      Subjective Pain Comment R breast  -AB              Subjective Comments    Subjective Comments Pt. presents for tx w/ compression shirt donned. She reports that she has been using her basic pump daily. However, she reports that she feels increased heaviness in axilla/breast area.  -AB              Lymphedema Assessment    Lymphedema Classification LLE:;Trunk:;Other:;secondary;stage 2 (Spontaneously Irreversible);primary;RUE:  -AB      Lymphedema Cancer Related Sx left;axillary dissection;lumpectomy  -AB      Positive Lymph Nodes # 1  -AB      Radiation Therapy Received yes  -AB      Lymphedema Precautions kidney disease  -AB              Posture/Observations    Posture- WNL Posture is WNL  -AB              Lymphedema Edema Assessment    Ptting Edema Category By grade out of 4  -AB      Pitting Edema + 2/4;Mild  -AB      Stemmer Sign bilateral:;negative  -AB      [place] Hump negative;bilateral:  -AB              Skin Changes/Observations    Location/Assessment Upper Extremity;Lower  Extremity;Head/Neck;Upper Quadrant  -AB      Upper Extremity Conditions clean;dry;hairless  -AB      Upper Extremity Color/Pigment right:;hyperpigmented  -AB      Upper Quadrant Conditions dry;hairless  -AB      Upper Quadrant Color/Pigment fibrosis;P'eau d'orange;hyperpigmented  R breast  -AB      Lower Extremity Conditions bilateral:;clean;dry;hairless  -AB      Lower Extremity Color/Pigment left:;hyperpigmented  -AB      Head/Neck Conditions intact;clean;dry  -AB      Head/Neck Color/Pigment red  -AB              Lymphedema Pulses/Capillary Refill    Lymphedema Pulses/Capillary Refill capillary refill  -AB      Capillary Refill lower extremity capillary refill  -AB      Lower Extremity Capillary Refill right:;left:;less than 3 seconds  -AB              Lymphedema Measurements    Measurement Type(s) Quick Girth;Circumferential  -AB      Quick Girth Areas Upper extremities;Lower extremities  -AB      Circumferential Areas Neck;Trunk  -AB              RUE Quick Girth (cm)    Axilla 54.8 cm  -AB      Mid upper arm 45.4 cm  -AB      Elbow 29.1 cm  -AB      Mid forearm 31.7 cm  -AB      Wrist crease 17.5 cm  -AB      Web space 20.5 cm  -AB      Met-heads 18.7 cm  -AB      Other 1 20.4 cm  -AB      Other 2 25.7 cm  -AB      RUE Quick Girth Total 263.8  -AB              LLE Quick Girth (cm)    Met-heads 22.7 cm  -AB      Mid foot 24.2 cm  -AB      Smallest ankle 27.2 cm  -AB      Largest calf 49.6 cm  -AB      Tib tuberosity 50 cm  -AB      Mid patella 55.5 cm  -AB      Other 1 35.4 cm  -AB      Other 2 47 cm  -AB              Neck Circumferential (cm)    Measurement Location 1 Neck  -AB      Neck 1 41.3 cm  -AB      Measurement Location 2 Axillary  -AB      Neck 2 118.5 cm  -AB      Measurement Location 3 Nipple line  -AB      Neck 3 127 cm  -AB      Measurement Location 4 below breast  -AB      Neck 4 122 cm  -AB      Neck Circumferential Total 408.8 cm  -AB              Trunk Circumferential (cm)    Measurement  Location 1 Abdomen at navel  -AB      Trunk 1 148.5 cm  -AB      Measurement Location 2 --  -AB      Trunk Circumferential Total 148.5 cm  -AB              Manual Lymphatic Drainage    Manual Lymphatic Drainage initial sequence;opened regional lymph nodes;opened anastamoses;extremity treatment  -AB      Initial Sequence short neck;supraclavicular;shoulder collectors;abdomen  -AB      Opened Regional Lymph Nodes axillary;inguinal;paraspinal  -AB      Opened Anastamoses anterior axillo-axillary;posterior axillo-axillary;anterior inguino-inguinal;posterior inguino-inguinal  -AB      Extremity Treatment MLD to full limb;simple/brief MLD  -AB      MLD to Full Limb RUE, LLE  -AB      Manual Lymphatic Drainage Comments vibration to RUE/axilla/breast  -AB      Manual Therapy MLD to RUE, abdomen, LLE, axillary, inguinal, shoulder collectors, paraspinals, trunk  -AB              Compression/Skin Care    Compression/Skin Care skin care;wrapping location;bandaging  -AB      Skin Care moisturizing lotion applied  -AB      Wrapping Location upper extremity  -AB      Wrapping Location UE right:;hand to axilla  -AB      Bandage Layers short-stretch bandages (comment size/quantity);cotton elastic stocking- single layer (comment size);soft foam- 1/4 inch  -AB      Bandaging Technique circumferential/spiral;moderate compression  -AB      Compression/Skin Care Comments compression pump to abdomen, LLE, RUE  -AB            User Key  (r) = Recorded By, (t) = Taken By, (c) = Cosigned By    Initials Name Provider Type    Willa Schwartz, OT Occupational Therapist                                      Therapy Education  Given: HEP, Edema management, Symptoms/condition management, Bandaging/dressing change  Program: Reinforced  How Provided: Verbal, Demonstration  Provided to: Patient  Level of Understanding: Verbalized                Time Calculation:   OT Start Time: 1300  OT Stop Time: 1430  OT Time Calculation (min): 90 min  OT  Non-Billable Time (min): 25 min  Total Timed Code Minutes- OT: 90 minute(s)     Therapy Charges for Today     Code Description Service Date Service Provider Modifiers Qty    06802008878  OT MANUAL THERAPY EA 15 MIN 3/29/2022 Willa Sanchez OT GO KX 4    [id number]  OT LYMPHEDEMA MANAGEMENT-15 MIN 3/29/2022 Willa Sanchez OT KX 1    [id number]  OT VASOPNEUMAT DEVIC 1 OR MORE AREAS 3/29/2022 Willa Sanchez OT GO, KX 1                      Willa Sanchez OT  3/29/2022

## 2022-04-01 NOTE — THERAPY TREATMENT NOTE
Outpatient Occupational Therapy Lymphedema Treatment Note  MITZI Keller     Patient Name: Ronna Wayne  : 1955  MRN: [id number]  Today's Date: 2022      Visit Date: 2022    Patient Active Problem List   Diagnosis   • Frequent headaches   • Memory loss   • Essential hypertension   • Dyslipidemia   • Type 2 diabetes mellitus with hyperglycemia, with long-term current use of insulin (HCC)   • Obstructive sleep apnea syndrome   • Glaucoma   • Anxiety   • GERD (gastroesophageal reflux disease)   • Morbid obesity   • Weakness of left arm   • Cerebrovascular accident (CVA) due to embolism (HCC)   • SVT (supraventricular tachycardia) (HCC)   • Vitamin D deficiency   • B12 deficiency   • Malignant neoplasm of upper-outer quadrant of right breast in female, estrogen receptor positive (HCC)   • Other specified hypothyroidism   • Personal history of colonic polyps        Past Medical History:   Diagnosis Date   • Anxiety    • B12 deficiency    • BPV (benign positional vertigo)    • Breast cancer (HCC) 2021   • Diabetes mellitus (HCC)    • Diarrhea    • Disease of thyroid gland    • Elevated cholesterol    • Fibromyalgia    • GERD (gastroesophageal reflux disease)    • Glaucoma    • Heart murmur    • Hyperlipidemia    • Hypertension    • Kidney disease    • Obesity    • Peptic ulceration    • PONV (postoperative nausea and vomiting)    • Sleep apnea     c-pap   • Stroke (HCC)    • Weakness of left arm         Past Surgical History:   Procedure Laterality Date   • ABDOMINAL SURGERY     • APPENDECTOMY     • BREAST CYST ASPIRATION     • BREAST LUMPECTOMY WITH SENTINEL NODE BIOPSY Right 2021    Procedure: BREAST LUMPECTOMY WITH SENTINEL NODE BIOPSY;  Surgeon: Lee Parrish MD;  Location: [facility];  Service: General;  Laterality: Right;   • CATARACT EXTRACTION Bilateral    • CHOLECYSTECTOMY     • COLONOSCOPY     • ENDOSCOPY     • HEMATOMA EVACUATION TRUNK Right 2021    Procedure: HEMATOMA  EVACUATION TRUNK;  Surgeon: Lee Parrish MD;  Location: [facility];  Service: General;  Laterality: Right;   • URETHRA SURGERY           Visit Dx:      ICD-10-CM ICD-9-CM   1. Lymphedema  I89.0 457.1   2. Malignant neoplasm of upper-outer quadrant of right breast in female, estrogen receptor positive (HCC)  C50.411 174.4    Z17.0 V86.0   3. Carcinoma of right breast metastatic to axillary lymph node (HCC)  C50.911 174.9    C77.3 196.3   4. Type 2 diabetes mellitus with hyperglycemia, with long-term current use of insulin (HCC)  E11.65 250.00    Z79.4 790.29     V58.67   5. Lymphedema of breast  I89.0 457.1   6. Swelling in right armpit  M79.89 729.81   7. Lymphedema of left lower extremity  I89.0 457.1   8. Lymphedema of right upper extremity  I89.0 457.1        Lymphedema     Row Name 01/28/22 1400             Subjective Pain    Able to rate subjective pain? yes  -AB      Pre-Treatment Pain Level 2  -AB      Subjective Pain Comment R breast, shoulder blades/neck  -AB              Subjective Comments    Subjective Comments Pt. reports that she has 2/10 soreness in R breast and shoulder blade/neck area. She states that she tolerated wraps for approximately 36 hours post last tx, then her hand began to swell so she removed bandages.  -AB              Lymphedema Assessment    Lymphedema Classification LLE:; Trunk:; Other:; secondary; stage 2 (Spontaneously Irreversible); primary; RUE:  -AB      Lymphedema Cancer Related Sx left; axillary dissection; lumpectomy  -AB      Positive Lymph Nodes # 1  -AB      Radiation Therapy Received yes  -AB      Lymphedema Precautions kidney disease  -AB              Posture/Observations    Posture- WNL Posture is WNL  -AB              Lymphedema Edema Assessment    Ptting Edema Category By grade out of 4  -AB      Pitting Edema + 2/4; Mild  -AB      Stemmer Sign bilateral:; negative  -AB      Whiteside Hump negative; bilateral:  -AB              Skin Changes/Observations     Location/Assessment Upper Extremity; Lower Extremity; Head/Neck; Upper Quadrant  -AB      Upper Extremity Conditions clean; dry; hairless  -AB      Upper Extremity Color/Pigment right:; hyperpigmented  -AB      Upper Quadrant Conditions dry; hairless  -AB      Upper Quadrant Color/Pigment fibrosis; P'eau d'orange; hyperpigmented  R breast  -AB      Lower Extremity Conditions bilateral:; clean; dry; hairless  -AB      Lower Extremity Color/Pigment left:; hyperpigmented  -AB      Head/Neck Conditions intact; clean; dry  -AB      Head/Neck Color/Pigment red  -AB              Lymphedema Pulses/Capillary Refill    Lymphedema Pulses/Capillary Refill capillary refill  -AB      Capillary Refill lower extremity capillary refill  -AB      Lower Extremity Capillary Refill right:; left:; less than 3 seconds  -AB              Lymphedema Measurements    Measurement Type(s) Quick Girth; Circumferential  -AB      Quick Girth Areas Upper extremities; Lower extremities  -AB      Circumferential Areas Neck; Trunk  -AB              RUE Quick Girth (cm)    Axilla 47.5 cm  -AB      Mid upper arm 47.8 cm  -AB      Elbow 31.4 cm  -AB      Mid forearm 33.3 cm  -AB      Wrist crease 17.7 cm  -AB      Web space 20.5 cm  -AB      Met-heads 19.1 cm  -AB      Other 1 22.1 cm  -AB      Other 2 27.4 cm  -AB      RUE Quick Girth Total 266.8  -AB              LLE Quick Girth (cm)    Met-heads 22.4 cm  -AB      Mid foot 24.7 cm  -AB      Smallest ankle 29 cm  -AB      Largest calf 51.5 cm  -AB      Tib tuberosity 51 cm  -AB      Mid patella 55 cm  -AB      Distal thigh 66.5 cm  -AB      Other 1 36 cm  -AB      Other 2 47.9 cm  -AB              Neck Circumferential (cm)    Measurement Location 1 Neck  -AB      Measurement Location 2 Axillary  -AB      Measurement Location 3 Nipple line  -AB      Measurement Location 4 below breast  -AB              Trunk Circumferential (cm)    Measurement Location 1 Abdomen at navel  -AB      Measurement Location 2  Hip  -AB              Manual Lymphatic Drainage    Manual Lymphatic Drainage initial sequence; opened regional lymph nodes; opened anastamoses; extremity treatment  -AB      Initial Sequence short neck; supraclavicular; shoulder collectors; abdomen  -AB      Opened Regional Lymph Nodes axillary; inguinal; paraspinal  -AB      Opened Anastamoses anterior axillo-axillary; posterior axillo-axillary; anterior inguino-inguinal; posterior inguino-inguinal  -AB      Extremity Treatment MLD to full limb  -AB      MLD to Full Limb RUE, LLE  -AB      Manual Lymphatic Drainage Comments vibration to RUE, axilla  -AB      Manual Therapy MLD to RUE, LLE, abdomen, axillary, inguinal, shoulder collectors, paraspinals  -AB              Compression/Skin Care    Compression/Skin Care skin care; wrapping location; bandaging  -AB      Skin Care moisturizing lotion applied  -AB      Wrapping Location lower extremity; upper extremity  -AB      Wrapping Location UE right:; hand to axilla  -AB      Bandage Layers short-stretch bandages (comment size/quantity); cotton elastic stocking- single layer (comment size); soft foam- 1/4 inch  -AB      Bandaging Comments tg  size M to LLE from base of toes to knee  -AB      Bandaging Technique circumferential/spiral; light compression; moderate compression  -AB      Compression/Skin Care Comments compression pump to abdomen, LLE, and RUE  -AB            User Key  (r) = Recorded By, (t) = Taken By, (c) = Cosigned By    Initials Name Provider Type    Willa Schwartz, OT Occupational Therapist                                      Therapy Education  Given: HEP, Edema management, Symptoms/condition management, Bandaging/dressing change  Program: Reinforced  How Provided: Verbal, Demonstration  Provided to: Patient  Level of Understanding: Verbalized                Time Calculation:   OT Start Time: 1400  OT Stop Time: 1525  OT Time Calculation (min): 85 min  OT Non-Billable Time (min): 25  min  Total Timed Code Minutes- OT: 85 minute(s)     Therapy Charges for Today     Code Description Service Date Service Provider Modifiers Qty    14049839005 HC OT MANUAL THERAPY EA 15 MIN 1/28/2022 Willa Sanchez OT GO, KX 4    71842408468 HC OT LYMPHEDEMA MANAGEMENT-15 MIN 1/28/2022 Willa Sanchez OT KX 1    [id number]  OT VASOPNEUMAT DEVIC 1 OR MORE AREAS 1/28/2022 Willa Sanchez OT GO, KX 1                      Willa Sanchez OT  1/28/2022   Yes

## 2022-04-05 ENCOUNTER — APPOINTMENT (OUTPATIENT)
Dept: OCCUPATIONAL THERAPY | Facility: HOSPITAL | Age: 67
End: 2022-04-05

## 2022-04-05 DIAGNOSIS — F32.89 OTHER DEPRESSION: ICD-10-CM

## 2022-04-05 RX ORDER — FLUOXETINE 10 MG/1
CAPSULE ORAL
Qty: 30 CAPSULE | Refills: 0 | Status: SHIPPED | OUTPATIENT
Start: 2022-04-05 | End: 2022-04-08

## 2022-04-08 ENCOUNTER — TELEMEDICINE (OUTPATIENT)
Dept: FAMILY MEDICINE CLINIC | Facility: CLINIC | Age: 67
End: 2022-04-08

## 2022-04-08 VITALS — BODY MASS INDEX: 48.73 KG/M2 | HEIGHT: 63 IN | WEIGHT: 275 LBS

## 2022-04-08 DIAGNOSIS — F32.89 OTHER DEPRESSION: Primary | ICD-10-CM

## 2022-04-08 DIAGNOSIS — E11.65 TYPE 2 DIABETES MELLITUS WITH HYPERGLYCEMIA, WITH LONG-TERM CURRENT USE OF INSULIN: Chronic | ICD-10-CM

## 2022-04-08 DIAGNOSIS — Z79.4 TYPE 2 DIABETES MELLITUS WITH HYPERGLYCEMIA, WITH LONG-TERM CURRENT USE OF INSULIN: Chronic | ICD-10-CM

## 2022-04-08 DIAGNOSIS — I10 ESSENTIAL HYPERTENSION: Chronic | ICD-10-CM

## 2022-04-08 DIAGNOSIS — E78.5 DYSLIPIDEMIA: Chronic | ICD-10-CM

## 2022-04-08 DIAGNOSIS — E03.8 OTHER SPECIFIED HYPOTHYROIDISM: Chronic | ICD-10-CM

## 2022-04-08 PROCEDURE — 99214 OFFICE O/P EST MOD 30 MIN: CPT | Performed by: NURSE PRACTITIONER

## 2022-04-08 RX ORDER — FLUOXETINE HYDROCHLORIDE 20 MG/1
20 CAPSULE ORAL DAILY
Qty: 30 CAPSULE | Refills: 1 | Status: SHIPPED | OUTPATIENT
Start: 2022-04-08 | End: 2022-06-13

## 2022-04-08 NOTE — PROGRESS NOTES
You have chosen to receive care through a telehealth visit.  Do you consent to use a video/audio connection for your medical care today? Yes    History of Present Illness  Ronna Wayne is a 67 y.o. female presents to the clinic via video from her home. Ronna has  DM, type 2, HTN, Dyslipidemia, GERD and vitamin deficiencies. In addition, she has been diagnosed and treated a malignant neoplasm of her upper outer quadrant of right breast. At her last visit in March, Ronna shared  her mother and sister both have  which she is having a difficult time especially with her sister's death. She was started on Prozac 10 mg at that visit which she reports she has responded very well.     Diabetes  She has type 2 diabetes mellitus. The initial diagnosis of diabetes was made . Diabetic complications include a CVA, nephropathy and PVD. Risk factors for coronary artery disease include dyslipidemia, family history, hypertension, obesity and sedentary lifestyle. Current diabetic treatment includes insulin injections and oral agent (monotherapy).  She is currently taking insulin pre-breakfast, pre-lunch and pre-dinner. Insulin injections are given by patient. Rotation sites for injection include the abdominal wall. She is following a diabetic diet. Meal planning includes ADA exchanges, avoidance of concentrated sweets and carbohydrate counting. She rarely participates in exercise. Blood glucose monitoring compliance is good.    Lab Results   Component Value Date    HGBA1C 8.80 (H) 2022     Hypertension   The current episode started more than 1 year ago. The problem is controlled. Associated symptoms include headaches, malaise/fatigue and peripheral edema. Risk factors for coronary artery disease include diabetes mellitus, dyslipidemia, obesity and post-menopausal state. Current antihypertension treatment includes angiotensin blockers. Home blood pressure have been in range.   Lab Results   Component Value  Date    GLUCOSE 168 (H) 03/08/2022    BUN 14 03/08/2022    CREATININE 0.83 03/08/2022    BCR 16.9 03/08/2022    K 4.5 03/08/2022    CO2 22.8 03/08/2022    CALCIUM 9.5 03/08/2022    ALBUMIN 3.40 (L) 03/08/2022    AST 34 (H) 03/08/2022    ALT 24 03/08/2022     Dyslipidemia   The current episode started more than 1 year ago. Current antihyperlipidemic treatment includes statins. Risk factors for coronary artery disease include diabetes mellitus, dyslipidemia, hypertension, obesity and post-menopausal.   Lab Results   Component Value Date    CHOL 169 03/08/2022    CHOL 207 (H) 11/29/2021    CHOL 192 06/22/2021     Lab Results   Component Value Date    TRIG 110 03/08/2022    TRIG 153 (H) 11/29/2021    TRIG 119 06/22/2021     Lab Results   Component Value Date    HDL 51 03/08/2022    HDL 48 11/29/2021    HDL 50 06/22/2021     Lab Results   Component Value Date    LDL 98 03/08/2022     (H) 11/29/2021     (H) 06/22/2021     The following portions of the patient's history were reviewed and updated as appropriate: allergies, current medications, past family history, past medical history, past social history, past surgical history and problem list.    Review of Systems   Constitutional: Positive for fatigue. Negative for activity change, appetite change, chills, fever and unexpected weight change.   Eyes: Negative for visual disturbance.   Respiratory: Negative for cough, shortness of breath and wheezing.    Cardiovascular: Positive for leg swelling (Going to Lymphedema Clinic). Negative for chest pain and palpitations.   Gastrointestinal: Positive for nausea. Negative for vomiting.   Endocrine: Negative for cold intolerance, heat intolerance, polydipsia, polyphagia and polyuria.   Musculoskeletal: Positive for arthralgias.   Skin: Negative for color change and rash.   Neurological: Positive for dizziness, weakness, numbness and headaches. Negative for tremors, speech difficulty and light-headedness.  "  Hematological: Negative for adenopathy.   Psychiatric/Behavioral: Positive for sleep disturbance. Negative for confusion and suicidal ideas. The patient is not nervous/anxious.    All other systems reviewed and are negative.    Vital signs:  Ht 160 cm (62.99\")   Wt 125 kg (275 lb)   BMI 48.73 kg/m²     Physical Exam  Constitutional:       General: She is not in acute distress.     Comments: Sitting in a chair in her home. Smiling;    HENT:      Head: Normocephalic.      Nose: Nose normal.   Eyes:      General: No scleral icterus.        Right eye: No discharge.         Left eye: No discharge.      Conjunctiva/sclera: Conjunctivae normal.   Pulmonary:      Effort: No respiratory distress.   Musculoskeletal:      Cervical back: Neck supple.   Neurological:      Mental Status: She is alert.   Psychiatric:         Mood and Affect: Mood and affect normal.         Speech: Speech normal.         Behavior: Behavior is cooperative.         Thought Content: Thought content normal.       Patient's Body mass index is 48.73 kg/m². indicating that she is obese (BMI >30). Obesity-related health conditions include the following: hypertension, diabetes mellitus and dyslipidemias. Obesity is unchanged. BMI  is above average; BMI management plan is completed. Provided information previously on  portion control and increasing exercise..     Assessment/Plan     Diagnoses and all orders for this visit:    1. Other depression (Primary)  Comments:  Tolerating well will increase to Prozac 20 mg  Orders:  -     FLUoxetine (PROzac) 20 MG capsule; Take 1 capsule by mouth Daily.  Dispense: 30 capsule; Refill: 1    2. Type 2 diabetes mellitus with hyperglycemia, with long-term current use of insulin (HCC)  Comments:  Continue Jardiance, PreMeal 70/30, and Basaglar    3. Essential hypertension  Comments:  Continue Amlodipine, Losartan and HCTZ    4. Dyslipidemia  Comments:  Continue Atorvastatin     5. Other specified " hypothyroidism  Comments:  Continue Levothyroxine       Follow Up In June   Findings and recommendations discussed with Ronna. She will  follow up in June sooner if problems/concerns occur.  Ronna was given instructions and counseling regarding her condition or for health maintenance advice. Please see specific information pulled into the AVS if appropriate    This document has been electronically signed by:

## 2022-04-11 DIAGNOSIS — Z79.4 TYPE 2 DIABETES MELLITUS WITH HYPERGLYCEMIA, WITH LONG-TERM CURRENT USE OF INSULIN: Primary | ICD-10-CM

## 2022-04-11 DIAGNOSIS — E11.65 TYPE 2 DIABETES MELLITUS WITH HYPERGLYCEMIA, WITH LONG-TERM CURRENT USE OF INSULIN: Primary | ICD-10-CM

## 2022-04-11 RX ORDER — INSULIN GLARGINE 300 U/ML
80 INJECTION, SOLUTION SUBCUTANEOUS DAILY
Qty: 5 PEN | Refills: 0
Start: 2022-04-11 | End: 2022-09-29 | Stop reason: SDUPTHER

## 2022-04-12 ENCOUNTER — APPOINTMENT (OUTPATIENT)
Dept: OCCUPATIONAL THERAPY | Facility: HOSPITAL | Age: 67
End: 2022-04-12

## 2022-04-14 ENCOUNTER — HOSPITAL ENCOUNTER (OUTPATIENT)
Dept: MAMMOGRAPHY | Facility: HOSPITAL | Age: 67
Discharge: HOME | End: 2022-04-14
Admitting: INTERNAL MEDICINE

## 2022-04-14 DIAGNOSIS — Z17.0 MALIGNANT NEOPLASM OF UPPER-OUTER QUADRANT OF RIGHT BREAST IN FEMALE, ESTROGEN RECEPTOR POSITIVE: ICD-10-CM

## 2022-04-14 DIAGNOSIS — C50.411 MALIGNANT NEOPLASM OF UPPER-OUTER QUADRANT OF RIGHT BREAST IN FEMALE, ESTROGEN RECEPTOR POSITIVE: ICD-10-CM

## 2022-04-14 PROCEDURE — G0279 TOMOSYNTHESIS, MAMMO: HCPCS

## 2022-04-14 PROCEDURE — G0279 TOMOSYNTHESIS, MAMMO: HCPCS | Performed by: RADIOLOGY

## 2022-04-14 PROCEDURE — 77066 DX MAMMO INCL CAD BI: CPT | Performed by: RADIOLOGY

## 2022-04-14 PROCEDURE — 77066 DX MAMMO INCL CAD BI: CPT

## 2022-04-18 DIAGNOSIS — Z79.4 TYPE 2 DIABETES MELLITUS WITH HYPERGLYCEMIA, WITH LONG-TERM CURRENT USE OF INSULIN: ICD-10-CM

## 2022-04-18 DIAGNOSIS — E11.65 TYPE 2 DIABETES MELLITUS WITH HYPERGLYCEMIA, WITH LONG-TERM CURRENT USE OF INSULIN: ICD-10-CM

## 2022-04-18 DIAGNOSIS — E11.40 TYPE 2 DIABETES MELLITUS WITH DIABETIC NEUROPATHY, WITH LONG-TERM CURRENT USE OF INSULIN: ICD-10-CM

## 2022-04-18 DIAGNOSIS — Z79.4 TYPE 2 DIABETES MELLITUS WITH DIABETIC NEUROPATHY, WITH LONG-TERM CURRENT USE OF INSULIN: ICD-10-CM

## 2022-04-19 ENCOUNTER — HOSPITAL ENCOUNTER (OUTPATIENT)
Dept: OCCUPATIONAL THERAPY | Facility: HOSPITAL | Age: 67
Setting detail: THERAPIES SERIES
Discharge: HOME | End: 2022-04-19

## 2022-04-19 DIAGNOSIS — M79.89 SWELLING IN RIGHT ARMPIT: ICD-10-CM

## 2022-04-19 DIAGNOSIS — I89.0 LYMPHEDEMA OF BREAST: ICD-10-CM

## 2022-04-19 DIAGNOSIS — C77.3 CARCINOMA OF RIGHT BREAST METASTATIC TO AXILLARY LYMPH NODE: ICD-10-CM

## 2022-04-19 DIAGNOSIS — C50.911 CARCINOMA OF RIGHT BREAST METASTATIC TO AXILLARY LYMPH NODE: ICD-10-CM

## 2022-04-19 DIAGNOSIS — Z17.0 MALIGNANT NEOPLASM OF UPPER-OUTER QUADRANT OF RIGHT BREAST IN FEMALE, ESTROGEN RECEPTOR POSITIVE: ICD-10-CM

## 2022-04-19 DIAGNOSIS — I89.0 LYMPHEDEMA OF RIGHT UPPER EXTREMITY: ICD-10-CM

## 2022-04-19 DIAGNOSIS — I89.0 LYMPHEDEMA OF LEFT LOWER EXTREMITY: ICD-10-CM

## 2022-04-19 DIAGNOSIS — C50.411 MALIGNANT NEOPLASM OF UPPER-OUTER QUADRANT OF RIGHT BREAST IN FEMALE, ESTROGEN RECEPTOR POSITIVE: ICD-10-CM

## 2022-04-19 DIAGNOSIS — I89.0 LYMPHEDEMA: Primary | ICD-10-CM

## 2022-04-19 PROCEDURE — 97140 MANUAL THERAPY 1/> REGIONS: CPT

## 2022-04-19 PROCEDURE — 97139 UNLISTED THERAPEUTIC PX: CPT

## 2022-04-19 NOTE — THERAPY TREATMENT NOTE
Outpatient Occupational Therapy Lymphedema Treatment Note  MITZI Keller     Patient Name: Ronna Wayne  : 1955  MRN: [id number]  Today's Date: 2022      Visit Date: 2022    Patient Active Problem List   Diagnosis   • Frequent headaches   • Memory loss   • Essential hypertension   • Dyslipidemia   • Type 2 diabetes mellitus with hyperglycemia, with long-term current use of insulin (HCC)   • Obstructive sleep apnea syndrome   • Glaucoma   • Anxiety   • GERD (gastroesophageal reflux disease)   • Morbid obesity   • Weakness of left arm   • Cerebrovascular accident (CVA) due to embolism (HCC)   • SVT (supraventricular tachycardia) (HCC)   • Vitamin D deficiency   • B12 deficiency   • Malignant neoplasm of upper-outer quadrant of right breast in female, estrogen receptor positive (HCC)   • Other specified hypothyroidism   • Personal history of colonic polyps        Past Medical History:   Diagnosis Date   • Anxiety    • B12 deficiency    • BPV (benign positional vertigo)    • Breast cancer (HCC) 2021   • Diabetes mellitus (HCC)    • Diarrhea    • Disease of thyroid gland    • Elevated cholesterol    • Fibromyalgia    • GERD (gastroesophageal reflux disease)    • Glaucoma    • Heart murmur    • Hyperlipidemia    • Hypertension    • Kidney disease    • Obesity    • Peptic ulceration    • PONV (postoperative nausea and vomiting)    • Sleep apnea     c-pap   • Stroke (HCC)    • Weakness of left arm         Past Surgical History:   Procedure Laterality Date   • ABDOMINAL SURGERY     • APPENDECTOMY     • BREAST CYST ASPIRATION     • BREAST LUMPECTOMY WITH SENTINEL NODE BIOPSY Right 2021    Procedure: BREAST LUMPECTOMY WITH SENTINEL NODE BIOPSY;  Surgeon: Lee Parrish MD;  Location: [facility];  Service: General;  Laterality: Right;   • CATARACT EXTRACTION Bilateral    • CHOLECYSTECTOMY     • COLONOSCOPY     • ENDOSCOPY     • HEMATOMA EVACUATION TRUNK Right 2021    Procedure: HEMATOMA  EVACUATION TRUNK;  Surgeon: Lee Parrish MD;  Location: [facility];  Service: General;  Laterality: Right;   • URETHRA SURGERY           Visit Dx:      ICD-10-CM ICD-9-CM   1. Lymphedema  I89.0 457.1   2. Malignant neoplasm of upper-outer quadrant of right breast in female, estrogen receptor positive (HCC)  C50.411 174.4    Z17.0 V86.0   3. Carcinoma of right breast metastatic to axillary lymph node (HCC)  C50.911 174.9    C77.3 196.3   4. Swelling in right armpit  M79.89 729.81   5. Lymphedema of breast  I89.0 457.1   6. Lymphedema of right upper extremity  I89.0 457.1   7. Lymphedema of left lower extremity  I89.0 457.1        Lymphedema     Row Name 04/19/22 1300             Subjective Pain    Able to rate subjective pain? yes  -HB      Pre-Treatment Pain Level 0  -HB      Post-Treatment Pain Level 0  -HB      Subjective Pain Comment Pt reports no pain this date.  -HB              Subjective Comments    Subjective Comments Patient presents for tx this date. Pt states shes using her pump daily.  -HB              Lymphedema Assessment    Lymphedema Classification LLE:;Trunk:;Other:;secondary;stage 2 (Spontaneously Irreversible);primary;RUE:  -HB      Lymphedema Cancer Related Sx left;axillary dissection;lumpectomy  -HB      Positive Lymph Nodes # 1  -HB      Radiation Therapy Received yes  -HB      Lymphedema Precautions kidney disease  -HB              Posture/Observations    Posture- WNL Posture is WNL  -HB              Lymphedema Edema Assessment    Ptting Edema Category By grade out of 4  -HB      Pitting Edema + 2/4;Mild  -HB      Stemmer Sign bilateral:;negative  -HB      Buchanan Hump negative;bilateral:  -HB              Skin Changes/Observations    Location/Assessment Upper Extremity;Lower Extremity;Head/Neck;Upper Quadrant  -HB      Upper Extremity Conditions clean;dry;hairless  -HB      Upper Extremity Color/Pigment right:;hyperpigmented  -HB      Upper Quadrant Conditions dry;hairless  -HB       Upper Quadrant Color/Pigment fibrosis;P'eau d'orange;hyperpigmented  -HB      Lower Extremity Conditions bilateral:;clean;dry;hairless  -HB      Lower Extremity Color/Pigment left:;hyperpigmented  -HB      Head/Neck Conditions intact;clean;dry  -HB      Head/Neck Color/Pigment red  -HB              Lymphedema Pulses/Capillary Refill    Lymphedema Pulses/Capillary Refill capillary refill  -HB      Capillary Refill lower extremity capillary refill  -HB      Lower Extremity Capillary Refill right:;left:;less than 3 seconds  -HB              Lymphedema Measurements    Measurement Type(s) Quick Girth;Circumferential  -HB      Quick Girth Areas Upper extremities;Lower extremities  -HB      Circumferential Areas Neck;Trunk  -HB              RUE Quick Girth (cm)    Axilla 52.6 cm  -HB      Mid upper arm 48.2 cm  -HB      Elbow 30.5 cm  -HB      Mid forearm 29.5 cm  -HB      Wrist crease 18 cm  -HB      Web space 20.4 cm  -HB      Met-heads 18.6 cm  -HB      Other 1 20.3 cm  -HB      Other 2 25.6 cm  -HB      RUE Quick Girth Total 263.7  -HB              LLE Quick Girth (cm)    Met-heads 23.2 cm  -HB      Mid foot 23.4 cm  -HB      Smallest ankle 28 cm  -HB      Largest calf 48.9 cm  -HB      Tib tuberosity 51.1 cm  -HB      Mid patella 55.6 cm  -HB      Other 1 33.6 cm  -HB      Other 2 47.3 cm  -HB              Manual Lymphatic Drainage    Manual Lymphatic Drainage initial sequence;opened regional lymph nodes;opened anastamoses;extremity treatment  -HB      Initial Sequence short neck;supraclavicular;shoulder collectors;abdomen  -HB      Opened Regional Lymph Nodes axillary;inguinal;paraspinal  -HB      Opened Anastamoses anterior axillo-axillary;posterior axillo-axillary;anterior inguino-inguinal;posterior inguino-inguinal  -HB      Extremity Treatment MLD to full limb;simple/brief MLD  -HB      Simple/Brief MLD BLE's  -HB      MLD to Full Limb RUE and LLE  -HB      Manual Lymphatic Drainage Comments vibration to RUE  axilla/breast  -HB      Manual Therapy MLD to RUE , abdomen, LLE, axillary, inguinal, shoudler collectors, trunk  -HB              Compression/Skin Care    Compression/Skin Care skin care;wrapping location;bandaging  -HB      Skin Care moisturizing lotion applied  -HB      Wrapping Location upper extremity  -HB      Wrapping Location UE right:;hand to axilla  -HB      Bandage Layers short-stretch bandages (comment size/quantity);cotton elastic stocking- single layer (comment size);soft foam- 1/4 inch  -HB      Bandaging Technique circumferential/spiral;moderate compression  -HB            User Key  (r) = Recorded By, (t) = Taken By, (c) = Cosigned By    Initials Name Provider Type     Sunshine Graves OT Occupational Therapist                                                       Time Calculation:   OT Start Time: 1310  OT Stop Time: 1435  OT Time Calculation (min): 85 min  OT Non-Billable Time (min): 25 min  Total Timed Code Minutes- OT: 80 minute(s)     Therapy Charges for Today     Code Description Service Date Service Provider Modifiers Qty    94083174727  OT LYMPHEDEMA MANAGEMENT-15 MIN 4/19/2022 Sunshine Graves OT  2    [id number]  OT MANUAL THERAPY EA 15 MIN 4/19/2022 Sunshine Graves OT GO 4                      Sunshine Graves OT  4/19/2022

## 2022-04-26 ENCOUNTER — APPOINTMENT (OUTPATIENT)
Dept: OCCUPATIONAL THERAPY | Facility: HOSPITAL | Age: 67
End: 2022-04-26

## 2022-05-22 DIAGNOSIS — I10 ESSENTIAL HYPERTENSION: ICD-10-CM

## 2022-05-23 RX ORDER — METOPROLOL SUCCINATE 100 MG/1
TABLET, EXTENDED RELEASE ORAL
Qty: 30 TABLET | Refills: 2 | Status: SHIPPED | OUTPATIENT
Start: 2022-05-23 | End: 2022-08-24

## 2022-05-26 ENCOUNTER — LAB (OUTPATIENT)
Dept: FAMILY MEDICINE CLINIC | Facility: CLINIC | Age: 67
End: 2022-05-26

## 2022-05-26 DIAGNOSIS — E83.42 HYPOMAGNESEMIA: ICD-10-CM

## 2022-05-26 PROCEDURE — 83735 ASSAY OF MAGNESIUM: CPT | Performed by: NURSE PRACTITIONER

## 2022-05-27 DIAGNOSIS — E83.42 HYPOMAGNESEMIA: ICD-10-CM

## 2022-05-27 LAB — MAGNESIUM SERPL-MCNC: 1.6 MG/DL (ref 1.6–2.4)

## 2022-05-27 RX ORDER — MAGNESIUM OXIDE TAB 400 MG (241.3 MG ELEMENTAL MG) 400 (241.3 MG) MG
TAB ORAL
Qty: 90 TABLET | Refills: 3 | Status: ON HOLD | OUTPATIENT
Start: 2022-05-27 | End: 2022-12-18

## 2022-06-12 DIAGNOSIS — F32.89 OTHER DEPRESSION: ICD-10-CM

## 2022-06-12 DIAGNOSIS — E03.9 HYPOTHYROIDISM, UNSPECIFIED TYPE: Chronic | ICD-10-CM

## 2022-06-13 RX ORDER — FLUOXETINE HYDROCHLORIDE 20 MG/1
CAPSULE ORAL
Qty: 30 CAPSULE | Refills: 1 | Status: SHIPPED | OUTPATIENT
Start: 2022-06-13 | End: 2022-08-24

## 2022-06-13 RX ORDER — LEVOTHYROXINE SODIUM 88 UG/1
TABLET ORAL
Qty: 90 TABLET | Refills: 0 | Status: SHIPPED | OUTPATIENT
Start: 2022-06-13 | End: 2022-09-20

## 2022-06-15 ENCOUNTER — OFFICE VISIT (OUTPATIENT)
Dept: SURGERY | Facility: CLINIC | Age: 67
End: 2022-06-15

## 2022-06-15 VITALS — WEIGHT: 275 LBS | BODY MASS INDEX: 48.73 KG/M2 | HEIGHT: 63 IN

## 2022-06-15 DIAGNOSIS — Z17.0 MALIGNANT NEOPLASM OF UPPER-OUTER QUADRANT OF RIGHT BREAST IN FEMALE, ESTROGEN RECEPTOR POSITIVE: Primary | ICD-10-CM

## 2022-06-15 DIAGNOSIS — C50.411 MALIGNANT NEOPLASM OF UPPER-OUTER QUADRANT OF RIGHT BREAST IN FEMALE, ESTROGEN RECEPTOR POSITIVE: Primary | ICD-10-CM

## 2022-06-15 PROCEDURE — 99213 OFFICE O/P EST LOW 20 MIN: CPT | Performed by: SURGERY

## 2022-06-29 ENCOUNTER — OFFICE VISIT (OUTPATIENT)
Dept: FAMILY MEDICINE CLINIC | Facility: CLINIC | Age: 67
End: 2022-06-29

## 2022-06-29 VITALS
SYSTOLIC BLOOD PRESSURE: 130 MMHG | HEIGHT: 63 IN | OXYGEN SATURATION: 96 % | HEART RATE: 57 BPM | BODY MASS INDEX: 48.73 KG/M2 | DIASTOLIC BLOOD PRESSURE: 74 MMHG | TEMPERATURE: 96.9 F | WEIGHT: 275 LBS

## 2022-06-29 DIAGNOSIS — C50.411 MALIGNANT NEOPLASM OF UPPER-OUTER QUADRANT OF RIGHT BREAST IN FEMALE, ESTROGEN RECEPTOR POSITIVE: Chronic | ICD-10-CM

## 2022-06-29 DIAGNOSIS — E11.65 TYPE 2 DIABETES MELLITUS WITH HYPERGLYCEMIA, WITH LONG-TERM CURRENT USE OF INSULIN: Primary | Chronic | ICD-10-CM

## 2022-06-29 DIAGNOSIS — G47.33 OBSTRUCTIVE SLEEP APNEA SYNDROME: Chronic | ICD-10-CM

## 2022-06-29 DIAGNOSIS — I47.1 SVT (SUPRAVENTRICULAR TACHYCARDIA): Chronic | ICD-10-CM

## 2022-06-29 DIAGNOSIS — Z79.4 TYPE 2 DIABETES MELLITUS WITH HYPERGLYCEMIA, WITH LONG-TERM CURRENT USE OF INSULIN: Primary | Chronic | ICD-10-CM

## 2022-06-29 DIAGNOSIS — F32.A DEPRESSION, UNSPECIFIED DEPRESSION TYPE: ICD-10-CM

## 2022-06-29 DIAGNOSIS — I10 ESSENTIAL HYPERTENSION: Chronic | ICD-10-CM

## 2022-06-29 DIAGNOSIS — E03.8 OTHER SPECIFIED HYPOTHYROIDISM: Chronic | ICD-10-CM

## 2022-06-29 DIAGNOSIS — E78.5 DYSLIPIDEMIA: Chronic | ICD-10-CM

## 2022-06-29 DIAGNOSIS — Z17.0 MALIGNANT NEOPLASM OF UPPER-OUTER QUADRANT OF RIGHT BREAST IN FEMALE, ESTROGEN RECEPTOR POSITIVE: Chronic | ICD-10-CM

## 2022-06-29 DIAGNOSIS — E83.42 HYPOMAGNESEMIA: ICD-10-CM

## 2022-06-29 DIAGNOSIS — E55.9 VITAMIN D DEFICIENCY: ICD-10-CM

## 2022-06-29 PROCEDURE — 99214 OFFICE O/P EST MOD 30 MIN: CPT | Performed by: NURSE PRACTITIONER

## 2022-06-30 ENCOUNTER — LAB (OUTPATIENT)
Dept: FAMILY MEDICINE CLINIC | Facility: CLINIC | Age: 67
End: 2022-06-30

## 2022-06-30 DIAGNOSIS — E78.2 MIXED HYPERLIPIDEMIA: ICD-10-CM

## 2022-06-30 PROCEDURE — 83735 ASSAY OF MAGNESIUM: CPT | Performed by: NURSE PRACTITIONER

## 2022-06-30 PROCEDURE — 80053 COMPREHEN METABOLIC PANEL: CPT | Performed by: PHYSICIAN ASSISTANT

## 2022-07-01 PROBLEM — F32.A DEPRESSION: Status: ACTIVE | Noted: 2022-07-01

## 2022-07-01 LAB
ALBUMIN SERPL-MCNC: 3.4 G/DL (ref 3.5–5.2)
ALBUMIN/GLOB SERPL: 0.9 G/DL
ALP SERPL-CCNC: 76 U/L (ref 39–117)
ALT SERPL W P-5'-P-CCNC: 13 U/L (ref 1–33)
ANION GAP SERPL CALCULATED.3IONS-SCNC: 13 MMOL/L (ref 5–15)
AST SERPL-CCNC: 21 U/L (ref 1–32)
BILIRUB SERPL-MCNC: 0.7 MG/DL (ref 0–1.2)
BUN SERPL-MCNC: 11 MG/DL (ref 8–23)
BUN/CREAT SERPL: 14.5 (ref 7–25)
CALCIUM SPEC-SCNC: 9.4 MG/DL (ref 8.6–10.5)
CHLORIDE SERPL-SCNC: 98 MMOL/L (ref 98–107)
CO2 SERPL-SCNC: 25 MMOL/L (ref 22–29)
CREAT SERPL-MCNC: 0.76 MG/DL (ref 0.57–1)
EGFRCR SERPLBLD CKD-EPI 2021: 86 ML/MIN/1.73
GLOBULIN UR ELPH-MCNC: 4 GM/DL
GLUCOSE SERPL-MCNC: 91 MG/DL (ref 65–99)
MAGNESIUM SERPL-MCNC: 1.6 MG/DL (ref 1.6–2.4)
POTASSIUM SERPL-SCNC: 4.2 MMOL/L (ref 3.5–5.2)
PROT SERPL-MCNC: 7.4 G/DL (ref 6–8.5)
SODIUM SERPL-SCNC: 136 MMOL/L (ref 136–145)

## 2022-07-03 DIAGNOSIS — I10 ESSENTIAL HYPERTENSION: Chronic | ICD-10-CM

## 2022-07-05 RX ORDER — HYDROCHLOROTHIAZIDE 12.5 MG/1
TABLET ORAL
Qty: 30 TABLET | Refills: 3 | Status: SHIPPED | OUTPATIENT
Start: 2022-07-05 | End: 2022-10-28 | Stop reason: SDUPTHER

## 2022-07-06 ENCOUNTER — OFFICE VISIT (OUTPATIENT)
Dept: SURGERY | Facility: CLINIC | Age: 67
End: 2022-07-06

## 2022-07-06 ENCOUNTER — LAB (OUTPATIENT)
Dept: LAB | Facility: HOSPITAL | Age: 67
End: 2022-07-06

## 2022-07-06 VITALS — HEIGHT: 63 IN | WEIGHT: 275 LBS | BODY MASS INDEX: 48.73 KG/M2

## 2022-07-06 DIAGNOSIS — C50.411 MALIGNANT NEOPLASM OF UPPER-OUTER QUADRANT OF RIGHT BREAST IN FEMALE, ESTROGEN RECEPTOR POSITIVE: ICD-10-CM

## 2022-07-06 DIAGNOSIS — Z17.0 MALIGNANT NEOPLASM OF UPPER-OUTER QUADRANT OF RIGHT BREAST IN FEMALE, ESTROGEN RECEPTOR POSITIVE: ICD-10-CM

## 2022-07-06 DIAGNOSIS — Z17.0 MALIGNANT NEOPLASM OF UPPER-OUTER QUADRANT OF RIGHT BREAST IN FEMALE, ESTROGEN RECEPTOR POSITIVE: Primary | ICD-10-CM

## 2022-07-06 DIAGNOSIS — C50.411 MALIGNANT NEOPLASM OF UPPER-OUTER QUADRANT OF RIGHT BREAST IN FEMALE, ESTROGEN RECEPTOR POSITIVE: Primary | ICD-10-CM

## 2022-07-06 LAB — SARS-COV-2 RNA RESP QL NAA+PROBE: NOT DETECTED

## 2022-07-06 PROCEDURE — U0003 INFECTIOUS AGENT DETECTION BY NUCLEIC ACID (DNA OR RNA); SEVERE ACUTE RESPIRATORY SYNDROME CORONAVIRUS 2 (SARS-COV-2) (CORONAVIRUS DISEASE [COVID-19]), AMPLIFIED PROBE TECHNIQUE, MAKING USE OF HIGH THROUGHPUT TECHNOLOGIES AS DESCRIBED BY CMS-2020-01-R: HCPCS

## 2022-07-06 PROCEDURE — U0005 INFEC AGEN DETEC AMPLI PROBE: HCPCS

## 2022-07-06 PROCEDURE — 99213 OFFICE O/P EST LOW 20 MIN: CPT | Performed by: SURGERY

## 2022-07-06 NOTE — PROGRESS NOTES
Subjective   Ronna Wayne is a 67 y.o. female  is here today for follow-up.         Ronna Wayne is a 67 y.o. female here for follow up after right breast lumpectomy with sentinel lymph node biopsy was positive requiring level 1 and 2 axillary howard dissection.  Postoperatively her course was complicated by postoperative day 0 hematoma development for which she was taken back to the operating room and ligation of a single bleeding vessel was performed.  The patient is doing well and her incisions have healed nicely.  Postradiation changes are within normal limits and continue to improve.  She has a large seroma cavity which is expected but unfortunately has developed skin breakdown and resulting drainage.  There is underlying proteinaceous debris and she will require wound exploration..                        Assessment     Diagnoses and all orders for this visit:    1. Malignant neoplasm of upper-outer quadrant of right breast in female, estrogen receptor positive (HCC) (Primary)      Ronna Wayne is a 67 y.o. female status post right breast lumpectomy with axillary howard dissection.  The patient had a single 2.5 mm howard met with extranodal extension and the remaining lymph nodes were negative for malignancy.  Final excision margins on the lumpectomy specimen were negative.  The patient is doing well has completed adjuvant radiation therapy.  She has developed spontaneous dehiscence and drainage of her large seroma and she will undergo wound exploration with biopsy due to the previous malignancy.

## 2022-07-07 ENCOUNTER — HOSPITAL ENCOUNTER (OUTPATIENT)
Facility: HOSPITAL | Age: 67
Setting detail: HOSPITAL OUTPATIENT SURGERY
Discharge: HOME | End: 2022-07-07
Attending: SURGERY | Admitting: SURGERY

## 2022-07-07 ENCOUNTER — ANESTHESIA EVENT (OUTPATIENT)
Dept: PERIOP | Facility: HOSPITAL | Age: 67
End: 2022-07-07

## 2022-07-07 ENCOUNTER — ANESTHESIA (OUTPATIENT)
Dept: PERIOP | Facility: HOSPITAL | Age: 67
End: 2022-07-07

## 2022-07-07 VITALS
RESPIRATION RATE: 20 BRPM | WEIGHT: 286.6 LBS | TEMPERATURE: 97.7 F | BODY MASS INDEX: 50.78 KG/M2 | HEIGHT: 63 IN | OXYGEN SATURATION: 96 % | DIASTOLIC BLOOD PRESSURE: 71 MMHG | SYSTOLIC BLOOD PRESSURE: 155 MMHG | HEART RATE: 47 BPM

## 2022-07-07 DIAGNOSIS — C50.411 MALIGNANT NEOPLASM OF UPPER-OUTER QUADRANT OF RIGHT BREAST IN FEMALE, ESTROGEN RECEPTOR POSITIVE: ICD-10-CM

## 2022-07-07 DIAGNOSIS — Z17.0 MALIGNANT NEOPLASM OF UPPER-OUTER QUADRANT OF RIGHT BREAST IN FEMALE, ESTROGEN RECEPTOR POSITIVE: ICD-10-CM

## 2022-07-07 LAB — GLUCOSE BLDC GLUCOMTR-MCNC: 211 MG/DL (ref 70–130)

## 2022-07-07 PROCEDURE — 25010000002 ONDANSETRON PER 1 MG: Performed by: NURSE ANESTHETIST, CERTIFIED REGISTERED

## 2022-07-07 PROCEDURE — 25010000002 KETOROLAC TROMETHAMINE PER 15 MG: Performed by: NURSE ANESTHETIST, CERTIFIED REGISTERED

## 2022-07-07 PROCEDURE — 25010000002 CEFAZOLIN PER 500 MG: Performed by: SURGERY

## 2022-07-07 PROCEDURE — 25010000002 FENTANYL CITRATE (PF) 50 MCG/ML SOLUTION: Performed by: NURSE ANESTHETIST, CERTIFIED REGISTERED

## 2022-07-07 PROCEDURE — 19120 REMOVAL OF BREAST LESION: CPT | Performed by: SURGERY

## 2022-07-07 PROCEDURE — S0260 H&P FOR SURGERY: HCPCS | Performed by: SURGERY

## 2022-07-07 PROCEDURE — 88305 TISSUE EXAM BY PATHOLOGIST: CPT

## 2022-07-07 PROCEDURE — 82962 GLUCOSE BLOOD TEST: CPT

## 2022-07-07 PROCEDURE — 25010000002 PROPOFOL 10 MG/ML EMULSION: Performed by: NURSE ANESTHETIST, CERTIFIED REGISTERED

## 2022-07-07 PROCEDURE — 0 MEPERIDINE PER 100 MG: Performed by: NURSE ANESTHETIST, CERTIFIED REGISTERED

## 2022-07-07 RX ORDER — BUPIVACAINE HYDROCHLORIDE AND EPINEPHRINE 5; 5 MG/ML; UG/ML
INJECTION, SOLUTION PERINEURAL AS NEEDED
Status: DISCONTINUED | OUTPATIENT
Start: 2022-07-07 | End: 2022-07-07 | Stop reason: HOSPADM

## 2022-07-07 RX ORDER — OXYCODONE HYDROCHLORIDE AND ACETAMINOPHEN 5; 325 MG/1; MG/1
1 TABLET ORAL ONCE
Status: COMPLETED | OUTPATIENT
Start: 2022-07-07 | End: 2022-07-07

## 2022-07-07 RX ORDER — LIDOCAINE HYDROCHLORIDE 20 MG/ML
INJECTION, SOLUTION INFILTRATION; PERINEURAL AS NEEDED
Status: DISCONTINUED | OUTPATIENT
Start: 2022-07-07 | End: 2022-07-07 | Stop reason: SURG

## 2022-07-07 RX ORDER — SODIUM CHLORIDE 0.9 % (FLUSH) 0.9 %
10 SYRINGE (ML) INJECTION EVERY 12 HOURS SCHEDULED
Status: DISCONTINUED | OUTPATIENT
Start: 2022-07-07 | End: 2022-07-07 | Stop reason: HOSPADM

## 2022-07-07 RX ORDER — ONDANSETRON 2 MG/ML
INJECTION INTRAMUSCULAR; INTRAVENOUS AS NEEDED
Status: DISCONTINUED | OUTPATIENT
Start: 2022-07-07 | End: 2022-07-07 | Stop reason: SURG

## 2022-07-07 RX ORDER — SODIUM CHLORIDE, SODIUM LACTATE, POTASSIUM CHLORIDE, CALCIUM CHLORIDE 600; 310; 30; 20 MG/100ML; MG/100ML; MG/100ML; MG/100ML
125 INJECTION, SOLUTION INTRAVENOUS ONCE
Status: COMPLETED | OUTPATIENT
Start: 2022-07-07 | End: 2022-07-07

## 2022-07-07 RX ORDER — FAMOTIDINE 10 MG/ML
INJECTION, SOLUTION INTRAVENOUS AS NEEDED
Status: DISCONTINUED | OUTPATIENT
Start: 2022-07-07 | End: 2022-07-07 | Stop reason: SURG

## 2022-07-07 RX ORDER — SODIUM CHLORIDE 0.9 % (FLUSH) 0.9 %
10 SYRINGE (ML) INJECTION AS NEEDED
Status: DISCONTINUED | OUTPATIENT
Start: 2022-07-07 | End: 2022-07-07 | Stop reason: HOSPADM

## 2022-07-07 RX ORDER — PROPOFOL 10 MG/ML
VIAL (ML) INTRAVENOUS AS NEEDED
Status: DISCONTINUED | OUTPATIENT
Start: 2022-07-07 | End: 2022-07-07 | Stop reason: SURG

## 2022-07-07 RX ORDER — ONDANSETRON 2 MG/ML
4 INJECTION INTRAMUSCULAR; INTRAVENOUS AS NEEDED
Status: DISCONTINUED | OUTPATIENT
Start: 2022-07-07 | End: 2022-07-07 | Stop reason: HOSPADM

## 2022-07-07 RX ORDER — ACETAMINOPHEN 325 MG/1
650 TABLET ORAL EVERY 4 HOURS PRN
Qty: 30 TABLET | Refills: 0 | Status: ON HOLD | OUTPATIENT
Start: 2022-07-07 | End: 2022-12-18

## 2022-07-07 RX ORDER — KETOROLAC TROMETHAMINE 30 MG/ML
15 INJECTION, SOLUTION INTRAMUSCULAR; INTRAVENOUS EVERY 6 HOURS PRN
Status: COMPLETED | OUTPATIENT
Start: 2022-07-07 | End: 2022-07-07

## 2022-07-07 RX ORDER — TRAMADOL HYDROCHLORIDE 50 MG/1
50 TABLET ORAL EVERY 8 HOURS PRN
Qty: 10 TABLET | Refills: 0 | Status: SHIPPED | OUTPATIENT
Start: 2022-07-07 | End: 2022-07-27 | Stop reason: SDUPTHER

## 2022-07-07 RX ORDER — SODIUM CHLORIDE, SODIUM LACTATE, POTASSIUM CHLORIDE, CALCIUM CHLORIDE 600; 310; 30; 20 MG/100ML; MG/100ML; MG/100ML; MG/100ML
100 INJECTION, SOLUTION INTRAVENOUS ONCE AS NEEDED
Status: DISCONTINUED | OUTPATIENT
Start: 2022-07-07 | End: 2022-07-07 | Stop reason: HOSPADM

## 2022-07-07 RX ORDER — MIDAZOLAM HYDROCHLORIDE 1 MG/ML
0.5 INJECTION INTRAMUSCULAR; INTRAVENOUS
Status: DISCONTINUED | OUTPATIENT
Start: 2022-07-07 | End: 2022-07-07 | Stop reason: HOSPADM

## 2022-07-07 RX ORDER — FENTANYL CITRATE 50 UG/ML
50 INJECTION, SOLUTION INTRAMUSCULAR; INTRAVENOUS
Status: DISCONTINUED | OUTPATIENT
Start: 2022-07-07 | End: 2022-07-07 | Stop reason: HOSPADM

## 2022-07-07 RX ORDER — FENTANYL CITRATE 50 UG/ML
INJECTION, SOLUTION INTRAMUSCULAR; INTRAVENOUS AS NEEDED
Status: DISCONTINUED | OUTPATIENT
Start: 2022-07-07 | End: 2022-07-07 | Stop reason: SURG

## 2022-07-07 RX ORDER — IPRATROPIUM BROMIDE AND ALBUTEROL SULFATE 2.5; .5 MG/3ML; MG/3ML
3 SOLUTION RESPIRATORY (INHALATION) ONCE AS NEEDED
Status: DISCONTINUED | OUTPATIENT
Start: 2022-07-07 | End: 2022-07-07 | Stop reason: HOSPADM

## 2022-07-07 RX ORDER — MAGNESIUM HYDROXIDE 1200 MG/15ML
LIQUID ORAL AS NEEDED
Status: DISCONTINUED | OUTPATIENT
Start: 2022-07-07 | End: 2022-07-07 | Stop reason: HOSPADM

## 2022-07-07 RX ORDER — MEPERIDINE HYDROCHLORIDE 25 MG/ML
12.5 INJECTION INTRAMUSCULAR; INTRAVENOUS; SUBCUTANEOUS
Status: COMPLETED | OUTPATIENT
Start: 2022-07-07 | End: 2022-07-07

## 2022-07-07 RX ORDER — IBUPROFEN 600 MG/1
600 TABLET ORAL EVERY 6 HOURS PRN
Qty: 30 TABLET | Refills: 0 | Status: ON HOLD | OUTPATIENT
Start: 2022-07-07 | End: 2022-12-18

## 2022-07-07 RX ADMIN — ONDANSETRON 4 MG: 2 INJECTION INTRAMUSCULAR; INTRAVENOUS at 11:55

## 2022-07-07 RX ADMIN — FAMOTIDINE 20 MG: 10 INJECTION INTRAVENOUS at 11:42

## 2022-07-07 RX ADMIN — OXYCODONE HYDROCHLORIDE AND ACETAMINOPHEN 1 TABLET: 5; 325 TABLET ORAL at 13:50

## 2022-07-07 RX ADMIN — KETOROLAC TROMETHAMINE 15 MG: 30 INJECTION, SOLUTION INTRAMUSCULAR; INTRAVENOUS at 12:53

## 2022-07-07 RX ADMIN — FENTANYL CITRATE 50 MCG: 50 INJECTION INTRAMUSCULAR; INTRAVENOUS at 12:49

## 2022-07-07 RX ADMIN — MEPERIDINE HYDROCHLORIDE 12.5 MG: 25 INJECTION, SOLUTION INTRAMUSCULAR; INTRAVENOUS; SUBCUTANEOUS at 12:46

## 2022-07-07 RX ADMIN — FENTANYL CITRATE 50 MCG: 50 INJECTION INTRAMUSCULAR; INTRAVENOUS at 12:26

## 2022-07-07 RX ADMIN — CEFAZOLIN 3 G: 1 INJECTION, POWDER, FOR SOLUTION INTRAVENOUS at 11:42

## 2022-07-07 RX ADMIN — PROPOFOL 150 MG: 10 INJECTION, EMULSION INTRAVENOUS at 11:47

## 2022-07-07 RX ADMIN — LIDOCAINE HYDROCHLORIDE 60 MG: 20 INJECTION, SOLUTION INFILTRATION; PERINEURAL at 11:47

## 2022-07-07 RX ADMIN — FENTANYL CITRATE 100 MCG: 50 INJECTION INTRAMUSCULAR; INTRAVENOUS at 11:42

## 2022-07-07 RX ADMIN — FENTANYL CITRATE 50 MCG: 50 INJECTION INTRAMUSCULAR; INTRAVENOUS at 12:56

## 2022-07-07 RX ADMIN — FENTANYL CITRATE 50 MCG: 50 INJECTION INTRAMUSCULAR; INTRAVENOUS at 12:19

## 2022-07-07 RX ADMIN — SODIUM CHLORIDE, POTASSIUM CHLORIDE, SODIUM LACTATE AND CALCIUM CHLORIDE: 600; 310; 30; 20 INJECTION, SOLUTION INTRAVENOUS at 11:42

## 2022-07-07 RX ADMIN — MEPERIDINE HYDROCHLORIDE 12.5 MG: 25 INJECTION, SOLUTION INTRAMUSCULAR; INTRAVENOUS; SUBCUTANEOUS at 12:41

## 2022-07-07 NOTE — ANESTHESIA PREPROCEDURE EVALUATION
Anesthesia Evaluation     Patient summary reviewed and Nursing notes reviewed   history of anesthetic complications: PONV  NPO Solid Status: Waived due to emergency  NPO Liquid Status: Waived due to emergency           Airway   Mallampati: II  TM distance: >3 FB  Neck ROM: full  Dental - normal exam     Pulmonary     breath sounds clear to auscultation  (+) sleep apnea on CPAP,   Cardiovascular   Exercise tolerance: good (4-7 METS)    Rhythm: regular  Rate: normal    (+) hypertension, valvular problems/murmurs, hyperlipidemia,       Neuro/Psych  (+) CVA (residual left hand tremor 2017), headaches,    GI/Hepatic/Renal/Endo    (+) obesity, morbid obesity, GERD, PUD,  renal disease, diabetes mellitus type 1,     Musculoskeletal     Abdominal     Abdomen: soft.   Substance History      OB/GYN          Other      history of cancer        Phys Exam Other: Echo May 2021 shows ejection fraction of 51 to 55%                  Anesthesia Plan    ASA 3 - emergent     general     intravenous induction     Anesthetic plan, risks, benefits, and alternatives have been provided, discussed and informed consent has been obtained with: patient.    Plan discussed with CRNA.

## 2022-07-07 NOTE — ANESTHESIA PROCEDURE NOTES
Airway  Urgency: elective    Date/Time: 7/7/2022 11:47 AM  Airway not difficult    General Information and Staff    Patient location during procedure: OR  Anesthesiologist: Alvaro Titus MD  CRNA/CAA: Lyn Dangelo CRNA    Indications and Patient Condition  Indications for airway management: airway protection    Preoxygenated: yes  Mask difficulty assessment: 0 - not attempted    Final Airway Details  Final airway type: supraglottic airway      Successful airway: classic  Size 4    Number of attempts at approach: 1  Assessment: lips, teeth, and gum same as pre-op    Additional Comments  LMA placed with no trauma noted. Patient tolerated well. Good seal. Secured.

## 2022-07-07 NOTE — ANESTHESIA POSTPROCEDURE EVALUATION
Patient: Ronna Wayne    Procedure Summary     Date: 07/07/22 Room / Location: [facility] OR 02 /  [facility] OR    Anesthesia Start: 1142 Anesthesia Stop: 1226    Procedure: BREAST ABSCESS INCISION AND DRAINAGE (Right Breast) Diagnosis:       Malignant neoplasm of upper-outer quadrant of right breast in female, estrogen receptor positive (HCC)      (Malignant neoplasm of upper-outer quadrant of right breast in female, estrogen receptor positive (HCC) [C50.411, Z17.0])    Surgeons: Lee Parrish MD Provider: Alvaro Titus MD    Anesthesia Type: general ASA Status: 3 - Emergent          Anesthesia Type: general    Vitals  Vitals Value Taken Time   /87 07/07/22 1241   Temp 97.7 °F (36.5 °C) 07/07/22 1226   Pulse 55 07/07/22 1241   Resp 20 07/07/22 1241   SpO2 100 % 07/07/22 1241           Post Anesthesia Care and Evaluation    Patient location during evaluation: PHASE II  Patient participation: complete - patient participated  Level of consciousness: awake and alert  Pain score: 1  Pain management: adequate    Airway patency: patent  Anesthetic complications: No anesthetic complications  PONV Status: controlled  Cardiovascular status: acceptable  Respiratory status: acceptable  Hydration status: acceptable

## 2022-07-07 NOTE — OP NOTE
BREAST ABSCESS INCISION AND DRAINAGE  Procedure Note    Ronna Wayne  7/7/2022    Pre-op Diagnosis:   Malignant neoplasm of upper-outer quadrant of right breast in female, estrogen receptor positive (HCC) [C50.411, Z17.0]    Post-op Diagnosis:     Post-Op Diagnosis Codes:     * Malignant neoplasm of upper-outer quadrant of right breast in female, estrogen receptor positive (HCC) [C50.411, Z17.0]    Procedure(s):  BREAST ABSCESS INCISION AND DRAINAGE    Surgeon(s):  Lee Parrish MD    Anesthesia: Choice    Staff:   Circulator: Diamond Burdick RN  Assistant: Charli Falk    Findings:   Extensive post radiation changes to the right breast  Large seroma cavity that had spontaneously drained with no evidence of infection  Partial seroma cavity excision    Operative Procedure: Patient taken operating simply supine on operating table.  After induction of LMA anesthesia the right breast was prepped and draped in usual sterile fashion.  Timeout procedure was performed.  Preoperative antibiotics were confirmed.  There is an open wound at the lateral aspect of the patient's previous surgical incision through which she developed spontaneous drainage of her seroma.  This area was probed and the previous incision was opened with a scalpel along the incisional scar.  There was no evidence of active infection or necrosis.  There were dense radiation changes and the seroma cavity was large.  Partial excision of the seroma cavity was performed circumferentially and specimen sent to pathology.  The wound was irrigated with sterile water and all irrigant suctioned free.  Hemostasis was assured with cautery.  The midportion of the incision was closed with interrupted nylon suture and a Penrose drain was placed through the incision and secured in a loop fashion with silk suture.  The wound was then packed with Kerlix gauze and the patient was awakened from anesthesia and taken recovery after bandage was  applied.    Estimated Blood Loss: 10 mL    Specimens:   Seroma cavity right breast           Drains: Penrose drain right breast    Grafts/Implants: None    Complications: None      Lee Parrish MD     Date: 7/7/2022  Time: 12:14 EDT

## 2022-07-08 ENCOUNTER — OFFICE VISIT (OUTPATIENT)
Dept: SURGERY | Facility: CLINIC | Age: 67
End: 2022-07-08

## 2022-07-08 VITALS — BODY MASS INDEX: 50.68 KG/M2 | WEIGHT: 286 LBS | HEIGHT: 63 IN

## 2022-07-08 DIAGNOSIS — Z17.0 MALIGNANT NEOPLASM OF UPPER-OUTER QUADRANT OF RIGHT BREAST IN FEMALE, ESTROGEN RECEPTOR POSITIVE: Primary | ICD-10-CM

## 2022-07-08 DIAGNOSIS — C50.411 MALIGNANT NEOPLASM OF UPPER-OUTER QUADRANT OF RIGHT BREAST IN FEMALE, ESTROGEN RECEPTOR POSITIVE: Primary | ICD-10-CM

## 2022-07-08 PROCEDURE — 99024 POSTOP FOLLOW-UP VISIT: CPT | Performed by: SURGERY

## 2022-07-08 NOTE — PROGRESS NOTES
Subjective   Ronna Wayne is a 67 y.o. female  is here today for follow-up.         Ronna Wayne is a 67 y.o. female here for follow up after seroma drainage and wound debridement of the right breast.  The patient is doing well and packing has been removed in the office today.  Drain is in place.          Assessment     Diagnoses and all orders for this visit:    1. Malignant neoplasm of upper-outer quadrant of right breast in female, estrogen receptor positive (HCC) (Primary)      Ronna Wayne is a 67 y.o. female doing well after seroma drainage and debridement of the right breast.  Packing removed in the office today.  No need for further packing.  Penrose drain in place will be floss twice daily.  Follow-up in 1 week for reassessment of the wound.

## 2022-07-11 LAB — REF LAB TEST METHOD: NORMAL

## 2022-07-12 NOTE — H&P
[facility]   HISTORY AND PHYSICAL    Patient Name: Ronna Wayne  : 1955  MRN: [id number]  Primary Care Physician:  Mague Acosta APRN  Date of admission: 2022    Subjective   Subjective     Chief Complaint: seroma right breast    66 yo F with R breast cancer s/p lumpectomy and XRT with large seroma that has spontaneously drained and required debridement      Review of Systems   Constitutional: Negative for activity change, appetite change, chills and fever.   HENT: Negative for sore throat and trouble swallowing.    Eyes: Negative for visual disturbance.   Respiratory: Negative for cough and shortness of breath.    Cardiovascular: Negative for chest pain and palpitations.   Gastrointestinal: Negative for abdominal distention, abdominal pain, blood in stool, constipation, diarrhea, nausea and vomiting.   Endocrine: Negative for cold intolerance and heat intolerance.   Genitourinary: Negative for dysuria.   Musculoskeletal: Negative for joint swelling.   Skin: Negative for color change, rash and wound.   Allergic/Immunologic: Negative for immunocompromised state.   Neurological: Negative for dizziness, seizures, weakness and headaches.   Hematological: Negative for adenopathy. Does not bruise/bleed easily.   Psychiatric/Behavioral: Negative for agitation and confusion.   All other systems reviewed and are negative.       Personal History     Past Medical History:   Diagnosis Date   • Anxiety    • B12 deficiency    • BPV (benign positional vertigo)    • Breast cancer (HCC) 2021   • Diabetes mellitus (HCC)    • Diarrhea    • Disease of thyroid gland    • Elevated cholesterol    • Fibromyalgia    • GERD (gastroesophageal reflux disease)    • Glaucoma    • Heart murmur    • Hyperlipidemia    • Hypertension    • Kidney disease    • Obesity    • Peptic ulceration    • PONV (postoperative nausea and vomiting)    • Sleep apnea     c-pap   • Stroke (HCC)    • Weakness of left arm        Past  Surgical History:   Procedure Laterality Date   • ABDOMINAL SURGERY     • APPENDECTOMY     • BREAST ABSCESS INCISION AND DRAINAGE Right 7/7/2022    Procedure: BREAST ABSCESS INCISION AND DRAINAGE;  Surgeon: Lee Parrish MD;  Location: [facility] OR;  Service: General;  Laterality: Right;   • BREAST CYST ASPIRATION     • BREAST LUMPECTOMY WITH SENTINEL NODE BIOPSY Right 8/5/2021    Procedure: BREAST LUMPECTOMY WITH SENTINEL NODE BIOPSY;  Surgeon: Lee Parrish MD;  Location: [facility] OR;  Service: General;  Laterality: Right;   • CATARACT EXTRACTION Bilateral    • CHOLECYSTECTOMY     • COLONOSCOPY     • ENDOSCOPY     • HEMATOMA EVACUATION TRUNK Right 8/5/2021    Procedure: HEMATOMA EVACUATION TRUNK;  Surgeon: Lee Parrish MD;  Location: [facility] OR;  Service: General;  Laterality: Right;   • URETHRA SURGERY         Family History: family history includes Alcohol abuse in her maternal aunt; Arthritis in her sister; Breast cancer (age of onset: 40) in her sister; Cancer in her maternal grandfather; Diabetes in her mother; Heart attack in her father; Hyperlipidemia in her father, sister, and sister; Hypertension in her father, sister, sister, sister, and another family member; Obesity in her sister and sister; Stroke in her paternal grandfather and paternal grandmother; Thyroid disease in her mother and sister. Otherwise pertinent FHx was reviewed and not pertinent to current issue.    Social History:  reports that she quit smoking about 46 years ago. Her smoking use included cigarettes. She has a 12.00 pack-year smoking history. She has never used smokeless tobacco. She reports that she does not drink alcohol.    Home Medications:  Dexcom G6 , Dexcom G6 Sensor, FLUoxetine, Folic Acid-Cholecalciferol, Insulin Glargine (1 Unit Dial), Insulin Syringe-Needle U-100, Magnesium Oxide, acetaminophen, anastrozole, aspirin, atorvastatin, cholecalciferol, empagliflozin, hydroCHLOROthiazide, ibuprofen,  insulin aspart prot-insulin aspart, levothyroxine, metoprolol succinate XL, nystatin, and traMADol    Allergies:  Allergies   Allergen Reactions   • Codeine GI Intolerance     Increased heart rate     • Sulfa Antibiotics GI Intolerance     Heart rate increase        Objective    Objective     Vitals:        Physical Exam  Constitutional:       Appearance: She is well-developed.   HENT:      Head: Normocephalic and atraumatic.   Eyes:      Conjunctiva/sclera: Conjunctivae normal.      Pupils: Pupils are equal, round, and reactive to light.   Neck:      Thyroid: No thyromegaly.      Vascular: No JVD.      Trachea: No tracheal deviation.   Cardiovascular:      Rate and Rhythm: Normal rate and regular rhythm.      Heart sounds: No murmur heard.    No friction rub. No gallop.   Pulmonary:      Effort: Pulmonary effort is normal.      Breath sounds: Normal breath sounds.   Abdominal:      General: There is no distension.      Palpations: Abdomen is soft. There is no hepatomegaly or splenomegaly.      Tenderness: There is no abdominal tenderness.      Hernia: No hernia is present.   Musculoskeletal:         General: No deformity. Normal range of motion.      Cervical back: Neck supple.   Skin:     General: Skin is warm and dry.      Comments: R breast XRT changes with open seroma and drainage.  No infection   Neurological:      Mental Status: She is alert and oriented to person, place, and time.         Result Review    Result Review:  I have personally reviewed the results from the time of this admission to 7/12/2022 08:21 EDT and agree with these findings:  [x]  Laboratory list / accordion  []  Microbiology  [x]  Radiology  []  EKG/Telemetry   []  Cardiology/Vascular   []  Pathology  []  Old records  []  Other:        Assessment & Plan   Assessment / Plan     Brief Patient Summary:  Ronna Wayne is a 67 y.o. female who has R breast seroma requiring debridement    Active Hospital Problems:  Active Hospital  Problems    Diagnosis    • **Malignant neoplasm of upper-outer quadrant of right breast in female, estrogen receptor positive (HCC)      Plan:   OR for debridement      Lee Parrish MD

## 2022-07-13 ENCOUNTER — OFFICE VISIT (OUTPATIENT)
Dept: SURGERY | Facility: CLINIC | Age: 67
End: 2022-07-13

## 2022-07-13 VITALS — WEIGHT: 286 LBS | BODY MASS INDEX: 50.68 KG/M2 | HEIGHT: 63 IN

## 2022-07-13 DIAGNOSIS — Z17.0 MALIGNANT NEOPLASM OF UPPER-OUTER QUADRANT OF RIGHT BREAST IN FEMALE, ESTROGEN RECEPTOR POSITIVE: Primary | ICD-10-CM

## 2022-07-13 DIAGNOSIS — C50.411 MALIGNANT NEOPLASM OF UPPER-OUTER QUADRANT OF RIGHT BREAST IN FEMALE, ESTROGEN RECEPTOR POSITIVE: Primary | ICD-10-CM

## 2022-07-13 PROCEDURE — 99024 POSTOP FOLLOW-UP VISIT: CPT | Performed by: SURGERY

## 2022-07-13 NOTE — PROGRESS NOTES
Subjective   Ronna Wayne is a 67 y.o. female  is here today for follow-up.         Ronna Wayne is a 67 y.o. female here for follow up after seroma drainage from the right breast.  Her Penrose drains in place and seroma drainage persist.  This is likely to be a chronic problem until radiation changes subside and we are able to perform seroma cavity excision without extensive trauma, bleeding, or wound healing issues.    Assessment     Diagnoses and all orders for this visit:    1. Malignant neoplasm of upper-outer quadrant of right breast in female, estrogen receptor positive (HCC) (Primary)      Ronna Wayne is a 67 y.o. female doing well after drainage of seroma of the right breast.  Penrose drain will remain in place for now and hopefully will heal by secondary intent but may require drainage until radiation changes resolved and formal excision can be performed without significant morbidity.

## 2022-07-27 ENCOUNTER — OFFICE VISIT (OUTPATIENT)
Dept: SURGERY | Facility: CLINIC | Age: 67
End: 2022-07-27

## 2022-07-27 VITALS — WEIGHT: 286 LBS | HEIGHT: 63 IN | BODY MASS INDEX: 50.68 KG/M2

## 2022-07-27 DIAGNOSIS — C50.411 MALIGNANT NEOPLASM OF UPPER-OUTER QUADRANT OF RIGHT BREAST IN FEMALE, ESTROGEN RECEPTOR POSITIVE: ICD-10-CM

## 2022-07-27 DIAGNOSIS — Z17.0 MALIGNANT NEOPLASM OF UPPER-OUTER QUADRANT OF RIGHT BREAST IN FEMALE, ESTROGEN RECEPTOR POSITIVE: ICD-10-CM

## 2022-07-27 PROCEDURE — 99024 POSTOP FOLLOW-UP VISIT: CPT | Performed by: SURGERY

## 2022-07-27 RX ORDER — TRAMADOL HYDROCHLORIDE 50 MG/1
50 TABLET ORAL EVERY 8 HOURS PRN
Qty: 20 TABLET | Refills: 0 | Status: ON HOLD | OUTPATIENT
Start: 2022-07-27 | End: 2022-12-18

## 2022-07-28 NOTE — PROGRESS NOTES
Subjective   Ronna Wayne is a 67 y.o. female  is here today for follow-up.         Ronna Wayne is a 67 y.o. female here for follow up after seroma drainage from the right breast.  Her Penrose drains in place and seroma drainage persist.  This is likely to be a chronic problem until radiation changes subside and we are able to perform seroma cavity excision without extensive trauma, bleeding, or wound healing issues.    Assessment     Diagnoses and all orders for this visit:    1. Malignant neoplasm of upper-outer quadrant of right breast in female, estrogen receptor positive (HCC)  -     traMADol (ULTRAM) 50 MG tablet; Take 1 tablet by mouth Every 8 (Eight) Hours As Needed for Moderate Pain .  Dispense: 20 tablet; Refill: 0      Ronna Wayne is a 67 y.o. female doing well after drainage of seroma of the right breast.  Penrose drain will remain in place for now and hopefully will heal by secondary intent but may require drainage until radiation changes resolved and formal excision can be performed without significant morbidity.

## 2022-08-09 ENCOUNTER — OFFICE VISIT (OUTPATIENT)
Dept: ONCOLOGY | Facility: CLINIC | Age: 67
End: 2022-08-09

## 2022-08-09 VITALS
SYSTOLIC BLOOD PRESSURE: 144 MMHG | RESPIRATION RATE: 18 BRPM | WEIGHT: 277.8 LBS | BODY MASS INDEX: 49.22 KG/M2 | DIASTOLIC BLOOD PRESSURE: 73 MMHG | HEIGHT: 63 IN | HEART RATE: 57 BPM | TEMPERATURE: 97.5 F | OXYGEN SATURATION: 96 %

## 2022-08-09 DIAGNOSIS — Z17.0 MALIGNANT NEOPLASM OF UPPER-OUTER QUADRANT OF RIGHT BREAST IN FEMALE, ESTROGEN RECEPTOR POSITIVE: Primary | ICD-10-CM

## 2022-08-09 DIAGNOSIS — C50.411 MALIGNANT NEOPLASM OF UPPER-OUTER QUADRANT OF RIGHT BREAST IN FEMALE, ESTROGEN RECEPTOR POSITIVE: Primary | ICD-10-CM

## 2022-08-09 PROCEDURE — 99214 OFFICE O/P EST MOD 30 MIN: CPT | Performed by: INTERNAL MEDICINE

## 2022-08-09 RX ORDER — ANASTROZOLE 1 MG/1
1 TABLET ORAL DAILY
Qty: 30 TABLET | Refills: 11 | Status: SHIPPED | OUTPATIENT
Start: 2022-08-09 | End: 2023-02-20 | Stop reason: SDUPTHER

## 2022-08-09 NOTE — PROGRESS NOTES
Name:  Ronna Wayne  :  1955  Date:  2022     REFERRING PHYSICIAN  Lee Parrish MD    PRIMARY CARE PROVIDER  Mague Acosta APRN    REASON FOR FOLLOWUP  1. Malignant neoplasm of upper-outer quadrant of right breast in female, estrogen receptor positive (HCC)      CHIEF COMPLAINT  Rupturing of her right-sided breast seroma early last month (July), drains still in place.    Dear Lee,    HISTORY OF PRESENT ILLNESS:   I saw Ms. Wayne in follow up today in our medical oncology clinic. As you are aware, she is a pleasant, 67 y.o., white female with a history of multiple medical problems, including hypertension, diabetes and sleep apnea who was diagnosed with an ER positive (95%), TX positive (95%), HER2/abida negative (1+ by IHC), invasive, mammary carcinoma (with mixed ductal and lobular features) of the upper, outer quadrant of the right breast in late Spring 2021. She was referred to you, and you performed a successful, right-sided lumpectomy on 2021. A sentinel node was positive; however, an additional twelve, excised, right-sided, axillary lymph nodes were uninvolved (). Final, surgical staging was consistent with stage [id number] disease (IIB). She was subsequently referred to our clinic (and radiation oncology) for further evaluation and management. Adjuvant treatment with whole breast irradiation followed by (at least five years of) endocrine therapy were ultimately recommended. She completed a thirty-fraction course of the former by late 2021 and began the latter (daily Arimidex) by early 2021. She has been doing overall very well in routine followup, ever since then, with no evidence of residual/recurrent disease to date.    INTERIM HISTORY:  Ms. Wayne returns to clinic today for followup by herself. She began Arimidex in early 2021, has been on it for a total of nearly ~eight (8) full months now and is still tolerating this medication  overall very well, with no noticeable side effects. Her only complaint today is that, early last month (July), her right-sided breast seroma increased in size to the point that it ruptured; and she had to undergo an I and D on 07/07/2022. Her postoperative drains remain in place; but this area is now overall doing much better, and she continues to follow with surgery. She has stopped following with the lymphedema clinic as she is currently doing well performing routine exercises on her own. She has no other new or specific complaints.    Past Medical History:   Diagnosis Date   • Anxiety    • B12 deficiency    • BPV (benign positional vertigo)    • Breast cancer (HCC) 05/2021   • Diabetes mellitus (HCC)    • Diarrhea    • Disease of thyroid gland    • Elevated cholesterol    • Fibromyalgia    • GERD (gastroesophageal reflux disease)    • Glaucoma    • Heart murmur    • Hyperlipidemia    • Hypertension    • Kidney disease    • Obesity    • Peptic ulceration    • PONV (postoperative nausea and vomiting)    • Sleep apnea     c-pap   • Stroke (HCC)    • Weakness of left arm        Past Surgical History:   Procedure Laterality Date   • ABDOMINAL SURGERY     • APPENDECTOMY     • BREAST ABSCESS INCISION AND DRAINAGE Right 7/7/2022    Procedure: BREAST ABSCESS INCISION AND DRAINAGE;  Surgeon: Lee Parrish MD;  Location: [facility] OR;  Service: General;  Laterality: Right;   • BREAST CYST ASPIRATION     • BREAST LUMPECTOMY WITH SENTINEL NODE BIOPSY Right 8/5/2021    Procedure: BREAST LUMPECTOMY WITH SENTINEL NODE BIOPSY;  Surgeon: Lee Parrish MD;  Location: [facility] OR;  Service: General;  Laterality: Right;   • CATARACT EXTRACTION Bilateral    • CHOLECYSTECTOMY     • COLONOSCOPY     • ENDOSCOPY     • HEMATOMA EVACUATION TRUNK Right 8/5/2021    Procedure: HEMATOMA EVACUATION TRUNK;  Surgeon: Lee Parrish MD;  Location: [facility] OR;  Service: General;  Laterality: Right;   • URETHRA SURGERY         Social  History     Socioeconomic History   • Marital status:    Tobacco Use   • Smoking status: Former Smoker     Packs/day: 1.50     Years: 8.00     Pack years: 12.00     Types: Cigarettes     Quit date:      Years since quittin.6   • Smokeless tobacco: Never Used   Vaping Use   • Vaping Use: Never used   Substance and Sexual Activity   • Alcohol use: No     Comment: former social drinker not had anything in 25 + years   • Sexual activity: Defer       Family History   Problem Relation Age of Onset   • Thyroid disease Mother    • Diabetes Mother    • Hyperlipidemia Father    • Hypertension Father    • Heart attack Father    • Alcohol abuse Maternal Aunt    • Cancer Maternal Grandfather         PROSTATE   • Stroke Paternal Grandmother    • Stroke Paternal Grandfather    • Hypertension Other    • Hypertension Sister    • Breast cancer Sister 40   • Obesity Sister    • Hypertension Sister    • Hyperlipidemia Sister    • Hyperlipidemia Sister    • Hypertension Sister    • Arthritis Sister    • Obesity Sister    • Thyroid disease Sister        Allergies   Allergen Reactions   • Codeine GI Intolerance     Increased heart rate     • Sulfa Antibiotics GI Intolerance     Heart rate increase        Current Outpatient Medications   Medication Sig Dispense Refill   • acetaminophen (TYLENOL) 325 MG tablet Take 2 tablets by mouth Every 4 (Four) Hours As Needed for Mild Pain . 30 tablet 0   • acetaminophen (TYLENOL) 325 MG tablet Take 2 tablets by mouth Every 4 (Four) Hours As Needed for Mild Pain . 30 tablet 0   • anastrozole (ARIMIDEX) 1 MG tablet Take 1 tablet by mouth Daily. 30 tablet 11   • aspirin 81 MG EC tablet Take 1 tablet by mouth Daily. 90 tablet 3   • atorvastatin (Lipitor) 10 MG tablet Take 1 tablet by mouth Daily. 30 tablet 3   • cholecalciferol (VITAMIN D3) 25 MCG (1000 UT) tablet Take 1,000 Units by mouth Daily.     • Continuous Blood Gluc  (Dexcom G6 ) device 1 Device Daily. 1 each 0   •  "Continuous Blood Gluc Sensor (Dexcom G6 Sensor) Every 10 (Ten) Days. 9 each 3   • empagliflozin (Jardiance) 25 MG tablet tablet Take 1 tablet by mouth Every Morning. 30 tablet 3   • FLUoxetine (PROzac) 20 MG capsule TAKE ONE CAPSULE BY MOUTH DAILY 30 capsule 1   • Folic Acid-Cholecalciferol 1-3775 MG-UNIT capsule Take 1 capsule by mouth Daily.     • hydroCHLOROthiazide (HYDRODIURIL) 12.5 MG tablet TAKE ONE TABLET BY MOUTH DAILY AS NEEDED FOR SWELLING 30 tablet 3   • ibuprofen (ADVIL,MOTRIN) 600 MG tablet Take 1 tablet by mouth Every 6 (Six) Hours As Needed for Mild Pain . 30 tablet 0   • ibuprofen (ADVIL,MOTRIN) 600 MG tablet Take 1 tablet by mouth Every 6 (Six) Hours As Needed for Mild Pain . 30 tablet 0   • insulin aspart prot-insulin aspart (novoLOG 70/30) (70-30) 100 UNIT/ML injection Inject 5 Units under the skin into the appropriate area as directed Daily With Breakfast.     • insulin aspart prot-insulin aspart (novoLOG 70/30) (70-30) 100 UNIT/ML injection Inject 8 Units under the skin into the appropriate area as directed Daily With Lunch.     • insulin aspart prot-insulin aspart (novoLOG 70/30) (70-30) 100 UNIT/ML injection Per her routine regimen with maximum daily dose of 50 units 150 mL 2   • Insulin Glargine, 1 Unit Dial, (Toujeo SoloStar) 300 UNIT/ML solution pen-injector injection Inject 80 Units under the skin into the appropriate area as directed Daily. 5 pen 0   • Insulin Syringe-Needle U-100 30G X 5/16\" 0.3 ML misc 1 Device 3 (Three) Times a Day. 100 each 5   • levothyroxine (SYNTHROID, LEVOTHROID) 88 MCG tablet TAKE ONE TABLET BY MOUTH DAILY 90 tablet 0   • MAGnesium-Oxide 400 (240 Mg) MG tablet TAKE ONE TABLET BY MOUTH THREE TIMES A DAY 90 tablet 3   • metoprolol succinate XL (TOPROL-XL) 100 MG 24 hr tablet TAKE ONE TABLET BY MOUTH DAILY 30 tablet 2   • nystatin 887860 UNIT/GM topical powder      • traMADol (ULTRAM) 50 MG tablet Take 1 tablet by mouth Every 8 (Eight) Hours As Needed for Moderate " "Pain . 20 tablet 0     No current facility-administered medications for this visit.     REVIEW OF SYSTEMS  CONSTITUTIONAL:  No fever, chills, night sweats or fatigue.  EYES:  No blurry vision, diplopia or other vision changes.  ENT:  No hearing loss, nosebleeds or sore throat.  CARDIOVASCULAR:  No palpitations, arrhythmia, syncopal episodes or edema.  PULMONARY:  No hemoptysis, wheezing, chronic cough or shortness of breath.  BREASTS: As per the HPI above.  GASTROINTESTINAL:  No nausea or vomiting.  No constipation or diarrhea.  No abdominal pain.  GENITOURINARY:  No hematuria, kidney stones or frequent urination.  MUSCULOSKELETAL:  No joint or back pains. Decreased range of motion in the right arm, about to start following with our lymphedema clinic, as per the HPI above.  INTEGUMENTARY: As per the HPI above.  ENDOCRINE:  No excessive thirst or hot flashes.  HEMATOLOGIC:  No history of free bleeding, spontaneous bleeding or clotting.  IMMUNOLOGIC:  No allergies or frequent infections.  NEUROLOGIC: No numbness, tingling, seizures or weakness.  PSYCHIATRIC:  No anxiety or depression.    PHYSICAL EXAMINATION  /73   Pulse 57   Temp 97.5 °F (36.4 °C) (Temporal)   Resp 18   Ht 160 cm (63\")   Wt 126 kg (277 lb 12.8 oz)   SpO2 96%   BMI 49.21 kg/m²     Pain Score:  Pain Score    22 1430   PainSc: 0-No pain       PHQ-Score Total:  PHQ-9 Total Score:      ECO  GENERAL:  A well-developed, well-nourished, morbidly obese, white female in no acute distress.  HEENT:  Pupils equally round and reactive to light. Extraocular muscles intact.  CARDIOVASCULAR:  Regular rate and rhythm. No murmurs, gallops or rubs.  LUNGS:  Clear to auscultation bilaterally.  BREASTS: Status post right-sided lumpectomy on 2021 and I and D on 2022. Drains currently still in place.  ABDOMEN:  Soft, nontender, nondistended with positive bowel sounds.  EXTREMITIES:  No clubbing, cyanosis or edema bilaterally.  SKIN:  No " rashes or petechiae.  NEURO:  Cranial nerves grossly intact. No focal deficits.  PSYCH:  Alert and oriented x3.    LABORATORY  Lab Results   Component Value Date    WBC 6.61 03/08/2022    HGB 13.5 03/08/2022    HCT 41.6 03/08/2022    .5 (H) 03/08/2022     03/08/2022    NEUTROABS 3.07 03/08/2022       Lab Results   Component Value Date     06/30/2022    K 4.2 06/30/2022    CL 98 06/30/2022    CO2 25.0 06/30/2022    BUN 11 06/30/2022    CREATININE 0.76 06/30/2022    GLUCOSE 91 06/30/2022    CALCIUM 9.4 06/30/2022    AST 21 06/30/2022    ALT 13 06/30/2022    ALKPHOS 76 06/30/2022    BILITOT 0.7 06/30/2022    PROTEINTOT 7.4 06/30/2022    ALBUMIN 3.40 (L) 06/30/2022     CBC (01/11/2022): WBCs: 7.11; HgB: 12.5; Hct: 37.3; platelets: 152  CBC (11/29/2021): WBCs: 5.36; HgB: 12.8; Hct: 39.1; platelets: 148  CBC (11/09/2021): WBCs: 6.63; HgB: 11.9; Hct: 36.1; platelets: 160  CBC (08/06/2021): WBCs: 9.36; HgB: 9.2; Hct: 29.1; platelets: 131    IMAGING  MRI breast bilateral with and without contrast (06/22/2021):  Impression:  1) Negative left breast MRI.  2) Biopsy proven malignancy in the right 9:00 region measuring 3.7 cm. There are no additional worrisome findings in the right breast.    Bone densitometry (DEXA) scan (05/11/2021):  Impression: The BMD measured at the AP spine L1-L4 is 1.157 gm/cm2 with a T-score of -0.2. The patient is considered to be normal according to WHO criteria. Fracture risk is low.    Bilateral diagnostic mammogram with tomosynthesis (04/14/2022):  Impression:  1) Stable mammographic appearance of the left breast with no findings suspicious for malignancy.  2) Presumed postoperative and posttreatment related changes in the right breast. Recommend short interval followup.    PATHOLOGY  Breast, lumpectomy, right (08/05/2021):  Invasive mammary carcinoma with ductal and lobular features, margins uninvolved. Atypical, lobular hyperplasia with involvement of ducts. Dense stromal  "fibrosis. Greatest dimension of invasive focus: 31 mm. ER positive (95%), MO positive (95%), HER2 negative (1+ by IHC). [id number] (stage IIB).    [place] node, right #1 (08/05/2021):  Metastatic mammary carcinoma, 2.5 cmm extent, with extranodal extension (1/1).    Axillary contents, right (08/05/2021):  No malignancy identified in twelve axillary lymph nodes (0/12).    MammaPrint result (08/31/2021): Low risk.    IMPRESSION AND PLAN  Ms. Wayne is a 67 y.o., white female with:  1. Breast carcinoma: Diagnosed in late Spring 2021 and status post a right-sided lumpectomy with right axillary evaluation on 08/05/2021. The final, surgical pathology was consistent with stage IIB ([id number]), ER positive (95%), MO positive (95%), HER2 negative (1+ by IHC), invasive, mammary carcinoma (with ductal and lobular features) of the right breast. The surgical margins were clear; and, while one sentinel node was involved, an additional twelve lymph nodes were all negative for metastasis (1/13). I have had multiple, long discussions with the patient since the time of her initial consultation in our clinic (on 08/25/2021) regarding this diagnosis and its prognosis. In short, her surgery went extremely well; and her prognosis was/remains overall very good. That said, adjuvant treatment was/remains recommended in order to maximize her chances of cure. As MammaPrint testing on her tumor was consistent with \"low risk\" disease, chemotherapy was not indicated (as the potential risks would have drastically outweighed its very limited/nonexistant potential benefits in her case). As she had a lumpectomy, whole breast radiation was definitely indicated, and she completed a thirty fraction course of this therapy on 11/30/2021. As her tumor is very strongly ER/MO positive, endocrine therapy (with an aromatase inhibitor, given her postmenopausal status) was definitely indicated and strongly recommended as the final step in her adjuvant treatment. " She was given a Rx for Arimidex 1 mg PO daily at that time. She has been on this therapy for a total of about ~eight (8) months now and is still tolerating it overall well, without any noticeable side effects. She will proceed with this current treatment plan. Meanwhile, I have had several, long discussions with her regarding the current guidelines for the routine follow up of patients with a history of breast cancer. We again discussed how, other than periodic clinic visits, continued annual mammograms of remaining breast tissue (the most recent followup bilateral exam, performed on 04/14/2022 and summarized above, remained Bi-Rads Category 3 due to presumed right-sided treatment related changes; a six month, followup right-sided exam will be performed in mid-October) and routine bone density monitoring (particularly since she is on an AI), there is no proven benefit to performing additional studies (including blood work, such as CBCs, LFTs or tumor markers, or imaging, such as CT, NM bone or PET scans) to identify metastatic recurrences before they become symptomatic (and are identified via the focused investigation of new symptoms), as patient outcomes are not improved by doing so (she should continue to get routine CBCs, etc. through her PCP’s office as indicated, of course). We will see her back in our clinic in six months for another wellness visit.  2. Bone health: The results from the patient's most recent DEXA scan (performed on 05/11/2021) are summarized above. Continue to monitor on Arimidex (we will repeat a DEXA in May 2023).  3. Lymphedema: Secondary to a combination of right-sided, axillary surgery and adjuvant, localized XRT. She is currently not following routinely with our lymphedema clinic as she continues to do well performing some routine exercises on her own. Continue to monitor.  4. Seroma: Status post rupture and I and D in early July. Drains currently still in place but reportedly now doing  much better. Ongoing management per surgery.  The patient was in agreement with these plans.    It is a pleasure to participate in Ms. Wayne's care. Please do not hesitate to call with any questions or concerns that you may have.    A total of 30 minutes were spent coordinating this patient’s care in clinic today; more than 50% of this time was face-to-face with the patient, reviewing her interim medical history, discussing the results of April's repeat bilateral mammogram and counseling on the current treatment and followup plan. All questions were answered to her satisfaction.    FOLLOW UP  Continue Arimidex 1 mg PO daily (refills provided today). With surgery, as previously planned. With radiation oncology, as previously planned. Repeat a unilateral (right-sided) mammogram in mid-October. Return to our clinic in 6 months (~early February 2023).            This document was electronically signed by LEYDI Teresa MD August 9, 2022 14:57 EDT      CC: MD Marbin Ramirez MD Sherelene S. Fortney, APRN

## 2022-08-23 DIAGNOSIS — F32.89 OTHER DEPRESSION: ICD-10-CM

## 2022-08-23 DIAGNOSIS — I10 ESSENTIAL HYPERTENSION: ICD-10-CM

## 2022-08-24 ENCOUNTER — OFFICE VISIT (OUTPATIENT)
Dept: SURGERY | Facility: CLINIC | Age: 67
End: 2022-08-24

## 2022-08-24 VITALS — WEIGHT: 277.8 LBS | HEIGHT: 63 IN | BODY MASS INDEX: 49.22 KG/M2

## 2022-08-24 DIAGNOSIS — Z17.0 MALIGNANT NEOPLASM OF UPPER-OUTER QUADRANT OF RIGHT BREAST IN FEMALE, ESTROGEN RECEPTOR POSITIVE: Primary | ICD-10-CM

## 2022-08-24 DIAGNOSIS — C50.411 MALIGNANT NEOPLASM OF UPPER-OUTER QUADRANT OF RIGHT BREAST IN FEMALE, ESTROGEN RECEPTOR POSITIVE: Primary | ICD-10-CM

## 2022-08-24 PROCEDURE — 99024 POSTOP FOLLOW-UP VISIT: CPT | Performed by: SURGERY

## 2022-08-24 RX ORDER — METOPROLOL SUCCINATE 100 MG/1
TABLET, EXTENDED RELEASE ORAL
Qty: 30 TABLET | Refills: 2 | Status: SHIPPED | OUTPATIENT
Start: 2022-08-24 | End: 2022-10-28

## 2022-08-24 RX ORDER — FLUOXETINE HYDROCHLORIDE 20 MG/1
CAPSULE ORAL
Qty: 30 CAPSULE | Refills: 2 | Status: SHIPPED | OUTPATIENT
Start: 2022-08-24 | End: 2022-11-28

## 2022-08-24 NOTE — PROGRESS NOTES
Subjective   Ronna Wayne is a 67 y.o. female  is here today for follow-up.         Ronna Wayne is a 67 y.o. female here for follow up after seroma drainage from the right breast.  Her Penrose drain is in place and seroma drainage has diminished and the wound has granulated and epithelialized well.  Penrose drain removed in the office today.  Radiation changes greatly improved.    Assessment     Diagnoses and all orders for this visit:    1. Malignant neoplasm of upper-outer quadrant of right breast in female, estrogen receptor positive (HCC) (Primary)      Ronna Wayne is a 67 y.o. female doing well after drainage of seroma of the right breast.  The wound is healing well by secondary intent and the Penrose drain has been removed in the office today.  Radiation changes greatly improved.  Follow-up in 1 month.

## 2022-09-12 ENCOUNTER — APPOINTMENT (OUTPATIENT)
Dept: RADIATION ONCOLOGY | Facility: HOSPITAL | Age: 67
End: 2022-09-12

## 2022-09-12 ENCOUNTER — OFFICE VISIT (OUTPATIENT)
Dept: RADIATION ONCOLOGY | Facility: HOSPITAL | Age: 67
End: 2022-09-12

## 2022-09-12 VITALS
HEART RATE: 51 BPM | SYSTOLIC BLOOD PRESSURE: 186 MMHG | DIASTOLIC BLOOD PRESSURE: 73 MMHG | RESPIRATION RATE: 18 BRPM | TEMPERATURE: 97.6 F | OXYGEN SATURATION: 96 %

## 2022-09-12 DIAGNOSIS — C50.411 MALIGNANT NEOPLASM OF UPPER-OUTER QUADRANT OF RIGHT BREAST IN FEMALE, ESTROGEN RECEPTOR POSITIVE: ICD-10-CM

## 2022-09-12 DIAGNOSIS — Z17.0 MALIGNANT NEOPLASM OF UPPER-OUTER QUADRANT OF RIGHT BREAST IN FEMALE, ESTROGEN RECEPTOR POSITIVE: ICD-10-CM

## 2022-09-12 PROCEDURE — G0463 HOSPITAL OUTPT CLINIC VISIT: HCPCS

## 2022-09-12 PROCEDURE — 99214 OFFICE O/P EST MOD 30 MIN: CPT

## 2022-09-12 NOTE — PROGRESS NOTES
Office Follow Up Note      Patient Name: Ronna Wayne  : 1955   MRN: [id number]     Requesting Physician: Lee Parrish MD    Chief Complaint: Breast Cancer   Staging: Malignant neoplasm of upper-outer quadrant of right breast in female, estrogen receptor positive (HCC)  - IIb  - pT2, pN1, cM0     History of Present Illness: Ronna Wayne is a pleasant 67 y.o. female who is here today for follow up after completing radiation therapy.     She was diagnosed with an ER positive (95%), NC positive (95%), HER2/abida negative (1+ by IHC), invasive, mammary carcinoma (with mixed ductal and lobular features) of the upper, outer quadrant of the right breast in late Spring 2021.      2021- right sided lumpectomy performed. 1 sentinel node was positive, therefore 12 more were biopsied and were all uninvolved (). Staging at this point was determined to be IIB ([id number]). She had a single 2.5 mm howard met with extranodal extension and the remaining lymph nodes were negative for malignancy. Final excision margins on the lumpectomy specimen were negative.     2021- patient MammaPrint results determined her to be low risk.      Interval History:  Ms. Wayne presents today for follow-up after completing radiation therapy.  She completed 5000 cGy in 25 fractions with a boost of 1000 cGy in 5 fractions for a total of 6000 cGy.  She started on 10/12/2021 and finished on 2021. She done well throughout treatment. She did have some skin irritation and was placed on a break for 1 week before returning to complete treatments.  She has been taking Arimidex with Dr. Teresa.  Back in 2022 the patient had a seroma to the right breast that ruptured.  Dr. Parrish performed an I&D on 2022 and placed a Penrose drain which was recently removed.      Subjective      Review of Systems:   Review of Systems   Constitutional: Positive for fatigue.   HENT: Negative for sore throat and  trouble swallowing.    Respiratory: Negative for cough and shortness of breath.    Cardiovascular: Negative for chest pain and palpitations.   Genitourinary: Positive for breast pain. Negative for breast lump.   Skin: Positive for wound. Negative for color change and dry skin.   All other systems reviewed and are negative.      I have reviewed and confirmed the accuracy of the ROS as documented by the MA/LPN/RN BOSTON Damon     The following portions of the patient's history were reviewed and updated as appropriate: allergies, current medications, past family history, past medical history, past social history, past surgical history and problem list.    Past Oncology History:   Oncology/Hematology History   Malignant neoplasm of upper-outer quadrant of right breast in female, estrogen receptor positive (HCC)   6/2/2021 Initial Diagnosis    Malignant neoplasm of upper-outer quadrant of right breast in female, estrogen receptor positive (CMS/HCC)     8/5/2021 Surgery    Surgery       Procedure:  Lumpectomy with [place] Node Biopsy      Location:  Right Breast      Completeness of resection:  No evidence of residual tumor.  Diagnosed as Stage IIB and was ER/NH positive, HER2 negative.       8/25/2021 Cancer Staged    Staging form: Breast, AJCC 8th Edition  - Pathologic: pT2, pN1, cM0 - Signed by LEYDI Teresa MD on 8/25/2021     10/12/2021 - 11/30/2021 Radiation    Radiation OncologyTreatment Course:  Ronna Wayne received 6000 cGy in 30 fractions (25 regular fractions and 5 boost fractions) to the right breast via External Beam Radiation - EBRT.        KPS: 90%     Results Review:   The following data was reviewed by: BOSTON Damon on 09/12/2022:  Common labs    Common Labsle 1/11/22 1/11/22 3/8/22 3/8/22 3/8/22 3/8/22 3/8/22 6/30/22    1427 1427 1247 1247 1247 1247 1247    Glucose  326 (A)  168 (A)    91   BUN  20  14    11   Creatinine  1.02 (A)  0.83    0.76   eGFR Non   Am  54 (A)         Sodium  131 (A)  140    136   Potassium  4.4  4.5    4.2   Chloride  97 (A)  104    98   Calcium  9.4  9.5    9.4   Albumin  3.42 (A)  3.40 (A)    3.40 (A)   Total Bilirubin  0.6  0.6    0.7   Alkaline Phosphatase  97  93    76   AST (SGOT)  39 (A)  34 (A)    21   ALT (SGPT)  20  24    13   WBC 7.11      6.61    Hemoglobin 12.5      13.5    Hematocrit 37.3      41.6    Platelets 152      144    Total Cholesterol     169      Triglycerides     110      HDL Cholesterol     51      LDL Cholesterol      98      Hemoglobin A1C   8.80 (A)        Microalbumin, Urine      <1.2     (A) Abnormal value       Comments are available for some flowsheets but are not being displayed.           Imaging:   No radiology results for the last 90 days.     Diagnostic mammogram bilateral-4/14/2022  IMPRESSION:  1. Stable mammographic appearance of the left breast with no findings suspicious for malignancy.  2. Presumed postoperative and posttreatment related changes in the right breast. Recommend short interval follow-up.    Diagnostic Mammo 5/12/2021  IMPRESSION:  Findings highly suspicious for malignancy in the right  breast     BI-RADS CATEGORY:  5, HIGHLY SUGGESTIVE OF MALIGNANCY     RECOMMENDATION:  Recommend ultrasound-guided core biopsy of the right  breast.        MRI Breast Bilateral 6/22/2021  FINDINGS: There is minimal bilateral background contrast enhancement and  normal vascular enhancement.     There are no worrisome areas of contrast enhancement in the left breast.     Correlating to the biopsy-proven malignancy in the anterior right 9:00  region is an approximately 3.2 x 3.7 cm irregular rapidly enhancing mass  associated with artifact from a postbiopsy marking clip. There are no  additional worrisome areas of contrast enhancement in the right breast.     There is no axillary adenopathy by MRI criteria and no chest wall  abnormality is seen.      CT PE Chest- 11/18/2021  IMPRESSION:  1. No PE or  aortic dissection.  2. Negative for pneumonia.  3. New large fluid collection in the right breast, most likely a postoperative seroma or hematoma. Correlation and follow-up recommended.  4. Cirrhosis.  5. Atherosclerotic disease and coronary artery disease.       Pathology:  5/19/2021 8/5/2021 8/31/2021         Objective     Physical Exam:  Physical Exam  Vitals reviewed.   Constitutional:       Appearance: Normal appearance.   Cardiovascular:      Rate and Rhythm: Normal rate and regular rhythm.      Pulses: Normal pulses.      Heart sounds: Normal heart sounds.   Pulmonary:      Effort: Pulmonary effort is normal.      Breath sounds: Normal breath sounds.   Chest:      Chest wall: No edema.   Breasts:      Right: Swelling (lymphedema) and tenderness present.      Left: Normal.         Musculoskeletal:      Right upper arm: No edema.      Right forearm: No edema.   Skin:     General: Skin is warm and dry.   Neurological:      General: No focal deficit present.      Mental Status: She is alert and oriented to person, place, and time. Mental status is at baseline.   Psychiatric:         Mood and Affect: Mood normal.         Behavior: Behavior normal.         Thought Content: Thought content normal.         Vital Signs:   Vitals:    09/12/22 1112   BP: (!) 186/73   Pulse: 51   Resp: 18   Temp: 97.6 °F (36.4 °C)   TempSrc: Temporal   SpO2: 96%   PainSc:   3   PainLoc: Breast     There is no height or weight on file to calculate BMI.     Assessment / Plan      Assessment/Plan:   Ronna Wayne is a very pleasant 67 y.o. female with stage IIB ([id number]), ER positive (95%), CT positive (95%), HER2 negative (1+ by IHC), invasive, mammary carcinoma (with ductal and lobular features) of the right breast. The surgical margins were clear; 1 sentinel node was involved with MARILYN, an additional twelve lymph nodes were all negative for metastasis (1/13).  Mammaprint showed low risk score.       Breast radiation was indicated, and we offered whole breast irradiation (WBI) for a course of 50 Gy with 2 Gray per fraction for 25 treatments to her right breast. Followed by a 10 Gray/2Gy per fraction boost to the lumpectomy cavity.  She started on 10/12/2021 and finished on 11/30/2021. She done well throughout treatment. She did have some skin irritation and burns with blistering to the right breast and axilla, and was placed on a break for 1 week before returning to complete treatments.  She has been taking Arimidex with Dr. Teresa and continues to do well with this, other than some noticeable increased fatigue since starting.  Back in June/July 2022 the patient had a seroma to the right breast that ruptured.  Dr. Parrish performed an I&D on 7/7/2022 and placed a Penrose drain which was recently removed on 8/24/2022. The patient followed with the lymphedema clinic and has since completed therapy.  Her last mammogram on 4/14/2022 showed postop/posttreatment changes.  Dr. Teresa plans to repeat her next mammogram in October.    Her skin looks good today on the affected breast, no redness or peeling noted. She does have lymphedema in the right breast.  She states that this has improved since Dr. Parrish performed an I&D and placed a Penrose drain.  The area looks almost healed and there are no signs of infection, however it is continuing to drain.  She will return to see Dr. Parrish at the end of this month to assess the area further.  Instructed her that after she is cleared by Dr. Parrish and the area has healed and no longer draining or painful she can start to do breast massage to help alleviate the lymphedema and scar tissue.  We reviewed technique on breast massage to eliminate the lymphedema that is present.  Range of motion in the arm is good.  She completed therapy with the lymphedema clinic.  She states that she still has her compression sleeves at home and is using them.  She also states that she continues  her exercises that she learned with the lymphedema clinic at home, she can continue this.  She has no complaints of a cough, shortness of breath, trouble swallowing, or chest pain. I instructed the patient on what imaging studies and appointments to expect coming up. Instructed her on late toxicities from radiation including lymphedema, fibrosis, or pneumonitis. Instructed her on the importance of breast massage to reduce the risk of fibrosis and that she should be performing self breast exam at least every 6 months at home. Instructed her to continue arm exercises to maintain good range of motion.    Continue to follow with Dr. Teresa and Dr. Parrish.  We will see her back in 6 months, sooner if needed.  Instructed the patient that if she has any questions or any new symptoms arise do not hesitate to call us.    I spent 35 minutes with Ronna Wayne today.  In the office today, more than 50% of this time was spent face-to-face with her  in counseling / coordination of care, reviewing her interim medical history and counseling on the current treatment plan.  All questions were answered to her satisfaction. Digital speech recognition software was used to dictate parts of this note, some dictation errors may occur.    Follow Up:   Return in about 6 months (around 3/12/2023) for Office Visit.    Christina Tovar, BOSTON  09/12/22 11:44 EDT

## 2022-09-20 DIAGNOSIS — E03.9 HYPOTHYROIDISM, UNSPECIFIED TYPE: Chronic | ICD-10-CM

## 2022-09-20 RX ORDER — LEVOTHYROXINE SODIUM 88 UG/1
TABLET ORAL
Qty: 90 TABLET | Refills: 0 | Status: ON HOLD | OUTPATIENT
Start: 2022-09-20 | End: 2022-12-18

## 2022-09-21 ENCOUNTER — OFFICE VISIT (OUTPATIENT)
Dept: SURGERY | Facility: CLINIC | Age: 67
End: 2022-09-21

## 2022-09-21 VITALS — WEIGHT: 277 LBS | HEIGHT: 63 IN | BODY MASS INDEX: 49.08 KG/M2

## 2022-09-21 DIAGNOSIS — C50.411 MALIGNANT NEOPLASM OF UPPER-OUTER QUADRANT OF RIGHT BREAST IN FEMALE, ESTROGEN RECEPTOR POSITIVE: Primary | ICD-10-CM

## 2022-09-21 DIAGNOSIS — Z17.0 MALIGNANT NEOPLASM OF UPPER-OUTER QUADRANT OF RIGHT BREAST IN FEMALE, ESTROGEN RECEPTOR POSITIVE: Primary | ICD-10-CM

## 2022-09-21 PROCEDURE — 99024 POSTOP FOLLOW-UP VISIT: CPT | Performed by: SURGERY

## 2022-09-22 NOTE — PROGRESS NOTES
Subjective   Ronna Wayne is a 67 y.o. female  is here today for follow-up.         Ronna Wayne is a 67 y.o. female here for follow up after seroma drainage from the right breast.  Wound continues to epithelialize and granulate well after drain removal.  Radiation changes greatly improved.    Assessment     Diagnoses and all orders for this visit:    1. Malignant neoplasm of upper-outer quadrant of right breast in female, estrogen receptor positive (HCC) (Primary)      Ronna Wayne is a 67 y.o. female doing well after drainage of seroma of the right breast.  The wound is healing well by secondary intent and the patient will follow-up in 1 month.

## 2022-09-29 ENCOUNTER — TELEPHONE (OUTPATIENT)
Dept: FAMILY MEDICINE CLINIC | Facility: CLINIC | Age: 67
End: 2022-09-29

## 2022-09-29 DIAGNOSIS — E11.65 TYPE 2 DIABETES MELLITUS WITH HYPERGLYCEMIA, WITH LONG-TERM CURRENT USE OF INSULIN: Primary | ICD-10-CM

## 2022-09-29 DIAGNOSIS — Z79.4 TYPE 2 DIABETES MELLITUS WITH HYPERGLYCEMIA, WITH LONG-TERM CURRENT USE OF INSULIN: Primary | ICD-10-CM

## 2022-09-29 RX ORDER — INSULIN GLARGINE 300 U/ML
80 INJECTION, SOLUTION SUBCUTANEOUS DAILY
Qty: 15 ML | Refills: 2
Start: 2022-09-29 | End: 2022-12-22 | Stop reason: HOSPADM

## 2022-09-29 RX ORDER — INSULIN ASPART 100 [IU]/ML
INJECTION, SUSPENSION SUBCUTANEOUS
Qty: 150 ML | Refills: 2 | Status: SHIPPED | OUTPATIENT
Start: 2022-09-29 | End: 2022-10-30

## 2022-09-29 NOTE — TELEPHONE ENCOUNTER
Caller: Ronna Wayne    Relationship to patient: Self    Best call back number: [phone]    Patient is needing: PATIENT STATES THAT SHE NEEDS HER DOCTOR TO ORDER MORE PENS FOR HER.

## 2022-09-30 ENCOUNTER — TELEPHONE (OUTPATIENT)
Dept: FAMILY MEDICINE CLINIC | Facility: CLINIC | Age: 67
End: 2022-09-30

## 2022-09-30 NOTE — TELEPHONE ENCOUNTER
Called patient and let her know that I sent a new application for the [facility] patient assistance program. Patient is in understanding.

## 2022-10-18 ENCOUNTER — HOSPITAL ENCOUNTER (OUTPATIENT)
Dept: MAMMOGRAPHY | Facility: HOSPITAL | Age: 67
Discharge: HOME | End: 2022-10-18
Admitting: INTERNAL MEDICINE

## 2022-10-18 DIAGNOSIS — Z17.0 MALIGNANT NEOPLASM OF UPPER-OUTER QUADRANT OF RIGHT BREAST IN FEMALE, ESTROGEN RECEPTOR POSITIVE: ICD-10-CM

## 2022-10-18 DIAGNOSIS — C50.411 MALIGNANT NEOPLASM OF UPPER-OUTER QUADRANT OF RIGHT BREAST IN FEMALE, ESTROGEN RECEPTOR POSITIVE: ICD-10-CM

## 2022-10-18 PROCEDURE — 77065 DX MAMMO INCL CAD UNI: CPT

## 2022-10-18 PROCEDURE — G0279 TOMOSYNTHESIS, MAMMO: HCPCS | Performed by: RADIOLOGY

## 2022-10-18 PROCEDURE — G0279 TOMOSYNTHESIS, MAMMO: HCPCS

## 2022-10-18 PROCEDURE — 77065 DX MAMMO INCL CAD UNI: CPT | Performed by: RADIOLOGY

## 2022-10-19 ENCOUNTER — OFFICE VISIT (OUTPATIENT)
Dept: SURGERY | Facility: CLINIC | Age: 67
End: 2022-10-19

## 2022-10-19 VITALS — BODY MASS INDEX: 49.08 KG/M2 | HEIGHT: 63 IN | WEIGHT: 277 LBS

## 2022-10-19 DIAGNOSIS — C50.411 MALIGNANT NEOPLASM OF UPPER-OUTER QUADRANT OF RIGHT BREAST IN FEMALE, ESTROGEN RECEPTOR POSITIVE: Primary | ICD-10-CM

## 2022-10-19 DIAGNOSIS — Z17.0 MALIGNANT NEOPLASM OF UPPER-OUTER QUADRANT OF RIGHT BREAST IN FEMALE, ESTROGEN RECEPTOR POSITIVE: Primary | ICD-10-CM

## 2022-10-19 PROCEDURE — 99212 OFFICE O/P EST SF 10 MIN: CPT | Performed by: SURGERY

## 2022-10-19 NOTE — PROGRESS NOTES
Subjective   Ronna Wayne is a 67 y.o. female  is here today for follow-up.         Ronna Wyane is a 67 y.o. female here for follow up after seroma drainage from the right breast.  Wound continues to epithelialize and granulate well after drain removal.  Radiation changes greatly improved.    Assessment     Diagnoses and all orders for this visit:    1. Malignant neoplasm of upper-outer quadrant of right breast in female, estrogen receptor positive (HCC) (Primary)      Ronna Wayne is a 67 y.o. female doing well after drainage of seroma of the right breast.  The wound is healing well by secondary intent and the patient will follow-up in 3 months.  Recent mammogram with benign findings.

## 2022-10-21 ENCOUNTER — CLINICAL SUPPORT (OUTPATIENT)
Dept: FAMILY MEDICINE CLINIC | Facility: CLINIC | Age: 67
End: 2022-10-21

## 2022-10-21 DIAGNOSIS — E11.65 TYPE 2 DIABETES MELLITUS WITH HYPERGLYCEMIA, WITH LONG-TERM CURRENT USE OF INSULIN: Chronic | ICD-10-CM

## 2022-10-21 DIAGNOSIS — Z79.4 TYPE 2 DIABETES MELLITUS WITH HYPERGLYCEMIA, WITH LONG-TERM CURRENT USE OF INSULIN: Chronic | ICD-10-CM

## 2022-10-21 DIAGNOSIS — E55.9 VITAMIN D DEFICIENCY: ICD-10-CM

## 2022-10-21 DIAGNOSIS — E78.5 DYSLIPIDEMIA: Chronic | ICD-10-CM

## 2022-10-21 DIAGNOSIS — I10 ESSENTIAL HYPERTENSION: Chronic | ICD-10-CM

## 2022-10-21 LAB
25(OH)D3 SERPL-MCNC: 52.8 NG/ML (ref 30–100)
CHOLEST SERPL-MCNC: 210 MG/DL (ref 0–200)
HBA1C MFR BLD: 7.8 % (ref 4.8–5.6)
HDLC SERPL-MCNC: 51 MG/DL (ref 40–60)
LDLC SERPL CALC-MCNC: 124 MG/DL (ref 0–100)
LDLC/HDLC SERPL: 2.33 {RATIO}
TRIGL SERPL-MCNC: 200 MG/DL (ref 0–150)
TSH SERPL DL<=0.05 MIU/L-ACNC: 0.96 UIU/ML (ref 0.27–4.2)
VIT B12 BLD-MCNC: 653 PG/ML (ref 211–946)
VLDLC SERPL-MCNC: 35 MG/DL (ref 5–40)

## 2022-10-21 PROCEDURE — 36415 COLL VENOUS BLD VENIPUNCTURE: CPT | Performed by: NURSE PRACTITIONER

## 2022-10-21 PROCEDURE — 84443 ASSAY THYROID STIM HORMONE: CPT | Performed by: NURSE PRACTITIONER

## 2022-10-21 PROCEDURE — 80061 LIPID PANEL: CPT | Performed by: NURSE PRACTITIONER

## 2022-10-21 PROCEDURE — 82306 VITAMIN D 25 HYDROXY: CPT | Performed by: NURSE PRACTITIONER

## 2022-10-21 PROCEDURE — 82607 VITAMIN B-12: CPT | Performed by: NURSE PRACTITIONER

## 2022-10-21 PROCEDURE — 83036 HEMOGLOBIN GLYCOSYLATED A1C: CPT | Performed by: NURSE PRACTITIONER

## 2022-10-27 ENCOUNTER — OFFICE VISIT (OUTPATIENT)
Dept: FAMILY MEDICINE CLINIC | Facility: CLINIC | Age: 67
End: 2022-10-27

## 2022-10-27 DIAGNOSIS — I47.1 SVT (SUPRAVENTRICULAR TACHYCARDIA): Chronic | ICD-10-CM

## 2022-10-27 DIAGNOSIS — I10 ESSENTIAL HYPERTENSION: Chronic | ICD-10-CM

## 2022-10-27 DIAGNOSIS — Z23 ENCOUNTER FOR IMMUNIZATION: ICD-10-CM

## 2022-10-27 DIAGNOSIS — E83.42 HYPOMAGNESEMIA: ICD-10-CM

## 2022-10-27 DIAGNOSIS — E03.8 OTHER SPECIFIED HYPOTHYROIDISM: Chronic | ICD-10-CM

## 2022-10-27 DIAGNOSIS — E78.5 DYSLIPIDEMIA: Chronic | ICD-10-CM

## 2022-10-27 DIAGNOSIS — Z79.4 TYPE 2 DIABETES MELLITUS WITH HYPERGLYCEMIA, WITH LONG-TERM CURRENT USE OF INSULIN: Primary | Chronic | ICD-10-CM

## 2022-10-27 DIAGNOSIS — E55.9 VITAMIN D DEFICIENCY: ICD-10-CM

## 2022-10-27 DIAGNOSIS — F32.A DEPRESSION, UNSPECIFIED DEPRESSION TYPE: Chronic | ICD-10-CM

## 2022-10-27 DIAGNOSIS — E11.65 TYPE 2 DIABETES MELLITUS WITH HYPERGLYCEMIA, WITH LONG-TERM CURRENT USE OF INSULIN: Primary | Chronic | ICD-10-CM

## 2022-10-27 DIAGNOSIS — K21.00 GASTROESOPHAGEAL REFLUX DISEASE WITH ESOPHAGITIS WITHOUT HEMORRHAGE: Chronic | ICD-10-CM

## 2022-10-27 PROCEDURE — 99214 OFFICE O/P EST MOD 30 MIN: CPT | Performed by: NURSE PRACTITIONER

## 2022-10-27 PROCEDURE — G0008 ADMIN INFLUENZA VIRUS VAC: HCPCS | Performed by: NURSE PRACTITIONER

## 2022-10-27 PROCEDURE — 90662 IIV NO PRSV INCREASED AG IM: CPT | Performed by: NURSE PRACTITIONER

## 2022-10-27 PROCEDURE — 80053 COMPREHEN METABOLIC PANEL: CPT | Performed by: NURSE PRACTITIONER

## 2022-10-27 PROCEDURE — 83735 ASSAY OF MAGNESIUM: CPT | Performed by: NURSE PRACTITIONER

## 2022-10-27 NOTE — PROGRESS NOTES
History of Present Illness  Ronna Wayne is a 67 y.o. female presents to the clinic pertaining to her DM, type 2, HTN, Dyslipidemia, GERD and vitamin deficiencies. In addition, she has been diagnosed and treated for a malignant neoplasm of her upper outer quadrant of right breast.    Diabetes  She has type 2 diabetes mellitus. The initial diagnosis of diabetes was made 2008. Diabetic complications include a CVA, nephropathy and PVD. Risk factors for coronary artery disease include dyslipidemia, family history, hypertension, obesity and sedentary lifestyle. Current diabetic treatment includes insulin injections and oral agents.  She is currently taking insulin pre-breakfast, pre-lunch and pre-dinner. Insulin injections are given by patient. Rotation sites for injection include the abdominal wall. She is following a diabetic diet. Meal planning includes ADA exchanges, avoidance of concentrated sweets and carbohydrate counting. She rarely participates in exercise. Blood glucose monitoring compliance is good.    Lab Results   Component Value Date    HGBA1C 8.80 (H) 03/08/2022     Lab Results   Component Value Date    HGBA1C 7.80 (H) 10/21/2022     Hypertension   The current episode started more than 1 year ago. The problem is controlled. Associated symptoms include headaches, malaise/fatigue and peripheral edema. Risk factors for coronary artery disease include diabetes mellitus, dyslipidemia, obesity and post-menopausal state. Current antihypertension treatment includes angiotensin blockers.   Lab Results   Component Value Date    GLUCOSE 144 (H) 10/27/2022    BUN 25 (H) 10/27/2022    CREATININE 0.86 10/27/2022    BCR 29.1 (H) 10/27/2022    K 4.5 10/27/2022    CO2 22.4 10/27/2022    CALCIUM 9.7 10/27/2022    ALBUMIN 3.90 10/27/2022    AST 29 10/27/2022    ALT 23 10/27/2022     Dyslipidemia   The current episode started more than 1 year ago. Current antihyperlipidemic treatment includes statins. Risk factors  for coronary artery disease include diabetes mellitus, dyslipidemia, hypertension, obesity and post-menopausal.   Lab Results   Component Value Date    CHOL 210 (H) 10/21/2022    CHOL 169 03/08/2022    CHOL 207 (H) 11/29/2021     Lab Results   Component Value Date    TRIG 200 (H) 10/21/2022    TRIG 110 03/08/2022    TRIG 153 (H) 11/29/2021     Lab Results   Component Value Date    HDL 51 10/21/2022    HDL 51 03/08/2022    HDL 48 11/29/2021     Lab Results   Component Value Date     (H) 10/21/2022    LDL 98 03/08/2022     (H) 11/29/2021     The following portions of the patient's history were reviewed and updated as appropriate: allergies, current medications, past family history, past medical history, past social history, past surgical history and problem list.    Review of Systems   Constitutional: Positive for fatigue (Improving). Negative for activity change, appetite change, chills, fever and unexpected weight change.   Eyes: Negative for visual disturbance.   Respiratory: Negative for cough, shortness of breath and wheezing.    Cardiovascular: Positive for leg swelling (Intermittent). Negative for chest pain and palpitations.   Gastrointestinal: Positive for nausea (Intermittent). Negative for vomiting.   Endocrine: Negative for cold intolerance, heat intolerance, polydipsia, polyphagia and polyuria.   Musculoskeletal: Positive for arthralgias.   Skin: Negative for color change and rash.   Neurological: Positive for numbness and headaches. Negative for dizziness, tremors, speech difficulty, weakness and light-headedness.   Hematological: Negative for adenopathy.   Psychiatric/Behavioral: Positive for sleep disturbance (Improving). Negative for confusion and suicidal ideas. Dysphoric mood: Improving with fluoxetine. The patient is not nervous/anxious.    All other systems reviewed and are negative.    Vital signs:  /90 (BP Location: Left arm, Patient Position: Sitting, Cuff Size: Adult)    "Pulse 50   Temp 97.2 °F (36.2 °C) (Temporal)   Resp 14   Ht 160 cm (62.99\")   Wt 125 kg (275 lb)   SpO2 95%   BMI 48.73 kg/m²     Physical Exam  Vitals and nursing note reviewed.   Constitutional:       General: She is not in acute distress.     Appearance: She is well-developed.      Comments: Pleasant, feeling much better overall. Gait slow and cautious   HENT:      Head: Normocephalic.      Nose: Nose normal.   Eyes:      General: No scleral icterus.        Right eye: No discharge.         Left eye: No discharge.      Conjunctiva/sclera: Conjunctivae normal.   Neck:      Thyroid: No thyromegaly.      Vascular: No JVD.   Cardiovascular:      Rate and Rhythm: Normal rate and regular rhythm.      Heart sounds: Murmur heard.     No friction rub.   Pulmonary:      Effort: Pulmonary effort is normal. No respiratory distress.      Breath sounds: Normal breath sounds. No decreased breath sounds, wheezing, rhonchi or rales.   Chest:   Breasts:     Left: No mass.   Abdominal:      General: Bowel sounds are normal. There is no distension.      Palpations: Abdomen is soft.      Tenderness: There is no abdominal tenderness. There is no guarding or rebound.   Musculoskeletal:         General: Tenderness (Generalized) present.      Cervical back: Neck supple.      Right lower leg: No edema.      Left lower leg: No edema.   Lymphadenopathy:      Cervical: No cervical adenopathy.   Skin:     General: Skin is warm and dry.      Capillary Refill: Capillary refill takes less than 2 seconds.      Findings: No erythema or rash.   Neurological:      Mental Status: She is alert and oriented to person, place, and time.   Psychiatric:         Mood and Affect: Mood and affect normal.         Speech: Speech normal.         Behavior: Behavior normal. Behavior is cooperative.         Thought Content: Thought content normal.         Judgment: Judgment normal.     Result Review :  Results for orders placed or performed in visit on " 10/27/22   Comprehensive Metabolic Panel    Specimen: Arm, Right; Blood   Result Value Ref Range    Glucose 144 (H) 65 - 99 mg/dL    BUN 25 (H) 8 - 23 mg/dL    Creatinine 0.86 0.57 - 1.00 mg/dL    Sodium 138 136 - 145 mmol/L    Potassium 4.5 3.5 - 5.2 mmol/L    Chloride 102 98 - 107 mmol/L    CO2 22.4 22.0 - 29.0 mmol/L    Calcium 9.7 8.6 - 10.5 mg/dL    Total Protein 7.6 6.0 - 8.5 g/dL    Albumin 3.90 3.50 - 5.20 g/dL    ALT (SGPT) 23 1 - 33 U/L    AST (SGOT) 29 1 - 32 U/L    Alkaline Phosphatase 82 39 - 117 U/L    Total Bilirubin 0.5 0.0 - 1.2 mg/dL    Globulin 3.7 gm/dL    A/G Ratio 1.1 g/dL    BUN/Creatinine Ratio 29.1 (H) 7.0 - 25.0    Anion Gap 13.6 5.0 - 15.0 mmol/L    eGFR 74.2 >60.0 mL/min/1.73   Magnesium    Specimen: Arm, Right; Blood   Result Value Ref Range    Magnesium 1.6 1.6 - 2.4 mg/dL     Class 3 Severe Obesity (BMI >=40). Obesity-related health conditions include the following: hypertension, diabetes mellitus, dyslipidemias and GERD. Obesity is unchanged. BMI  is above average; BMI management plan is completed.Provided information portion control and increasing exercise.     Assessment & Plan     Diagnoses and all orders for this visit:    1. Type 2 diabetes mellitus with hyperglycemia, with long-term current use of insulin (HCC) (Primary)  Comments:  She has discontinued the 70/30 3 times daily due to financial reasons.  She has not noted any difference in her glycemic control.  Continue Toujeo and Jardiance  Orders:  -     Comprehensive Metabolic Panel; Future  -     Lipid Panel; Future  -     TSH; Future  -     Hemoglobin A1c; Future  -     Vitamin B12; Future  -     MicroAlbumin, Urine, Random - Urine, Clean Catch; Future    2. Essential hypertension  Comments:  Adequately controlled.  Continue metoprolol and hydrochlorothiazide.  Goal is to add ACE or ARB  Orders:  -     Cancel: Comprehensive metabolic panel; Future  -     Comprehensive Metabolic Panel  -     Comprehensive Metabolic Panel;  Future  -     Lipid Panel; Future  -     TSH; Future    3. Dyslipidemia  Comments:  Rosuvastatin 20 mg     4. Other specified hypothyroidism  Comments:  Continue levothyroxine 88 MCG    5. Gastroesophageal reflux disease with esophagitis without hemorrhage  Comments:  Stable  Orders:  -     CBC & Differential; Future    6. Depression, unspecified depression type  Comments:  Continue Prozac 20 mg    7. SVT (supraventricular tachycardia) (HCC)  Comments:  Follow-up with cardiology tomorrow in regards to her metoprolol  Orders:  -     TSH; Future    8. Vitamin D deficiency  Comments:  Daily supplement  Orders:  -     Vitamin D,25-Hydroxy; Future    9. Hypomagnesemia  Comments:  Continue magnesium  Orders:  -     Cancel: Magnesium  -     Magnesium  -     Magnesium; Future    10. Encounter for immunization  Comments:  Influenza vaccination given today  Orders:  -     Fluzone High-Dose 65+yrs ([id number])    Follow Up in February  Findings and recommendations discussed with Ronna. Reviewed test results. Lifestyle modifications reinforced including nutrition and activity recommendations.  Ronna will follow up in February sooner if problems/concerns occur.    She was given instructions and counseling regarding her condition or for health maintenance advice. Please see specific information pulled into the AVS if appropriate    This document has been electronically signed by:

## 2022-10-27 NOTE — PROGRESS NOTES
Injection  Injection performed in left deltoid by Bre Rodriguez MA. Patient tolerated the procedure well without complications.  10/27/22   Bre Rodriguez MA

## 2022-10-28 ENCOUNTER — TELEPHONE (OUTPATIENT)
Dept: FAMILY MEDICINE CLINIC | Facility: CLINIC | Age: 67
End: 2022-10-28

## 2022-10-28 ENCOUNTER — OFFICE VISIT (OUTPATIENT)
Dept: CARDIOLOGY | Facility: CLINIC | Age: 67
End: 2022-10-28

## 2022-10-28 VITALS
WEIGHT: 275.2 LBS | HEART RATE: 49 BPM | DIASTOLIC BLOOD PRESSURE: 72 MMHG | SYSTOLIC BLOOD PRESSURE: 162 MMHG | HEIGHT: 63 IN | BODY MASS INDEX: 48.76 KG/M2

## 2022-10-28 DIAGNOSIS — G47.33 OBSTRUCTIVE SLEEP APNEA SYNDROME: Primary | ICD-10-CM

## 2022-10-28 DIAGNOSIS — I10 ESSENTIAL HYPERTENSION: Chronic | ICD-10-CM

## 2022-10-28 DIAGNOSIS — I47.1 SVT (SUPRAVENTRICULAR TACHYCARDIA): ICD-10-CM

## 2022-10-28 DIAGNOSIS — I10 ESSENTIAL HYPERTENSION: ICD-10-CM

## 2022-10-28 LAB
ALBUMIN SERPL-MCNC: 3.9 G/DL (ref 3.5–5.2)
ALBUMIN/GLOB SERPL: 1.1 G/DL
ALP SERPL-CCNC: 82 U/L (ref 39–117)
ALT SERPL W P-5'-P-CCNC: 23 U/L (ref 1–33)
ANION GAP SERPL CALCULATED.3IONS-SCNC: 13.6 MMOL/L (ref 5–15)
AST SERPL-CCNC: 29 U/L (ref 1–32)
BILIRUB SERPL-MCNC: 0.5 MG/DL (ref 0–1.2)
BUN SERPL-MCNC: 25 MG/DL (ref 8–23)
BUN/CREAT SERPL: 29.1 (ref 7–25)
CALCIUM SPEC-SCNC: 9.7 MG/DL (ref 8.6–10.5)
CHLORIDE SERPL-SCNC: 102 MMOL/L (ref 98–107)
CO2 SERPL-SCNC: 22.4 MMOL/L (ref 22–29)
CREAT SERPL-MCNC: 0.86 MG/DL (ref 0.57–1)
EGFRCR SERPLBLD CKD-EPI 2021: 74.2 ML/MIN/1.73
GLOBULIN UR ELPH-MCNC: 3.7 GM/DL
GLUCOSE SERPL-MCNC: 144 MG/DL (ref 65–99)
MAGNESIUM SERPL-MCNC: 1.6 MG/DL (ref 1.6–2.4)
POTASSIUM SERPL-SCNC: 4.5 MMOL/L (ref 3.5–5.2)
PROT SERPL-MCNC: 7.6 G/DL (ref 6–8.5)
SODIUM SERPL-SCNC: 138 MMOL/L (ref 136–145)

## 2022-10-28 PROCEDURE — 93000 ELECTROCARDIOGRAM COMPLETE: CPT | Performed by: NURSE PRACTITIONER

## 2022-10-28 PROCEDURE — 99214 OFFICE O/P EST MOD 30 MIN: CPT | Performed by: NURSE PRACTITIONER

## 2022-10-28 RX ORDER — ROSUVASTATIN CALCIUM 20 MG/1
20 TABLET, COATED ORAL DAILY
Qty: 30 TABLET | Refills: 4 | Status: SHIPPED | OUTPATIENT
Start: 2022-10-28

## 2022-10-28 RX ORDER — METOPROLOL SUCCINATE 50 MG/1
75 TABLET, EXTENDED RELEASE ORAL DAILY
Qty: 90 TABLET | Refills: 2 | Status: SHIPPED | OUTPATIENT
Start: 2022-10-28 | End: 2022-12-22 | Stop reason: HOSPADM

## 2022-10-28 RX ORDER — HYDROCHLOROTHIAZIDE 25 MG/1
25 TABLET ORAL DAILY
Qty: 30 TABLET | Refills: 3 | Status: SHIPPED | OUTPATIENT
Start: 2022-10-28 | End: 2022-12-22 | Stop reason: HOSPADM

## 2022-10-28 NOTE — TELEPHONE ENCOUNTER
I spoke with the patient and let her know that I have found a patient assistance program for Ozempic. All she needs to do is bring in proof of her income and I will get the paperwork faxed over. She is in understanding and she said she will bring it by the office next week.

## 2022-10-28 NOTE — PROGRESS NOTES
[facility]             BrendaBOSTON Mtz Sherelene S, APRN  Ronna Wayne  1955  10/28/2022    Patient Active Problem List   Diagnosis   • Frequent headaches   • Memory loss   • Essential hypertension   • Dyslipidemia   • Type 2 diabetes mellitus with hyperglycemia, with long-term current use of insulin (HCC)   • Obstructive sleep apnea syndrome   • Glaucoma   • Anxiety   • GERD (gastroesophageal reflux disease)   • Morbid obesity   • Weakness of left arm   • Cerebrovascular accident (CVA) due to embolism (HCC)   • SVT (supraventricular tachycardia) (HCC)   • Vitamin D deficiency   • B12 deficiency   • Malignant neoplasm of upper-outer quadrant of right breast in female, estrogen receptor positive (HCC)   • Other specified hypothyroidism   • Personal history of colonic polyps   • Depression       Dear Mague Acosta APRN:    Subjective     Chief Complaint   Patient presents with   • Follow-up       HPI:     This is a 67 y.o. female with known past medical history of dyslipidemia, diabetes mellitus type 2, essential hypertension, obstructive sleep apnea, PSVT, history of CVA and, breast cancer and obesity.    Ronna Wayne presents today for routine cardiology follow up.  Patient states she has been doing overall well since her last visit.  Patient underwent I&D to right breast for seroma that ruptured back in July 2022.  She states she has been doing well since then.  She was seen in her PCP office yesterday who was concerned about her bradycardia.  Denies any recent episodes of SVT, chest pain, shortness of breath or palpitations.  Noted to be sinus bradycardia in the 40s in the office today.  Denies any dizziness, syncope or lightheadedness.  Currently takes metoprolol succinate 100 mg p.o. daily.  Blood pressure elevated in the office today however reports at home it is overall controlled.  Recent labs showed normal  electrolytes and kidney function and magnesium.  Lipid panel showed LDL of 124 triglycerides of 200.    • Diagnostic Testing  1. Echocardiogram 5/2021: EF 51 to 55% with moderate aortic sclerosis  2. Nuclear stress test 5/2021: No evidence of ischemia     All other systems were reviewed and were negative.    Patient Active Problem List   Diagnosis   • Frequent headaches   • Memory loss   • Essential hypertension   • Dyslipidemia   • Type 2 diabetes mellitus with hyperglycemia, with long-term current use of insulin (HCC)   • Obstructive sleep apnea syndrome   • Glaucoma   • Anxiety   • GERD (gastroesophageal reflux disease)   • Morbid obesity   • Weakness of left arm   • Cerebrovascular accident (CVA) due to embolism (HCC)   • SVT (supraventricular tachycardia) ([facility])   • Vitamin D deficiency   • B12 deficiency   • Malignant neoplasm of upper-outer quadrant of right breast in female, estrogen receptor positive (HCC)   • Other specified hypothyroidism   • Personal history of colonic polyps   • Depression       family history includes Alcohol abuse in her maternal aunt; Arthritis in her sister; Breast cancer (age of onset: 40) in her sister; Cancer in her maternal grandfather; Diabetes in her mother; Heart attack in her father; Hyperlipidemia in her father, sister, and sister; Hypertension in her father, sister, sister, sister, and another family member; Obesity in her sister and sister; Stroke in her paternal grandfather and paternal grandmother; Thyroid disease in her mother and sister.     reports that she quit smoking about 46 years ago. Her smoking use included cigarettes. She has a 12.00 pack-year smoking history. She has never used smokeless tobacco. She reports that she does not drink alcohol.    Allergies   Allergen Reactions   • Codeine GI Intolerance     Increased heart rate     • Sulfa Antibiotics GI Intolerance     Heart rate increase          Current Outpatient Medications:   •  acetaminophen (TYLENOL) 325  "MG tablet, Take 2 tablets by mouth Every 4 (Four) Hours As Needed for Mild Pain ., Disp: 30 tablet, Rfl: 0  •  anastrozole (ARIMIDEX) 1 MG tablet, Take 1 tablet by mouth Daily., Disp: 30 tablet, Rfl: 11  •  aspirin 81 MG EC tablet, Take 1 tablet by mouth Daily., Disp: 90 tablet, Rfl: 3  •  cholecalciferol (VITAMIN D3) 25 MCG (1000 UT) tablet, Take 1,000 Units by mouth Daily., Disp: , Rfl:   •  Continuous Blood Gluc  (Dexcom G6 ) device, 1 Device Daily., Disp: 1 each, Rfl: 0  •  empagliflozin (Jardiance) 25 MG tablet tablet, Take 1 tablet by mouth Every Morning., Disp: 30 tablet, Rfl: 3  •  FLUoxetine (PROzac) 20 MG capsule, TAKE ONE CAPSULE BY MOUTH DAILY, Disp: 30 capsule, Rfl: 2  •  hydroCHLOROthiazide (HYDRODIURIL) 25 MG tablet, Take 1 tablet by mouth Daily. for swelling., Disp: 30 tablet, Rfl: 3  •  ibuprofen (ADVIL,MOTRIN) 600 MG tablet, Take 1 tablet by mouth Every 6 (Six) Hours As Needed for Mild Pain ., Disp: 30 tablet, Rfl: 0  •  insulin aspart prot-insulin aspart (novoLOG 70/30) (70-30) 100 UNIT/ML injection, Inject 5 Units under the skin into the appropriate area as directed Daily With Breakfast., Disp: , Rfl:   •  Insulin Glargine, 1 Unit Dial, (Toujeo SoloStar) 300 UNIT/ML solution pen-injector injection, Inject 80 Units under the skin into the appropriate area as directed Daily., Disp: 15 mL, Rfl: 2  •  Insulin Syringe-Needle U-100 30G X 5/16\" 0.3 ML misc, 1 Device 3 (Three) Times a Day., Disp: 100 each, Rfl: 5  •  levothyroxine (SYNTHROID, LEVOTHROID) 88 MCG tablet, TAKE ONE TABLET BY MOUTH DAILY, Disp: 90 tablet, Rfl: 0  •  MAGnesium-Oxide 400 (240 Mg) MG tablet, TAKE ONE TABLET BY MOUTH THREE TIMES A DAY, Disp: 90 tablet, Rfl: 3  •  acetaminophen (TYLENOL) 325 MG tablet, Take 2 tablets by mouth Every 4 (Four) Hours As Needed for Mild Pain ., Disp: 30 tablet, Rfl: 0  •  Continuous Blood Gluc Sensor (Dexcom G6 Sensor), Every 10 (Ten) Days., Disp: 9 each, Rfl: 3  •  Folic " Acid-Cholecalciferol 1-3775 MG-UNIT capsule, Take 1 capsule by mouth Daily., Disp: , Rfl:   •  ibuprofen (ADVIL,MOTRIN) 600 MG tablet, Take 1 tablet by mouth Every 6 (Six) Hours As Needed for Mild Pain ., Disp: 30 tablet, Rfl: 0  •  insulin aspart prot-insulin aspart (novoLOG 70/30) (70-30) 100 UNIT/ML injection, Inject 8 Units under the skin into the appropriate area as directed Daily With Lunch., Disp: , Rfl:   •  insulin aspart prot-insulin aspart (novoLOG 70/30) (70-30) 100 UNIT/ML injection, Per her routine regimen with maximum daily dose of 50 units, Disp: 150 mL, Rfl: 2  •  metoprolol succinate XL (TOPROL-XL) 50 MG 24 hr tablet, Take 1.5 tablets by mouth Daily., Disp: 90 tablet, Rfl: 2  •  nystatin 780376 UNIT/GM topical powder, , Disp: , Rfl:   •  rosuvastatin (CRESTOR) 20 MG tablet, Take 1 tablet by mouth Daily., Disp: 30 tablet, Rfl: 4  •  traMADol (ULTRAM) 50 MG tablet, Take 1 tablet by mouth Every 8 (Eight) Hours As Needed for Moderate Pain ., Disp: 20 tablet, Rfl: 0      Physical Exam:  I have reviewed the patient's current vital signs as documented in the patient's EMR.   Vitals:    10/28/22 1035   BP: 162/72   Pulse: (!) 49     Body mass index is 48.76 kg/m².       10/28/22  1035   Weight: 125 kg (275 lb 3.2 oz)      Physical Exam  Constitutional:       General: She is not in acute distress.     Appearance: Normal appearance. She is well-developed and normal weight.   HENT:      Head: Normocephalic and atraumatic.   Eyes:      General: Lids are normal.      Conjunctiva/sclera: Conjunctivae normal.      Pupils: Pupils are equal, round, and reactive to light.   Neck:      Vascular: No carotid bruit or JVD.   Cardiovascular:      Rate and Rhythm: Normal rate and regular rhythm.      Pulses: Normal pulses.      Heart sounds: S1 normal and S2 normal. Murmur (3/6 systolic murmur) heard.   Pulmonary:      Effort: Pulmonary effort is normal. No respiratory distress.      Breath sounds: Normal breath sounds.  No wheezing.   Abdominal:      General: Bowel sounds are normal. There is no distension.      Palpations: Abdomen is soft. There is no hepatomegaly or splenomegaly.      Tenderness: There is no abdominal tenderness.   Musculoskeletal:         General: No swelling. Normal range of motion.      Cervical back: Normal range of motion and neck supple.      Right lower leg: No edema.      Left lower leg: No edema.   Skin:     General: Skin is warm and dry.      Coloration: Skin is not jaundiced.      Findings: No rash.   Neurological:      General: No focal deficit present.      Mental Status: She is alert and oriented to person, place, and time. Mental status is at baseline.   Psychiatric:         Mood and Affect: Mood normal.         Speech: Speech normal.         Behavior: Behavior normal.         Thought Content: Thought content normal.         Judgment: Judgment normal.        DATA REVIEWED:     TTE/MAGI:  Results for orders placed during the hospital encounter of 05/01/21    Adult Transthoracic Echo Complete w/ Color, Spectral and Contrast if necessary per protocol    Interpretation Summary  · Estimated left ventricular EF = 55% Left ventricular ejection fraction appears to be 51 - 55%. Left ventricular systolic function is normal.  · Left ventricular diastolic function was normal.  · Estimated right ventricular systolic pressure from tricuspid regurgitation is normal (<35 mmHg).  · Moderate aortic sclerosis present  · Mitral valve is unremarkable  · No prev echo      Laboratory evaluations:    Lab Results   Component Value Date    GLUCOSE 144 (H) 10/27/2022    BUN 25 (H) 10/27/2022    CREATININE 0.86 10/27/2022    EGFRIFNONA 54 (L) 01/11/2022    BCR 29.1 (H) 10/27/2022    K 4.5 10/27/2022    CO2 22.4 10/27/2022    CALCIUM 9.7 10/27/2022    ALBUMIN 3.90 10/27/2022    LABIL2 1.0 (L) 05/28/2016    AST 29 10/27/2022    ALT 23 10/27/2022     Lab Results   Component Value Date    WBC 6.61 03/08/2022    HGB 13.5 03/08/2022     HCT 41.6 03/08/2022    .5 (H) 03/08/2022     03/08/2022     Lab Results   Component Value Date    CHOL 210 (H) 10/21/2022    CHLPL 226 (H) 05/28/2016    TRIG 200 (H) 10/21/2022    HDL 51 10/21/2022     (H) 10/21/2022     Lab Results   Component Value Date    TSH 0.961 10/21/2022     Lab Results   Component Value Date    HGBA1C 7.80 (H) 10/21/2022     Lab Results   Component Value Date    ALT 23 10/27/2022     Lab Results   Component Value Date    HGBA1C 7.80 (H) 10/21/2022    HGBA1C 8.80 (H) 03/08/2022    HGBA1C 10.77 (H) 11/29/2021     Lab Results   Component Value Date    MICROALBUR <1.2 03/08/2022    CREATININE 0.86 10/27/2022     No results found for: IRON, TIBC, FERRITIN  Lab Results   Component Value Date    INR 1.02 11/18/2021    INR 1.05 06/19/2021    INR 1.05 05/01/2021    PROTIME 13.8 11/18/2021    PROTIME 14.1 06/19/2021    PROTIME 13.5 05/01/2021           ECG 12 Lead    Date/Time: 10/28/2022 11:09 AM  Performed by: Brenda Singh APRN  Authorized by: Brenda Singh APRN   Comparison: compared with previous ECG   Rhythm: sinus bradycardia  Rate: bradycardic  Conduction: conduction normal  Comments: Sinus bradycardia at 47 bpm             ASSESSMENT/PLAN:      Diagnosis Plan   1. Obstructive sleep apnea syndrome  Ambulatory Referral to Pulmonology      2. Essential hypertension  hydroCHLOROthiazide (HYDRODIURIL) 25 MG tablet    ECG 12 Lead    She has not had her bp medication today. Encouraged her to take it as soon as she got home. She does monitor at home and she reports usually below 140/90      3. Essential hypertension  hydroCHLOROthiazide (HYDRODIURIL) 25 MG tablet    ECG 12 Lead      4. SVT (supraventricular tachycardia) (HCC)  ECG 12 Lead          1. PSVT  • No further episodes of SVT since her last visit.  Noted to be bradycardic today in the 40s.  Decrease metoprolol to 75 mg p.o. daily.  Recent TSH normal.    2. Essential hypertension  • Blood  pressure elevated in the office today.  Recent BMP showed normal kidney function.  Increase HCTZ to 25 mg p.o. daily.  Instructed patient to keep a log of her blood pressure and bring into next visit.    3.  Hyperlipidemia  · Recent Lipid panel show LDL of 124 with triglycerides of 200.  She was taking Lipitor however she was unable to tolerate this secondary to myalgias.  Given her diabetes and elevated LDL we will start her on Crestor 20 mg p.o. daily and see how she tolerates this.    4.  Obstructive sleep apnea  · Patient reports compliance with her CPAP however she states it has not been adjusted or managed in a few years.  Will refer her to pulmonology for further management for her sleep apnea.    This document has been @Electronically signed by BOSTON Jiménez, 10/28/22, 9:25 AM EDT.       Dictated Utilizing Dragon Dictation: Part of this note may be an electronic transcription/translation of spoken language to printed text using the Dragon Dictation System.    Follow-up appointment and medication changes provided in hand delivered After Visit Summary as well as reviewed in the room.

## 2022-10-30 VITALS
HEIGHT: 63 IN | RESPIRATION RATE: 14 BRPM | BODY MASS INDEX: 48.73 KG/M2 | OXYGEN SATURATION: 95 % | HEART RATE: 50 BPM | TEMPERATURE: 97.2 F | SYSTOLIC BLOOD PRESSURE: 132 MMHG | WEIGHT: 275 LBS | DIASTOLIC BLOOD PRESSURE: 90 MMHG

## 2022-11-24 DIAGNOSIS — F32.89 OTHER DEPRESSION: ICD-10-CM

## 2022-11-28 RX ORDER — FLUOXETINE HYDROCHLORIDE 20 MG/1
CAPSULE ORAL
Qty: 30 CAPSULE | Refills: 2 | Status: SHIPPED | OUTPATIENT
Start: 2022-11-28 | End: 2022-12-29 | Stop reason: HOSPADM

## 2022-12-16 DIAGNOSIS — E83.42 HYPOMAGNESEMIA: ICD-10-CM

## 2022-12-16 DIAGNOSIS — E03.9 HYPOTHYROIDISM, UNSPECIFIED TYPE: Chronic | ICD-10-CM

## 2022-12-17 ENCOUNTER — HOSPITAL ENCOUNTER (INPATIENT)
Facility: HOSPITAL | Age: 67
LOS: 5 days | Discharge: HOME | DRG: 309 | End: 2022-12-22
Attending: STUDENT IN AN ORGANIZED HEALTH CARE EDUCATION/TRAINING PROGRAM | Admitting: INTERNAL MEDICINE
Payer: MEDICARE

## 2022-12-17 ENCOUNTER — APPOINTMENT (OUTPATIENT)
Dept: GENERAL RADIOLOGY | Facility: HOSPITAL | Age: 67
DRG: 309 | End: 2022-12-17
Payer: MEDICARE

## 2022-12-17 ENCOUNTER — HOSPITAL ENCOUNTER (EMERGENCY)
Facility: HOSPITAL | Age: 67
Discharge: HOME | DRG: 309 | End: 2022-12-17
Attending: STUDENT IN AN ORGANIZED HEALTH CARE EDUCATION/TRAINING PROGRAM | Admitting: STUDENT IN AN ORGANIZED HEALTH CARE EDUCATION/TRAINING PROGRAM
Payer: MEDICARE

## 2022-12-17 VITALS
SYSTOLIC BLOOD PRESSURE: 143 MMHG | RESPIRATION RATE: 22 BRPM | HEART RATE: 64 BPM | BODY MASS INDEX: 49.96 KG/M2 | OXYGEN SATURATION: 95 % | TEMPERATURE: 98 F | DIASTOLIC BLOOD PRESSURE: 72 MMHG | HEIGHT: 63 IN | WEIGHT: 282 LBS

## 2022-12-17 DIAGNOSIS — I47.1 SVT (SUPRAVENTRICULAR TACHYCARDIA): Primary | ICD-10-CM

## 2022-12-17 DIAGNOSIS — I47.1 SUPRAVENTRICULAR TACHYCARDIA: Primary | ICD-10-CM

## 2022-12-17 PROBLEM — I47.10 PAROXYSMAL SVT (SUPRAVENTRICULAR TACHYCARDIA): Status: ACTIVE | Noted: 2022-12-17

## 2022-12-17 LAB
ALBUMIN SERPL-MCNC: 3.49 G/DL (ref 3.5–5.2)
ALBUMIN/GLOB SERPL: 0.9 G/DL
ALP SERPL-CCNC: 102 U/L (ref 39–117)
ALT SERPL W P-5'-P-CCNC: 27 U/L (ref 1–33)
ANION GAP SERPL CALCULATED.3IONS-SCNC: 13.9 MMOL/L (ref 5–15)
ANION GAP SERPL CALCULATED.3IONS-SCNC: 16 MMOL/L (ref 5–15)
APTT PPP: 31.4 SECONDS (ref 26.5–34.5)
AST SERPL-CCNC: 39 U/L (ref 1–32)
BASOPHILS # BLD AUTO: 0.05 10*3/MM3 (ref 0–0.2)
BASOPHILS # BLD AUTO: 0.07 10*3/MM3 (ref 0–0.2)
BASOPHILS NFR BLD AUTO: 0.8 % (ref 0–1.5)
BASOPHILS NFR BLD AUTO: 0.9 % (ref 0–1.5)
BILIRUB SERPL-MCNC: 0.7 MG/DL (ref 0–1.2)
BUN SERPL-MCNC: 25 MG/DL (ref 8–23)
BUN SERPL-MCNC: 29 MG/DL (ref 8–23)
BUN/CREAT SERPL: 26 (ref 7–25)
BUN/CREAT SERPL: 29.9 (ref 7–25)
CALCIUM SPEC-SCNC: 9.6 MG/DL (ref 8.6–10.5)
CALCIUM SPEC-SCNC: 9.7 MG/DL (ref 8.6–10.5)
CHLORIDE SERPL-SCNC: 96 MMOL/L (ref 98–107)
CHLORIDE SERPL-SCNC: 98 MMOL/L (ref 98–107)
CO2 SERPL-SCNC: 20.1 MMOL/L (ref 22–29)
CO2 SERPL-SCNC: 23 MMOL/L (ref 22–29)
CREAT SERPL-MCNC: 0.96 MG/DL (ref 0.57–1)
CREAT SERPL-MCNC: 0.97 MG/DL (ref 0.57–1)
D-LACTATE SERPL-SCNC: 2.3 MMOL/L (ref 0.5–2)
DEPRECATED RDW RBC AUTO: 48.2 FL (ref 37–54)
DEPRECATED RDW RBC AUTO: 49 FL (ref 37–54)
EGFRCR SERPLBLD CKD-EPI 2021: 64.2 ML/MIN/1.73
EGFRCR SERPLBLD CKD-EPI 2021: 65 ML/MIN/1.73
EOSINOPHIL # BLD AUTO: 0.03 10*3/MM3 (ref 0–0.4)
EOSINOPHIL # BLD AUTO: 0.07 10*3/MM3 (ref 0–0.4)
EOSINOPHIL NFR BLD AUTO: 0.5 % (ref 0.3–6.2)
EOSINOPHIL NFR BLD AUTO: 0.8 % (ref 0.3–6.2)
ERYTHROCYTE [DISTWIDTH] IN BLOOD BY AUTOMATED COUNT: 13.1 % (ref 12.3–15.4)
ERYTHROCYTE [DISTWIDTH] IN BLOOD BY AUTOMATED COUNT: 13.2 % (ref 12.3–15.4)
FLUAV RNA RESP QL NAA+PROBE: NOT DETECTED
FLUBV RNA RESP QL NAA+PROBE: NOT DETECTED
GLOBULIN UR ELPH-MCNC: 3.9 GM/DL
GLUCOSE SERPL-MCNC: 245 MG/DL (ref 65–99)
GLUCOSE SERPL-MCNC: 286 MG/DL (ref 65–99)
HCT VFR BLD AUTO: 43.3 % (ref 34–46.6)
HCT VFR BLD AUTO: 43.8 % (ref 34–46.6)
HGB BLD-MCNC: 14.8 G/DL (ref 12–15.9)
HGB BLD-MCNC: 15 G/DL (ref 12–15.9)
HOLD SPECIMEN: NORMAL
HOLD SPECIMEN: NORMAL
IMM GRANULOCYTES # BLD AUTO: 0.01 10*3/MM3 (ref 0–0.05)
IMM GRANULOCYTES # BLD AUTO: 0.02 10*3/MM3 (ref 0–0.05)
IMM GRANULOCYTES NFR BLD AUTO: 0.2 % (ref 0–0.5)
IMM GRANULOCYTES NFR BLD AUTO: 0.2 % (ref 0–0.5)
INR PPP: 1.06 (ref 0.9–1.1)
LYMPHOCYTES # BLD AUTO: 1.71 10*3/MM3 (ref 0.7–3.1)
LYMPHOCYTES # BLD AUTO: 3.16 10*3/MM3 (ref 0.7–3.1)
LYMPHOCYTES NFR BLD AUTO: 29.2 % (ref 19.6–45.3)
LYMPHOCYTES NFR BLD AUTO: 36.8 % (ref 19.6–45.3)
MAGNESIUM SERPL-MCNC: 1.5 MG/DL (ref 1.6–2.4)
MCH RBC QN AUTO: 34.1 PG (ref 26.6–33)
MCH RBC QN AUTO: 34.6 PG (ref 26.6–33)
MCHC RBC AUTO-ENTMCNC: 34.2 G/DL (ref 31.5–35.7)
MCHC RBC AUTO-ENTMCNC: 34.2 G/DL (ref 31.5–35.7)
MCV RBC AUTO: 100.9 FL (ref 79–97)
MCV RBC AUTO: 99.8 FL (ref 79–97)
MONOCYTES # BLD AUTO: 0.72 10*3/MM3 (ref 0.1–0.9)
MONOCYTES # BLD AUTO: 1.11 10*3/MM3 (ref 0.1–0.9)
MONOCYTES NFR BLD AUTO: 12.3 % (ref 5–12)
MONOCYTES NFR BLD AUTO: 12.9 % (ref 5–12)
NEUTROPHILS NFR BLD AUTO: 3.34 10*3/MM3 (ref 1.7–7)
NEUTROPHILS NFR BLD AUTO: 4.16 10*3/MM3 (ref 1.7–7)
NEUTROPHILS NFR BLD AUTO: 48.5 % (ref 42.7–76)
NEUTROPHILS NFR BLD AUTO: 56.9 % (ref 42.7–76)
NRBC BLD AUTO-RTO: 0 /100 WBC (ref 0–0.2)
NRBC BLD AUTO-RTO: 0 /100 WBC (ref 0–0.2)
PLATELET # BLD AUTO: 122 10*3/MM3 (ref 140–450)
PLATELET # BLD AUTO: 136 10*3/MM3 (ref 140–450)
PMV BLD AUTO: 10 FL (ref 6–12)
PMV BLD AUTO: 10.2 FL (ref 6–12)
POTASSIUM SERPL-SCNC: 3.5 MMOL/L (ref 3.5–5.2)
POTASSIUM SERPL-SCNC: 3.8 MMOL/L (ref 3.5–5.2)
PROT SERPL-MCNC: 7.4 G/DL (ref 6–8.5)
PROTHROMBIN TIME: 14 SECONDS (ref 12.1–14.7)
QT INTERVAL: 290 MS
QT INTERVAL: 368 MS
QTC INTERVAL: 440 MS
QTC INTERVAL: 489 MS
RBC # BLD AUTO: 4.34 10*6/MM3 (ref 3.77–5.28)
RBC # BLD AUTO: 4.34 10*6/MM3 (ref 3.77–5.28)
SARS-COV-2 RNA RESP QL NAA+PROBE: NOT DETECTED
SODIUM SERPL-SCNC: 130 MMOL/L (ref 136–145)
SODIUM SERPL-SCNC: 137 MMOL/L (ref 136–145)
TROPONIN T SERPL-MCNC: 0.02 NG/ML (ref 0–0.03)
TROPONIN T SERPL-MCNC: <0.01 NG/ML (ref 0–0.03)
TSH SERPL DL<=0.05 MIU/L-ACNC: 0.41 UIU/ML (ref 0.27–4.2)
WBC NRBC COR # BLD: 5.86 10*3/MM3 (ref 3.4–10.8)
WBC NRBC COR # BLD: 8.59 10*3/MM3 (ref 3.4–10.8)
WHOLE BLOOD HOLD COAG: NORMAL
WHOLE BLOOD HOLD SPECIMEN: NORMAL

## 2022-12-17 PROCEDURE — 25010000002 MAGNESIUM SULFATE IN D5W 1G/100ML (PREMIX) 1-5 GM/100ML-% SOLUTION: Performed by: STUDENT IN AN ORGANIZED HEALTH CARE EDUCATION/TRAINING PROGRAM

## 2022-12-17 PROCEDURE — 93005 ELECTROCARDIOGRAM TRACING: CPT | Performed by: STUDENT IN AN ORGANIZED HEALTH CARE EDUCATION/TRAINING PROGRAM

## 2022-12-17 PROCEDURE — 85730 THROMBOPLASTIN TIME PARTIAL: CPT | Performed by: STUDENT IN AN ORGANIZED HEALTH CARE EDUCATION/TRAINING PROGRAM

## 2022-12-17 PROCEDURE — 84443 ASSAY THYROID STIM HORMONE: CPT | Performed by: STUDENT IN AN ORGANIZED HEALTH CARE EDUCATION/TRAINING PROGRAM

## 2022-12-17 PROCEDURE — 80053 COMPREHEN METABOLIC PANEL: CPT | Performed by: STUDENT IN AN ORGANIZED HEALTH CARE EDUCATION/TRAINING PROGRAM

## 2022-12-17 PROCEDURE — 25010000002 CALCIUM GLUCONATE-NACL 1-0.675 GM/50ML-% SOLUTION: Performed by: STUDENT IN AN ORGANIZED HEALTH CARE EDUCATION/TRAINING PROGRAM

## 2022-12-17 PROCEDURE — 25010000002 ADENOSINE PER 6 MG: Performed by: STUDENT IN AN ORGANIZED HEALTH CARE EDUCATION/TRAINING PROGRAM

## 2022-12-17 PROCEDURE — 99284 EMERGENCY DEPT VISIT MOD MDM: CPT

## 2022-12-17 PROCEDURE — 25010000002 GLUCAGON (HUMAN RECOMBINANT) 1 MG RECONSTITUTED SOLUTION: Performed by: STUDENT IN AN ORGANIZED HEALTH CARE EDUCATION/TRAINING PROGRAM

## 2022-12-17 PROCEDURE — 85025 COMPLETE CBC W/AUTO DIFF WBC: CPT | Performed by: STUDENT IN AN ORGANIZED HEALTH CARE EDUCATION/TRAINING PROGRAM

## 2022-12-17 PROCEDURE — 84484 ASSAY OF TROPONIN QUANT: CPT | Performed by: STUDENT IN AN ORGANIZED HEALTH CARE EDUCATION/TRAINING PROGRAM

## 2022-12-17 PROCEDURE — 25010000002 ADENOSINE PER 6 MG

## 2022-12-17 PROCEDURE — 25010000002 ONDANSETRON PER 1 MG: Performed by: STUDENT IN AN ORGANIZED HEALTH CARE EDUCATION/TRAINING PROGRAM

## 2022-12-17 PROCEDURE — 96374 THER/PROPH/DIAG INJ IV PUSH: CPT

## 2022-12-17 PROCEDURE — 36415 COLL VENOUS BLD VENIPUNCTURE: CPT

## 2022-12-17 PROCEDURE — 87040 BLOOD CULTURE FOR BACTERIA: CPT | Performed by: STUDENT IN AN ORGANIZED HEALTH CARE EDUCATION/TRAINING PROGRAM

## 2022-12-17 PROCEDURE — 99285 EMERGENCY DEPT VISIT HI MDM: CPT

## 2022-12-17 PROCEDURE — 71045 X-RAY EXAM CHEST 1 VIEW: CPT

## 2022-12-17 PROCEDURE — 25010000002 ATROPINE SULFATE: Performed by: STUDENT IN AN ORGANIZED HEALTH CARE EDUCATION/TRAINING PROGRAM

## 2022-12-17 PROCEDURE — 93010 ELECTROCARDIOGRAM REPORT: CPT | Performed by: SPECIALIST

## 2022-12-17 PROCEDURE — 83735 ASSAY OF MAGNESIUM: CPT | Performed by: STUDENT IN AN ORGANIZED HEALTH CARE EDUCATION/TRAINING PROGRAM

## 2022-12-17 PROCEDURE — 83605 ASSAY OF LACTIC ACID: CPT | Performed by: STUDENT IN AN ORGANIZED HEALTH CARE EDUCATION/TRAINING PROGRAM

## 2022-12-17 PROCEDURE — 85610 PROTHROMBIN TIME: CPT | Performed by: STUDENT IN AN ORGANIZED HEALTH CARE EDUCATION/TRAINING PROGRAM

## 2022-12-17 PROCEDURE — 87636 SARSCOV2 & INF A&B AMP PRB: CPT | Performed by: STUDENT IN AN ORGANIZED HEALTH CARE EDUCATION/TRAINING PROGRAM

## 2022-12-17 PROCEDURE — 82746 ASSAY OF FOLIC ACID SERUM: CPT | Performed by: INTERNAL MEDICINE

## 2022-12-17 RX ORDER — METOPROLOL TARTRATE 5 MG/5ML
5 INJECTION INTRAVENOUS
Status: CANCELLED | OUTPATIENT
Start: 2022-12-17

## 2022-12-17 RX ORDER — ONDANSETRON 2 MG/ML
4 INJECTION INTRAMUSCULAR; INTRAVENOUS ONCE
Status: COMPLETED | OUTPATIENT
Start: 2022-12-17 | End: 2022-12-17

## 2022-12-17 RX ORDER — ADENOSINE 3 MG/ML
INJECTION, SOLUTION INTRAVENOUS
Status: COMPLETED
Start: 2022-12-17 | End: 2022-12-17

## 2022-12-17 RX ORDER — DILTIAZEM HCL/D5W 125 MG/125
5-15 PLASTIC BAG, INJECTION (ML) INTRAVENOUS
Status: DISCONTINUED | OUTPATIENT
Start: 2022-12-17 | End: 2022-12-18

## 2022-12-17 RX ORDER — CALCIUM GLUCONATE 20 MG/ML
1 INJECTION, SOLUTION INTRAVENOUS ONCE
Status: COMPLETED | OUTPATIENT
Start: 2022-12-17 | End: 2022-12-17

## 2022-12-17 RX ORDER — ADENOSINE 3 MG/ML
INJECTION, SOLUTION INTRAVENOUS
Status: DISCONTINUED
Start: 2022-12-17 | End: 2022-12-17 | Stop reason: WASHOUT

## 2022-12-17 RX ORDER — DILTIAZEM HYDROCHLORIDE 5 MG/ML
INJECTION INTRAVENOUS
Status: DISPENSED
Start: 2022-12-17 | End: 2022-12-18

## 2022-12-17 RX ORDER — ADENOSINE 3 MG/ML
6 INJECTION, SOLUTION INTRAVENOUS ONCE
Status: COMPLETED | OUTPATIENT
Start: 2022-12-17 | End: 2022-12-17

## 2022-12-17 RX ORDER — ASPIRIN 81 MG/1
324 TABLET, CHEWABLE ORAL ONCE
Status: COMPLETED | OUTPATIENT
Start: 2022-12-17 | End: 2022-12-17

## 2022-12-17 RX ORDER — DILTIAZEM HYDROCHLORIDE 5 MG/ML
10 INJECTION INTRAVENOUS ONCE
Status: COMPLETED | OUTPATIENT
Start: 2022-12-17 | End: 2022-12-17

## 2022-12-17 RX ORDER — METOPROLOL TARTRATE 5 MG/5ML
5 INJECTION INTRAVENOUS
Status: DISCONTINUED | OUTPATIENT
Start: 2022-12-17 | End: 2022-12-18

## 2022-12-17 RX ORDER — DILTIAZEM HYDROCHLORIDE 5 MG/ML
10 INJECTION INTRAVENOUS ONCE
Status: DISCONTINUED | OUTPATIENT
Start: 2022-12-17 | End: 2022-12-17

## 2022-12-17 RX ORDER — MAGNESIUM SULFATE 1 G/100ML
1 INJECTION INTRAVENOUS ONCE
Status: COMPLETED | OUTPATIENT
Start: 2022-12-17 | End: 2022-12-17

## 2022-12-17 RX ORDER — SODIUM CHLORIDE 0.9 % (FLUSH) 0.9 %
10 SYRINGE (ML) INJECTION AS NEEDED
Status: DISCONTINUED | OUTPATIENT
Start: 2022-12-17 | End: 2022-12-22 | Stop reason: HOSPADM

## 2022-12-17 RX ORDER — ADENOSINE 3 MG/ML
6 INJECTION, SOLUTION INTRAVENOUS ONCE
Status: DISCONTINUED | OUTPATIENT
Start: 2022-12-17 | End: 2022-12-17

## 2022-12-17 RX ORDER — SODIUM CHLORIDE 0.9 % (FLUSH) 0.9 %
10 SYRINGE (ML) INJECTION AS NEEDED
Status: DISCONTINUED | OUTPATIENT
Start: 2022-12-17 | End: 2022-12-17 | Stop reason: HOSPADM

## 2022-12-17 RX ADMIN — ATROPINE SULFATE 1 MG: 0.1 INJECTION PARENTERAL at 23:45

## 2022-12-17 RX ADMIN — GLUCAGON HYDROCHLORIDE 3 MG: KIT at 23:45

## 2022-12-17 RX ADMIN — DILTIAZEM HYDROCHLORIDE 10 MG: 5 INJECTION INTRAVENOUS at 21:04

## 2022-12-17 RX ADMIN — METOPROLOL TARTRATE 5 MG: 5 INJECTION, SOLUTION INTRAVENOUS at 20:49

## 2022-12-17 RX ADMIN — ONDANSETRON 4 MG: 2 INJECTION INTRAMUSCULAR; INTRAVENOUS at 22:43

## 2022-12-17 RX ADMIN — CALCIUM GLUCONATE 1 G: 20 INJECTION, SOLUTION INTRAVENOUS at 22:45

## 2022-12-17 RX ADMIN — ATROPINE SULFATE 0.5 MG: 0.1 INJECTION INTRAVENOUS at 22:41

## 2022-12-17 RX ADMIN — MAGNESIUM SULFATE HEPTAHYDRATE 1 G: 1 INJECTION, SOLUTION INTRAVENOUS at 20:16

## 2022-12-17 RX ADMIN — ADENOSINE 6 MG: 3 INJECTION, SOLUTION INTRAVENOUS at 21:30

## 2022-12-17 RX ADMIN — ASPIRIN 324 MG: 81 TABLET, CHEWABLE ORAL at 01:32

## 2022-12-17 RX ADMIN — GLUCAGON HYDROCHLORIDE 1 MG: 1 INJECTION, POWDER, FOR SOLUTION INTRAMUSCULAR; INTRAVENOUS; SUBCUTANEOUS at 22:42

## 2022-12-17 RX ADMIN — METOPROLOL TARTRATE 5 MG: 5 INJECTION, SOLUTION INTRAVENOUS at 20:12

## 2022-12-17 RX ADMIN — Medication 5 MG/HR: at 21:44

## 2022-12-17 RX ADMIN — SODIUM CHLORIDE 1000 ML: 9 INJECTION, SOLUTION INTRAVENOUS at 23:44

## 2022-12-17 RX ADMIN — DILTIAZEM HYDROCHLORIDE 10 MG: 5 INJECTION INTRAVENOUS at 21:34

## 2022-12-17 RX ADMIN — ADENOSINE 6 MG: 3 INJECTION, SOLUTION INTRAVENOUS at 01:15

## 2022-12-17 RX ADMIN — METOPROLOL TARTRATE 5 MG: 5 INJECTION, SOLUTION INTRAVENOUS at 20:23

## 2022-12-17 NOTE — ED NOTES
Patient arrived to triage via POV with EKG performed and SVT resulted. Patient roomed in 116. Zoll monitor placed and VS taken; 20G IV placed in L AC. 6mg Adenosine given at 0115 via IV push.

## 2022-12-17 NOTE — Clinical Note
Level of Care: Progressive Care [20]   Diagnosis: Paroxysmal SVT (supraventricular tachycardia) ([facility]) [[id number]]   Admitting Physician: AMOS GOLDSMITH [[id number]   Certification: I Certify That Inpatient Hospital Services Are Medically Necessary For Greater Than 2 Midnights

## 2022-12-17 NOTE — ED PROVIDER NOTES
Subjective   History of Present Illness  67-year-old female with past medical history of SVT, hypertension, hyperlipidemia presents to the ER due to concerns for palpitations.  Patient notes a previous history of SVT requiring adenosine cardioversion.  Patient noted palpitations have been occurring for the past several hours.  Denies chest pain.  Denied diaphoresis.  Denies shortness of breath or nausea with vomiting.  Patient noted no obvious aggravating or alleviating factors.  ER triage screening evaluation noted a heart rate in the 170s.  Patient was quickly transitioned to the ER for further treatment and care.        Review of Systems   Cardiovascular: Positive for palpitations.   All other systems reviewed and are negative.      Past Medical History:   Diagnosis Date   • Anxiety    • B12 deficiency    • BPV (benign positional vertigo)    • Breast cancer (HCC) 05/2021   • Diabetes mellitus (HCC)    • Diarrhea    • Disease of thyroid gland    • Elevated cholesterol    • Fibromyalgia    • GERD (gastroesophageal reflux disease)    • Glaucoma    • Heart murmur    • Hyperlipidemia    • Hypertension    • Kidney disease    • Obesity    • Peptic ulceration    • PONV (postoperative nausea and vomiting)    • Sleep apnea     c-pap   • Stroke (HCC)    • Weakness of left arm        Allergies   Allergen Reactions   • Codeine GI Intolerance     Increased heart rate     • Sulfa Antibiotics GI Intolerance     Heart rate increase        Past Surgical History:   Procedure Laterality Date   • ABDOMINAL SURGERY     • APPENDECTOMY     • BREAST ABSCESS INCISION AND DRAINAGE Right 7/7/2022    Procedure: BREAST ABSCESS INCISION AND DRAINAGE;  Surgeon: Lee Parrish MD;  Location: [facility];  Service: General;  Laterality: Right;   • BREAST CYST ASPIRATION     • BREAST LUMPECTOMY WITH SENTINEL NODE BIOPSY Right 8/5/2021    Procedure: BREAST LUMPECTOMY WITH SENTINEL NODE BIOPSY;  Surgeon: Lee Parrish MD;  Location:   [facility];  Service: General;  Laterality: Right;   • CATARACT EXTRACTION Bilateral    • CHOLECYSTECTOMY     • COLONOSCOPY     • ENDOSCOPY     • HEMATOMA EVACUATION TRUNK Right 2021    Procedure: HEMATOMA EVACUATION TRUNK;  Surgeon: Lee Parrish MD;  Location:  [facility];  Service: General;  Laterality: Right;   • URETHRA SURGERY         Family History   Problem Relation Age of Onset   • Thyroid disease Mother    • Diabetes Mother    • Hyperlipidemia Father    • Hypertension Father    • Heart attack Father    • Alcohol abuse Maternal Aunt    • Cancer Maternal Grandfather         PROSTATE   • Stroke Paternal Grandmother    • Stroke Paternal Grandfather    • Hypertension Other    • Hypertension Sister    • Breast cancer Sister 40   • Obesity Sister    • Hypertension Sister    • Hyperlipidemia Sister    • Hyperlipidemia Sister    • Hypertension Sister    • Arthritis Sister    • Obesity Sister    • Thyroid disease Sister        Social History     Socioeconomic History   • Marital status:    Tobacco Use   • Smoking status: Former     Packs/day: 1.50     Years: 8.00     Pack years: 12.00     Types: Cigarettes     Quit date:      Years since quittin.9   • Smokeless tobacco: Never   Vaping Use   • Vaping Use: Never used   Substance and Sexual Activity   • Alcohol use: No     Comment: former social drinker not had anything in 25 + years   • Sexual activity: Defer           Objective   Physical Exam  Constitutional:       General: She is not in acute distress.     Appearance: Normal appearance. She is not ill-appearing.   HENT:      Head: Normocephalic and atraumatic.      Right Ear: External ear normal.      Left Ear: External ear normal.      Nose: Nose normal.      Mouth/Throat:      Mouth: Mucous membranes are moist.   Eyes:      Extraocular Movements: Extraocular movements intact.      Pupils: Pupils are equal, round, and reactive to light.   Cardiovascular:      Rate and Rhythm: Regular rhythm.  Tachycardia present.      Heart sounds: No murmur heard.  Pulmonary:      Effort: Pulmonary effort is normal. No respiratory distress.      Breath sounds: Normal breath sounds. No wheezing.   Abdominal:      General: Bowel sounds are normal.      Palpations: Abdomen is soft.      Tenderness: There is no abdominal tenderness.   Musculoskeletal:         General: No deformity or signs of injury. Normal range of motion.      Cervical back: Normal range of motion and neck supple.   Skin:     General: Skin is warm and dry.      Findings: No erythema.   Neurological:      General: No focal deficit present.      Mental Status: She is alert and oriented to person, place, and time. Mental status is at baseline.      Cranial Nerves: No cranial nerve deficit.   Psychiatric:         Mood and Affect: Mood normal.         Behavior: Behavior normal.         Thought Content: Thought content normal.         Chemical Cardioversion    Date/Time: 12/17/2022 4:41 AM  Performed by: Danilo Lara DO  Authorized by: Danilo Lara DO     Consent:     Consent obtained:  Verbal    Consent given by:  Patient    Risks discussed:  Induced arrhythmia, pain and death    Alternatives discussed:  No treatment, electrical cardioversion, alternative treatment, referral, anti-coagulation medication, delayed treatment and observation  Pre-procedure details:     Cardioversion basis:  Emergent    Rhythm:  Supraventricular tachycardia  Attempt one:     Cardioversion medication:  Adenosine 6mg      Medication outcome:  Conversion to normal sinus rhythm  Post-procedure details:     Patient status:  Awake    Patient tolerance of procedure:  Tolerated well, no immediate complications               ED Course  ED Course as of 12/17/22 0445   Sat Dec 17, 2022   0113 EKG notes supraventricular tachycardia.  171 bpm.  No acute ST elevation.  QTc 489    Electronically signed by Danilo Lara DO, 12/17/22, 1:13 AM EST.   [SF]   0146 Patient was cardioverted  with adenosine 6 mg IV x1. [SF]   0141 Repeat EKG to confirm sinus rhythm at 86 bpm.  No acute ST elevations.  QTc 440    Electronically signed by Danilo Lara DO, 12/17/22, 1:41 AM EST.   [SF]   0442 Cardioversion was achieved with adenosine 6 mg IV x1.  Repeat EKG noted sinus rhythm.  CBC and CMP unremarkable.  Troponin trended x2 within normal limits.  COVID and influenza screen negative.  Coagulation studies unremarkable.  Patient's vitals remained stable throughout the entire ER course.  Patient counseled to follow-up closely with her family physician and cardiologist.  Work up and results were discussed throughly with the patient.  The patient will be discharged for further monitoring and management with their PCP.  Red flags, warning signs, worsening symptoms, and when to return to the ER discussed with and understood by the patient.  Patient will follow up with their PCP in a timely manner.  Vitals stable at discharge. [SF]      ED Course User Index  [SF] Danilo Lara DO                                           [facility]    Final diagnoses:   Supraventricular tachycardia (HCC)       ED Disposition  ED Disposition     ED Disposition   Discharge    Condition   Stable    Comment   --             No follow-up provider specified.       Medication List      No changes were made to your prescriptions during this visit.          Danilo Lara DO  12/17/22 [id number]

## 2022-12-18 ENCOUNTER — APPOINTMENT (OUTPATIENT)
Dept: CARDIOLOGY | Facility: HOSPITAL | Age: 67
DRG: 309 | End: 2022-12-18
Payer: MEDICARE

## 2022-12-18 LAB
ALBUMIN SERPL-MCNC: 2.73 G/DL (ref 3.5–5.2)
ALBUMIN/GLOB SERPL: 0.7 G/DL
ALP SERPL-CCNC: 71 U/L (ref 39–117)
ALT SERPL W P-5'-P-CCNC: 20 U/L (ref 1–33)
ANION GAP SERPL CALCULATED.3IONS-SCNC: 11.7 MMOL/L (ref 5–15)
AST SERPL-CCNC: 29 U/L (ref 1–32)
BASOPHILS # BLD AUTO: 0.05 10*3/MM3 (ref 0–0.2)
BASOPHILS NFR BLD AUTO: 0.7 % (ref 0–1.5)
BH CV ECHO MEAS - ACS: 1.6 CM
BH CV ECHO MEAS - AO MAX PG: 32.3 MMHG
BH CV ECHO MEAS - AO MEAN PG: 19 MMHG
BH CV ECHO MEAS - AO ROOT DIAM: 2.5 CM
BH CV ECHO MEAS - AO V2 MAX: 284 CM/SEC
BH CV ECHO MEAS - AO V2 VTI: 71.5 CM
BH CV ECHO MEAS - AVA(I,D): 1.8 CM2
BH CV ECHO MEAS - EDV(CUBED): 126.1 ML
BH CV ECHO MEAS - EDV(MOD-SP4): 87.4 ML
BH CV ECHO MEAS - EF(MOD-SP4): 56.1 %
BH CV ECHO MEAS - ESV(CUBED): 52.1 ML
BH CV ECHO MEAS - ESV(MOD-SP4): 38.4 ML
BH CV ECHO MEAS - FS: 25.5 %
BH CV ECHO MEAS - IVS/LVPW: 0.89 CM
BH CV ECHO MEAS - IVSD: 0.96 CM
BH CV ECHO MEAS - LA DIMENSION: 4.3 CM
BH CV ECHO MEAS - LAT PEAK E' VEL: 10.8 CM/SEC
BH CV ECHO MEAS - LV DIASTOLIC VOL/BSA (35-75): 39.7 CM2
BH CV ECHO MEAS - LV MASS(C)D: 187.9 GRAMS
BH CV ECHO MEAS - LV MAX PG: 7.8 MMHG
BH CV ECHO MEAS - LV MEAN PG: 4 MMHG
BH CV ECHO MEAS - LV SYSTOLIC VOL/BSA (12-30): 17.5 CM2
BH CV ECHO MEAS - LV V1 MAX: 140 CM/SEC
BH CV ECHO MEAS - LV V1 VTI: 33.9 CM
BH CV ECHO MEAS - LVIDD: 5 CM
BH CV ECHO MEAS - LVIDS: 3.7 CM
BH CV ECHO MEAS - LVOT AREA: 3.8 CM2
BH CV ECHO MEAS - LVOT DIAM: 2.2 CM
BH CV ECHO MEAS - LVPWD: 1.08 CM
BH CV ECHO MEAS - MED PEAK E' VEL: 7.6 CM/SEC
BH CV ECHO MEAS - MV A MAX VEL: 91.8 CM/SEC
BH CV ECHO MEAS - MV E MAX VEL: 138 CM/SEC
BH CV ECHO MEAS - MV E/A: 1.5
BH CV ECHO MEAS - PA ACC TIME: 0.13 SEC
BH CV ECHO MEAS - PA PR(ACCEL): 20.5 MMHG
BH CV ECHO MEAS - RAP SYSTOLE: 10 MMHG
BH CV ECHO MEAS - RVSP: 41.4 MMHG
BH CV ECHO MEAS - SI(MOD-SP4): 22.3 ML/M2
BH CV ECHO MEAS - SV(LVOT): 128.9 ML
BH CV ECHO MEAS - SV(MOD-SP4): 49 ML
BH CV ECHO MEAS - TAPSE (>1.6): 2.8 CM
BH CV ECHO MEAS - TR MAX PG: 31.4 MMHG
BH CV ECHO MEAS - TR MAX VEL: 280 CM/SEC
BH CV ECHO MEASUREMENTS AVERAGE E/E' RATIO: 15
BILIRUB SERPL-MCNC: 0.7 MG/DL (ref 0–1.2)
BUN SERPL-MCNC: 26 MG/DL (ref 8–23)
BUN/CREAT SERPL: 28.6 (ref 7–25)
CALCIUM SPEC-SCNC: 9.1 MG/DL (ref 8.6–10.5)
CHLORIDE SERPL-SCNC: 103 MMOL/L (ref 98–107)
CO2 SERPL-SCNC: 16.3 MMOL/L (ref 22–29)
CREAT SERPL-MCNC: 0.91 MG/DL (ref 0.57–1)
DEPRECATED RDW RBC AUTO: 52.2 FL (ref 37–54)
EGFRCR SERPLBLD CKD-EPI 2021: 69.3 ML/MIN/1.73
EOSINOPHIL # BLD AUTO: 0.04 10*3/MM3 (ref 0–0.4)
EOSINOPHIL NFR BLD AUTO: 0.5 % (ref 0.3–6.2)
ERYTHROCYTE [DISTWIDTH] IN BLOOD BY AUTOMATED COUNT: 13.4 % (ref 12.3–15.4)
FOLATE SERPL-MCNC: 9.98 NG/ML (ref 4.78–24.2)
GLOBULIN UR ELPH-MCNC: 4 GM/DL
GLUCOSE BLDC GLUCOMTR-MCNC: 173 MG/DL (ref 70–130)
GLUCOSE BLDC GLUCOMTR-MCNC: 182 MG/DL (ref 70–130)
GLUCOSE BLDC GLUCOMTR-MCNC: 187 MG/DL (ref 70–130)
GLUCOSE BLDC GLUCOMTR-MCNC: 201 MG/DL (ref 70–130)
GLUCOSE BLDC GLUCOMTR-MCNC: 267 MG/DL (ref 70–130)
GLUCOSE BLDC GLUCOMTR-MCNC: 283 MG/DL (ref 70–130)
GLUCOSE SERPL-MCNC: 253 MG/DL (ref 65–99)
HCT VFR BLD AUTO: 44.2 % (ref 34–46.6)
HGB BLD-MCNC: 14.5 G/DL (ref 12–15.9)
IMM GRANULOCYTES # BLD AUTO: 0.01 10*3/MM3 (ref 0–0.05)
IMM GRANULOCYTES NFR BLD AUTO: 0.1 % (ref 0–0.5)
LEFT ATRIUM VOLUME INDEX: 24.5 ML/M2
LYMPHOCYTES # BLD AUTO: 2.96 10*3/MM3 (ref 0.7–3.1)
LYMPHOCYTES NFR BLD AUTO: 38.7 % (ref 19.6–45.3)
MAGNESIUM SERPL-MCNC: 2.3 MG/DL (ref 1.6–2.4)
MAXIMAL PREDICTED HEART RATE: 153 BPM
MCH RBC QN AUTO: 34.6 PG (ref 26.6–33)
MCHC RBC AUTO-ENTMCNC: 32.8 G/DL (ref 31.5–35.7)
MCV RBC AUTO: 105.5 FL (ref 79–97)
MONOCYTES # BLD AUTO: 0.96 10*3/MM3 (ref 0.1–0.9)
MONOCYTES NFR BLD AUTO: 12.6 % (ref 5–12)
NEUTROPHILS NFR BLD AUTO: 3.62 10*3/MM3 (ref 1.7–7)
NEUTROPHILS NFR BLD AUTO: 47.4 % (ref 42.7–76)
NRBC BLD AUTO-RTO: 0 /100 WBC (ref 0–0.2)
PLATELET # BLD AUTO: 86 10*3/MM3 (ref 140–450)
PMV BLD AUTO: 10.2 FL (ref 6–12)
POTASSIUM SERPL-SCNC: 4 MMOL/L (ref 3.5–5.2)
PROT SERPL-MCNC: 6.7 G/DL (ref 6–8.5)
QT INTERVAL: 306 MS
QT INTERVAL: 312 MS
QT INTERVAL: 456 MS
QT INTERVAL: 466 MS
QT INTERVAL: 508 MS
QTC INTERVAL: 360 MS
QTC INTERVAL: 408 MS
QTC INTERVAL: 456 MS
QTC INTERVAL: 460 MS
QTC INTERVAL: 498 MS
RBC # BLD AUTO: 4.19 10*6/MM3 (ref 3.77–5.28)
SODIUM SERPL-SCNC: 131 MMOL/L (ref 136–145)
STRESS TARGET HR: 130 BPM
WBC NRBC COR # BLD: 7.64 10*3/MM3 (ref 3.4–10.8)

## 2022-12-18 PROCEDURE — 36415 COLL VENOUS BLD VENIPUNCTURE: CPT

## 2022-12-18 PROCEDURE — 25010000002 CALCIUM GLUCONATE PER 10 ML: Performed by: INTERNAL MEDICINE

## 2022-12-18 PROCEDURE — 82962 GLUCOSE BLOOD TEST: CPT

## 2022-12-18 PROCEDURE — 80053 COMPREHEN METABOLIC PANEL: CPT | Performed by: INTERNAL MEDICINE

## 2022-12-18 PROCEDURE — 93010 ELECTROCARDIOGRAM REPORT: CPT | Performed by: SPECIALIST

## 2022-12-18 PROCEDURE — 25010000002 HEPARIN (PORCINE) PER 1000 UNITS: Performed by: INTERNAL MEDICINE

## 2022-12-18 PROCEDURE — 63710000001 INSULIN ASPART PER 5 UNITS: Performed by: INTERNAL MEDICINE

## 2022-12-18 PROCEDURE — 83735 ASSAY OF MAGNESIUM: CPT | Performed by: INTERNAL MEDICINE

## 2022-12-18 PROCEDURE — 93306 TTE W/DOPPLER COMPLETE: CPT

## 2022-12-18 PROCEDURE — 99222 1ST HOSP IP/OBS MODERATE 55: CPT | Performed by: SPECIALIST

## 2022-12-18 PROCEDURE — 93306 TTE W/DOPPLER COMPLETE: CPT | Performed by: SPECIALIST

## 2022-12-18 PROCEDURE — 85025 COMPLETE CBC W/AUTO DIFF WBC: CPT | Performed by: INTERNAL MEDICINE

## 2022-12-18 PROCEDURE — 99223 1ST HOSP IP/OBS HIGH 75: CPT | Performed by: INTERNAL MEDICINE

## 2022-12-18 PROCEDURE — 94799 UNLISTED PULMONARY SVC/PX: CPT

## 2022-12-18 PROCEDURE — 94761 N-INVAS EAR/PLS OXIMETRY MLT: CPT

## 2022-12-18 PROCEDURE — 93005 ELECTROCARDIOGRAM TRACING: CPT | Performed by: INTERNAL MEDICINE

## 2022-12-18 RX ORDER — HYDROCHLOROTHIAZIDE 25 MG/1
25 TABLET ORAL DAILY
Status: CANCELLED | OUTPATIENT
Start: 2022-12-18

## 2022-12-18 RX ORDER — MAGNESIUM SULFATE HEPTAHYDRATE 40 MG/ML
2 INJECTION, SOLUTION INTRAVENOUS AS NEEDED
Status: DISCONTINUED | OUTPATIENT
Start: 2022-12-18 | End: 2022-12-22 | Stop reason: HOSPADM

## 2022-12-18 RX ORDER — FLUOXETINE HYDROCHLORIDE 20 MG/1
20 CAPSULE ORAL DAILY
Status: DISCONTINUED | OUTPATIENT
Start: 2022-12-18 | End: 2022-12-22 | Stop reason: HOSPADM

## 2022-12-18 RX ORDER — NICOTINE POLACRILEX 4 MG
15 LOZENGE BUCCAL
Status: DISCONTINUED | OUTPATIENT
Start: 2022-12-18 | End: 2022-12-22 | Stop reason: HOSPADM

## 2022-12-18 RX ORDER — LEVOTHYROXINE SODIUM 88 UG/1
88 TABLET ORAL DAILY
Status: DISCONTINUED | OUTPATIENT
Start: 2022-12-18 | End: 2022-12-22 | Stop reason: HOSPADM

## 2022-12-18 RX ORDER — INSULIN ASPART 100 [IU]/ML
0-9 INJECTION, SOLUTION INTRAVENOUS; SUBCUTANEOUS
Status: DISCONTINUED | OUTPATIENT
Start: 2022-12-18 | End: 2022-12-22 | Stop reason: HOSPADM

## 2022-12-18 RX ORDER — SODIUM CHLORIDE 9 MG/ML
40 INJECTION, SOLUTION INTRAVENOUS AS NEEDED
Status: DISCONTINUED | OUTPATIENT
Start: 2022-12-18 | End: 2022-12-22 | Stop reason: HOSPADM

## 2022-12-18 RX ORDER — SODIUM CHLORIDE 0.9 % (FLUSH) 0.9 %
10 SYRINGE (ML) INJECTION AS NEEDED
Status: DISCONTINUED | OUTPATIENT
Start: 2022-12-18 | End: 2022-12-22 | Stop reason: HOSPADM

## 2022-12-18 RX ORDER — ROSUVASTATIN CALCIUM 20 MG/1
20 TABLET, COATED ORAL DAILY
Status: DISCONTINUED | OUTPATIENT
Start: 2022-12-18 | End: 2022-12-22 | Stop reason: HOSPADM

## 2022-12-18 RX ORDER — DEXTROSE MONOHYDRATE 25 G/50ML
25 INJECTION, SOLUTION INTRAVENOUS
Status: DISCONTINUED | OUTPATIENT
Start: 2022-12-18 | End: 2022-12-22 | Stop reason: HOSPADM

## 2022-12-18 RX ORDER — POTASSIUM CHLORIDE 7.45 MG/ML
10 INJECTION INTRAVENOUS
Status: DISCONTINUED | OUTPATIENT
Start: 2022-12-18 | End: 2022-12-22 | Stop reason: HOSPADM

## 2022-12-18 RX ORDER — MAGNESIUM SULFATE HEPTAHYDRATE 40 MG/ML
4 INJECTION, SOLUTION INTRAVENOUS AS NEEDED
Status: DISCONTINUED | OUTPATIENT
Start: 2022-12-18 | End: 2022-12-22 | Stop reason: HOSPADM

## 2022-12-18 RX ORDER — SODIUM CHLORIDE 0.9 % (FLUSH) 0.9 %
10 SYRINGE (ML) INJECTION EVERY 12 HOURS SCHEDULED
Status: DISCONTINUED | OUTPATIENT
Start: 2022-12-18 | End: 2022-12-22 | Stop reason: HOSPADM

## 2022-12-18 RX ORDER — LEVOTHYROXINE SODIUM 88 UG/1
88 TABLET ORAL DAILY
COMMUNITY
End: 2023-03-31

## 2022-12-18 RX ORDER — ASPIRIN 81 MG/1
81 TABLET ORAL DAILY
Status: DISCONTINUED | OUTPATIENT
Start: 2022-12-18 | End: 2022-12-22 | Stop reason: HOSPADM

## 2022-12-18 RX ORDER — OMEGA-3S/DHA/EPA/FISH OIL/D3 300MG-1000
1000 CAPSULE ORAL DAILY
Status: DISCONTINUED | OUTPATIENT
Start: 2022-12-18 | End: 2022-12-22 | Stop reason: HOSPADM

## 2022-12-18 RX ORDER — ACETAMINOPHEN 325 MG/1
650 TABLET ORAL EVERY 6 HOURS PRN
Status: DISCONTINUED | OUTPATIENT
Start: 2022-12-18 | End: 2022-12-22 | Stop reason: HOSPADM

## 2022-12-18 RX ORDER — HEPARIN SODIUM 5000 [USP'U]/ML
5000 INJECTION, SOLUTION INTRAVENOUS; SUBCUTANEOUS EVERY 8 HOURS SCHEDULED
Status: DISCONTINUED | OUTPATIENT
Start: 2022-12-18 | End: 2022-12-22 | Stop reason: HOSPADM

## 2022-12-18 RX ORDER — POTASSIUM CHLORIDE 1500 MG/1
40 TABLET, FILM COATED, EXTENDED RELEASE ORAL AS NEEDED
Status: DISCONTINUED | OUTPATIENT
Start: 2022-12-18 | End: 2022-12-22 | Stop reason: HOSPADM

## 2022-12-18 RX ORDER — NITROGLYCERIN 0.4 MG/1
0.4 TABLET SUBLINGUAL
Status: DISCONTINUED | OUTPATIENT
Start: 2022-12-18 | End: 2022-12-22 | Stop reason: HOSPADM

## 2022-12-18 RX ORDER — INSULIN ASPART 100 [IU]/ML
0-7 INJECTION, SOLUTION INTRAVENOUS; SUBCUTANEOUS
Status: DISCONTINUED | OUTPATIENT
Start: 2022-12-18 | End: 2022-12-18

## 2022-12-18 RX ORDER — ANASTROZOLE 1 MG/1
1 TABLET ORAL DAILY
Status: DISCONTINUED | OUTPATIENT
Start: 2022-12-18 | End: 2022-12-22 | Stop reason: HOSPADM

## 2022-12-18 RX ORDER — POTASSIUM CHLORIDE 1.5 G/1.77G
40 POWDER, FOR SOLUTION ORAL AS NEEDED
Status: DISCONTINUED | OUTPATIENT
Start: 2022-12-18 | End: 2022-12-22 | Stop reason: HOSPADM

## 2022-12-18 RX ADMIN — ACETAMINOPHEN 650 MG: 325 TABLET, FILM COATED ORAL at 13:14

## 2022-12-18 RX ADMIN — ANASTROZOLE 1 MG: 1 TABLET ORAL at 17:48

## 2022-12-18 RX ADMIN — Medication 10 ML: at 02:07

## 2022-12-18 RX ADMIN — LEVOTHYROXINE SODIUM 88 MCG: 88 TABLET ORAL at 17:49

## 2022-12-18 RX ADMIN — INSULIN ASPART 4 UNITS: 100 INJECTION, SOLUTION INTRAVENOUS; SUBCUTANEOUS at 21:22

## 2022-12-18 RX ADMIN — HEPARIN SODIUM 5000 UNITS: 5000 INJECTION INTRAVENOUS; SUBCUTANEOUS at 05:29

## 2022-12-18 RX ADMIN — INSULIN ASPART 2 UNITS: 100 INJECTION, SOLUTION INTRAVENOUS; SUBCUTANEOUS at 16:33

## 2022-12-18 RX ADMIN — FLUOXETINE HYDROCHLORIDE 20 MG: 20 CAPSULE ORAL at 17:49

## 2022-12-18 RX ADMIN — Medication 10 ML: at 08:34

## 2022-12-18 RX ADMIN — INSULIN ASPART 2 UNITS: 100 INJECTION, SOLUTION INTRAVENOUS; SUBCUTANEOUS at 08:33

## 2022-12-18 RX ADMIN — HEPARIN SODIUM 5000 UNITS: 5000 INJECTION INTRAVENOUS; SUBCUTANEOUS at 21:18

## 2022-12-18 RX ADMIN — INSULIN ASPART 4 UNITS: 100 INJECTION, SOLUTION INTRAVENOUS; SUBCUTANEOUS at 02:07

## 2022-12-18 RX ADMIN — ROSUVASTATIN CALCIUM 20 MG: 20 TABLET, FILM COATED ORAL at 17:49

## 2022-12-18 RX ADMIN — CHOLECALCIFEROL TAB 10 MCG (400 UNIT) 1000 UNITS: 10 TAB at 17:49

## 2022-12-18 RX ADMIN — ASPIRIN 81 MG: 81 TABLET, COATED ORAL at 17:49

## 2022-12-18 RX ADMIN — INSULIN ASPART 2 UNITS: 100 INJECTION, SOLUTION INTRAVENOUS; SUBCUTANEOUS at 11:36

## 2022-12-18 RX ADMIN — HEPARIN SODIUM 5000 UNITS: 5000 INJECTION INTRAVENOUS; SUBCUTANEOUS at 13:14

## 2022-12-18 RX ADMIN — CALCIUM GLUCONATE 2 G: 98 INJECTION, SOLUTION INTRAVENOUS at 11:05

## 2022-12-18 RX ADMIN — Medication 10 ML: at 21:52

## 2022-12-18 NOTE — ED PROVIDER NOTES
[facility]  emergency department encounter        Pt Name: Ronna Wayne  MRN: [id number]  Birthdate 1955  Date of evaluation: 12/18/2022    Chief Complaint   Patient presents with   • Palpitations   • Rapid Heart Rate             HISTORY OF PRESENT ILLNESS:   Ronna Wayne is a 67 y.o. female PMH HTN, GD, obesity, GERD, sleep apnea, thyroid disease, CVA, CKD, DM, PAD, breast cancer, CVA, pSVT.    Patient presents with predominant complaint of shortness of breath and palpitations new onset shortly prior to arrival.  Arrival heart rate was 150.  Patient was seen here last night with similar complaint, diagnosed with SVT and cardioverted with single dose of adenosine 6 mg IV.  No medications at time of discharge.  Patient reports prior to symptom onset she had some bradycardia, heart rate dipping into the 40s.  Reports baseline heart rate 50.  Patient otherwise broadly denies ROS and reports she has been at baseline.  Denies chest pain.    No other exacerbating or alleviating factors other than as noted above.  Severity: Severe    PCP: Mague Acosta APRN          REVIEW OF SYSTEMS:     Review of Systems   Constitutional: Negative for fever.   HENT: Negative for congestion and rhinorrhea.    Eyes: Negative for visual disturbance.   Respiratory: Positive for shortness of breath. Negative for cough.    Cardiovascular: Positive for palpitations. Negative for chest pain.   Gastrointestinal: Negative for abdominal pain, nausea and vomiting.   Genitourinary: Negative for dysuria.   Musculoskeletal: Negative for myalgias.   Skin: Negative for rash.   Neurological: Negative for headaches.   Psychiatric/Behavioral: Negative for confusion.       Review of systems otherwise as per HPI.          PREVIOUS HISTORY:         Past Medical History:   Diagnosis Date   • Anxiety    • B12 deficiency    • BPV (benign positional vertigo)    • Breast cancer (HCC) 05/2021   • Diabetes mellitus (HCC)     • Diarrhea    • Disease of thyroid gland    • Elevated cholesterol    • Fibromyalgia    • GERD (gastroesophageal reflux disease)    • Glaucoma    • Heart murmur    • Hyperlipidemia    • Hypertension    • Kidney disease    • Obesity    • Peptic ulceration    • PONV (postoperative nausea and vomiting)    • Sleep apnea     c-pap   • Stroke (HCC)    • Weakness of left arm            Past Surgical History:   Procedure Laterality Date   • ABDOMINAL SURGERY     • APPENDECTOMY     • BREAST ABSCESS INCISION AND DRAINAGE Right 2022    Procedure: BREAST ABSCESS INCISION AND DRAINAGE;  Surgeon: Lee Parrish MD;  Location: [facility];  Service: General;  Laterality: Right;   • BREAST CYST ASPIRATION     • BREAST LUMPECTOMY WITH SENTINEL NODE BIOPSY Right 2021    Procedure: BREAST LUMPECTOMY WITH SENTINEL NODE BIOPSY;  Surgeon: Lee Parrish MD;  Location: [facility];  Service: General;  Laterality: Right;   • CATARACT EXTRACTION Bilateral    • CHOLECYSTECTOMY     • COLONOSCOPY     • ENDOSCOPY     • HEMATOMA EVACUATION TRUNK Right 2021    Procedure: HEMATOMA EVACUATION TRUNK;  Surgeon: Lee Parrish MD;  Location: [facility];  Service: General;  Laterality: Right;   • URETHRA SURGERY             Social History     Socioeconomic History   • Marital status:    Tobacco Use   • Smoking status: Former     Packs/day: 1.50     Years: 8.00     Pack years: 12.00     Types: Cigarettes     Quit date:      Years since quittin.9   • Smokeless tobacco: Never   Vaping Use   • Vaping Use: Never used   Substance and Sexual Activity   • Alcohol use: No     Comment: former social drinker not had anything in 25 + years   • Sexual activity: Defer           Family History   Problem Relation Age of Onset   • Thyroid disease Mother    • Diabetes Mother    • Hyperlipidemia Father    • Hypertension Father    • Heart attack Father    • Alcohol abuse Maternal Aunt    • Cancer Maternal Grandfather          PROSTATE   • Stroke Paternal Grandmother    • Stroke Paternal Grandfather    • Hypertension Other    • Hypertension Sister    • Breast cancer Sister 40   • Obesity Sister    • Hypertension Sister    • Hyperlipidemia Sister    • Hyperlipidemia Sister    • Hypertension Sister    • Arthritis Sister    • Obesity Sister    • Thyroid disease Sister          Current Outpatient Medications   Medication Instructions   • acetaminophen (TYLENOL) 650 mg, Oral, Every 4 Hours PRN   • acetaminophen (TYLENOL) 650 mg, Oral, Every 4 Hours PRN   • anastrozole (ARIMIDEX) 1 mg, Oral, Daily   • aspirin 81 mg, Oral, Daily   • cholecalciferol (VITAMIN D3) 1,000 Units, Oral, Daily   • Continuous Blood Gluc  (Dexcom G6 ) device 1 Device, Does not apply, Daily   • Continuous Blood Gluc Sensor (Dexcom G6 Sensor) Does not apply, Every 10 Days   • empagliflozin (JARDIANCE) 25 mg, Oral, Every Morning   • FLUoxetine (PROzac) 20 MG capsule TAKE ONE CAPSULE BY MOUTH DAILY   • Folic Acid-Cholecalciferol 1-3775 MG-UNIT capsule 1 capsule, Oral, Daily   • hydroCHLOROthiazide (HYDRODIURIL) 25 mg, Oral, Daily, for swelling.   • ibuprofen (ADVIL,MOTRIN) 600 mg, Oral, Every 6 Hours PRN   • ibuprofen (ADVIL,MOTRIN) 600 mg, Oral, Every 6 Hours PRN   • Insulin Syringe-Needle U-100 30G X 5/16\" 0.3 ML misc 1 Device, Does not apply, 3 Times Daily   • levothyroxine (SYNTHROID, LEVOTHROID) 88 MCG tablet TAKE ONE TABLET BY MOUTH DAILY   • MAGnesium-Oxide 400 (240 Mg) MG tablet TAKE ONE TABLET BY MOUTH THREE TIMES A DAY   • metoprolol succinate XL (TOPROL-XL) 75 mg, Oral, Daily   • nystatin 245727 UNIT/GM topical powder No dose, route, or frequency recorded.   • rosuvastatin (CRESTOR) 20 mg, Oral, Daily   • Toujeo SoloStar 80 Units, Subcutaneous, Daily   • traMADol (ULTRAM) 50 mg, Oral, Every 8 Hours PRN         Allergies:  Codeine and Sulfa antibiotics    Medications, allergies and past medical, surgical, family, and social history reviewed.         PHYSICAL EXAM:     Patient Vitals for the past 24 hrs:   BP Temp Temp src Pulse Resp SpO2 Height Weight   12/18/22 0031 126/93 -- -- (!) 48 -- 97 % -- --   12/18/22 0026 117/57 -- -- (!) 42 -- 96 % -- --   12/18/22 0021 101/55 -- -- (!) 42 -- 94 % -- --   12/18/22 0016 104/60 -- -- (!) 42 -- 96 % -- --   12/18/22 0006 112/59 -- -- (!) 43 -- 93 % -- --   12/18/22 0001 108/55 -- -- (!) 40 -- 94 % -- --   12/17/22 2356 112/53 -- -- (!) 42 -- 93 % -- --   12/17/22 2351 109/63 -- -- (!) 38 -- 94 % -- --   12/17/22 2346 105/56 -- -- (!) 39 -- 94 % -- --   12/17/22 2341 106/55 -- -- (!) 36 -- 94 % -- --   12/17/22 2336 106/52 -- -- (!) 38 -- 96 % -- --   12/17/22 2326 101/48 -- -- (!) 36 -- 92 % -- --   12/17/22 2321 109/51 -- -- (!) 40 -- 97 % -- --   12/17/22 2316 107/52 -- -- (!) 39 -- 92 % -- --   12/17/22 2311 100/52 -- -- (!) 39 -- 94 % -- --   12/17/22 2306 103/56 -- -- (!) 35 -- 94 % -- --   12/17/22 2301 96/49 -- -- (!) 36 -- 94 % -- --   12/17/22 2256 101/50 -- -- (!) 36 -- 95 % -- --   12/17/22 2251 102/52 -- -- (!) 36 -- 99 % -- --   12/17/22 2249 106/91 -- -- (!) 36 -- 94 % -- --   12/17/22 2226 100/74 -- -- (!) 38 -- 97 % -- --   12/17/22 2221 93/51 -- -- (!) 40 -- 97 % -- --   12/17/22 2216 112/63 -- -- 55 -- 97 % -- --   12/17/22 2211 116/88 -- -- 56 -- 96 % -- --   12/17/22 2206 129/70 -- -- 61 -- 97 % -- --   12/17/22 2156 135/72 -- -- 62 -- 96 % -- --   12/17/22 2153 119/75 -- -- 62 17 96 % -- --   12/17/22 2153 -- -- -- 54 -- 97 % -- --   12/17/22 2151 119/76 -- -- (!) 130 -- 93 % -- --   12/17/22 2144 107/74 -- -- (!) 133 -- -- -- --   12/17/22 2104 101/78 -- -- (!) 144 -- -- -- --   12/17/22 2049 128/81 -- -- (!) 144 -- 95 % -- --   12/17/22 2023 115/84 -- -- (!) 144 -- -- -- --   12/17/22 2022 115/84 -- -- (!) 146 -- 95 % -- --   12/17/22 1946 111/74 97.8 °F (36.6 °C) Tympanic (!) 152 20 98 % 160 cm (63\") 125 kg (275 lb)       Physical Exam  Vitals and nursing note reviewed.   Constitutional:        General: She is not in acute distress.     Appearance: She is obese. She is not ill-appearing or toxic-appearing.   HENT:      Head: Normocephalic and atraumatic.   Eyes:      Extraocular Movements: Extraocular movements intact.      Conjunctiva/sclera: Conjunctivae normal.   Cardiovascular:      Rate and Rhythm: Regular rhythm. Tachycardia present.   Pulmonary:      Effort: Pulmonary effort is normal. No respiratory distress.   Abdominal:      General: Abdomen is flat. There is no distension.   Musculoskeletal:         General: No deformity. Normal range of motion.      Cervical back: Normal range of motion. No rigidity.   Skin:     Coloration: Skin is not jaundiced.      Findings: No rash.   Neurological:      General: No focal deficit present.      Mental Status: She is alert and oriented to person, place, and time.   Psychiatric:         Mood and Affect: Mood normal.         Behavior: Behavior normal.             COMPLETED DIAGNOSTIC STUDIES AND INTERVENTIONS:     Lab Results (last 24 hours)     Procedure Component Value Units Date/Time    Comprehensive Metabolic Panel [[id number]]  (Abnormal) Collected: 12/17/22 0147    Specimen: Blood from Arm, Left Updated: 12/17/22 0219     Glucose 286 mg/dL      BUN 29 mg/dL      Creatinine 0.97 mg/dL      Sodium 137 mmol/L      Potassium 3.8 mmol/L      Comment: Slight hemolysis detected by analyzer. Results may be affected.        Chloride 98 mmol/L      CO2 23.0 mmol/L      Calcium 9.7 mg/dL      Total Protein 7.4 g/dL      Albumin 3.49 g/dL      ALT (SGPT) 27 U/L      AST (SGOT) 39 U/L      Alkaline Phosphatase 102 U/L      Total Bilirubin 0.7 mg/dL      Globulin 3.9 gm/dL      A/G Ratio 0.9 g/dL      BUN/Creatinine Ratio 29.9     Anion Gap 16.0 mmol/L      eGFR 64.2 mL/min/1.73      Comment: National Kidney Foundation and American Society of Nephrology (ASN) Task Force recommended calculation based on the Chronic Kidney Disease Epidemiology Collaboration (CKD-EPI)  equation refit without adjustment for race.       Narrative:      GFR Normal >60  Chronic Kidney Disease <60  Kidney Failure <15      CBC & Differential [[id number]]  (Abnormal) Collected: 12/17/22 0147    Specimen: Blood from Arm, Left Updated: 12/17/22 0157    Narrative:      The following orders were created for panel order CBC & Differential.  Procedure                               Abnormality         Status                     ---------                               -----------         ------                     CBC Auto Differential[[id number]]        Abnormal            Final result                 Please view results for these tests on the individual orders.    Protime-INR [[id number]]  (Normal) Collected: 12/17/22 0147    Specimen: Blood from Arm, Left Updated: 12/17/22 0300     Protime 14.0 Seconds      INR 1.06    Narrative:      Suggested INR therapeutic range for stable oral anticoagulant therapy:    Low Intensity therapy:   1.5-2.0  Moderate Intensity therapy:   2.0-3.0  High Intensity therapy:   2.5-4.0    aPTT [[id number]]  (Normal) Collected: 12/17/22 0147    Specimen: Blood from Arm, Left Updated: 12/17/22 0300     PTT 31.4 seconds     Narrative:      PTT Heparin Therapeutic Range:  59 - 95 seconds      Magnesium [[id number]]  (Abnormal) Collected: 12/17/22 0147    Specimen: Blood from Arm, Left Updated: 12/17/22 0219     Magnesium 1.5 mg/dL     CBC Auto Differential [[id number]]  (Abnormal) Collected: 12/17/22 0147    Specimen: Blood from Arm, Left Updated: 12/17/22 0157     WBC 5.86 10*3/mm3      RBC 4.34 10*6/mm3      Hemoglobin 15.0 g/dL      Hematocrit 43.8 %      .9 fL      MCH 34.6 pg      MCHC 34.2 g/dL      RDW 13.1 %      RDW-SD 49.0 fl      MPV 10.0 fL      Platelets 122 10*3/mm3      Neutrophil % 56.9 %      Lymphocyte % 29.2 %      Monocyte % 12.3 %      Eosinophil % 0.5 %      Basophil % 0.9 %      Immature Grans % 0.2 %      Neutrophils, Absolute 3.34 10*3/mm3      Lymphocytes,  Absolute 1.71 10*3/mm3      Monocytes, Absolute 0.72 10*3/mm3      Eosinophils, Absolute 0.03 10*3/mm3      Basophils, Absolute 0.05 10*3/mm3      Immature Grans, Absolute 0.01 10*3/mm3      nRBC 0.0 /100 WBC     Lactic Acid, Plasma [[id number]]  (Abnormal) Collected: 12/17/22 0147    Specimen: Blood from Arm, Left Updated: 12/17/22 0222     Lactate 2.3 mmol/L     Blood Culture - Blood, Arm, Left [[id number]] Collected: 12/17/22 0147    Specimen: Blood from Arm, Left Updated: 12/17/22 0154    Troponin [[id number]]  (Normal) Collected: 12/17/22 0147    Specimen: Blood from Arm, Left Updated: 12/17/22 0219     Troponin T <0.010 ng/mL     Narrative:      Troponin T Reference Range:  <= 0.03 ng/mL-   Negative for AMI  >0.03 ng/mL-     Abnormal for myocardial necrosis.  Clinicians would have to utilize clinical acumen, EKG, Troponin and serial changes to determine if it is an Acute Myocardial Infarction or myocardial injury due to an underlying chronic condition.       Results may be falsely decreased if patient taking Biotin.      COVID PRE-OP / PRE-PROCEDURE SCREENING ORDER (NO ISOLATION) - Swab, Nasopharynx [[id number]]  (Normal) Collected: 12/17/22 0148    Specimen: Swab from Nasopharynx Updated: 12/17/22 0232    Narrative:      The following orders were created for panel order COVID PRE-OP / PRE-PROCEDURE SCREENING ORDER (NO ISOLATION) - Swab, Nasopharynx.  Procedure                               Abnormality         Status                     ---------                               -----------         ------                     COVID-19 and FLU A/B PCR...[[id number]]  Normal              Final result                 Please view results for these tests on the individual orders.    COVID-19 and FLU A/B PCR - Swab, Nasopharynx [[id number]]  (Normal) Collected: 12/17/22 0148    Specimen: Swab from Nasopharynx Updated: 12/17/22 0232     COVID19 Not Detected     Influenza A PCR Not Detected     Influenza B PCR Not Detected     Narrative:      Fact sheet for providers: https://www.fda.gov/media/216120/download    Fact sheet for patients: https://www.fda.gov/media/120293/download    Test performed by PCR.    Blood Culture - Blood, Arm, Left [[id number]] Collected: 12/17/22 0338    Specimen: Blood from Arm, Left Updated: 12/17/22 0340    Troponin [[id number]]  (Normal) Collected: 12/17/22 0338    Specimen: Blood from Arm, Left Updated: 12/17/22 0406     Troponin T 0.019 ng/mL     Narrative:      Troponin T Reference Range:  <= 0.03 ng/mL-   Negative for AMI  >0.03 ng/mL-     Abnormal for myocardial necrosis.  Clinicians would have to utilize clinical acumen, EKG, Troponin and serial changes to determine if it is an Acute Myocardial Infarction or myocardial injury due to an underlying chronic condition.       Results may be falsely decreased if patient taking Biotin.      TSH [[id number]]  (Normal) Collected: 12/17/22 0338    Specimen: Blood from Arm, Left Updated: 12/17/22 2100     TSH 0.414 uIU/mL     Basic Metabolic Panel [[id number]]  (Abnormal) Collected: 12/17/22 2029    Specimen: Blood from Hand, Left Updated: 12/17/22 2054     Glucose 245 mg/dL      BUN 25 mg/dL      Creatinine 0.96 mg/dL      Sodium 130 mmol/L      Potassium 3.5 mmol/L      Comment: Slight hemolysis detected by analyzer. Results may be affected.        Chloride 96 mmol/L      CO2 20.1 mmol/L      Calcium 9.6 mg/dL      BUN/Creatinine Ratio 26.0     Anion Gap 13.9 mmol/L      eGFR 65.0 mL/min/1.73      Comment: National Kidney Foundation and American Society of Nephrology (ASN) Task Force recommended calculation based on the Chronic Kidney Disease Epidemiology Collaboration (CKD-EPI) equation refit without adjustment for race.       Narrative:      GFR Normal >60  Chronic Kidney Disease <60  Kidney Failure <15      CBC & Differential [[id number]]  (Abnormal) Collected: 12/17/22 2029    Specimen: Blood from Hand, Left Updated: 12/17/22 2035    Narrative:      The following  orders were created for panel order CBC & Differential.  Procedure                               Abnormality         Status                     ---------                               -----------         ------                     CBC Auto Differential[[id number]]        Abnormal            Final result                 Please view results for these tests on the individual orders.    CBC Auto Differential [[id number]]  (Abnormal) Collected: 12/17/22 2029    Specimen: Blood from Hand, Left Updated: 12/17/22 2035     WBC 8.59 10*3/mm3      RBC 4.34 10*6/mm3      Hemoglobin 14.8 g/dL      Hematocrit 43.3 %      MCV 99.8 fL      MCH 34.1 pg      MCHC 34.2 g/dL      RDW 13.2 %      RDW-SD 48.2 fl      MPV 10.2 fL      Platelets 136 10*3/mm3      Neutrophil % 48.5 %      Lymphocyte % 36.8 %      Monocyte % 12.9 %      Eosinophil % 0.8 %      Basophil % 0.8 %      Immature Grans % 0.2 %      Neutrophils, Absolute 4.16 10*3/mm3      Lymphocytes, Absolute 3.16 10*3/mm3      Monocytes, Absolute 1.11 10*3/mm3      Eosinophils, Absolute 0.07 10*3/mm3      Basophils, Absolute 0.07 10*3/mm3      Immature Grans, Absolute 0.02 10*3/mm3      nRBC 0.0 /100 WBC     POC Glucose Once [[id number]]  (Abnormal) Collected: 12/18/22 0046    Specimen: Blood Updated: 12/18/22 0056     Glucose 283 mg/dL      Comment: Meter: [id number] : [id number] ABNER SHERMAN               No orders to display         New Medications Ordered This Visit   Medications   • magnesium sulfate in D5W 1g/100mL (PREMIX)   • sodium chloride 0.9 % flush 10 mL   • metoprolol tartrate (LOPRESSOR) injection 5 mg   • dilTIAZem (CARDIZEM) 25 MG/5ML injection  - ADS Override Pull     Created by cabinet override   • dilTIAZem (CARDIZEM) injection 10 mg   • adenosine (ADENOCARD) injection 6 mg   • dilTIAZem (CARDIZEM) injection 10 mg   • adenosine (ADENOCARD) 6 MG/2ML injection  - ADS Override Pull     Created by cabinet override   • dilTIAZem (CARDIZEM) 125 mg in 125 mL D5W  infusion   • atropine sulfate injection 0.5 mg   • calcium gluconate 1g/50ml 0.675% NaCl IV SOLN   • ondansetron (ZOFRAN) injection 4 mg   • glucagon (human recombinant) (GLUCAGEN DIAGNOSTIC) injection 1 mg   • glucagon (human recombinant) (GLUCAGEN DIAGNOSTIC) injection 3 mg   • atropine sulfate injection 1 mg   • sodium chloride 0.9 % bolus 1,000 mL         Procedures            MEDICAL DECISION-MAKING AND ED COURSE:     ED Course as of 12/18/22 0059   Sat Dec 17, 2022   1957 EKG at 1950 no discernible T waves, QRS on EKG rate is 120, estimate likely 100 with P wave following QRS obfuscating read.  Rate 153, regular, no STEMI.  Likely SVT. [KP]   2017 67-year-old female presents with tachycardia to 150, no variation in rate.  Endorsing shortness of breath and palpitations.  Seen last night with SVT cardioverted with adenosine 6 mg.  Will attempt cardioversion with [KP]   2103 No resolution after 15 mg of metoprolol IV.  Heart rate 144. [KP]   2122 No resolution after 10 mg of diltiazem, 6 mg of adenosine, 12 mg of adenosine.  Administer another diltiazem 10 mg followed by gtt.  Heart rate remains very steady at 137. [KP]   2135 EKG at 2134 hrs.  bpm. [KP]   2217 Patient cardioverted to NSR.  Patient was on diltiazem 5 mg/h.  Heart rate dipped into the low 30s.  Patient reports mild headache.  Medication discontinued.  Repeat EKG pending.  Case discussed with and admit to hospitalist Dr. Queen. [KP]   2228 EKG at 2220 hrs. no clear sinus waves, appears to be junctional rhythm 36 bpm, QRS 86, QTc 360.  No ischemic changes.   [KP]   2256 Patient had single blood pressure systolic of 72, ordered atropine glucagon calcium. BP currently 101/50, HR 35.  [KP]      ED Course User Index  [KP] Dennis Crowell MD       ?      FINAL IMPRESSION:       1. SVT (supraventricular tachycardia) (HCC)         The complaints listed here are new problems to this examiner.      FOLLOW-UP  No follow-up provider  specified.    DISPOSITION  ED Disposition     ED Disposition   Decision to Admit    Condition   --    Comment   Level of Care: Progressive Care [20]   Diagnosis: Paroxysmal SVT (supraventricular tachycardia) ([facility]) [[id number]]   Admitting Physician: AMOS GOLDSMITH [[id number]]   Certification: I Certify That Inpatient Hospital Services Are Medically Necessary For Greater Than 2 Midnights                   Please note that portions of this note were completed with a voice recognition program.      Dennis Crowell MD  00:59 EST  12/18/2022             Dennis Crowell MD  12/17/22 2228       Dennis Crowell MD  12/18/22 0059

## 2022-12-18 NOTE — PLAN OF CARE
Goal Outcome Evaluation:  Plan of Care Reviewed With: patient        Progress: no change  Outcome Evaluation: Pt. alert and oriented times 4. RA. HR maintaing in the 40s. Denies any chest pain or shortness of air at this time. Bed alarm set, call light within reach.      Problem: Adult Inpatient Plan of Care  Goal: Plan of Care Review  Outcome: Ongoing, Progressing  Flowsheets (Taken 12/18/2022 0230)  Progress: no change  Plan of Care Reviewed With: patient  Outcome Evaluation: Pt. alert and oriented times 4. RA. HR maintaing in the 40s. Denies any chest pain or shortness of air at this time. Bed alarm set, call light within reach.  Goal: Patient-Specific Goal (Individualized)  Outcome: Ongoing, Progressing  Goal: Absence of Hospital-Acquired Illness or Injury  Outcome: Ongoing, Progressing  Intervention: Identify and Manage Fall Risk  Recent Flowsheet Documentation  Taken 12/18/2022 0215 by Essence Somers RN  Safety Promotion/Fall Prevention:   activity supervised   fall prevention program maintained   safety round/check completed  Intervention: Prevent Skin Injury  Recent Flowsheet Documentation  Taken 12/18/2022 0215 by Essence Somers RN  Skin Protection:   adhesive use limited   incontinence pads utilized   transparent dressing maintained   tubing/devices free from skin contact  Intervention: Prevent and Manage VTE (Venous Thromboembolism) Risk  Recent Flowsheet Documentation  Taken 12/18/2022 0215 by Essence Somers RN  Activity Management: bedrest  VTE Prevention/Management: (heparin sq) other (see comments)  Intervention: Prevent Infection  Recent Flowsheet Documentation  Taken 12/18/2022 0215 by Essence Somers RN  Infection Prevention:   single patient room provided   rest/sleep promoted   personal protective equipment utilized   hand hygiene promoted   equipment surfaces disinfected  Goal: Optimal Comfort and Wellbeing  Outcome: Ongoing, Progressing  Intervention: Provide Person-Centered Care  Recent  Flowsheet Documentation  Taken 12/18/2022 0215 by Essence Somers RN  Trust Relationship/Rapport:   care explained   choices provided  Goal: Readiness for Transition of Care  Outcome: Ongoing, Progressing     Problem: Fall Injury Risk  Goal: Absence of Fall and Fall-Related Injury  Outcome: Ongoing, Progressing  Intervention: Identify and Manage Contributors  Recent Flowsheet Documentation  Taken 12/18/2022 0215 by Essence Somers RN  Medication Review/Management: medications reviewed  Self-Care Promotion: independence encouraged  Intervention: Promote Injury-Free Environment  Recent Flowsheet Documentation  Taken 12/18/2022 0215 by Essence Somers RN  Safety Promotion/Fall Prevention:   activity supervised   fall prevention program maintained   safety round/check completed     Problem: Diabetes Comorbidity  Goal: Blood Glucose Level Within Targeted Range  Outcome: Ongoing, Progressing  Intervention: Monitor and Manage Glycemia  Recent Flowsheet Documentation  Taken 12/18/2022 0215 by Essence Somers RN  Glycemic Management: blood glucose monitored     Problem: Hypertension Comorbidity  Goal: Blood Pressure in Desired Range  Outcome: Ongoing, Progressing  Intervention: Maintain Blood Pressure Management  Recent Flowsheet Documentation  Taken 12/18/2022 0215 by Essence Somers RN  Medication Review/Management: medications reviewed     Problem: Skin Injury Risk Increased  Goal: Skin Health and Integrity  Outcome: Ongoing, Progressing  Intervention: Optimize Skin Protection  Recent Flowsheet Documentation  Taken 12/18/2022 0215 by Essence Somers RN  Pressure Reduction Techniques:   weight shift assistance provided   heels elevated off bed  Pressure Reduction Devices:   pressure-redistributing mattress utilized   positioning supports utilized  Skin Protection:   adhesive use limited   incontinence pads utilized   transparent dressing maintained   tubing/devices free from skin contact

## 2022-12-18 NOTE — ED NOTES
Spoke with pharmacy; pharmacist will check stock to see if there is 10mg glucagon available to administer to the pt.

## 2022-12-18 NOTE — ED NOTES
Pt IV access was found to be infiltrated at 0017; MD was notified and aware; IV access removed from pt; cold pack applied to site; attempted to obtain another IV access; unsuccessful at this time.

## 2022-12-18 NOTE — PROGRESS NOTES
Mrs. Wayne was admitted early this morning due to recurrent tachycardia.  She was diagnosed with paroxysmal SVT previously and was on metoprolol succinate 75 mg daily (recently decreased in cardiology clinic due to heart rate of 40s during appointment).  Was seen in the emergency room on 12/17 early in the morning and was found to be in SVT with heart rate in the 150s.  Received adenosine which broke SVT back to normal sinus rhythm.  She was discharged from the ER however returned due to recurrence of palpitation.  She was found to have a heart rate in the 150s again.  Received 3 doses of IV metoprolol 5 mg without major change.  Then received adenosine which did not convert her to normal sinus rhythm therefore a loading dose of Cardizem 10 mg IV x2 doses was given and started on Cardizem drip.  Patient became bradycardic shortly after with heart rate in the 30s.  Cardizem drip was stopped and she received a dose of glucagon.  After a few hours her heart rate improved to the 40s.  She was mainly asymptomatic without any dizziness.  Therefore she was admitted to PCU.  Cardiology has been consulted and seen her this morning.  Echocardiogram has been done and pending read.  We will monitor her on telemetry to understand the pattern after rhythm better.  There are some concern she may have tachybradycardia/sick sinus syndrome needing pacemaker versus treating the SVT with ablation as mentioned in cardiology note to prevent bradycardia if bradycardia is proven to be medication induced.  She is receiving IV calcium to reverse potential calcium blockade effect.  However her heart rate is in the 50s now during my evaluation.  Of note she has mild chronic thrombocytopenia that ranges from 120-140.  Noted to have lower platelets at 86 this admission.  Unclear if she is on medication contributing to this.  We will wait for home medication update by pharmacy.    Will resume home meds once updated list is available.  However  we will continue to hold all AV howard blockade agents at this time.

## 2022-12-18 NOTE — H&P
Psychiatric   HISTORY AND PHYSICAL    Patient Name: Ronna Wayne  : 1955  MRN: [id number]  Primary Care Physician:  Mague Acosta APRN  Date of admission: 2022    Subjective   Subjective     Chief Complaint: Intermittent tachycardia/bradycardia, Palpitations    History of Present Illness     The patient is a 67-year-old female with past medical history significant for PSVT, MEMO compliant with CPAP, essential hypertension, DM type II, breast cancer s/p lumpectomy with abscess in , hypothyroidism and Stroke who presents to the ED complaining of intermittent tachycardia with bradycardia, palpitations and chest heaviness.    Patient seen and examined at bedside in ER room 111.  Patient's  is present at bedside in addition to ED nursing staff.    Patient was initially seen in the early morning hours on 22 for SVT which broke with chemical cardioversion with Adenosine 6 mg IV x 1. Patient was discharged home only to return later that same evening with tachycardia.    Patient states that at around 4 PM her heart rate was in the 40s.  She states that she felt tired and thought it was from her most recent ED visit so she took a nap and upon waking at 5:30 PM states that her heart rate was sustaining in the 150s and she experienced palpitations and chest heaviness.    Upon arrival to the ED, patient received three 5 mg doses of IV Lopressor followed by 6 mg IV adenosine and in total 20 mg IV Cardizem (10 x 2), prior to the initiation of the Cardizem infusion, 5mg/hr.    During my visit patient's heart rate remained mostly 31-36 bpm; very briefly increased to 90 bpm.  Patient alert and oriented x4.  Patient states that she will have intermittent bradycardia at home as well.  She does report compliance with CPAP.    Patient was receiving atropine followed by glucagon and calcium gluconate during my examination.    She currently complains of some chest heaviness but no  palpitations or shortness of air.  No chest pressure.  No nausea, vomiting, lightheadedness and/or dizziness.      Reports longstanding history of PSVT requiring approximately 5 chemical cardioversions apart from her most recent ED trip.    Patient follows outpatient cardiology. Her Toprol XL was decreased from 100 mg/d --> 75 mg/d in October 2022 due to bradycardia in the 40s.    Patient's  states that she has recently been very lethargic during the day for the past several days.  He also confirms her compliant with CPAP and wonders if this extreme lethargy could be related to intermittent bradycardia.     Review of Systems   Constitutional: Positive for fatigue. Negative for chills and fever.   HENT: Negative for congestion, sore throat and trouble swallowing.    Eyes: Negative for visual disturbance.   Respiratory: Negative for cough and shortness of breath.    Cardiovascular: Positive for chest pain and palpitations. Negative for leg swelling.   Gastrointestinal: Positive for nausea. Negative for abdominal pain and vomiting.   Genitourinary: Negative for decreased urine volume and dysuria.   Musculoskeletal: Negative for back pain and gait problem.   Skin: Negative for rash and wound.   Neurological: Positive for weakness. Negative for syncope.   Psychiatric/Behavioral: Negative for confusion and hallucinations. The patient is not nervous/anxious.       Personal History     Past Medical History:   Diagnosis Date   • Anxiety    • B12 deficiency    • BPV (benign positional vertigo)    • Breast cancer (HCC) 05/2021   • Diabetes mellitus (HCC)    • Diarrhea    • Disease of thyroid gland    • Elevated cholesterol    • Fibromyalgia    • GERD (gastroesophageal reflux disease)    • Glaucoma    • Heart murmur    • Hyperlipidemia    • Hypertension    • Kidney disease    • Obesity    • Peptic ulceration    • PONV (postoperative nausea and vomiting)    • Sleep apnea     c-pap   • Stroke (HCC)    • Weakness of left  arm        Past Surgical History:   Procedure Laterality Date   • ABDOMINAL SURGERY     • APPENDECTOMY     • BREAST ABSCESS INCISION AND DRAINAGE Right 7/7/2022    Procedure: BREAST ABSCESS INCISION AND DRAINAGE;  Surgeon: Lee Parrish MD;  Location: [facility] OR;  Service: General;  Laterality: Right;   • BREAST CYST ASPIRATION     • BREAST LUMPECTOMY WITH SENTINEL NODE BIOPSY Right 8/5/2021    Procedure: BREAST LUMPECTOMY WITH SENTINEL NODE BIOPSY;  Surgeon: Lee Parrish MD;  Location: [facility] OR;  Service: General;  Laterality: Right;   • CATARACT EXTRACTION Bilateral    • CHOLECYSTECTOMY     • COLONOSCOPY     • ENDOSCOPY     • HEMATOMA EVACUATION TRUNK Right 8/5/2021    Procedure: HEMATOMA EVACUATION TRUNK;  Surgeon: Lee Parrish MD;  Location: [facility] OR;  Service: General;  Laterality: Right;   • URETHRA SURGERY         Family History: family history includes Alcohol abuse in her maternal aunt; Arthritis in her sister; Breast cancer (age of onset: 40) in her sister; Cancer in her maternal grandfather; Diabetes in her mother; Heart attack in her father; Hyperlipidemia in her father, sister, and sister; Hypertension in her father, sister, sister, sister, and another family member; Obesity in her sister and sister; Stroke in her paternal grandfather and paternal grandmother; Thyroid disease in her mother and sister. Otherwise pertinent FHx was reviewed and not pertinent to current issue.    Social History:  reports that she quit smoking about 46 years ago. Her smoking use included cigarettes. She has a 12.00 pack-year smoking history. She has never used smokeless tobacco. She reports that she does not drink alcohol.    Home Medications:  Dexcom G6 , Dexcom G6 Sensor, FLUoxetine, Folic Acid-Cholecalciferol, Insulin Glargine (1 Unit Dial), Insulin Syringe-Needle U-100, Magnesium Oxide, acetaminophen, anastrozole, aspirin, cholecalciferol, empagliflozin, hydroCHLOROthiazide, ibuprofen,  levothyroxine, metoprolol succinate XL, nystatin, rosuvastatin, and traMADol    Allergies:  Allergies   Allergen Reactions   • Codeine GI Intolerance     Increased heart rate     • Sulfa Antibiotics GI Intolerance     Heart rate increase        Objective    Objective     Vitals:   Temp:  [97.8 °F (36.6 °C)-98 °F (36.7 °C)] 97.8 °F (36.6 °C)  Heart Rate:  [] 36  Resp:  [17-22] 17  BP: ()/(51-91) 106/91    Physical Exam  Constitutional:       General: She is not in acute distress.     Appearance: She is well-developed.   HENT:      Head: Normocephalic and atraumatic.   Eyes:      Conjunctiva/sclera: Conjunctivae normal.   Neck:      Vascular: JVD present.      Trachea: No tracheal deviation.   Cardiovascular:      Rate and Rhythm: Regular rhythm. Bradycardia present.      Pulses:           Posterior tibial pulses are 2+ on the right side and 2+ on the left side.      Heart sounds: No murmur heard.    No friction rub. No gallop.   Pulmonary:      Effort: No respiratory distress.      Breath sounds: Normal breath sounds. No wheezing or rales.   Abdominal:      General: Bowel sounds are normal. There is no distension.      Palpations: Abdomen is soft.      Tenderness: There is no abdominal tenderness. There is no guarding.   Musculoskeletal:      Right lower leg: No edema.      Left lower leg: No edema.   Skin:     General: Skin is warm and dry.      Findings: No erythema or rash.   Neurological:      Mental Status: She is oriented to person, place, and time.      Cranial Nerves: No cranial nerve deficit.         Result Review    Result Review:  I have personally reviewed the results from the time of this admission to 12/17/2022 22:56 EST and agree with these findings:  [x]  Laboratory list / accordion  [x]  Microbiology  [x]  Radiology  []  EKG/Telemetry   []  Cardiology/Vascular   []  Pathology  []  Old records  []  Other:  Most notable findings include: Troponin T negative x2.  CMP with glucose 286, anion  gap 16, BUN/creatinine ratio 29.9.  Magnesium 1.5.  Initial lactate mildly elevated 2.3.  CBC with normal WBC at 8.59, MCV chronically elevated at 99.8.    EKG 1 (1950) per my view: SVT with non-specific ST changes inferolaterally  EKG 2 (2134) per my view: irregular tachycardic rhythm with above mentioned ST changes  EKG 3 (2220) per my view: sinus and junctional bradycardia    CXR:  FINDINGS:  Portable AP view(s) of the chest.  The heart and mediastinal contours are normal. The lungs are clear. No pneumothorax or pleural effusion.     IMPRESSION:  No acute cardiopulmonary findings.      Assessment / Plan     #Recurrent PSVT, POA  #Profound bradycardia, suspect medication induced (?)  #Hypomagnesemia  #Hypokalemia  #Diabetes mellitus type II with hyperglycemia  #AGMA  #Minimal AST elevation  -Admit to PCU  -Monitor closely on telemetry; most recently, patient has been sinus bradycardia in the low 40s  -Hold any further beta blockers and/or calcium channel blockers  -Consult cardiology for further evaluation and recommendations  -Obtain echocardiogram  -Supplement Mg and K as needed per protocol  -Accu-checks and SSI with low dose SSI for now  -Repeat chemistry panel and CBC in a.m.     Chronic medical problems:  -Essential hypertension  -MEMO compliant with CPAP  -CVA  -Morbid Obesity  -CKD Stage II  -Hypothyroidism  -Cirrhosis noted on previous CTs  -History of breast cancer  -History of lymphedema    DVT prophylaxis:  Subcutaneous heparin    Patient is considered to be a high risk patient due to: Recurrent PSVT, Profound bradycardia, electrolyte abnormalities, MEMO, obesity    Admission Status:  I believe this patient meets inpatient status.    Afshan Queen,

## 2022-12-18 NOTE — CONSULTS
Consults  Date of Admit: 12/17/2022  Date of Consult: 12/18/22  No ref. provider found  Ronna Wayne  1955  Consulting Physician: Dr. Lupe MD    Cardiology consultation    Reason for consultation: SVT and bradycardia  Assessment:  1. Recurrent SVT  2. Severe bradycardia with junctional escape likely iatrogenic at least partly  3. Possible sick sinus syndrome  4. Brief episode of atrial fibrillation with junctional escape  5. Obstructive sleep apnea on CPAP  6. Essential hypertension  7. Diabetes mellitus type 2  8. History of CVA  9.  Dyslipidemia  10.  Reported chest pain with the tachycardia stress test back in May 2021 negative for ischemia with negative troponin    Recommendations:  1. Suspect bradycardia but could be partly related to medications including metoprolol and Cardizem now her heart rate is 40 and above and she is otherwise hemodynamically stable we will monitor closely  2. Likely patient will need ablation as an outpatient for her SVT as this will simplify her management  3. Continue with CPAP  4. Lipids are elevated restart statin  5. Hyponatremia we will monitor      History of Present Illness    Subjective     Chief Complaint   Patient presents with   • Palpitations   • Rapid Heart Rate       Ronna Wayne is a 67 y.o. female with past medical history significant for paroxysmal SVT, diabetes mellitus, essential hypertension, obstructive sleep apnea, history of CVA.  Patient originally presented about [facility] emergency department in the morning of 12/17/2022 where she was found to be in SVT that did require 6 mg of IV adenosine which was successful in achieving sinus rhythm.  She was then discharged.  Unfortunately she presented back to the emergency department later that day back in SVT where she required 3 dosages of 5 mg of IV Lopressor, 6 mg of IV adenosine and 20 mg of IV Cardizem.  This was ultimately successful however she has been bradycardic since and was  admitted for further evaluation.  During our evaluation she was bradycardic with heart rate at around 40 bpm with junctional rhythm being observed with intermittent sinus rhythm on telemetry.  Clinically she reports that she does get dizzy/lightheaded whenever she is bradycardic.  She also admitted to some chest pressure when her heart was tachycardic at 150 bpm while in SVT.  She denies any syncope or near syncope.  She has been taking 75 mg of metoprolol            Past Medical History:   Diagnosis Date   • Anxiety    • B12 deficiency    • BPV (benign positional vertigo)    • Breast cancer (HCC) 05/2021   • Diabetes mellitus (HCC)    • Diarrhea    • Disease of thyroid gland    • Elevated cholesterol    • Fibromyalgia    • GERD (gastroesophageal reflux disease)    • Glaucoma    • Heart murmur    • Hyperlipidemia    • Hypertension    • Kidney disease    • Obesity    • Peptic ulceration    • PONV (postoperative nausea and vomiting)    • Sleep apnea     c-pap   • Stroke (HCC)    • Weakness of left arm      Past Surgical History:   Procedure Laterality Date   • ABDOMINAL SURGERY     • APPENDECTOMY     • BREAST ABSCESS INCISION AND DRAINAGE Right 7/7/2022    Procedure: BREAST ABSCESS INCISION AND DRAINAGE;  Surgeon: Lee Parrish MD;  Location: [facility];  Service: General;  Laterality: Right;   • BREAST CYST ASPIRATION     • BREAST LUMPECTOMY WITH SENTINEL NODE BIOPSY Right 8/5/2021    Procedure: BREAST LUMPECTOMY WITH SENTINEL NODE BIOPSY;  Surgeon: Lee Parrish MD;  Location: [facility] OR;  Service: General;  Laterality: Right;   • CATARACT EXTRACTION Bilateral    • CHOLECYSTECTOMY     • COLONOSCOPY     • ENDOSCOPY     • HEMATOMA EVACUATION TRUNK Right 8/5/2021    Procedure: HEMATOMA EVACUATION TRUNK;  Surgeon: Lee Parrish MD;  Location: [facility];  Service: General;  Laterality: Right;   • URETHRA SURGERY       Family History   Problem Relation Age of Onset   • Thyroid disease Mother    •  Diabetes Mother    • Hyperlipidemia Father    • Hypertension Father    • Heart attack Father    • Alcohol abuse Maternal Aunt    • Cancer Maternal Grandfather         PROSTATE   • Stroke Paternal Grandmother    • Stroke Paternal Grandfather    • Hypertension Other    • Hypertension Sister    • Breast cancer Sister 40   • Obesity Sister    • Hypertension Sister    • Hyperlipidemia Sister    • Hyperlipidemia Sister    • Hypertension Sister    • Arthritis Sister    • Obesity Sister    • Thyroid disease Sister      Social History     Tobacco Use   • Smoking status: Former     Packs/day: 1.50     Years: 8.00     Pack years: 12.00     Types: Cigarettes     Quit date:      Years since quittin.9   • Smokeless tobacco: Never   Vaping Use   • Vaping Use: Never used   Substance Use Topics   • Alcohol use: No     Comment: former social drinker not had anything in 25 + years     Medications Prior to Admission   Medication Sig Dispense Refill Last Dose   • acetaminophen (TYLENOL) 325 MG tablet Take 2 tablets by mouth Every 4 (Four) Hours As Needed for Mild Pain . 30 tablet 0    • acetaminophen (TYLENOL) 325 MG tablet Take 2 tablets by mouth Every 4 (Four) Hours As Needed for Mild Pain . 30 tablet 0    • anastrozole (ARIMIDEX) 1 MG tablet Take 1 tablet by mouth Daily. 30 tablet 11    • aspirin 81 MG EC tablet Take 1 tablet by mouth Daily. 90 tablet 3    • cholecalciferol (VITAMIN D3) 25 MCG (1000 UT) tablet Take 1,000 Units by mouth Daily.      • empagliflozin (Jardiance) 25 MG tablet tablet Take 1 tablet by mouth Every Morning. 30 tablet 3    • FLUoxetine (PROzac) 20 MG capsule TAKE ONE CAPSULE BY MOUTH DAILY 30 capsule 2    • Folic Acid-Cholecalciferol 1-3775 MG-UNIT capsule Take 1 capsule by mouth Daily.      • hydroCHLOROthiazide (HYDRODIURIL) 25 MG tablet Take 1 tablet by mouth Daily. for swelling. 30 tablet 3    • ibuprofen (ADVIL,MOTRIN) 600 MG tablet Take 1 tablet by mouth Every 6 (Six) Hours As Needed for Mild  Pain . 30 tablet 0    • ibuprofen (ADVIL,MOTRIN) 600 MG tablet Take 1 tablet by mouth Every 6 (Six) Hours As Needed for Mild Pain . 30 tablet 0    • Insulin Glargine, 1 Unit Dial, (Toujeo SoloStar) 300 UNIT/ML solution pen-injector injection Inject 80 Units under the skin into the appropriate area as directed Daily. 15 mL 2    • levothyroxine (SYNTHROID, LEVOTHROID) 88 MCG tablet TAKE ONE TABLET BY MOUTH DAILY 90 tablet 0    • MAGnesium-Oxide 400 (240 Mg) MG tablet TAKE ONE TABLET BY MOUTH THREE TIMES A DAY 90 tablet 3    • metoprolol succinate XL (TOPROL-XL) 50 MG 24 hr tablet Take 1.5 tablets by mouth Daily. 90 tablet 2    • nystatin 557356 UNIT/GM topical powder       • rosuvastatin (CRESTOR) 20 MG tablet Take 1 tablet by mouth Daily. 30 tablet 4    • traMADol (ULTRAM) 50 MG tablet Take 1 tablet by mouth Every 8 (Eight) Hours As Needed for Moderate Pain . 20 tablet 0      Allergies:  Codeine and Sulfa antibiotics      Current Facility-Administered Medications:   •  calcium gluconate 2 g in sodium chloride 0.9 % 100 mL IVPB, 2 g, Intravenous, Once, Behbahani, Katayoun, MD  •  dextrose (D50W) (25 g/50 mL) IV injection 25 g, 25 g, Intravenous, Q15 Min PRN, Afshan Queen, DO  •  dextrose (GLUTOSE) oral gel 15 g, 15 g, Oral, Q15 Min PRN, Afshan Queen, DO  •  glucagon (human recombinant) (GLUCAGEN DIAGNOSTIC) injection 1 mg, 1 mg, Intramuscular, Q15 Min PRN, Afshan Queen, DO  •  heparin (porcine) 5000 UNIT/ML injection 5,000 Units, 5,000 Units, Subcutaneous, Q8H, Afshan Queen, , 5,000 Units at 12/18/22 0529  •  Insulin Aspart (novoLOG) injection 0-9 Units, 0-9 Units, Subcutaneous, 4x Daily AC & at Bedtime, Afshan Queen, DO, 2 Units at 12/18/22 0833  •  Magnesium Sulfate 2 gram Bolus, followed by 8 gram infusion (total Mg dose 10 grams)- Mg less than or equal to 1mg/dL, 2 g, Intravenous, PRN **OR** Magnesium Sulfate 2 gram / 50mL Infusion (GIVE X 3 BAGS TO EQUAL 6GM TOTAL DOSE) -  Mg 1.1 - 1.5 mg/dl, 2 g, Intravenous, PRN **OR** Magnesium Sulfate 4 gram infusion- Mg 1.6-1.9 mg/dL, 4 g, Intravenous, PRN, Afshan Queen, DO  •  nitroglycerin (NITROSTAT) SL tablet 0.4 mg, 0.4 mg, Sublingual, Q5 Min PRN, Afshan Queen, DO  •  potassium chloride (K-DUR,KLOR-CON) ER tablet 40 mEq, 40 mEq, Oral, PRN **OR** potassium chloride (KLOR-CON) packet 40 mEq, 40 mEq, Oral, PRN **OR** potassium chloride 10 mEq in 100 mL IVPB, 10 mEq, Intravenous, Q1H PRN, Afshan Queen,   •  [COMPLETED] Insert Peripheral IV, , , Once **AND** sodium chloride 0.9 % flush 10 mL, 10 mL, Intravenous, PRN, Afshan Queen, DO  •  sodium chloride 0.9 % flush 10 mL, 10 mL, Intravenous, Q12H, Afshan Queen, , 10 mL at 12/18/22 0834  •  sodium chloride 0.9 % flush 10 mL, 10 mL, Intravenous, PRN, Afshan Queen, DO  •  sodium chloride 0.9 % infusion 40 mL, 40 mL, Intravenous, PRN, Afshan Queen, DO    Review of Systems   Constitutional: Negative for chills and diaphoresis.   HENT: Negative for congestion and nosebleeds.    Respiratory: Positive for chest tightness and shortness of breath.    Cardiovascular: Positive for chest pain. Negative for palpitations.   Gastrointestinal: Negative for blood in stool and diarrhea.   Musculoskeletal: Negative for arthralgias and gait problem.   Neurological: Positive for dizziness. Negative for syncope.   Hematological: Negative for adenopathy. Does not bruise/bleed easily.   Psychiatric/Behavioral: Negative for agitation and behavioral problems.         Objective      Vital Signs  Temp:  [97.8 °F (36.6 °C)-98.3 °F (36.8 °C)] 97.9 °F (36.6 °C)  Heart Rate:  [] 40  Resp:  [12-20] 12  BP: ()/(47-93) 116/56  Body mass index is 48.06 kg/m².    Intake/Output Summary (Last 24 hours) at 12/18/2022 0953  Last data filed at 12/18/2022 0800  Gross per 24 hour   Intake 1606 ml   Output 100 ml   Net 1506 ml       Physical Exam  Constitutional:        Appearance: Normal appearance.   HENT:      Head: Normocephalic and atraumatic.      Mouth/Throat:      Mouth: Mucous membranes are dry.   Eyes:      General:         Right eye: No discharge.         Left eye: No discharge.      Pupils: Pupils are equal, round, and reactive to light.   Cardiovascular:      Rate and Rhythm: Bradycardia present.   Pulmonary:      Effort: Pulmonary effort is normal.      Breath sounds: Normal breath sounds.   Abdominal:      General: Abdomen is flat.      Palpations: Abdomen is soft.   Musculoskeletal:         General: No swelling or tenderness.   Skin:     General: Skin is warm and dry.   Neurological:      General: No focal deficit present.      Mental Status: She is alert and oriented to person, place, and time.   Psychiatric:         Mood and Affect: Mood normal.         Behavior: Behavior normal.           Results Review:   I reviewed the patient's new clinical results.  Results from last 7 days   Lab Units 12/17/22  0338 12/17/22 0147   TROPONIN T ng/mL 0.019 <0.010     Results from last 7 days   Lab Units 12/18/22  0353 12/17/22 2029 12/17/22 0147   WBC 10*3/mm3 7.64 8.59 5.86   HEMOGLOBIN g/dL 14.5 14.8 15.0   PLATELETS 10*3/mm3 86* 136* 122*     Results from last 7 days   Lab Units 12/18/22  0353 12/17/22 2029 12/17/22 0147   SODIUM mmol/L 131* 130* 137   POTASSIUM mmol/L 4.0 3.5 3.8   CHLORIDE mmol/L 103 96* 98   CO2 mmol/L 16.3* 20.1* 23.0   BUN mg/dL 26* 25* 29*   CREATININE mg/dL 0.91 0.96 0.97   CALCIUM mg/dL 9.1 9.6 9.7   GLUCOSE mg/dL 253* 245* 286*   ALT (SGPT) U/L 20  --  27   AST (SGOT) U/L 29  --  39*     Lab Results   Component Value Date    INR 1.06 12/17/2022    INR 1.02 11/18/2021    INR 1.05 06/19/2021    INR 1.05 05/01/2021    INR 1.09 03/09/2021     Lab Results   Component Value Date    MG 2.3 12/18/2022    MG 1.5 (L) 12/17/2022    MG 1.6 10/27/2022     Lab Results   Component Value Date    TSH 0.414 12/17/2022    CHLPL 226 (H) 05/28/2016    TRIG 200 (H)  10/21/2022    HDL 51 10/21/2022     (H) 10/21/2022      No results found for: BNP       Thank you very much for asking us to be involved in this patient's care.  We will follow along with you.    Esdras Márquez PA-C   Electronically signed by Branden Andrade MD, 12/18/22, 10:12 AM EST.    12/18/22  09:53 EST

## 2022-12-18 NOTE — PLAN OF CARE
Goal Outcome Evaluation:           Progress: improving  Outcome Evaluation: Pt A/Ox4. VSS. HR ranging in 40s-50s. Pt remains on RA. Pt denies any chest pain or shortness of breath.  has been at bedside throughout shift. Pt resting comfortably in bed with no requests at this time. Call light in reach, bed locked and lowest level, bed alarm active. Will continue to monitor.

## 2022-12-19 LAB
ANION GAP SERPL CALCULATED.3IONS-SCNC: 11.1 MMOL/L (ref 5–15)
BUN SERPL-MCNC: 22 MG/DL (ref 8–23)
BUN/CREAT SERPL: 22.4 (ref 7–25)
CALCIUM SPEC-SCNC: 9.3 MG/DL (ref 8.6–10.5)
CHLORIDE SERPL-SCNC: 102 MMOL/L (ref 98–107)
CO2 SERPL-SCNC: 21.9 MMOL/L (ref 22–29)
CREAT SERPL-MCNC: 0.98 MG/DL (ref 0.57–1)
DEPRECATED RDW RBC AUTO: 50 FL (ref 37–54)
EGFRCR SERPLBLD CKD-EPI 2021: 63.4 ML/MIN/1.73
ERYTHROCYTE [DISTWIDTH] IN BLOOD BY AUTOMATED COUNT: 13.1 % (ref 12.3–15.4)
GLUCOSE BLDC GLUCOMTR-MCNC: 140 MG/DL (ref 70–130)
GLUCOSE BLDC GLUCOMTR-MCNC: 167 MG/DL (ref 70–130)
GLUCOSE BLDC GLUCOMTR-MCNC: 180 MG/DL (ref 70–130)
GLUCOSE BLDC GLUCOMTR-MCNC: 211 MG/DL (ref 70–130)
GLUCOSE SERPL-MCNC: 210 MG/DL (ref 65–99)
HCT VFR BLD AUTO: 41.3 % (ref 34–46.6)
HGB BLD-MCNC: 13.6 G/DL (ref 12–15.9)
MCH RBC QN AUTO: 34.3 PG (ref 26.6–33)
MCHC RBC AUTO-ENTMCNC: 32.9 G/DL (ref 31.5–35.7)
MCV RBC AUTO: 104 FL (ref 79–97)
PLATELET # BLD AUTO: 111 10*3/MM3 (ref 140–450)
PMV BLD AUTO: 10.6 FL (ref 6–12)
POTASSIUM SERPL-SCNC: 4.1 MMOL/L (ref 3.5–5.2)
RBC # BLD AUTO: 3.97 10*6/MM3 (ref 3.77–5.28)
SODIUM SERPL-SCNC: 135 MMOL/L (ref 136–145)
WBC NRBC COR # BLD: 6.97 10*3/MM3 (ref 3.4–10.8)

## 2022-12-19 PROCEDURE — 94799 UNLISTED PULMONARY SVC/PX: CPT

## 2022-12-19 PROCEDURE — 99232 SBSQ HOSP IP/OBS MODERATE 35: CPT | Performed by: SPECIALIST

## 2022-12-19 PROCEDURE — 25010000002 HEPARIN (PORCINE) PER 1000 UNITS: Performed by: INTERNAL MEDICINE

## 2022-12-19 PROCEDURE — 94761 N-INVAS EAR/PLS OXIMETRY MLT: CPT

## 2022-12-19 PROCEDURE — 82962 GLUCOSE BLOOD TEST: CPT

## 2022-12-19 PROCEDURE — 80048 BASIC METABOLIC PNL TOTAL CA: CPT | Performed by: INTERNAL MEDICINE

## 2022-12-19 PROCEDURE — 63710000001 INSULIN ASPART PER 5 UNITS: Performed by: INTERNAL MEDICINE

## 2022-12-19 PROCEDURE — 94660 CPAP INITIATION&MGMT: CPT

## 2022-12-19 PROCEDURE — 85027 COMPLETE CBC AUTOMATED: CPT | Performed by: INTERNAL MEDICINE

## 2022-12-19 PROCEDURE — 99233 SBSQ HOSP IP/OBS HIGH 50: CPT | Performed by: INTERNAL MEDICINE

## 2022-12-19 RX ADMIN — CHOLECALCIFEROL TAB 10 MCG (400 UNIT) 1000 UNITS: 10 TAB at 08:09

## 2022-12-19 RX ADMIN — ASPIRIN 81 MG: 81 TABLET, COATED ORAL at 08:09

## 2022-12-19 RX ADMIN — HEPARIN SODIUM 5000 UNITS: 5000 INJECTION INTRAVENOUS; SUBCUTANEOUS at 14:57

## 2022-12-19 RX ADMIN — INSULIN ASPART 2 UNITS: 100 INJECTION, SOLUTION INTRAVENOUS; SUBCUTANEOUS at 16:55

## 2022-12-19 RX ADMIN — ROSUVASTATIN CALCIUM 20 MG: 20 TABLET, FILM COATED ORAL at 08:08

## 2022-12-19 RX ADMIN — HEPARIN SODIUM 5000 UNITS: 5000 INJECTION INTRAVENOUS; SUBCUTANEOUS at 05:42

## 2022-12-19 RX ADMIN — ACETAMINOPHEN 650 MG: 325 TABLET, FILM COATED ORAL at 21:13

## 2022-12-19 RX ADMIN — LEVOTHYROXINE SODIUM 88 MCG: 88 TABLET ORAL at 08:08

## 2022-12-19 RX ADMIN — INSULIN ASPART 4 UNITS: 100 INJECTION, SOLUTION INTRAVENOUS; SUBCUTANEOUS at 21:16

## 2022-12-19 RX ADMIN — ANASTROZOLE 1 MG: 1 TABLET ORAL at 08:08

## 2022-12-19 RX ADMIN — Medication 10 ML: at 08:09

## 2022-12-19 RX ADMIN — HEPARIN SODIUM 5000 UNITS: 5000 INJECTION INTRAVENOUS; SUBCUTANEOUS at 21:15

## 2022-12-19 RX ADMIN — FLUOXETINE HYDROCHLORIDE 20 MG: 20 CAPSULE ORAL at 08:09

## 2022-12-19 RX ADMIN — INSULIN ASPART 2 UNITS: 100 INJECTION, SOLUTION INTRAVENOUS; SUBCUTANEOUS at 11:38

## 2022-12-19 RX ADMIN — Medication 10 ML: at 21:43

## 2022-12-19 NOTE — CASE MANAGEMENT/SOCIAL WORK
Discharge Planning Assessment   Krishna     Patient Name: Ronna Wayne  MRN: [id number]  Today's Date: 12/19/2022    Admit Date: 12/17/2022       Discharge Needs Assessment     Row Name 12/19/22 1740       Living Environment    People in Home spouse    Name(s) of People in Home Tristin Wayne (spouse)    Current Living Arrangements home    Primary Care Provided by self    Provides Primary Care For no one    Family Caregiver if Needed spouse       Transition Planning    Patient/Family Anticipates Transition to home with family    Transportation Anticipated family or friend will provide       Discharge Needs Assessment    Equipment Currently Used at Home cane, straight    Concerns to be Addressed discharge planning               Discharge Plan     Row Name 12/19/22 1749       Plan    Plan Pt was admitted on 12/17/22. SS received nursing consult for discharge planning. SS spoke with Pt and spouse at bedside on this date. Pt lives with her spouse and plans to return home at discharge. Pt does not utilize home health services. Pt has a straight cane via unknown provider. Pt's PCP is Prasanna Acosta. Pt does not have a POA/living will. Pt's spouse to provide transportation. SS to follow.                Kristy Dumont, JENNW

## 2022-12-19 NOTE — PLAN OF CARE
Goal Outcome Evaluation:              Outcome Evaluation: Patient continues to be bradycardic, HR sustained in the 50s. Other VSS. Blood glucose checked per orders. Patient ambulted to restroom with stand-by assistance. No complaints of chest pain today. Patient remains on RA. Spouse at bedside. Safety maintained, call light in reach. Plan of continued.

## 2022-12-19 NOTE — PLAN OF CARE
Goal Outcome Evaluation:  Plan of Care Reviewed With: patient        Progress: improving  Outcome Evaluation: Pt. alert and oriented times 4. VSS. RA. Heart rate remains sinus janie, 40s-45s. Denies any chest pain or shortness of air at this time. Bed alarm set, call light within reach.      Problem: Adult Inpatient Plan of Care  Goal: Plan of Care Review  Outcome: Ongoing, Progressing  Flowsheets (Taken 12/19/2022 0122)  Progress: improving  Plan of Care Reviewed With: patient  Outcome Evaluation: Pt. alert and oriented times 4. VSS. RA. Heart rate remains sinus janie, 40s-45s. Denies any chest pain or shortness of air at this time. Bed alarm set, call light within reach.  Goal: Patient-Specific Goal (Individualized)  Outcome: Ongoing, Progressing  Goal: Absence of Hospital-Acquired Illness or Injury  Outcome: Ongoing, Progressing  Intervention: Identify and Manage Fall Risk  Recent Flowsheet Documentation  Taken 12/19/2022 0119 by Essence Somers RN  Safety Promotion/Fall Prevention:   activity supervised   fall prevention program maintained   safety round/check completed  Taken 12/19/2022 0100 by Essence Somers RN  Safety Promotion/Fall Prevention:   activity supervised   fall prevention program maintained   safety round/check completed  Taken 12/18/2022 2300 by Essence Somers RN  Safety Promotion/Fall Prevention:   fall prevention program maintained   safety round/check completed  Taken 12/18/2022 2100 by Essence Somers RN  Safety Promotion/Fall Prevention:   activity supervised   fall prevention program maintained   safety round/check completed  Taken 12/18/2022 1958 by Essence Somers RN  Safety Promotion/Fall Prevention:   activity supervised   fall prevention program maintained   safety round/check completed  Taken 12/18/2022 1900 by Essence Somers RN  Safety Promotion/Fall Prevention:   activity supervised   fall prevention program maintained   safety round/check completed  Intervention: Prevent Skin  Injury  Recent Flowsheet Documentation  Taken 12/19/2022 0119 by Essence Somers RN  Body Position: position changed independently  Skin Protection:   adhesive use limited   incontinence pads utilized   transparent dressing maintained   tubing/devices free from skin contact  Taken 12/18/2022 1958 by Essence Somers RN  Body Position: position changed independently  Skin Protection:   adhesive use limited   incontinence pads utilized   transparent dressing maintained   tubing/devices free from skin contact  Intervention: Prevent and Manage VTE (Venous Thromboembolism) Risk  Recent Flowsheet Documentation  Taken 12/19/2022 0119 by Essence Somers RN  Activity Management: bedrest  VTE Prevention/Management: (heparin sq) other (see comments)  Taken 12/18/2022 1958 by Essence Somers RN  Activity Management: bedrest  VTE Prevention/Management: (heparin sq) other (see comments)  Intervention: Prevent Infection  Recent Flowsheet Documentation  Taken 12/19/2022 0100 by Essence Somers RN  Infection Prevention:   single patient room provided   rest/sleep promoted   personal protective equipment utilized   hand hygiene promoted   equipment surfaces disinfected  Taken 12/18/2022 2300 by Essence Somers RN  Infection Prevention:   single patient room provided   rest/sleep promoted   personal protective equipment utilized   hand hygiene promoted   equipment surfaces disinfected  Taken 12/18/2022 2100 by Essence Somers RN  Infection Prevention:   single patient room provided   rest/sleep promoted   personal protective equipment utilized   hand hygiene promoted   equipment surfaces disinfected  Taken 12/18/2022 1900 by Essence Somers RN  Infection Prevention:   single patient room provided   rest/sleep promoted   personal protective equipment utilized   hand hygiene promoted   equipment surfaces disinfected  Goal: Optimal Comfort and Wellbeing  Outcome: Ongoing, Progressing  Intervention: Provide Person-Centered Care  Recent Flowsheet  Documentation  Taken 12/19/2022 0119 by Essence Somers RN  Trust Relationship/Rapport:   care explained   choices provided  Taken 12/18/2022 1958 by Essence Somers RN  Trust Relationship/Rapport:   care explained   choices provided  Goal: Readiness for Transition of Care  Outcome: Ongoing, Progressing     Problem: Fall Injury Risk  Goal: Absence of Fall and Fall-Related Injury  Outcome: Ongoing, Progressing  Intervention: Identify and Manage Contributors  Recent Flowsheet Documentation  Taken 12/19/2022 0119 by Essence Somers RN  Medication Review/Management: medications reviewed  Self-Care Promotion: independence encouraged  Taken 12/19/2022 0100 by Essence Somers RN  Medication Review/Management: medications reviewed  Taken 12/18/2022 2300 by Essence Somers RN  Medication Review/Management: medications reviewed  Taken 12/18/2022 2100 by Essence Somers RN  Medication Review/Management: medications reviewed  Taken 12/18/2022 1958 by Essence Somers RN  Medication Review/Management: medications reviewed  Self-Care Promotion: independence encouraged  Taken 12/18/2022 1900 by Essence Somers RN  Medication Review/Management: medications reviewed  Intervention: Promote Injury-Free Environment  Recent Flowsheet Documentation  Taken 12/19/2022 0119 by Essence Somers RN  Safety Promotion/Fall Prevention:   activity supervised   fall prevention program maintained   safety round/check completed  Taken 12/19/2022 0100 by Essence Somers RN  Safety Promotion/Fall Prevention:   activity supervised   fall prevention program maintained   safety round/check completed  Taken 12/18/2022 2300 by Essence Somers RN  Safety Promotion/Fall Prevention:   fall prevention program maintained   safety round/check completed  Taken 12/18/2022 2100 by Essence Somers RN  Safety Promotion/Fall Prevention:   activity supervised   fall prevention program maintained   safety round/check completed  Taken 12/18/2022 1958 by Essence Somers RN  Safety  Promotion/Fall Prevention:   activity supervised   fall prevention program maintained   safety round/check completed  Taken 12/18/2022 1900 by Essence Somers RN  Safety Promotion/Fall Prevention:   activity supervised   fall prevention program maintained   safety round/check completed     Problem: Diabetes Comorbidity  Goal: Blood Glucose Level Within Targeted Range  Outcome: Ongoing, Progressing  Intervention: Monitor and Manage Glycemia  Recent Flowsheet Documentation  Taken 12/19/2022 0119 by Essence Somers RN  Glycemic Management: blood glucose monitored  Taken 12/18/2022 1958 by Essence Somers RN  Glycemic Management: blood glucose monitored     Problem: Hypertension Comorbidity  Goal: Blood Pressure in Desired Range  Outcome: Ongoing, Progressing  Intervention: Maintain Blood Pressure Management  Recent Flowsheet Documentation  Taken 12/19/2022 0119 by Essence Somers RN  Medication Review/Management: medications reviewed  Taken 12/19/2022 0100 by Essence Somers RN  Medication Review/Management: medications reviewed  Taken 12/18/2022 2300 by Essence Somers RN  Medication Review/Management: medications reviewed  Taken 12/18/2022 2100 by Essence Somers RN  Medication Review/Management: medications reviewed  Taken 12/18/2022 1958 by Essence Somers RN  Medication Review/Management: medications reviewed  Taken 12/18/2022 1900 by Essence Somers RN  Medication Review/Management: medications reviewed     Problem: Skin Injury Risk Increased  Goal: Skin Health and Integrity  Outcome: Ongoing, Progressing  Intervention: Promote and Optimize Oral Intake  Recent Flowsheet Documentation  Taken 12/19/2022 0119 by Essence Somers RN  Oral Nutrition Promotion: rest periods promoted  Intervention: Optimize Skin Protection  Recent Flowsheet Documentation  Taken 12/19/2022 0119 by Essence Somers RN  Pressure Reduction Techniques:   frequent weight shift encouraged   heels elevated off bed   weight shift assistance provided  Head of  Bed ([facility]) Positioning: [facility] elevated  Pressure Reduction Devices:   pressure-redistributing mattress utilized   positioning supports utilized  Skin Protection:   adhesive use limited   incontinence pads utilized   transparent dressing maintained   tubing/devices free from skin contact  Taken 12/18/2022 1958 by Essence Somers RN  Pressure Reduction Techniques:   weight shift assistance provided   heels elevated off bed  Head of Bed ([facility]) Positioning: [facility] elevated  Pressure Reduction Devices: pressure-redistributing mattress utilized  Skin Protection:   adhesive use limited   incontinence pads utilized   transparent dressing maintained   tubing/devices free from skin contact      [Negative] : Musculoskeletal [FreeTextEntry2] : grade 1 neuropathy of fingers and toes b/l, improved after this cycle  [de-identified] : mild constipation, occasional R sided/umbilical abdominal discomfort  [Neuropathy] : neuropathy

## 2022-12-19 NOTE — PROGRESS NOTES
[facility] HOSPITALIST PROGRESS NOTE     Patient Identification:  Name:  Ronna Wayne  Age:  67 y.o.  Sex:  female  :  1955  MRN:  [id number]  Visit Number:  [id number]  ROOM: [address]     Primary Care Provider:  Mague Acosta APRN    Length of stay in inpatient status:  2    Subjective     Chief Compliant:    Chief Complaint   Patient presents with   • Palpitations   • Rapid Heart Rate     History of Presenting Illness:    Patient remains ill but stable today, no acute events overnight, no new complaints, denies any fevers or chills, heart rate remains low in upper 40's-low 50's, patient denies any tachycardia this AM, she does note she is fatigued though, she denies any chest pain, Cardiology updated recommendations pending, Echocardiogram with normal EF though mild AS and Pulmonary Hypertension.   Objective     Current Hospital Meds:  anastrozole, 1 mg, Oral, Daily  aspirin, 81 mg, Oral, Daily  cholecalciferol, 1,000 Units, Oral, Daily  FLUoxetine, 20 mg, Oral, Daily  heparin (porcine), 5,000 Units, Subcutaneous, Q8H  Insulin Aspart, 0-9 Units, Subcutaneous, 4x Daily AC & at Bedtime  levothyroxine, 88 mcg, Oral, Daily  rosuvastatin, 20 mg, Oral, Daily  sodium chloride, 10 mL, Intravenous, Q12H    ----------------------------------------------------------------------------------------------------------------------  Vital Signs:  Temp:  [97.8 °F (36.6 °C)-99 °F (37.2 °C)] 99 °F (37.2 °C)  Heart Rate:  [41-60] 50  Resp:  [13-20] 20  BP: ()/() 157/127  SpO2:  [90 %-96 %] 94 %  on   ;   Device (Oxygen Therapy): room air  Body mass index is 47.86 kg/m².      Intake/Output Summary (Last 24 hours) at 2022 1001  Last data filed at 2022 0800  Gross per 24 hour   Intake 743.39 ml   Output 1300 ml   Net -556.61 ml      ----------------------------------------------------------------------------------------------------------------------  Physical  exam:  Constitutional:  Well-developed and well-nourished.  No acute distress.      HENT:  Head:  Normocephalic and atraumatic.  Mouth:  Moist mucous membranes.    Eyes:  Conjunctivae and EOM are normal. No scleral icterus.    Neck:  Neck supple.  No JVD present.    Cardiovascular:  bradycardic, regular rhythm and normal heart sounds with no murmur.  Pulmonary/Chest:  No respiratory distress, no wheezes, no crackles, on 2LNC  Abdominal:  Soft. No distension and no tenderness.   Musculoskeletal:  No tenderness, and no deformity.  No red or swollen joints anywhere.    Neurological:  Alert and oriented to person, place, and time.  No cranial nerve deficit.    Skin:  Skin is warm and dry. No rash noted. No pallor.   Peripheral vascular:  No clubbing, no cyanosis, no edema.  Psychiatric: Appropriate mood and affect    Edited by: Michel Lizama MD at 12/19/2022 0956  ----------------------------------------------------------------------------------------------------------------------  Labs ordered and pending     No results found for: URINECX  Blood Culture   Date Value Ref Range Status   12/17/2022 No growth at 2 days  Preliminary   12/17/2022 No growth at 2 days  Preliminary     I have personally looked at the labs and they are summarized above.  ----------------------------------------------------------------------------------------------------------------------  Detailed radiology reports for the last 24 hours:  Adult Transthoracic Echo Complete W/ Cont if Necessary Per Protocol    Addendum Date: 12/18/2022    •  Left ventricular systolic function is normal. Left ventricular ejection fraction appears to be 61 - 65%. •  Left ventricular diastolic function is consistent with (grade II w/high LAP) pseudonormalization. •  Left atrial volume is mildly increased. •  The right atrial cavity is mildly  dilated. •  Mild aortic valve stenosis is present, with an estimated aortic valve area of 1.8 cm² and the mean gradient of 19  mmHg. •  Estimated right ventricular systolic pressure from tricuspid regurgitation is mildly elevated (35-45 mmHg).     Assessment & Plan    67F Morbid Obesity by BMI PMH Hypertension, Diabetes Mellitus type II, Breast cancer status post lumpectomy with abscess (2022), Hypothyroid, Cerebrovascular Accident, Obstructive Sleep Apnea on home CPAP, PSVT, presented [facility] emergency room 12/17 with complaint of intermittent tachycardia, bradycardia, palpitations and chest heaviness.    #Recurrent SVT  #Tachycardia-Bradycardia with possible Sick Sinus Syndrome   #Brief episode Paroxysmal Atrial Fibrillation with junctional escape  #Mild Aortic Stenosis  #Mild Pulmonary Hypertension   #Hypertension/Dyslipidemia  - Echocardiogram showed LVEF 61-65%, mildly increased left atrium volume, mildly dilated right atrial cavity, mild AS with MORAIMA 1.8cm^2 and mean gradient 19mmHg, RVSP 35-45mmHg  - Cardiology consulted and following  - Continue home Aspirin 81, Statin  - Holding home beta blocker, follow up Cardiology recommendations   - Holding home Hydrochlorothiazide, resume as indicated   - Continue to monitor on telemetry, strict I/O's, trend heart rate and blood pressure     #Insulin Dependent Diabetes Mellitus Type II, uncontrolled, unknown complications  - Hgb A1c = 7.8%  - Holding home oral agents, continue FSBG and SSI, resume long acting as indicated.    #Chronic Kidney Disease Stage II  - Trend Cr and urine output, avoid nephrotoxins, NSAIDs, dehydration and contrast as able.     #Hypothyroid  - Continue home Levothyroxine    #Obstructive Sleep Apnea   - Continue home CPAP nightly     #History Cerebrovascular Accident   - Continue home Aspirin 81, Statin, Supportive Care    #History Breast cancer status post lumpectomy complicated by abscess  - Continue home Anastrozole, Supportive Care    #Anxiety/Depression  - Continue home regimen    #Morbid Obesity by BMI  - BMI 47, complicates all aspects of care    F:  Oral  E: Monitor & Replace PRN  N: Diet: Cardiac Diets, Diabetic Diets; Healthy Heart (2-3 Na+); Consistent Carbohydrate; Texture: Regular Texture (IDDSI 7); Fluid Consistency: Thin (IDDSI 0)  PPx: SQH  Code Status (Patient has no pulse and is not breathing): CPR (Attempt to Resuscitate)  Medical Interventions (Patient has pulse or is breathing): Full Support     Dispo: Pending workup and clinical improvement    *This patient is considered high risk secondary to recurrent SVT, tachycardia-bradycardia concern for Sick Sinus Syndrome, chest pain requiring further cardiac workup and imaging.     Edited by: Michel Lizama MD at 12/19/2022 [address] [place] [address]

## 2022-12-19 NOTE — PROGRESS NOTES
LOS: 2 days     Name: Ronna Wayne  Age/Sex: 67 y.o. female  :  1955        PCP: Mague Acosta APRN  REF: No ref. provider found    Principal Problem:    Paroxysmal SVT (supraventricular tachycardia) (HCC)  Active Problems:    SVT (supraventricular tachycardia) (HCC)      Reason for follow-up: SVT and bradycardia    Subjective     Subjective     Ronna Wayne is a 67 y.o. female with past medical history significant for paroxysmal SVT, diabetes mellitus, essential hypertension, obstructive sleep apnea, history of CVA.  Patient originally presented about [facility] emergency department in the morning of 2022 where she was found to be in SVT that did require 6 mg of IV adenosine which was successful in achieving sinus rhythm.     Interval History: Patient reports she is feeling okay today.  Denies any chest pain.  Currently sinus bradycardia 40 to 50 bpm.    Vital Signs  Temp:  [97.8 °F (36.6 °C)-99 °F (37.2 °C)] 99 °F (37.2 °C)  Heart Rate:  [41-60] 50  Resp:  [13-20] 20  BP: ()/() 157/127  Vital Signs (last 72 hrs)        0700   0659  0700   0659  07 0659  0700   0932   Most Recent      Temp (°F)   97.8 -  98.3    97.8 -  98.2      99     99 (37.2)  0800    Heart Rate   35 -  152    38 -  60    46 -  50     50  0802    Resp    -  20    13 -  14      20     20  0802    BP   91/47 -  159/93    98/55 -  168/93    100/43 -  157/127     157/127  0802    SpO2 (%)   91 -  99    90 -  96      94     94  0802        Documented weights    22 1946 22 0158 22 0500 22 0400   Weight: 125 kg (275 lb) 123 kg (271 lb 9.7 oz) 123 kg (271 lb 4.8 oz) 123 kg (270 lb 3.2 oz)      Body mass index is 47.86 kg/m².    Intake/Output Summary (Last 24 hours) at 2022 0932  Last data filed at 2022 0800  Gross per 24 hour   Intake 743.39 ml   Output 1300 ml   Net -556.61 ml      Objective    Objective       Physical Exam:     General Appearance:    Alert, cooperative, in no acute distress   Head:    Normocephalic, without obvious abnormality, atraumatic   Eyes:            Conjunctivae and sclerae normal, no   icterus, no pallor, corneas clear.   Neck:   No adenopathy, supple, trachea midline, no thyromegaly, no   carotid bruit, no JVD   Lungs:     Clear to auscultation,respirations regular, even and                  unlabored    Heart:    Regular rhythm and bradycardia, normal S1 and S2, no            murmur, no gallop, no rub, no click   Chest Wall:    No abnormalities observed   Abdomen:     Normal bowel sounds, no masses, no organomegaly, soft        nontender, nondistended, no guarding, no rebound                tenderness   Extremities:   Moves all extremities well, no edema, no cyanosis, no             redness   Pulses:   Pulses palpable and equal bilaterally   Skin:   No bleeding, bruising or rash       Neurologic:   Alert and oriented      Results review       Results Review:   Results from last 7 days   Lab Units 12/18/22  0353 12/17/22 2029 12/17/22 0147   WBC 10*3/mm3 7.64 8.59 5.86   HEMOGLOBIN g/dL 14.5 14.8 15.0   PLATELETS 10*3/mm3 86* 136* 122*     Results from last 7 days   Lab Units 12/18/22  0353 12/17/22 2029 12/17/22  0147   SODIUM mmol/L 131* 130* 137   POTASSIUM mmol/L 4.0 3.5 3.8   CHLORIDE mmol/L 103 96* 98   CO2 mmol/L 16.3* 20.1* 23.0   BUN mg/dL 26* 25* 29*   CREATININE mg/dL 0.91 0.96 0.97   CALCIUM mg/dL 9.1 9.6 9.7   GLUCOSE mg/dL 253* 245* 286*   ALT (SGPT) U/L 20  --  27   AST (SGOT) U/L 29  --  39*     Results from last 7 days   Lab Units 12/17/22  0338 12/17/22  0147   TROPONIN T ng/mL 0.019 <0.010     Lab Results   Component Value Date    INR 1.06 12/17/2022    INR 1.02 11/18/2021    INR 1.05 06/19/2021    INR 1.05 05/01/2021    INR 1.09 03/09/2021     Lab Results   Component Value Date    MG 2.3 12/18/2022    MG 1.5 (L) 12/17/2022    MG 1.6  10/27/2022     Lab Results   Component Value Date    TSH 0.414 12/17/2022    CHLPL 226 (H) 05/28/2016    TRIG 200 (H) 10/21/2022    HDL 51 10/21/2022     (H) 10/21/2022      Imaging Results (Last 48 Hours)     ** No results found for the last 48 hours. **        No results found for: BNP           Echo   Results for orders placed during the hospital encounter of 12/17/22    Adult Transthoracic Echo Complete W/ Cont if Necessary Per Protocol    Interpretation Summary  •  Left ventricular systolic function is normal. Left ventricular ejection fraction appears to be 61 - 65%.  •  Left ventricular diastolic function is consistent with (grade II w/high LAP) pseudonormalization.  •  Left atrial volume is mildly increased.  •  The right atrial cavity is mildly  dilated.  •  Mild aortic valve stenosis is present, with an estimated aortic valve area of 1.8 cm² and the mean gradient of 19 mmHg.  •  Estimated right ventricular systolic pressure from tricuspid regurgitation is mildly elevated (35-45 mmHg).       I reviewed the patient's new clinical results.    Telemetry: Sinus bradycardia 40 to 50 bpm     Medication Review:   anastrozole, 1 mg, Oral, Daily  aspirin, 81 mg, Oral, Daily  cholecalciferol, 1,000 Units, Oral, Daily  FLUoxetine, 20 mg, Oral, Daily  heparin (porcine), 5,000 Units, Subcutaneous, Q8H  Insulin Aspart, 0-9 Units, Subcutaneous, 4x Daily AC & at Bedtime  levothyroxine, 88 mcg, Oral, Daily  rosuvastatin, 20 mg, Oral, Daily  sodium chloride, 10 mL, Intravenous, Q12H             Assessment      Assessment:    1. Recurrent SVT  2. Severe bradycardia with junctional escape likely iatrogenic at least partly  3. Possible sick sinus syndrome  4. Brief episode of atrial fibrillation with junctional escape  5. Obstructive sleep apnea on CPAP  6. Essential hypertension  7. Diabetes mellitus type 2  8. History of CVA  9.  Dyslipidemia  10.  Reported chest pain with the tachycardia stress test back in May 2021  negative for ischemia with negative troponin      Plan     Recommendations:  1. Patient heart rate is in the 50s to lower 60s she is hemodynamically stable advised to ambulate to assess her level of bradycardia currently she is off medications including of beta-blockers  2. Patient will need to be referred to EP she did see them before regarding ablation procedure for her SVT  3. No further chest pain ambulate as mentioned above stress test recently was negative for ischemia    I discussed the patient's findings and my recommendations with patient and family    Electronically signed by BOSTON Jiménez, 12/19/22, 9:32 AM EST.   Electronically signed by Branden Andrade MD, 12/19/22, 11:12 AM EST.      Please note that portions of this note were completed with a voice recognition program.

## 2022-12-19 NOTE — CONSULTS
Diabetes Education  Assessment/Teaching    Patient Name:  Ronna Wayne  YOB: 1955  MRN: [id number]  Admit Date:  12/17/2022      Assessment Date:  12/19/2022  Flowsheet Row Most Recent Value   General Information     Height 160 cm (63\")   Height Method Stated   Weight 123 kg (270 lb 3.2 oz)   Weight Method Bed scale   Pregnancy Assessment    Diabetes History    Education Preferences    Nutrition Information    Assessment Topics    DM Goals           Flowsheet Row Most Recent Value   DM Education Needs    Meter Has own   Meter Type Other (comment)  [CGM]   Frequency of Testing Other (comment)  [CGM]   Medication Insulin, Oral   Problem Solving Hypoglycemia, Hyperglycemia, Sick days, Signs, Symptoms, Treatment   Reducing Risks Immunizations, Foot care, Dental exam, Eye exam, Blood pressure, Lipids, A1C testing, Cardiovascular, Neuropathy, Retinopathy   Healthy Eating Basic meal plan provided   Physical Activity Walking   Physical Activity Frequency Occasionally   Healthy Coping Appropriate   Discharge Plan Home, Follow-up with PCP   Motivation Moderate   Teaching Method Explanation, Discussion, Handouts   Patient Response Verbalized understanding            Other Comments:  A1C 7.8 Patient did not wear a mask. Patient was educated and received AADE7 and ADA handouts on diet, activity, checking blood glucose, taking medication as prescribed, checking feet daily and S/S of hypo/hyperglycemia. Patient was educated on sick rule days. Patient had no questions or concerns. Please re-consult or call for concerns or questions. Thank you.        Electronically signed by:  Marta Alvarado RN  12/19/22 09:52 EST

## 2022-12-20 LAB
GLUCOSE BLDC GLUCOMTR-MCNC: 135 MG/DL (ref 70–130)
GLUCOSE BLDC GLUCOMTR-MCNC: 166 MG/DL (ref 70–130)
GLUCOSE BLDC GLUCOMTR-MCNC: 187 MG/DL (ref 70–130)
GLUCOSE BLDC GLUCOMTR-MCNC: 206 MG/DL (ref 70–130)
GLUCOSE BLDC GLUCOMTR-MCNC: 253 MG/DL (ref 70–130)

## 2022-12-20 PROCEDURE — 94660 CPAP INITIATION&MGMT: CPT

## 2022-12-20 PROCEDURE — 99232 SBSQ HOSP IP/OBS MODERATE 35: CPT | Performed by: INTERNAL MEDICINE

## 2022-12-20 PROCEDURE — 94799 UNLISTED PULMONARY SVC/PX: CPT

## 2022-12-20 PROCEDURE — 82962 GLUCOSE BLOOD TEST: CPT

## 2022-12-20 PROCEDURE — 97166 OT EVAL MOD COMPLEX 45 MIN: CPT

## 2022-12-20 PROCEDURE — 25010000002 HEPARIN (PORCINE) PER 1000 UNITS: Performed by: INTERNAL MEDICINE

## 2022-12-20 PROCEDURE — 94761 N-INVAS EAR/PLS OXIMETRY MLT: CPT

## 2022-12-20 PROCEDURE — 63710000001 INSULIN ASPART PER 5 UNITS: Performed by: INTERNAL MEDICINE

## 2022-12-20 RX ORDER — FLECAINIDE ACETATE 50 MG/1
50 TABLET ORAL EVERY 12 HOURS SCHEDULED
Status: DISCONTINUED | OUTPATIENT
Start: 2022-12-20 | End: 2022-12-22 | Stop reason: HOSPADM

## 2022-12-20 RX ADMIN — ROSUVASTATIN CALCIUM 20 MG: 20 TABLET, FILM COATED ORAL at 08:18

## 2022-12-20 RX ADMIN — ACETAMINOPHEN 650 MG: 325 TABLET, FILM COATED ORAL at 21:55

## 2022-12-20 RX ADMIN — FLECAINIDE ACETATE 50 MG: 50 TABLET ORAL at 21:47

## 2022-12-20 RX ADMIN — LEVOTHYROXINE SODIUM 88 MCG: 88 TABLET ORAL at 08:18

## 2022-12-20 RX ADMIN — INSULIN ASPART 4 UNITS: 100 INJECTION, SOLUTION INTRAVENOUS; SUBCUTANEOUS at 11:44

## 2022-12-20 RX ADMIN — Medication 10 ML: at 21:51

## 2022-12-20 RX ADMIN — CHOLECALCIFEROL TAB 10 MCG (400 UNIT) 1000 UNITS: 10 TAB at 08:18

## 2022-12-20 RX ADMIN — INSULIN ASPART 6 UNITS: 100 INJECTION, SOLUTION INTRAVENOUS; SUBCUTANEOUS at 21:47

## 2022-12-20 RX ADMIN — ASPIRIN 81 MG: 81 TABLET, COATED ORAL at 08:18

## 2022-12-20 RX ADMIN — INSULIN ASPART 2 UNITS: 100 INJECTION, SOLUTION INTRAVENOUS; SUBCUTANEOUS at 17:15

## 2022-12-20 RX ADMIN — HEPARIN SODIUM 5000 UNITS: 5000 INJECTION INTRAVENOUS; SUBCUTANEOUS at 05:16

## 2022-12-20 RX ADMIN — ANASTROZOLE 1 MG: 1 TABLET ORAL at 08:18

## 2022-12-20 RX ADMIN — HEPARIN SODIUM 5000 UNITS: 5000 INJECTION INTRAVENOUS; SUBCUTANEOUS at 21:47

## 2022-12-20 RX ADMIN — FLUOXETINE HYDROCHLORIDE 20 MG: 20 CAPSULE ORAL at 08:18

## 2022-12-20 RX ADMIN — Medication 10 ML: at 08:18

## 2022-12-20 NOTE — THERAPY EVALUATION
Patient Name: Ronna Wayne  : 1955    MRN: [id number]                              Today's Date: 2022       Admit Date: 2022    Visit Dx:     ICD-10-CM ICD-9-CM   1. SVT (supraventricular tachycardia) (HCC)  I47.1 427.89     Patient Active Problem List   Diagnosis   • Frequent headaches   • Memory loss   • Essential hypertension   • Dyslipidemia   • Type 2 diabetes mellitus with hyperglycemia, with long-term current use of insulin (HCC)   • Obstructive sleep apnea syndrome   • Glaucoma   • Anxiety   • GERD (gastroesophageal reflux disease)   • Morbid obesity   • Weakness of left arm   • Cerebrovascular accident (CVA) due to embolism (HCC)   • SVT (supraventricular tachycardia) (HCC)   • Vitamin D deficiency   • B12 deficiency   • Malignant neoplasm of upper-outer quadrant of right breast in female, estrogen receptor positive (HCC)   • Other specified hypothyroidism   • Personal history of colonic polyps   • Depression   • Paroxysmal SVT (supraventricular tachycardia) (HCC)     Past Medical History:   Diagnosis Date   • Anxiety    • B12 deficiency    • BPV (benign positional vertigo)    • Breast cancer (HCC) 2021   • Diabetes mellitus (HCC)    • Diarrhea    • Disease of thyroid gland    • Elevated cholesterol    • Fibromyalgia    • GERD (gastroesophageal reflux disease)    • Glaucoma    • Heart murmur    • Hyperlipidemia    • Hypertension    • Kidney disease    • Obesity    • Peptic ulceration    • PONV (postoperative nausea and vomiting)    • Sleep apnea     c-pap   • Stroke (HCC)    • Weakness of left arm      Past Surgical History:   Procedure Laterality Date   • ABDOMINAL SURGERY     • APPENDECTOMY     • BREAST ABSCESS INCISION AND DRAINAGE Right 2022    Procedure: BREAST ABSCESS INCISION AND DRAINAGE;  Surgeon: Lee Parrish MD;  Location: [facility];  Service: General;  Laterality: Right;   • BREAST CYST ASPIRATION     • BREAST LUMPECTOMY WITH SENTINEL NODE BIOPSY  Right 8/5/2021    Procedure: BREAST LUMPECTOMY WITH SENTINEL NODE BIOPSY;  Surgeon: Lee Parrish MD;  Location:  [facility] OR;  Service: General;  Laterality: Right;   • CATARACT EXTRACTION Bilateral    • CHOLECYSTECTOMY     • COLONOSCOPY     • ENDOSCOPY     • HEMATOMA EVACUATION TRUNK Right 8/5/2021    Procedure: HEMATOMA EVACUATION TRUNK;  Surgeon: Lee Parrish MD;  Location: [facility] OR;  Service: General;  Laterality: Right;   • URETHRA SURGERY        General Information     Row Name 12/20/22 1449          OT Time and Intention    Document Type evaluation  -     Mode of Treatment individual therapy;occupational therapy  -[facility] Name 12/20/22 1449          General Information    Patient Profile Reviewed yes  -     Prior Level of Function independent:;all household mobility;ADL's;cooking  -     Existing Precautions/Restrictions no known precautions/restrictions  -     Barriers to Rehab none identified  -     Row Name 12/20/22 1449          Occupational Profile    Reason for Services/Referral (Occupational Profile) Patient was admitted to [facility] on 12/17/2022. She was referred for OT evaluation due to change in functional performance with ADLs, functional mobility, and/or transfers.  -     Row Name 12/20/22 1449          Living Environment    People in Home spouse  -[facility] Name 12/20/22 1449          Home Main Entrance    Number of Stairs, Main Entrance four  -     Stair Railings, Main Entrance railings safe and in good condition  -[facility] Name 12/20/22 1449          Stairs Within Home, Primary    Number of Stairs, Within Home, Primary none  -[facility] Name 12/20/22 1449          Cognition    Orientation Status (Cognition) oriented x 4  -     Row Name 12/20/22 1449          Safety Issues, Functional Mobility    Impairments Affecting Function (Mobility) endurance/activity tolerance;strength  -           User Key  (r) = Recorded By, (t) = Taken By, (c) =  Cosigned By    Initials Name Provider Type     Rosetta Gordon, OT Occupational Therapist                 Mobility/ADL's     Row Name 12/20/22 1450          Bed Mobility    Bed Mobility bed mobility (all) activities  -     All Activities, [place] (Bed Mobility) minimum assist (75% patient effort)  -     Assistive Device (Bed Mobility) bed rails;head of bed elevated  -     Row Name 12/20/22 1450          Transfers    Transfers sit-stand transfer;stand-sit transfer  -     Row Name 12/20/22 1450          Sit-Stand Transfer    Sit-Stand [place] (Transfers) contact guard;standby assist  -     Row Name 12/20/22 1450          Stand-Sit Transfer    Stand-Sit [place] (Transfers) contact guard;standby assist  -     Row Name 12/20/22 1450          Activities of Daily Living    BADL Assessment/Intervention bathing;upper body dressing;lower body dressing;grooming;feeding;toileting  -     Row Name 12/20/22 1450          Bathing Assessment/Intervention    [place] Level (Bathing) bathing skills;minimum assist (75% patient effort)  -     Row Name 12/20/22 1450          Upper Body Dressing Assessment/Training    [place] Level (Upper Body Dressing) upper body dressing skills;standby assist  -     Position (Upper Body Dressing) edge of bed sitting  -     Row Name 12/20/22 1450          Lower Body Dressing Assessment/Training    [place] Level (Lower Body Dressing) lower body dressing skills;minimum assist (75% patient effort)  -     Position (Lower Body Dressing) edge of bed sitting  -[facility] Name 12/20/22 1450          Grooming Assessment/Training    [place] Level (Grooming) grooming skills;standby assist  -     Position (Grooming) edge of bed sitting  -[facility] Name 12/20/22 1450          Self-Feeding Assessment/Training    [place] Level (Feeding) feeding skills;set up  -     Row Name 12/20/22 1450          Toileting Assessment/Training    [place] Level  (Toileting) toileting skills;minimum assist (75% patient effort);standby assist  -KP           User Key  (r) = Recorded By, (t) = Taken By, (c) = Cosigned By    Initials Name Provider Type    Rosetta Garcia OT Occupational Therapist               Obj/Interventions     Row Name 12/20/22 [facility]          Sensory Assessment (Somatosensory)    Sensory Assessment (Somatosensory) UE sensation intact  -[facility] Name 12/20/22 1451          Vision Assessment/Intervention    Visual Impairment/Limitations WFL;corrective lenses for reading  -     Row Name 12/20/22 [facility]          Range of Motion Comprehensive    General Range of Motion bilateral upper extremity ROM WFL  -     Comment, General Range of Motion limitations in overhead reach in LUE due to IV placement per patient  -     Row Name 12/20/22 [facility]          Strength Comprehensive (MMT)    Comment, General Manual Muscle Testing (MMT) Assessment 4+/5 MMT in BUEs  -[facility] Name 12/20/22 [facility]          Motor Skills    Motor Skills coordination;functional endurance  -KP     Coordination WFL;bilateral;upper extremity  -KP     Functional Endurance fair  -[facility] Name 12/20/22 [facility]          Balance    Balance Assessment sitting static balance;standing static balance  -KP     Static Sitting Balance independent  -KP     Static Standing Balance standby assist  -KP           User Key  (r) = Recorded By, (t) = Taken By, (c) = Cosigned By    Initials Name Provider Type    Rosetta Garcia OT Occupational Therapist               Goals/Plan     Row Name 12/20/22 1456          Transfer Goal 1 (OT)    Activity/Assistive Device (Transfer Goal 1, OT) transfers, all  -KP     [place] Level/Cues Needed (Transfer Goal 1, OT) independent  -KP     Time Frame (Transfer Goal 1, OT) by discharge  -     Row Name 12/20/22 1456          Dressing Goal 1 (OT)    Activity/Device (Dressing Goal 1, OT) dressing skills, all  -KP     [place]/Cues Needed (Dressing Goal 1, OT)  independent  -     Time Frame (Dressing Goal 1, OT) by discharge  -     Row Name 12/20/22 1456          Problem Specific Goal 1 (OT)    Problem Specific Goal 1 (OT) Patient will perform sustained activity X10 minutes to promote funcitonal endurance/activity tolerance needed for daily routine.  -     Time Frame (Problem Specific Goal 1, OT) by discharge  -     Row Name 12/20/22 1456          Therapy Assessment/Plan (OT)    Planned Therapy Interventions (OT) activity tolerance training;BADL retraining;occupation/activity based interventions;ROM/therapeutic exercise;transfer/mobility retraining;patient/caregiver education/training;strengthening exercise  -           User Key  (r) = Recorded By, (t) = Taken By, (c) = Cosigned By    Initials Name Provider Type    Rosetta Garcia, KANDI Occupational Therapist               Clinical Impression     Row Name 12/20/22 1452          Pain Assessment    Pretreatment Pain Rating 0/10 - no pain  -     Posttreatment Pain Rating 0/10 - no pain  -     Row Name 12/20/22 1458 12/20/22 1451       Plan of Care Review    Plan of Care Reviewed With -- patient;spouse  -    Progress -- no change  -    Outcome Evaluation Patient seen for OT evaluation. She currently requires minimal to standby assist for ADLs and contact guard/standby assist for transfer. Functional deficits include limited endurance/activity tolerance and generalized weakness. OT skilled services recommended during acute hospitalizatoin to promote return to prior level of function.  - --    Row Name 12/20/22 1456          Therapy Assessment/Plan (OT)    Patient/Family Therapy Goal Statement (OT) return home  -     Rehab Potential (OT) good, to achieve stated therapy goals  -     Criteria for Skilled Therapeutic Interventions Met (OT) yes;skilled treatment is necessary;meets criteria  -     Therapy Frequency (OT) 3 times/wk  3-5x/week as able and available to promote functional progress  -      Predicted Duration of Therapy Intervention (OT) discharge  -     Row Name 12/20/22 1452          Therapy Plan Review/Discharge Plan (OT)    Anticipated Discharge Disposition (OT) home with assist  -     Row Name 12/20/22 1452          Vital Signs    O2 Delivery Pre Treatment room air  -     O2 Delivery Intra Treatment room air  -KP     O2 Delivery Post Treatment room air  -     Row Name 12/20/22 1452          Positioning and Restraints    Post Treatment Position bed  -     In Bed call light within reach;sitting EOB;with family/caregiver  -           User Key  (r) = Recorded By, (t) = Taken By, (c) = Cosigned By    Initials Name Provider Type    Rosetta Garcia OT Occupational Therapist               Outcome Measures    No documentation.                   OT Recommendation and Plan  Planned Therapy Interventions (OT): activity tolerance training, BADL retraining, occupation/activity based interventions, ROM/therapeutic exercise, transfer/mobility retraining, patient/caregiver education/training, strengthening exercise  Therapy Frequency (OT): 3 times/wk (3-5x/week as able and available to promote functional progress)  Plan of Care Review  Plan of Care Reviewed With: patient, spouse  Progress: no change  Outcome Evaluation: Patient seen for OT evaluation. She currently requires minimal to standby assist for ADLs and contact guard/standby assist for transfer. Functional deficits include limited endurance/activity tolerance and generalized weakness. OT skilled services recommended during acute hospitalizatoin to promote return to prior level of function.     Time Calculation:    Time Calculation- OT     Row Name 12/20/22 1457             Time Calculation- OT    OT Start Time 1450  -KP      OT Received On 12/20/22  -            User Key  (r) = Recorded By, (t) = Taken By, (c) = Cosigned By    Initials Name Provider Type    Rosetta Garcia OT Occupational Therapist              Therapy Charges for  Today     Code Description Service Date Service Provider Modifiers Qty    [id number] HC OT EVAL MOD COMPLEXITY 4 12/20/2022 Rosetta Gordon, KANDI GO 1               Rosetta Gordon OT  12/20/2022

## 2022-12-20 NOTE — PLAN OF CARE
Goal Outcome Evaluation:              Outcome Evaluation: Patient HR remains in 50-60s. Other VSS. Continued on RA. Patient ambulated to restroom independently. Spouse at bedside. Safety maintained, call light in reach. Plan of care continued per orders.

## 2022-12-20 NOTE — PROGRESS NOTES
[facility] HOSPITALIST PROGRESS NOTE     Patient Identification:  Name:  Ronna Wayne  Age:  67 y.o.  Sex:  female  :  1955  MRN:  [id number]  Visit Number:  [id number]  ROOM: [address]     Primary Care Provider:  Mague Acosta APRN    Length of stay in inpatient status:  3    Subjective     Chief Compliant:    Chief Complaint   Patient presents with   • Palpitations   • Rapid Heart Rate     History of Presenting Illness:    Patient remains ill but stable today, no acute events overnight, no new complaints, denies any fevers or chills, heart rate remains in 50's, patient endorsing persisting fatigue, mild headache today, though notes she does get frequently at home, Cardiology consulted and following, no plan for pacemaker as of now but will follow up recommendations today, have ordered PT/OT evaluations.   Objective     Current Hospital Meds:  anastrozole, 1 mg, Oral, Daily  aspirin, 81 mg, Oral, Daily  cholecalciferol, 1,000 Units, Oral, Daily  FLUoxetine, 20 mg, Oral, Daily  heparin (porcine), 5,000 Units, Subcutaneous, Q8H  Insulin Aspart, 0-9 Units, Subcutaneous, 4x Daily AC & at Bedtime  levothyroxine, 88 mcg, Oral, Daily  rosuvastatin, 20 mg, Oral, Daily  sodium chloride, 10 mL, Intravenous, Q12H    ----------------------------------------------------------------------------------------------------------------------  Vital Signs:  Temp:  [98.4 °F (36.9 °C)-99 °F (37.2 °C)] 99 °F (37.2 °C)  Heart Rate:  [49-61] 51  Resp:  [12-16] 12  BP: ()/() 141/67  SpO2:  [91 %-99 %] 98 %  on   ;   Device (Oxygen Therapy): CPAP  Body mass index is 48.54 kg/m².      Intake/Output Summary (Last 24 hours) at 2022 0854  Last data filed at 2022 1355  Gross per 24 hour   Intake 240 ml   Output 1000 ml   Net -760 ml      ----------------------------------------------------------------------------------------------------------------------  Physical  exam:  Constitutional:  Well-developed and well-nourished.  No acute distress.      HENT:  Head:  Normocephalic and atraumatic.  Mouth:  Moist mucous membranes.    Eyes:  Conjunctivae and EOM are normal. No scleral icterus.    Neck:  Neck supple.  No JVD present.    Cardiovascular:  bradycardic still, regular rhythm and normal heart sounds with no murmur.  Pulmonary/Chest:  No respiratory distress, no wheezes, no crackles, on room air  Abdominal:  Soft. No distension and no tenderness.   Musculoskeletal:  No tenderness, and no deformity.  No red or swollen joints anywhere.    Neurological:  Alert and oriented to person, place, and time.  No gross focal deficits   Skin:  Skin is warm and dry. No rash noted. No pallor.   Peripheral vascular:  No clubbing, no cyanosis, no edema.  Psychiatric: Appropriate mood and affect    Edited by: Michel Lizama MD at 12/20/2022 0853  ----------------------------------------------------------------------------------------------------------------------  WBC/HGB/HCT/PLT   6.97/13.6/41.3/111 (12/19 1159)  BUN/CREAT/GLUC/ALT/AST/COURT/LIP    22/0.98/210/--/--/--/-- (12/19 1159)  LYTES - Na/K/Cl/CO2: 135*/4.1/102/21.9* (12/19 1159)     No results found for: URINECX  Blood Culture   Date Value Ref Range Status   12/17/2022 No growth at 3 days  Preliminary   12/17/2022 No growth at 3 days  Preliminary     I have personally looked at the labs and they are summarized above.  ----------------------------------------------------------------------------------------------------------------------  Detailed radiology reports for the last 24 hours:  Adult Transthoracic Echo Complete W/ Cont if Necessary Per Protocol    Addendum Date: 12/18/2022    •  Left ventricular systolic function is normal. Left ventricular ejection fraction appears to be 61 - 65%. •  Left ventricular diastolic function is consistent with (grade II w/high LAP) pseudonormalization. •  Left atrial volume is mildly increased. •   The right atrial cavity is mildly  dilated. •  Mild aortic valve stenosis is present, with an estimated aortic valve area of 1.8 cm² and the mean gradient of 19 mmHg. •  Estimated right ventricular systolic pressure from tricuspid regurgitation is mildly elevated (35-45 mmHg).     Assessment & Plan    67F Morbid Obesity by BMI PMH Hypertension, Diabetes Mellitus type II, Breast cancer status post lumpectomy with abscess (2022), Hypothyroid, Cerebrovascular Accident, Obstructive Sleep Apnea on home CPAP, PSVT, presented [facility] emergency room 12/17 with complaint of intermittent tachycardia, bradycardia, palpitations and chest heaviness.    #Recurrent SVT  #Tachycardia-Bradycardia with possible Sick Sinus Syndrome   #Brief episode Paroxysmal Atrial Fibrillation with junctional escape  #Mild Aortic Stenosis  #Mild Pulmonary Hypertension   #Hypertension/Dyslipidemia  - Echocardiogram showed LVEF 61-65%, mildly increased left atrium volume, mildly dilated right atrial cavity, mild AS with MORAIMA 1.8cm^2 and mean gradient 19mmHg, RVSP 35-45mmHg  - Cardiology consulted and following  - Continue home Aspirin 81, Statin  - Holding home beta blocker  - Holding home Hydrochlorothiazide, resume as indicated   - Follow up Cardiology recommendations on need for pacemaker, have recommended outpatient EP study at time of discharge  - Continue to monitor on telemetry, strict I/O's, trend heart rate and blood pressure     #Insulin Dependent Diabetes Mellitus Type II, uncontrolled, unknown complications  - Hgb A1c = 7.8%  - Holding home oral agents, continue FSBG and SSI, resume long acting as indicated.    #Chronic Kidney Disease Stage II  - Trend Cr and urine output, avoid nephrotoxins, NSAIDs, dehydration and contrast as able.     #Hypothyroid  - Continue home Levothyroxine    #Obstructive Sleep Apnea   - Continue home CPAP nightly     #History Cerebrovascular Accident   - Continue home Aspirin 81, Statin, Supportive  Care    #History Breast cancer status post lumpectomy complicated by abscess  - Continue home Anastrozole, Supportive Care    #Anxiety/Depression  - Continue home regimen    #Morbid Obesity by BMI  - BMI 47, complicates all aspects of care    F: Oral  E: Monitor & Replace PRN  N: Diet: Cardiac Diets, Diabetic Diets; Healthy Heart (2-3 Na+); Consistent Carbohydrate; Texture: Regular Texture (IDDSI 7); Fluid Consistency: Thin (IDDSI 0)  PPx: SQH  Code Status (Patient has no pulse and is not breathing): CPR (Attempt to Resuscitate)  Medical Interventions (Patient has pulse or is breathing): Full Support     Dispo: Pending clinical improvement and definitive plan for persisting bradycardia, anticipate home though have ordered PT/OT evaluations     *This patient is considered high risk secondary to recurrent SVT, tachycardia-bradycardia concern for Sick Sinus Syndrome, chest pain requiring further cardiac workup and imaging.     Edited by: Michel Lizama MD at 12/20/2022 [address] [place] [address]

## 2022-12-20 NOTE — PLAN OF CARE
Goal Outcome Evaluation:  Plan of Care Reviewed With: patient        Progress: improving  Outcome Evaluation: Pt. alert and oriented times 4. VSS. RA. NSR in 50s. Complaints of headache early in shift, treated with prns. No other complaints at this time. Pt. currently resting at this time. Bed alarm set, call light within reach.      Problem: Adult Inpatient Plan of Care  Goal: Plan of Care Review  Outcome: Ongoing, Progressing  Flowsheets (Taken 12/20/2022 0145)  Progress: improving  Plan of Care Reviewed With: patient  Outcome Evaluation: Pt. alert and oriented times 4. VSS. RA. NSR in 50s. Complaints of headache early in shift, treated with prns. No other complaints at this time. Pt. currently resting at this time. Bed alarm set, call light within reach.  Goal: Patient-Specific Goal (Individualized)  Outcome: Ongoing, Progressing  Goal: Absence of Hospital-Acquired Illness or Injury  Outcome: Ongoing, Progressing  Intervention: Identify and Manage Fall Risk  Recent Flowsheet Documentation  Taken 12/20/2022 0130 by Essence Somers RN  Safety Promotion/Fall Prevention:   activity supervised   fall prevention program maintained   safety round/check completed  Taken 12/20/2022 0100 by Essence Somers RN  Safety Promotion/Fall Prevention:   activity supervised   fall prevention program maintained   safety round/check completed  Taken 12/19/2022 2300 by Essence Somers RN  Safety Promotion/Fall Prevention:   activity supervised   fall prevention program maintained   safety round/check completed  Taken 12/19/2022 2100 by Essence Somers RN  Safety Promotion/Fall Prevention:   activity supervised   fall prevention program maintained   safety round/check completed  Taken 12/19/2022 1900 by Essence Somers RN  Safety Promotion/Fall Prevention:   activity supervised   fall prevention program maintained   safety round/check completed  Intervention: Prevent Skin Injury  Recent Flowsheet Documentation  Taken 12/20/2022 0130 by  Essence Somers RN  Body Position: position changed independently  Skin Protection:   adhesive use limited   incontinence pads utilized   transparent dressing maintained   tubing/devices free from skin contact  Taken 12/19/2022 2100 by Essence Somers RN  Body Position: position changed independently  Skin Protection:   adhesive use limited   incontinence pads utilized   transparent dressing maintained   tubing/devices free from skin contact  Intervention: Prevent and Manage VTE (Venous Thromboembolism) Risk  Recent Flowsheet Documentation  Taken 12/20/2022 0130 by Essence Somers RN  Activity Management: activity adjusted per tolerance  VTE Prevention/Management: (heparin sq) other (see comments)  Taken 12/19/2022 2100 by Essence Somers RN  Activity Management: activity adjusted per tolerance  VTE Prevention/Management: (heparin sq) other (see comments)  Intervention: Prevent Infection  Recent Flowsheet Documentation  Taken 12/20/2022 0100 by Essence Somers RN  Infection Prevention:   single patient room provided   rest/sleep promoted   personal protective equipment utilized   hand hygiene promoted   equipment surfaces disinfected  Taken 12/19/2022 2300 by Essence Somers RN  Infection Prevention:   single patient room provided   rest/sleep promoted   personal protective equipment utilized   hand hygiene promoted   equipment surfaces disinfected  Taken 12/19/2022 2100 by Essence Somers RN  Infection Prevention:   single patient room provided   rest/sleep promoted   personal protective equipment utilized   hand hygiene promoted   equipment surfaces disinfected  Taken 12/19/2022 1900 by Essence Somers RN  Infection Prevention:   single patient room provided   rest/sleep promoted   personal protective equipment utilized   hand hygiene promoted   equipment surfaces disinfected  Goal: Optimal Comfort and Wellbeing  Outcome: Ongoing, Progressing  Intervention: Monitor Pain and Promote Comfort  Recent Flowsheet  Documentation  Taken 12/19/2022 2100 by Essence Somers RN  Pain Management Interventions: see MAR  Intervention: Provide Person-Centered Care  Recent Flowsheet Documentation  Taken 12/20/2022 0130 by Essence Somers RN  Trust Relationship/Rapport: choices provided  Goal: Readiness for Transition of Care  Outcome: Ongoing, Progressing     Problem: Fall Injury Risk  Goal: Absence of Fall and Fall-Related Injury  Outcome: Ongoing, Progressing  Intervention: Identify and Manage Contributors  Recent Flowsheet Documentation  Taken 12/20/2022 0130 by Essence Somers RN  Medication Review/Management: medications reviewed  Self-Care Promotion: independence encouraged  Taken 12/20/2022 0100 by Essence Somers RN  Medication Review/Management: medications reviewed  Taken 12/19/2022 2300 by Essence Somers RN  Medication Review/Management: medications reviewed  Taken 12/19/2022 2100 by Essence Somers RN  Medication Review/Management: medications reviewed  Self-Care Promotion: independence encouraged  Taken 12/19/2022 1900 by Essence Somers RN  Medication Review/Management: medications reviewed  Intervention: Promote Injury-Free Environment  Recent Flowsheet Documentation  Taken 12/20/2022 0130 by Essence Somers RN  Safety Promotion/Fall Prevention:   activity supervised   fall prevention program maintained   safety round/check completed  Taken 12/20/2022 0100 by Essence Somers RN  Safety Promotion/Fall Prevention:   activity supervised   fall prevention program maintained   safety round/check completed  Taken 12/19/2022 2300 by Essence Somers RN  Safety Promotion/Fall Prevention:   activity supervised   fall prevention program maintained   safety round/check completed  Taken 12/19/2022 2100 by Essence Somers RN  Safety Promotion/Fall Prevention:   activity supervised   fall prevention program maintained   safety round/check completed  Taken 12/19/2022 1900 by Essence Somers RN  Safety Promotion/Fall Prevention:   activity  supervised   fall prevention program maintained   safety round/check completed     Problem: Diabetes Comorbidity  Goal: Blood Glucose Level Within Targeted Range  Outcome: Ongoing, Progressing  Intervention: Monitor and Manage Glycemia  Recent Flowsheet Documentation  Taken 12/20/2022 0130 by Essence Somers RN  Glycemic Management: blood glucose monitored  Taken 12/19/2022 2100 by Essence Somers RN  Glycemic Management: blood glucose monitored     Problem: Hypertension Comorbidity  Goal: Blood Pressure in Desired Range  Outcome: Ongoing, Progressing  Intervention: Maintain Blood Pressure Management  Recent Flowsheet Documentation  Taken 12/20/2022 0130 by Essence Somers RN  Medication Review/Management: medications reviewed  Taken 12/20/2022 0100 by Essence Somers RN  Medication Review/Management: medications reviewed  Taken 12/19/2022 2300 by Essence Somers RN  Medication Review/Management: medications reviewed  Taken 12/19/2022 2100 by Essence Somers RN  Medication Review/Management: medications reviewed  Taken 12/19/2022 1900 by Essence Somers RN  Medication Review/Management: medications reviewed     Problem: Skin Injury Risk Increased  Goal: Skin Health and Integrity  Outcome: Ongoing, Progressing  Intervention: Promote and Optimize Oral Intake  Recent Flowsheet Documentation  Taken 12/19/2022 2100 by Essence Somers RN  Oral Nutrition Promotion: rest periods promoted  Intervention: Optimize Skin Protection  Recent Flowsheet Documentation  Taken 12/20/2022 0130 by Essence Somers RN  Pressure Reduction Techniques:   frequent weight shift encouraged   heels elevated off bed  Head of Bed (HOB) Positioning: HOB elevated  Pressure Reduction Devices: pressure-redistributing mattress utilized  Skin Protection:   adhesive use limited   incontinence pads utilized   transparent dressing maintained   tubing/devices free from skin contact  Taken 12/19/2022 2100 by Essence Somers RN  Pressure Reduction Techniques:   frequent  weight shift encouraged   heels elevated off bed  Head of Bed (HOB) Positioning: HOB elevated  Pressure Reduction Devices: pressure-redistributing mattress utilized  Skin Protection:   adhesive use limited   incontinence pads utilized   transparent dressing maintained   tubing/devices free from skin contact

## 2022-12-21 DIAGNOSIS — I47.1 PAROXYSMAL SVT (SUPRAVENTRICULAR TACHYCARDIA): Primary | ICD-10-CM

## 2022-12-21 LAB
ANION GAP SERPL CALCULATED.3IONS-SCNC: 10.5 MMOL/L (ref 5–15)
BUN SERPL-MCNC: 15 MG/DL (ref 8–23)
BUN/CREAT SERPL: 20.5 (ref 7–25)
CALCIUM SPEC-SCNC: 9.2 MG/DL (ref 8.6–10.5)
CHLORIDE SERPL-SCNC: 99 MMOL/L (ref 98–107)
CO2 SERPL-SCNC: 23.5 MMOL/L (ref 22–29)
CREAT SERPL-MCNC: 0.73 MG/DL (ref 0.57–1)
DEPRECATED RDW RBC AUTO: 48.9 FL (ref 37–54)
EGFRCR SERPLBLD CKD-EPI 2021: 90.3 ML/MIN/1.73
ERYTHROCYTE [DISTWIDTH] IN BLOOD BY AUTOMATED COUNT: 13.2 % (ref 12.3–15.4)
GLUCOSE BLDC GLUCOMTR-MCNC: 142 MG/DL (ref 70–130)
GLUCOSE BLDC GLUCOMTR-MCNC: 225 MG/DL (ref 70–130)
GLUCOSE BLDC GLUCOMTR-MCNC: 237 MG/DL (ref 70–130)
GLUCOSE BLDC GLUCOMTR-MCNC: 256 MG/DL (ref 70–130)
GLUCOSE SERPL-MCNC: 260 MG/DL (ref 65–99)
HCT VFR BLD AUTO: 43.3 % (ref 34–46.6)
HGB BLD-MCNC: 14.9 G/DL (ref 12–15.9)
MCH RBC QN AUTO: 34.7 PG (ref 26.6–33)
MCHC RBC AUTO-ENTMCNC: 34.4 G/DL (ref 31.5–35.7)
MCV RBC AUTO: 100.7 FL (ref 79–97)
PLATELET # BLD AUTO: 126 10*3/MM3 (ref 140–450)
PMV BLD AUTO: 10.2 FL (ref 6–12)
POTASSIUM SERPL-SCNC: 4.1 MMOL/L (ref 3.5–5.2)
RBC # BLD AUTO: 4.3 10*6/MM3 (ref 3.77–5.28)
SODIUM SERPL-SCNC: 133 MMOL/L (ref 136–145)
WBC NRBC COR # BLD: 6.28 10*3/MM3 (ref 3.4–10.8)

## 2022-12-21 PROCEDURE — 82962 GLUCOSE BLOOD TEST: CPT

## 2022-12-21 PROCEDURE — 94799 UNLISTED PULMONARY SVC/PX: CPT

## 2022-12-21 PROCEDURE — 97162 PT EVAL MOD COMPLEX 30 MIN: CPT

## 2022-12-21 PROCEDURE — 99232 SBSQ HOSP IP/OBS MODERATE 35: CPT | Performed by: INTERNAL MEDICINE

## 2022-12-21 PROCEDURE — 63710000001 INSULIN ASPART PER 5 UNITS: Performed by: INTERNAL MEDICINE

## 2022-12-21 PROCEDURE — 85027 COMPLETE CBC AUTOMATED: CPT | Performed by: INTERNAL MEDICINE

## 2022-12-21 PROCEDURE — 93010 ELECTROCARDIOGRAM REPORT: CPT | Performed by: INTERNAL MEDICINE

## 2022-12-21 PROCEDURE — 25010000002 HEPARIN (PORCINE) PER 1000 UNITS: Performed by: INTERNAL MEDICINE

## 2022-12-21 PROCEDURE — 94761 N-INVAS EAR/PLS OXIMETRY MLT: CPT

## 2022-12-21 PROCEDURE — 99233 SBSQ HOSP IP/OBS HIGH 50: CPT | Performed by: INTERNAL MEDICINE

## 2022-12-21 PROCEDURE — 80048 BASIC METABOLIC PNL TOTAL CA: CPT | Performed by: INTERNAL MEDICINE

## 2022-12-21 PROCEDURE — 93005 ELECTROCARDIOGRAM TRACING: CPT | Performed by: INTERNAL MEDICINE

## 2022-12-21 RX ADMIN — FLECAINIDE ACETATE 50 MG: 50 TABLET ORAL at 09:03

## 2022-12-21 RX ADMIN — INSULIN ASPART 4 UNITS: 100 INJECTION, SOLUTION INTRAVENOUS; SUBCUTANEOUS at 17:34

## 2022-12-21 RX ADMIN — HEPARIN SODIUM 5000 UNITS: 5000 INJECTION INTRAVENOUS; SUBCUTANEOUS at 05:29

## 2022-12-21 RX ADMIN — ANASTROZOLE 1 MG: 1 TABLET ORAL at 09:04

## 2022-12-21 RX ADMIN — ASPIRIN 81 MG: 81 TABLET, COATED ORAL at 09:03

## 2022-12-21 RX ADMIN — LEVOTHYROXINE SODIUM 88 MCG: 88 TABLET ORAL at 09:03

## 2022-12-21 RX ADMIN — HEPARIN SODIUM 5000 UNITS: 5000 INJECTION INTRAVENOUS; SUBCUTANEOUS at 21:14

## 2022-12-21 RX ADMIN — FLUOXETINE HYDROCHLORIDE 20 MG: 20 CAPSULE ORAL at 09:03

## 2022-12-21 RX ADMIN — ACETAMINOPHEN 650 MG: 325 TABLET, FILM COATED ORAL at 21:34

## 2022-12-21 RX ADMIN — FLECAINIDE ACETATE 50 MG: 50 TABLET ORAL at 21:17

## 2022-12-21 RX ADMIN — ROSUVASTATIN CALCIUM 20 MG: 20 TABLET, FILM COATED ORAL at 09:03

## 2022-12-21 RX ADMIN — INSULIN ASPART 6 UNITS: 100 INJECTION, SOLUTION INTRAVENOUS; SUBCUTANEOUS at 13:20

## 2022-12-21 RX ADMIN — INSULIN ASPART 4 UNITS: 100 INJECTION, SOLUTION INTRAVENOUS; SUBCUTANEOUS at 21:13

## 2022-12-21 RX ADMIN — HEPARIN SODIUM 5000 UNITS: 5000 INJECTION INTRAVENOUS; SUBCUTANEOUS at 17:34

## 2022-12-21 RX ADMIN — Medication 10 ML: at 09:03

## 2022-12-21 RX ADMIN — Medication 10 ML: at 21:13

## 2022-12-21 RX ADMIN — CHOLECALCIFEROL TAB 10 MCG (400 UNIT) 1000 UNITS: 10 TAB at 09:04

## 2022-12-21 NOTE — PLAN OF CARE
Goal Outcome Evaluation:  Plan of Care Reviewed With: patient        Progress: improving  Outcome Evaluation: Pt presents w/ fair/good bedside mobility limited by endurance for activity.  Pt would benefit from skilled PT for acitivty tolerance.  Anticipate d/c home w/ family support.

## 2022-12-21 NOTE — PROGRESS NOTES
LOS: 4 days     Name: Ronna Wayne  Age/Sex: 67 y.o. female  :  1955        PCP: Mague Acosta APRN  REF: No ref. provider found    Principal Problem:    Paroxysmal SVT (supraventricular tachycardia) (HCC)  Active Problems:    SVT (supraventricular tachycardia) (HCC)      Reason for follow-up: SVT and bradycardia     Subjective   HPI:  Subjective   Ronna Wayne is a 67 y.o. female with past medical history significant for paroxysmal SVT, diabetes mellitus, essential hypertension, obstructive sleep apnea, history of CVA.  Patient originally presented to [facility] emergency room on 2022 with complaints of shortness of breath and fast heart rate. She was found to be in SVT was gven 6 mg of IV adenosine which was successful in achieving sinus rhythm. Cardiology was consulted for further evaluation and management.     Interval History:   Patient is lying in bed resting quietly. Spouse is at bedside. She denies any chest pain or shortness of breath. She does report on episode of dizziness earlier this morning but \"it did not last long\". Telemetry reveals SR 60's while at her bedside today. Oxygen is 94 % on RA. Spouse is at bedside.     Vital Signs  Temp:  [96.9 °F (36.1 °C)-98.4 °F (36.9 °C)] 98.4 °F (36.9 °C)  Heart Rate:  [52-71] 58  Resp:  [-] 16  BP: ()/() 160/79     Vital Signs (last 72 hrs)        0700   0659  0700   0659  0700   0659  0700   1446   Most Recent      Temp (°F) 97.8 -  98.2    98.4 -  99    96.9 -  98.5    98 -  98.4     98.4 (36.9)  1200    Heart Rate 38 -  60    46 -  61    51 -  71    56 -  66     58  1400    Resp 13 -  14    14 -  20    11 -  20      16     16  0900    BP 98/55 -  168/93    95/82 -  170/104    93/83 -  171/86    150/75 -  163/108     160/79 12/21 1400    SpO2 (%) 90 -  96    91 -  99    90 -  98    90 -  99     99 1400        Body mass index is 48.54  kg/m².    Intake/Output Summary (Last 24 hours) at 12/21/2022 1446  Last data filed at 12/21/2022 1200  Gross per 24 hour   Intake 240 ml   Output --   Net 240 ml     Objective  Objective     Physical Exam:      General Appearance:    Alert, cooperative, in no acute distress   Head:    Normocephalic, without obvious abnormality, atraumatic   Eyes:                          Conjunctivae and sclerae normal, no icterus, no pallor, corneas clear   Neck:   No adenopathy, supple, trachea midline, no thyromegaly, no carotid bruit, no JVD   Lungs:     Clear to auscultation, respirations regular, even and             unlabored    Heart:    Regular rhythm and bradycardic rate, normal S1 and S2, no          murmur, no gallop, no rub, no click   Chest Wall:    No abnormalities observed   Abdomen:     Normal bowel sounds, no masses, no organomegaly, soft      non-tender, non-distended, no guarding, no rebound                tenderness   Extremities:   Moves all extremities well, no edema, no cyanosis, no            redness   Pulses:   Pulses palpable and equal bilaterally   Skin:   No bleeding, bruising or rash   Neurologic:   Alert and Oriented x 3, Speech Clear & comprehensive.       Results review   Results Review:   Results from last 7 days   Lab Units 12/21/22  0959 12/19/22  1159 12/18/22  0353 12/17/22 2029 12/17/22  0147   WBC 10*3/mm3 6.28 6.97 7.64 8.59 5.86   HEMOGLOBIN g/dL 14.9 13.6 14.5 14.8 15.0   PLATELETS 10*3/mm3 126* 111* 86* 136* 122*     Results from last 7 days   Lab Units 12/21/22  0959 12/19/22  1159 12/18/22  0353 12/17/22 2029 12/17/22  0147   SODIUM mmol/L 133* 135* 131* 130* 137   POTASSIUM mmol/L 4.1 4.1 4.0 3.5 3.8   CHLORIDE mmol/L 99 102 103 96* 98   CO2 mmol/L 23.5 21.9* 16.3* 20.1* 23.0   BUN mg/dL 15 22 26* 25* 29*   CREATININE mg/dL 0.73 0.98 0.91 0.96 0.97   CALCIUM mg/dL 9.2 9.3 9.1 9.6 9.7   GLUCOSE mg/dL 260* 210* 253* 245* 286*   ALT (SGPT) U/L  --   --  20  --  27   AST (SGOT) U/L  --    --  29  --  39*     Results from last 7 days   Lab Units 22  0338 22  0147   TROPONIN T ng/mL 0.019 <0.010     Lab Results   Component Value Date    PROBNP 244.6 2021    PROBNP 297.5 2021    PROBNP 779.7 2021     Lab Results   Component Value Date    INR 1.06 2022    INR 1.02 2021    INR 1.05 2021    INR 1.05 2021    INR 1.09 2021     Lab Results   Component Value Date    MG 2.3 2022    MG 1.5 (L) 2022    MG 1.6 10/27/2022     Lab Results   Component Value Date    TSH 0.414 2022    CHLPL 226 (H) 2016    TRIG 200 (H) 10/21/2022    HDL 51 10/21/2022     (H) 10/21/2022      Imaging Results (Last 48 Hours)     ** No results found for the last 48 hours. **        ECHO:  Results for orders placed during the hospital encounter of 22    Adult Transthoracic Echo Complete W/ Cont if Necessary Per Protocol    Interpretation Summary  •  Left ventricular systolic function is normal. Left ventricular ejection fraction appears to be 61 - 65%.  •  Left ventricular diastolic function is consistent with (grade II w/high LAP) pseudonormalization.  •  Left atrial volume is mildly increased.  •  The right atrial cavity is mildly  dilated.  •  Mild aortic valve stenosis is present, with an estimated aortic valve area of 1.8 cm² and the mean gradient of 19 mmHg.  •  Estimated right ventricular systolic pressure from tricuspid regurgitation is mildly elevated (35-45 mmHg).    [id number]      Telemetry: SB/SR 50-60's     I reviewed the patient's new clinical results.    Medication Review:   anastrozole, 1 mg, Oral, Daily  aspirin, 81 mg, Oral, Daily  cholecalciferol, 1,000 Units, Oral, Daily  flecainide, 50 mg, Oral, Q12H  FLUoxetine, 20 mg, Oral, Daily  heparin (porcine), 5,000 Units, Subcutaneous, Q8H  Insulin Aspart, 0-9 Units, Subcutaneous, 4x Daily AC & at Bedtime  levothyroxine, 88 mcg, Oral, Daily  rosuvastatin, 20 mg, Oral,  Daily  sodium chloride, 10 mL, Intravenous, Q12H         Assessment    Assessment:  1. Paroxysmal SVT.  2. Sinus bradycardia in 50s,relatively asymtomatic.  3. Obstructive sleep apnea, on CPAP.  4. Essential hypertension, controlled.  5. Type 2 diabetes mellitus.    Plan   Recommendations:  1. Continue Flecainide, monitor QRS and QTc..  2. OK for discharge home in am and follow up in office in 2 weeks.  3. May need event monitor after discharge if any symptoms of dizziness to r/o symptomatic bradycardia.  4. Will refer to EP later, especially if reccurent SVT.      I discussed the patients findings and my recommendations with patient and family      Electronically signed by BOSTON Chaudhry, 12/21/22, 2:59 PM EST.  Electronically signed by Bj Hyde MD, 12/21/22, 9:14 PM EST.        Please note that portions of this note were completed with a voice recognition program.

## 2022-12-21 NOTE — PLAN OF CARE
Problem: Adult Inpatient Plan of Care  Goal: Plan of Care Review  Outcome: Ongoing, Progressing  Goal: Patient-Specific Goal (Individualized)  Outcome: Ongoing, Progressing  Goal: Absence of Hospital-Acquired Illness or Injury  Outcome: Ongoing, Progressing  Intervention: Identify and Manage Fall Risk  Recent Flowsheet Documentation  Taken 12/21/2022 1700 by Willa Schaefer RN  Safety Promotion/Fall Prevention: safety round/check completed  Taken 12/21/2022 1500 by Willa Schaefer RN  Safety Promotion/Fall Prevention: safety round/check completed  Taken 12/21/2022 1300 by Willa Schaefer RN  Safety Promotion/Fall Prevention: activity supervised  Taken 12/21/2022 1100 by Willa Schaefer RN  Safety Promotion/Fall Prevention: safety round/check completed  Taken 12/21/2022 0900 by Willa Schaefer RN  Safety Promotion/Fall Prevention: safety round/check completed  Taken 12/21/2022 0700 by Willa Schaefer RN  Safety Promotion/Fall Prevention: activity supervised  Intervention: Prevent Skin Injury  Recent Flowsheet Documentation  Taken 12/21/2022 0700 by Willa Schaefer RN  Body Position: position changed independently  Skin Protection:   adhesive use limited   incontinence pads utilized   transparent dressing maintained  Intervention: Prevent and Manage VTE (Venous Thromboembolism) Risk  Recent Flowsheet Documentation  Taken 12/21/2022 0700 by Willa Schaefer RN  Activity Management: activity adjusted per tolerance  VTE Prevention/Management: other (see comments)  Range of Motion: active ROM (range of motion) encouraged  Goal: Optimal Comfort and Wellbeing  Outcome: Ongoing, Progressing  Intervention: Provide Person-Centered Care  Recent Flowsheet Documentation  Taken 12/21/2022 0700 by Willa Schaefer RN  Trust Relationship/Rapport:   care explained   questions answered   reassurance provided  Goal: Readiness for Transition of Care  Outcome: Ongoing, Progressing     Problem: Fall Injury Risk  Goal: Absence of Fall and  Fall-Related Injury  Outcome: Ongoing, Progressing  Intervention: Identify and Manage Contributors  Recent Flowsheet Documentation  Taken 12/21/2022 0700 by Willa Schaefer RN  Medication Review/Management: medications reviewed  Intervention: Promote Injury-Free Environment  Recent Flowsheet Documentation  Taken 12/21/2022 1700 by Willa Schaefer RN  Safety Promotion/Fall Prevention: safety round/check completed  Taken 12/21/2022 1500 by Willa Schaefer RN  Safety Promotion/Fall Prevention: safety round/check completed  Taken 12/21/2022 1300 by Willa Schaefer RN  Safety Promotion/Fall Prevention: activity supervised  Taken 12/21/2022 1100 by Willa Schaefer RN  Safety Promotion/Fall Prevention: safety round/check completed  Taken 12/21/2022 0900 by Willa Schaefer RN  Safety Promotion/Fall Prevention: safety round/check completed  Taken 12/21/2022 0700 by Willa Schaefer RN  Safety Promotion/Fall Prevention: activity supervised     Problem: Diabetes Comorbidity  Goal: Blood Glucose Level Within Targeted Range  Outcome: Ongoing, Progressing  Intervention: Monitor and Manage Glycemia  Recent Flowsheet Documentation  Taken 12/21/2022 0700 by Willa Schaefer RN  Glycemic Management: blood glucose monitored     Problem: Hypertension Comorbidity  Goal: Blood Pressure in Desired Range  Outcome: Ongoing, Progressing  Intervention: Maintain Blood Pressure Management  Recent Flowsheet Documentation  Taken 12/21/2022 0700 by Willa Schaefer RN  Medication Review/Management: medications reviewed     Problem: Skin Injury Risk Increased  Goal: Skin Health and Integrity  Outcome: Ongoing, Progressing  Intervention: Optimize Skin Protection  Recent Flowsheet Documentation  Taken 12/21/2022 0700 by Willa Schaefer RN  Pressure Reduction Techniques:   frequent weight shift encouraged   heels elevated off bed  Head of Bed (HOB) Positioning: HOB at 30-45 degrees  Pressure Reduction Devices:   heel offloading device utilized    positioning supports utilized   pressure-redistributing mattress utilized  Skin Protection:   adhesive use limited   incontinence pads utilized   transparent dressing maintained

## 2022-12-21 NOTE — PLAN OF CARE
Goal Outcome Evaluation:  Plan of Care Reviewed With: patient        Progress: no change  Outcome Evaluation: Patient A&Ox4. Patient has been bradycardic this shift, otherwise VSS at this time. Patient wore CPAP while sleeping this shift but is currently on room air and is tolerating well. Patient verbalized having a headache this shift, PRN tylenol administered. Patient is resting in bed and denies any further requests at this time. Bed in lowest position, wheels locked, upper side rails raised, bed alarm refused but patient educated on importance of safety/call light use and demonstrates understanding, call light and personal items within reach. Will continue to follow plan of care 7p to 7a.

## 2022-12-21 NOTE — PROGRESS NOTES
[facility] HOSPITALIST PROGRESS NOTE     Patient Identification:  Name:  Ronna Wayne  Age:  67 y.o.  Sex:  female  :  1955  MRN:  [id number]  Visit Number:  [id number]  ROOM: [address]     Primary Care Provider:  Mague Acosta APRN    Length of stay in inpatient status:  4    Subjective     Chief Compliant:    Chief Complaint   Patient presents with   • Palpitations   • Rapid Heart Rate     History of Presenting Illness:    Patient remains ill but stable today, no acute events overnight, no new complaints, denies any fevers or chills, Cardiology has started on new anti-arrhytmic agent with flecainide, heart rate remains bradycardic though no tachycardic episodes overnight, trending QTc and this AM was 450's, follow up Cardiology recommendations today, patient denies any chest pain, dyspnea, palpitations, though does endorse persisting mild fatigue. Notes she is tolerating diet and having regular bowel movements, has been up out of bed as well.   Objective     Current Hospital Meds:  anastrozole, 1 mg, Oral, Daily  aspirin, 81 mg, Oral, Daily  cholecalciferol, 1,000 Units, Oral, Daily  flecainide, 50 mg, Oral, Q12H  FLUoxetine, 20 mg, Oral, Daily  heparin (porcine), 5,000 Units, Subcutaneous, Q8H  Insulin Aspart, 0-9 Units, Subcutaneous, 4x Daily AC & at Bedtime  levothyroxine, 88 mcg, Oral, Daily  rosuvastatin, 20 mg, Oral, Daily  sodium chloride, 10 mL, Intravenous, Q12H    ----------------------------------------------------------------------------------------------------------------------  Vital Signs:  Temp:  [96.9 °F (36.1 °C)-98.2 °F (36.8 °C)] 98 °F (36.7 °C)  Heart Rate:  [51-71] 54  Resp:  [11-20] 14  BP: ()/(54-96) 153/71  SpO2:  [90 %-98 %] 94 %  on   ;   Device (Oxygen Therapy): room air  Body mass index is 48.54 kg/m².      Intake/Output Summary (Last 24 hours) at 2022 0925  Last data filed at 2022 1200  Gross per 24 hour   Intake 200 ml   Output  --   Net 200 ml      ----------------------------------------------------------------------------------------------------------------------  Physical exam:  Constitutional:  Well-developed and well-nourished.  No acute distress.      HENT:  Head:  Normocephalic and atraumatic.  Mouth:  Moist mucous membranes.    Eyes:  Conjunctivae and EOM are normal. No scleral icterus.    Neck:  Neck supple.  No JVD present.    Cardiovascular:  bradycardic still, regular rhythm and normal heart sounds with no murmur.  Pulmonary/Chest:  No respiratory distress, no wheezes, no crackles, on room air  Abdominal:  Soft. No distension and no tenderness.   Musculoskeletal:  No tenderness, and no deformity.  Neurological:  Alert and oriented to person, place, and time.  No gross focal deficits   Skin:  Skin is warm and dry. No rash noted. No pallor.   Peripheral vascular:  No clubbing, no cyanosis, no edema.  Psychiatric: Appropriate mood and affect    *Examination stable today 12/21  Edited by: Michel Lizama MD at 12/21/2022 0924  ----------------------------------------------------------------------------------------------------------------------  No results found for: URINECX  Blood Culture   Date Value Ref Range Status   12/17/2022 No growth at 4 days  Preliminary   12/17/2022 No growth at 4 days  Preliminary     I have personally looked at the labs and they are summarized above.  ----------------------------------------------------------------------------------------------------------------------  Detailed radiology reports for the last 24 hours:  No radiology results for the last day  Assessment & Plan    67F Morbid Obesity by BMI PMH Hypertension, Diabetes Mellitus type II, Breast cancer status post lumpectomy with abscess (2022), Hypothyroid, Cerebrovascular Accident, Obstructive Sleep Apnea on home CPAP, PSVT, presented [facility] emergency room 12/17 with complaint of intermittent tachycardia, bradycardia,  palpitations and chest heaviness.    #Recurrent/Paroxysmal SVT  #Tachycardia-Bradycardia with possible Sick Sinus Syndrome   #Brief episode Paroxysmal Atrial Fibrillation with junctional escape  #Mild Aortic Stenosis  #Mild Pulmonary Hypertension   #Hypertension/Dyslipidemia  - Echocardiogram showed LVEF 61-65%, mildly increased left atrium volume, mildly dilated right atrial cavity, mild AS with MORAIMA 1.8cm^2 and mean gradient 19mmHg, RVSP 35-45mmHg  - Cardiology consulted and following, have started on anti-arrhythmic agent  - Continue home Aspirin 81, Statin  - Holding home beta blocker  - Holding home Hydrochlorothiazide, resume as indicated   - Continue new Flecainide, trending QTc on EKGs, today 450's  - Will need outpatient EP referral at time of discharge  - Continue to monitor on telemetry, strict I/O's, trend heart rate and blood pressure     #Insulin Dependent Diabetes Mellitus Type II, uncontrolled, unknown complications  - Hgb A1c = 7.8%  - Holding home oral agents, continue FSBG and SSI, resume long acting as indicated.    #Chronic Kidney Disease Stage II  - Trend Cr and urine output, avoid nephrotoxins, NSAIDs, dehydration and contrast as able.     #Hypothyroid  - Continue home Levothyroxine    #Obstructive Sleep Apnea   - Continue home CPAP nightly     #History Cerebrovascular Accident   - Continue home Aspirin 81, Statin, Supportive Care    #History Breast cancer status post lumpectomy complicated by abscess  - Continue home Anastrozole, Supportive Care    #Anxiety/Depression  - Continue home regimen    #Morbid Obesity by BMI  - BMI 47, complicates all aspects of care    F: Oral  E: Monitor & Replace PRN  N: Diet: Cardiac Diets, Diabetic Diets; Healthy Heart (2-3 Na+); Consistent Carbohydrate; Texture: Regular Texture (IDDSI 7); Fluid Consistency: Thin (IDDSI 0)  PPx: SQH  Code Status (Patient has no pulse and is not breathing): CPR (Attempt to Resuscitate)  Medical Interventions (Patient has pulse or  is breathing): Full Support     Dispo: Pending clinical improvement and definitive plan for persisting bradycardia, anticipate home though have ordered PT/OT evaluations     *This patient is considered high risk secondary to recurrent SVT, tachycardia-bradycardia concern for Sick Sinus Syndrome, chest pain requiring further cardiac workup and imaging, monitoring for drug toxicities on new anti-arrhythmic agent.    Edited by: Michel Lizama MD at 12/21/2022 3638  [facility]

## 2022-12-21 NOTE — CASE MANAGEMENT/SOCIAL WORK
Discharge Planning Assessment   Krishna     Patient Name: Ronna Wayne  MRN: [id number]  Today's Date: 12/21/2022    Admit Date: 12/17/2022       Discharge Plan     Row Name 12/21/22 8562       Plan    Plan Pt lives with her spouse and plans to return home at discharge. Pt does not utilize home health services. Pt has a straight cane via unknown provider. PT evaluated and recommended home with family support. SS to follow.              JENN RivasW

## 2022-12-22 ENCOUNTER — CLINICAL SUPPORT (OUTPATIENT)
Dept: CARDIOLOGY | Facility: CLINIC | Age: 67
End: 2022-12-22
Payer: MEDICARE

## 2022-12-22 ENCOUNTER — READMISSION MANAGEMENT (OUTPATIENT)
Dept: CALL CENTER | Facility: HOSPITAL | Age: 67
End: 2022-12-22

## 2022-12-22 ENCOUNTER — TELEPHONE (OUTPATIENT)
Dept: FAMILY MEDICINE CLINIC | Facility: CLINIC | Age: 67
End: 2022-12-22

## 2022-12-22 VITALS
OXYGEN SATURATION: 95 % | RESPIRATION RATE: 18 BRPM | SYSTOLIC BLOOD PRESSURE: 174 MMHG | HEIGHT: 63 IN | TEMPERATURE: 98.1 F | WEIGHT: 273.1 LBS | BODY MASS INDEX: 48.39 KG/M2 | DIASTOLIC BLOOD PRESSURE: 100 MMHG | HEART RATE: 68 BPM

## 2022-12-22 LAB
BACTERIA SPEC AEROBE CULT: NORMAL
BACTERIA SPEC AEROBE CULT: NORMAL
GLUCOSE BLDC GLUCOMTR-MCNC: 144 MG/DL (ref 70–130)

## 2022-12-22 PROCEDURE — 93010 ELECTROCARDIOGRAM REPORT: CPT | Performed by: INTERNAL MEDICINE

## 2022-12-22 PROCEDURE — 82962 GLUCOSE BLOOD TEST: CPT

## 2022-12-22 PROCEDURE — 94799 UNLISTED PULMONARY SVC/PX: CPT

## 2022-12-22 PROCEDURE — 25010000002 HEPARIN (PORCINE) PER 1000 UNITS: Performed by: INTERNAL MEDICINE

## 2022-12-22 PROCEDURE — 93005 ELECTROCARDIOGRAM TRACING: CPT | Performed by: INTERNAL MEDICINE

## 2022-12-22 PROCEDURE — 94660 CPAP INITIATION&MGMT: CPT

## 2022-12-22 PROCEDURE — 99239 HOSP IP/OBS DSCHRG MGMT >30: CPT | Performed by: INTERNAL MEDICINE

## 2022-12-22 PROCEDURE — 94761 N-INVAS EAR/PLS OXIMETRY MLT: CPT

## 2022-12-22 PROCEDURE — 97116 GAIT TRAINING THERAPY: CPT

## 2022-12-22 RX ORDER — FLECAINIDE ACETATE 50 MG/1
50 TABLET ORAL EVERY 12 HOURS SCHEDULED
Qty: 60 TABLET | Refills: 0 | Status: SHIPPED | OUTPATIENT
Start: 2022-12-22 | End: 2023-01-04

## 2022-12-22 RX ORDER — INSULIN ASPART 100 [IU]/ML
0-9 INJECTION, SOLUTION INTRAVENOUS; SUBCUTANEOUS
Qty: 10.8 ML | Refills: 0 | Status: SHIPPED | OUTPATIENT
Start: 2022-12-22 | End: 2022-12-22

## 2022-12-22 RX ORDER — INSULIN ASPART 100 [IU]/ML
0-9 INJECTION, SOLUTION INTRAVENOUS; SUBCUTANEOUS
Qty: 15 ML | Refills: 0 | Status: SHIPPED | OUTPATIENT
Start: 2022-12-22

## 2022-12-22 RX ADMIN — FLUOXETINE HYDROCHLORIDE 20 MG: 20 CAPSULE ORAL at 08:40

## 2022-12-22 RX ADMIN — ROSUVASTATIN CALCIUM 20 MG: 20 TABLET, FILM COATED ORAL at 08:40

## 2022-12-22 RX ADMIN — HEPARIN SODIUM 5000 UNITS: 5000 INJECTION INTRAVENOUS; SUBCUTANEOUS at 05:42

## 2022-12-22 RX ADMIN — CHOLECALCIFEROL TAB 10 MCG (400 UNIT) 1000 UNITS: 10 TAB at 08:40

## 2022-12-22 RX ADMIN — ASPIRIN 81 MG: 81 TABLET, COATED ORAL at 08:40

## 2022-12-22 RX ADMIN — ANASTROZOLE 1 MG: 1 TABLET ORAL at 08:40

## 2022-12-22 RX ADMIN — LEVOTHYROXINE SODIUM 88 MCG: 88 TABLET ORAL at 08:40

## 2022-12-22 RX ADMIN — FLECAINIDE ACETATE 50 MG: 50 TABLET ORAL at 08:40

## 2022-12-22 NOTE — DISCHARGE SUMMARY
[facility] HOSPITALISTS DISCHARGE SUMMARY    Patient Identification:  Name:  Ronna Wayne  Age:  67 y.o.  Sex:  female  :  1955  MRN:  [id number]  Visit Number:  [id number]    Date of Admission: 2022  Date of Discharge:  2022     PCP: Mague Acosta APRN    DISCHARGE DIAGNOSIS  Recurrent/Paroxysmal SVT - Improved   Tachycardia-Bradycardia with possible Sick Sinus Syndrome - Stable   Brief episode Paroxysmal Atrial Fibrillation with junctional escape - Resolved   Mild Aortic Stenosis  Mild Pulmonary Hypertension   Hypertension  Dyslipidemia  Insulin Dependent Diabetes Mellitus Type II, uncontrolled, unknown complications  Chronic Kidney Disease Stage II  Hypothyroid  Obstructive Sleep Apnea   History Cerebrovascular Accident   History Breast cancer status post lumpectomy complicated by abscess  Anxiety/Depression  Morbid Obesity by BMI    CONSULTS   Consults     Date and Time Order Name Status Description    2022  1:50 AM Inpatient Cardiology Consult          PROCEDURES PERFORMED  None    HOSPITAL COURSE  67F Morbid Obesity by BMI PMH Hypertension, Diabetes Mellitus type II, Breast cancer status post lumpectomy with abscess (), Hypothyroid, Cerebrovascular Accident, Obstructive Sleep Apnea on home CPAP, PSVT, presented [facility] emergency room  with complaint of intermittent tachycardia, bradycardia, palpitations and chest heaviness.    #Recurrent/Paroxysmal SVT  #Tachycardia-Bradycardia with possible Sick Sinus Syndrome   #Brief episode Paroxysmal Atrial Fibrillation with junctional escape  #Mild Aortic Stenosis  #Mild Pulmonary Hypertension   #Hypertension/Dyslipidemia  This admission patient has alternated between tachycardia and bradycardia at baseline.  Echocardiogram showed LVEF 61-65%, mildly increased left atrium volume, mildly dilated right atrial cavity, mild AS with MORAIMA 1.8cm^2 and mean gradient 19mmHg, RVSP 35-45mmHg.   Cardiology consulted and followed.  Patient was started on new anti-arrhythmic agent with flecainide and has had normal Qtc on serial measurements.  Heart rate has been stable in upper 50's-low 60's now.  She reports she feels at her baseline.  Cardiology has deferred starting on anticoagulation.  They have not recommended pacemaker placement at this time.  They have recommended event monitor for which she will  in their clinic today.  She will follow up with Cardiology 2 weeks.  They plan to refer her for EP study at a later date given current stable heart rate.  Have held home beta blocker and Hydrochlorothiazide at time of discharge.  Blood pressure has been mildly elevated though will follow up with PCP Monday-Tuesday for blood pressure check.  Continued home Aspirin 81 and statin.      #Insulin Dependent Diabetes Mellitus Type II, uncontrolled, unknown complications  Hgb A1c = 7.8%.  Held home oral agents and Levemir upon admission.  Glc has been stable 160-200 for the most part.  I am hesitant to resume home Levemir 80U nightly given her overall controlled glucose here.  Will send home on resumed oral agent and SSI coverage at home.  Follow up PCP on Monday-Tuesday to consider resuming long acting insulin at that time.    #Chronic Kidney Disease Stage II  Trended Cr and urine output, avoided nephrotoxins, NSAIDs, dehydration and contrast as able.     #Hypothyroid  Continued home Levothyroxine    #Obstructive Sleep Apnea   Continued home CPAP nightly     #History Cerebrovascular Accident   Continued home Aspirin 81, Statin, Supportive Care    #History Breast cancer status post lumpectomy complicated by abscess  Continued home Anastrozole, Supportive Care    #Anxiety/Depression  Continued home regimen    #Morbid Obesity by BMI  BMI 47, complicates all aspects of care    Edited by: Michel Lizama MD at 12/22/2022 0659    VITAL SIGNS:  Temp:  [97.8 °F (36.6 °C)-98.4 °F (36.9 °C)] 97.8 °F (36.6 °C)  Heart  Rate:  [52-79] 52  Resp:  [12-20] 16  BP: (107-169)/() 169/93  SpO2:  [90 %-99 %] 96 %  on   ;   Device (Oxygen Therapy): room air    Body mass index is 48.38 kg/m².  Wt Readings from Last 3 Encounters:   12/22/22 124 kg (273 lb 1.6 oz)   12/17/22 128 kg (282 lb)   10/28/22 125 kg (275 lb 3.2 oz)     PHYSICAL EXAM:  Constitutional:  Well-developed and well-nourished.  No acute distress.      HENT:  Head:  Normocephalic and atraumatic.  Mouth:  Moist mucous membranes.    Eyes:  Conjunctivae and EOM are normal. No scleral icterus.    Neck:  Neck supple.  No JVD present.    Cardiovascular:  regular rate, regular rhythm and normal heart sounds with no murmur.  Pulmonary/Chest:  No respiratory distress, no wheezes, no crackles, on room air  Abdominal:  Soft. No distension and no tenderness.   Musculoskeletal:  No tenderness, and no deformity.  Neurological:  Alert and oriented to person, place, and time.  No gross focal deficits   Skin:  Skin is warm and dry. No rash noted. No pallor.   Peripheral vascular:  No clubbing, no cyanosis, no edema.  Psychiatric: Appropriate mood and affect  Edited by: Michel Lizama MD at 12/22/2022 0659    DISCHARGE DISPOSITION   Stable    DISCHARGE MEDICATIONS:     Discharge Medications      New Medications      Instructions Start Date   flecainide 50 MG tablet  Commonly known as: TAMBOCOR   50 mg, Oral, Every 12 Hours Scheduled      Insulin Aspart 100 UNIT/ML injection  Commonly known as: novoLOG   0-9 Units, Subcutaneous, 4 Times Daily Before Meals & Nightly         Continue These Medications      Instructions Start Date   anastrozole 1 MG tablet  Commonly known as: ARIMIDEX   1 mg, Oral, Daily      aspirin 81 MG EC tablet   81 mg, Oral, Daily      cholecalciferol 25 MCG (1000 UT) tablet  Commonly known as: VITAMIN D3   1,000 Units, Oral, Daily      empagliflozin 25 MG tablet tablet  Commonly known as: Jardiance   25 mg, Oral, Every Morning      FLUoxetine 20 MG capsule  Commonly  known as: PROzac   TAKE ONE CAPSULE BY MOUTH DAILY      levothyroxine 88 MCG tablet  Commonly known as: SYNTHROID, LEVOTHROID   88 mcg, Oral, Daily      rosuvastatin 20 MG tablet  Commonly known as: CRESTOR   20 mg, Oral, Daily         Stop These Medications    hydroCHLOROthiazide 25 MG tablet  Commonly known as: HYDRODIURIL     metoprolol succinate XL 50 MG 24 hr tablet  Commonly known as: TOPROL-XL     Toujeo SoloStar 300 UNIT/ML solution pen-injector injection  Generic drug: Insulin Glargine (1 Unit Dial)            Additional Instructions for the Follow-ups that You Need to Schedule     Discharge Follow-up with PCP   As directed       Currently Documented PCP:    Mague Acosta APRN    PCP Phone Number:    [phone]     Follow Up Details: Monday-Tuesday, needs basic labs, BP check, glc check, resume long acting insulin?         Discharge Follow-up with Specified Provider: Marty 2 weeks, needs to  event monitor today in clinic, cards to refer to EP when appropriate   As directed      To: Cards 2 weeks, needs to  event monitor today in clinic, cards to refer to EP when appropriate            Follow-up Information     Mague Acosta APRN .    Specialty: Family Medicine  Why: Monday-Tuesday, needs basic labs, BP check, glc check, resume long acting insulin?  Contact information:  [address] [place] [id number] [phone]                        TEST  RESULTS PENDING AT DISCHARGE  None     CODE STATUS  Code Status and Medical Interventions:   Ordered at: 12/17/22 2219     Code Status (Patient has no pulse and is not breathing):    CPR (Attempt to Resuscitate)     Medical Interventions (Patient has pulse or is breathing):    Full Support     Michel Lizama MD  [facility]  12/22/22  06:59 EST    Please note that this discharge summary required more than 30 minutes to complete.

## 2022-12-22 NOTE — TELEPHONE ENCOUNTER
Hub staff attempted to follow warm transfer process and was unsuccessful     Caller: MITZI MORRIS    Relationship to patient:     Best call back number: [phone]    Patient is needing: ALEXANDRA IS CALLING TO MAKE THE PATIENT A HOSPITAL FU.  THERE WERE NO APPOINTMENTS AVAILABLE.

## 2022-12-22 NOTE — CONSULTS
Diabetes Education    Patient Name:  Ronna Wayne  YOB: 1955  MRN: [id number]  Admit Date:  12/17/2022        A1C 7.8 Patient did not wear a mask. Patient was seen on the 12/19/22 and received ADA and AADE7 handouts along with insulin injection sheet and had no other questions ort concerns. If any questions please call. Thank you.       Electronically signed by:  Marta Alvarado RN  12/22/22 09:23 EST

## 2022-12-22 NOTE — PLAN OF CARE
Goal Outcome Evaluation:  Plan of Care Reviewed With: patient        Progress: improving  Outcome Evaluation: Patient A&Ox4. Patient has been bradycardic this shift; otherwise, VSS at this time. Patient remains on room air and is tolerating well. Patient ambulated to the bathroom and around the room with standy-by assistance this shift and tolerated well. Patient verbalized headache this shift, PRN tylenol administered. Patient is eager to be discharged home. Patient is resting in bed with no further requests at this time. Bed in lowest position, wheels locked, upper side rails raised, call light and personal items within reach. Will continue to follow plan of care 7p to 7a.

## 2022-12-22 NOTE — OUTREACH NOTE
Prep Survey    Flowsheet Row Responses   [facility] patient discharged from? [place]   Is LACE score < 7 ? No   Eligibility [facility]   Date of Admission 12/17/22   Date of Discharge 12/15/22   Discharge diagnosis Paroxysmal SVT (supraventricular tachycardia)    Does the patient have one of the following disease processes/diagnoses(primary or secondary)? Other   Does the patient have Home health ordered? No   Is there a DME ordered? No   Prep survey completed? Yes          CARLOS HOLLEY - Registered Nurse

## 2022-12-22 NOTE — CASE MANAGEMENT/SOCIAL WORK
Discharge Planning Assessment   Krishna     Patient Name: Ronna Wayne  MRN: [id number]  Today's Date: 12/22/2022    Admit Date: 12/17/2022    Plan: Pt lives with her spouse and plans to return home at discharge. Pt does not utilize home health services. Pt has a straight cane via unknown provider. PT evaluated and recommended home with family support. SS to follow.   Discharge Needs Assessment    No documentation.                Discharge Plan     Row Name 12/22/22 0850       Plan    Final Discharge Disposition Code 01 - home or self-care    Final Note Pt is being discharged home on this date. No other needs identified.                  JENN RivasW

## 2022-12-22 NOTE — THERAPY DISCHARGE NOTE
Acute Care - Physical Therapy Treatment Note/Discharge  MITZI Keller     Patient Name: Ronna Wayne  : 1955  MRN: [id number]  Today's Date: 2022   Onset of Illness/Injury or Date of Surgery: 22     Referring Physician: Alda      Admit Date: 2022    Visit Dx:    ICD-10-CM ICD-9-CM   1. SVT (supraventricular tachycardia) (HCC)  I47.1 427.89     Patient Active Problem List   Diagnosis   • Frequent headaches   • Memory loss   • Essential hypertension   • Dyslipidemia   • Type 2 diabetes mellitus with hyperglycemia, with long-term current use of insulin (HCC)   • Obstructive sleep apnea syndrome   • Glaucoma   • Anxiety   • GERD (gastroesophageal reflux disease)   • Morbid obesity   • Weakness of left arm   • Cerebrovascular accident (CVA) due to embolism (HCC)   • SVT (supraventricular tachycardia) (HCC)   • Vitamin D deficiency   • B12 deficiency   • Malignant neoplasm of upper-outer quadrant of right breast in female, estrogen receptor positive (HCC)   • Other specified hypothyroidism   • Personal history of colonic polyps   • Depression   • Paroxysmal SVT (supraventricular tachycardia) (HCC)     Past Medical History:   Diagnosis Date   • Anxiety    • B12 deficiency    • BPV (benign positional vertigo)    • Breast cancer (HCC) 2021   • Diabetes mellitus (HCC)    • Diarrhea    • Disease of thyroid gland    • Elevated cholesterol    • Fibromyalgia    • GERD (gastroesophageal reflux disease)    • Glaucoma    • Heart murmur    • Hyperlipidemia    • Hypertension    • Kidney disease    • Obesity    • Peptic ulceration    • PONV (postoperative nausea and vomiting)    • Sleep apnea     c-pap   • Stroke (HCC)    • Weakness of left arm      Past Surgical History:   Procedure Laterality Date   • ABDOMINAL SURGERY     • APPENDECTOMY     • BREAST ABSCESS INCISION AND DRAINAGE Right 2022    Procedure: BREAST ABSCESS INCISION AND DRAINAGE;  Surgeon: Lee Parrish MD;  Location:  [facility]  OR;  Service: General;  Laterality: Right;   • BREAST CYST ASPIRATION     • BREAST LUMPECTOMY WITH SENTINEL NODE BIOPSY Right 8/5/2021    Procedure: BREAST LUMPECTOMY WITH SENTINEL NODE BIOPSY;  Surgeon: Lee Parrish MD;  Location: [facility] OR;  Service: General;  Laterality: Right;   • CATARACT EXTRACTION Bilateral    • CHOLECYSTECTOMY     • COLONOSCOPY     • ENDOSCOPY     • HEMATOMA EVACUATION TRUNK Right 8/5/2021    Procedure: HEMATOMA EVACUATION TRUNK;  Surgeon: Lee Parrish MD;  Location: [facility] OR;  Service: General;  Laterality: Right;   • URETHRA SURGERY         PT Assessment (last 12 hours)     PT Evaluation and Treatment     Row Name 12/22/22 0845          Physical Therapy Time and Intention    Subjective Information no complaints  -CS     Document Type therapy note (daily note)  -CS     Mode of Treatment physical therapy  -CS     Patient Effort adequate  -CS     Comment Pt seen bedside this AM. Pt anticipates d/c home this day.  -CS     Row Name 12/22/22 0845          General Information    Patient Profile Reviewed yes  -CS     Existing Precautions/Restrictions no known precautions/restrictions  -CS     Row Name 12/22/22 0845          Living Environment    Primary Care Provided by self  -CS     Row Name 12/22/22 0845          Home Use of Assistive/Adaptive Equipment    Equipment Currently Used at Home cane, straight  -CS     Row Name 12/22/22 0845          Pain    Pretreatment Pain Rating 0/10 - no pain  -CS     Posttreatment Pain Rating 0/10 - no pain  -CS     Row Name 12/22/22 0845          Cognition    Orientation Status (Cognition) oriented x 4  -CS     Row Name 12/22/22 0845          Range of Motion Comprehensive    General Range of Motion bilateral upper extremity ROM WFL  -CS     Row Name 12/22/22 0845          Vision Assessment/Intervention    Visual Impairment/Limitations WFL;corrective lenses for reading  -CS     Row Name 12/22/22 0845          Bed Mobility    Bed Mobility bed  mobility (all) activities  -CS     All Activities, Haakon (Bed Mobility) standby assist  -CS     Assistive Device (Bed Mobility) bed rails;head of bed elevated  -CS     Row Name 12/22/22 0845          Transfers    Transfers sit-stand transfer;stand-sit transfer  -CS     Row Name 12/22/22 0845          Sit-Stand Transfer    Sit-Stand Haakon (Transfers) contact guard;standby assist  -CS     Row Name 12/22/22 0845          Stand-Sit Transfer    Stand-Sit Haakon (Transfers) contact guard;standby assist  -CS     Row Name 12/22/22 0845          Gait/Stairs (Locomotion)    Gait/Stairs Locomotion gait/ambulation independence  -CS     Haakon Level (Gait) independent  -CS     Distance in Feet (Gait) ad yani in room  -CS     Pattern (Gait) step-through  -CS     Comment, (Gait/Stairs) Pt denies any further PT needs at this time and looking forward to going home.  -CS     Row Name 12/22/22 0845          Safety Issues, Functional Mobility    Impairments Affecting Function (Mobility) endurance/activity tolerance;strength  -CS     Row Name 12/22/22 0845          Respiratory WDL    Respiratory WDL WDL  -CS     Rhythm/Pattern, Respiratory no shortness of breath reported;pattern regular;depth regular;unlabored  -CS     Row Name 12/22/22 0845          Breath Sounds    Breath Sounds All Fields  -CS     All Lung Fields Breath Sounds Anterior:;Lateral:;clear;equal bilaterally  -CS     Row Name 12/22/22 0845          Skin WDL    Skin WDL X;color  -CS     Skin Color/Characteristics maroon/purple;other (see comments)  generalized bruising noted throughout  -CS     Skin Temperature warm  -CS     Skin Moisture dry;flaky  -CS     Skin Elasticity quick return to original state  -CS     Skin Integrity bruised (ecchymotic);other (see comments)  blanchable redness between eyes above bridge of nose  -CS     Row Name 12/22/22 0845          Wound 12/18/22 0200 Left upper arm Extravasation    Wound - Properties Group Placement  Date: 12/18/22 -AM Placement Time: 0200  -AM Present on Hospital Admission: N  -AM Side: Left  -AM Orientation: upper  -AM Location: arm  -AM Primary Wound Type: Extravasatio  -AM    Base maroon/purple  -CS     Periwound Temperature warm  -CS     Drainage Amount none  -CS     Retired Wound - Properties Group Placement Date: 12/18/22 -AM Placement Time: 0200  -AM Present on Hospital Admission: N  -AM Side: Left  -AM Orientation: upper  -AM Location: arm  -AM Primary Wound Type: Extravasatio  -AM    Retired Wound - Properties Group Date first assessed: 12/18/22 -AM Time first assessed: 0200  -AM Present on Hospital Admission: N  -AM Side: Left  -AM Location: arm  -AM Primary Wound Type: Extravasatio  -AM    Row Name 12/22/22 0845          Coping    Observed Emotional State calm;cooperative  -CS     Verbalized Emotional State acceptance  -CS     Family/Support Persons family  -CS     Involvement in Care not present at bedside  -CS     Row Name 12/22/22 0845          Plan of Care Review    Plan of Care Reviewed With patient  -CS     Progress improving  -CS     Outcome Evaluation Pt at or near her baseline for mobility and is safe for d/c from a PT standpoint.  -CS     Row Name 12/22/22 0845          Positioning and Restraints    Pre-Treatment Position in bed  -CS     Post Treatment Position bed  -CS     In Bed call light within reach;encouraged to call for assist;with family/caregiver  -CS     Row Name 12/22/22 0845          Therapy Assessment/Plan (PT)    Rehab Potential (PT) good, to achieve stated therapy goals  -CS     Criteria for Skilled Interventions Met (PT) yes;meets criteria;skilled treatment is necessary  -CS     Therapy Frequency (PT) --  2-5x/week  -CS     Problem List (PT) problems related to;balance;mobility  -CS     Activity Limitations Related to Problem List (PT) unable to ambulate safely  -CS     Row Name 12/22/22 0845          Physical Therapy Goals    Gait Training Goal Selection (PT) gait  training, PT goal 1  -CS     Row Name 12/22/22 0845          Gait Training Goal 1 (PT)    Activity/Assistive Device (Gait Training Goal 1, PT) gait (walking locomotion)  -CS     Cole Level (Gait Training Goal 1, PT) standby assist  -CS     Distance (Gait Training Goal 1, PT) 150'  -CS     Time Frame (Gait Training Goal 1, PT) long term goal (LTG);by discharge  -CS     Progress/Outcome (Gait Training Goal 1, PT) goal partially met  -CS     Row Name 12/22/22 0845          Discharge Summary (PT)    Additional Documentation --  D/C acute PT.  -CS           User Key  (r) = Recorded By, (t) = Taken By, (c) = Cosigned By    Initials Name Provider Type    AM Essence Somers, RN Registered Nurse    Gordon Menezes, PT Physical Therapist                      PT Recommendation and Plan  Planned Therapy Interventions (PT): balance training, bed mobility training, gait training, home exercise program, neuromuscular re-education, patient/family education, transfer training  Therapy Frequency (PT):  (2-5x/week)  Plan of Care Reviewed With: patient  Progress: improving  Outcome Evaluation: Pt at or near her baseline for mobility and is safe for d/c from a PT standpoint.         Time Calculation:    PT Charges     Row Name 12/22/22 1127             Time Calculation    Start Time 0845  -CS      PT Received On 12/22/22  -         Timed Charges    84260 - Gait Training Minutes  23  -CS         Total Minutes    Timed Charges Total Minutes 23  -CS       Total Minutes 23  -CS            User Key  (r) = Recorded By, (t) = Taken By, (c) = Cosigned By    Initials Name Provider Type     Gordon Beltran, PT Physical Therapist              Therapy Charges for Today     Code Description Service Date Service Provider Modifiers Qty    44705948063  PT EVAL MOD COMPLEXITY 4 12/21/2022 Gordon Beltran, PT GP 1    [id number]  GAIT TRAINING EA 15 MIN 12/22/2022 Gordon Beltran, PT GP 2          PT G-Codes  AM-PAC 6 Clicks Score (PT): 24          Gordon Beltran, PT  12/22/2022

## 2022-12-23 ENCOUNTER — TRANSITIONAL CARE MANAGEMENT TELEPHONE ENCOUNTER (OUTPATIENT)
Dept: CALL CENTER | Facility: HOSPITAL | Age: 67
End: 2022-12-23

## 2022-12-23 NOTE — OUTREACH NOTE
Call Center TCM Note    Flowsheet Row Responses   [facility] patient discharged from? [facility]   Does the patient have one of the following disease processes/diagnoses(primary or secondary)? Other   TCM attempt successful? Yes   Call start time 1544   Call end time 1545   Discharge diagnosis Paroxysmal SVT (supraventricular tachycardia)    Person spoke with today (if not patient) and relationship patient   Meds reviewed with patient/caregiver? Yes   Is the patient having any side effects they believe may be caused by any medication additions or changes? No   Does the patient have all medications ordered at discharge? Yes   Is the patient taking all medications as directed (includes completed medication regime)? Yes   Comments Has a followup scheduled on 12/28/2022 with PCP office.    Does the patient have an appointment with their PCP within 7 days of discharge? Yes   Has home health visited the patient within 72 hours of discharge? N/A   Psychosocial issues? No   Did the patient receive a copy of their discharge instructions? Yes   Nursing interventions Reviewed instructions with patient   What is the patient's perception of their health status since discharge? Improving   Is the patient/caregiver able to teach back signs and symptoms related to disease process for when to call PCP? Yes   Is the patient/caregiver able to teach back signs and symptoms related to disease process for when to call 911? Yes   Is the patient/caregiver able to teach back the hierarchy of who to call/visit for symptoms/problems? PCP, Specialist, Home health nurse, Urgent Care, ED, 911 Yes   If the patient is a current smoker, are they able to teach back resources for cessation? Not a smoker   TCM call completed? Yes   Wrap up additional comments Doing well. No questions or concerns.    Call end time 1545   Would this patient benefit from a Referral to [facility] Social Work? No   Is the patient interested in additional calls from an  ambulatory ?  NOTE:  applies to high risk patients requiring additional follow-up. No          Brady Maldonado RN    12/23/2022, 15:46 EST

## 2022-12-24 ENCOUNTER — HOSPITAL ENCOUNTER (INPATIENT)
Facility: HOSPITAL | Age: 67
LOS: 5 days | Discharge: HOME | DRG: 308 | End: 2022-12-29
Attending: EMERGENCY MEDICINE | Admitting: HOSPITALIST
Payer: MEDICARE

## 2022-12-24 ENCOUNTER — APPOINTMENT (OUTPATIENT)
Dept: GENERAL RADIOLOGY | Facility: HOSPITAL | Age: 67
DRG: 308 | End: 2022-12-24
Payer: MEDICARE

## 2022-12-24 DIAGNOSIS — E11.65 TYPE 2 DIABETES MELLITUS WITH HYPERGLYCEMIA, WITH LONG-TERM CURRENT USE OF INSULIN: ICD-10-CM

## 2022-12-24 DIAGNOSIS — Z79.4 TYPE 2 DIABETES MELLITUS WITH HYPERGLYCEMIA, WITH LONG-TERM CURRENT USE OF INSULIN: ICD-10-CM

## 2022-12-24 DIAGNOSIS — E83.42 HYPOMAGNESEMIA: ICD-10-CM

## 2022-12-24 DIAGNOSIS — I47.1 SUPRAVENTRICULAR TACHYCARDIA: Primary | ICD-10-CM

## 2022-12-24 PROBLEM — I47.10 SUPRAVENTRICULAR TACHYCARDIA: Status: ACTIVE | Noted: 2022-12-24

## 2022-12-24 LAB
ALBUMIN SERPL-MCNC: 3.02 G/DL (ref 3.5–5.2)
ALBUMIN/GLOB SERPL: 0.8 G/DL
ALP SERPL-CCNC: 80 U/L (ref 39–117)
ALT SERPL W P-5'-P-CCNC: 28 U/L (ref 1–33)
ANION GAP SERPL CALCULATED.3IONS-SCNC: 12 MMOL/L (ref 5–15)
AST SERPL-CCNC: 37 U/L (ref 1–32)
BASOPHILS # BLD AUTO: 0.07 10*3/MM3 (ref 0–0.2)
BASOPHILS NFR BLD AUTO: 1 % (ref 0–1.5)
BILIRUB SERPL-MCNC: 0.6 MG/DL (ref 0–1.2)
BILIRUB UR QL STRIP: NEGATIVE
BUN SERPL-MCNC: 16 MG/DL (ref 8–23)
BUN/CREAT SERPL: 18.4 (ref 7–25)
CALCIUM SPEC-SCNC: 8.8 MG/DL (ref 8.6–10.5)
CHLORIDE SERPL-SCNC: 105 MMOL/L (ref 98–107)
CLARITY UR: CLEAR
CO2 SERPL-SCNC: 20 MMOL/L (ref 22–29)
COLOR UR: YELLOW
CREAT SERPL-MCNC: 0.87 MG/DL (ref 0.57–1)
DEPRECATED RDW RBC AUTO: 49.8 FL (ref 37–54)
EGFRCR SERPLBLD CKD-EPI 2021: 73.1 ML/MIN/1.73
EOSINOPHIL # BLD AUTO: 0.06 10*3/MM3 (ref 0–0.4)
EOSINOPHIL NFR BLD AUTO: 0.9 % (ref 0.3–6.2)
ERYTHROCYTE [DISTWIDTH] IN BLOOD BY AUTOMATED COUNT: 13.5 % (ref 12.3–15.4)
FLUAV RNA RESP QL NAA+PROBE: NOT DETECTED
FLUBV RNA RESP QL NAA+PROBE: NOT DETECTED
GLOBULIN UR ELPH-MCNC: 3.7 GM/DL
GLUCOSE BLDC GLUCOMTR-MCNC: 155 MG/DL (ref 70–130)
GLUCOSE SERPL-MCNC: 194 MG/DL (ref 65–99)
GLUCOSE UR STRIP-MCNC: ABNORMAL MG/DL
HCT VFR BLD AUTO: 46.3 % (ref 34–46.6)
HGB BLD-MCNC: 16 G/DL (ref 12–15.9)
HGB UR QL STRIP.AUTO: NEGATIVE
IMM GRANULOCYTES # BLD AUTO: 0.01 10*3/MM3 (ref 0–0.05)
IMM GRANULOCYTES NFR BLD AUTO: 0.1 % (ref 0–0.5)
KETONES UR QL STRIP: NEGATIVE
LEUKOCYTE ESTERASE UR QL STRIP.AUTO: NEGATIVE
LYMPHOCYTES # BLD AUTO: 2.68 10*3/MM3 (ref 0.7–3.1)
LYMPHOCYTES NFR BLD AUTO: 38.5 % (ref 19.6–45.3)
MAGNESIUM SERPL-MCNC: 1.3 MG/DL (ref 1.6–2.4)
MCH RBC QN AUTO: 34.8 PG (ref 26.6–33)
MCHC RBC AUTO-ENTMCNC: 34.6 G/DL (ref 31.5–35.7)
MCV RBC AUTO: 100.7 FL (ref 79–97)
MONOCYTES # BLD AUTO: 0.8 10*3/MM3 (ref 0.1–0.9)
MONOCYTES NFR BLD AUTO: 11.5 % (ref 5–12)
NEUTROPHILS NFR BLD AUTO: 3.34 10*3/MM3 (ref 1.7–7)
NEUTROPHILS NFR BLD AUTO: 48 % (ref 42.7–76)
NITRITE UR QL STRIP: NEGATIVE
NRBC BLD AUTO-RTO: 0 /100 WBC (ref 0–0.2)
NT-PROBNP SERPL-MCNC: 6200 PG/ML (ref 0–900)
PH UR STRIP.AUTO: 6 [PH] (ref 5–8)
PLATELET # BLD AUTO: 133 10*3/MM3 (ref 140–450)
PMV BLD AUTO: 10.6 FL (ref 6–12)
POTASSIUM SERPL-SCNC: 4.7 MMOL/L (ref 3.5–5.2)
PROT SERPL-MCNC: 6.7 G/DL (ref 6–8.5)
PROT UR QL STRIP: NEGATIVE
QT INTERVAL: 498 MS
QTC INTERVAL: 454 MS
RBC # BLD AUTO: 4.6 10*6/MM3 (ref 3.77–5.28)
SARS-COV-2 RNA RESP QL NAA+PROBE: NOT DETECTED
SODIUM SERPL-SCNC: 137 MMOL/L (ref 136–145)
SP GR UR STRIP: 1.03 (ref 1–1.03)
TROPONIN T SERPL-MCNC: <0.01 NG/ML (ref 0–0.03)
TROPONIN T SERPL-MCNC: <0.01 NG/ML (ref 0–0.03)
UROBILINOGEN UR QL STRIP: ABNORMAL
WBC NRBC COR # BLD: 6.96 10*3/MM3 (ref 3.4–10.8)

## 2022-12-24 PROCEDURE — 80053 COMPREHEN METABOLIC PANEL: CPT | Performed by: EMERGENCY MEDICINE

## 2022-12-24 PROCEDURE — 81003 URINALYSIS AUTO W/O SCOPE: CPT | Performed by: EMERGENCY MEDICINE

## 2022-12-24 PROCEDURE — 25010000002 FUROSEMIDE PER 20 MG: Performed by: EMERGENCY MEDICINE

## 2022-12-24 PROCEDURE — 84484 ASSAY OF TROPONIN QUANT: CPT | Performed by: EMERGENCY MEDICINE

## 2022-12-24 PROCEDURE — 83880 ASSAY OF NATRIURETIC PEPTIDE: CPT | Performed by: EMERGENCY MEDICINE

## 2022-12-24 PROCEDURE — 93010 ELECTROCARDIOGRAM REPORT: CPT | Performed by: INTERNAL MEDICINE

## 2022-12-24 PROCEDURE — 85025 COMPLETE CBC W/AUTO DIFF WBC: CPT | Performed by: EMERGENCY MEDICINE

## 2022-12-24 PROCEDURE — 25010000002 MAGNESIUM SULFATE IN D5W 1G/100ML (PREMIX) 1-5 GM/100ML-% SOLUTION: Performed by: HOSPITALIST

## 2022-12-24 PROCEDURE — 82962 GLUCOSE BLOOD TEST: CPT

## 2022-12-24 PROCEDURE — 87636 SARSCOV2 & INF A&B AMP PRB: CPT | Performed by: EMERGENCY MEDICINE

## 2022-12-24 PROCEDURE — 99223 1ST HOSP IP/OBS HIGH 75: CPT | Performed by: HOSPITALIST

## 2022-12-24 PROCEDURE — 36415 COLL VENOUS BLD VENIPUNCTURE: CPT

## 2022-12-24 PROCEDURE — 83735 ASSAY OF MAGNESIUM: CPT | Performed by: EMERGENCY MEDICINE

## 2022-12-24 PROCEDURE — 93005 ELECTROCARDIOGRAM TRACING: CPT | Performed by: EMERGENCY MEDICINE

## 2022-12-24 PROCEDURE — 84484 ASSAY OF TROPONIN QUANT: CPT | Performed by: HOSPITALIST

## 2022-12-24 PROCEDURE — 99284 EMERGENCY DEPT VISIT MOD MDM: CPT

## 2022-12-24 PROCEDURE — 25010000002 HEPARIN (PORCINE) PER 1000 UNITS: Performed by: HOSPITALIST

## 2022-12-24 PROCEDURE — 71045 X-RAY EXAM CHEST 1 VIEW: CPT

## 2022-12-24 RX ORDER — SODIUM CHLORIDE 9 MG/ML
40 INJECTION, SOLUTION INTRAVENOUS AS NEEDED
Status: DISCONTINUED | OUTPATIENT
Start: 2022-12-24 | End: 2022-12-29 | Stop reason: HOSPADM

## 2022-12-24 RX ORDER — SODIUM CHLORIDE 0.9 % (FLUSH) 0.9 %
10 SYRINGE (ML) INJECTION AS NEEDED
Status: DISCONTINUED | OUTPATIENT
Start: 2022-12-24 | End: 2022-12-29 | Stop reason: HOSPADM

## 2022-12-24 RX ORDER — DEXTROSE MONOHYDRATE 25 G/50ML
25 INJECTION, SOLUTION INTRAVENOUS
Status: DISCONTINUED | OUTPATIENT
Start: 2022-12-24 | End: 2022-12-29 | Stop reason: HOSPADM

## 2022-12-24 RX ORDER — HEPARIN SODIUM 5000 [USP'U]/ML
5000 INJECTION, SOLUTION INTRAVENOUS; SUBCUTANEOUS EVERY 12 HOURS SCHEDULED
Status: DISCONTINUED | OUTPATIENT
Start: 2022-12-24 | End: 2022-12-29 | Stop reason: HOSPADM

## 2022-12-24 RX ORDER — METOPROLOL TARTRATE 5 MG/5ML
5 INJECTION INTRAVENOUS ONCE
Status: COMPLETED | OUTPATIENT
Start: 2022-12-24 | End: 2022-12-24

## 2022-12-24 RX ORDER — MAGNESIUM SULFATE 1 G/100ML
1 INJECTION INTRAVENOUS AS NEEDED
Status: DISCONTINUED | OUTPATIENT
Start: 2022-12-24 | End: 2022-12-29 | Stop reason: HOSPADM

## 2022-12-24 RX ORDER — NICOTINE POLACRILEX 4 MG
15 LOZENGE BUCCAL
Status: DISCONTINUED | OUTPATIENT
Start: 2022-12-24 | End: 2022-12-29 | Stop reason: HOSPADM

## 2022-12-24 RX ORDER — ROSUVASTATIN CALCIUM 20 MG/1
20 TABLET, COATED ORAL DAILY
Status: DISCONTINUED | OUTPATIENT
Start: 2022-12-25 | End: 2022-12-29 | Stop reason: HOSPADM

## 2022-12-24 RX ORDER — INSULIN ASPART 100 [IU]/ML
0-9 INJECTION, SOLUTION INTRAVENOUS; SUBCUTANEOUS
Status: DISCONTINUED | OUTPATIENT
Start: 2022-12-25 | End: 2022-12-29 | Stop reason: HOSPADM

## 2022-12-24 RX ORDER — FLECAINIDE ACETATE 50 MG/1
50 TABLET ORAL EVERY 12 HOURS SCHEDULED
Status: DISCONTINUED | OUTPATIENT
Start: 2022-12-24 | End: 2022-12-29 | Stop reason: HOSPADM

## 2022-12-24 RX ORDER — SODIUM CHLORIDE 0.9 % (FLUSH) 0.9 %
10 SYRINGE (ML) INJECTION EVERY 12 HOURS SCHEDULED
Status: DISCONTINUED | OUTPATIENT
Start: 2022-12-24 | End: 2022-12-29 | Stop reason: HOSPADM

## 2022-12-24 RX ORDER — MAGNESIUM SULFATE HEPTAHYDRATE 40 MG/ML
4 INJECTION, SOLUTION INTRAVENOUS AS NEEDED
Status: DISCONTINUED | OUTPATIENT
Start: 2022-12-24 | End: 2022-12-29 | Stop reason: HOSPADM

## 2022-12-24 RX ORDER — INSULIN ASPART 100 [IU]/ML
0-9 INJECTION, SOLUTION INTRAVENOUS; SUBCUTANEOUS
Status: DISCONTINUED | OUTPATIENT
Start: 2022-12-24 | End: 2022-12-24

## 2022-12-24 RX ORDER — FLECAINIDE ACETATE 50 MG/1
50 TABLET ORAL EVERY 12 HOURS SCHEDULED
Status: CANCELLED | OUTPATIENT
Start: 2022-12-24

## 2022-12-24 RX ORDER — ASPIRIN 81 MG/1
81 TABLET ORAL DAILY
Status: DISCONTINUED | OUTPATIENT
Start: 2022-12-25 | End: 2022-12-29 | Stop reason: HOSPADM

## 2022-12-24 RX ORDER — MAGNESIUM SULFATE HEPTAHYDRATE 40 MG/ML
2 INJECTION, SOLUTION INTRAVENOUS AS NEEDED
Status: DISCONTINUED | OUTPATIENT
Start: 2022-12-24 | End: 2022-12-29 | Stop reason: HOSPADM

## 2022-12-24 RX ORDER — ANASTROZOLE 1 MG/1
1 TABLET ORAL DAILY
Status: CANCELLED | OUTPATIENT
Start: 2022-12-25

## 2022-12-24 RX ORDER — NITROGLYCERIN 0.4 MG/1
0.4 TABLET SUBLINGUAL
Status: DISCONTINUED | OUTPATIENT
Start: 2022-12-24 | End: 2022-12-29 | Stop reason: HOSPADM

## 2022-12-24 RX ORDER — OMEGA-3S/DHA/EPA/FISH OIL/D3 300MG-1000
1000 CAPSULE ORAL DAILY
Status: CANCELLED | OUTPATIENT
Start: 2022-12-25

## 2022-12-24 RX ORDER — MAGNESIUM SULFATE 1 G/100ML
1 INJECTION INTRAVENOUS
Status: COMPLETED | OUTPATIENT
Start: 2022-12-24 | End: 2022-12-25

## 2022-12-24 RX ORDER — METOPROLOL TARTRATE 5 MG/5ML
5 INJECTION INTRAVENOUS ONCE
Status: DISCONTINUED | OUTPATIENT
Start: 2022-12-24 | End: 2022-12-24

## 2022-12-24 RX ORDER — FLUOXETINE HYDROCHLORIDE 20 MG/1
20 CAPSULE ORAL DAILY
Status: CANCELLED | OUTPATIENT
Start: 2022-12-25

## 2022-12-24 RX ORDER — FUROSEMIDE 10 MG/ML
40 INJECTION INTRAMUSCULAR; INTRAVENOUS ONCE
Status: COMPLETED | OUTPATIENT
Start: 2022-12-24 | End: 2022-12-24

## 2022-12-24 RX ORDER — LEVOTHYROXINE SODIUM 88 UG/1
88 TABLET ORAL DAILY
Status: DISCONTINUED | OUTPATIENT
Start: 2022-12-25 | End: 2022-12-29 | Stop reason: HOSPADM

## 2022-12-24 RX ADMIN — METOPROLOL TARTRATE 5 MG: 1 INJECTION, SOLUTION INTRAVENOUS at 17:55

## 2022-12-24 RX ADMIN — Medication 10 ML: at 22:44

## 2022-12-24 RX ADMIN — MAGNESIUM SULFATE HEPTAHYDRATE 1 G: 1 INJECTION, SOLUTION INTRAVENOUS at 22:43

## 2022-12-24 RX ADMIN — METOPROLOL TARTRATE 25 MG: 25 TABLET, FILM COATED ORAL at 22:43

## 2022-12-24 RX ADMIN — METOPROLOL TARTRATE 5 MG: 1 INJECTION, SOLUTION INTRAVENOUS at 22:44

## 2022-12-24 RX ADMIN — FLECAINIDE ACETATE 50 MG: 50 TABLET ORAL at 22:43

## 2022-12-24 RX ADMIN — SODIUM CHLORIDE 500 ML: 9 INJECTION, SOLUTION INTRAVENOUS at 16:42

## 2022-12-24 RX ADMIN — METOPROLOL TARTRATE 5 MG: 5 INJECTION, SOLUTION INTRAVENOUS at 19:22

## 2022-12-24 RX ADMIN — FUROSEMIDE 40 MG: 10 INJECTION, SOLUTION INTRAMUSCULAR; INTRAVENOUS at 19:22

## 2022-12-24 RX ADMIN — HEPARIN SODIUM 5000 UNITS: 5000 INJECTION INTRAVENOUS; SUBCUTANEOUS at 22:44

## 2022-12-24 RX ADMIN — METOPROLOL TARTRATE 5 MG: 1 INJECTION, SOLUTION INTRAVENOUS at 16:44

## 2022-12-25 LAB
ALBUMIN SERPL-MCNC: 3.26 G/DL (ref 3.5–5.2)
ALBUMIN/GLOB SERPL: 0.8 G/DL
ALP SERPL-CCNC: 84 U/L (ref 39–117)
ALT SERPL W P-5'-P-CCNC: 31 U/L (ref 1–33)
ANION GAP SERPL CALCULATED.3IONS-SCNC: 14.7 MMOL/L (ref 5–15)
AST SERPL-CCNC: 39 U/L (ref 1–32)
BASOPHILS # BLD AUTO: 0.09 10*3/MM3 (ref 0–0.2)
BASOPHILS NFR BLD AUTO: 1.2 % (ref 0–1.5)
BILIRUB SERPL-MCNC: 0.9 MG/DL (ref 0–1.2)
BUN SERPL-MCNC: 16 MG/DL (ref 8–23)
BUN/CREAT SERPL: 16.3 (ref 7–25)
CALCIUM SPEC-SCNC: 9.2 MG/DL (ref 8.6–10.5)
CHLORIDE SERPL-SCNC: 100 MMOL/L (ref 98–107)
CO2 SERPL-SCNC: 21.3 MMOL/L (ref 22–29)
CREAT SERPL-MCNC: 0.98 MG/DL (ref 0.57–1)
DEPRECATED RDW RBC AUTO: 51.1 FL (ref 37–54)
EGFRCR SERPLBLD CKD-EPI 2021: 63.4 ML/MIN/1.73
EOSINOPHIL # BLD AUTO: 0.08 10*3/MM3 (ref 0–0.4)
EOSINOPHIL NFR BLD AUTO: 1.1 % (ref 0.3–6.2)
ERYTHROCYTE [DISTWIDTH] IN BLOOD BY AUTOMATED COUNT: 13.6 % (ref 12.3–15.4)
GLOBULIN UR ELPH-MCNC: 3.9 GM/DL
GLUCOSE BLDC GLUCOMTR-MCNC: 182 MG/DL (ref 70–130)
GLUCOSE BLDC GLUCOMTR-MCNC: 214 MG/DL (ref 70–130)
GLUCOSE BLDC GLUCOMTR-MCNC: 225 MG/DL (ref 70–130)
GLUCOSE BLDC GLUCOMTR-MCNC: 237 MG/DL (ref 70–130)
GLUCOSE BLDC GLUCOMTR-MCNC: 246 MG/DL (ref 70–130)
GLUCOSE SERPL-MCNC: 203 MG/DL (ref 65–99)
HCT VFR BLD AUTO: 48.5 % (ref 34–46.6)
HGB BLD-MCNC: 16.4 G/DL (ref 12–15.9)
IMM GRANULOCYTES # BLD AUTO: 0.02 10*3/MM3 (ref 0–0.05)
IMM GRANULOCYTES NFR BLD AUTO: 0.3 % (ref 0–0.5)
LYMPHOCYTES # BLD AUTO: 2.73 10*3/MM3 (ref 0.7–3.1)
LYMPHOCYTES NFR BLD AUTO: 36.5 % (ref 19.6–45.3)
MAGNESIUM SERPL-MCNC: 2.1 MG/DL (ref 1.6–2.4)
MCH RBC QN AUTO: 34.7 PG (ref 26.6–33)
MCHC RBC AUTO-ENTMCNC: 33.8 G/DL (ref 31.5–35.7)
MCV RBC AUTO: 102.5 FL (ref 79–97)
MONOCYTES # BLD AUTO: 0.93 10*3/MM3 (ref 0.1–0.9)
MONOCYTES NFR BLD AUTO: 12.4 % (ref 5–12)
NEUTROPHILS NFR BLD AUTO: 3.63 10*3/MM3 (ref 1.7–7)
NEUTROPHILS NFR BLD AUTO: 48.5 % (ref 42.7–76)
NRBC BLD AUTO-RTO: 0 /100 WBC (ref 0–0.2)
PLATELET # BLD AUTO: 128 10*3/MM3 (ref 140–450)
PMV BLD AUTO: 10.1 FL (ref 6–12)
POTASSIUM SERPL-SCNC: 4 MMOL/L (ref 3.5–5.2)
PROT SERPL-MCNC: 7.2 G/DL (ref 6–8.5)
QT INTERVAL: 314 MS
QT INTERVAL: 332 MS
QT INTERVAL: 498 MS
QT INTERVAL: 584 MS
QT INTERVAL: 604 MS
QT INTERVAL: 682 MS
QT INTERVAL: 700 MS
QTC INTERVAL: 412 MS
QTC INTERVAL: 454 MS
QTC INTERVAL: 473 MS
QTC INTERVAL: 484 MS
QTC INTERVAL: 491 MS
QTC INTERVAL: 494 MS
QTC INTERVAL: 504 MS
RBC # BLD AUTO: 4.73 10*6/MM3 (ref 3.77–5.28)
SODIUM SERPL-SCNC: 136 MMOL/L (ref 136–145)
TROPONIN T SERPL-MCNC: <0.01 NG/ML (ref 0–0.03)
TROPONIN T SERPL-MCNC: <0.01 NG/ML (ref 0–0.03)
WBC NRBC COR # BLD: 7.48 10*3/MM3 (ref 3.4–10.8)

## 2022-12-25 PROCEDURE — 93005 ELECTROCARDIOGRAM TRACING: CPT | Performed by: PHYSICIAN ASSISTANT

## 2022-12-25 PROCEDURE — 93010 ELECTROCARDIOGRAM REPORT: CPT | Performed by: INTERNAL MEDICINE

## 2022-12-25 PROCEDURE — 82962 GLUCOSE BLOOD TEST: CPT

## 2022-12-25 PROCEDURE — 83735 ASSAY OF MAGNESIUM: CPT | Performed by: PHYSICIAN ASSISTANT

## 2022-12-25 PROCEDURE — 80053 COMPREHEN METABOLIC PANEL: CPT | Performed by: HOSPITALIST

## 2022-12-25 PROCEDURE — 25010000002 ONDANSETRON PER 1 MG: Performed by: PHYSICIAN ASSISTANT

## 2022-12-25 PROCEDURE — 99233 SBSQ HOSP IP/OBS HIGH 50: CPT | Performed by: STUDENT IN AN ORGANIZED HEALTH CARE EDUCATION/TRAINING PROGRAM

## 2022-12-25 PROCEDURE — 63710000001 INSULIN ASPART PER 5 UNITS: Performed by: HOSPITALIST

## 2022-12-25 PROCEDURE — 84484 ASSAY OF TROPONIN QUANT: CPT | Performed by: STUDENT IN AN ORGANIZED HEALTH CARE EDUCATION/TRAINING PROGRAM

## 2022-12-25 PROCEDURE — 25010000002 HEPARIN (PORCINE) PER 1000 UNITS: Performed by: HOSPITALIST

## 2022-12-25 PROCEDURE — 93005 ELECTROCARDIOGRAM TRACING: CPT | Performed by: HOSPITALIST

## 2022-12-25 PROCEDURE — 93005 ELECTROCARDIOGRAM TRACING: CPT | Performed by: STUDENT IN AN ORGANIZED HEALTH CARE EDUCATION/TRAINING PROGRAM

## 2022-12-25 PROCEDURE — 84484 ASSAY OF TROPONIN QUANT: CPT | Performed by: HOSPITALIST

## 2022-12-25 PROCEDURE — 99222 1ST HOSP IP/OBS MODERATE 55: CPT | Performed by: INTERNAL MEDICINE

## 2022-12-25 PROCEDURE — 99291 CRITICAL CARE FIRST HOUR: CPT | Performed by: STUDENT IN AN ORGANIZED HEALTH CARE EDUCATION/TRAINING PROGRAM

## 2022-12-25 PROCEDURE — 25010000002 DOPAMINE PER 40 MG: Performed by: INTERNAL MEDICINE

## 2022-12-25 PROCEDURE — 25010000002 MAGNESIUM SULFATE IN D5W 1G/100ML (PREMIX) 1-5 GM/100ML-% SOLUTION: Performed by: HOSPITALIST

## 2022-12-25 PROCEDURE — 85025 COMPLETE CBC W/AUTO DIFF WBC: CPT | Performed by: HOSPITALIST

## 2022-12-25 RX ORDER — SODIUM CHLORIDE 9 MG/ML
40 INJECTION, SOLUTION INTRAVENOUS AS NEEDED
Status: DISCONTINUED | OUTPATIENT
Start: 2022-12-25 | End: 2022-12-29 | Stop reason: HOSPADM

## 2022-12-25 RX ORDER — ANASTROZOLE 1 MG/1
1 TABLET ORAL DAILY
Status: DISCONTINUED | OUTPATIENT
Start: 2022-12-25 | End: 2022-12-29 | Stop reason: HOSPADM

## 2022-12-25 RX ORDER — ACETAMINOPHEN 325 MG/1
650 TABLET ORAL EVERY 6 HOURS PRN
Status: DISCONTINUED | OUTPATIENT
Start: 2022-12-25 | End: 2022-12-29 | Stop reason: HOSPADM

## 2022-12-25 RX ORDER — OMEGA-3S/DHA/EPA/FISH OIL/D3 300MG-1000
1000 CAPSULE ORAL DAILY
Status: DISCONTINUED | OUTPATIENT
Start: 2022-12-25 | End: 2022-12-29 | Stop reason: HOSPADM

## 2022-12-25 RX ORDER — OXYCODONE HYDROCHLORIDE 5 MG/1
5 TABLET ORAL EVERY 4 HOURS PRN
Status: DISCONTINUED | OUTPATIENT
Start: 2022-12-25 | End: 2022-12-29 | Stop reason: HOSPADM

## 2022-12-25 RX ORDER — SODIUM CHLORIDE 0.9 % (FLUSH) 0.9 %
10 SYRINGE (ML) INJECTION AS NEEDED
Status: DISCONTINUED | OUTPATIENT
Start: 2022-12-25 | End: 2022-12-29 | Stop reason: HOSPADM

## 2022-12-25 RX ORDER — DOPAMINE HYDROCHLORIDE 160 MG/100ML
2.5 INJECTION, SOLUTION INTRAVENOUS
Status: DISCONTINUED | OUTPATIENT
Start: 2022-12-25 | End: 2022-12-26

## 2022-12-25 RX ORDER — SODIUM CHLORIDE 9 MG/ML
150 INJECTION, SOLUTION INTRAVENOUS CONTINUOUS
Status: DISCONTINUED | OUTPATIENT
Start: 2022-12-25 | End: 2022-12-25

## 2022-12-25 RX ORDER — SODIUM CHLORIDE 0.9 % (FLUSH) 0.9 %
10 SYRINGE (ML) INJECTION EVERY 12 HOURS SCHEDULED
Status: DISCONTINUED | OUTPATIENT
Start: 2022-12-25 | End: 2022-12-29 | Stop reason: HOSPADM

## 2022-12-25 RX ORDER — ONDANSETRON 2 MG/ML
4 INJECTION INTRAMUSCULAR; INTRAVENOUS EVERY 6 HOURS PRN
Status: DISCONTINUED | OUTPATIENT
Start: 2022-12-25 | End: 2022-12-29 | Stop reason: HOSPADM

## 2022-12-25 RX ORDER — FLUCONAZOLE 100 MG/1
150 TABLET ORAL ONCE
Status: COMPLETED | OUTPATIENT
Start: 2022-12-25 | End: 2022-12-25

## 2022-12-25 RX ADMIN — FLUCONAZOLE 150 MG: 100 TABLET ORAL at 13:48

## 2022-12-25 RX ADMIN — ACETAMINOPHEN 650 MG: 325 TABLET, FILM COATED ORAL at 03:04

## 2022-12-25 RX ADMIN — SODIUM CHLORIDE 250 ML: 9 INJECTION, SOLUTION INTRAVENOUS at 04:56

## 2022-12-25 RX ADMIN — HEPARIN SODIUM 5000 UNITS: 5000 INJECTION INTRAVENOUS; SUBCUTANEOUS at 20:03

## 2022-12-25 RX ADMIN — Medication 10 ML: at 21:57

## 2022-12-25 RX ADMIN — Medication 10 ML: at 20:03

## 2022-12-25 RX ADMIN — ASPIRIN 81 MG: 81 TABLET, COATED ORAL at 08:59

## 2022-12-25 RX ADMIN — ANASTROZOLE 1 MG: 1 TABLET ORAL at 09:00

## 2022-12-25 RX ADMIN — INSULIN ASPART 4 UNITS: 100 INJECTION, SOLUTION INTRAVENOUS; SUBCUTANEOUS at 13:48

## 2022-12-25 RX ADMIN — MAGNESIUM SULFATE HEPTAHYDRATE 1 G: 1 INJECTION, SOLUTION INTRAVENOUS at 00:03

## 2022-12-25 RX ADMIN — ROSUVASTATIN CALCIUM 20 MG: 20 TABLET, FILM COATED ORAL at 08:59

## 2022-12-25 RX ADMIN — ONDANSETRON 4 MG: 2 INJECTION INTRAMUSCULAR; INTRAVENOUS at 04:43

## 2022-12-25 RX ADMIN — MAGNESIUM SULFATE HEPTAHYDRATE 1 G: 1 INJECTION, SOLUTION INTRAVENOUS at 01:23

## 2022-12-25 RX ADMIN — SODIUM CHLORIDE 150 ML/HR: 9 INJECTION, SOLUTION INTRAVENOUS at 05:27

## 2022-12-25 RX ADMIN — CHOLECALCIFEROL TAB 10 MCG (400 UNIT) 1000 UNITS: 10 TAB at 08:59

## 2022-12-25 RX ADMIN — DOPAMINE HYDROCHLORIDE 2.5 MCG/KG/MIN: 160 INJECTION, SOLUTION INTRAVENOUS at 11:27

## 2022-12-25 RX ADMIN — INSULIN ASPART 4 UNITS: 100 INJECTION, SOLUTION INTRAVENOUS; SUBCUTANEOUS at 08:57

## 2022-12-25 RX ADMIN — FLECAINIDE ACETATE 50 MG: 50 TABLET ORAL at 20:01

## 2022-12-25 RX ADMIN — INSULIN ASPART 4 UNITS: 100 INJECTION, SOLUTION INTRAVENOUS; SUBCUTANEOUS at 17:11

## 2022-12-25 RX ADMIN — HEPARIN SODIUM 5000 UNITS: 5000 INJECTION INTRAVENOUS; SUBCUTANEOUS at 09:00

## 2022-12-25 RX ADMIN — Medication 10 ML: at 08:59

## 2022-12-25 RX ADMIN — LEVOTHYROXINE SODIUM 88 MCG: 88 TABLET ORAL at 08:59

## 2022-12-25 NOTE — CONSULTS
Consults    Patient Identification:    Name:  Ronna Wayne  Age:  67 y.o.  Sex:  female  :  1955  MRN:  [id number]  Visit Number:  [id number]  Primary care provider:  Mague Acosta APRN    Chief complaint:   Palpitation, SVT, flutter    History of presenting illness:   67-year-old obese white female who has a known history of arrhythmia, SVT  Patient was continued on flecainide but not on beta-blocker patient started to have complaint of palpitation therefore came to the emergency room patient was in tachycardia heart rate of 150 bpm it appears to be flutter  Patient did receive IV metoprolol multiple times later on during the night patient become junctional and bradycardic started on transcutaneous pacer and transferred to ICU  Patient will be started on dopamine drip.  Patient blood pressure is a stable 120s not in any distress or any complaint laying comfortably in bed  ---------------------------------------------------------------------------------------------------------------------  ROS: All systems reviewed and negative except noted above.  ---------------------------------------------------------------------------------------------------------------------   Past History:   Family History   Problem Relation Age of Onset   • Thyroid disease Mother    • Diabetes Mother    • Hyperlipidemia Father    • Hypertension Father    • Heart attack Father    • Alcohol abuse Maternal Aunt    • Cancer Maternal Grandfather         PROSTATE   • Stroke Paternal Grandmother    • Stroke Paternal Grandfather    • Hypertension Other    • Hypertension Sister    • Breast cancer Sister 40   • Obesity Sister    • Hypertension Sister    • Hyperlipidemia Sister    • Hyperlipidemia Sister    • Hypertension Sister    • Arthritis Sister    • Obesity Sister    • Thyroid disease Sister      Past Medical History:   Diagnosis Date   • Anxiety    • B12 deficiency    • BPV (benign positional vertigo)    • Breast cancer  ([facility]) 2021   • Diabetes mellitus ([facility])    • Diarrhea    • Disease of thyroid gland    • Elevated cholesterol    • Fibromyalgia    • GERD (gastroesophageal reflux disease)    • Glaucoma    • Heart murmur    • Hyperlipidemia    • Hypertension    • Kidney disease    • Obesity    • Peptic ulceration    • PONV (postoperative nausea and vomiting)    • Sleep apnea     c-pap   • Stroke (HCC)    • Weakness of left arm      Past Surgical History:   Procedure Laterality Date   • ABDOMINAL SURGERY     • APPENDECTOMY     • BREAST ABSCESS INCISION AND DRAINAGE Right 2022    Procedure: BREAST ABSCESS INCISION AND DRAINAGE;  Surgeon: Lee Parrish MD;  Location: [facility];  Service: General;  Laterality: Right;   • BREAST CYST ASPIRATION     • BREAST LUMPECTOMY WITH SENTINEL NODE BIOPSY Right 2021    Procedure: BREAST LUMPECTOMY WITH SENTINEL NODE BIOPSY;  Surgeon: Lee Parrish MD;  Location: [facility];  Service: General;  Laterality: Right;   • CATARACT EXTRACTION Bilateral    • CHOLECYSTECTOMY     • COLONOSCOPY     • ENDOSCOPY     • HEMATOMA EVACUATION TRUNK Right 2021    Procedure: HEMATOMA EVACUATION TRUNK;  Surgeon: Lee Parrish MD;  Location: [facility];  Service: General;  Laterality: Right;   • URETHRA SURGERY       Social History     Socioeconomic History   • Marital status:    Tobacco Use   • Smoking status: Former     Packs/day: 1.50     Years: 8.00     Pack years: 12.00     Types: Cigarettes     Quit date:      Years since quittin.0   • Smokeless tobacco: Never   Vaping Use   • Vaping Use: Never used   Substance and Sexual Activity   • Alcohol use: No     Comment: former social drinker not had anything in 25 + years   • Sexual activity: Defer     ---------------------------------------------------------------------------------------------------------------------   Allergies:  Codeine and Sulfa  antibiotics  ---------------------------------------------------------------------------------------------------------------------   Prior to Admission Medications     Prescriptions Last Dose Informant Patient Reported? Taking?    anastrozole (ARIMIDEX) 1 MG tablet 12/24/2022  No Yes    Take 1 tablet by mouth Daily.    aspirin 81 MG EC tablet 12/24/2022 Pharmacy No Yes    Take 1 tablet by mouth Daily.    cholecalciferol (VITAMIN D3) 25 MCG (1000 UT) tablet 12/24/2022 Pharmacy Yes Yes    Take 1,000 Units by mouth Daily.    empagliflozin (Jardiance) 25 MG tablet tablet 12/24/2022  No Yes    Take 1 tablet by mouth Every Morning.    flecainide (TAMBOCOR) 50 MG tablet 12/24/2022  No Yes    Take 1 tablet by mouth Every 12 (Twelve) Hours.    FLUoxetine (PROzac) 20 MG capsule 12/24/2022  No Yes    TAKE ONE CAPSULE BY MOUTH DAILY    insulin aspart (NovoLOG FlexPen) 100 UNIT/ML solution pen-injector sc pen 12/24/2022  No Yes    Inject 0-9 Units under the skin into the appropriate area as directed 4 (Four) Times a Day Before Meals & at Bedtime.    levothyroxine (SYNTHROID, LEVOTHROID) 88 MCG tablet 12/24/2022 Self Yes Yes    Take 88 mcg by mouth Daily.    rosuvastatin (CRESTOR) 20 MG tablet 12/24/2022  No Yes    Take 1 tablet by mouth Daily.        Hospital Meds:  anastrozole, 1 mg, Oral, Daily  aspirin, 81 mg, Oral, Daily  cholecalciferol, 1,000 Units, Oral, Daily  flecainide, 50 mg, Oral, Q12H  fluconazole, 150 mg, Oral, Once  heparin (porcine), 5,000 Units, Subcutaneous, Q12H  Insulin Aspart, 0-9 Units, Subcutaneous, TID AC  levothyroxine, 88 mcg, Oral, Daily  rosuvastatin, 20 mg, Oral, Daily  sodium chloride, 10 mL, Intravenous, Q12H      DOPamine, 2.5 mcg/kg/min, Last Rate: 2.5 mcg/kg/min (12/25/22 1127)      ---------------------------------------------------------------------------------------------------------------------   Vital Signs:  Temp:  [97.8 °F (36.6 °C)-98 °F (36.7 °C)] 97.8 °F (36.6 °C)  Heart Rate:   [] 37  Resp:  [12-20] 12  BP: ()/(36-98) 113/58      12/24/22  1606 12/24/22  2218 12/25/22  1001   Weight: 125 kg (275 lb) 121 kg (267 lb 3.5 oz) 122 kg (269 lb 6.4 oz)     Body mass index is 47.72 kg/m².  ---------------------------------------------------------------------------------------------------------------------   Physical exam:   Constitutional:  Well-developed and well-nourished.  No respiratory distress.      HENT:  Head: Normocephalic and atraumatic.  Mouth:  Moist mucous membranes.    Eyes:  Conjunctivae and EOM are normal.  Pupils are equal, round, and reactive to light.  No scleral icterus.  Neck:  Neck supple.  No JVD present.    Cardiovascular:  Normal rate, regular rhythm and normal heart sounds with no murmur.  Pulmonary/Chest:  No respiratory distress, no wheezes, no crackles, with normal breath sounds and good air movement.  Abdominal:  Soft.  Bowel sounds are normal.  No distension and no tenderness.   Musculoskeletal:  No edema, no tenderness, and no deformity.  No red or swollen joints anywhere.    Neurological:  Alert and oriented to person, place, and time.  No cranial nerve deficit.  No tongue deviation.  No facial droop.  No slurred speech.   Skin:  Skin is warm and dry.  No rash noted.  No pallor.   Psychiatric:  Normal mood and affect.  Behavior is normal.  Judgment and thought content normal.   Peripheral vascular:  No edema and strong pulses on all 4 extremities.    ---------------------------------------------------------------------------------------------------------------------   EKG: SVT  Telemetry: Junctional rhythm with transcutaneous pacer spike  I have personally looked at both the EKG and the telemetry strips.  Echo:  Results for orders placed during the hospital encounter of 12/17/22    Adult Transthoracic Echo Complete W/ Cont if Necessary Per Protocol    Interpretation Summary  •  Left ventricular systolic function is normal. Left ventricular ejection  fraction appears to be 61 - 65%.  •  Left ventricular diastolic function is consistent with (grade II w/high LAP) pseudonormalization.  •  Left atrial volume is mildly increased.  •  The right atrial cavity is mildly  dilated.  •  Mild aortic valve stenosis is present, with an estimated aortic valve area of 1.8 cm² and the mean gradient of 19 mmHg.  •  Estimated right ventricular systolic pressure from tricuspid regurgitation is mildly elevated (35-45 mmHg).    ---------------------------------------------------------------------------------------------------------------------   Results from last 7 days   Lab Units 12/25/22  0007 12/24/22  1627 12/21/22  0959   WBC 10*3/mm3 7.48 6.96 6.28   HEMOGLOBIN g/dL 16.4* 16.0* 14.9   HEMATOCRIT % 48.5* 46.3 43.3   MCV fL 102.5* 100.7* 100.7*   MCHC g/dL 33.8 34.6 34.4   PLATELETS 10*3/mm3 128* 133* 126*         Results from last 7 days   Lab Units 12/25/22  0006 12/24/22 2027 12/24/22  1814 12/21/22  0959   SODIUM mmol/L 136  --  137 133*   POTASSIUM mmol/L 4.0  --  4.7 4.1   MAGNESIUM mg/dL  --  1.3*  --   --    CHLORIDE mmol/L 100  --  105 99   CO2 mmol/L 21.3*  --  20.0* 23.5   BUN mg/dL 16  --  16 15   CREATININE mg/dL 0.98  --  0.87 0.73   CALCIUM mg/dL 9.2  --  8.8 9.2   GLUCOSE mg/dL 203*  --  194* 260*   ALBUMIN g/dL 3.26*  --  3.02*  --    BILIRUBIN mg/dL 0.9  --  0.6  --    ALK PHOS U/L 84  --  80  --    AST (SGOT) U/L 39*  --  37*  --    ALT (SGPT) U/L 31  --  28  --    Estimated Creatinine Clearance: 70.5 mL/min (by C-G formula based on SCr of 0.98 mg/dL).  No results found for: AMMONIA  Results from last 7 days   Lab Units 12/25/22  0811 12/25/22  0006 12/24/22  2125   TROPONIN T ng/mL <0.010 <0.010 <0.010     Results from last 7 days   Lab Units 12/24/22  1814   PROBNP pg/mL 6,200.0*     Lab Results   Component Value Date    HGBA1C 7.80 (H) 10/21/2022     Lab Results   Component Value Date    TSH 0.414 12/17/2022    FREET4 1.19 11/18/2021     No results found  for: PREGTESTUR, PREGSERUM, HCG, HCGQUANT  Pain Management Panel     Pain Management Panel Latest Ref Rng & Units 2/24/2021 2/24/2021    CREATININE UR mg/dL 82.6 82.6        No results found for: BLOODCX  No results found for: URINECX  No results found for: WOUNDCX  No results found for: STOOLCX        ---------------------------------------------------------------------------------------------------------------------   Imaging Results (Last 7 Days)     Procedure Component Value Units Date/Time    XR Chest 1 View [[id number]] Collected: 12/24/22 1739     Updated: 12/24/22 1741    Narrative:      CR Chest 1 Vw    INDICATION:   Palpitations. External heart monitor     COMPARISON:    Chest x-ray 12/17/2022.    FINDINGS:  Portable AP view(s) of the chest.  There is mild cardiac silhouette enlargement. This is increased from previous and there is mild vascular congestion and interstitial edema with patchy bibasilar airspace disease. Cannot exclude a small amount of pleural  fluid. No pneumothorax. External cardiac device noted projected over the left hemithorax.      Impression:      Findings are most consistent with interval development of mild congestive heart failure. Follow-up to clearing is recommended.    Signer Name: Cherrie Wellington MD   Signed: 12/24/2022 5:39 PM   Workstation Name: [email or web address]    [facility]        ----------------------------------------------------------------------------------------------------------------------  Assessment:   SVT  Obesity  Diabetes mellitus      Plan:   Patient appears to be overmedicated with beta-blocker  Therefore will start a dopamine and monitor the patient until the medication side effect go away  Further recommendation on patient response to the current treatment  Patient will need EP consult and ablation  Discussed with patient   Patient saw the electrophysiologist and then later on procedure was canceled she does not know the detail  Thank you for  the consult.    Agatha Ortiz MD  12/25/22  12:12 EST

## 2022-12-25 NOTE — H&P
Hospitalist History and Physical        Patient Identification  Name: Ronna Wayne  Age/Sex: 67 y.o. female  :  1955        MRN: [id number]  Visit Number: [id number]  Admit Date: 2022   PCP: Mague Acosta APRN          Chief complaint palpitations, short of breath    History of Present Illness:  Patient is a 67 y.o. female with history of anxiety/depression, BPV, Type II DM insulin dependent, hypothyroidism, HTN, HLD, MEMO on CPAP, stroke, stage II CKD, breast cancer s/p lumpectomy, and paroxysmal SVT, who was recently admitted to our facility from -2022 for management of recurrent SVT as well as intermittent episodes of bradycardia. She was also noted to have a brief run of afib per discharge summary; cardiology opted not to start chronic anticoagulation. During said hospitalization, she was transitioned off metoprolol XL presumably due to concerns for bradycardia and initiated on flecainide. Her HCTZ was also held at time of discharge, presumably because K+ was low normal and mag was low during her hospital stay which could have been contributing to her tachyarrhythmia. She reports the day she went home, she felt well. However, the next day (), she developed palpitations along with dyspnea and lightheadedness. However, weather conditions were too severe for her to get back to the hospital, so she waited until today to return to the ED. She denies any chest pain, fever/chills, or worsening lower extremity edema. She does report some nausea, and as such she reports not eating anything today but she has been taking her home meds as prescribed. In the ED, she was found to be in SVT with HR in the 150s. Her BNP was elevated and CXR showed evidence of mild CHF exacerbation. She was given 3 consecutive doses of IV metoprolol which brought her heart rate down but only to the 120s-130s at best and then her heart rate would start creeping back up after a few minutes. She was  also given a dose of IV lasix. She has been admitted to the telemetry unit per cardiology recommendations. Presently her heart rate is in the 120s-140s per bedside RN. Patient reports overall she feels better compared to when she first arrived to the hospital, with less pronounced palpitations and resolution or dyspnea and lightheadedness.     Of note, this patient was seen remotely using audiovisual equipment with assistance of nursing staff at bedside.     Review of Systems  Review of Systems   Constitutional: Positive for activity change, appetite change and fatigue. Negative for chills, diaphoresis, fever and unexpected weight change.   HENT: Negative for congestion, postnasal drip, rhinorrhea, sinus pressure, sinus pain, sneezing and sore throat.    Eyes: Negative for photophobia, pain, discharge, redness, itching and visual disturbance.   Respiratory: Positive for shortness of breath. Negative for cough and wheezing.    Cardiovascular: Positive for palpitations. Negative for chest pain and leg swelling.   Gastrointestinal: Positive for nausea. Negative for abdominal distention, abdominal pain, constipation, diarrhea and vomiting.   Endocrine: Negative for cold intolerance, heat intolerance, polydipsia, polyphagia and polyuria.   Genitourinary: Negative for difficulty urinating, dysuria, flank pain, frequency and hematuria.   Musculoskeletal: Negative for arthralgias, back pain, joint swelling, myalgias, neck pain and neck stiffness.   Skin: Negative for color change, pallor, rash and wound.   Neurological: Positive for dizziness, weakness (generalized) and light-headedness. Negative for tremors, seizures, syncope and numbness.   Hematological: Negative for adenopathy. Does not bruise/bleed easily.   Psychiatric/Behavioral: Negative for agitation, behavioral problems and confusion.       History  Past Medical History:   Diagnosis Date   • Anxiety    • B12 deficiency    • BPV (benign positional vertigo)    •  Breast cancer (HCC) 05/2021   • Diabetes mellitus (HCC)    • Diarrhea    • Disease of thyroid gland    • Elevated cholesterol    • Fibromyalgia    • GERD (gastroesophageal reflux disease)    • Glaucoma    • Heart murmur    • Hyperlipidemia    • Hypertension    • Kidney disease    • Obesity    • Peptic ulceration    • PONV (postoperative nausea and vomiting)    • Sleep apnea     c-pap   • Stroke (HCC)    • Weakness of left arm      Past Surgical History:   Procedure Laterality Date   • ABDOMINAL SURGERY     • APPENDECTOMY     • BREAST ABSCESS INCISION AND DRAINAGE Right 7/7/2022    Procedure: BREAST ABSCESS INCISION AND DRAINAGE;  Surgeon: Lee Parrish MD;  Location: [facility];  Service: General;  Laterality: Right;   • BREAST CYST ASPIRATION     • BREAST LUMPECTOMY WITH SENTINEL NODE BIOPSY Right 8/5/2021    Procedure: BREAST LUMPECTOMY WITH SENTINEL NODE BIOPSY;  Surgeon: Lee Parrish MD;  Location: [facility];  Service: General;  Laterality: Right;   • CATARACT EXTRACTION Bilateral    • CHOLECYSTECTOMY     • COLONOSCOPY     • ENDOSCOPY     • HEMATOMA EVACUATION TRUNK Right 8/5/2021    Procedure: HEMATOMA EVACUATION TRUNK;  Surgeon: Lee Parrish MD;  Location: [facility];  Service: General;  Laterality: Right;   • URETHRA SURGERY       Family History   Problem Relation Age of Onset   • Thyroid disease Mother    • Diabetes Mother    • Hyperlipidemia Father    • Hypertension Father    • Heart attack Father    • Alcohol abuse Maternal Aunt    • Cancer Maternal Grandfather         PROSTATE   • Stroke Paternal Grandmother    • Stroke Paternal Grandfather    • Hypertension Other    • Hypertension Sister    • Breast cancer Sister 40   • Obesity Sister    • Hypertension Sister    • Hyperlipidemia Sister    • Hyperlipidemia Sister    • Hypertension Sister    • Arthritis Sister    • Obesity Sister    • Thyroid disease Sister      Social History     Tobacco Use   • Smoking status: Former      Packs/day: 1.50     Years: 8.00     Pack years: 12.00     Types: Cigarettes     Quit date:      Years since quittin.0   • Smokeless tobacco: Never   Vaping Use   • Vaping Use: Never used   Substance Use Topics   • Alcohol use: No     Comment: former social drinker not had anything in 25 + years     Medications Prior to Admission   Medication Sig Dispense Refill Last Dose   • anastrozole (ARIMIDEX) 1 MG tablet Take 1 tablet by mouth Daily. 30 tablet 11 2022   • aspirin 81 MG EC tablet Take 1 tablet by mouth Daily. 90 tablet 3 2022   • cholecalciferol (VITAMIN D3) 25 MCG (1000 UT) tablet Take 1,000 Units by mouth Daily.   2022   • empagliflozin (Jardiance) 25 MG tablet tablet Take 1 tablet by mouth Every Morning. 30 tablet 3 2022   • flecainide (TAMBOCOR) 50 MG tablet Take 1 tablet by mouth Every 12 (Twelve) Hours. 60 tablet 0 2022 at AM   • FLUoxetine (PROzac) 20 MG capsule TAKE ONE CAPSULE BY MOUTH DAILY 30 capsule 2 2022   • insulin aspart (NovoLOG FlexPen) 100 UNIT/ML solution pen-injector sc pen Inject 0-9 Units under the skin into the appropriate area as directed 4 (Four) Times a Day Before Meals & at Bedtime. 15 mL 0 2022   • levothyroxine (SYNTHROID, LEVOTHROID) 88 MCG tablet Take 88 mcg by mouth Daily.   2022   • rosuvastatin (CRESTOR) 20 MG tablet Take 1 tablet by mouth Daily. 30 tablet 4 2022     Allergies:  Codeine and Sulfa antibiotics    Objective     Vital Signs  Temp:  [97.8 °F (36.6 °C)] 97.8 °F (36.6 °C)  Heart Rate:  [128-150] 128  Resp:  [20] 20  BP: (110-134)/(89-98) 124/94  Body mass index is 48.71 kg/m².    Physical Exam:  Physical Exam  This physical exam has been personally performed remotely in the unit aided by real-time audio/visual communication tools. Nursing staf were present at bedside during this exam and assisted during exam. The use of a video visit has been reviewed with the patient and verbal informed consent has been  obtained.     Physical Exam:  General: Patient appears awake, alert, and in no acute distress.  Head: Normocephalic, atraumatic  Eyes: EOMI. Conjunctivae and sclerae normal.  Ears: Ears appear intact with no abnormalities noted.   Neck: Trachea midline. No obvious JVD.  Heart: Tele reveals SVT in 120s-140s  Lungs: Respirations appear to be regular, even and unlabored with no signs of respiratory distress. No audible wheezing.  Abdomen: No obvious abdominal distension.  MS: Muscle tone appears normal. No gross deformities.  Extremities: No clubbing, cyanosis or edema noted.  Skin: No visible bleeding, bruising, or rash.  Neurologic: Alert and oriented x3. No gross focal deficits.     Results Review:       Lab Results:  Results from last 7 days   Lab Units 12/24/22  1627 12/21/22  0959 12/19/22  1159 12/18/22  0353   WBC 10*3/mm3 6.96 6.28 6.97 7.64   HEMOGLOBIN g/dL 16.0* 14.9 13.6 14.5   PLATELETS 10*3/mm3 133* 126* 111* 86*         Results from last 7 days   Lab Units 12/24/22  1814 12/21/22  0959 12/19/22  1159 12/18/22  0353   SODIUM mmol/L 137 133* 135* 131*   POTASSIUM mmol/L 4.7 4.1 4.1 4.0   CHLORIDE mmol/L 105 99 102 103   CO2 mmol/L 20.0* 23.5 21.9* 16.3*   BUN mg/dL 16 15 22 26*   CREATININE mg/dL 0.87 0.73 0.98 0.91   CALCIUM mg/dL 8.8 9.2 9.3 9.1   GLUCOSE mg/dL 194* 260* 210* 253*     Results from last 7 days   Lab Units 12/24/22 2027 12/18/22  0353   MAGNESIUM mg/dL 1.3* 2.3     No results found for: HGBA1C  Results from last 7 days   Lab Units 12/24/22 1814 12/18/22  0353   BILIRUBIN mg/dL 0.6 0.7   ALK PHOS U/L 80 71   AST (SGOT) U/L 37* 29   ALT (SGPT) U/L 28 20     Results from last 7 days   Lab Units 12/24/22 2125 12/24/22  1814   TROPONIN T ng/mL <0.010 <0.010                   I have reviewed the patient's laboratory results.    Imaging:  Imaging Results (Last 72 Hours)     Procedure Component Value Units Date/Time    XR Chest 1 View [[id number]] Collected: 12/24/22 1739     Updated: 12/24/22  1741    Narrative:      CR Chest 1 Vw    INDICATION:   Palpitations. External heart monitor     COMPARISON:    Chest x-ray 2022.    FINDINGS:  Portable AP view(s) of the chest.  There is mild cardiac silhouette enlargement. This is increased from previous and there is mild vascular congestion and interstitial edema with patchy bibasilar airspace disease. Cannot exclude a small amount of pleural  fluid. No pneumothorax. External cardiac device noted projected over the left hemithorax.      Impression:      Findings are most consistent with interval development of mild congestive heart failure. Follow-up to clearing is recommended.    Signer Name: Cherrie Wellington MD   Signed: 2022 5:39 PM   Workstation Name: [facility]    [facility]          I have personally reviewed the patient's radiologic imaging.        EKst EKG:  Supraventricular tachycardia, , QTc 504  ST & T wave abnormality, consider inferior ischemia  Abnormal ECG  When compared with ECG of 22-DEC-2022 05:19, (Unconfirmed)  Vent. rate has increased BY  98 BPM  ST no longer elevated in Inferior leads  ST depression has replaced ST elevation in Lateral leads  T wave inversion now evident in Inferior leads  T wave inversion now evident in Lateral leadsf    SVT, , QTc 491  Low voltage QRS  Nonspecific T wave abnormality,  Abnormal ECG  When compared with ECG of 24-DEC-2022 16:01, (Unconfirmed)  Nonspecific T wave abnormality has replaced inverted T waves in Lateral leads    I have personally reviewed the patient's EKG. No obvious ST changes appreciated now that HR is slower. QT remains prolonged        Assessment & Plan     - Supraventricular tachycardia (HCC), recurrent: admitted to telemetry unit. Remains in SVT but rate improved from 150s to 120-140s. Spoke with on-call cardiologist Dr Ortiz who recommends resuming metoprolol but tartrate BID rather than succinate and at lower dose (25mg BID) with hold  parameters, then titrating dose to control tachycardia while at the same time not inducing significant bradycardia. Continue flecainide, repeat daily EKG to monitor QTc. Electrolytes reveal adequate potassium but low magnesium 1.3 which will likely drop further with IV lasix given in the ED. This could certainly be contributing to his recurrent SVT. Will replace mag per protocol. Continue to monitor closely on telemetry; if HR increases and sustains over 150, instructed RN to notify me at which time will attempt cardioversion with IV adenosine.   - Acute on chronic diastolic CHF, likely secondary to cardiac strain from SVT: received IV lasix in the ED. Hold further diuretics in light of hypomagnesemia. Pulmonary edema will likely resolve with bringing HR back under adequate control.   - Hypomagnesemia: see plan above.  - Type II DM, insulin dependent: Monitor accuchecks. Resume home SSI regimen.   - Hypertension: monitor BP closely moving forward while restarting metoprolol.   - Hypothyroidism: TSH WNL during last hospital stay. Plan to resume home synthroid once reconciled by pharmacy.   - Brief episode of afib during last hospitalization: cardiology opted not to start chronic anticoagulation per discharge summary, though reasons not given (ie not felt to be significant since brief or has risk factors for bleeding?), but [id number] certainly high when multiple comorbidities including HTN, DM, CHF, female sex, age, and prior stroke taken into account (total score 7), so may want to consider discussing risks and benefits of anticoagulation further if there are no significant contraindications.     DVT Prophylaxis: SQ heparin    Estimated Length of Stay >2 midnights    I discussed the patient's findings, assessment and plan with the patient and nursing staff on [facility].    * patient is high risk due to symptomatic recurrent SVT, acute on chronic diastolic CHF    Emmanuel Mercado MD  12/24/22  22:04 EST

## 2022-12-25 NOTE — NURSING NOTE
Called report to [facility], patient will be transferred to CCU room 2 with belongings    0910: Patient moved with belongings,  aware.

## 2022-12-25 NOTE — PROGRESS NOTES
Hospitalist Update:    Notified that patient's HR was now down in the 30s as of around 4 am after receiving PO metoprolol and flecainide earlier in the evening. Patient is reportedly nauseated by not lightheaded, dyspneic or having chest pain. BP reportedly stable initially, but then when repeated it dropped to 84/56; MAP still above 65 however. Will discontinue PO metoprolol for now and administer 250cc IV fluid bolus. Instructed RN to notify Dr Ortiz, on-call cardiology who is following patient in consultation, of patient's bradycardia. Patient's HR dropped significantly with rate controlling meds last time she was in the hospital, which is why metoprolol was held at time of discharge. May ultimately need to be considered for pacemaker placement.

## 2022-12-25 NOTE — PROGRESS NOTES
[facility] HOSPITALIST PROGRESS NOTE     Patient Identification:  Name:  Ronna Wayne  Age:  67 y.o.  Sex:  female  :  1955  MRN:  [id number]  Visit Number:  [id number]  ROOM: [address]     Primary Care Provider:  Mague Acosta APRN     Date of Admission: 2022    Length of stay in inpatient status:  1    Subjective     Chief Compliant:    Chief Complaint   Patient presents with   • Rapid Heart Rate       Patient overnight converted from tachycardia w/SVT now to bradycardia w/junctional rhythm requiring transcutaneous pacing and dopamine gtt. Patient initially SOA w/dizziness and transient hypotension resolved with noted interventions detailed in earlier progress note. Patient otherwise without new complaint this am. CP free and only having palpitations during pauses and bradycardic episodes <40 bpm.      GI: No diarrhea or constipation  CV: LE edema prior to admission, dyspnea this am but otherwise without significant shortness of breath      Objective     Current Hospital Meds:  anastrozole, 1 mg, Oral, Daily  aspirin, 81 mg, Oral, Daily  cholecalciferol, 1,000 Units, Oral, Daily  flecainide, 50 mg, Oral, Q12H  heparin (porcine), 5,000 Units, Subcutaneous, Q12H  Insulin Aspart, 0-9 Units, Subcutaneous, TID AC  levothyroxine, 88 mcg, Oral, Daily  rosuvastatin, 20 mg, Oral, Daily  sodium chloride, 10 mL, Intravenous, Q12H    DOPamine, 2.5 mcg/kg/min, Last Rate: 2.5 mcg/kg/min (22 1127)      Current Antimicrobial Therapy:  Anti-Infectives (From admission, onward)    Ordered     Dose/Rate Route Frequency Start Stop    22 1023  fluconazole (DIFLUCAN) tablet 150 mg        Ordering Provider: Abdiel León MD    150 mg Oral Once 22 1200 22 1348        Current Diuretic Therapy:  Diuretics (From admission, onward)    Ordered     Dose/Rate Route Frequency Start Stop    22 1900  furosemide (LASIX) injection 40 mg        Ordering Provider: Desmond  Topher DEMARCO MD    40 mg Intravenous Once 12/24/22 1902 12/24/22 1922        ----------------------------------------------------------------------------------------------------------------------  Vital Signs:  Temp:  [97.8 °F (36.6 °C)-98 °F (36.7 °C)] 98 °F (36.7 °C)  Heart Rate:  [] 56  Resp:  [12-20] 12  BP: ()/(36-98) 153/58  SpO2:  [92 %-97 %] 97 %  on   ;   Device (Oxygen Therapy): room air  Body mass index is 47.72 kg/m².    Wt Readings from Last 3 Encounters:   12/25/22 122 kg (269 lb 6.4 oz)   12/22/22 124 kg (273 lb 1.6 oz)   12/17/22 128 kg (282 lb)     Intake & Output (last 3 days)       12/22 0701  12/23 0700 12/23 0701 12/24 0700 12/24 0701  12/25 0700 12/25 0701  12/26 0700    P.O.  240  600    IV Piggyback   500     Total Intake(mL/kg)  240 500 (4.1) 600 (4.9)    Net  +240 +500 +600            Urine Unmeasured Occurrence   3 x         Diet: Cardiac Diets, Diabetic Diets; Healthy Heart (2-3 Na+); Consistent Carbohydrate; Texture: Regular Texture (IDDSI 7); Fluid Consistency: Thin (IDDSI 0)  ----------------------------------------------------------------------------------------------------------------------  Physical Exam  Vitals reviewed.   Constitutional:       Appearance: She is obese. She is ill-appearing. She is not diaphoretic.   HENT:      Head: Normocephalic and atraumatic.      Mouth/Throat:      Mouth: Mucous membranes are moist.      Pharynx: Oropharynx is clear.   Eyes:      General: No scleral icterus.     Extraocular Movements: Extraocular movements intact.   Cardiovascular:      Rate and Rhythm: Bradycardia present. Rhythm irregular.      Pulses: Normal pulses.      Heart sounds: No murmur heard.  Pulmonary:      Effort: Pulmonary effort is normal. No respiratory distress.      Breath sounds: No wheezing.   Abdominal:      Palpations: Abdomen is soft.      Tenderness: There is no abdominal tenderness.   Musculoskeletal:      Right lower leg: No edema.      Left lower leg: No  edema.   Skin:     General: Skin is warm and dry.      Capillary Refill: Capillary refill takes less than 2 seconds.   Neurological:      General: No focal deficit present.      Mental Status: She is alert and oriented to person, place, and time.   Psychiatric:         Mood and Affect: Mood normal.         Behavior: Behavior normal.       ----------------------------------------------------------------------------------------------------------------------    ----------------------------------------------------------------------------------------------------------------------  LABS:    CBC and coagulation:  Results from last 7 days   Lab Units 12/25/22  0007 12/24/22  1627 12/21/22  0959 12/19/22  1159   WBC 10*3/mm3 7.48 6.96 6.28 6.97   HEMOGLOBIN g/dL 16.4* 16.0* 14.9 13.6   HEMATOCRIT % 48.5* 46.3 43.3 41.3   MCV fL 102.5* 100.7* 100.7* 104.0*   MCHC g/dL 33.8 34.6 34.4 32.9   PLATELETS 10*3/mm3 128* 133* 126* 111*     Acid/base balance:      Renal and electrolytes:  Results from last 7 days   Lab Units 12/25/22  1438 12/25/22  0006 12/24/22  2027 12/24/22  1814 12/21/22  0959 12/19/22  1159   SODIUM mmol/L  --  136  --  137 133* 135*   POTASSIUM mmol/L  --  4.0  --  4.7 4.1 4.1   MAGNESIUM mg/dL 2.1  --  1.3*  --   --   --    CHLORIDE mmol/L  --  100  --  105 99 102   CO2 mmol/L  --  21.3*  --  20.0* 23.5 21.9*   BUN mg/dL  --  16  --  16 15 22   CREATININE mg/dL  --  0.98  --  0.87 0.73 0.98   CALCIUM mg/dL  --  9.2  --  8.8 9.2 9.3   GLUCOSE mg/dL  --  203*  --  194* 260* 210*     Estimated Creatinine Clearance: 70.5 mL/min (by C-G formula based on SCr of 0.98 mg/dL).    Liver and pancreatic function:  Results from last 7 days   Lab Units 12/25/22  0006 12/24/22  1814   ALBUMIN g/dL 3.26* 3.02*   BILIRUBIN mg/dL 0.9 0.6   ALK PHOS U/L 84 80   AST (SGOT) U/L 39* 37*   ALT (SGPT) U/L 31 28     Endocrine function:  Lab Results   Component Value Date    HGBA1C 7.80 (H) 10/21/2022     Point of care bedside glucose  levels:  Results from last 7 days   Lab Units 12/25/22  1340 12/25/22  0652 12/25/22  0505 12/24/22  2214 12/22/22  0543 12/21/22  2035 12/21/22  1721 12/21/22  1247 12/21/22  0532 12/20/22  2258 12/20/22  2020 12/20/22  1653 12/20/22  1056 12/20/22  0516   GLUCOSE mg/dL 237* 214* 182* 155* 144* 225* 237* 256* 142* 166* 253* 187* 206* 135*     Glucose levels from the CMP:  Results from last 7 days   Lab Units 12/25/22  0006 12/24/22  1814 12/21/22  0959 12/19/22  1159   GLUCOSE mg/dL 203* 194* 260* 210*     Lab Results   Component Value Date    TSH 0.414 12/17/2022    FREET4 1.19 11/18/2021     Cardiac:  Results from last 7 days   Lab Units 12/25/22  0811 12/25/22  0006 12/24/22 2125 12/24/22  1814   TROPONIN T ng/mL <0.010 <0.010 <0.010 <0.010   PROBNP pg/mL  --   --   --  6,200.0*       Cultures:  Lab Results   Component Value Date    COLORU Yellow 12/24/2022    CLARITYU Clear 12/24/2022    SPECGRAV 1.025 09/08/2020    PHUR 6.0 12/24/2022    PROTEINUR 18.0 02/24/2021    GLUCOSEU >=1000 mg/dL (3+) (A) 12/24/2022    KETONESU Negative 12/24/2022    BLOODU Negative 12/24/2022    NITRITEU Negative 12/24/2022    LEUKOCYTESUR Negative 12/24/2022    BILIRUBINUR Negative 12/24/2022    UROBILINOGEN 0.2 E.U./dL 12/24/2022    RBCUA 0-2 02/24/2021    WBCUA 13-20 (A) 02/24/2021    BACTERIA None Seen 02/24/2021     Microbiology Results (last 10 days)     Procedure Component Value - Date/Time    COVID PRE-OP / PRE-PROCEDURE SCREENING ORDER (NO ISOLATION) - Swab, Nasopharynx [[id number]]  (Normal) Collected: 12/24/22 1636    Lab Status: Final result Specimen: Swab from Nasopharynx Updated: 12/24/22 5587    Narrative:      The following orders were created for panel order COVID PRE-OP / PRE-PROCEDURE SCREENING ORDER (NO ISOLATION) - Swab, Nasopharynx.  Procedure                               Abnormality         Status                     ---------                               -----------         ------                     COVID-19  and FLU A/B PCR...[[id number]]  Normal              Final result                 Please view results for these tests on the individual orders.    COVID-19 and FLU A/B PCR - Swab, Nasopharynx [[id number]]  (Normal) Collected: 12/24/22 1636    Lab Status: Final result Specimen: Swab from Nasopharynx Updated: 12/24/22 1659     COVID19 Not Detected     Influenza A PCR Not Detected     Influenza B PCR Not Detected    Narrative:      Fact sheet for providers: https://www.fda.gov/media/563951/download    Fact sheet for patients: https://www.fda.gov/media/141309/download    Test performed by PCR.    Blood Culture - Blood, Arm, Left [[id number]]  (Normal) Collected: 12/17/22 0338    Lab Status: Final result Specimen: Blood from Arm, Left Updated: 12/22/22 0345     Blood Culture No growth at 5 days    COVID PRE-OP / PRE-PROCEDURE SCREENING ORDER (NO ISOLATION) - Swab, Nasopharynx [[id number]]  (Normal) Collected: 12/17/22 0148    Lab Status: Final result Specimen: Swab from Nasopharynx Updated: 12/17/22 0232    Narrative:      The following orders were created for panel order COVID PRE-OP / PRE-PROCEDURE SCREENING ORDER (NO ISOLATION) - Swab, Nasopharynx.  Procedure                               Abnormality         Status                     ---------                               -----------         ------                     COVID-19 and FLU A/B PCR...[[id number]]  Normal              Final result                 Please view results for these tests on the individual orders.    COVID-19 and FLU A/B PCR - Swab, Nasopharynx [[id number]]  (Normal) Collected: 12/17/22 0148    Lab Status: Final result Specimen: Swab from Nasopharynx Updated: 12/17/22 0232     COVID19 Not Detected     Influenza A PCR Not Detected     Influenza B PCR Not Detected    Narrative:      Fact sheet for providers: https://www.fda.gov/media/359699/download    Fact sheet for patients: https://www.fda.gov/media/610459/download    Test performed by PCR.    Blood  Culture - Blood, Arm, Left [[id number]]  (Normal) Collected: 12/17/22 0147    Lab Status: Final result Specimen: Blood from Arm, Left Updated: 12/22/22 0200     Blood Culture No growth at 5 days          No results found for: PREGTESTUR, PREGSERUM, HCG, HCGQUANT  Pain Management Panel     Pain Management Panel Latest Ref Rng & Units 2/24/2021 2/24/2021    CREATININE UR mg/dL 82.6 82.6          I have personally looked at the labs and they are summarized above.  ----------------------------------------------------------------------------------------------------------------------  Detailed radiology reports for the last 24 hours:    Imaging Results (Last 24 Hours)     Procedure Component Value Units Date/Time    XR Chest 1 View [[id number]] Collected: 12/24/22 1739     Updated: 12/24/22 1741    Narrative:      CR Chest 1 Vw    INDICATION:   Palpitations. External heart monitor     COMPARISON:    Chest x-ray 12/17/2022.    FINDINGS:  Portable AP view(s) of the chest.  There is mild cardiac silhouette enlargement. This is increased from previous and there is mild vascular congestion and interstitial edema with patchy bibasilar airspace disease. Cannot exclude a small amount of pleural  fluid. No pneumothorax. External cardiac device noted projected over the left hemithorax.      Impression:      Findings are most consistent with interval development of mild congestive heart failure. Follow-up to clearing is recommended.    Signer Name: Cherrie Wellington MD   Signed: 12/24/2022 5:39 PM   Workstation Name: [id number]    [facility]        I have personally looked at the radiology images over the last 24hrs, my personal read below:    ECG demonstrates junctional bradycardia w/narrow QRS complex and absence of discernable P-waves    Assessment & Plan      #Symptomatic bradycardia w/hx of SVT recently discharged 12/22  #Sick Sinus syndrome  -Patient with noted SVT hx progressing over last year becoming  incresingly symptomatic and frequent however has not had significant hypotension, syncope, or HF prior ot this admission however now with more significant sx that began shortly after last discharge. Went home on flecainide with BB held given tachy/janie syndrome but sx recurred shortly after discharge, return to ED delayed by weather.  -Now with some LE edema on arrival, improved w/IV lasix x1 dose and increased BNP previously wnl  -Had SVT on arrival w/rate ~150, cardiology consulted and received Metoprolol 5mg IV x3 doses and additional dose given on arrival to floor in addition to PO flecainide with bradycardia developing later in morning. Suspect acute worsening secondary to howard blockade but patient now >12hrs since last dose and remains pace maker dependent currently  -Patient currently stabilized in CCU on dopamine gtt to be titrated to HR >50bpm w/normal MAP, transcutaneous pacing at 10mV w/consistnet capture and back-up rate 50 bpm  -Agree patient needs EP study for possible SVT ablation however if symptomatic, hemodynamically significant bradycardia does not resolve within next 12hrs w/metoprolol washout, will discuss inpatient PM placement prior discharge. Patient could then be heavily beta blocked to suppress SVT conduction and remain pacemaker dependent     #Acute on Chronic diastolic HF  -Repeat ECHO pend, prn diuresis    #HypoMag  -Keep > 2.0    #pAF during last hospitalization  -Not on AC but has elevated chads-vasc as indication however currently with no p-waves noted and holding DOAC for possible procedural intervention  -Discuss AC closer to discharge, dvt prophylaxis in interim    - - Chronic - -    #T2DM: SSI w/accuchecks  #HTN: Hold antihypertensives on dopamine  #Hypothyroidism: Home synthroid    F: Reg, heart healthy  A: Tylenol and oxy prn   S: -  T: subQ heparin  H: HOB elevated  U: -   G: PRN  S: -  B: PRN  I: PIVs  D: Dopamine        VTE Prophylaxis:   Mechanical Order History:     None       Pharmalogical Order History:      Ordered     Dose Route Frequency Stop    12/24/22 5540  heparin (porcine) 5000 UNIT/ML injection 5,000 Units         5,000 Units SC Every 12 Hours Scheduled --                  Disposition: Cont CCU management    I have reviewed any copied/forwarded text or data, verified its accuracy, and updated as necessary above.    Abdiel León MD  [facility] Hospitalist  12/25/22  15:18 EST

## 2022-12-25 NOTE — SIGNIFICANT NOTE
Patient off dopamine gtt and pacing stopped now w/noted NSR on monitor w/rate 50-58 bpm and BP wnl. Patient w/o sx currently. Confirms bradycardia likely medication induced and will cont flecainide while avoiding BB. If patient has recurrent SVT would give adenosine to convert and observe underlying rhythm before giving IV metoprolol conservatively given now demonstrated significant response. Agree that if patient having sustained NSR w/rate >50 bpm, would cont flecainide and observation and if patient has no recurrent SVT then can refer to EP as outpatient for study/possible SVT ablation now that she has demonstrated NSR w/o high grade heart block.

## 2022-12-25 NOTE — PLAN OF CARE
Goal Outcome Evaluation:   Pt was admitted to the floor this shift. Pt received IV and PO metoprolol and flecainide do to HR in the 140s. Pt HR after medication has been 120s. Magnesium was replaced this shift. Pt complained of pain this shift. PRN medication was administered, see MAR. No complaints of chest pain or SOB this shift. No signs and symptoms of acute distress noted.     Update:   Contacted by telemetry stating pt HR in 30s. EKG obtained, Conner GRESHAM MD, and Cardiologist informed see orders see MAR. Will continue to monitor.

## 2022-12-25 NOTE — PLAN OF CARE
Goal Outcome Evaluation:  Plan of Care Reviewed With: patient, spouse        Progress: improving  Outcome Evaluation: Pt remains a/ox4. VSS, external pacer stopped per Dr. León at bedside. Remains sinus janie. Titrated off dobutamine gtt. Room air. Pt's spouse remains at bedside for duration of shift. Pt currently resting comfortably in bed, call light in reach, alarms set.

## 2022-12-25 NOTE — NURSING NOTE
Patient currently being externally paced due to low HR per Dr. León. Patient will be transferred to CCU. Spoke with spouse Tristin about patient status, updated spouse, and made aware of room change.

## 2022-12-25 NOTE — ED PROVIDER NOTES
Subjective   History of Present Illness  Patient presents to ER with rapid heartrate. Was discharged recently for same. Medications had been changed from Metoprolol to Flecanide.    Palpitations  Palpitations quality:  Fast  Onset quality:  Gradual  Timing:  Constant  Chronicity:  Recurrent  Relieved by:  Nothing  Worsened by:  Nothing  Ineffective treatments:  Beta blockers  Associated symptoms: shortness of breath        Review of Systems   Constitutional: Positive for fatigue.   HENT: Negative.    Eyes: Negative.    Respiratory: Positive for shortness of breath.    Cardiovascular: Positive for palpitations.   Gastrointestinal: Negative.    Endocrine: Negative.    Genitourinary: Negative.    Musculoskeletal: Negative.    Skin: Negative.    Allergic/Immunologic: Negative.    Neurological: Negative.    Hematological: Negative.    Psychiatric/Behavioral: Negative.        Past Medical History:   Diagnosis Date   • Anxiety    • B12 deficiency    • BPV (benign positional vertigo)    • Breast cancer (HCC) 05/2021   • Diabetes mellitus (HCC)    • Diarrhea    • Disease of thyroid gland    • Elevated cholesterol    • Fibromyalgia    • GERD (gastroesophageal reflux disease)    • Glaucoma    • Heart murmur    • Hyperlipidemia    • Hypertension    • Kidney disease    • Obesity    • Peptic ulceration    • PONV (postoperative nausea and vomiting)    • Sleep apnea     c-pap   • Stroke (HCC)    • Weakness of left arm        Allergies   Allergen Reactions   • Codeine GI Intolerance     Increased heart rate     • Sulfa Antibiotics GI Intolerance     Heart rate increase        Past Surgical History:   Procedure Laterality Date   • ABDOMINAL SURGERY     • APPENDECTOMY     • BREAST ABSCESS INCISION AND DRAINAGE Right 7/7/2022    Procedure: BREAST ABSCESS INCISION AND DRAINAGE;  Surgeon: Lee Parrish MD;  Location: [facility];  Service: General;  Laterality: Right;   • BREAST CYST ASPIRATION     • BREAST LUMPECTOMY WITH SENTINEL  NODE BIOPSY Right 2021    Procedure: BREAST LUMPECTOMY WITH SENTINEL NODE BIOPSY;  Surgeon: Lee Parrish MD;  Location:  [facility];  Service: General;  Laterality: Right;   • CATARACT EXTRACTION Bilateral    • CHOLECYSTECTOMY     • COLONOSCOPY     • ENDOSCOPY     • HEMATOMA EVACUATION TRUNK Right 2021    Procedure: HEMATOMA EVACUATION TRUNK;  Surgeon: Lee Parrish MD;  Location:  [facility];  Service: General;  Laterality: Right;   • URETHRA SURGERY         Family History   Problem Relation Age of Onset   • Thyroid disease Mother    • Diabetes Mother    • Hyperlipidemia Father    • Hypertension Father    • Heart attack Father    • Alcohol abuse Maternal Aunt    • Cancer Maternal Grandfather         PROSTATE   • Stroke Paternal Grandmother    • Stroke Paternal Grandfather    • Hypertension Other    • Hypertension Sister    • Breast cancer Sister 40   • Obesity Sister    • Hypertension Sister    • Hyperlipidemia Sister    • Hyperlipidemia Sister    • Hypertension Sister    • Arthritis Sister    • Obesity Sister    • Thyroid disease Sister        Social History     Socioeconomic History   • Marital status:    Tobacco Use   • Smoking status: Former     Packs/day: 1.50     Years: 8.00     Pack years: 12.00     Types: Cigarettes     Quit date:      Years since quittin.0   • Smokeless tobacco: Never   Vaping Use   • Vaping Use: Never used   Substance and Sexual Activity   • Alcohol use: No     Comment: former social drinker not had anything in 25 + years   • Sexual activity: Defer           Objective   Physical Exam  Vitals and nursing note reviewed.   HENT:      Head: Normocephalic.      Nose: Nose normal.      Mouth/Throat:      Mouth: Mucous membranes are moist.   Eyes:      Pupils: Pupils are equal, round, and reactive to light.   Cardiovascular:      Rate and Rhythm: Regular rhythm. Tachycardia present.   Pulmonary:      Comments: Decreased  Breath sounds at bases  Abdominal:       General: Abdomen is flat.   Musculoskeletal:         General: Normal range of motion.      Cervical back: Normal range of motion.   Skin:     General: Skin is warm and dry.   Neurological:      General: No focal deficit present.      Mental Status: She is alert.   Psychiatric:         Mood and Affect: Mood normal.         Procedures           ED Course  ED Course as of 12/29/22 1407   Sat Dec 24, 2022   1614 ECG 16:01 Supraventricular tachycardia, rate 150. ST andt wave abnormality, consistent with inferior ischemia. qT/qTc 314/504 [DARIAN]   1900 CXR : mild CHF [DARIAN]   1951 ECG 19:48 Supraventricular tachycardia, rate 133. Nonspecific T wave abnormality.  [DARIAN]   2022 Dr Way consulted, recommends metoprolol and Tambocor be given together [DARIAN]      ED Course User Index  [DARIAN] Topher Logan MD                                           [facility]    Final diagnoses:   Supraventricular tachycardia (HCC)       ED Disposition  ED Disposition     ED Disposition   Decision to Admit    Condition   --    Comment   Level of Care: Telemetry [5]   Diagnosis: Supraventricular tachycardia (HCC) [206069]   Admitting Physician: DIANA SAEED [[id number]]   Attending Physician: DIANA SAEED [[id number]]   Certification: I Certify That Inpatient Hospital Services Are Medically Necessary For Greater Than 2 Midnights               Mague Acosta, APRN  [address] [id number] [phone]      1 week with BMP and CBC; hospital follow up SVT         Medication List      New Prescriptions    lisinopril 10 MG tablet  Commonly known as: PRINIVIL,ZESTRIL  Take 1 tablet by mouth Daily.  Start taking on: December 30, 2022     Magnesium Oxide 400 (240 Mg) MG tablet  Commonly known as: MAGnesium-Oxide  Take 1 tablet by mouth 2 (Two) Times a Day.  Notes to patient: Separate from levothyroxine by at least 4 hours        Stop    FLUoxetine 20 MG capsule  Commonly known as: PROzac           Where to Get Your Medications      These  medications were sent to [facility] [place] [address] [place] [address]    Hours: 8AM-6PM Mon-Fri Phone: [phone]   · lisinopril 10 MG tablet  · Magnesium Oxide 400 (240 Mg) MG tablet          Topher Logan MD  12/29/22 7083

## 2022-12-26 ENCOUNTER — READMISSION MANAGEMENT (OUTPATIENT)
Dept: CALL CENTER | Facility: HOSPITAL | Age: 67
End: 2022-12-26

## 2022-12-26 LAB
ANION GAP SERPL CALCULATED.3IONS-SCNC: 10.9 MMOL/L (ref 5–15)
BUN SERPL-MCNC: 23 MG/DL (ref 8–23)
BUN/CREAT SERPL: 20.7 (ref 7–25)
CALCIUM SPEC-SCNC: 8.6 MG/DL (ref 8.6–10.5)
CHLORIDE SERPL-SCNC: 103 MMOL/L (ref 98–107)
CO2 SERPL-SCNC: 23.1 MMOL/L (ref 22–29)
CREAT SERPL-MCNC: 1.11 MG/DL (ref 0.57–1)
DEPRECATED RDW RBC AUTO: 52.3 FL (ref 37–54)
EGFRCR SERPLBLD CKD-EPI 2021: 54.6 ML/MIN/1.73
ERYTHROCYTE [DISTWIDTH] IN BLOOD BY AUTOMATED COUNT: 13.7 % (ref 12.3–15.4)
GLUCOSE BLDC GLUCOMTR-MCNC: 130 MG/DL (ref 70–130)
GLUCOSE BLDC GLUCOMTR-MCNC: 138 MG/DL (ref 70–130)
GLUCOSE BLDC GLUCOMTR-MCNC: 176 MG/DL (ref 70–130)
GLUCOSE BLDC GLUCOMTR-MCNC: 192 MG/DL (ref 70–130)
GLUCOSE SERPL-MCNC: 161 MG/DL (ref 65–99)
HCT VFR BLD AUTO: 42.1 % (ref 34–46.6)
HGB BLD-MCNC: 14 G/DL (ref 12–15.9)
MAGNESIUM SERPL-MCNC: 2.1 MG/DL (ref 1.6–2.4)
MCH RBC QN AUTO: 34.6 PG (ref 26.6–33)
MCHC RBC AUTO-ENTMCNC: 33.3 G/DL (ref 31.5–35.7)
MCV RBC AUTO: 104 FL (ref 79–97)
PLATELET # BLD AUTO: 126 10*3/MM3 (ref 140–450)
PMV BLD AUTO: 10.1 FL (ref 6–12)
POTASSIUM SERPL-SCNC: 4.2 MMOL/L (ref 3.5–5.2)
QT INTERVAL: 560 MS
QTC INTERVAL: 505 MS
RBC # BLD AUTO: 4.05 10*6/MM3 (ref 3.77–5.28)
SODIUM SERPL-SCNC: 137 MMOL/L (ref 136–145)
WBC NRBC COR # BLD: 7.32 10*3/MM3 (ref 3.4–10.8)

## 2022-12-26 PROCEDURE — 85027 COMPLETE CBC AUTOMATED: CPT | Performed by: STUDENT IN AN ORGANIZED HEALTH CARE EDUCATION/TRAINING PROGRAM

## 2022-12-26 PROCEDURE — 80048 BASIC METABOLIC PNL TOTAL CA: CPT | Performed by: STUDENT IN AN ORGANIZED HEALTH CARE EDUCATION/TRAINING PROGRAM

## 2022-12-26 PROCEDURE — 97162 PT EVAL MOD COMPLEX 30 MIN: CPT

## 2022-12-26 PROCEDURE — 99233 SBSQ HOSP IP/OBS HIGH 50: CPT | Performed by: INTERNAL MEDICINE

## 2022-12-26 PROCEDURE — 83735 ASSAY OF MAGNESIUM: CPT | Performed by: STUDENT IN AN ORGANIZED HEALTH CARE EDUCATION/TRAINING PROGRAM

## 2022-12-26 PROCEDURE — 93005 ELECTROCARDIOGRAM TRACING: CPT | Performed by: HOSPITALIST

## 2022-12-26 PROCEDURE — 25010000002 HEPARIN (PORCINE) PER 1000 UNITS: Performed by: HOSPITALIST

## 2022-12-26 PROCEDURE — 82962 GLUCOSE BLOOD TEST: CPT

## 2022-12-26 PROCEDURE — 94761 N-INVAS EAR/PLS OXIMETRY MLT: CPT

## 2022-12-26 PROCEDURE — 94799 UNLISTED PULMONARY SVC/PX: CPT

## 2022-12-26 RX ADMIN — Medication 10 ML: at 09:11

## 2022-12-26 RX ADMIN — ASPIRIN 81 MG: 81 TABLET, COATED ORAL at 09:11

## 2022-12-26 RX ADMIN — LEVOTHYROXINE SODIUM 88 MCG: 88 TABLET ORAL at 09:11

## 2022-12-26 RX ADMIN — ACETAMINOPHEN 650 MG: 325 TABLET, FILM COATED ORAL at 09:24

## 2022-12-26 RX ADMIN — ANASTROZOLE 1 MG: 1 TABLET ORAL at 09:11

## 2022-12-26 RX ADMIN — ROSUVASTATIN CALCIUM 20 MG: 20 TABLET, FILM COATED ORAL at 09:11

## 2022-12-26 RX ADMIN — Medication 10 ML: at 20:15

## 2022-12-26 RX ADMIN — FLECAINIDE ACETATE 50 MG: 50 TABLET ORAL at 20:15

## 2022-12-26 RX ADMIN — FLECAINIDE ACETATE 50 MG: 50 TABLET ORAL at 09:11

## 2022-12-26 RX ADMIN — CHOLECALCIFEROL TAB 10 MCG (400 UNIT) 1000 UNITS: 10 TAB at 09:11

## 2022-12-26 RX ADMIN — HEPARIN SODIUM 5000 UNITS: 5000 INJECTION INTRAVENOUS; SUBCUTANEOUS at 20:15

## 2022-12-26 RX ADMIN — HEPARIN SODIUM 5000 UNITS: 5000 INJECTION INTRAVENOUS; SUBCUTANEOUS at 09:11

## 2022-12-26 NOTE — THERAPY EVALUATION
Acute Care - Physical Therapy Initial Evaluation   Krishna     Patient Name: Ronna Wayne  : 1955  MRN: [id number]  Today's Date: 2022   Onset of Illness/Injury or Date of Surgery: 22  Visit Dx:     ICD-10-CM ICD-9-CM   1. Supraventricular tachycardia (HCC)  I47.1 427.89     Patient Active Problem List   Diagnosis   • Frequent headaches   • Memory loss   • Essential hypertension   • Dyslipidemia   • Type 2 diabetes mellitus with hyperglycemia, with long-term current use of insulin (HCC)   • Obstructive sleep apnea syndrome   • Glaucoma   • Anxiety   • GERD (gastroesophageal reflux disease)   • Morbid obesity   • Weakness of left arm   • Cerebrovascular accident (CVA) due to embolism (HCC)   • SVT (supraventricular tachycardia) (HCC)   • Vitamin D deficiency   • B12 deficiency   • Malignant neoplasm of upper-outer quadrant of right breast in female, estrogen receptor positive (HCC)   • Other specified hypothyroidism   • Personal history of colonic polyps   • Depression   • Paroxysmal SVT (supraventricular tachycardia) (HCC)   • Supraventricular tachycardia (HCC)     Past Medical History:   Diagnosis Date   • Anxiety    • B12 deficiency    • BPV (benign positional vertigo)    • Breast cancer (HCC) 2021   • Diabetes mellitus (HCC)    • Diarrhea    • Disease of thyroid gland    • Elevated cholesterol    • Fibromyalgia    • GERD (gastroesophageal reflux disease)    • Glaucoma    • Heart murmur    • Hyperlipidemia    • Hypertension    • Kidney disease    • Obesity    • Peptic ulceration    • PONV (postoperative nausea and vomiting)    • Sleep apnea     c-pap   • Stroke (HCC)    • Weakness of left arm      Past Surgical History:   Procedure Laterality Date   • ABDOMINAL SURGERY     • APPENDECTOMY     • BREAST ABSCESS INCISION AND DRAINAGE Right 2022    Procedure: BREAST ABSCESS INCISION AND DRAINAGE;  Surgeon: Lee Parrish MD;  Location: [facility];  Service: General;   Laterality: Right;   • BREAST CYST ASPIRATION     • BREAST LUMPECTOMY WITH SENTINEL NODE BIOPSY Right 8/5/2021    Procedure: BREAST LUMPECTOMY WITH SENTINEL NODE BIOPSY;  Surgeon: Lee Parrish MD;  Location:  [facility];  Service: General;  Laterality: Right;   • CATARACT EXTRACTION Bilateral    • CHOLECYSTECTOMY     • COLONOSCOPY     • ENDOSCOPY     • HEMATOMA EVACUATION TRUNK Right 8/5/2021    Procedure: HEMATOMA EVACUATION TRUNK;  Surgeon: Lee Parrish MD;  Location:  [facility];  Service: General;  Laterality: Right;   • URETHRA SURGERY       PT Assessment (last 12 hours)     PT Evaluation and Treatment     Row Name 12/26/22 0830          Physical Therapy Time and Intention    Subjective Information no complaints  -CS     Document Type evaluation  -CS     Mode of Treatment physical therapy  -CS     Patient Effort adequate  -CS     Symptoms Noted During/After Treatment none  -CS     Comment Pt seen bedside this AM.  Pt well-known to this therapist from recent admission.  Pt reports being home 1 day and having to return w/ fast heart rate.  Pt agreed to evaluation.  -CS     Row Name 12/26/22 0830          General Information    Patient Profile Reviewed yes  -CS     Onset of Illness/Injury or Date of Surgery 12/24/22  -CS     Referring Physician Bernice  -CS     Patient Observations alert;cooperative;agree to therapy  -CS     Prior Level of Function independent:  -CS     Risks Reviewed patient:;LOB;dizziness;increased discomfort;change in vital signs;lines disloged  -CS     Benefits Reviewed patient:;improve function;increase independence;increase strength;increase balance;decrease pain;decrease risk of DVT;improve skin integrity;increase knowledge  -CS     Row Name 12/26/22 0830          Home Use of Assistive/Adaptive Equipment    Equipment Currently Used at Home cane, straight;bp cuff;glucometer  -CS     Row Name 12/26/22 0830          Pain    Additional Documentation --  no c/o  -CS     Row Name  12/26/22 0830          Cognition    Orientation Status (Cognition) oriented x 4  -CS     Row Name 12/26/22 0830          Range of Motion (ROM)    Range of Motion --  (B)LE WFL  -CS     Row Name 12/26/22 0830          Strength Comprehensive (MMT)    Comment, General Manual Muscle Testing (MMT) Assessment (B)LE grossly 4+/5  -CS     Row Name 12/26/22 0830          Bed Mobility    Bed Mobility bed mobility (all) activities  -CS     All Activities, [place] (Bed Mobility) standby assist  -CS     Assistive Device (Bed Mobility) head of bed elevated  -CS     Row Name 12/26/22 0830          Transfers    Comment, (Transfers) SBA  -CS     Row Name 12/26/22 0830          Gait/Stairs (Locomotion)    Comment, (Gait/Stairs) Deferred 2/2 CCU environment  -CS     Row Name 12/26/22 0830          Plan of Care Review    Plan of Care Reviewed With patient  -CS     Progress improving  -CS     Outcome Evaluation Pt presents w/ good bedside mobility but limited standing mobility 2/2 CCU environment.  Pt would benefit from skilled PT while in house to restore standing mobility but anticipte d/c home when medically ready.  -CS     Row Name 12/26/22 0830          Positioning and Restraints    Pre-Treatment Position in bed  -CS     Post Treatment Position bed  -CS     In Bed supine  -CS     Row Name 12/26/22 0830          Therapy Assessment/Plan (PT)    Functional Level at Time of Evaluation (PT) SBA  -CS     PT Diagnosis (PT) decreased functional mobility  -CS     Rehab Potential (PT) good, to achieve stated therapy goals  -CS     Criteria for Skilled Interventions Met (PT) yes;meets criteria;skilled treatment is necessary  -CS     Therapy Frequency (PT) 2 times/wk  2-5x/week  -CS     Predicted Duration of Therapy Intervention (PT) while in house  -CS     Problem List (PT) problems related to;balance;mobility  -CS     Activity Limitations Related to Problem List (PT) unable to ambulate safely;unable to transfer safely  -CS     Comment,  Therapy Assessment/Plan (PT) Pt presents w/ good bedside mobility but limited standing mobility 2/2 CCU environment.  Pt would benefit from skilled PT while in house to restore standing mobility but anticipte d/c home when medically ready.  -CS     Row Name 12/26/22 0830          PT Evaluation Complexity    History, PT Evaluation Complexity 3 or more personal factors and/or comorbidities  -CS     Examination of Body Systems (PT Eval Complexity) total of 4 or more elements  -CS     Clinical Presentation (PT Evaluation Complexity) evolving  -CS     Clinical Decision Making (PT Evaluation Complexity) high complexity  -CS     Overall Complexity (PT Evaluation Complexity) moderate complexity  -CS     Row Name 12/26/22 0830          Therapy Plan Review/Discharge Plan (PT)    Therapy Plan Review (PT) evaluation/treatment results reviewed;care plan/treatment goals reviewed;risks/benefits reviewed;current/potential barriers reviewed;participants voiced agreement with care plan;participants included;patient  -CS     Row Name 12/26/22 0830          Physical Therapy Goals    Gait Training Goal Selection (PT) gait training, PT goal 1  -     Row Name 12/26/22 0830          Gait Training Goal 1 (PT)    Activity/Assistive Device (Gait Training Goal 1, PT) gait (walking locomotion)  -CS     [place] Level (Gait Training Goal 1, PT) independent  -CS     Distance (Gait Training Goal 1, PT) 150'+  -CS     Time Frame (Gait Training Goal 1, PT) long term goal (LTG);by discharge  -CS           User Key  (r) = Recorded By, (t) = Taken By, (c) = Cosigned By    Initials Name Provider Type    CS Gordon Beltran, PT Physical Therapist                Physical Therapy Education     Title: PT OT SLP Therapies (Done)     Topic: Physical Therapy (Done)     Point: Mobility training (Done)     Learning Progress Summary           Patient Acceptance, E,TB, VU by  at 12/26/2022 0988                   Point: Home exercise program (Done)      Learning Progress Summary           Patient Acceptance, E,TB, VU by  at 12/26/2022 0939                   Point: Body mechanics (Done)     Learning Progress Summary           Patient Acceptance, E,TB, VU by  at 12/26/2022 0939                   Point: Precautions (Done)     Learning Progress Summary           Patient Acceptance, E,TB, VU by  at 12/26/2022 0939                               User Key     Initials Effective Dates Name Provider Type Discipline     05/24/22 -  Gordon Beltran, PT Physical Therapist PT              PT Recommendation and Plan  Anticipated Discharge Disposition (PT): home  Planned Therapy Interventions (PT): balance training, bed mobility training, gait training, home exercise program, neuromuscular re-education, patient/family education, strengthening, transfer training  Therapy Frequency (PT): 2 times/wk (2-5x/week)  Plan of Care Reviewed With: patient  Progress: improving  Outcome Evaluation: Pt presents w/ good bedside mobility but limited standing mobility 2/2 CCU environment.  Pt would benefit from skilled PT while in house to restore standing mobility but anticipte d/c home when medically ready.       Time Calculation:    PT Charges     Row Name 12/26/22 0940             Time Calculation    Start Time 0830  -      PT Received On 12/26/22  -      PT Goal Re-Cert Due Date 01/09/23  -            User Key  (r) = Recorded By, (t) = Taken By, (c) = Cosigned By    Initials Name Provider Type     Gordon Beltran, PT Physical Therapist              Therapy Charges for Today     Code Description Service Date Service Provider Modifiers Qty    12960564879 HC PT EVAL MOD COMPLEXITY 4 12/26/2022 Gordon Beltran, PT GP 1          PT G-Codes  AM-PAC 6 Clicks Score (PT): 15    Gordon Beltran PT  12/26/2022

## 2022-12-26 NOTE — OUTREACH NOTE
Medical Week 2 Survey    Flowsheet Row Responses   [facility] of hospitals in [place] facility patient discharged from? [facility]   Does the patient have one of the following disease processes/diagnoses(primary or secondary)? Other   Week 2 attempt successful? No   Unsuccessful attempts Attempt 1   Revoke Readmitted          EUGENE HOLLEY - Registered Nurse

## 2022-12-26 NOTE — PROGRESS NOTES
LOS: 2 days     Name: Ronna Wayne  Age/Sex: 67 y.o. female  :  1955        PCP: Mague cAosta APRN  REF: No Known Provider    Principal Problem:    Supraventricular tachycardia (HCC)      Reason for follow-up:    Subjective       Subjective     HPI consult  67-year-old obese white female who has a known history of arrhythmia, SVT  Patient was continued on flecainide but not on beta-blocker patient started to have complaint of palpitation therefore came to the emergency room patient was in tachycardia heart rate of 150 bpm it appears to be flutter  Patient did receive IV metoprolol multiple times later on during the night patient become junctional and bradycardic started on transcutaneous pacer and transferred to ICU  Patient will be started on dopamine drip.     Interval History:    Patient remains off dopamine drip.  Heart rate is about 58 to 60 bpm sinus.  Patient has not had any significant cardiac arrhythmias overnight.  No complaints of palpitations or chest pain.    ROS    Vital Signs  Temp:  [97.9 °F (36.6 °C)-98.2 °F (36.8 °C)] 98.1 °F (36.7 °C)  Heart Rate:  [49-58] 51  Resp:  [-18] 16  BP: ()/() 147/129  Vital Signs (last 72 hrs)        0700   0659  0700   0659  0700   0659  0700   1635   Most Recent      Temp (°F)   97.8 -  98    97.9 -  98.2    97.9 -  98.1     98.1 (36.7)  1200    Heart Rate   32 -  150    31 -  74    49 -  58     51  1500    Resp   18 -  20    12 -  16    14 -  18     16  1500    BP   80/42 -  134/98    98/69 -  153/58    107/62 -  151/72     147/129  1500    SpO2 (%)   95 -  97    88 -  97    86 -  95     95  1500        Documented weights    22 1606 22 2218 22 1001   Weight: 125 kg (275 lb) 121 kg (267 lb 3.5 oz) 122 kg (269 lb 6.4 oz)      Body mass index is 47.72 kg/m².    Intake/Output Summary (Last 24 hours) at 2022 3525  Last data filed at  12/26/2022 0800  Gross per 24 hour   Intake 480 ml   Output 1250 ml   Net -770 ml     Objective    Objective       Physical Exam:     General Appearance:    Alert, cooperative, in no acute distress, obese   Head:    Normocephalic, without obvious abnormality, atraumatic   Eyes:            Conjunctivae and sclerae normal, no   icterus, no pallor, corneas clear.   Neck:   No adenopathy, supple, trachea midline, no thyromegaly, no   carotid bruit, no JVD   Lungs:     Clear to auscultation,respirations regular, even and                  unlabored    Heart:    Regular rhythm and normal rate, normal S1 and S2, no            murmur, no gallop, no rub, no click   Chest Wall:    No abnormalities observed   Abdomen:     Normal bowel sounds, no masses, no organomegaly, soft        nontender, nondistended, no guarding, no rebound                tenderness   Extremities:   Moves all extremities well, no edema, no cyanosis, no             redness   Pulses:   Pulses palpable and equal bilaterally   Skin:   No bleeding, bruising or rash       Neurologic:  Alert and oriented x3          Procedures    Results review       Results Review:   Results from last 7 days   Lab Units 12/26/22  0058 12/25/22  0007 12/24/22  1627 12/21/22  0959   WBC 10*3/mm3 7.32 7.48 6.96 6.28   HEMOGLOBIN g/dL 14.0 16.4* 16.0* 14.9   PLATELETS 10*3/mm3 126* 128* 133* 126*     Results from last 7 days   Lab Units 12/26/22  0058 12/25/22  0006 12/24/22  1814 12/21/22  0959   SODIUM mmol/L 137 136 137 133*   POTASSIUM mmol/L 4.2 4.0 4.7 4.1   CHLORIDE mmol/L 103 100 105 99   CO2 mmol/L 23.1 21.3* 20.0* 23.5   BUN mg/dL 23 16 16 15   CREATININE mg/dL 1.11* 0.98 0.87 0.73   CALCIUM mg/dL 8.6 9.2 8.8 9.2   GLUCOSE mg/dL 161* 203* 194* 260*   ALT (SGPT) U/L  --  31 28  --    AST (SGOT) U/L  --  39* 37*  --      Results from last 7 days   Lab Units 12/25/22  0811 12/25/22  0006 12/24/22  2125 12/24/22  1814   TROPONIN T ng/mL <0.010 <0.010 <0.010 <0.010     Lab  Results   Component Value Date    INR 1.06 12/17/2022    INR 1.02 11/18/2021    INR 1.05 06/19/2021    INR 1.05 05/01/2021    INR 1.09 03/09/2021     Lab Results   Component Value Date    MG 2.1 12/26/2022    MG 2.1 12/25/2022    MG 1.3 (L) 12/24/2022     Lab Results   Component Value Date    TSH 0.414 12/17/2022    CHLPL 226 (H) 05/28/2016    TRIG 200 (H) 10/21/2022    HDL 51 10/21/2022     (H) 10/21/2022      Imaging Results (Last 48 Hours)     Procedure Component Value Units Date/Time    XR Chest 1 View [[id number]] Collected: 12/24/22 1739     Updated: 12/24/22 1741    Narrative:      CR Chest 1 Vw    INDICATION:   Palpitations. External heart monitor     COMPARISON:    Chest x-ray 12/17/2022.    FINDINGS:  Portable AP view(s) of the chest.  There is mild cardiac silhouette enlargement. This is increased from previous and there is mild vascular congestion and interstitial edema with patchy bibasilar airspace disease. Cannot exclude a small amount of pleural  fluid. No pneumothorax. External cardiac device noted projected over the left hemithorax.      Impression:      Findings are most consistent with interval development of mild congestive heart failure. Follow-up to clearing is recommended.    Signer Name: Cherrie Wellington MD   Signed: 12/24/2022 5:39 PM   [email or web address]    [facility]            ECG      Echo   Results for orders placed during the hospital encounter of 12/17/22    Adult Transthoracic Echo Complete W/ Cont if Necessary Per Protocol    Interpretation Summary  •  Left ventricular systolic function is normal. Left ventricular ejection fraction appears to be 61 - 65%.  •  Left ventricular diastolic function is consistent with (grade II w/high LAP) pseudonormalization.  •  Left atrial volume is mildly increased.  •  The right atrial cavity is mildly  dilated.  •  Mild aortic valve stenosis is present, with an estimated aortic valve area of 1.8 cm² and the mean  gradient of 19 mmHg.  •  Estimated right ventricular systolic pressure from tricuspid regurgitation is mildly elevated (35-45 mmHg).          Telemetry: Sinus rhythm rate of 58 to 60 bpm       Medication Review:   anastrozole, 1 mg, Oral, Daily  aspirin, 81 mg, Oral, Daily  cholecalciferol, 1,000 Units, Oral, Daily  flecainide, 50 mg, Oral, Q12H  heparin (porcine), 5,000 Units, Subcutaneous, Q12H  Insulin Aspart, 0-9 Units, Subcutaneous, TID AC  levothyroxine, 88 mcg, Oral, Daily  rosuvastatin, 20 mg, Oral, Daily  sodium chloride, 10 mL, Intravenous, Q12H  sodium chloride, 10 mL, Intravenous, Q12H             Assessment      Assessment:    SVT  Acute on chronic HFpEF  Obesity  Diabetes mellitus       Plan     Recommendations:  As per the cardiology consultation note, consider EP evaluation as outpatient  Close monitoring of the electrolytes, keep magnesium and potassium in adequate range.  Patient is on flecainide at this time.  Watch for SVT in the absence of beta-blockers.  Increase activity and transfer out of critical care tomorrow if stable.           Yuri York MD  12/26/22  16:35 EST    Please note that portions of this note were completed with a voice recognition program.

## 2022-12-26 NOTE — PLAN OF CARE
Goal Outcome Evaluation:  Plan of Care Reviewed With: patient        Progress: improving  Outcome Evaluation: Pt presents w/ good bedside mobility but limited standing mobility 2/2 CCU environment.  Pt would benefit from skilled PT while in house to restore standing mobility but anticipte d/c home when medically ready.

## 2022-12-26 NOTE — PLAN OF CARE
Goal Outcome Evaluation:  Plan of Care Reviewed With: patient        Progress: improving  Outcome Evaluation: Pt remains alert and oriented x4 on room air. Sinus janie in the 50s throughout shift. External catheter in place with adequate UOP. Afebrile throughout shift. VSS. Call light in reach.

## 2022-12-26 NOTE — PLAN OF CARE
Problem: Adult Inpatient Plan of Care  Goal: Plan of Care Review  Outcome: Ongoing, Progressing  Goal: Patient-Specific Goal (Individualized)  Outcome: Ongoing, Progressing  Goal: Absence of Hospital-Acquired Illness or Injury  Outcome: Ongoing, Progressing  Intervention: Identify and Manage Fall Risk  Recent Flowsheet Documentation  Taken 12/26/2022 1800 by Willa Schaefer RN  Safety Promotion/Fall Prevention: safety round/check completed  Taken 12/26/2022 1700 by Willa Schaefer RN  Safety Promotion/Fall Prevention: safety round/check completed  Taken 12/26/2022 1600 by Willa Schaefer RN  Safety Promotion/Fall Prevention: safety round/check completed  Taken 12/26/2022 1500 by Willa Schaefer RN  Safety Promotion/Fall Prevention: safety round/check completed  Taken 12/26/2022 1400 by Willa Schaefer RN  Safety Promotion/Fall Prevention: safety round/check completed  Taken 12/26/2022 1300 by Willa Schaefer RN  Safety Promotion/Fall Prevention: safety round/check completed  Taken 12/26/2022 1200 by Willa Schaefer RN  Safety Promotion/Fall Prevention: safety round/check completed  Taken 12/26/2022 1100 by Willa Schaefer RN  Safety Promotion/Fall Prevention: safety round/check completed  Taken 12/26/2022 1000 by Willa Schaefer RN  Safety Promotion/Fall Prevention: safety round/check completed  Taken 12/26/2022 0900 by Willa Schaefer RN  Safety Promotion/Fall Prevention: safety round/check completed  Taken 12/26/2022 0800 by Willa Schaefer RN  Safety Promotion/Fall Prevention: safety round/check completed  Taken 12/26/2022 0700 by Willa Schaefer RN  Safety Promotion/Fall Prevention: safety round/check completed  Intervention: Prevent Skin Injury  Recent Flowsheet Documentation  Taken 12/26/2022 1800 by Willa Schaefer RN  Body Position: position changed independently  Taken 12/26/2022 1600 by Willa Schaefer RN  Body Position: position changed independently  Taken 12/26/2022 1400 by Willa Schaefer RN  Body  Position:   right   position changed independently   tilted  Skin Protection: adhesive use limited  Taken 12/26/2022 1200 by Willa Schaefer RN  Body Position: sitting up in bed  Taken 12/26/2022 1000 by Willa Schaefer RN  Body Position:   side-lying   left  Taken 12/26/2022 0800 by Willa Schaefer RN  Body Position:   right   side-lying  Skin Protection: adhesive use limited  Intervention: Prevent and Manage VTE (Venous Thromboembolism) Risk  Recent Flowsheet Documentation  Taken 12/26/2022 1800 by Willa Schaefer RN  Activity Management: activity adjusted per tolerance  Taken 12/26/2022 1600 by Willa Schaefer RN  Activity Management: activity adjusted per tolerance  Taken 12/26/2022 1400 by Willa Schaefer RN  Activity Management: activity adjusted per tolerance  Range of Motion: active ROM (range of motion) encouraged  Taken 12/26/2022 0800 by Willa Schaefer RN  Activity Management: bedrest  VTE Prevention/Management: (see MAR/Orders) other (see comments)  Range of Motion: active ROM (range of motion) encouraged  Goal: Optimal Comfort and Wellbeing  Outcome: Ongoing, Progressing  Intervention: Provide Person-Centered Care  Recent Flowsheet Documentation  Taken 12/26/2022 1400 by Willa Schaefer RN  Trust Relationship/Rapport:   care explained   questions answered  Taken 12/26/2022 0800 by Willa Schaefer RN  Trust Relationship/Rapport:   care explained   questions answered   questions encouraged   reassurance provided   thoughts/feelings acknowledged  Goal: Readiness for Transition of Care  Outcome: Ongoing, Progressing     Problem: Diabetes Comorbidity  Goal: Blood Glucose Level Within Targeted Range  Outcome: Ongoing, Progressing  Intervention: Monitor and Manage Glycemia  Recent Flowsheet Documentation  Taken 12/26/2022 0800 by Willa Schaefer RN  Glycemic Management: blood glucose monitored     Problem: Hypertension Comorbidity  Goal: Blood Pressure in Desired Range  Outcome: Ongoing,  Progressing  Intervention: Maintain Blood Pressure Management  Recent Flowsheet Documentation  Taken 12/26/2022 1800 by Willa Schaefer RN  Medication Review/Management: medications reviewed  Taken 12/26/2022 1400 by Willa Schaefer RN  Medication Review/Management: medications reviewed  Taken 12/26/2022 1200 by Willa Schaefer RN  Medication Review/Management: medications reviewed  Taken 12/26/2022 1000 by Willa Schaefer RN  Medication Review/Management: medications reviewed  Taken 12/26/2022 0800 by Willa Schaefer RN  Medication Review/Management: medications reviewed     Problem: Obstructive Sleep Apnea Risk or Actual Comorbidity Management  Goal: Unobstructed Breathing During Sleep  Outcome: Ongoing, Progressing     Problem: Skin Injury Risk Increased  Goal: Skin Health and Integrity  Outcome: Ongoing, Progressing  Intervention: Optimize Skin Protection  Recent Flowsheet Documentation  Taken 12/26/2022 1800 by Willa Schaefer RN  Head of Bed (HOB) Positioning: HOB at 30-45 degrees  Taken 12/26/2022 1600 by Willa Schaefer RN  Head of Bed (HOB) Positioning: HOB at 30-45 degrees  Taken 12/26/2022 1400 by Willa Schaefer RN  Pressure Reduction Techniques: frequent weight shift encouraged  Head of Bed (HOB) Positioning: HOB at 30-45 degrees  Pressure Reduction Devices:   heel offloading device utilized   positioning supports utilized   pressure-redistributing mattress utilized  Skin Protection: adhesive use limited  Taken 12/26/2022 1200 by Willa Schaefer RN  Head of Bed (HOB) Positioning: HOB at 60 degrees  Taken 12/26/2022 1000 by Willa Schaefer RN  Head of Bed (HOB) Positioning: HOB at 30-45 degrees  Taken 12/26/2022 0800 by Willa Schaefer RN  Pressure Reduction Techniques:   frequent weight shift encouraged   heels elevated off bed   pressure points protected  Head of Bed (HOB) Positioning: HOB at 30 degrees  Pressure Reduction Devices:   pressure-redistributing mattress utilized   heel offloading device  utilized   positioning supports utilized  Skin Protection: adhesive use limited     Problem: Fall Injury Risk  Goal: Absence of Fall and Fall-Related Injury  Outcome: Ongoing, Progressing  Intervention: Identify and Manage Contributors  Recent Flowsheet Documentation  Taken 12/26/2022 1800 by Willa Schaefer RN  Medication Review/Management: medications reviewed  Taken 12/26/2022 1400 by Willa Schaefer RN  Medication Review/Management: medications reviewed  Taken 12/26/2022 1200 by Willa Schaefer RN  Medication Review/Management: medications reviewed  Taken 12/26/2022 1000 by Willa Schaefer RN  Medication Review/Management: medications reviewed  Taken 12/26/2022 0800 by Willa Schaefer RN  Medication Review/Management: medications reviewed  Intervention: Promote Injury-Free Environment  Recent Flowsheet Documentation  Taken 12/26/2022 1800 by Willa Schaefer RN  Safety Promotion/Fall Prevention: safety round/check completed  Taken 12/26/2022 1700 by Willa Schaefer RN  Safety Promotion/Fall Prevention: safety round/check completed  Taken 12/26/2022 1600 by Willa Schaefer RN  Safety Promotion/Fall Prevention: safety round/check completed  Taken 12/26/2022 1500 by Willa Schaefer RN  Safety Promotion/Fall Prevention: safety round/check completed  Taken 12/26/2022 1400 by Willa Schaefer RN  Safety Promotion/Fall Prevention: safety round/check completed  Taken 12/26/2022 1300 by Willa Schaefer RN  Safety Promotion/Fall Prevention: safety round/check completed  Taken 12/26/2022 1200 by Willa Schaefer RN  Safety Promotion/Fall Prevention: safety round/check completed  Taken 12/26/2022 1100 by Willa Schaefer RN  Safety Promotion/Fall Prevention: safety round/check completed  Taken 12/26/2022 1000 by Willa Schaefer RN  Safety Promotion/Fall Prevention: safety round/check completed  Taken 12/26/2022 0900 by Willa Schaefer RN  Safety Promotion/Fall Prevention: safety round/check completed  Taken 12/26/2022 0800 by  Willa Schaefer, RN  Safety Promotion/Fall Prevention: safety round/check completed  Taken 12/26/2022 0700 by Willa Schaefer, RN  Safety Promotion/Fall Prevention: safety round/check completed

## 2022-12-26 NOTE — PROGRESS NOTES
[facility]     Progress Note    Patient Name: Ronna Wayne  : 1955  MRN: [id number]  Primary Care Physician:  Mague Acosta APRN  Date of admission: 2022    Subjective   Subjective     Chief Complaint: 67-year-old female being seen in follow-up for her rapid heart rate    History of Present Illness  Patient Reports feeling unwell this morning.  She complains of a headache and also reports poor sleep overnight.    Patient's bedside telemetry during my exam revealed bradycardia with junctional rhythm with heart rates in the upper 40s.  Patient did receive transcutaneous pacing and required a dopamine infusion upon admission.    No acute overnight events reported to me per staff.    Review of Systems  Patient denies any dizziness.  She does report headache as noted above and states that she gets headaches quite frequently.  She denies chest pains or palpitations.  She denies shortness of air.  Patient denies vomiting and/or diarrhea.    Objective   Objective     Vitals:   Temp:  [97.9 °F (36.6 °C)-98.2 °F (36.8 °C)] 98.1 °F (36.7 °C)  Heart Rate:  [43-74] 51  Resp:  [12-18] 18  BP: ()/() 129/76    Physical Exam  Constitutional:       General: She is not in acute distress.     Appearance: She is well-developed.   HENT:      Head: Normocephalic and atraumatic.   Eyes:      Conjunctiva/sclera: Conjunctivae normal.   Neck:      Trachea: No tracheal deviation.   Cardiovascular:      Rate and Rhythm: Regular rhythm. Bradycardia present.      Heart sounds: No murmur heard.    No friction rub. No gallop.      Comments: No significant lower extremity edema  Pulmonary:      Effort: No respiratory distress.      Breath sounds: Normal breath sounds. No wheezing or rales.   Abdominal:      General: Bowel sounds are normal. There is no distension.      Palpations: Abdomen is soft.      Tenderness: There is no abdominal tenderness. There is no guarding.   Skin:     General: Skin is warm  and dry.      Findings: No erythema or rash.   Neurological:      Mental Status: She is alert and oriented to person, place, and time.      Cranial Nerves: No cranial nerve deficit.          Result Review    Result Review:  I have personally reviewed the results from the time of this admission to 12/26/2022 12:43 EST and agree with these findings:  [x]  Laboratory list / accordion  []  Microbiology  []  Radiology  []  EKG/Telemetry   []  Cardiology/Vascular   []  Pathology  []  Old records  []  Other:  Most notable findings include: BMP largely unremarkable; K 4.2; glucose 161, Cr 1.11. Mg WNL at 2.1. CBC with plts 126.     EKG per my view with sinus bradycardia, prolonged QTc 505 ms.      Assessment / Plan     #Symptomatic bradycardia with history of intermittent SVT  #Sick sinus syndrome  #Mild QT prolongation, 505 ms  #PAF during recent hospitalization  #Acute on chronic HFpEF  -Patient currently in sinus bradycardia to junctional rhythm and has been weaned off the dopamine infusion; transcutaneous pacing discontinued on 12/25/2022; patient with heart rates in the upper 40s during my exam this morning  -Patient likely with medication induced bradycardia at she has recently been on metoprolol  -Continue with flecainide for now  -If patient has recurrent SVT we will plan to give adenosine to convert and observe the underlying rhythm before giving IV metoprolol  -Cardiology is following; consider referral to EP as outpatient for further study and possible SVT ablation  -Continue to monitor her electrolytes closely; goal potassium 4.0-4.5 and goal magnesium 2.0-2.2  -Mild QT prolongation may be due to flecanide; will continue for now; repeat EKG in a.m.   -Continue with PRN diuresis; patient currently with overall (+) fluid status of approximately 570 ml; appears comfortable/compensated  -Holding DOAC for now as patient may require procedural intervention   -Continue with subcutaneous heparin    Chronic medical  problems:  -MEMO, compliant with home CPAP: Will order CPAP HS/PRN  -Hypothyroidism: Home synthroid  -Type II DM, currently controlled: Accu-checks and PRN SSI    DVT prophylaxis:  Subcutaneous heparin    Disposition:  I expect patient to be discharged likely home when medically stable.    Afshanmelina Queen, DO

## 2022-12-27 ENCOUNTER — APPOINTMENT (OUTPATIENT)
Dept: GENERAL RADIOLOGY | Facility: HOSPITAL | Age: 67
DRG: 308 | End: 2022-12-27
Payer: MEDICARE

## 2022-12-27 PROBLEM — I47.10 SUPRAVENTRICULAR TACHYCARDIA: Status: RESOLVED | Noted: 2022-12-24 | Resolved: 2022-12-27

## 2022-12-27 PROBLEM — I47.1 SUPRAVENTRICULAR TACHYCARDIA: Status: RESOLVED | Noted: 2022-12-24 | Resolved: 2022-12-27

## 2022-12-27 LAB
ANION GAP SERPL CALCULATED.3IONS-SCNC: 11.1 MMOL/L (ref 5–15)
BUN SERPL-MCNC: 18 MG/DL (ref 8–23)
BUN/CREAT SERPL: 19.4 (ref 7–25)
CALCIUM SPEC-SCNC: 8.9 MG/DL (ref 8.6–10.5)
CHLORIDE SERPL-SCNC: 103 MMOL/L (ref 98–107)
CO2 SERPL-SCNC: 23.9 MMOL/L (ref 22–29)
CREAT SERPL-MCNC: 0.93 MG/DL (ref 0.57–1)
DEPRECATED RDW RBC AUTO: 50.4 FL (ref 37–54)
EGFRCR SERPLBLD CKD-EPI 2021: 67.5 ML/MIN/1.73
ERYTHROCYTE [DISTWIDTH] IN BLOOD BY AUTOMATED COUNT: 13.3 % (ref 12.3–15.4)
GLUCOSE BLDC GLUCOMTR-MCNC: 126 MG/DL (ref 70–130)
GLUCOSE BLDC GLUCOMTR-MCNC: 138 MG/DL (ref 70–130)
GLUCOSE BLDC GLUCOMTR-MCNC: 161 MG/DL (ref 70–130)
GLUCOSE BLDC GLUCOMTR-MCNC: 168 MG/DL (ref 70–130)
GLUCOSE SERPL-MCNC: 153 MG/DL (ref 65–99)
HCT VFR BLD AUTO: 42.3 % (ref 34–46.6)
HGB BLD-MCNC: 14 G/DL (ref 12–15.9)
MAGNESIUM SERPL-MCNC: 1.7 MG/DL (ref 1.6–2.4)
MAGNESIUM SERPL-MCNC: 1.9 MG/DL (ref 1.6–2.4)
MCH RBC QN AUTO: 33.8 PG (ref 26.6–33)
MCHC RBC AUTO-ENTMCNC: 33.1 G/DL (ref 31.5–35.7)
MCV RBC AUTO: 102.2 FL (ref 79–97)
PLATELET # BLD AUTO: 133 10*3/MM3 (ref 140–450)
PMV BLD AUTO: 10 FL (ref 6–12)
POTASSIUM SERPL-SCNC: 4.4 MMOL/L (ref 3.5–5.2)
QT INTERVAL: 518 MS
QTC INTERVAL: 491 MS
RBC # BLD AUTO: 4.14 10*6/MM3 (ref 3.77–5.28)
SODIUM SERPL-SCNC: 138 MMOL/L (ref 136–145)
WBC NRBC COR # BLD: 5.86 10*3/MM3 (ref 3.4–10.8)

## 2022-12-27 PROCEDURE — 85027 COMPLETE CBC AUTOMATED: CPT | Performed by: INTERNAL MEDICINE

## 2022-12-27 PROCEDURE — 94799 UNLISTED PULMONARY SVC/PX: CPT

## 2022-12-27 PROCEDURE — 71045 X-RAY EXAM CHEST 1 VIEW: CPT

## 2022-12-27 PROCEDURE — 25010000002 MAGNESIUM SULFATE 2 GM/50ML SOLUTION: Performed by: INTERNAL MEDICINE

## 2022-12-27 PROCEDURE — 99233 SBSQ HOSP IP/OBS HIGH 50: CPT | Performed by: INTERNAL MEDICINE

## 2022-12-27 PROCEDURE — 94761 N-INVAS EAR/PLS OXIMETRY MLT: CPT

## 2022-12-27 PROCEDURE — 25010000002 HEPARIN (PORCINE) PER 1000 UNITS: Performed by: INTERNAL MEDICINE

## 2022-12-27 PROCEDURE — 71045 X-RAY EXAM CHEST 1 VIEW: CPT | Performed by: RADIOLOGY

## 2022-12-27 PROCEDURE — 63710000001 INSULIN ASPART PER 5 UNITS: Performed by: HOSPITALIST

## 2022-12-27 PROCEDURE — 83735 ASSAY OF MAGNESIUM: CPT | Performed by: INTERNAL MEDICINE

## 2022-12-27 PROCEDURE — 82962 GLUCOSE BLOOD TEST: CPT

## 2022-12-27 PROCEDURE — 94660 CPAP INITIATION&MGMT: CPT

## 2022-12-27 PROCEDURE — 25010000002 HEPARIN (PORCINE) PER 1000 UNITS: Performed by: HOSPITALIST

## 2022-12-27 PROCEDURE — 93005 ELECTROCARDIOGRAM TRACING: CPT | Performed by: HOSPITALIST

## 2022-12-27 PROCEDURE — 80048 BASIC METABOLIC PNL TOTAL CA: CPT | Performed by: INTERNAL MEDICINE

## 2022-12-27 RX ORDER — MAGNESIUM OXIDE TAB 400 MG (241.3 MG ELEMENTAL MG) 400 (241.3 MG) MG
TAB ORAL
Qty: 90 TABLET | Refills: 3 | Status: SHIPPED | OUTPATIENT
Start: 2022-12-27 | End: 2022-12-29 | Stop reason: SDUPTHER

## 2022-12-27 RX ORDER — CHOLECALCIFEROL (VITAMIN D3) 125 MCG
5 CAPSULE ORAL NIGHTLY
Status: DISCONTINUED | OUTPATIENT
Start: 2022-12-27 | End: 2022-12-29 | Stop reason: HOSPADM

## 2022-12-27 RX ORDER — LEVOTHYROXINE SODIUM 88 UG/1
TABLET ORAL
Qty: 90 TABLET | Refills: 0 | Status: SHIPPED | OUTPATIENT
Start: 2022-12-27 | End: 2022-12-27

## 2022-12-27 RX ORDER — MAGNESIUM SULFATE HEPTAHYDRATE 40 MG/ML
2 INJECTION, SOLUTION INTRAVENOUS ONCE
Status: COMPLETED | OUTPATIENT
Start: 2022-12-27 | End: 2022-12-27

## 2022-12-27 RX ADMIN — Medication 10 ML: at 09:07

## 2022-12-27 RX ADMIN — MAGNESIUM SULFATE HEPTAHYDRATE 2 G: 40 INJECTION, SOLUTION INTRAVENOUS at 03:31

## 2022-12-27 RX ADMIN — FLECAINIDE ACETATE 50 MG: 50 TABLET ORAL at 09:06

## 2022-12-27 RX ADMIN — ASPIRIN 81 MG: 81 TABLET, COATED ORAL at 09:06

## 2022-12-27 RX ADMIN — FLECAINIDE ACETATE 50 MG: 50 TABLET ORAL at 21:39

## 2022-12-27 RX ADMIN — Medication 10 ML: at 20:19

## 2022-12-27 RX ADMIN — Medication 5 MG: at 20:19

## 2022-12-27 RX ADMIN — LEVOTHYROXINE SODIUM 88 MCG: 88 TABLET ORAL at 09:06

## 2022-12-27 RX ADMIN — HEPARIN SODIUM 5000 UNITS: 5000 INJECTION INTRAVENOUS; SUBCUTANEOUS at 20:19

## 2022-12-27 RX ADMIN — ROSUVASTATIN CALCIUM 20 MG: 20 TABLET, FILM COATED ORAL at 09:06

## 2022-12-27 RX ADMIN — ANASTROZOLE 1 MG: 1 TABLET ORAL at 09:05

## 2022-12-27 RX ADMIN — INSULIN ASPART 2 UNITS: 100 INJECTION, SOLUTION INTRAVENOUS; SUBCUTANEOUS at 11:47

## 2022-12-27 RX ADMIN — CHOLECALCIFEROL TAB 10 MCG (400 UNIT) 1000 UNITS: 10 TAB at 09:05

## 2022-12-27 RX ADMIN — HEPARIN SODIUM 5000 UNITS: 5000 INJECTION INTRAVENOUS; SUBCUTANEOUS at 09:05

## 2022-12-27 NOTE — PLAN OF CARE
Goal Outcome Evaluation:  Plan of Care Reviewed With: patient        Progress: improving  Outcome Evaluation: Pt is alert and oriented x4 and remains on room air. Afebrile throughout shift with adequate UOP. HR has remained stabled. Call light in reach. Alarms set.

## 2022-12-27 NOTE — PROGRESS NOTES
LOS: 3 days     Name: Ronna Wayne  Age/Sex: 67 y.o. female  :  1955        PCP: Mague Acosta APRN  REF: No Known Provider    Active Problems:    * No active hospital problems. *      Reason for follow-up: SVT    Subjective   HPI:  Subjective   Ronna Wayen is a 67 y.o. female with past medical history significant for paroxysmal SVT, diabetes mellitus, essential hypertension, obstructive sleep apnea, history of CVA.  Patient recently had a in-house admission on 2022 - 2022 to [facility] for SVT.  She returned to [facility] ([facility]) emergency room (ER) on 2022 with palpitations and was found to have as in tachycardia heart rate of 150 bpm it appeared to be flutter.  Patient was admitted and Cardiology was consulted for further evaluation and management.   Patient did receive IV metoprolol multiple times later on during the night of 2022 patient become junctional and bradycardic started on transcutaneous pacer and transferred to ICU.      Interval History:   Patient is lying in bed resting quietly with her eyes closed.  She is easily awakened with verbal stimuli.  Patient denies palpitation, chest pain or shortness of  Breath. She reports she did not sleep well last night but it was not related to any of those things\". Telemetry reveals SR 60's while at her bedside. Transcutaneous pacer is not in use currently. QTC interval on EKG today = 491 ms / QRS = 94 ms.     Vital Signs  Temp:  [98.1 °F (36.7 °C)] 98.1 °F (36.7 °C)  Heart Rate:  [51-65] 57  Resp:  [12-18] 18  BP: (109-175)/() 109/53     Vital Signs (last 72 hrs)        0700   0659  07 06 07 0659  07 0908   Most Recent      Temp (°F) 97.8 -  98    97.9 -  98.2    97.9 -  98.1       98.1 (36.7)  0400    Heart Rate 32 -  150    31 -  74    49 -  65      56     56  0906    Resp         16 12/27 0400    BP 80/42 -  134/98    98/69 -  153/58    107/62 -  175/91      169/71     169/71 12/27 0906    SpO2 (%) 95 -  97    88 -  97    86 -  96       94 12/27 0500        Body mass index is 47.8 kg/m².    Intake/Output Summary (Last 24 hours) at 12/27/2022 1219  Last data filed at 12/27/2022 0600  Gross per 24 hour   Intake 250 ml   Output 1225 ml   Net -975 ml     Objective  Objective     Physical Exam:      General Appearance:    Alert, cooperative, in no acute distress, obese   Head:    Normocephalic, without obvious abnormality, atraumatic   Eyes:                          Conjunctivae and sclerae normal, no icterus, no pallor, corneas clear   Neck:   No adenopathy, supple, trachea midline, no thyromegaly, no carotid bruit, no JVD   Lungs:     Clear to auscultation, respirations regular, even and             unlabored    Heart:    Regular rhythm and normal rate, normal S1 and S2, no          murmur, no gallop, no rub, no click   Chest Wall:    No abnormalities observed   Abdomen:     Normal bowel sounds, no masses, no organomegaly, soft      non-tender, non-distended, no guarding, no rebound                tenderness   Extremities:   Moves all extremities well, no edema, no cyanosis, no            redness   Pulses:   Pulses palpable and equal bilaterally   Skin:   No bleeding, bruising or rash   Neurologic:   Alert and Oriented x 3, Speech Clear & comprehensive.       Results review   Results Review:   Results from last 7 days   Lab Units 12/27/22  0017 12/26/22  0058 12/25/22  0007 12/24/22  1627 12/21/22  0959   WBC 10*3/mm3 5.86 7.32 7.48 6.96 6.28   HEMOGLOBIN g/dL 14.0 14.0 16.4* 16.0* 14.9   PLATELETS 10*3/mm3 133* 126* 128* 133* 126*     Results from last 7 days   Lab Units 12/27/22  0017 12/26/22  0058 12/25/22  0006 12/24/22  1814 12/21/22  0959   SODIUM mmol/L 138 137 136 137 133*   POTASSIUM mmol/L 4.4 4.2 4.0 4.7 4.1   CHLORIDE mmol/L 103 103 100 105 99   CO2 mmol/L 23.9 23.1 21.3* 20.0*  23.5   BUN mg/dL 18 23 16 16 15   CREATININE mg/dL 0.93 1.11* 0.98 0.87 0.73   CALCIUM mg/dL 8.9 8.6 9.2 8.8 9.2   GLUCOSE mg/dL 153* 161* 203* 194* 260*   ALT (SGPT) U/L  --   --  31 28  --    AST (SGOT) U/L  --   --  39* 37*  --      Results from last 7 days   Lab Units 22  0811 22  0006 225 22  1814   TROPONIN T ng/mL <0.010 <0.010 <0.010 <0.010     Lab Results   Component Value Date    PROBNP 6,200.0 (H) 2022    PROBNP 244.6 2021    PROBNP 297.5 2021     Lab Results   Component Value Date    INR 1.06 2022    INR 1.02 2021    INR 1.05 2021    INR 1.05 2021    INR 1.09 2021     Lab Results   Component Value Date    MG 1.9 2022    MG 1.7 2022    MG 2.1 2022     Lab Results   Component Value Date    TSH 0.414 2022    CHLPL 226 (H) 2016    TRIG 200 (H) 10/21/2022    HDL 51 10/21/2022     (H) 10/21/2022      Imaging Results (Last 48 Hours)     Procedure Component Value Units Date/Time    XR Chest 1 View [[id number]] Resulted: 22 1152     Updated: 22 1152        ECHO:  Results for orders placed during the hospital encounter of 22    Adult Transthoracic Echo Complete W/ Cont if Necessary Per Protocol    Interpretation Summary  •  Left ventricular systolic function is normal. Left ventricular ejection fraction appears to be 61 - 65%.  •  Left ventricular diastolic function is consistent with (grade II w/high LAP) pseudonormalization.  •  Left atrial volume is mildly increased.  •  The right atrial cavity is mildly  dilated.  •  Mild aortic valve stenosis is present, with an estimated aortic valve area of 1.8 cm² and the mean gradient of 19 mmHg.  •  Estimated right ventricular systolic pressure from tricuspid regurgitation is mildly elevated (35-45 mmHg).      [id number]      Telemetry: SB/SR 50-60's     I reviewed the patient's new clinical results.    Medication Review:   anastrozole,  1 mg, Oral, Daily  aspirin, 81 mg, Oral, Daily  cholecalciferol, 1,000 Units, Oral, Daily  flecainide, 50 mg, Oral, Q12H  heparin (porcine), 5,000 Units, Subcutaneous, Q12H  Insulin Aspart, 0-9 Units, Subcutaneous, TID AC  levothyroxine, 88 mcg, Oral, Daily  rosuvastatin, 20 mg, Oral, Daily  sodium chloride, 10 mL, Intravenous, Q12H  sodium chloride, 10 mL, Intravenous, Q12H         Assessment    Assessment:  1   SVT  Acute on chronic HFpEF  Obesity  Diabetes mellitus             Plan   Recommendations:    As per the cardiology consultation note, consider EP evaluation as outpatient  Close monitoring of the electrolytes, keep magnesium and potassium in adequate range.  Patient is on flecainide at this time.  Watch for SVT in the absence of beta-blockers.  Increase activity.               Electronically signed by BOSTON Chaudhry, 12/27/22, 12:23 PM EST.        Please note that portions of this note were completed with a voice recognition program.    Copied text in this note has been reviewed and is accurate as of 12/27/2022.

## 2022-12-27 NOTE — CASE MANAGEMENT/SOCIAL WORK
Discharge Planning Assessment  [facility]     Patient Name: Ronna Wayne  MRN: [id number]  Today's Date: 12/27/2022    Admit Date: 12/24/2022    Plan: TINO spoke with pt at bedside. Pt lives at home with spouse and plans to return home at d/c. Pt has a straight cane, glucometer and CPAP via unknown provider. Pt does not have HH and denies any needs. PCP is Mague MEAD and she gets rx filled at [facility] on [address]. She has Medicare A&B and denies issues with copays. Pt is independent with her care at home. Pt's spouse will transport at d/c.   Discharge Needs Assessment     Row Name 12/27/22 4077       Living Environment    People in Home spouse    Name(s) of People in Home Tristin Wayne    Current Living Arrangements home    Primary Care Provided by self    Provides Primary Care For no one    Family Caregiver if Needed spouse    Family Caregiver Names Tristin Wyane [phone]    Quality of Family Relationships supportive    Able to Return to Prior Arrangements yes       Resource/Environmental Concerns    Resource/Environmental Concerns none    Transportation Concerns none       Transition Planning    Patient/Family Anticipated Services at Transition none    Transportation Anticipated family or friend will provide       Discharge Needs Assessment    Readmission Within the Last 30 Days other (see comments)  Elevated heart rate again    Equipment Currently Used at Home glucometer;cpap;cane, straight  unknown provider    Concerns to be Addressed no discharge needs identified;denies needs/concerns at this time    Anticipated Changes Related to Illness none    Equipment Needed After Discharge none    Patient's Choice of Community Agency(s) Pt does not utilize home healh and denies any needs at this time.               Discharge Plan     Row Name 12/27/22 1400       Plan    Plan TINO spoke with pt at bedside. Pt lives at home with spouse and plans to return home at d/c. Pt has a straight cane,  glucometer and CPAP via unknown provider. Pt does not have HH and denies any needs. PCP is Mague MEAD and she gets rx filled at [facility] on [address]. She has Medicare A&B and denies issues with copays. Pt is independent with her care at home. Pt's spouse will transport at d/c.    Patient/Family in Agreement with Plan yes    Plan Comments [facility]MIT (12/17-12/22 [place] SVT) SVT, bradycardia  12/27: Admit tele c/o palpitations and SOB, 's metoprolol and flecainide in ED, transfer CCU external pacer 12/25 HR 20's, d/c metoprolol, IVF bolus, Dopamine gtt now off HR 56, Cardiology consult, will need EP study and ablation outpt cont Flecainide, hold DOAC for now may need procedure, PT eval              Expected Discharge Date and Time     Expected Discharge Date Expected Discharge Time    Dec 28, 2022         Shae Contreras RN

## 2022-12-27 NOTE — PROGRESS NOTES
[facility]     Progress Note    Patient Name: Ronna Wayne  : 1955  MRN: [id number]  Primary Care Physician:  Mague Acosta APRN  Date of admission: 2022    Subjective   Subjective     Chief Complaint: 67-year-old female being seen in follow-up for her rapid heart rate    History of Present Illness  Patient Reports feeling unwell again this morning.  She continues to report headache which seems to be somewhat chronic in nature.  She did not wear CPAP last night and as such I have placed an order for this moving forward.    The patient reports insomnia last night and poor sleep.  She also complains of dizziness/lightheadedness.    No acute overnight events reported to me per nursing staff.    Review of Systems  She denies chest pains or palpitations.  She denies shortness of air.  Patient denies vomiting and/or diarrhea.    Objective   Objective     Vitals:   Temp:  [98.1 °F (36.7 °C)] 98.1 °F (36.7 °C)  Heart Rate:  [51-65] 57  Resp:  [12-18] 18  BP: (109-175)/() 109/53    Physical Exam  Constitutional:       General: She is not in acute distress.     Appearance: She is well-developed.   HENT:      Head: Normocephalic and atraumatic.   Eyes:      Conjunctiva/sclera: Conjunctivae normal.   Neck:      Trachea: No tracheal deviation.   Cardiovascular:      Rate and Rhythm: Regular rhythm. Bradycardia present.      Heart sounds: No murmur heard.    No friction rub. No gallop.      Comments: Bedside telemetry: SB-SR 58-61 bpm. No significant lower extremity edema  Pulmonary:      Effort: No respiratory distress.      Breath sounds: Normal breath sounds. No wheezing or rales.   Abdominal:      General: Bowel sounds are normal. There is no distension.      Palpations: Abdomen is soft.      Tenderness: There is no abdominal tenderness. There is no guarding.   Skin:     General: Skin is warm and dry.      Findings: No erythema or rash.   Neurological:      Mental Status: She is alert  and oriented to person, place, and time.      Cranial Nerves: No cranial nerve deficit.          Result Review    Result Review:  I have personally reviewed the results from the time of this admission to 12/27/2022 11:42 EST and agree with these findings:  [x]  Laboratory list / accordion  []  Microbiology  []  Radiology  []  EKG/Telemetry   []  Cardiology/Vascular   []  Pathology  []  Old records  []  Other:  Most notable findings include: BMP largely unremarkable; K 4.4; glucose 153, Cr 0.93. Mg 1.7-->1.9. CBC with plts 133.    EKG per my view with sinus bradycardia, no significant ST elevations or ST changes; QTc slightly imroved to 491 ms.    Assessment / Plan     #Symptomatic bradycardia with history of intermittent SVT  #Sick sinus syndrome  #Mild QT prolongation, improved  #PAF during recent hospitalization  #Acute on chronic HFpEF  #Uncontrolled essential hypertension, improved  #Dizziness  #Insomnia  #Chronic headache  -Patient currently in sinus bradycardia and has been weaned off the dopamine infusion; transcutaneous pacing discontinued on 12/25/2022; patient with heart rates in the upper 50s-low 60s during my exam this morning  -Patient likely with medication induced bradycardia at she has recently been on metoprolol  -Continue with flecainide for now; monitor c/o dizziness   -Prolonged QTc improved to 491 ms; continue to monitor  -If patient has recurrent SVT we will plan to give adenosine to convert and observe the underlying rhythm before giving IV metoprolol  -Cardiology is following; consider referral to EP as outpatient for further study and possible SVT ablation  -Continue to monitor her electrolytes closely; goal potassium 4.0-4.5 and goal magnesium 2.0-2.2; she remains on supplementation as needed; will repeat her chemistry panel and Mg in a.m.   -Continue with PRN diuresis; appears comfortable/compensated  -Schedule melatonin  -Order CPAP HS/PRN  -Encourage ambulation    Chronic medical  problems:  -MEMO, compliant with home CPAP: Will order CPAP HS/PRN  -Hypothyroidism: Home synthroid  -Type II DM, currently controlled: Accu-checks and PRN SSI; overall well controlled    DVT prophylaxis:  Subcutaneous heparin    Disposition:  I expect patient to be discharged likely home when medically stable. Will transfer to PCU today if a bed is available.    Afshan Queen, DO

## 2022-12-27 NOTE — PLAN OF CARE
Goal Outcome Evaluation:  Plan of Care Reviewed With: patient        Progress: improving  Outcome Evaluation: Patient A&Ox4, pleasant. She is up to chair at this time with chir alarm set. Vital signs stable, sinus bradycardia on the monitor at this time.

## 2022-12-28 DIAGNOSIS — I47.1 PAROXYSMAL SVT (SUPRAVENTRICULAR TACHYCARDIA): ICD-10-CM

## 2022-12-28 LAB
ALBUMIN SERPL-MCNC: 2.9 G/DL (ref 3.5–5.2)
ALBUMIN/GLOB SERPL: 0.8 G/DL
ALP SERPL-CCNC: 74 U/L (ref 39–117)
ALT SERPL W P-5'-P-CCNC: 22 U/L (ref 1–33)
ANION GAP SERPL CALCULATED.3IONS-SCNC: 8.4 MMOL/L (ref 5–15)
AST SERPL-CCNC: 33 U/L (ref 1–32)
BILIRUB SERPL-MCNC: 0.7 MG/DL (ref 0–1.2)
BUN SERPL-MCNC: 14 MG/DL (ref 8–23)
BUN/CREAT SERPL: 18.4 (ref 7–25)
CALCIUM SPEC-SCNC: 9 MG/DL (ref 8.6–10.5)
CHLORIDE SERPL-SCNC: 103 MMOL/L (ref 98–107)
CO2 SERPL-SCNC: 19.6 MMOL/L (ref 22–29)
CREAT SERPL-MCNC: 0.76 MG/DL (ref 0.57–1)
DEPRECATED RDW RBC AUTO: 51.9 FL (ref 37–54)
EGFRCR SERPLBLD CKD-EPI 2021: 86 ML/MIN/1.73
ERYTHROCYTE [DISTWIDTH] IN BLOOD BY AUTOMATED COUNT: 13.2 % (ref 12.3–15.4)
GLOBULIN UR ELPH-MCNC: 3.7 GM/DL
GLUCOSE BLDC GLUCOMTR-MCNC: 123 MG/DL (ref 70–130)
GLUCOSE BLDC GLUCOMTR-MCNC: 201 MG/DL (ref 70–130)
GLUCOSE BLDC GLUCOMTR-MCNC: 231 MG/DL (ref 70–130)
GLUCOSE BLDC GLUCOMTR-MCNC: 257 MG/DL (ref 70–130)
GLUCOSE SERPL-MCNC: 140 MG/DL (ref 65–99)
HCT VFR BLD AUTO: 45.3 % (ref 34–46.6)
HGB BLD-MCNC: 14.6 G/DL (ref 12–15.9)
MAGNESIUM SERPL-MCNC: 1.7 MG/DL (ref 1.6–2.4)
MCH RBC QN AUTO: 34.4 PG (ref 26.6–33)
MCHC RBC AUTO-ENTMCNC: 32.2 G/DL (ref 31.5–35.7)
MCV RBC AUTO: 106.6 FL (ref 79–97)
PLATELET # BLD AUTO: 93 10*3/MM3 (ref 140–450)
PMV BLD AUTO: 10.3 FL (ref 6–12)
POTASSIUM SERPL-SCNC: 4.9 MMOL/L (ref 3.5–5.2)
PROT SERPL-MCNC: 6.6 G/DL (ref 6–8.5)
QT INTERVAL: 508 MS
QTC INTERVAL: 468 MS
RBC # BLD AUTO: 4.25 10*6/MM3 (ref 3.77–5.28)
SODIUM SERPL-SCNC: 131 MMOL/L (ref 136–145)
WBC NRBC COR # BLD: 6.03 10*3/MM3 (ref 3.4–10.8)

## 2022-12-28 PROCEDURE — 99233 SBSQ HOSP IP/OBS HIGH 50: CPT | Performed by: INTERNAL MEDICINE

## 2022-12-28 PROCEDURE — 93005 ELECTROCARDIOGRAM TRACING: CPT | Performed by: HOSPITALIST

## 2022-12-28 PROCEDURE — 97530 THERAPEUTIC ACTIVITIES: CPT

## 2022-12-28 PROCEDURE — 85027 COMPLETE CBC AUTOMATED: CPT | Performed by: INTERNAL MEDICINE

## 2022-12-28 PROCEDURE — 25010000002 MAGNESIUM SULFATE 2 GM/50ML SOLUTION: Performed by: INTERNAL MEDICINE

## 2022-12-28 PROCEDURE — 94799 UNLISTED PULMONARY SVC/PX: CPT

## 2022-12-28 PROCEDURE — 25010000002 HEPARIN (PORCINE) PER 1000 UNITS: Performed by: INTERNAL MEDICINE

## 2022-12-28 PROCEDURE — 94761 N-INVAS EAR/PLS OXIMETRY MLT: CPT

## 2022-12-28 PROCEDURE — 82962 GLUCOSE BLOOD TEST: CPT

## 2022-12-28 PROCEDURE — 80053 COMPREHEN METABOLIC PANEL: CPT | Performed by: INTERNAL MEDICINE

## 2022-12-28 PROCEDURE — 94660 CPAP INITIATION&MGMT: CPT

## 2022-12-28 PROCEDURE — 83735 ASSAY OF MAGNESIUM: CPT | Performed by: INTERNAL MEDICINE

## 2022-12-28 PROCEDURE — 63710000001 INSULIN ASPART PER 5 UNITS: Performed by: INTERNAL MEDICINE

## 2022-12-28 RX ORDER — LISINOPRIL 10 MG/1
10 TABLET ORAL
Status: DISCONTINUED | OUTPATIENT
Start: 2022-12-29 | End: 2022-12-29 | Stop reason: HOSPADM

## 2022-12-28 RX ORDER — MAGNESIUM SULFATE HEPTAHYDRATE 40 MG/ML
2 INJECTION, SOLUTION INTRAVENOUS ONCE
Status: COMPLETED | OUTPATIENT
Start: 2022-12-28 | End: 2022-12-28

## 2022-12-28 RX ADMIN — ANASTROZOLE 1 MG: 1 TABLET ORAL at 09:11

## 2022-12-28 RX ADMIN — MAGNESIUM SULFATE HEPTAHYDRATE 2 G: 40 INJECTION, SOLUTION INTRAVENOUS at 09:13

## 2022-12-28 RX ADMIN — INSULIN ASPART 4 UNITS: 100 INJECTION, SOLUTION INTRAVENOUS; SUBCUTANEOUS at 12:38

## 2022-12-28 RX ADMIN — HEPARIN SODIUM 5000 UNITS: 5000 INJECTION INTRAVENOUS; SUBCUTANEOUS at 09:11

## 2022-12-28 RX ADMIN — Medication 10 ML: at 20:42

## 2022-12-28 RX ADMIN — FLECAINIDE ACETATE 50 MG: 50 TABLET ORAL at 09:11

## 2022-12-28 RX ADMIN — LEVOTHYROXINE SODIUM 88 MCG: 88 TABLET ORAL at 09:11

## 2022-12-28 RX ADMIN — INSULIN ASPART 6 UNITS: 100 INJECTION, SOLUTION INTRAVENOUS; SUBCUTANEOUS at 18:00

## 2022-12-28 RX ADMIN — CHOLECALCIFEROL TAB 10 MCG (400 UNIT) 1000 UNITS: 10 TAB at 09:11

## 2022-12-28 RX ADMIN — FLECAINIDE ACETATE 50 MG: 50 TABLET ORAL at 20:41

## 2022-12-28 RX ADMIN — ACETAMINOPHEN 650 MG: 325 TABLET, FILM COATED ORAL at 14:05

## 2022-12-28 RX ADMIN — HEPARIN SODIUM 5000 UNITS: 5000 INJECTION INTRAVENOUS; SUBCUTANEOUS at 20:41

## 2022-12-28 RX ADMIN — Medication 5 MG: at 20:41

## 2022-12-28 RX ADMIN — Medication 10 ML: at 09:11

## 2022-12-28 RX ADMIN — ASPIRIN 81 MG: 81 TABLET, COATED ORAL at 09:11

## 2022-12-28 RX ADMIN — Medication 10 ML: at 09:12

## 2022-12-28 RX ADMIN — ROSUVASTATIN CALCIUM 20 MG: 20 TABLET, FILM COATED ORAL at 09:11

## 2022-12-28 NOTE — CASE MANAGEMENT/SOCIAL WORK
Continued Stay Note  MITZI Keller     Patient Name: Ronna Wayne  MRN: [id number]  Today's Date: 12/28/2022    Admit Date: 12/24/2022    Plan: CM spoke with patient who states thgat she still plans to return home at discharge and  will transport.  MD told patient she may get to go home in the morning or the next day.  Patient states there is nothing that she can think of at this time that she may need at discharge.  No other issues or concerns are noted at this time.  CM will continue to follow and assist with any discharge needs.   Discharge Plan     Row Name 12/28/22 1631       Plan    Plan CM spoke with patient who states thgat she still plans to return home at discharge and  will transport.  MD told patient she may get to go home in the morning or the next day.  Patient states there is nothing that she can think of at this time that she may need at discharge.  No other issues or concerns are noted at this time.  CM will continue to follow and assist with any discharge needs.    Plan Comments 12/28:  Na+ 131, GFR 54, AST 33, Plt 93, Pt wore cpap last pm, some bradycardia last pm, patient denies palpitations, Pt worked with PT 4', C/O HA, D/C Home 24-48 hrs-KLS               Discharge Codes    No documentation.               Expected Discharge Date and Time     Expected Discharge Date Expected Discharge Time    Dec 28, 2022             Denise Bird RN

## 2022-12-28 NOTE — THERAPY TREATMENT NOTE
Acute Care - Physical Therapy Treatment Note   [place]     Patient Name: Ronna Wayne  : 1955  MRN: [id number]  Today's Date: 2022   Onset of Illness/Injury or Date of Surgery: 22  Visit Dx:     ICD-10-CM ICD-9-CM   1. Supraventricular tachycardia (HCC)  I47.1 427.89     Patient Active Problem List   Diagnosis   • Frequent headaches   • Memory loss   • Essential hypertension   • Dyslipidemia   • Type 2 diabetes mellitus with hyperglycemia, with long-term current use of insulin (HCC)   • Obstructive sleep apnea syndrome   • Glaucoma   • Anxiety   • GERD (gastroesophageal reflux disease)   • Morbid obesity   • Weakness of left arm   • Cerebrovascular accident (CVA) due to embolism (HCC)   • SVT (supraventricular tachycardia) (HCC)   • Vitamin D deficiency   • B12 deficiency   • Malignant neoplasm of upper-outer quadrant of right breast in female, estrogen receptor positive (HCC)   • Other specified hypothyroidism   • Personal history of colonic polyps   • Depression   • Paroxysmal SVT (supraventricular tachycardia) (HCC)     Past Medical History:   Diagnosis Date   • Anxiety    • B12 deficiency    • BPV (benign positional vertigo)    • Breast cancer (HCC) 2021   • Diabetes mellitus (HCC)    • Diarrhea    • Disease of thyroid gland    • Elevated cholesterol    • Fibromyalgia    • GERD (gastroesophageal reflux disease)    • Glaucoma    • Heart murmur    • Hyperlipidemia    • Hypertension    • Kidney disease    • Obesity    • Peptic ulceration    • PONV (postoperative nausea and vomiting)    • Sleep apnea     c-pap   • Stroke (HCC)    • Weakness of left arm      Past Surgical History:   Procedure Laterality Date   • ABDOMINAL SURGERY     • APPENDECTOMY     • BREAST ABSCESS INCISION AND DRAINAGE Right 2022    Procedure: BREAST ABSCESS INCISION AND DRAINAGE;  Surgeon: Lee Parrish MD;  Location: [facility];  Service: General;  Laterality: Right;   • BREAST CYST ASPIRATION     •  BREAST LUMPECTOMY WITH SENTINEL NODE BIOPSY Right 8/5/2021    Procedure: BREAST LUMPECTOMY WITH SENTINEL NODE BIOPSY;  Surgeon: Lee Parrish MD;  Location:  [facility];  Service: General;  Laterality: Right;   • CATARACT EXTRACTION Bilateral    • CHOLECYSTECTOMY     • COLONOSCOPY     • ENDOSCOPY     • HEMATOMA EVACUATION TRUNK Right 8/5/2021    Procedure: HEMATOMA EVACUATION TRUNK;  Surgeon: Lee Parrish MD;  Location:  [facility];  Service: General;  Laterality: Right;   • URETHRA SURGERY       PT Assessment (last 12 hours)     PT Evaluation and Treatment     Row Name 12/28/22 1455          Physical Therapy Time and Intention    Subjective Information complains of;pain  foot  -CS     Document Type therapy note (daily note)  -CS     Mode of Treatment physical therapy  -CS     Patient Effort adequate  -CS     Comment Pt seen bedside this PM.  Pt agreed to PT but was limited w/ ambulation 2/2 foot pain.  -CS     Row Name 12/28/22 1456          General Information    Patient Profile Reviewed yes  -CS     Row Name 12/28/22 1459          Living Environment    Primary Care Provided by self  -CS     Row Name 12/28/22 1454          Home Use of Assistive/Adaptive Equipment    Equipment Currently Used at Home glucometer;cpap;cane, straight  unknown provider  -CS     Row Name 12/28/22 1457          Cognition    Orientation Status (Cognition) oriented x 4  -CS     Row Name 12/28/22 1454          Bed Mobility    Bed Mobility bed mobility (all) activities  -CS     All Activities, [place] (Bed Mobility) standby assist  -CS     Assistive Device (Bed Mobility) head of bed elevated  -CS     Row Name 12/28/22 1453          Transfers    Comment, (Transfers) SBA  -CS     Row Name 12/28/22 1452          Gait/Stairs (Locomotion)    Gait/Stairs Locomotion gait/ambulation independence  -CS     [place] Level (Gait) standby assist  -CS     Distance in Feet (Gait) 4' to bedside chair  -CS     Comment, (Gait/Stairs)  Antalgic 2/2 foot/arch pain  -CS     Row Name 12/28/22 1455          Respiratory WDL    Respiratory WDL WDL  -CS     Row Name 12/28/22 1455          Breath Sounds    Breath Sounds All Fields  -CS     All Lung Fields Breath Sounds clear  -CS     FIORELLA Breath Sounds clear  -CS     LLL Breath Sounds diminished  -CS     RUL Breath Sounds clear  -CS     RML Breath Sounds diminished  -CS     RLL Breath Sounds diminished  -CS     Row Name 12/28/22 1455          Skin WDL    Skin WDL X;color  refuses full skin assessment, A&O, denies issues  -CS     Skin Color/Characteristics maroon/purple  noted to coccyx  -CS     Skin Temperature warm  -CS     Skin Moisture flaky;dry  -CS     Skin Elasticity slow return to original state  -CS     Skin Integrity bruised (ecchymotic)  -CS     Row Name 12/28/22 1455          Coping    Observed Emotional State calm;cooperative  -CS     Verbalized Emotional State acceptance  -CS     Family/Support Persons spouse  -CS     Involvement in Care not present at bedside  -CS     Row Name 12/28/22 1458          Plan of Care Review    Plan of Care Reviewed With patient  -CS     Progress improving  -CS     Outcome Evaluation Pt HR stable during session but limited w/ standing mobility 2/2 pain.  -CS     Row Name 12/28/22 1459          Positioning and Restraints    Pre-Treatment Position in bed  -CS     Post Treatment Position chair  -CS     In Chair call light within reach;encouraged to call for assist  -CS     Row Name 12/28/22 1459          Therapy Assessment/Plan (PT)    Rehab Potential (PT) good, to achieve stated therapy goals  -CS     Criteria for Skilled Interventions Met (PT) yes;meets criteria;skilled treatment is necessary  -CS     Therapy Frequency (PT) 2 times/wk  2-5x/week  -CS     Problem List (PT) problems related to;balance;mobility  -CS     Activity Limitations Related to Problem List (PT) unable to ambulate safely;unable to transfer safely  -CS     Row Name 12/28/22 9913          Progress  Summary (PT)    Progress Toward Functional Goals (PT) progress toward functional goals is fair  -CS     Daily Progress Summary (PT) Pt HR stable during PT but limited by pain w/ standing mobility this day.  -CS     Row Name 12/28/22 1455          Physical Therapy Goals    Gait Training Goal Selection (PT) gait training, PT goal 1  -CS     Row Name 12/28/22 1455          Gait Training Goal 1 (PT)    Activity/Assistive Device (Gait Training Goal 1, PT) gait (walking locomotion)  -CS     [place] Level (Gait Training Goal 1, PT) independent  -CS     Distance (Gait Training Goal 1, PT) 150'+  -CS     Time Frame (Gait Training Goal 1, PT) long term goal (LTG);by discharge  -CS           User Key  (r) = Recorded By, (t) = Taken By, (c) = Cosigned By    Initials Name Provider Type    Gordon Menezes, PT Physical Therapist                Physical Therapy Education     Title: PT OT SLP Therapies (Done)     Topic: Physical Therapy (Done)     Point: Mobility training (Done)     Learning Progress Summary           Patient Acceptance, E, VU by AM at 12/28/2022 0441    Acceptance, E, VU,NR by  at 12/27/2022 1531    Acceptance, E,TB, VU by  at 12/27/2022 0303    Acceptance, E,TB, VU by  at 12/26/2022 0939                   Point: Home exercise program (Done)     Learning Progress Summary           Patient Acceptance, E, VU by AM at 12/28/2022 0441    Acceptance, E, VU,NR by KA at 12/27/2022 1531    Acceptance, E,TB, VU by  at 12/27/2022 0303    Acceptance, E,TB, VU by  at 12/26/2022 0939                   Point: Body mechanics (Done)     Learning Progress Summary           Patient Acceptance, E, VU by AM at 12/28/2022 0441    Acceptance, E, VU,NR by KA at 12/27/2022 1531    Acceptance, E,TB, VU by  at 12/27/2022 0303    Acceptance, E,TB, VU by  at 12/26/2022 0939                   Point: Precautions (Done)     Learning Progress Summary           Patient Acceptance, E, VU by AM at 12/28/2022 0441    Acceptance,  E, VU,NR by KA at 12/27/2022 1531    Acceptance, E,TB, VU by  at 12/27/2022 0303    Acceptance, E,TB, VU by  at 12/26/2022 0939                               User Key     Initials Effective Dates Name Provider Type Discipline     06/16/21 -  Rosetta Mcghee, RN Registered Nurse Nurse    AM 06/24/20 -  Bre Rodriguez, RN Registered Nurse Nurse     05/24/22 -  Gordon Beltran, PT Physical Therapist PT     06/12/22 -  Rosaline Dan, KADEN Registered Nurse Nurse              PT Recommendation and Plan  Anticipated Discharge Disposition (PT): home  Planned Therapy Interventions (PT): balance training, bed mobility training, gait training, home exercise program, neuromuscular re-education, patient/family education, strengthening, transfer training  Therapy Frequency (PT): 2 times/wk (2-5x/week)  Progress Summary (PT)  Progress Toward Functional Goals (PT): progress toward functional goals is fair  Daily Progress Summary (PT): Pt HR stable during PT but limited by pain w/ standing mobility this day.  Plan of Care Reviewed With: patient  Progress: improving  Outcome Evaluation: Pt HR stable during session but limited w/ standing mobility 2/2 pain.       Time Calculation:    PT Charges     Row Name 12/28/22 1459             Time Calculation    Start Time 1400  -CS      PT Received On 12/28/22  -CS      PT Goal Re-Cert Due Date 01/09/23  -CS         Timed Charges    83158 - PT Therapeutic Activity Minutes 23  -CS         Total Minutes    Timed Charges Total Minutes 23  -CS       Total Minutes 23  -CS            User Key  (r) = Recorded By, (t) = Taken By, (c) = Cosigned By    Initials Name Provider Type     Gordon Beltran, PT Physical Therapist              Therapy Charges for Today     Code Description Service Date Service Provider Modifiers Qty    [id number] HC PT THERAPEUTIC ACT EA 15 MIN 12/28/2022 Gordon Beltran, PT GP 2          PT G-Codes  AM-PAC 6 Clicks Score (PT): 20    Gordon Beltran PT  12/28/2022

## 2022-12-28 NOTE — PLAN OF CARE
Goal Outcome Evaluation:           Progress: improving  Outcome Evaluation: Patient vital signs currently stable and she is currently wearing her cpap. She is alert and oriented x4. She has been bradycardic during this shift. Otherwise, no significant changes noted.

## 2022-12-28 NOTE — PROGRESS NOTES
[facility]     Progress Note    Patient Name: Ronna Wayne  : 1955  MRN: [id number]  Primary Care Physician:  Mague Acosta APRN  Date of admission: 2022    Subjective   Subjective     Chief Complaint: 67-year-old female being seen in follow-up for her rapid heart rate    History of Present Illness  Patient Reports feeling alittle better today. She was seen earlier this morning. She reports good sleep last night with low dose melatonin and CPAP.     BP was again elevated this morning with SBPs  In the 160s-170s.     Patient continues to report headaches; chronic.    Present during visit: Patient's     Review of Systems  She denies chest pains or palpitations.  She denies shortness of air.  Patient denies vomiting and/or diarrhea.    Objective   Objective     Vitals:   Temp:  [98 °F (36.7 °C)-98.7 °F (37.1 °C)] 98.2 °F (36.8 °C)  Heart Rate:  [49-78] 66  Resp:  [12-24] 12  BP: (110-178)/(51-91) 129/60    Physical Exam  Constitutional:       General: She is not in acute distress.     Appearance: She is well-developed.   HENT:      Head: Normocephalic and atraumatic.   Eyes:      Conjunctiva/sclera: Conjunctivae normal.   Neck:      Trachea: No tracheal deviation.   Cardiovascular:      Rate and Rhythm: Regular rhythm. Bradycardia present.      Heart sounds: No murmur heard.    No friction rub. No gallop.      Comments: Bedside telemetry: SB in the 50s. No significant lower extremity edema  Pulmonary:      Effort: No respiratory distress.      Breath sounds: Normal breath sounds. No wheezing or rales.   Abdominal:      General: Bowel sounds are normal. There is no distension.      Palpations: Abdomen is soft.      Tenderness: There is no abdominal tenderness. There is no guarding.   Skin:     General: Skin is warm and dry.      Findings: No erythema or rash.   Neurological:      Mental Status: She is alert and oriented to person, place, and time.      Cranial Nerves: No  cranial nerve deficit.          Result Review    Result Review:  I have personally reviewed the results from the time of this admission to 12/28/2022 13:26 EST and agree with these findings:  [x]  Laboratory list / accordion  []  Microbiology  []  Radiology  []  EKG/Telemetry   []  Cardiology/Vascular   []  Pathology  []  Old records  []  Other:  Most notable findings include: CMP with CO2 19.6; AST 33.CBC remains stable.    EKG per my view with sinus bradycardia, no significant ST elevations or ST changes; QTc 468 ms.     Assessment / Plan     #Symptomatic bradycardia with history of intermittent SVT  #Sick sinus syndrome  #Mild QT prolongation, improved  #PAF during recent hospitalization  #Acute on chronic HFpEF  #Uncontrolled essential hypertension, intermittent   #Dizziness  #Insomnia, improved  #Chronic headache  -Patient currently in sinus bradycardia and has been weaned off the dopamine infusion; transcutaneous pacing discontinued on 12/25/2022; patient with heart rates in the upper 50s-low 60s during my exam this morning  -Patient likely with medication induced bradycardia at she has recently been on metoprolol  -Will begin low dose lisinopril with hold parameters  -Continue with flecainide for now; monitor c/o dizziness   -Prolonged QTc improved to 468 ms; continue to monitor  -If patient has recurrent SVT we will plan to give adenosine to convert and observe the underlying rhythm before giving IV metoprolol  -Cardiology is following; consider referral to EP as outpatient for further study and possible SVT ablation  -Continue to monitor her electrolytes closely; goal potassium 4.0-4.5 and goal magnesium 2.0-2.2; she remains on supplementation as needed; will repeat her chemistry panel and Mg in a.m.   -Patient receiving Mg supplementation   -Continue with PRN diuresis; appears comfortable/compensated  -Continue melatonin  -Continue CPAP HS/PRN  -Encourage ambulation    Chronic medical problems:  -MEMO,  compliant with home CPAP: Continue CPAP HS/PRN  -Hypothyroidism: Home synthroid  -Type II DM, currently controlled: Accu-checks and PRN SSI; overall well controlled    DVT prophylaxis:  Subcutaneous heparin    Disposition:  I expect patient to be discharged likely home when medically stable; hopefully in 24-48 hours    Afshan Queen,

## 2022-12-28 NOTE — PROGRESS NOTES
LOS: 4 days     Name: Ronna Wayne  Age/Sex: 67 y.o. female  :  1955        PCP: Mague Acosta APRN  REF: No Known Provider    Active Problems:    * No active hospital problems. *      Reason for follow-up: SVT    Subjective   HPI:  Subjective   Ronna Wayne is a 67 y.o. female with past medical history significant for paroxysmal SVT, diabetes mellitus, essential hypertension, obstructive sleep apnea, history of CVA.  Patient recently had a in-house admission on 2022 - 2022 to [facility] for SVT.  She returned to [facility] ([place] [facility]) emergency room (ER) on 2022 with palpitations and was found to have as in tachycardia heart rate of 150 bpm it appeared to be flutter.  Patient was admitted and Cardiology was consulted for further evaluation and management.   Patient did receive IV metoprolol multiple times later on during the night of 2022 patient become junctional and bradycardic started on transcutaneous pacer and transferred to ICU.      Interval History:   QRS- 94 ms  OTC interval 468 ms. Patient was transferred back to PCU 2022. Patient was lying in bed resting quietly. Her spouse is at bedside. Patient denies chest pain, shortness of breath, or palpitations. She reports she has been up ambulating and has tolerated it well without palpitations. Patient currently is receiving IV infusion of Magnesium per protocol with her Mg level being 1.7.    Vital Signs  Temp:  [98 °F (36.7 °C)-98.7 °F (37.1 °C)] 98.2 °F (36.8 °C)  Heart Rate:  [49-78] 66  Resp:  [-] 12  BP: (110-178)/(51-91) 129/60     Vital Signs (last 72 hrs)        0700   0659  07 06 07 0659  07 09   Most Recent      Temp (°F) 97.9 -  98.2    97.9 -  98.1    98 -  98.7      98.2     98.2 (36.8)  0800    Heart Rate 31 -  74    49 -  65    49 -  60      50     50  0700    Resp   16     -   24       12 12/28 0213    BP 98/69 -  153/58    107/62 -  175/91    109/53 -  178/87      131/86     131/86 12/28 0700    SpO2 (%) 88 -  97    86 -  96    89 -  98      94     94 12/28 0700        Body mass index is 47.88 kg/m².    Intake/Output Summary (Last 24 hours) at 12/28/2022 1248  Last data filed at 12/28/2022 1210  Gross per 24 hour   Intake 735 ml   Output 2900 ml   Net -2165 ml     Objective  Objective     Physical Exam:      General Appearance:    Alert, cooperative, in no acute distress   Head:    Normocephalic, without obvious abnormality, atraumatic   Eyes:                          Conjunctivae and sclerae normal, no icterus, no pallor, corneas clear   Neck:   No adenopathy, supple, trachea midline, no thyromegaly, no carotid bruit, no JVD   Lungs:     Clear to auscultation, respirations regular, even and             unlabored    Heart:    Regular rhythm and normal rate, normal S1 and S2, no          murmur, no gallop, no rub, no click   Chest Wall:    No abnormalities observed   Abdomen:     Normal bowel sounds, no masses, no organomegaly, soft      non-tender, non-distended, no guarding, no rebound                tenderness   Extremities:   Moves all extremities well, no edema, no cyanosis, no            redness   Pulses:   Pulses palpable and equal bilaterally   Skin:   No bleeding, bruising or rash   Neurologic:   Alert and Oriented x 3, Speech Clear & comprehensive.       Results review   Results Review:   Results from last 7 days   Lab Units 12/28/22  0040 12/27/22  0017 12/26/22 0058 12/25/22  0007 12/24/22  1627   WBC 10*3/mm3 6.03 5.86 7.32 7.48 6.96   HEMOGLOBIN g/dL 14.6 14.0 14.0 16.4* 16.0*   PLATELETS 10*3/mm3 93* 133* 126* 128* 133*     Results from last 7 days   Lab Units 12/28/22  0040 12/27/22  0017 12/26/22  0058 12/25/22  0006 12/24/22  1814   SODIUM mmol/L 131* 138 137 136 137   POTASSIUM mmol/L 4.9 4.4 4.2 4.0 4.7   CHLORIDE mmol/L 103 103 103 100 105   CO2 mmol/L 19.6* 23.9  23.1 21.3* 20.0*   BUN mg/dL 14 18 23 16 16   CREATININE mg/dL 0.76 0.93 1.11* 0.98 0.87   CALCIUM mg/dL 9.0 8.9 8.6 9.2 8.8   GLUCOSE mg/dL 140* 153* 161* 203* 194*   ALT (SGPT) U/L 22  --   --  31 28   AST (SGOT) U/L 33*  --   --  39* 37*     Results from last 7 days   Lab Units 12/25/22  0811 12/25/22  0006 12/24/22 2125 12/24/22  1814   TROPONIN T ng/mL <0.010 <0.010 <0.010 <0.010     Lab Results   Component Value Date    PROBNP 6,200.0 (H) 12/24/2022    PROBNP 244.6 11/18/2021    PROBNP 297.5 06/19/2021     Lab Results   Component Value Date    INR 1.06 12/17/2022    INR 1.02 11/18/2021    INR 1.05 06/19/2021    INR 1.05 05/01/2021    INR 1.09 03/09/2021     Lab Results   Component Value Date    MG 1.7 12/28/2022    MG 1.9 12/27/2022    MG 1.7 12/27/2022     Lab Results   Component Value Date    TSH 0.414 12/17/2022    CHLPL 226 (H) 05/28/2016    TRIG 200 (H) 10/21/2022    HDL 51 10/21/2022     (H) 10/21/2022      Imaging Results (Last 48 Hours)     Procedure Component Value Units Date/Time    XR Chest 1 View [[id number]] Collected: 12/27/22 1243     Updated: 12/27/22 1246    Narrative:      EXAM:    XR Chest, 1 View     EXAM DATE:    12/27/2022 11:51 AM     CLINICAL HISTORY:    CHF; I47.1-Supraventricular tachycardia     TECHNIQUE:    Frontal view of the chest.     COMPARISON:  12/24/2022     FINDINGS:    LUNGS:  Unremarkable.  No consolidation.    PLEURAL SPACE:  Unremarkable.  No pneumothorax.    HEART:  Unremarkable.  No cardiomegaly.    MEDIASTINUM:  Unremarkable.    BONES/JOINTS:  Unremarkable.       Impression:        Unremarkable exam. No acute cardiopulmonary findings identified.     This report was finalized on 12/27/2022 12:44 PM by Dr. Stanford Sesay MD.           ECHO:  Results for orders placed during the hospital encounter of 12/17/22    Adult Transthoracic Echo Complete W/ Cont if Necessary Per Protocol    Interpretation Summary  •  Left ventricular systolic function is normal. Left  ventricular ejection fraction appears to be 61 - 65%.  •  Left ventricular diastolic function is consistent with (grade II w/high LAP) pseudonormalization.  •  Left atrial volume is mildly increased.  •  The right atrial cavity is mildly  dilated.  •  Mild aortic valve stenosis is present, with an estimated aortic valve area of 1.8 cm² and the mean gradient of 19 mmHg.  •  Estimated right ventricular systolic pressure from tricuspid regurgitation is mildly elevated (35-45 mmHg).    [id number]      Telemetry: SB/SR 50-60's     I reviewed the patient's new clinical results.    Medication Review:   anastrozole, 1 mg, Oral, Daily  aspirin, 81 mg, Oral, Daily  cholecalciferol, 1,000 Units, Oral, Daily  flecainide, 50 mg, Oral, Q12H  heparin (porcine), 5,000 Units, Subcutaneous, Q12H  Insulin Aspart, 0-9 Units, Subcutaneous, TID AC  levothyroxine, 88 mcg, Oral, Daily  melatonin, 5 mg, Oral, Nightly  rosuvastatin, 20 mg, Oral, Daily  sodium chloride, 10 mL, Intravenous, Q12H  sodium chloride, 10 mL, Intravenous, Q12H         Assessment    Assessment:  SVT  Acute on chronic HFpEF  Obesity  Diabetes mellitus             Plan   Recommendations:  1.  As per the cardiology consultation note, consider EP evaluation as outpatient  Close monitoring of the electrolytes, keep magnesium and potassium in adequate range.  Patient is on flecainide at this time.  Watch for SVT in the absence of beta-blockers.  Increase activity.  Cardiac wise okay for discharge.       I discussed the patients findings and my recommendations with patient and family.    Electronically signed by BOSTON Chaudhry, 22, 12:49 PM EST.        Please note that portions of this note were completed with a voice recognition program.

## 2022-12-28 NOTE — PLAN OF CARE
Goal Outcome Evaluation:           Progress: improving  Outcome Evaluation: No changes throughout shift. Pt ambluated in room. Pt has all items in reach, will continue to monitor.

## 2022-12-29 ENCOUNTER — READMISSION MANAGEMENT (OUTPATIENT)
Dept: CALL CENTER | Facility: HOSPITAL | Age: 67
End: 2022-12-29

## 2022-12-29 VITALS
WEIGHT: 267.86 LBS | HEART RATE: 76 BPM | TEMPERATURE: 98.3 F | HEIGHT: 63 IN | RESPIRATION RATE: 15 BRPM | DIASTOLIC BLOOD PRESSURE: 69 MMHG | SYSTOLIC BLOOD PRESSURE: 115 MMHG | OXYGEN SATURATION: 96 % | BODY MASS INDEX: 47.46 KG/M2

## 2022-12-29 LAB
ANION GAP SERPL CALCULATED.3IONS-SCNC: 10.3 MMOL/L (ref 5–15)
BUN SERPL-MCNC: 13 MG/DL (ref 8–23)
BUN/CREAT SERPL: 18.8 (ref 7–25)
CALCIUM SPEC-SCNC: 9.3 MG/DL (ref 8.6–10.5)
CHLORIDE SERPL-SCNC: 100 MMOL/L (ref 98–107)
CO2 SERPL-SCNC: 24.7 MMOL/L (ref 22–29)
CREAT SERPL-MCNC: 0.69 MG/DL (ref 0.57–1)
DEPRECATED RDW RBC AUTO: 48.9 FL (ref 37–54)
EGFRCR SERPLBLD CKD-EPI 2021: 95.3 ML/MIN/1.73
ERYTHROCYTE [DISTWIDTH] IN BLOOD BY AUTOMATED COUNT: 13.1 % (ref 12.3–15.4)
GLUCOSE BLDC GLUCOMTR-MCNC: 149 MG/DL (ref 70–130)
GLUCOSE BLDC GLUCOMTR-MCNC: 212 MG/DL (ref 70–130)
GLUCOSE SERPL-MCNC: 132 MG/DL (ref 65–99)
HCT VFR BLD AUTO: 43.8 % (ref 34–46.6)
HGB BLD-MCNC: 15 G/DL (ref 12–15.9)
MAGNESIUM SERPL-MCNC: 1.6 MG/DL (ref 1.6–2.4)
MCH RBC QN AUTO: 34.5 PG (ref 26.6–33)
MCHC RBC AUTO-ENTMCNC: 34.2 G/DL (ref 31.5–35.7)
MCV RBC AUTO: 100.7 FL (ref 79–97)
PLATELET # BLD AUTO: 119 10*3/MM3 (ref 140–450)
PMV BLD AUTO: 9.7 FL (ref 6–12)
POTASSIUM SERPL-SCNC: 4.3 MMOL/L (ref 3.5–5.2)
QT INTERVAL: 496 MS
QTC INTERVAL: 470 MS
RBC # BLD AUTO: 4.35 10*6/MM3 (ref 3.77–5.28)
SODIUM SERPL-SCNC: 135 MMOL/L (ref 136–145)
WBC NRBC COR # BLD: 6.28 10*3/MM3 (ref 3.4–10.8)

## 2022-12-29 PROCEDURE — 85027 COMPLETE CBC AUTOMATED: CPT | Performed by: INTERNAL MEDICINE

## 2022-12-29 PROCEDURE — 25010000002 HEPARIN (PORCINE) PER 1000 UNITS: Performed by: INTERNAL MEDICINE

## 2022-12-29 PROCEDURE — 80048 BASIC METABOLIC PNL TOTAL CA: CPT | Performed by: INTERNAL MEDICINE

## 2022-12-29 PROCEDURE — 94799 UNLISTED PULMONARY SVC/PX: CPT

## 2022-12-29 PROCEDURE — 93005 ELECTROCARDIOGRAM TRACING: CPT | Performed by: HOSPITALIST

## 2022-12-29 PROCEDURE — 82962 GLUCOSE BLOOD TEST: CPT

## 2022-12-29 PROCEDURE — 99239 HOSP IP/OBS DSCHRG MGMT >30: CPT | Performed by: INTERNAL MEDICINE

## 2022-12-29 PROCEDURE — 63710000001 INSULIN ASPART PER 5 UNITS: Performed by: INTERNAL MEDICINE

## 2022-12-29 PROCEDURE — 83735 ASSAY OF MAGNESIUM: CPT | Performed by: INTERNAL MEDICINE

## 2022-12-29 PROCEDURE — 25010000002 MAGNESIUM SULFATE 2 GM/50ML SOLUTION: Performed by: INTERNAL MEDICINE

## 2022-12-29 RX ORDER — LANOLIN ALCOHOL/MO/W.PET/CERES
400 CREAM (GRAM) TOPICAL 2 TIMES DAILY
Qty: 90 TABLET | Refills: 3 | Status: SHIPPED | OUTPATIENT
Start: 2022-12-29

## 2022-12-29 RX ORDER — LISINOPRIL 10 MG/1
10 TABLET ORAL
Qty: 30 TABLET | Refills: 0 | Status: SHIPPED | OUTPATIENT
Start: 2022-12-30 | End: 2023-01-27 | Stop reason: SDUPTHER

## 2022-12-29 RX ORDER — MAGNESIUM SULFATE HEPTAHYDRATE 40 MG/ML
2 INJECTION, SOLUTION INTRAVENOUS ONCE
Status: COMPLETED | OUTPATIENT
Start: 2022-12-29 | End: 2022-12-29

## 2022-12-29 RX ADMIN — CHOLECALCIFEROL TAB 10 MCG (400 UNIT) 1000 UNITS: 10 TAB at 08:31

## 2022-12-29 RX ADMIN — INSULIN ASPART 4 UNITS: 100 INJECTION, SOLUTION INTRAVENOUS; SUBCUTANEOUS at 10:49

## 2022-12-29 RX ADMIN — LISINOPRIL 10 MG: 10 TABLET ORAL at 08:31

## 2022-12-29 RX ADMIN — Medication 10 ML: at 08:32

## 2022-12-29 RX ADMIN — FLECAINIDE ACETATE 50 MG: 50 TABLET ORAL at 10:49

## 2022-12-29 RX ADMIN — ROSUVASTATIN CALCIUM 20 MG: 20 TABLET, FILM COATED ORAL at 08:31

## 2022-12-29 RX ADMIN — HEPARIN SODIUM 5000 UNITS: 5000 INJECTION INTRAVENOUS; SUBCUTANEOUS at 08:31

## 2022-12-29 RX ADMIN — LEVOTHYROXINE SODIUM 88 MCG: 88 TABLET ORAL at 08:31

## 2022-12-29 RX ADMIN — MAGNESIUM SULFATE 2 G: 2 INJECTION INTRAVENOUS at 02:35

## 2022-12-29 RX ADMIN — ANASTROZOLE 1 MG: 1 TABLET ORAL at 08:31

## 2022-12-29 RX ADMIN — ASPIRIN 81 MG: 81 TABLET, COATED ORAL at 08:31

## 2022-12-29 NOTE — CASE MANAGEMENT/SOCIAL WORK
Case Management Discharge Note      Final Note: Patient being discharged home on this date 12/29/22.  No needs identified.         Selected Continued Care - Admitted Since 12/24/2022         Final Discharge Disposition Code: 01 - home or self-care

## 2022-12-29 NOTE — PROGRESS NOTES
LOS: 5 days     Name: Ronna Wayne  Age/Sex: 67 y.o. female  :  1955        PCP: Mague Acosta APRN  REF: No Known Provider    Active Problems:    * No active hospital problems. *      Reason for follow-up: SVT    Subjective   HPI:  Subjective   Ronna Wayne is a 67 y.o. female with past medical history significant for paroxysmal SVT, diabetes mellitus, essential hypertension, obstructive sleep apnea, history of CVA.  Patient recently had a in-house admission on 2022 - 2022 to [facility] for SVT.  She returned to [facility] ([facility]) emergency room (ER) on 2022 with palpitations and was found to have as in tachycardia heart rate of 150 bpm it appeared to be flutter.  Patient was admitted and Cardiology was consulted for further evaluation and management.   Patient has been stable and on the PCU floor since 2022.     Interval History:   Patient is lying in bed resting quietly. No acute distress noted. Spouse is at bedside. Patient denies chest pain, shortness of breath or palpitations. She denies any SVT episodes in the night.  Patient is expecting to be discharged home today.     Vital Signs  Temp:  [98.2 °F (36.8 °C)-98.4 °F (36.9 °C)] 98.4 °F (36.9 °C)  Heart Rate:  [50-78] 56  Resp:  [-15] 15  BP: (100-175)/(60-96) 142/89     Vital Signs (last 72 hrs)        0700   0659  0700   0659  0700   0659  0700   0846   Most Recent      Temp (°F) 97.9 -  98.1    98 -  98.7    98.2 -  98.4       98.4 (36.9)  0400    Heart Rate 49 -  65    49 -  60    50 -  78      56     56  0702    Resp  -  18        11 -  15       15  0424    /62 -  175/91    109/53 -  178/87    100/83 -  175/89      142/89     142/89 12/29 0702    SpO2 (%) 86 -  96    89 -  98    92 -  98      97     97 702        Body mass index is 47.45 kg/m².    Intake/Output Summary (Last 24 hours) at 2022  0846  Last data filed at 12/29/2022 0521  Gross per 24 hour   Intake 626 ml   Output 2850 ml   Net -2224 ml     Objective  Objective     Physical Exam:      General Appearance:    Alert, cooperative, in no acute distress   Head:    Normocephalic, without obvious abnormality, atraumatic   Eyes:                          Conjunctivae and sclerae normal, no icterus, no pallor, corneas clear   Neck:   No adenopathy, supple, trachea midline, no thyromegaly, no carotid bruit, no JVD   Lungs:     Clear to auscultation, respirations regular, even and             unlabored    Heart:    Regular rhythm and normal rate, normal S1 and S2, no          murmur, no gallop, no rub, no click   Chest Wall:    No abnormalities observed   Abdomen:     Normal bowel sounds, no masses, no organomegaly, soft      non-tender, non-distended, no guarding, no rebound                tenderness   Extremities:   Moves all extremities well, no edema, no cyanosis, no            redness   Pulses:   Pulses palpable and equal bilaterally   Skin:   No bleeding, bruising or rash   Neurologic:   Alert and Oriented x 3, Speech Clear & comprehensive.       Results review   Results Review:   Results from last 7 days   Lab Units 12/29/22  0016 12/28/22  0040 12/27/22  0017 12/26/22  0058 12/25/22  0007 12/24/22  1627   WBC 10*3/mm3 6.28 6.03 5.86 7.32 7.48 6.96   HEMOGLOBIN g/dL 15.0 14.6 14.0 14.0 16.4* 16.0*   PLATELETS 10*3/mm3 119* 93* 133* 126* 128* 133*     Results from last 7 days   Lab Units 12/29/22  0016 12/28/22  0040 12/27/22  0017 12/26/22  0058 12/25/22  0006 12/24/22  1814   SODIUM mmol/L 135* 131* 138 137 136 137   POTASSIUM mmol/L 4.3 4.9 4.4 4.2 4.0 4.7   CHLORIDE mmol/L 100 103 103 103 100 105   CO2 mmol/L 24.7 19.6* 23.9 23.1 21.3* 20.0*   BUN mg/dL 13 14 18 23 16 16   CREATININE mg/dL 0.69 0.76 0.93 1.11* 0.98 0.87   CALCIUM mg/dL 9.3 9.0 8.9 8.6 9.2 8.8   GLUCOSE mg/dL 132* 140* 153* 161* 203* 194*   ALT (SGPT) U/L  --  22  --   --  31 28    AST (SGOT) U/L  --  33*  --   --  39* 37*     Results from last 7 days   Lab Units 12/25/22  0811 12/25/22  0006 12/24/22 2125 12/24/22  1814   TROPONIN T ng/mL <0.010 <0.010 <0.010 <0.010     Lab Results   Component Value Date    PROBNP 6,200.0 (H) 12/24/2022    PROBNP 244.6 11/18/2021    PROBNP 297.5 06/19/2021     Lab Results   Component Value Date    INR 1.06 12/17/2022    INR 1.02 11/18/2021    INR 1.05 06/19/2021    INR 1.05 05/01/2021    INR 1.09 03/09/2021     Lab Results   Component Value Date    MG 1.6 12/29/2022    MG 1.7 12/28/2022    MG 1.9 12/27/2022     Lab Results   Component Value Date    TSH 0.414 12/17/2022    CHLPL 226 (H) 05/28/2016    TRIG 200 (H) 10/21/2022    HDL 51 10/21/2022     (H) 10/21/2022      Imaging Results (Last 48 Hours)     Procedure Component Value Units Date/Time    XR Chest 1 View [[id number]] Collected: 12/27/22 1243     Updated: 12/27/22 1246    Narrative:      EXAM:    XR Chest, 1 View     EXAM DATE:    12/27/2022 11:51 AM     CLINICAL HISTORY:    CHF; I47.1-Supraventricular tachycardia     TECHNIQUE:    Frontal view of the chest.     COMPARISON:  12/24/2022     FINDINGS:    LUNGS:  Unremarkable.  No consolidation.    PLEURAL SPACE:  Unremarkable.  No pneumothorax.    HEART:  Unremarkable.  No cardiomegaly.    MEDIASTINUM:  Unremarkable.    BONES/JOINTS:  Unremarkable.       Impression:        Unremarkable exam. No acute cardiopulmonary findings identified.     This report was finalized on 12/27/2022 12:44 PM by Dr. Stanford Sesay MD.             ECHO:  Results for orders placed during the hospital encounter of 12/17/22    Adult Transthoracic Echo Complete W/ Cont if Necessary Per Protocol    Interpretation Summary  •  Left ventricular systolic function is normal. Left ventricular ejection fraction appears to be 61 - 65%.  •  Left ventricular diastolic function is consistent with (grade II w/high LAP) pseudonormalization.  •  Left atrial volume is mildly  increased.  •  The right atrial cavity is mildly  dilated.  •  Mild aortic valve stenosis is present, with an estimated aortic valve area of 1.8 cm² and the mean gradient of 19 mmHg.  •  Estimated right ventricular systolic pressure from tricuspid regurgitation is mildly elevated (35-45 mmHg).      Telemetry: NSR 60's       I reviewed the patient's new clinical results.    Medication Review:   anastrozole, 1 mg, Oral, Daily  aspirin, 81 mg, Oral, Daily  cholecalciferol, 1,000 Units, Oral, Daily  flecainide, 50 mg, Oral, Q12H  heparin (porcine), 5,000 Units, Subcutaneous, Q12H  Insulin Aspart, 0-9 Units, Subcutaneous, TID AC  levothyroxine, 88 mcg, Oral, Daily  lisinopril, 10 mg, Oral, Q24H  melatonin, 5 mg, Oral, Nightly  rosuvastatin, 20 mg, Oral, Daily  sodium chloride, 10 mL, Intravenous, Q12H  sodium chloride, 10 mL, Intravenous, Q12H         Assessment    Assessment:  1. SVT  2. Acute on Chronic HFpEF, EF 61-65%  3. Diabetes Mellitus type 2  4. Obesity with BMI 47.45          Plan   Recommendations:  1. As previously noted, consider referral to EP for evaluation as outpatient for ablation.  2. Follow up in Cardiology office in next  1-2 weeks for recheck.       I discussed the patients findings and my recommendations with patient and family    Electronically signed by BOSTON Chaudhry, 12/29/22, 10:50 AM EST.        Please note that portions of this note were completed with a voice recognition program.    Copied text in this note has been reviewed and is accurate as of 12/29/2022.      I have reviewed the notes, assessments and plan documented by BOSTON Cordova. I concur with her documentation.  I have independently examined the patient and made assessment and plan which was documented by BOSTON Cordova.

## 2022-12-29 NOTE — OUTREACH NOTE
Prep Survey    Flowsheet Row Responses   [facility] patient discharged from? [place]   Is LACE score < 7 ? No   Eligibility [facility]   Hospital  [place]   Date of Admission 12/24/22   Date of Discharge 12/29/22   Discharge Disposition Home or Self Care   Discharge diagnosis Supraventricular tachycardia    Does the patient have one of the following disease processes/diagnoses(primary or secondary)? Other   Does the patient have Home health ordered? No   Is there a DME ordered? No   Prep survey completed? Yes          KARLI RAINEY - Registered Nurse

## 2022-12-29 NOTE — DISCHARGE SUMMARY
Discharge Summary:    Date of Admission: 12/24/2022  Date of Discharge:  12/29/2022    PCP: Mague Acosta APRN    DISCHARGE DIAGNOSIS  #Symptomatic bradycardia with history of intermittent SVT  #Sick sinus syndrome  #Mild QT prolongation, improved  #Hypomagnesemia, resolved  #Acute on chronic HFpEF, resolved  #PAF during recent hospitalization  #Acute on chronic HFpEF  #Uncontrolled essential hypertension, intermittent   #Brief episode of atrial fibrillation during recent hospital stay; not started on anticoagulation at that time; will defer to outpatient Cardiology    SECONDARY DIAGNOSES  Past Medical History:   Diagnosis Date   • Anxiety    • B12 deficiency    • BPV (benign positional vertigo)    • Breast cancer (HCC) 05/2021   • Diabetes mellitus (HCC)    • Diarrhea    • Disease of thyroid gland    • Elevated cholesterol    • Fibromyalgia    • GERD (gastroesophageal reflux disease)    • Glaucoma    • Heart murmur    • Hyperlipidemia    • Hypertension    • Kidney disease    • Obesity    • Peptic ulceration    • PONV (postoperative nausea and vomiting)    • Sleep apnea     c-pap   • Stroke (HCC)    • Weakness of left arm        CONSULTS   Dr. Ortiz - Cardiology    PROCEDURES PERFORMED  None    HOSPITAL COURSE  Patient is a 67 y.o. female presented to [facility] complaining of shortness of breath.  Please see the admitting history and physical for further details.      Patient was admitted to the hospital medicine service.  Patient was found to be in supraventricular tachycardia upon admission.  Upon discussion with on-call cardiologist it was initially recommended to resume metoprolol tartrate.  Patient had recently been admitted and discharged from our service and was discharged on flecainide.  At that time, flecainide was also continued as well.  Patient's magnesium was found to be 1.3 upon admission and this was supplemented per protocol.  Due to patient's tachycardia, it was felt that she  was in an acute on chronic diastolic heart failure exacerbation and as such she did receive intravenous Lasix in the emergency department.    Unfortunately, the patient did develop bradycardia as she had experienced during previous hospitalizations after receiving beta-blockade.  The patient did require transfer to the critical care unit and transcutaneous pacing with a dopamine infusion per cardiology recommendations.    Patient's beta-blockers were continued to be held during her hospitalization.  Cardiology felt that as patient's heart rate had slowed down and was found to be sinus bradycardia to junctional rhythm (and not significant heart block), patient could keep her outpatient EP appointment.  Patient did have an event monitor in place and was continued to use this upon discharge.    Patient was noted to have uncontrolled hypertension.  As noted above, beta-blockers were no longer an option due to severe bradycardia.  Given patient's diabetes mellitus, I felt that she could benefit from lisinopril and this was started towards the end of her hospitalization.  Overall, patient's blood pressures were downtrending.    The patient was transferred to the progressive care unit where she remained until the time of discharge.  Patient had complained of a brief episode of dizziness while still in the critical care unit with this had improved by the time of discharge.    Patient had a brief episode of atrial fibrillation during a previous hospitalization. Cardiology had recommended to hold anticoagulation at that time - but has not been addressed further. Will recommend Cardiology discuss further during her follow up appointment.     It was felt that patient had maximized the benefit of her hospitalization. Patient was discharged home with her  in a stable condition.     CONDITION ON DISCHARGE  Stable.      VITAL SIGNS  /94   Pulse 62   Temp 98.3 °F (36.8 °C) (Oral)   Resp 15   Ht 160 cm (63\")   Wt  121 kg (267 lb 13.7 oz)   SpO2 91%   BMI 47.45 kg/m²   Objective:  General Appearance:  Comfortable, well-appearing and in no acute distress.    Vital signs: (most recent): Blood pressure 166/94, pulse 62, temperature 98.3 °F (36.8 °C), temperature source Oral, resp. rate 15, height 160 cm (63\"), weight 121 kg (267 lb 13.7 oz), SpO2 91 %, not currently breastfeeding.  Vital signs are normal.    HEENT: Normal HEENT exam.    Lungs:  Normal effort.  Breath sounds clear to auscultation.  No rales, wheezes or rhonchi.    Heart: Normal rate.  S1 normal and S2 normal.  No murmur, gallop or friction rub. (Bedside telemetry: SR, 61 bpm)  Chest: Symmetric chest wall expansion.   Abdomen: Abdomen is soft and non-distended.  Bowel sounds are normal.   There is no abdominal tenderness.     Extremities: Normal range of motion.  There is no deformity or dependent edema.    Pulses: Distal pulses are intact.    Neurological: Patient is alert and oriented to person, place and time.  Normal strength.    Skin:  Warm and dry.  No rash or ulceration.         Present during visit: Patient's     DISCHARGE DISPOSITION   Home or Self Care    DISCHARGE MEDICATIONS     Discharge Medications      New Medications      Instructions Start Date   lisinopril 10 MG tablet  Commonly known as: PRINIVIL,ZESTRIL   10 mg, Oral, Every 24 Hours Scheduled   Start Date: December 30, 2022     Magnesium Oxide 400 (240 Mg) MG tablet  Commonly known as: MAGnesium-Oxide   400 mg, Oral, 2 Times Daily         Continue These Medications      Instructions Start Date   anastrozole 1 MG tablet  Commonly known as: ARIMIDEX   1 mg, Oral, Daily      aspirin 81 MG EC tablet   81 mg, Oral, Daily      cholecalciferol 25 MCG (1000 UT) tablet  Commonly known as: VITAMIN D3   1,000 Units, Oral, Daily      empagliflozin 25 MG tablet tablet  Commonly known as: Jardiance   25 mg, Oral, Every Morning      flecainide 50 MG tablet  Commonly known as: TAMBOCOR   50 mg, Oral,  Every 12 Hours Scheduled      levothyroxine 88 MCG tablet  Commonly known as: SYNTHROID, LEVOTHROID   88 mcg, Oral, Daily      NovoLOG FlexPen 100 UNIT/ML solution pen-injector sc pen  Generic drug: insulin aspart   0-9 Units, Subcutaneous, 4 Times Daily Before Meals & Nightly      rosuvastatin 20 MG tablet  Commonly known as: CRESTOR   20 mg, Oral, Daily         Stop These Medications    FLUoxetine 20 MG capsule  Commonly known as: PROzac            DISCHARGE DIET  cardiac diet, diabetic diet  Dietary Orders (From admission, onward)     Start     Ordered    12/24/22 2155  Diet: Cardiac Diets, Diabetic Diets; Healthy Heart (2-3 Na+); Consistent Carbohydrate; Texture: Regular Texture (IDDSI 7); Fluid Consistency: Thin (IDDSI 0)  Diet Effective Now        References:    Diet Order [facility]   Question Answer Comment   Diets: Cardiac Diets    Diets: Diabetic Diets    Cardiac Diet: Healthy Heart (2-3 Na+)    Diabetic Diet: Consistent Carbohydrate    Texture: Regular Texture (IDDSI 7)    Fluid Consistency: Thin (IDDSI 0)        12/24/22 2154                ACTIVITY AT DISCHARGE   activity as tolerated, check blood glucose, check blood pressures, wear CPAP as needed and at night    Future Appointments   Date Time Provider Department Center   1/6/2023  9:00 AM Natasha Mason APRN [facility]   1/12/2023 10:30 AM Branden Andrade MD [facility]   1/18/2023  1:50 PM Lee Parrish MD [facility]   2/9/2023 10:30 AM KATELYNN NURSE LAB [facility]   2/9/2023 11:00 AM LEYDI Teresa MD [facility]   2/21/2023  8:00 AM LAB JULIANO [facility]   2/28/2023 10:45 AM Mague Acosta APRN [facility]   3/13/2023 11:30 AM Christina Tovar APRN [facility]       Additional Instructions for the Follow-ups that You Need to Schedule     Discharge Follow-up with PCP   As directed       Currently Documented PCP:    Mague Acosta APRN    PCP Phone Number:     [phone]     Follow Up Details: 1 week with BMP and CBC; hospital follow up SVT         Discharge Follow-up with Specified Provider: BOSTON Gutierrez/Dr. Hyde; 2 Weeks   As directed      To: BOSTON Gutierrez/Dr. Hyde    Follow Up: 2 Weeks    Follow Up Details: hospital follow up recurrent SVT with intermittent bradycardia               TEST  RESULTS PENDING AT DISCHARGE      Afshan Queen DO  12/29/22  10:20 EST      Time: greater than 30 minutes.

## 2022-12-29 NOTE — PLAN OF CARE
Goal Outcome Evaluation:           Progress: improving  Outcome Evaluation: Patient vital signs currently stable. She is currently wearing her CPAP. She is alert and oriented x4. She is currently getting her magnesium replaced. Otherwise, no significant changes noted.

## 2022-12-29 NOTE — PLAN OF CARE
Goal Outcome Evaluation:  Plan of Care Reviewed With: patient        Progress: improving Iv removed. Pt took all items with her at discharge. Pt was transported out of room by transport.

## 2022-12-30 ENCOUNTER — TRANSITIONAL CARE MANAGEMENT TELEPHONE ENCOUNTER (OUTPATIENT)
Dept: CALL CENTER | Facility: HOSPITAL | Age: 67
End: 2022-12-30

## 2022-12-30 NOTE — OUTREACH NOTE
Call Center TCM Note    Flowsheet Row Responses   [facility] patient discharged from? [facility]   Does the patient have one of the following disease processes/diagnoses(primary or secondary)? Other   TCM attempt successful? Yes   Call start time 1430   Call end time 1433   Discharge diagnosis Supraventricular tachycardia    Person spoke with today (if not patient) and relationship patient   Meds reviewed with patient/caregiver? Yes   Is the patient having any side effects they believe may be caused by any medication additions or changes? No   Does the patient have all medications ordered at discharge? Yes   Is the patient taking all medications as directed (includes completed medication regime)? Yes   Comments Cardiology fu appt on 1/12/23,  Pulmonary fu appt 1/6/23 at 9:00 AM   Does the patient have an appointment with their PCP within 7 days of discharge? Yes  [1/3/23 at 9:15 AM]   Psychosocial issues? No   Did the patient receive a copy of their discharge instructions? Yes   Nursing interventions Reviewed instructions with patient   What is the patient's perception of their health status since discharge? Improving   Is the patient/caregiver able to teach back signs and symptoms related to disease process for when to call PCP? Yes   Is the patient/caregiver able to teach back signs and symptoms related to disease process for when to call 911? Yes   Is the patient/caregiver able to teach back the hierarchy of who to call/visit for symptoms/problems? PCP, Specialist, Home health nurse, Urgent Care, ED, 911 Yes   If the patient is a current smoker, are they able to teach back resources for cessation? Not a smoker   TCM call completed? Yes   Wrap up additional comments Pt states she is doing better, but feeling tired/weak still. Pt reports HR at 81 bpm, and no irregular heatbeats at this time. Reviewed AVS/medications with pt. Pt verified PCP hospital fu appt on 1/3/23, Pulmonologist fu appt on 1/6/23, and  cardiologist pedro appt on 1/12/23.   Call end time 0552   Would this patient benefit from a Referral to [facility] Social Work? No   Is the patient interested in additional calls from an ambulatory ?  NOTE:  applies to high risk patients requiring additional follow-up. No          Corinna Shah RN    12/30/2022, 14:35 EST

## 2023-01-04 ENCOUNTER — OFFICE VISIT (OUTPATIENT)
Dept: CARDIOLOGY | Facility: CLINIC | Age: 68
End: 2023-01-04
Payer: MEDICARE

## 2023-01-04 VITALS
HEART RATE: 76 BPM | RESPIRATION RATE: 16 BRPM | WEIGHT: 269.2 LBS | HEIGHT: 63 IN | DIASTOLIC BLOOD PRESSURE: 75 MMHG | BODY MASS INDEX: 47.7 KG/M2 | SYSTOLIC BLOOD PRESSURE: 135 MMHG

## 2023-01-04 DIAGNOSIS — I48.92 ATRIAL FLUTTER, UNSPECIFIED TYPE: ICD-10-CM

## 2023-01-04 DIAGNOSIS — I49.5 TACHY-BRADY SYNDROME: ICD-10-CM

## 2023-01-04 DIAGNOSIS — G47.33 OBSTRUCTIVE SLEEP APNEA SYNDROME: ICD-10-CM

## 2023-01-04 DIAGNOSIS — I47.1 SVT (SUPRAVENTRICULAR TACHYCARDIA): Primary | ICD-10-CM

## 2023-01-04 PROCEDURE — 99214 OFFICE O/P EST MOD 30 MIN: CPT | Performed by: NURSE PRACTITIONER

## 2023-01-04 NOTE — PROGRESS NOTES
Mague Acosta, APRN  Ronna Wayne  1955 01/04/2023    Patient Active Problem List   Diagnosis   • Frequent headaches   • Memory loss   • Essential hypertension   • Dyslipidemia   • Type 2 diabetes mellitus with hyperglycemia, with long-term current use of insulin (HCC)   • Obstructive sleep apnea syndrome   • Glaucoma   • Anxiety   • GERD (gastroesophageal reflux disease)   • Morbid obesity   • Weakness of left arm   • Cerebrovascular accident (CVA) due to embolism (HCC)   • SVT (supraventricular tachycardia) (HCC)   • Vitamin D deficiency   • B12 deficiency   • Malignant neoplasm of upper-outer quadrant of right breast in female, estrogen receptor positive (HCC)   • Other specified hypothyroidism   • Personal history of colonic polyps   • Depression   • Paroxysmal SVT (supraventricular tachycardia) (HCC)       Dear Mague Acosta, APRN:    Subjective     Ronna Wayne is a 67 y.o. female with the problems as listed above, presents    Chief Complaint   Patient presents with   • Follow-up     Event monitor reading   • Palpitations     Noticeable today   • Shortness of Breath     Routine activity   • Med Management     presented       History of Present Illness:        Allergies   Allergen Reactions   • Codeine GI Intolerance     Increased heart rate     • Sulfa Antibiotics GI Intolerance     Heart rate increase    :      Current Outpatient Medications:   •  anastrozole (ARIMIDEX) 1 MG tablet, Take 1 tablet by mouth Daily., Disp: 30 tablet, Rfl: 11  •  aspirin 81 MG EC tablet, Take 1 tablet by mouth Daily., Disp: 90 tablet, Rfl: 3  •  cholecalciferol (VITAMIN D3) 25 MCG (1000 UT) tablet, Take 1,000 Units by mouth Daily., Disp: , Rfl:   •  empagliflozin (Jardiance) 25 MG tablet tablet, Take 1 tablet by mouth Every Morning., Disp: 30 tablet, Rfl: 3  •  flecainide (TAMBOCOR) 50 MG tablet, Take 1 tablet by mouth Every 12 (Twelve) Hours., Disp: 60 tablet, Rfl: 0  •  insulin aspart  (NovoLOG FlexPen) 100 UNIT/ML solution pen-injector sc pen, Inject 0-9 Units under the skin into the appropriate area as directed 4 (Four) Times a Day Before Meals & at Bedtime., Disp: 15 mL, Rfl: 0  •  levothyroxine (SYNTHROID, LEVOTHROID) 88 MCG tablet, Take 88 mcg by mouth Daily., Disp: , Rfl:   •  lisinopril (PRINIVIL,ZESTRIL) 10 MG tablet, Take 1 tablet by mouth Daily., Disp: 30 tablet, Rfl: 0  •  MAGnesium-Oxide 400 (240 Mg) MG tablet, Take 1 tablet by mouth 2 (Two) Times a Day., Disp: 90 tablet, Rfl: 3  •  rosuvastatin (CRESTOR) 20 MG tablet, Take 1 tablet by mouth Daily., Disp: 30 tablet, Rfl: 4      The following portions of the patient's history were reviewed and updated as appropriate: allergies, current medications, past family history, past medical history, past social history, past surgical history and problem list.    Social History     Tobacco Use   • Smoking status: Former     Packs/day: 1.50     Years: 8.00     Pack years: 12.00     Types: Cigarettes     Quit date:      Years since quittin.0   • Smokeless tobacco: Never   Vaping Use   • Vaping Use: Never used   Substance Use Topics   • Alcohol use: No     Comment: former social drinker not had anything in 25 + years       Review of Systems   Cardiovascular: Positive for palpitations. Negative for chest pain and leg swelling.   Respiratory: Positive for shortness of breath.        Objective   Vitals:    23 1500   Resp: 16   Weight: 122 kg (269 lb 3.2 oz)   Height: 160 cm (63\")     Body mass index is 47.69 kg/m².        Physical Exam    Lab Results   Component Value Date     (L) 2022    K 4.3 2022     2022    CO2 24.7 2022    BUN 13 2022    CREATININE 0.69 2022    GLUCOSE 132 (H) 2022    CALCIUM 9.3 2022    AST 33 (H) 2022    ALT 22 2022    ALKPHOS 74 2022    LABIL2 1.0 (L) 2016     Lab Results   Component Value Date    CKTOTAL 36 2016     Lab  Results   Component Value Date    WBC 6.28 12/29/2022    HGB 15.0 12/29/2022    HCT 43.8 12/29/2022     (L) 12/29/2022     Lab Results   Component Value Date    INR 1.06 12/17/2022    INR 1.02 11/18/2021    INR 1.05 06/19/2021     Lab Results   Component Value Date    MG 1.6 12/29/2022     Lab Results   Component Value Date    TSH 0.414 12/17/2022    CHLPL 226 (H) 05/28/2016    TRIG 200 (H) 10/21/2022    HDL 51 10/21/2022     (H) 10/21/2022      No results found for: BNP    During this visit the following were done:  Labs Reviewed [x]    Labs Ordered []    Radiology Reports Reviewed [x]    Radiology Ordered []    PCP Records Reviewed []    Referring Provider Records Reviewed []    ER Records Reviewed [x]    Hospital Records Reviewed [x]    History Obtained From Family []    Radiology Images Reviewed [x]    Other Reviewed [x]    Records Requested []      Procedures      Assessment & Plan   No diagnosis found.             Recommendations:       No follow-ups on file.    As always, Mague Acosta, [facility]  I appreciate very much the opportunity to participate in the cardiovascular care of your patients. Please do not hesitate to call me with any questions with regards to Ronna Khouryaleyda FletcherWayne evaluation and management.           With Best Regards,        Bj Hyde MD, [facility]    Please note that portions of this note were completed with a voice recognition program.

## 2023-01-04 NOTE — PROGRESS NOTES
[facility]             BOSTON Jiménez Sherelene S, APRN  Ronna Álvarez Rosana  1955 01/04/2023    Patient Active Problem List   Diagnosis   • Frequent headaches   • Memory loss   • Essential hypertension   • Dyslipidemia   • Type 2 diabetes mellitus with hyperglycemia, with long-term current use of insulin (HCC)   • Obstructive sleep apnea syndrome   • Glaucoma   • Anxiety   • GERD (gastroesophageal reflux disease)   • Morbid obesity   • Weakness of left arm   • Cerebrovascular accident (CVA) due to embolism (HCC)   • SVT (supraventricular tachycardia) (HCC)   • Vitamin D deficiency   • B12 deficiency   • Malignant neoplasm of upper-outer quadrant of right breast in female, estrogen receptor positive (HCC)   • Other specified hypothyroidism   • Personal history of colonic polyps   • Depression   • Paroxysmal SVT (supraventricular tachycardia) (HCC)   • Tachy-janie syndrome (HCC)   • Atrial flutter (HCC)       Dear Mague Acosta APRN:    Subjective     Chief Complaint   Patient presents with   • Follow-up     Event monitor reading   • Palpitations     Noticeable today   • Shortness of Breath     Routine activity   • Med Management     presented       HPI:     This is a 67 y.o. female with known past medical history of dyslipidemia, diabetes mellitus type 2, essential hypertension, obstructive sleep apnea, PSVT, history of CVA, breast cancer, obesity and paroxysmal atrial fibrillation/flutter.    Ronna Lansdowne Rosana presents today for cardiology follow-up regarding abnormal event monitor.  Patient was admitted to [facility] in early December 2022 for paroxysmal SVT.  During that admission patient had intermittent episodes of bradycardia and tachycardia.  She was started on flecainide to help maintain her normal sinus rhythm given her recurrent SVT.  She was also noted to have some intermittent atrial fibrillation for which she  was not started on anticoagulation. Patient was discharged home with beta-blocker held along with HCTZ.  She did come to the office to have event monitor placed for further evaluation.  Patient presented back to [facility] on 12/24/2022 for SVT.  She was also given some IV Lasix during admission for some acute on chronic diastolic heart failure.  During that admission she did develop bradycardia after receiving beta-blocker and required transfer to the critical care unit and transcutaneous pacing with a dopamine infusion.  She was given an outpatient EP appointment as patient has stable sinus bradycardia with junctional rhythm and felt not to be significant heart block.  Patient presents today after event monitor shows intermittent episodes of atrial flutter with RVR along with bradycardia with heart rate in the 30s and intermittent tachycardia with atrial flutter.  Denies any chest pain, shortness of breath or palpitations.  Does report persistent fatigue.    • Diagnostic Testing  1. Echocardiogram 5/2021: EF 51 to 55% with moderate aortic sclerosis  2. Nuclear stress test 5/2021: No evidence of ischemia     All other systems were reviewed and were negative.    Patient Active Problem List   Diagnosis   • Frequent headaches   • Memory loss   • Essential hypertension   • Dyslipidemia   • Type 2 diabetes mellitus with hyperglycemia, with long-term current use of insulin (HCC)   • Obstructive sleep apnea syndrome   • Glaucoma   • Anxiety   • GERD (gastroesophageal reflux disease)   • Morbid obesity   • Weakness of left arm   • Cerebrovascular accident (CVA) due to embolism ([facility])   • SVT (supraventricular tachycardia) ([facility])   • Vitamin D deficiency   • B12 deficiency   • Malignant neoplasm of upper-outer quadrant of right breast in female, estrogen receptor positive (HCC)   • Other specified hypothyroidism   • Personal history of colonic polyps   • Depression   • Paroxysmal SVT (supraventricular tachycardia)  (HCC)   • Tachy-janie syndrome (HCC)   • Atrial flutter (HCC)       family history includes Alcohol abuse in her maternal aunt; Arthritis in her sister; Breast cancer (age of onset: 40) in her sister; Cancer in her maternal grandfather; Diabetes in her mother; Heart attack in her father; Hyperlipidemia in her father, sister, and sister; Hypertension in her father, sister, sister, sister, and another family member; Obesity in her sister and sister; Stroke in her paternal grandfather and paternal grandmother; Thyroid disease in her mother and sister.     reports that she quit smoking about 47 years ago. Her smoking use included cigarettes. She has a 12.00 pack-year smoking history. She has never used smokeless tobacco. She reports that she does not drink alcohol.    Allergies   Allergen Reactions   • Codeine GI Intolerance     Increased heart rate     • Sulfa Antibiotics GI Intolerance     Heart rate increase          Current Outpatient Medications:   •  anastrozole (ARIMIDEX) 1 MG tablet, Take 1 tablet by mouth Daily., Disp: 30 tablet, Rfl: 11  •  aspirin 81 MG EC tablet, Take 1 tablet by mouth Daily., Disp: 90 tablet, Rfl: 3  •  cholecalciferol (VITAMIN D3) 25 MCG (1000 UT) tablet, Take 1,000 Units by mouth Daily., Disp: , Rfl:   •  empagliflozin (Jardiance) 25 MG tablet tablet, Take 1 tablet by mouth Every Morning., Disp: 30 tablet, Rfl: 3  •  insulin aspart (NovoLOG FlexPen) 100 UNIT/ML solution pen-injector sc pen, Inject 0-9 Units under the skin into the appropriate area as directed 4 (Four) Times a Day Before Meals & at Bedtime., Disp: 15 mL, Rfl: 0  •  levothyroxine (SYNTHROID, LEVOTHROID) 88 MCG tablet, Take 88 mcg by mouth Daily., Disp: , Rfl:   •  lisinopril (PRINIVIL,ZESTRIL) 10 MG tablet, Take 1 tablet by mouth Daily., Disp: 30 tablet, Rfl: 0  •  MAGnesium-Oxide 400 (240 Mg) MG tablet, Take 1 tablet by mouth 2 (Two) Times a Day., Disp: 90 tablet, Rfl: 3  •  rosuvastatin (CRESTOR) 20 MG tablet, Take 1  tablet by mouth Daily., Disp: 30 tablet, Rfl: 4      Physical Exam:  I have reviewed the patient's current vital signs as documented in the patient's EMR.   Vitals:    01/04/23 1500   BP: 135/75   Pulse: 76   Resp: 16     Body mass index is 47.69 kg/m².       01/04/23  1500   Weight: 122 kg (269 lb 3.2 oz)      Physical Exam  Constitutional:       General: She is not in acute distress.     Appearance: Normal appearance. She is well-developed and normal weight.   HENT:      Head: Normocephalic and atraumatic.   Eyes:      General: Lids are normal.      Conjunctiva/sclera: Conjunctivae normal.      Pupils: Pupils are equal, round, and reactive to light.   Neck:      Vascular: No carotid bruit or JVD.   Cardiovascular:      Rate and Rhythm: Normal rate and regular rhythm.      Pulses: Normal pulses.           Radial pulses are 2+ on the right side and 2+ on the left side.      Heart sounds: Normal heart sounds, S1 normal and S2 normal. No murmur heard.  Pulmonary:      Effort: Pulmonary effort is normal. No respiratory distress.      Breath sounds: Normal breath sounds. No wheezing.   Abdominal:      General: Bowel sounds are normal. There is no distension.      Palpations: Abdomen is soft. There is no hepatomegaly or splenomegaly.      Tenderness: There is no abdominal tenderness.   Musculoskeletal:         General: No swelling. Normal range of motion.      Cervical back: Normal range of motion and neck supple.      Right lower leg: No edema.      Left lower leg: No edema.   Skin:     General: Skin is warm and dry.      Coloration: Skin is not jaundiced.      Findings: No rash.   Neurological:      General: No focal deficit present.      Mental Status: She is alert and oriented to person, place, and time. Mental status is at baseline.   Psychiatric:         Mood and Affect: Mood normal.         Speech: Speech normal.         Behavior: Behavior normal.         Thought Content: Thought content normal.         Judgment:  Judgment normal.            DATA REVIEWED:     TTE/MAGI:  Results for orders placed during the hospital encounter of 12/17/22    Adult Transthoracic Echo Complete W/ Cont if Necessary Per Protocol    Interpretation Summary  •  Left ventricular systolic function is normal. Left ventricular ejection fraction appears to be 61 - 65%.  •  Left ventricular diastolic function is consistent with (grade II w/high LAP) pseudonormalization.  •  Left atrial volume is mildly increased.  •  The right atrial cavity is mildly  dilated.  •  Mild aortic valve stenosis is present, with an estimated aortic valve area of 1.8 cm² and the mean gradient of 19 mmHg.  •  Estimated right ventricular systolic pressure from tricuspid regurgitation is mildly elevated (35-45 mmHg).      Laboratory evaluations:    Lab Results   Component Value Date    GLUCOSE 132 (H) 12/29/2022    BUN 13 12/29/2022    CREATININE 0.69 12/29/2022    EGFRIFNONA 54 (L) 01/11/2022    BCR 18.8 12/29/2022    K 4.3 12/29/2022    CO2 24.7 12/29/2022    CALCIUM 9.3 12/29/2022    ALBUMIN 2.9 (L) 12/28/2022    LABIL2 1.0 (L) 05/28/2016    AST 33 (H) 12/28/2022    ALT 22 12/28/2022     Lab Results   Component Value Date    WBC 6.28 12/29/2022    HGB 15.0 12/29/2022    HCT 43.8 12/29/2022    .7 (H) 12/29/2022     (L) 12/29/2022     Lab Results   Component Value Date    CHOL 210 (H) 10/21/2022    CHLPL 226 (H) 05/28/2016    TRIG 200 (H) 10/21/2022    HDL 51 10/21/2022     (H) 10/21/2022     Lab Results   Component Value Date    TSH 0.414 12/17/2022     Lab Results   Component Value Date    HGBA1C 7.80 (H) 10/21/2022     Lab Results   Component Value Date    ALT 22 12/28/2022     Lab Results   Component Value Date    HGBA1C 7.80 (H) 10/21/2022    HGBA1C 8.80 (H) 03/08/2022    HGBA1C 10.77 (H) 11/29/2021     Lab Results   Component Value Date    MICROALBUR <1.2 03/08/2022    CREATININE 0.69 12/29/2022     No results found for: IRON, TIBC, FERRITIN  Lab Results    Component Value Date    INR 1.06 12/17/2022    INR 1.02 11/18/2021    INR 1.05 06/19/2021    PROTIME 14.0 12/17/2022    PROTIME 13.8 11/18/2021    PROTIME 14.1 06/19/2021           --------------------------------------------------------------------------------------------------------------------------    ASSESSMENT/PLAN:      Diagnosis Plan   1. SVT (supraventricular tachycardia) ([facility])        2. Tachy-janie syndrome ([facility])  Case Request Cath Lab: Pacemaker DC new      3. Obstructive sleep apnea syndrome        4. Atrial flutter, unspecified type ([facility])            1. Tachybradycardia syndrome  • Patient has had multiple episodes of bradycardia during her recent hospital admissions with need for temporary percutaneous pacing.  Cardiac event monitor showed intermittent episodes of sinus bradycardia in the 30s along with intermittent episodes of tachycardia and atrial flutter.  Appears to have underlying tacky bradycardia syndrome for which she will require permanent pacemaker implantation.  Case discussed with Dr. Hyde.  Will schedule this for 1/9/2022.  Hold aspirin.   • Discontinued flecainide for now given her bradycardia.  • Discussed risks and benefits of permanent pacemaker implantation and answered all questions.  Patient is agreeable to proceed.    2.  Atrial flutter  3.  PSVT  · Patient has had recurrent episodes of SVT along with intermittent episodes of atrial flutter with RVR.  She has been unable to tolerate beta-blocker secondary to significant bradycardia requiring temporary pacing during her last hospital admission.  Patient scheduled to undergo permanent pacemaker implantation.  Can consider placing patient back on beta-blocker for rate control once pacemaker has been placed.  If she has recurrent SVTs and atrial flutter can consider referral to EP for further evaluation.  · Discussed with patient that if she has further symptoms to seek emergent care.  · Given high CHADS-VASc score she will require  anticoagulation for stroke prevention.  Once pacemaker has been implanted we will place her on Eliquis.    Discussed case with Dr. Hyde    This document has been @Electronically signed by BOSTON Jiménez, 01/04/23, 5:14 PM EST.       Dictated Utilizing Dragon Dictation: Part of this note may be an electronic transcription/translation of spoken language to printed text using the Dragon Dictation System.    Follow-up appointment and medication changes provided in hand delivered After Visit Summary as well as reviewed in the room.

## 2023-01-04 NOTE — H&P (VIEW-ONLY)
[facility]             BOSTON Jiménez Sherelene S, APRN  Ronna Álvarez Rosana  1955 01/04/2023    Patient Active Problem List   Diagnosis   • Frequent headaches   • Memory loss   • Essential hypertension   • Dyslipidemia   • Type 2 diabetes mellitus with hyperglycemia, with long-term current use of insulin (HCC)   • Obstructive sleep apnea syndrome   • Glaucoma   • Anxiety   • GERD (gastroesophageal reflux disease)   • Morbid obesity   • Weakness of left arm   • Cerebrovascular accident (CVA) due to embolism (HCC)   • SVT (supraventricular tachycardia) (HCC)   • Vitamin D deficiency   • B12 deficiency   • Malignant neoplasm of upper-outer quadrant of right breast in female, estrogen receptor positive (HCC)   • Other specified hypothyroidism   • Personal history of colonic polyps   • Depression   • Paroxysmal SVT (supraventricular tachycardia) (HCC)   • Tachy-janie syndrome (HCC)   • Atrial flutter (HCC)       Dear Mague Acosta APRN:    Subjective     Chief Complaint   Patient presents with   • Follow-up     Event monitor reading   • Palpitations     Noticeable today   • Shortness of Breath     Routine activity   • Med Management     presented       HPI:     This is a 67 y.o. female with known past medical history of dyslipidemia, diabetes mellitus type 2, essential hypertension, obstructive sleep apnea, PSVT, history of CVA, breast cancer, obesity and paroxysmal atrial fibrillation/flutter.    Ronna Rickman Rosana presents today for cardiology follow-up regarding abnormal event monitor.  Patient was admitted to [facility] in early December 2022 for paroxysmal SVT.  During that admission patient had intermittent episodes of bradycardia and tachycardia.  She was started on flecainide to help maintain her normal sinus rhythm given her recurrent SVT.  She was also noted to have some intermittent atrial fibrillation for which she  was not started on anticoagulation. Patient was discharged home with beta-blocker held along with HCTZ.  She did come to the office to have event monitor placed for further evaluation.  Patient presented back to [facility] on 12/24/2022 for SVT.  She was also given some IV Lasix during admission for some acute on chronic diastolic heart failure.  During that admission she did develop bradycardia after receiving beta-blocker and required transfer to the critical care unit and transcutaneous pacing with a dopamine infusion.  She was given an outpatient EP appointment as patient has stable sinus bradycardia with junctional rhythm and felt not to be significant heart block.  Patient presents today after event monitor shows intermittent episodes of atrial flutter with RVR along with bradycardia with heart rate in the 30s and intermittent tachycardia with atrial flutter.  Denies any chest pain, shortness of breath or palpitations.  Does report persistent fatigue.    • Diagnostic Testing  1. Echocardiogram 5/2021: EF 51 to 55% with moderate aortic sclerosis  2. Nuclear stress test 5/2021: No evidence of ischemia     All other systems were reviewed and were negative.    Patient Active Problem List   Diagnosis   • Frequent headaches   • Memory loss   • Essential hypertension   • Dyslipidemia   • Type 2 diabetes mellitus with hyperglycemia, with long-term current use of insulin (HCC)   • Obstructive sleep apnea syndrome   • Glaucoma   • Anxiety   • GERD (gastroesophageal reflux disease)   • Morbid obesity   • Weakness of left arm   • Cerebrovascular accident (CVA) due to embolism ([facility])   • SVT (supraventricular tachycardia) ([facility])   • Vitamin D deficiency   • B12 deficiency   • Malignant neoplasm of upper-outer quadrant of right breast in female, estrogen receptor positive (HCC)   • Other specified hypothyroidism   • Personal history of colonic polyps   • Depression   • Paroxysmal SVT (supraventricular tachycardia)  (HCC)   • Tachy-janie syndrome (HCC)   • Atrial flutter (HCC)       family history includes Alcohol abuse in her maternal aunt; Arthritis in her sister; Breast cancer (age of onset: 40) in her sister; Cancer in her maternal grandfather; Diabetes in her mother; Heart attack in her father; Hyperlipidemia in her father, sister, and sister; Hypertension in her father, sister, sister, sister, and another family member; Obesity in her sister and sister; Stroke in her paternal grandfather and paternal grandmother; Thyroid disease in her mother and sister.     reports that she quit smoking about 47 years ago. Her smoking use included cigarettes. She has a 12.00 pack-year smoking history. She has never used smokeless tobacco. She reports that she does not drink alcohol.    Allergies   Allergen Reactions   • Codeine GI Intolerance     Increased heart rate     • Sulfa Antibiotics GI Intolerance     Heart rate increase          Current Outpatient Medications:   •  anastrozole (ARIMIDEX) 1 MG tablet, Take 1 tablet by mouth Daily., Disp: 30 tablet, Rfl: 11  •  aspirin 81 MG EC tablet, Take 1 tablet by mouth Daily., Disp: 90 tablet, Rfl: 3  •  cholecalciferol (VITAMIN D3) 25 MCG (1000 UT) tablet, Take 1,000 Units by mouth Daily., Disp: , Rfl:   •  empagliflozin (Jardiance) 25 MG tablet tablet, Take 1 tablet by mouth Every Morning., Disp: 30 tablet, Rfl: 3  •  insulin aspart (NovoLOG FlexPen) 100 UNIT/ML solution pen-injector sc pen, Inject 0-9 Units under the skin into the appropriate area as directed 4 (Four) Times a Day Before Meals & at Bedtime., Disp: 15 mL, Rfl: 0  •  levothyroxine (SYNTHROID, LEVOTHROID) 88 MCG tablet, Take 88 mcg by mouth Daily., Disp: , Rfl:   •  lisinopril (PRINIVIL,ZESTRIL) 10 MG tablet, Take 1 tablet by mouth Daily., Disp: 30 tablet, Rfl: 0  •  MAGnesium-Oxide 400 (240 Mg) MG tablet, Take 1 tablet by mouth 2 (Two) Times a Day., Disp: 90 tablet, Rfl: 3  •  rosuvastatin (CRESTOR) 20 MG tablet, Take 1  tablet by mouth Daily., Disp: 30 tablet, Rfl: 4      Physical Exam:  I have reviewed the patient's current vital signs as documented in the patient's EMR.   Vitals:    01/04/23 1500   BP: 135/75   Pulse: 76   Resp: 16     Body mass index is 47.69 kg/m².       01/04/23  1500   Weight: 122 kg (269 lb 3.2 oz)      Physical Exam  Constitutional:       General: She is not in acute distress.     Appearance: Normal appearance. She is well-developed and normal weight.   HENT:      Head: Normocephalic and atraumatic.   Eyes:      General: Lids are normal.      Conjunctiva/sclera: Conjunctivae normal.      Pupils: Pupils are equal, round, and reactive to light.   Neck:      Vascular: No carotid bruit or JVD.   Cardiovascular:      Rate and Rhythm: Normal rate and regular rhythm.      Pulses: Normal pulses.           Radial pulses are 2+ on the right side and 2+ on the left side.      Heart sounds: Normal heart sounds, S1 normal and S2 normal. No murmur heard.  Pulmonary:      Effort: Pulmonary effort is normal. No respiratory distress.      Breath sounds: Normal breath sounds. No wheezing.   Abdominal:      General: Bowel sounds are normal. There is no distension.      Palpations: Abdomen is soft. There is no hepatomegaly or splenomegaly.      Tenderness: There is no abdominal tenderness.   Musculoskeletal:         General: No swelling. Normal range of motion.      Cervical back: Normal range of motion and neck supple.      Right lower leg: No edema.      Left lower leg: No edema.   Skin:     General: Skin is warm and dry.      Coloration: Skin is not jaundiced.      Findings: No rash.   Neurological:      General: No focal deficit present.      Mental Status: She is alert and oriented to person, place, and time. Mental status is at baseline.   Psychiatric:         Mood and Affect: Mood normal.         Speech: Speech normal.         Behavior: Behavior normal.         Thought Content: Thought content normal.         Judgment:  Judgment normal.            DATA REVIEWED:     TTE/MAGI:  Results for orders placed during the hospital encounter of 12/17/22    Adult Transthoracic Echo Complete W/ Cont if Necessary Per Protocol    Interpretation Summary  •  Left ventricular systolic function is normal. Left ventricular ejection fraction appears to be 61 - 65%.  •  Left ventricular diastolic function is consistent with (grade II w/high LAP) pseudonormalization.  •  Left atrial volume is mildly increased.  •  The right atrial cavity is mildly  dilated.  •  Mild aortic valve stenosis is present, with an estimated aortic valve area of 1.8 cm² and the mean gradient of 19 mmHg.  •  Estimated right ventricular systolic pressure from tricuspid regurgitation is mildly elevated (35-45 mmHg).      Laboratory evaluations:    Lab Results   Component Value Date    GLUCOSE 132 (H) 12/29/2022    BUN 13 12/29/2022    CREATININE 0.69 12/29/2022    EGFRIFNONA 54 (L) 01/11/2022    BCR 18.8 12/29/2022    K 4.3 12/29/2022    CO2 24.7 12/29/2022    CALCIUM 9.3 12/29/2022    ALBUMIN 2.9 (L) 12/28/2022    LABIL2 1.0 (L) 05/28/2016    AST 33 (H) 12/28/2022    ALT 22 12/28/2022     Lab Results   Component Value Date    WBC 6.28 12/29/2022    HGB 15.0 12/29/2022    HCT 43.8 12/29/2022    .7 (H) 12/29/2022     (L) 12/29/2022     Lab Results   Component Value Date    CHOL 210 (H) 10/21/2022    CHLPL 226 (H) 05/28/2016    TRIG 200 (H) 10/21/2022    HDL 51 10/21/2022     (H) 10/21/2022     Lab Results   Component Value Date    TSH 0.414 12/17/2022     Lab Results   Component Value Date    HGBA1C 7.80 (H) 10/21/2022     Lab Results   Component Value Date    ALT 22 12/28/2022     Lab Results   Component Value Date    HGBA1C 7.80 (H) 10/21/2022    HGBA1C 8.80 (H) 03/08/2022    HGBA1C 10.77 (H) 11/29/2021     Lab Results   Component Value Date    MICROALBUR <1.2 03/08/2022    CREATININE 0.69 12/29/2022     No results found for: IRON, TIBC, FERRITIN  Lab Results    Component Value Date    INR 1.06 12/17/2022    INR 1.02 11/18/2021    INR 1.05 06/19/2021    PROTIME 14.0 12/17/2022    PROTIME 13.8 11/18/2021    PROTIME 14.1 06/19/2021           --------------------------------------------------------------------------------------------------------------------------    ASSESSMENT/PLAN:      Diagnosis Plan   1. SVT (supraventricular tachycardia) ([facility])        2. Tachy-janie syndrome ([facility])  Case Request Cath Lab: Pacemaker DC new      3. Obstructive sleep apnea syndrome        4. Atrial flutter, unspecified type ([facility])            1. Tachybradycardia syndrome  • Patient has had multiple episodes of bradycardia during her recent hospital admissions with need for temporary percutaneous pacing.  Cardiac event monitor showed intermittent episodes of sinus bradycardia in the 30s along with intermittent episodes of tachycardia and atrial flutter.  Appears to have underlying tacky bradycardia syndrome for which she will require permanent pacemaker implantation.  Case discussed with Dr. Hyde.  Will schedule this for 1/9/2022.  Hold aspirin.   • Discontinued flecainide for now given her bradycardia.  • Discussed risks and benefits of permanent pacemaker implantation and answered all questions.  Patient is agreeable to proceed.    2.  Atrial flutter  3.  PSVT  · Patient has had recurrent episodes of SVT along with intermittent episodes of atrial flutter with RVR.  She has been unable to tolerate beta-blocker secondary to significant bradycardia requiring temporary pacing during her last hospital admission.  Patient scheduled to undergo permanent pacemaker implantation.  Can consider placing patient back on beta-blocker for rate control once pacemaker has been placed.  If she has recurrent SVTs and atrial flutter can consider referral to EP for further evaluation.  · Discussed with patient that if she has further symptoms to seek emergent care.  · Given high CHADS-VASc score she will require  anticoagulation for stroke prevention.  Once pacemaker has been implanted we will place her on Eliquis.    Discussed case with Dr. Hyde    This document has been @Electronically signed by BOSTON Jiménez, 01/04/23, 5:14 PM EST.       Dictated Utilizing Dragon Dictation: Part of this note may be an electronic transcription/translation of spoken language to printed text using the Dragon Dictation System.    Follow-up appointment and medication changes provided in hand delivered After Visit Summary as well as reviewed in the room.

## 2023-01-06 ENCOUNTER — OFFICE VISIT (OUTPATIENT)
Dept: PULMONOLOGY | Facility: CLINIC | Age: 68
End: 2023-01-06
Payer: MEDICARE

## 2023-01-06 VITALS
SYSTOLIC BLOOD PRESSURE: 162 MMHG | HEIGHT: 63 IN | DIASTOLIC BLOOD PRESSURE: 82 MMHG | BODY MASS INDEX: 47.98 KG/M2 | WEIGHT: 270.8 LBS | OXYGEN SATURATION: 94 % | TEMPERATURE: 97.6 F | HEART RATE: 103 BPM

## 2023-01-06 DIAGNOSIS — E66.01 MORBID OBESITY WITH BMI OF 45.0-49.9, ADULT: ICD-10-CM

## 2023-01-06 DIAGNOSIS — G47.33 OSA (OBSTRUCTIVE SLEEP APNEA): Primary | ICD-10-CM

## 2023-01-06 PROCEDURE — 99214 OFFICE O/P EST MOD 30 MIN: CPT | Performed by: NURSE PRACTITIONER

## 2023-01-06 NOTE — PROGRESS NOTES
Chief Complaint  Sleep Apnea (In lab study X 8 years ago in [place]. )    Subjective        Ronna Wayne presents to [facility]  History of Present Illness     Ms. Wayne is a 67 year old female with a medical history significant for anxiety, diabetes, hyperlipidemia, GERD, hypertension, sleep apnea and stroke.    She presents today for evaluation of sleep apnea.  She states that she was dianosed about 8 years ago with sleep apnea.  She did a sleep study at the sleep lab in [place], [place].  She states that she is using her cpap but that it is no longer working like it use to. She states that her machine is old.    Objective   Vital Signs:  /82 (BP Location: Left arm, Patient Position: Sitting)   Pulse 103   Temp 97.6 °F (36.4 °C)   Ht 160 cm (63\")   Wt 123 kg (270 lb 12.8 oz)   SpO2 94%   BMI 47.97 kg/m²   Estimated body mass index is 47.97 kg/m² as calculated from the following:    Height as of this encounter: 160 cm (63\").    Weight as of this encounter: 123 kg (270 lb 12.8 oz).    Class 3 Severe Obesity (BMI >=40). Obesity-related health conditions include the following: obstructive sleep apnea, hypertension, diabetes mellitus, dyslipidemias and GERD. Obesity is unchanged. BMI is is above average; BMI management plan is completed. We discussed portion control and increasing exercise.      Physical Exam     GENERAL APPEARANCE: Well developed, well nourished, alert and cooperative, and appears to be in no acute distress.    HEAD: normocephalic. Atraumatic.    EYES: PERRL, EOMI. Vision is grossly intact.    THROAT: Oral cavity and pharynx normal. No inflammation, swelling, exudate, or lesions.     NECK: Neck supple.  No thyromegaly.    CARDIAC: Normal S1 and S2. No S3, S4 or murmurs. Rhythm is regular.     RESPIRATORY:Bilateral air entry positive. Bilateral diminished breath sounds. No wheezing, crackles or rhonchi noted.    GI: Positive bowel  sounds. Soft, nondistended, nontender.     MUSCULOSKELETAL: No significant deformity or joint abnormality. No edema. Peripheral pulses intact. No varicosities.    NEUROLOGICAL: Strength and sensation symmetric and intact throughout.     PSYCHIATRIC: The mental examination revealed the patient was oriented to person, place, and time.     Result Review :  The following data was reviewed by: BOSTON Melvin on 01/06/2023:  Common labs    Common Labs 12/27/22 12/27/22 12/28/22 12/28/22 12/29/22 12/29/22    0017 0017 0040 0040 0016 0016   Glucose  153 (A)  140 (A)  132 (A)   BUN  18  14  13   Creatinine  0.93  0.76  0.69   Sodium  138  131 (A)  135 (A)   Potassium  4.4  4.9  4.3   Chloride  103  103  100   Calcium  8.9  9.0  9.3   Albumin    2.9 (A)     Total Bilirubin    0.7     Alkaline Phosphatase    74     AST (SGOT)    33 (A)     ALT (SGPT)    22     WBC 5.86  6.03  6.28    Hemoglobin 14.0  14.6  15.0    Hematocrit 42.3  45.3  43.8    Platelets 133 (A)  93 (A)  119 (A)    (A) Abnormal value       Comments are available for some flowsheets but are not being displayed.                  Assessment and Plan   Diagnoses and all orders for this visit:    1. MEMO (obstructive sleep apnea) (Primary)  -     Polysomnography 4 or More Parameters With CPAP; Future    2. Morbid obesity with BMI of 45.0-49.9, adult (HCC)              Continue use of current cpap.  Will obtain original sleep study.  Will order a titration study.     Patient was educated on positive airway pressure treatment.  As per CMS guidelines, more than 4 hours on 70% of observed nights is considered adherence. Patient was strongly encouraged to use CPAP as much as possible during sleep as more CPAP use is equal to more benefit. Use of heated humidification in positive airway pressure treatment to improve the adherence to the device.  In case of claustrophobia, we will provide the patient cognitive behavioral therapy and desensitization. Oral  appliances use will be discussed with the patient in case of mild to moderate sleep apnea or if the patient with severe disease fail positive airway pressure treatment.       The patient was extensively educated on the consequences of untreated obstructive sleep apnea namely cardiovascular/metabolic disorder, neurocognitive deficit, daytime sleepiness, motor vehicle accidents, depression, mood disorders and reduced quality of life.  At the end of conversation, the patient voices understanding of the disease process and treatment modality.  Patient also understands the risk of untreated obstructive sleep apnea and benefit benefits of the treatment.    Counseling time was greater than 10 minutes.          Follow Up   Return in about 3 months (around 4/6/2023).  Patient was given instructions and counseling regarding her condition or for health maintenance advice. Please see specific information pulled into the AVS if appropriate.

## 2023-01-09 ENCOUNTER — HOSPITAL ENCOUNTER (OUTPATIENT)
Facility: HOSPITAL | Age: 68
Discharge: HOME | End: 2023-01-10
Attending: INTERNAL MEDICINE | Admitting: INTERNAL MEDICINE
Payer: MEDICARE

## 2023-01-09 ENCOUNTER — APPOINTMENT (OUTPATIENT)
Dept: GENERAL RADIOLOGY | Facility: HOSPITAL | Age: 68
End: 2023-01-09
Payer: MEDICARE

## 2023-01-09 DIAGNOSIS — I49.5 TACHY-BRADY SYNDROME: ICD-10-CM

## 2023-01-09 LAB
ANION GAP SERPL CALCULATED.3IONS-SCNC: 13.3 MMOL/L (ref 5–15)
BASOPHILS # BLD AUTO: 0.06 10*3/MM3 (ref 0–0.2)
BASOPHILS NFR BLD AUTO: 0.9 % (ref 0–1.5)
BUN SERPL-MCNC: 14 MG/DL (ref 8–23)
BUN/CREAT SERPL: 17.3 (ref 7–25)
CALCIUM SPEC-SCNC: 9.7 MG/DL (ref 8.6–10.5)
CHLORIDE SERPL-SCNC: 99 MMOL/L (ref 98–107)
CO2 SERPL-SCNC: 17.7 MMOL/L (ref 22–29)
CREAT SERPL-MCNC: 0.81 MG/DL (ref 0.57–1)
DEPRECATED RDW RBC AUTO: 51.7 FL (ref 37–54)
EGFRCR SERPLBLD CKD-EPI 2021: 79.7 ML/MIN/1.73
EOSINOPHIL # BLD AUTO: 0.07 10*3/MM3 (ref 0–0.4)
EOSINOPHIL NFR BLD AUTO: 1.1 % (ref 0.3–6.2)
ERYTHROCYTE [DISTWIDTH] IN BLOOD BY AUTOMATED COUNT: 13.2 % (ref 12.3–15.4)
GLUCOSE SERPL-MCNC: 256 MG/DL (ref 65–99)
HCT VFR BLD AUTO: 45.2 % (ref 34–46.6)
HGB BLD-MCNC: 14.8 G/DL (ref 12–15.9)
IMM GRANULOCYTES # BLD AUTO: 0.01 10*3/MM3 (ref 0–0.05)
IMM GRANULOCYTES NFR BLD AUTO: 0.2 % (ref 0–0.5)
LYMPHOCYTES # BLD AUTO: 2.67 10*3/MM3 (ref 0.7–3.1)
LYMPHOCYTES NFR BLD AUTO: 40.5 % (ref 19.6–45.3)
MCH RBC QN AUTO: 34.3 PG (ref 26.6–33)
MCHC RBC AUTO-ENTMCNC: 32.7 G/DL (ref 31.5–35.7)
MCV RBC AUTO: 104.6 FL (ref 79–97)
MONOCYTES # BLD AUTO: 0.74 10*3/MM3 (ref 0.1–0.9)
MONOCYTES NFR BLD AUTO: 11.2 % (ref 5–12)
NEUTROPHILS NFR BLD AUTO: 3.05 10*3/MM3 (ref 1.7–7)
NEUTROPHILS NFR BLD AUTO: 46.1 % (ref 42.7–76)
NRBC BLD AUTO-RTO: 0 /100 WBC (ref 0–0.2)
PLATELET # BLD AUTO: 123 10*3/MM3 (ref 140–450)
PMV BLD AUTO: 9.8 FL (ref 6–12)
POTASSIUM SERPL-SCNC: 4.7 MMOL/L (ref 3.5–5.2)
QT INTERVAL: 414 MS
QT INTERVAL: 448 MS
QTC INTERVAL: 427 MS
QTC INTERVAL: 586 MS
RBC # BLD AUTO: 4.32 10*6/MM3 (ref 3.77–5.28)
SODIUM SERPL-SCNC: 130 MMOL/L (ref 136–145)
WBC NRBC COR # BLD: 6.6 10*3/MM3 (ref 3.4–10.8)

## 2023-01-09 PROCEDURE — 33208 INSRT HEART PM ATRIAL & VENT: CPT | Performed by: INTERNAL MEDICINE

## 2023-01-09 PROCEDURE — G0378 HOSPITAL OBSERVATION PER HR: HCPCS

## 2023-01-09 PROCEDURE — C1898 LEAD, PMKR, OTHER THAN TRANS: HCPCS | Performed by: INTERNAL MEDICINE

## 2023-01-09 PROCEDURE — 80048 BASIC METABOLIC PNL TOTAL CA: CPT | Performed by: INTERNAL MEDICINE

## 2023-01-09 PROCEDURE — 25010000002 VANCOMYCIN 1 G RECONSTITUTED SOLUTION 1 EACH VIAL: Performed by: INTERNAL MEDICINE

## 2023-01-09 PROCEDURE — 93005 ELECTROCARDIOGRAM TRACING: CPT | Performed by: INTERNAL MEDICINE

## 2023-01-09 PROCEDURE — C1785 PMKR, DUAL, RATE-RESP: HCPCS | Performed by: INTERNAL MEDICINE

## 2023-01-09 PROCEDURE — 25010000002 FENTANYL CITRATE (PF) 50 MCG/ML SOLUTION: Performed by: INTERNAL MEDICINE

## 2023-01-09 PROCEDURE — 0 IOPAMIDOL PER 1 ML: Performed by: INTERNAL MEDICINE

## 2023-01-09 PROCEDURE — 94761 N-INVAS EAR/PLS OXIMETRY MLT: CPT

## 2023-01-09 PROCEDURE — 25010000002 ATROPINE PER 0.01 MG: Performed by: INTERNAL MEDICINE

## 2023-01-09 PROCEDURE — 71045 X-RAY EXAM CHEST 1 VIEW: CPT

## 2023-01-09 PROCEDURE — 94799 UNLISTED PULMONARY SVC/PX: CPT

## 2023-01-09 PROCEDURE — 25010000002 VANCOMYCIN 5 G RECONSTITUTED SOLUTION 5,000 MG VIAL: Performed by: INTERNAL MEDICINE

## 2023-01-09 PROCEDURE — 85025 COMPLETE CBC W/AUTO DIFF WBC: CPT | Performed by: INTERNAL MEDICINE

## 2023-01-09 DEVICE — PACE/SENSE LEAD
Type: IMPLANTABLE DEVICE | Status: FUNCTIONAL
Brand: INGEVITY™+

## 2023-01-09 DEVICE — PACEMAKER
Type: IMPLANTABLE DEVICE | Status: FUNCTIONAL
Brand: ACCOLADE™ MRI DR

## 2023-01-09 RX ORDER — SODIUM CHLORIDE 0.9 % (FLUSH) 0.9 %
10 SYRINGE (ML) INJECTION EVERY 12 HOURS SCHEDULED
Status: DISCONTINUED | OUTPATIENT
Start: 2023-01-09 | End: 2023-01-10 | Stop reason: HOSPADM

## 2023-01-09 RX ORDER — SODIUM CHLORIDE 0.9 % (FLUSH) 0.9 %
3 SYRINGE (ML) INJECTION EVERY 12 HOURS SCHEDULED
Status: DISCONTINUED | OUTPATIENT
Start: 2023-01-09 | End: 2023-01-10 | Stop reason: HOSPADM

## 2023-01-09 RX ORDER — INSULIN ASPART 100 [IU]/ML
0-9 INJECTION, SOLUTION INTRAVENOUS; SUBCUTANEOUS
Status: CANCELLED | OUTPATIENT
Start: 2023-01-09

## 2023-01-09 RX ORDER — METOPROLOL TARTRATE 5 MG/5ML
INJECTION INTRAVENOUS
Status: DISCONTINUED | OUTPATIENT
Start: 2023-01-09 | End: 2023-01-09 | Stop reason: HOSPADM

## 2023-01-09 RX ORDER — HYDROCODONE BITARTRATE AND ACETAMINOPHEN 5; 325 MG/1; MG/1
1 TABLET ORAL EVERY 8 HOURS PRN
Status: DISCONTINUED | OUTPATIENT
Start: 2023-01-09 | End: 2023-01-10 | Stop reason: HOSPADM

## 2023-01-09 RX ORDER — ONDANSETRON 2 MG/ML
4 INJECTION INTRAMUSCULAR; INTRAVENOUS EVERY 6 HOURS PRN
Status: DISCONTINUED | OUTPATIENT
Start: 2023-01-09 | End: 2023-01-10 | Stop reason: HOSPADM

## 2023-01-09 RX ORDER — SODIUM CHLORIDE 9 MG/ML
40 INJECTION, SOLUTION INTRAVENOUS AS NEEDED
Status: DISCONTINUED | OUTPATIENT
Start: 2023-01-09 | End: 2023-01-10 | Stop reason: HOSPADM

## 2023-01-09 RX ORDER — ATROPINE SULFATE 1 MG/ML
INJECTION, SOLUTION INTRAMUSCULAR; INTRAVENOUS; SUBCUTANEOUS
Status: DISCONTINUED | OUTPATIENT
Start: 2023-01-09 | End: 2023-01-09 | Stop reason: HOSPADM

## 2023-01-09 RX ORDER — BACITRACIN, NEOMYCIN, POLYMYXIN B 400; 3.5; 5 [USP'U]/G; MG/G; [USP'U]/G
OINTMENT TOPICAL
Status: DISCONTINUED | OUTPATIENT
Start: 2023-01-09 | End: 2023-01-10 | Stop reason: HOSPADM

## 2023-01-09 RX ORDER — NITROGLYCERIN 0.4 MG/1
0.4 TABLET SUBLINGUAL
Status: DISCONTINUED | OUTPATIENT
Start: 2023-01-09 | End: 2023-01-10 | Stop reason: HOSPADM

## 2023-01-09 RX ORDER — DILTIAZEM HYDROCHLORIDE 5 MG/ML
INJECTION INTRAVENOUS
Status: DISCONTINUED | OUTPATIENT
Start: 2023-01-09 | End: 2023-01-09 | Stop reason: HOSPADM

## 2023-01-09 RX ORDER — FENTANYL CITRATE 50 UG/ML
INJECTION, SOLUTION INTRAMUSCULAR; INTRAVENOUS
Status: DISCONTINUED | OUTPATIENT
Start: 2023-01-09 | End: 2023-01-09 | Stop reason: HOSPADM

## 2023-01-09 RX ORDER — ONDANSETRON 4 MG/1
4 TABLET, FILM COATED ORAL EVERY 6 HOURS PRN
Status: DISCONTINUED | OUTPATIENT
Start: 2023-01-09 | End: 2023-01-10 | Stop reason: HOSPADM

## 2023-01-09 RX ORDER — SODIUM CHLORIDE 9 MG/ML
INJECTION, SOLUTION INTRAVENOUS
Status: COMPLETED | OUTPATIENT
Start: 2023-01-09 | End: 2023-01-09

## 2023-01-09 RX ORDER — LIDOCAINE HYDROCHLORIDE 20 MG/ML
INJECTION, SOLUTION INFILTRATION; PERINEURAL
Status: DISCONTINUED | OUTPATIENT
Start: 2023-01-09 | End: 2023-01-09 | Stop reason: HOSPADM

## 2023-01-09 RX ORDER — SODIUM CHLORIDE 0.9 % (FLUSH) 0.9 %
10 SYRINGE (ML) INJECTION AS NEEDED
Status: DISCONTINUED | OUTPATIENT
Start: 2023-01-09 | End: 2023-01-10 | Stop reason: HOSPADM

## 2023-01-09 RX ORDER — ACETAMINOPHEN 325 MG/1
650 TABLET ORAL EVERY 6 HOURS PRN
COMMUNITY

## 2023-01-09 RX ADMIN — HYDROCODONE BITARTRATE AND ACETAMINOPHEN 1 TABLET: 5; 325 TABLET ORAL at 20:53

## 2023-01-09 RX ADMIN — Medication 3 ML: at 20:52

## 2023-01-09 RX ADMIN — Medication 10 ML: at 20:52

## 2023-01-09 NOTE — Clinical Note
1 x-ray detectable sponge(s) removed from the pocket and methodical wound exploration (MWE) performed.

## 2023-01-09 NOTE — INTERVAL H&P NOTE
H&P reviewed. The patient was examined and there are no changes to the H&P.      Review of systems was performed and is mainly positive for intermittent palpitation and dizziness with no syncope.  Otherwise all other systems were negative

## 2023-01-10 ENCOUNTER — READMISSION MANAGEMENT (OUTPATIENT)
Dept: CALL CENTER | Facility: HOSPITAL | Age: 68
End: 2023-01-10
Payer: MEDICARE

## 2023-01-10 VITALS
HEART RATE: 64 BPM | HEIGHT: 63 IN | RESPIRATION RATE: 20 BRPM | OXYGEN SATURATION: 95 % | BODY MASS INDEX: 47.66 KG/M2 | DIASTOLIC BLOOD PRESSURE: 67 MMHG | WEIGHT: 269 LBS | TEMPERATURE: 98 F | SYSTOLIC BLOOD PRESSURE: 171 MMHG

## 2023-01-10 PROCEDURE — 94799 UNLISTED PULMONARY SVC/PX: CPT

## 2023-01-10 PROCEDURE — 94761 N-INVAS EAR/PLS OXIMETRY MLT: CPT

## 2023-01-10 PROCEDURE — G0378 HOSPITAL OBSERVATION PER HR: HCPCS

## 2023-01-10 PROCEDURE — 99024 POSTOP FOLLOW-UP VISIT: CPT | Performed by: INTERNAL MEDICINE

## 2023-01-10 RX ORDER — ANASTROZOLE 1 MG/1
1 TABLET ORAL DAILY
Status: CANCELLED | OUTPATIENT
Start: 2023-01-10

## 2023-01-10 RX ORDER — LANOLIN ALCOHOL/MO/W.PET/CERES
400 CREAM (GRAM) TOPICAL 2 TIMES DAILY
Status: CANCELLED | OUTPATIENT
Start: 2023-01-10

## 2023-01-10 RX ORDER — LEVOTHYROXINE SODIUM 88 UG/1
88 TABLET ORAL DAILY
Status: CANCELLED | OUTPATIENT
Start: 2023-01-10

## 2023-01-10 RX ORDER — LISINOPRIL 10 MG/1
10 TABLET ORAL
Status: CANCELLED | OUTPATIENT
Start: 2023-01-10

## 2023-01-10 RX ORDER — ASPIRIN 81 MG/1
81 TABLET ORAL DAILY
Status: CANCELLED | OUTPATIENT
Start: 2023-01-10

## 2023-01-10 RX ORDER — OMEGA-3S/DHA/EPA/FISH OIL/D3 300MG-1000
2000 CAPSULE ORAL DAILY
Status: CANCELLED | OUTPATIENT
Start: 2023-01-10

## 2023-01-10 RX ORDER — ACETAMINOPHEN 325 MG/1
650 TABLET ORAL EVERY 6 HOURS PRN
Status: CANCELLED | OUTPATIENT
Start: 2023-01-10

## 2023-01-10 RX ORDER — ROSUVASTATIN CALCIUM 20 MG/1
20 TABLET, COATED ORAL DAILY
Status: CANCELLED | OUTPATIENT
Start: 2023-01-10

## 2023-01-10 RX ADMIN — Medication 10 ML: at 09:01

## 2023-01-10 RX ADMIN — HYDROCODONE BITARTRATE AND ACETAMINOPHEN 1 TABLET: 5; 325 TABLET ORAL at 09:00

## 2023-01-10 NOTE — DISCHARGE SUMMARY
"Patient Identification:  Name:  Ronna Wayne  Age:  67 y.o.  Sex:  female  :  1955  MRN:  [id number]  Visit Number:  [id number]    Date of Admission: 2023  Date of Discharge:  1/10/2023    PCP: Mague Acosta APRN    DISCHARGE DIAGNOSIS  [place] pacemaker implantation due to Tachy-janie Syndrome    CONSULTS   none    PROCEDURES PERFORMED  Pacemaker Implantation      HOSPITAL COURSE  Patient is a 67 y.o. female presented to [facility] ([facility] [place]) for pacemaker implantation secondary to Tachy-janie Syndrome on 2023. Patient did not have any events overnight. Her pacemaker site has some bruising but no active external bleeding is noted. Patient and her spouse both have been verbally instructed on home care for the next week with sponge bathing until 2023. She has been instructed to report to the office on 2023 for dressing check and they to return to the office again on 2023 to have staples removed.  She will need a follow up in the office in the next 2 weeks as well. This is to be scheduled before she is discharged home later today. Patient and her spouse, both verbalized understanding of discharge instructions.     Please see the admitting history and physical for further details.      CONDITION ON DISCHARGE  Stable.    VITAL SIGNS  /67 (BP Location: Left leg, Patient Position: Lying)   Pulse 64   Temp 98 °F (36.7 °C) (Oral)   Resp 20   Ht 160 cm (63\")   Wt 122 kg (269 lb)   SpO2 95%   BMI 47.65 kg/m²     DISCHARGE PHYSICAL EXAM    Alert and Oriented x 3. Speech is clear.  No acute distress is noted. Normal appearance.   Conjunctivae are normal.   Respirations are regular and not labored. Lungs are clear to auscultate bilateral.   Hearts sounds are normal S1 and S2 without a murmur, gallop, or rub noted/  Skin is warm and dry. No rashes are noted. Mild bruising noted at pacemaker site- deep red to purpura in color at this " time. No active external bleeding is noted. Dressing is dry and intact.      Results Review:   Results from last 7 days   Lab Units 01/09/23  1302   WBC 10*3/mm3 6.60   HEMOGLOBIN g/dL 14.8   PLATELETS 10*3/mm3 123*     Results from last 7 days   Lab Units 01/09/23  1302   SODIUM mmol/L 130*   POTASSIUM mmol/L 4.7   CHLORIDE mmol/L 99   CO2 mmol/L 17.7*   BUN mg/dL 14   CREATININE mg/dL 0.81   CALCIUM mg/dL 9.7   GLUCOSE mg/dL 256*         Lab Results   Component Value Date    INR 1.06 12/17/2022    INR 1.02 11/18/2021    INR 1.05 06/19/2021    INR 1.05 05/01/2021    INR 1.09 03/09/2021     Lab Results   Component Value Date    MG 1.6 12/29/2022    MG 1.7 12/28/2022    MG 1.9 12/27/2022     Lab Results   Component Value Date    TSH 0.414 12/17/2022    CHLPL 226 (H) 05/28/2016    TRIG 200 (H) 10/21/2022    HDL 51 10/21/2022     (H) 10/21/2022      No results found for: BNP      DISCHARGE DISPOSITION       DISCHARGE MEDICATIONS     Discharge Medications        ASK your doctor about these medications        Instructions Start Date   acetaminophen 325 MG tablet  Commonly known as: TYLENOL   650 mg, Oral, Every 6 Hours PRN      anastrozole 1 MG tablet  Commonly known as: ARIMIDEX   1 mg, Oral, Daily      aspirin 81 MG EC tablet   81 mg, Oral, Daily      cholecalciferol 25 MCG (1000 UT) tablet  Commonly known as: VITAMIN D3   2,000 Units, Oral, Daily      empagliflozin 25 MG tablet tablet  Commonly known as: Jardiance   25 mg, Oral, Every Morning      levothyroxine 88 MCG tablet  Commonly known as: SYNTHROID, LEVOTHROID   88 mcg, Oral, Daily      lisinopril 10 MG tablet  Commonly known as: PRINIVIL,ZESTRIL   10 mg, Oral, Every 24 Hours Scheduled      Magnesium Oxide 400 (240 Mg) MG tablet  Commonly known as: MAGnesium-Oxide   400 mg, Oral, 2 Times Daily      NovoLOG FlexPen 100 UNIT/ML solution pen-injector sc pen  Generic drug: insulin aspart   0-9 Units, Subcutaneous, 4 Times Daily Before Meals & Nightly       rosuvastatin 20 MG tablet  Commonly known as: CRESTOR   20 mg, Oral, Daily                        Your Scheduled Appointments      Jan 12, 2023 10:30 AM  Follow Up with BOSTON Styles  [facility] CARDIOLOGY ([place]) [address]  [place] [address]  [phone]   -Bring photo ID, insurance card, and list of medications to appointment  -If testing was completed outside of [facility] then patient must bring images on a disc  -Copay will be collected at time of appointment  -Established patients should arrive 10 minutes prior to appointment       Jan 18, 2023  1:50 PM  Follow Up with Lee Parrish MD  [facility] GENERAL SURGERY [place] ([facility]) [address]  [address]  [facility] [phone]  [phone]   Arrive 15 minutes prior to appointment.       Feb 09, 2023 10:30 AM  LAB with [place] NURSE [facility]  [facility] HEMATOLOGY & ONCOLOGY ([place]) [address] [facility] [address]  [facility] [id number]  [phone]        Feb 09, 2023 11:00 AM  FOLLOW UP with LEYDI Teresa MD  [facility] HEMATOLOGY & ONCOLOGY ([place]) [address]  [facility] [id number]  [phone]        Feb 21, 2023  8:00 AM  Lab with LAB [facility] FAMILY MEDICINE formerly [facility] [address] [facility] [address]  [phone]        Feb 28, 2023 10:45 AM  Subsequent Medicare Wellness with BOSTON Joseph  [facility] FAMILY MEDICINE [facility] [address] [facility] [address]  [phone]        Mar 13, 2023 11:30 AM  FOLLOW UP with BOSTON Damon  [facility] RADIATION ONCOLOGY ([place]) [address]  [facility] [phone]  [phone]        Apr 06, 2023  1:00 PM  Follow Up with Natasha Mason APRSABINE  [facility] PULMONARY & CRITICAL CARE MEDICINE ([place]) [address] [facility] [address]  [facility] [phone]   Established:  Please bring outside images or reports.                  TEST  RESULTS PENDING AT DISCHARGE       Electronically signed by BOSTON Chaudhry, 01/10/23, 11:44 AM EST.      Time: greater than 30 minutes.    Please send a copy of this dictation to the following providers:  Mague Acosta APRN  ----------------------------------------------------------------------------------------------------------------------  Please note that portions of this note were completed with a voice recognition program.

## 2023-01-10 NOTE — DISCHARGE INSTR - APPOINTMENTS
Follow up with Francisco Gutierrez on 1/12/2023 @ 12:00   Follow up with Mague Acosta on 1/11/23 @ 3:00

## 2023-01-10 NOTE — DISCHARGE INSTRUCTIONS
Go by the office on Thursday (01/12/2023) to have the pacemaker dressing checked to see if it needs to be changed. Return to the office on Monday 01/16/2023, to have the staples removed and wound checked.     Sponge bath from now to Monday 01/16/2023.    Keep your left arm in the sling for most of the time unless you are exercising your arm as mentioned below.     Take your arm out of the sling several times in the day to do mild exercised with wrist, elbow and shoulder. DO NOT raise the left shoulder, just gently moved it back and forth.

## 2023-01-10 NOTE — OUTREACH NOTE
Prep Survey    Flowsheet Row Responses   [facility] facility patient discharged from? [facility]   Is LACE score < 7 ? No   Eligibility [facility]   Date of Admission 01/09/23   Date of Discharge 01/10/23   Discharge Disposition Home or Self Care   Discharge diagnosis Tachy-janie syndrome    Does the patient have one of the following disease processes/diagnoses(primary or secondary)? Other   Does the patient have Home health ordered? No   Is there a DME ordered? No   Prep survey completed? Yes          KARLI RAINEY - Registered Nurse

## 2023-01-10 NOTE — DISCHARGE INSTR - LAB
Go by the office on Thursday (01/12/2023) to have the pacemaker dressing checked to see if it needs to be changed. Return to the office on Monday 01/16/2023, to have the staples removed and wound checked.      Sponge bath from now to Monday 01/16/2023.     Keep your left arm in the sling for most of the time unless you are exercising your arm as mentioned below.     Take your arm out of the sling several times in the day to do mild exercised with wrist, elbow and shoulder. DO NOT raise the left shoulder, just gently moved it back and forth.       Last Modified by Yulisa Kenney, APRN at 1/10/23 1125

## 2023-01-10 NOTE — PLAN OF CARE
Goal Outcome Evaluation:                 
Goal Outcome Evaluation:               Patient arrived via wheelchair with Rn from cath lab. Patient has no complaints and is in no acute distress. VSS. Will monitor.  
Goal Outcome Evaluation:               Pt is stable and being discharged today   
Goal Outcome Evaluation:   Care plan started.               
Goal Outcome Evaluation:   Patient resting comfortably at this time. No reports of any chest pain during the shift. Sling continues to be on the LUE. Tolerating the RA well. HOB elevated. Bed alarm on and call bell in reach. Will continue to follow the POC.               
Goal Outcome Evaluation:  Patient discharged per Dr. Hyde. IV and telemetry D/C'd. Discharge paperwork discussed with patient and spouse. Patient has tolerated all interventions. No complaints or concerns at this time. No signs of acute distress noted. Will continue to monitor until patient off unit.  
[FreeTextEntry1] : CC:  right shoulder pain\par \par HPI:  s/p ACDF.  Did well.  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  He did well from this.  HE also suffers from a right shoulder injury for which surgery is scheduled, however delayed due to Covid 19.  An MRA of the neck was performed in 2016 prior to the accident but evaluation of the cervical spine is not possible on this study.\par \par Neuro stable

## 2023-01-11 ENCOUNTER — TRANSITIONAL CARE MANAGEMENT TELEPHONE ENCOUNTER (OUTPATIENT)
Dept: CALL CENTER | Facility: HOSPITAL | Age: 68
End: 2023-01-11
Payer: MEDICARE

## 2023-01-11 NOTE — OUTREACH NOTE
Call Center TCM Note    Flowsheet Row Responses   [facility] patient discharged from? [facility]   Does the patient have one of the following disease processes/diagnoses(primary or secondary)? Other   TCM attempt successful? Yes   Call start time 0837   Call end time 0840   Discharge diagnosis Tachy-janie syndrome    Meds reviewed with patient/caregiver? Yes   Is the patient having any side effects they believe may be caused by any medication additions or changes? No   Does the patient have all medications ordered at discharge? Yes   Is the patient taking all medications as directed (includes completed medication regime)? Yes   Comments Hospital f/u appt. with PCP 1/11/23 @3:15pm.   Does the patient have an appointment with their PCP within 7 days of discharge? Yes   Has home health visited the patient within 72 hours of discharge? N/A   Psychosocial issues? No   Did the patient receive a copy of their discharge instructions? Yes   Nursing interventions Reviewed instructions with patient   What is the patient's perception of their health status since discharge? Improving   Is the patient/caregiver able to teach back signs and symptoms related to disease process for when to call PCP? Yes   Is the patient/caregiver able to teach back signs and symptoms related to disease process for when to call 911? Yes   Is the patient/caregiver able to teach back the hierarchy of who to call/visit for symptoms/problems? PCP, Specialist, Home health nurse, Urgent Care, ED, 911 Yes   If the patient is a current smoker, are they able to teach back resources for cessation? Not a smoker   TCM call completed? Yes   Call end time 0840   Would this patient benefit from a Referral to Amb Social Work? No   Is the patient interested in additional calls from an ambulatory ?  NOTE:  applies to high risk patients requiring additional follow-up. No          Carlene Rios RN    1/11/2023, 08:42 EST

## 2023-01-12 ENCOUNTER — CLINICAL SUPPORT (OUTPATIENT)
Dept: CARDIOLOGY | Facility: CLINIC | Age: 68
End: 2023-01-12
Payer: MEDICARE

## 2023-01-12 PROCEDURE — 99024 POSTOP FOLLOW-UP VISIT: CPT | Performed by: INTERNAL MEDICINE

## 2023-01-12 NOTE — PROGRESS NOTES
Pt came into the office for a wound check form having her pacer implanted. Her site looked good had some bruising around the pacemaker. Pt was instructed to return back on Monday to have her staples removed. She expressed understanding.

## 2023-01-18 ENCOUNTER — OFFICE VISIT (OUTPATIENT)
Dept: SURGERY | Facility: CLINIC | Age: 68
End: 2023-01-18
Payer: MEDICARE

## 2023-01-18 VITALS — HEIGHT: 63 IN | BODY MASS INDEX: 47.66 KG/M2 | WEIGHT: 269 LBS

## 2023-01-18 DIAGNOSIS — Z17.0 MALIGNANT NEOPLASM OF UPPER-OUTER QUADRANT OF RIGHT BREAST IN FEMALE, ESTROGEN RECEPTOR POSITIVE: Primary | ICD-10-CM

## 2023-01-18 DIAGNOSIS — C50.411 MALIGNANT NEOPLASM OF UPPER-OUTER QUADRANT OF RIGHT BREAST IN FEMALE, ESTROGEN RECEPTOR POSITIVE: Primary | ICD-10-CM

## 2023-01-18 PROCEDURE — 99212 OFFICE O/P EST SF 10 MIN: CPT | Performed by: SURGERY

## 2023-01-18 NOTE — PROGRESS NOTES
Subjective   Ronna Wayne is a 67 y.o. female  is here today for follow-up.         Ronna Wayne is a 67 y.o. female here for follow up after seroma drainage from the right breast.  Wound completely healed..  Radiation changes greatly improved.  She has had a pacemaker placed since last visit.    Assessment     Diagnoses and all orders for this visit:    1. Malignant neoplasm of upper-outer quadrant of right breast in female, estrogen receptor positive (HCC) (Primary)      Ronna Wayne is a 67 y.o. female doing well after drainage of seroma of the right breast.  The wound is healing well by secondary intent and the patient will follow-up in 3 months.  Recent mammogram with benign findings.

## 2023-01-19 ENCOUNTER — TELEPHONE (OUTPATIENT)
Dept: CARDIOLOGY | Facility: CLINIC | Age: 68
End: 2023-01-19

## 2023-01-19 ENCOUNTER — HOSPITAL ENCOUNTER (EMERGENCY)
Facility: HOSPITAL | Age: 68
Discharge: HOME | End: 2023-01-19
Attending: EMERGENCY MEDICINE | Admitting: EMERGENCY MEDICINE
Payer: MEDICARE

## 2023-01-19 VITALS
BODY MASS INDEX: 47.84 KG/M2 | TEMPERATURE: 98.4 F | HEIGHT: 63 IN | RESPIRATION RATE: 20 BRPM | HEART RATE: 77 BPM | WEIGHT: 270 LBS | DIASTOLIC BLOOD PRESSURE: 79 MMHG | OXYGEN SATURATION: 99 % | SYSTOLIC BLOOD PRESSURE: 147 MMHG

## 2023-01-19 DIAGNOSIS — I47.1 SVT (SUPRAVENTRICULAR TACHYCARDIA): Primary | ICD-10-CM

## 2023-01-19 LAB
ALBUMIN SERPL-MCNC: 3.5 G/DL (ref 3.5–5.2)
ALBUMIN/GLOB SERPL: 0.9 G/DL
ALP SERPL-CCNC: 111 U/L (ref 39–117)
ALT SERPL W P-5'-P-CCNC: 38 U/L (ref 1–33)
ANION GAP SERPL CALCULATED.3IONS-SCNC: 13.3 MMOL/L (ref 5–15)
AST SERPL-CCNC: 52 U/L (ref 1–32)
BASOPHILS # BLD AUTO: 0.05 10*3/MM3 (ref 0–0.2)
BASOPHILS NFR BLD AUTO: 0.6 % (ref 0–1.5)
BILIRUB SERPL-MCNC: 0.8 MG/DL (ref 0–1.2)
BUN SERPL-MCNC: 12 MG/DL (ref 8–23)
BUN/CREAT SERPL: 16.7 (ref 7–25)
CALCIUM SPEC-SCNC: 9.6 MG/DL (ref 8.6–10.5)
CHLORIDE SERPL-SCNC: 98 MMOL/L (ref 98–107)
CO2 SERPL-SCNC: 22.7 MMOL/L (ref 22–29)
CREAT SERPL-MCNC: 0.72 MG/DL (ref 0.57–1)
DEPRECATED RDW RBC AUTO: 48.5 FL (ref 37–54)
EGFRCR SERPLBLD CKD-EPI 2021: 91.8 ML/MIN/1.73
EOSINOPHIL # BLD AUTO: 0.06 10*3/MM3 (ref 0–0.4)
EOSINOPHIL NFR BLD AUTO: 0.8 % (ref 0.3–6.2)
ERYTHROCYTE [DISTWIDTH] IN BLOOD BY AUTOMATED COUNT: 12.9 % (ref 12.3–15.4)
GLOBULIN UR ELPH-MCNC: 3.9 GM/DL
GLUCOSE SERPL-MCNC: 211 MG/DL (ref 65–99)
HCT VFR BLD AUTO: 41.4 % (ref 34–46.6)
HGB BLD-MCNC: 13.9 G/DL (ref 12–15.9)
IMM GRANULOCYTES # BLD AUTO: 0.01 10*3/MM3 (ref 0–0.05)
IMM GRANULOCYTES NFR BLD AUTO: 0.1 % (ref 0–0.5)
INR PPP: 1 (ref 0.9–1.1)
LYMPHOCYTES # BLD AUTO: 3.74 10*3/MM3 (ref 0.7–3.1)
LYMPHOCYTES NFR BLD AUTO: 47.6 % (ref 19.6–45.3)
MAGNESIUM SERPL-MCNC: 1.7 MG/DL (ref 1.6–2.4)
MCH RBC QN AUTO: 34.3 PG (ref 26.6–33)
MCHC RBC AUTO-ENTMCNC: 33.6 G/DL (ref 31.5–35.7)
MCV RBC AUTO: 102.2 FL (ref 79–97)
MONOCYTES # BLD AUTO: 0.97 10*3/MM3 (ref 0.1–0.9)
MONOCYTES NFR BLD AUTO: 12.3 % (ref 5–12)
NEUTROPHILS NFR BLD AUTO: 3.03 10*3/MM3 (ref 1.7–7)
NEUTROPHILS NFR BLD AUTO: 38.6 % (ref 42.7–76)
NRBC BLD AUTO-RTO: 0 /100 WBC (ref 0–0.2)
NT-PROBNP SERPL-MCNC: 426.5 PG/ML (ref 0–900)
PLATELET # BLD AUTO: 117 10*3/MM3 (ref 140–450)
PMV BLD AUTO: 9.5 FL (ref 6–12)
POTASSIUM SERPL-SCNC: 4.1 MMOL/L (ref 3.5–5.2)
PROT SERPL-MCNC: 7.4 G/DL (ref 6–8.5)
PROTHROMBIN TIME: 13.4 SECONDS (ref 12.1–14.7)
RBC # BLD AUTO: 4.05 10*6/MM3 (ref 3.77–5.28)
SODIUM SERPL-SCNC: 134 MMOL/L (ref 136–145)
TROPONIN T SERPL-MCNC: <0.01 NG/ML (ref 0–0.03)
WBC NRBC COR # BLD: 7.86 10*3/MM3 (ref 3.4–10.8)

## 2023-01-19 PROCEDURE — 99284 EMERGENCY DEPT VISIT MOD MDM: CPT

## 2023-01-19 PROCEDURE — 93005 ELECTROCARDIOGRAM TRACING: CPT | Performed by: EMERGENCY MEDICINE

## 2023-01-19 PROCEDURE — 85025 COMPLETE CBC W/AUTO DIFF WBC: CPT | Performed by: EMERGENCY MEDICINE

## 2023-01-19 PROCEDURE — 36415 COLL VENOUS BLD VENIPUNCTURE: CPT

## 2023-01-19 PROCEDURE — 83735 ASSAY OF MAGNESIUM: CPT | Performed by: EMERGENCY MEDICINE

## 2023-01-19 PROCEDURE — 25010000002 ADENOSINE PER 6 MG

## 2023-01-19 PROCEDURE — 93010 ELECTROCARDIOGRAM REPORT: CPT | Performed by: INTERNAL MEDICINE

## 2023-01-19 PROCEDURE — 84484 ASSAY OF TROPONIN QUANT: CPT | Performed by: EMERGENCY MEDICINE

## 2023-01-19 PROCEDURE — 80053 COMPREHEN METABOLIC PANEL: CPT | Performed by: EMERGENCY MEDICINE

## 2023-01-19 PROCEDURE — 96374 THER/PROPH/DIAG INJ IV PUSH: CPT

## 2023-01-19 PROCEDURE — 85610 PROTHROMBIN TIME: CPT | Performed by: EMERGENCY MEDICINE

## 2023-01-19 PROCEDURE — 99283 EMERGENCY DEPT VISIT LOW MDM: CPT

## 2023-01-19 PROCEDURE — 83880 ASSAY OF NATRIURETIC PEPTIDE: CPT | Performed by: EMERGENCY MEDICINE

## 2023-01-19 RX ORDER — ADENOSINE 3 MG/ML
6 INJECTION, SOLUTION INTRAVENOUS ONCE
Status: COMPLETED | OUTPATIENT
Start: 2023-01-19 | End: 2023-01-19

## 2023-01-19 RX ORDER — ADENOSINE 3 MG/ML
INJECTION, SOLUTION INTRAVENOUS
Status: COMPLETED
Start: 2023-01-19 | End: 2023-01-19

## 2023-01-19 RX ADMIN — ADENOSINE 6 MG: 3 INJECTION, SOLUTION INTRAVENOUS at 10:52

## 2023-01-19 NOTE — ED NOTES
Pt placed in gown. Pt connected to cardiac monitor and defib pads. Provider at bedside. Pt placed on 2L nc.

## 2023-01-19 NOTE — ED PROVIDER NOTES
Subjective   History of Present Illness  Presents to er with heart rate 0f 180. Had pacemaker placed 1-9-23    Palpitations  Palpitations quality:  Fast  Onset quality:  Sudden  Timing:  Constant  Chronicity:  New  Relieved by:  Nothing  Worsened by:  Nothing  Ineffective treatments:  None tried  Associated symptoms: shortness of breath        Review of Systems   Constitutional: Positive for activity change and fatigue.   HENT: Negative.    Eyes: Negative.    Respiratory: Positive for shortness of breath.    Cardiovascular: Positive for palpitations.   Gastrointestinal: Negative.    Endocrine: Negative.    Genitourinary: Negative.    Musculoskeletal: Negative.    Skin: Negative.    Allergic/Immunologic: Negative.    Neurological: Negative.    Hematological: Negative.    Psychiatric/Behavioral: Negative.        Past Medical History:   Diagnosis Date   • Anxiety    • B12 deficiency    • BPV (benign positional vertigo)    • Breast cancer (HCC) 05/2021   • Diabetes mellitus (HCC)    • Diarrhea    • Disease of thyroid gland    • Elevated cholesterol    • Fibromyalgia    • GERD (gastroesophageal reflux disease)    • Glaucoma    • Heart murmur    • Hyperlipidemia    • Hypertension    • Kidney disease    • Obesity    • Peptic ulceration    • PONV (postoperative nausea and vomiting)    • Primary central sleep apnea    • Sleep apnea     c-pap   • Stroke (HCC)    • Weakness of left arm        Allergies   Allergen Reactions   • Codeine GI Intolerance     Increased heart rate     • Sulfa Antibiotics GI Intolerance     Heart rate increase        Past Surgical History:   Procedure Laterality Date   • ABDOMINAL SURGERY     • APPENDECTOMY     • BREAST ABSCESS INCISION AND DRAINAGE Right 7/7/2022    Procedure: BREAST ABSCESS INCISION AND DRAINAGE;  Surgeon: Lee Parrish MD;  [facility];  Service: General;  Laterality: Right;   • BREAST CYST ASPIRATION     • BREAST LUMPECTOMY WITH SENTINEL NODE BIOPSY Right 8/5/2021     Procedure: BREAST LUMPECTOMY WITH SENTINEL NODE BIOPSY;  Surgeon: Lee Parrish MD;  Location: [facility] OR;  Service: General;  Laterality: Right;   • CARDIAC ELECTROPHYSIOLOGY PROCEDURE N/A 2023    Procedure: Pacemaker DC new;  Surgeon: Bj Hyde MD;  Location: [facility] CATH INVASIVE LOCATION;  Service: Cardiology;  Laterality: N/A;   • CATARACT EXTRACTION Bilateral    • CHOLECYSTECTOMY     • COLONOSCOPY     • ENDOSCOPY     • HEMATOMA EVACUATION TRUNK Right 2021    Procedure: HEMATOMA EVACUATION TRUNK;  Surgeon: Lee Parrish MD;  Location: [facility] OR;  Service: General;  Laterality: Right;   • URETHRA SURGERY         Family History   Problem Relation Age of Onset   • Thyroid disease Mother    • Diabetes Mother    • Hyperlipidemia Father    • Hypertension Father    • Heart attack Father    • Lung disease Father    • Hypertension Sister    • Breast cancer Sister 40   • Obesity Sister    • Hypertension Sister    • Hyperlipidemia Sister    • Cancer Sister         Breast cancer   • Hyperlipidemia Sister    • Hypertension Sister    • Arthritis Sister    • Obesity Sister    • Thyroid disease Sister    • Alcohol abuse Maternal Aunt    • Cancer Maternal Grandfather         Prostrate   • Stroke Paternal Grandmother    • Stroke Paternal Grandfather    • Hypertension Other        Social History     Socioeconomic History   • Marital status:    Tobacco Use   • Smoking status: Former     Packs/day: 1.50     Years: 8.00     Pack years: 12.00     Types: Cigarettes     Quit date: 1976     Years since quittin.1   • Smokeless tobacco: Never   • Tobacco comments:     I smoked as a teen and stopped when my son was born   Vaping Use   • Vaping Use: Never used   Substance and Sexual Activity   • Alcohol use: No     Comment: former social drinker not had anything in 25 + years   • Drug use: Never   • Sexual activity: Not Currently     Partners: Male     Birth control/protection: Abstinence,  Post-menopausal, Vasectomy           Objective   Physical Exam  Vitals and nursing note reviewed.   HENT:      Head: Normocephalic.      Nose: Nose normal.      Mouth/Throat:      Mouth: Mucous membranes are moist.   Eyes:      Pupils: Pupils are equal, round, and reactive to light.   Cardiovascular:      Rate and Rhythm: Tachycardia present.   Pulmonary:      Effort: Pulmonary effort is normal.   Abdominal:      General: There is distension.   Musculoskeletal:         General: Normal range of motion.      Cervical back: Normal range of motion.   Skin:     General: Skin is warm.      Capillary Refill: Capillary refill takes less than 2 seconds.   Neurological:      General: No focal deficit present.      Mental Status: She is alert.   Psychiatric:         Mood and Affect: Mood normal.         Procedures           ED Course  ED Course as of 01/30/23 1218   u Jan 19, 2023   1152 ECG 10:36 SVT rate 181. Marked ST abnormality consider subendocardial injury. QT/QTc 272/472 [DARIAN]      ED Course User Index  [DARIAN] Topher Logan MD                                           [facility]    Final diagnoses:   SVT (supraventricular tachycardia) (HCC)       ED Disposition  ED Disposition     ED Disposition   Discharge    Condition   Stable    Comment   --             Mague Acosta, APRN  [address] [facility] [address] [phone]    Schedule an appointment as soon as possible for a visit   If symptoms worsen         Medication List      No changes were made to your prescriptions during this visit.          Topher Logan MD  01/30/23 1213

## 2023-01-20 LAB
QT INTERVAL: 272 MS
QT INTERVAL: 356 MS
QTC INTERVAL: 420 MS
QTC INTERVAL: 472 MS

## 2023-01-25 ENCOUNTER — TREATMENT (OUTPATIENT)
Dept: CARDIOLOGY | Facility: CLINIC | Age: 68
End: 2023-01-25
Payer: MEDICARE

## 2023-01-25 DIAGNOSIS — I47.1 SVT (SUPRAVENTRICULAR TACHYCARDIA): Primary | ICD-10-CM

## 2023-01-25 PROCEDURE — 93228 REMOTE 30 DAY ECG REV/REPORT: CPT | Performed by: INTERNAL MEDICINE

## 2023-01-26 ENCOUNTER — PATIENT MESSAGE (OUTPATIENT)
Dept: CARDIOLOGY | Facility: CLINIC | Age: 68
End: 2023-01-26
Payer: MEDICARE

## 2023-01-27 DIAGNOSIS — I10 ESSENTIAL HYPERTENSION: ICD-10-CM

## 2023-01-27 DIAGNOSIS — I48.92 ATRIAL FLUTTER, UNSPECIFIED TYPE: Primary | ICD-10-CM

## 2023-01-27 RX ORDER — LISINOPRIL 10 MG/1
10 TABLET ORAL DAILY
Qty: 90 TABLET | Refills: 3 | Status: SHIPPED | OUTPATIENT
Start: 2023-01-27 | End: 2023-02-28

## 2023-02-06 ENCOUNTER — OFFICE VISIT (OUTPATIENT)
Dept: CARDIOLOGY | Facility: CLINIC | Age: 68
End: 2023-02-06
Payer: MEDICARE

## 2023-02-06 VITALS
OXYGEN SATURATION: 95 % | SYSTOLIC BLOOD PRESSURE: 141 MMHG | BODY MASS INDEX: 48.9 KG/M2 | HEIGHT: 63 IN | DIASTOLIC BLOOD PRESSURE: 65 MMHG | HEART RATE: 64 BPM | WEIGHT: 276 LBS

## 2023-02-06 DIAGNOSIS — I10 ESSENTIAL HYPERTENSION: ICD-10-CM

## 2023-02-06 DIAGNOSIS — G47.33 OBSTRUCTIVE SLEEP APNEA SYNDROME: ICD-10-CM

## 2023-02-06 DIAGNOSIS — I47.1 PAROXYSMAL SVT (SUPRAVENTRICULAR TACHYCARDIA): Primary | ICD-10-CM

## 2023-02-06 DIAGNOSIS — I48.0 PAROXYSMAL ATRIAL FIBRILLATION: ICD-10-CM

## 2023-02-06 DIAGNOSIS — I49.5 TACHY-BRADY SYNDROME: ICD-10-CM

## 2023-02-06 PROCEDURE — 99024 POSTOP FOLLOW-UP VISIT: CPT | Performed by: NURSE PRACTITIONER

## 2023-02-06 NOTE — PROGRESS NOTES
[facility]             BOSTON Jiménez Sherelene S, APRN  Ronna Wayne  1955 02/06/2023    Patient Active Problem List   Diagnosis   • Frequent headaches   • Memory loss   • Essential hypertension   • Dyslipidemia   • Type 2 diabetes mellitus with hyperglycemia, with long-term current use of insulin (HCC)   • Obstructive sleep apnea syndrome   • Glaucoma   • Anxiety   • GERD (gastroesophageal reflux disease)   • Morbid obesity   • Weakness of left arm   • Cerebrovascular accident (CVA) due to embolism (HCC)   • SVT (supraventricular tachycardia) (HCC)   • Vitamin D deficiency   • B12 deficiency   • Malignant neoplasm of upper-outer quadrant of right breast in female, estrogen receptor positive (HCC)   • Other specified hypothyroidism   • Personal history of colonic polyps   • Depression   • Paroxysmal SVT (supraventricular tachycardia) (HCC)   • Tachy-janie syndrome (HCC)   • Atrial flutter (HCC)   • Paroxysmal atrial fibrillation (HCC)       DeaMague Sullivan APRN:    Subjective     Chief Complaint   Patient presents with   • Med Management     Verbal.    • Shortness of Breath   • Edema       HPI:     This is a 68 y.o. female with known past medical history of dyslipidemia, diabetes mellitus type 2, essential hypertension, obstructive sleep apnea, PSVT, history of CVA, breast cancer, obesity and paroxysmal atrial fibrillation/flutter.    Ronna Álvarez Rosana presents today for routine cardiology follow up.  Patient underwent permanent pacemaker implantation in January 2023 due to tachybradycardia syndrome.  She then presented back to the [facility] ED on 1/19/2023 and was found to be in SVT with heart rates in the 180s. She required 6 mg of IV adenosine with conversion to normal sinus rhythm.  It is of note that patient has had multiple ED visits and hospital admissions over the last few months secondary to PSVT.  She  states since her last ED visit on 1/19/2023 she has been doing overall well.  Denies any palpitations, syncope, dizziness or chest pain.  Has been taking her medications as prescribed.  Pacemaker site is clean and dry and well-healed.  She brings in a blood pressure log today which shows systolic blood pressure ranging from 120-130.  Denies any bleeding issues with Eliquis.  She is scheduled to see EP in April 2023.    • Diagnostic Testing  1. Echocardiogram 5/2021: EF 51 to 55% with moderate aortic sclerosis  2. Nuclear stress test 5/2021: No evidence of ischemia  3. Echocardiogram 12/2022: EF 61 to 65% with mild aortic valve stenosis  4. Successful implantation of permanent dual chamber pacemaker with [employer] device on 1/5/2023 due to tachybradycardia syndrome     All other systems were reviewed and were negative.    Patient Active Problem List   Diagnosis   • Frequent headaches   • Memory loss   • Essential hypertension   • Dyslipidemia   • Type 2 diabetes mellitus with hyperglycemia, with long-term current use of insulin (HCC)   • Obstructive sleep apnea syndrome   • Glaucoma   • Anxiety   • GERD (gastroesophageal reflux disease)   • Morbid obesity   • Weakness of left arm   • Cerebrovascular accident (CVA) due to embolism (HCC)   • SVT (supraventricular tachycardia) (HCC)   • Vitamin D deficiency   • B12 deficiency   • Malignant neoplasm of upper-outer quadrant of right breast in female, estrogen receptor positive (HCC)   • Other specified hypothyroidism   • Personal history of colonic polyps   • Depression   • Paroxysmal SVT (supraventricular tachycardia) (HCC)   • Tachy-janie syndrome (HCC)   • Atrial flutter (HCC)   • Paroxysmal atrial fibrillation (HCC)       family history includes Alcohol abuse in her maternal aunt; Arthritis in her sister; Breast cancer (age of onset: 40) in her sister; Cancer in her maternal grandfather and sister; Diabetes in her mother; Heart attack in her father;  Hyperlipidemia in her father, sister, and sister; Hypertension in her father, sister, sister, sister, and another family member; Lung disease in her father; Obesity in her sister and sister; Stroke in her paternal grandfather and paternal grandmother; Thyroid disease in her mother and sister.     reports that she quit smoking about 47 years ago. Her smoking use included cigarettes. She has a 12.00 pack-year smoking history. She has never used smokeless tobacco. She reports that she does not drink alcohol and does not use drugs.    Allergies   Allergen Reactions   • Codeine GI Intolerance     Increased heart rate     • Sulfa Antibiotics GI Intolerance     Heart rate increase          Current Outpatient Medications:   •  acetaminophen (TYLENOL) 325 MG tablet, Take 650 mg by mouth Every 6 (Six) Hours As Needed for Mild Pain., Disp: , Rfl:   •  anastrozole (ARIMIDEX) 1 MG tablet, Take 1 tablet by mouth Daily., Disp: 30 tablet, Rfl: 11  •  apixaban (ELIQUIS) 5 MG tablet tablet, Take 1 tablet by mouth 2 (Two) Times a Day., Disp: 60 tablet, Rfl: 6  •  cholecalciferol (VITAMIN D3) 25 MCG (1000 UT) tablet, Take 2,000 Units by mouth Daily., Disp: , Rfl:   •  empagliflozin (Jardiance) 25 MG tablet tablet, Take 1 tablet by mouth Every Morning., Disp: 30 tablet, Rfl: 3  •  insulin aspart (NovoLOG FlexPen) 100 UNIT/ML solution pen-injector sc pen, Inject 0-9 Units under the skin into the appropriate area as directed 4 (Four) Times a Day Before Meals & at Bedtime., Disp: 15 mL, Rfl: 0  •  levothyroxine (SYNTHROID, LEVOTHROID) 88 MCG tablet, Take 88 mcg by mouth Daily., Disp: , Rfl:   •  lisinopril (PRINIVIL,ZESTRIL) 10 MG tablet, Take 1 tablet by mouth Daily., Disp: 90 tablet, Rfl: 3  •  MAGnesium-Oxide 400 (240 Mg) MG tablet, Take 1 tablet by mouth 2 (Two) Times a Day., Disp: 90 tablet, Rfl: 3  •  metoprolol tartrate (LOPRESSOR) 25 MG tablet, Take 1 tablet by mouth 2 (Two) Times a Day., Disp: 60 tablet, Rfl: 2  •  rosuvastatin  (CRESTOR) 20 MG tablet, Take 1 tablet by mouth Daily., Disp: 30 tablet, Rfl: 4      Physical Exam:  I have reviewed the patient's current vital signs as documented in the patient's EMR.   Vitals:    02/06/23 1405   BP: 141/65   Pulse: 64   SpO2: 95%     Body mass index is 48.89 kg/m².       02/06/23  1405   Weight: 125 kg (276 lb)      Physical Exam  Constitutional:       General: She is not in acute distress.     Appearance: Normal appearance. She is well-developed and normal weight.   HENT:      Head: Normocephalic and atraumatic.   Eyes:      General: Lids are normal.      Conjunctiva/sclera: Conjunctivae normal.      Pupils: Pupils are equal, round, and reactive to light.   Neck:      Vascular: No carotid bruit or JVD.   Cardiovascular:      Rate and Rhythm: Normal rate and regular rhythm.      Pulses: Normal pulses.      Heart sounds: Normal heart sounds, S1 normal and S2 normal. No murmur heard.  Pulmonary:      Effort: Pulmonary effort is normal. No respiratory distress.      Breath sounds: Normal breath sounds. No wheezing.   Abdominal:      General: Bowel sounds are normal. There is no distension.      Palpations: Abdomen is soft. There is no hepatomegaly or splenomegaly.      Tenderness: There is no abdominal tenderness.   Musculoskeletal:         General: No swelling. Normal range of motion.      Cervical back: Normal range of motion and neck supple.      Right lower leg: No edema.      Left lower leg: No edema.   Skin:     General: Skin is warm and dry.      Coloration: Skin is not jaundiced.      Findings: No rash.   Neurological:      General: No focal deficit present.      Mental Status: She is alert and oriented to person, place, and time. Mental status is at baseline.   Psychiatric:         Mood and Affect: Mood normal.         Speech: Speech normal.         Behavior: Behavior normal.         Thought Content: Thought content normal.         Judgment: Judgment normal.         DATA REVIEWED:      TTE/MAGI:  Results for orders placed during the hospital encounter of 12/17/22    Adult Transthoracic Echo Complete W/ Cont if Necessary Per Protocol    Interpretation Summary  •  Left ventricular systolic function is normal. Left ventricular ejection fraction appears to be 61 - 65%.  •  Left ventricular diastolic function is consistent with (grade II w/high LAP) pseudonormalization.  •  Left atrial volume is mildly increased.  •  The right atrial cavity is mildly  dilated.  •  Mild aortic valve stenosis is present, with an estimated aortic valve area of 1.8 cm² and the mean gradient of 19 mmHg.  •  Estimated right ventricular systolic pressure from tricuspid regurgitation is mildly elevated (35-45 mmHg).      Laboratory evaluations:    Lab Results   Component Value Date    GLUCOSE 211 (H) 01/19/2023    BUN 12 01/19/2023    CREATININE 0.72 01/19/2023    EGFRIFNONA 54 (L) 01/11/2022    BCR 16.7 01/19/2023    K 4.1 01/19/2023    CO2 22.7 01/19/2023    CALCIUM 9.6 01/19/2023    ALBUMIN 3.5 01/19/2023    LABIL2 1.0 (L) 05/28/2016    AST 52 (H) 01/19/2023    ALT 38 (H) 01/19/2023     Lab Results   Component Value Date    WBC 7.86 01/19/2023    HGB 13.9 01/19/2023    HCT 41.4 01/19/2023    .2 (H) 01/19/2023     (L) 01/19/2023     Lab Results   Component Value Date    CHOL 210 (H) 10/21/2022    CHLPL 226 (H) 05/28/2016    TRIG 200 (H) 10/21/2022    HDL 51 10/21/2022     (H) 10/21/2022     Lab Results   Component Value Date    TSH 0.414 12/17/2022     Lab Results   Component Value Date    HGBA1C 7.80 (H) 10/21/2022     Lab Results   Component Value Date    ALT 38 (H) 01/19/2023     Lab Results   Component Value Date    HGBA1C 7.80 (H) 10/21/2022    HGBA1C 8.80 (H) 03/08/2022    HGBA1C 10.77 (H) 11/29/2021     Lab Results   Component Value Date    MICROALBUR <1.2 03/08/2022    CREATININE 0.72 01/19/2023     No results found for: IRON, TIBC, FERRITIN  Lab Results   Component Value Date    INR 1.00  01/19/2023    INR 1.06 12/17/2022    INR 1.02 11/18/2021    PROTIME 13.4 01/19/2023    PROTIME 14.0 12/17/2022    PROTIME 13.8 11/18/2021           --------------------------------------------------------------------------------------------------------------------------    ASSESSMENT/PLAN:      Diagnosis Plan   1. Paroxysmal SVT (supraventricular tachycardia) (HCC)        2. Essential hypertension        3. Tachy-janie syndrome (HCC)        4. Paroxysmal atrial fibrillation (HCC)        5. Obstructive sleep apnea syndrome            1. Paroxysmal atrial fibrillation  2. PSVT  • Currently in normal sinus rhythm.  No further PSVT since her ED visit on 1/19/2023.  Continue with metoprolol at current dosing.  Patient scheduled to see EP in April for recurrent SVT.  Discussed with her that if she has further episodes can increase beta-blocker as needed until she sees EP.  • Continue with Eliquis for stroke prevention.    3. Tachybradycardia syndrome status post permanent pacemaker implantation  • Pacemaker site clean and dry and healing well.  Continue with routine monitoring.    4.  Essential hypertension  · Blood pressure controlled.  Continue with lisinopril metoprolol.    5.  Obstructive sleep apnea  · Reports compliance with CPAP.      This document has been @Electronically signed by BOSTON Jiménez, 02/06/23, 12:24 PM EST.       Dictated Utilizing Dragon Dictation: Part of this note may be an electronic transcription/translation of spoken language to printed text using the Dragon Dictation System.    Follow-up appointment and medication changes provided in hand delivered After Visit Summary as well as reviewed in the room.

## 2023-02-09 ENCOUNTER — OFFICE VISIT (OUTPATIENT)
Dept: ONCOLOGY | Facility: CLINIC | Age: 68
End: 2023-02-09
Payer: MEDICARE

## 2023-02-09 VITALS
SYSTOLIC BLOOD PRESSURE: 144 MMHG | WEIGHT: 274.2 LBS | DIASTOLIC BLOOD PRESSURE: 68 MMHG | OXYGEN SATURATION: 97 % | BODY MASS INDEX: 48.58 KG/M2 | HEART RATE: 67 BPM | HEIGHT: 63 IN | TEMPERATURE: 97.3 F | RESPIRATION RATE: 18 BRPM

## 2023-02-09 DIAGNOSIS — C50.411 MALIGNANT NEOPLASM OF UPPER-OUTER QUADRANT OF RIGHT BREAST IN FEMALE, ESTROGEN RECEPTOR POSITIVE: Primary | ICD-10-CM

## 2023-02-09 DIAGNOSIS — Z17.0 MALIGNANT NEOPLASM OF UPPER-OUTER QUADRANT OF RIGHT BREAST IN FEMALE, ESTROGEN RECEPTOR POSITIVE: Primary | ICD-10-CM

## 2023-02-09 DIAGNOSIS — Z78.0 ASYMPTOMATIC MENOPAUSAL STATE: ICD-10-CM

## 2023-02-09 PROCEDURE — 99214 OFFICE O/P EST MOD 30 MIN: CPT | Performed by: INTERNAL MEDICINE

## 2023-02-09 NOTE — PROGRESS NOTES
Name:  Ronna Wayne  :  1955  Date:  2023     REFERRING PHYSICIAN  Lee Parrish MD    PRIMARY CARE PROVIDER  Mague Acosta APRN    REASON FOR FOLLOWUP  1. Malignant neoplasm of upper-outer quadrant of right breast in female, estrogen receptor positive (HCC)      CHIEF COMPLAINT  None.    Dear Lee,    HISTORY OF PRESENT ILLNESS:   I saw Ms. Wayne in follow up today in our medical oncology clinic. As you are aware, she is a pleasant, 68 y.o., white female with a history of multiple medical problems, including hypertension, diabetes and sleep apnea who was diagnosed with an ER positive (95%), MD positive (95%), HER2/abida negative (1+ by IHC), invasive, mammary carcinoma (with mixed ductal and lobular features) of the upper, outer quadrant of the right breast in late Spring 2021. She was referred to you, and you performed a successful, right-sided lumpectomy on 2021. A sentinel node was positive; however, an additional twelve, excised, right-sided, axillary lymph nodes were uninvolved (). Final, surgical staging was consistent with stage [id number] disease (IIB). She was subsequently referred to our clinic (and radiation oncology) for further evaluation and management. Adjuvant treatment with whole breast irradiation followed by (at least five years of) endocrine therapy were ultimately recommended. She completed a thirty-fraction course of the former by late 2021 and began the latter (daily Arimidex) by early 2021. She has been doing overall very well in routine followup, ever since then, with no evidence of residual/recurrent disease to date.    INTERIM HISTORY:  Ms. Wayne returns to clinic today for followup by herself. She began Arimidex in early 2021, has been on it for a total of nearly ~fourteen (14) full months now and is still tolerating this medication overall very well, with no noticeable side effects. In 2022, her right-sided  breast seroma increased in size to the point that it ruptured; and she had to undergo an I and D on 07/07/2022. She has stopped following with the lymphedema clinic as she is currently doing well performing routine exercises on her own. Since we last saw her in our clinic, she had to have a pacemaker placed; and she is scheduled to follow up with electrophysiology shortly. She thinks that the lisinopril her cardiologist recently started her on is causing an annoying, periodic cough. She otherwise has no new or specific complaints.    Past Medical History:   Diagnosis Date   • Anxiety    • B12 deficiency    • BPV (benign positional vertigo)    • Breast cancer (HCC) 05/2021   • Diabetes mellitus (HCC)    • Diarrhea    • Disease of thyroid gland    • Elevated cholesterol    • Fibromyalgia    • GERD (gastroesophageal reflux disease)    • Glaucoma    • Heart murmur    • Hyperlipidemia    • Hypertension    • Kidney disease    • Obesity    • Peptic ulceration    • PONV (postoperative nausea and vomiting)    • Primary central sleep apnea    • Sleep apnea     c-pap   • Stroke (HCC)    • Weakness of left arm        Past Surgical History:   Procedure Laterality Date   • ABDOMINAL SURGERY     • APPENDECTOMY     • BREAST ABSCESS INCISION AND DRAINAGE Right 07/07/2022    Procedure: BREAST ABSCESS INCISION AND DRAINAGE;  Surgeon: Lee Parrish MD;  Location: [facility] OR;  Service: General;  Laterality: Right;   • BREAST CYST ASPIRATION     • BREAST LUMPECTOMY WITH SENTINEL NODE BIOPSY Right 08/05/2021    Procedure: BREAST LUMPECTOMY WITH SENTINEL NODE BIOPSY;  Surgeon: Lee Parrish MD;  Location: [facility] OR;  Service: General;  Laterality: Right;   • CARDIAC ELECTROPHYSIOLOGY PROCEDURE N/A 01/09/2023    Procedure: Pacemaker DC new;  Surgeon: Bj Hyde MD;  Location: [facility] CATH INVASIVE LOCATION;  Service: Cardiology;  Laterality: N/A;   • CATARACT EXTRACTION Bilateral    • CHOLECYSTECTOMY     • COLONOSCOPY      • ENDOSCOPY     • HEMATOMA EVACUATION TRUNK Right 2021    Procedure: HEMATOMA EVACUATION TRUNK;  Surgeon: Lee Parrish MD;  Location: [facility];  Service: General;  Laterality: Right;   • PACEMAKER IMPLANTATION     • URETHRA SURGERY         Social History     Socioeconomic History   • Marital status:    Tobacco Use   • Smoking status: Former     Packs/day: 1.50     Years: 8.00     Pack years: 12.00     Types: Cigarettes     Quit date: 1976     Years since quittin.1   • Smokeless tobacco: Never   • Tobacco comments:     I smoked as a teen and stopped when my son was born   Vaping Use   • Vaping Use: Never used   Substance and Sexual Activity   • Alcohol use: No     Comment: former social drinker not had anything in 25 + years   • Drug use: Never   • Sexual activity: Not Currently     Partners: Male     Birth control/protection: Abstinence, Post-menopausal, Vasectomy       Family History   Problem Relation Age of Onset   • Thyroid disease Mother    • Diabetes Mother    • Hyperlipidemia Father    • Hypertension Father    • Heart attack Father    • Lung disease Father    • Hypertension Sister    • Breast cancer Sister 40   • Obesity Sister    • Hypertension Sister    • Hyperlipidemia Sister    • Cancer Sister         Breast cancer   • Hyperlipidemia Sister    • Hypertension Sister    • Arthritis Sister    • Obesity Sister    • Thyroid disease Sister    • Alcohol abuse Maternal Aunt    • Cancer Maternal Grandfather         Prostrate   • Stroke Paternal Grandmother    • Stroke Paternal Grandfather    • Hypertension Other        Allergies   Allergen Reactions   • Codeine GI Intolerance     Increased heart rate     • Sulfa Antibiotics GI Intolerance     Heart rate increase        Current Outpatient Medications   Medication Sig Dispense Refill   • acetaminophen (TYLENOL) 325 MG tablet Take 650 mg by mouth Every 6 (Six) Hours As Needed for Mild Pain.     • anastrozole (ARIMIDEX) 1 MG tablet  Take 1 tablet by mouth Daily. 30 tablet 11   • apixaban (ELIQUIS) 5 MG tablet tablet Take 1 tablet by mouth 2 (Two) Times a Day. 60 tablet 6   • cholecalciferol (VITAMIN D3) 25 MCG (1000 UT) tablet Take 2,000 Units by mouth Daily.     • empagliflozin (Jardiance) 25 MG tablet tablet Take 1 tablet by mouth Every Morning. 30 tablet 3   • insulin aspart (NovoLOG FlexPen) 100 UNIT/ML solution pen-injector sc pen Inject 0-9 Units under the skin into the appropriate area as directed 4 (Four) Times a Day Before Meals & at Bedtime. 15 mL 0   • levothyroxine (SYNTHROID, LEVOTHROID) 88 MCG tablet Take 88 mcg by mouth Daily.     • lisinopril (PRINIVIL,ZESTRIL) 10 MG tablet Take 1 tablet by mouth Daily. 90 tablet 3   • MAGnesium-Oxide 400 (240 Mg) MG tablet Take 1 tablet by mouth 2 (Two) Times a Day. 90 tablet 3   • metoprolol tartrate (LOPRESSOR) 25 MG tablet Take 1 tablet by mouth 2 (Two) Times a Day. 60 tablet 2   • rosuvastatin (CRESTOR) 20 MG tablet Take 1 tablet by mouth Daily. 30 tablet 4     No current facility-administered medications for this visit.     REVIEW OF SYSTEMS  CONSTITUTIONAL:  No fever, chills or night sweats.  EYES:  No blurry vision, diplopia or other vision changes.  ENT:  No hearing loss, nosebleeds or sore throat.  CARDIOVASCULAR:  No palpitations, arrhythmia, syncopal episodes or edema.  PULMONARY:  No hemoptysis, wheezing, chronic cough or shortness of breath.  BREASTS: As per the HPI above.  GASTROINTESTINAL:  No nausea or vomiting.  No constipation or diarrhea. No abdominal pain.  GENITOURINARY:  No hematuria, kidney stones or frequent urination.  MUSCULOSKELETAL:  No joint or back pains.  INTEGUMENTARY: As per the HPI above.  ENDOCRINE:  No excessive thirst or hot flashes.  HEMATOLOGIC:  No history of free bleeding, spontaneous bleeding or clotting.  IMMUNOLOGIC:  No allergies or frequent infections.  NEUROLOGIC: No numbness, tingling, seizures or weakness.  PSYCHIATRIC:  No anxiety or  "depression.    PHYSICAL EXAMINATION  /68   Pulse 67   Temp 97.3 °F (36.3 °C) (Temporal)   Resp 18   Ht 160 cm (63\")   Wt 124 kg (274 lb 3.2 oz)   SpO2 97%   BMI 48.57 kg/m²     Pain Score:  Pain Score    23 1044   PainSc: 0-No pain       PHQ-Score Total:  PHQ-9 Total Score:      ECO  GENERAL:  A well-developed, well-nourished, morbidly obese, white female in no acute distress.  HEENT:  Pupils equally round and reactive to light. Extraocular muscles intact.  CARDIOVASCULAR:  Regular rate and rhythm. No murmurs, gallops or rubs.  LUNGS:  Clear to auscultation bilaterally.  BREASTS: Deferred today. Status post right-sided lumpectomy in 2021.  ABDOMEN:  Soft, nontender, nondistended with positive bowel sounds.  EXTREMITIES:  No clubbing, cyanosis or edema bilaterally.  SKIN:  No rashes or petechiae. Pacemaker now in place in upper left chest wall.  NEURO:  Cranial nerves grossly intact. No focal deficits.  PSYCH:  Alert and oriented x3.    LABORATORY  Lab Results   Component Value Date    WBC 7.86 2023    HGB 13.9 2023    HCT 41.4 2023    .2 (H) 2023     (L) 2023    NEUTROABS 3.03 2023       Lab Results   Component Value Date     (L) 2023    K 4.1 2023    CL 98 2023    CO2 22.7 2023    BUN 12 2023    CREATININE 0.72 2023    GLUCOSE 211 (H) 2023    CALCIUM 9.6 2023    AST 52 (H) 2023    ALT 38 (H) 2023    ALKPHOS 111 2023    BILITOT 0.8 2023    PROTEINTOT 7.4 2023    ALBUMIN 3.5 2023     CBC (2023): WBCs: 7.86; HgB: 13.9; Hct: 41.4; platelets: 117  CBC (2022): WBCs: 7.11; HgB: 12.5; Hct: 37.3; platelets: 152  CBC (2021): WBCs: 5.36; HgB: 12.8; Hct: 39.1; platelets: 148  CBC (2021): WBCs: 6.63; HgB: 11.9; Hct: 36.1; platelets: 160  CBC (2021): WBCs: 9.36; HgB: 9.2; Hct: 29.1; platelets: 131    IMAGING  MRI breast bilateral " with and without contrast (06/22/2021):  Impression:  1) Negative left breast MRI.  2) Biopsy proven malignancy in the right 9:00 region measuring 3.7 cm. There are no additional worrisome findings in the right breast.    Bone densitometry (DEXA) scan (05/11/2021):  Impression: The BMD measured at the AP spine L1-L4 is 1.157 gm/cm2 with a T-score of -0.2. The patient is considered to be normal according to WHO criteria. Fracture risk is low.    Bilateral diagnostic mammogram with tomosynthesis (04/14/2022):  Impression:  1) Stable mammographic appearance of the left breast with no findings suspicious for malignancy.  2) Presumed postoperative and posttreatment related changes in the right breast. Recommend short interval followup.    Mammogram, diagnostic, right with tomosynthesis (10/18/2022):  Impression: Probably benign right mammographic findings. Recommend continued followup. Bi-Rads Category 3; Probably Benign.    PATHOLOGY  Breast, lumpectomy, right (08/05/2021):  Invasive mammary carcinoma with ductal and lobular features, margins uninvolved. Atypical, lobular hyperplasia with involvement of ducts. Dense stromal fibrosis. Greatest dimension of invasive focus: 31 mm. ER positive (95%), OK positive (95%), HER2 negative (1+ by IHC). [id number] (stage IIB).    Mora node, right #1 (08/05/2021):  Metastatic mammary carcinoma, 2.5 cmm extent, with extranodal extension (1/1).    Axillary contents, right (08/05/2021):  No malignancy identified in twelve axillary lymph nodes (0/12).    MammaPrint result (08/31/2021): Low risk.    IMPRESSION AND PLAN  Ms. Wayne is a 68 y.o., white female with:  1. Breast carcinoma: Diagnosed in late Spring 2021 and status post a right-sided lumpectomy with right axillary evaluation on 08/05/2021. The final, surgical pathology was consistent with stage IIB ([id number]), ER positive (95%), OK positive (95%), HER2 negative (1+ by IHC), invasive, mammary carcinoma (with ductal and  "lobular features) of the right breast. The surgical margins were clear; and, while one sentinel node was involved, an additional twelve lymph nodes were all negative for metastasis (1/13). I have had multiple, long discussions with the patient since the time of her initial consultation in our clinic (on 08/25/2021) regarding this diagnosis and its prognosis. In short, her surgery went extremely well; and her prognosis was/remains overall very good. That said, adjuvant treatment was/remains recommended in order to maximize her chances of cure. As MammaPrint testing on her tumor was consistent with \"low risk\" disease, chemotherapy was not indicated (as the potential risks would have drastically outweighed its very limited/nonexistant potential benefits in her case). As she had a lumpectomy, whole breast radiation was definitely indicated, and she completed a thirty fraction course of this therapy on 11/30/2021. As her tumor is very strongly ER/DC positive, endocrine therapy (with an aromatase inhibitor, given her postmenopausal status) was definitely indicated and strongly recommended as the final step in her adjuvant treatment. She was given a Rx for Arimidex 1 mg PO daily at that time. She has been on this therapy for a total of well over one (1) full year now and is still tolerating it overall well, without any noticeable side effects. She will proceed with this current treatment plan. Meanwhile, I have had multiple, long discussions with her regarding the current guidelines for the routine follow up of patients with a history of breast cancer. We again discussed how, other than periodic clinic visits, continued annual mammograms of remaining breast tissue (the most recent followup, right-sided exam, performed on 10/18/2022 and summarized above, remained Bi-Rads Category 3 due to stable, right-sided treatment related changes; a six month, followup bilateral exam will be ordered today and performed in late April " 2023) and routine bone density monitoring (particularly since she is on an AI), there is no proven benefit to performing additional studies (including blood work, such as CBCs, LFTs or tumor markers, or imaging, such as CT, NM bone or PET scans) to identify metastatic recurrences before they become symptomatic (and are identified via the focused investigation of new symptoms), as patient outcomes are not improved by doing so (she should continue to get routine CBCs, etc. through her PCP’s office as indicated, of course). We will see her back in our clinic in six months (~early August 2023) for another wellness visit.  2. Bone health: The results from the patient's most recent DEXA scan (performed on 05/11/2021) are summarized above. Her BMD is currently solidly WNL. Continue to monitor on Arimidex (we will repeat a DEXA scan this May; an order will be placed today).  3. Lymphedema: Secondary to a combination of right-sided, axillary surgery and adjuvant, localized XRT. She is currently not following routinely with our lymphedema clinic as she continues to do well performing some routine exercises on her own. Continue to monitor.  4. Seroma: Status post rupture and I and D in early July 2022. Drains currently still in place but reportedly now doing much better. Ongoing management per surgery.  The patient was in agreement with these plans.    It is a pleasure to participate in Ms. Wayne's care. Please do not hesitate to call with any questions or concerns that you may have.    A total of 30 minutes were spent coordinating this patient’s care in clinic today; more than 50% of this time was face-to-face with the patient, reviewing her interim medical history, discussing the results of last October's right-sided mammogram and counseling on the current treatment and followup plan. All questions were answered to her satisfaction.    FOLLOW UP  Continue Arimidex 1 mg PO daily. With surgery, as previously planned.  Repeat a bilateral mammogram in late April. Repeat a bone densitometry (DEXA) scan in mid to late May. Return to our clinic in 6 months (~early August 2023).            This document was electronically signed by LEYDI Teresa MD February 9, 2023 11:07 EST      CC: MD Marbin Ramirez MD Sherelene S. Fortney, APRN

## 2023-02-13 DIAGNOSIS — I10 ESSENTIAL HYPERTENSION: Primary | ICD-10-CM

## 2023-02-13 RX ORDER — AMLODIPINE BESYLATE 5 MG/1
5 TABLET ORAL DAILY
Qty: 30 TABLET | Refills: 1 | Status: SHIPPED | OUTPATIENT
Start: 2023-02-13

## 2023-02-20 ENCOUNTER — TELEPHONE (OUTPATIENT)
Dept: ONCOLOGY | Facility: CLINIC | Age: 68
End: 2023-02-20
Payer: MEDICARE

## 2023-02-20 RX ORDER — ANASTROZOLE 1 MG/1
1 TABLET ORAL DAILY
Qty: 30 TABLET | Refills: 11 | Status: SHIPPED | OUTPATIENT
Start: 2023-02-20

## 2023-02-20 NOTE — TELEPHONE ENCOUNTER
Caller: Ronna Wayne    Relationship: Self     Best call back number: [phone]      What was the call regarding: PT MISSED CALL FROM THE OFFICE, SHE THINKS COULD BE ABOUT HER MEDICATIONS    Do you require a callback: YES

## 2023-02-21 ENCOUNTER — LAB (OUTPATIENT)
Dept: FAMILY MEDICINE CLINIC | Facility: CLINIC | Age: 68
End: 2023-02-21
Payer: MEDICARE

## 2023-02-21 DIAGNOSIS — I10 ESSENTIAL HYPERTENSION: Chronic | ICD-10-CM

## 2023-02-21 DIAGNOSIS — E55.9 VITAMIN D DEFICIENCY: ICD-10-CM

## 2023-02-21 DIAGNOSIS — I47.1 SVT (SUPRAVENTRICULAR TACHYCARDIA): Chronic | ICD-10-CM

## 2023-02-21 DIAGNOSIS — E11.65 TYPE 2 DIABETES MELLITUS WITH HYPERGLYCEMIA, WITH LONG-TERM CURRENT USE OF INSULIN: Chronic | ICD-10-CM

## 2023-02-21 DIAGNOSIS — E83.42 HYPOMAGNESEMIA: ICD-10-CM

## 2023-02-21 DIAGNOSIS — K21.00 GASTROESOPHAGEAL REFLUX DISEASE WITH ESOPHAGITIS WITHOUT HEMORRHAGE: ICD-10-CM

## 2023-02-21 DIAGNOSIS — Z79.4 TYPE 2 DIABETES MELLITUS WITH HYPERGLYCEMIA, WITH LONG-TERM CURRENT USE OF INSULIN: Chronic | ICD-10-CM

## 2023-02-21 LAB
25(OH)D3 SERPL-MCNC: 39.9 NG/ML (ref 30–100)
ALBUMIN SERPL-MCNC: 3.7 G/DL (ref 3.5–5.2)
ALBUMIN UR-MCNC: 1.9 MG/DL
ALBUMIN/GLOB SERPL: 1 G/DL
ALP SERPL-CCNC: 99 U/L (ref 39–117)
ALT SERPL W P-5'-P-CCNC: 23 U/L (ref 1–33)
ANION GAP SERPL CALCULATED.3IONS-SCNC: 8.7 MMOL/L (ref 5–15)
AST SERPL-CCNC: 31 U/L (ref 1–32)
BASOPHILS # BLD AUTO: 0.07 10*3/MM3 (ref 0–0.2)
BASOPHILS NFR BLD AUTO: 1.2 % (ref 0–1.5)
BILIRUB SERPL-MCNC: 0.6 MG/DL (ref 0–1.2)
BUN SERPL-MCNC: 19 MG/DL (ref 8–23)
BUN/CREAT SERPL: 25.7 (ref 7–25)
CALCIUM SPEC-SCNC: 9.6 MG/DL (ref 8.6–10.5)
CHLORIDE SERPL-SCNC: 104 MMOL/L (ref 98–107)
CHOLEST SERPL-MCNC: 180 MG/DL (ref 0–200)
CO2 SERPL-SCNC: 24.3 MMOL/L (ref 22–29)
CREAT SERPL-MCNC: 0.74 MG/DL (ref 0.57–1)
DEPRECATED RDW RBC AUTO: 46.5 FL (ref 37–54)
EGFRCR SERPLBLD CKD-EPI 2021: 88.3 ML/MIN/1.73
EOSINOPHIL # BLD AUTO: 0.06 10*3/MM3 (ref 0–0.4)
EOSINOPHIL NFR BLD AUTO: 1 % (ref 0.3–6.2)
ERYTHROCYTE [DISTWIDTH] IN BLOOD BY AUTOMATED COUNT: 12.3 % (ref 12.3–15.4)
GLOBULIN UR ELPH-MCNC: 3.6 GM/DL
GLUCOSE SERPL-MCNC: 239 MG/DL (ref 65–99)
HBA1C MFR BLD: 9.5 % (ref 4.8–5.6)
HCT VFR BLD AUTO: 38.2 % (ref 34–46.6)
HDLC SERPL-MCNC: 70 MG/DL (ref 40–60)
HGB BLD-MCNC: 13 G/DL (ref 12–15.9)
IMM GRANULOCYTES # BLD AUTO: 0.01 10*3/MM3 (ref 0–0.05)
IMM GRANULOCYTES NFR BLD AUTO: 0.2 % (ref 0–0.5)
LDLC SERPL CALC-MCNC: 84 MG/DL (ref 0–100)
LDLC/HDLC SERPL: 1.14 {RATIO}
LYMPHOCYTES # BLD AUTO: 2.41 10*3/MM3 (ref 0.7–3.1)
LYMPHOCYTES NFR BLD AUTO: 41.2 % (ref 19.6–45.3)
MAGNESIUM SERPL-MCNC: 1.8 MG/DL (ref 1.6–2.4)
MCH RBC QN AUTO: 34.6 PG (ref 26.6–33)
MCHC RBC AUTO-ENTMCNC: 34 G/DL (ref 31.5–35.7)
MCV RBC AUTO: 101.6 FL (ref 79–97)
MONOCYTES # BLD AUTO: 0.72 10*3/MM3 (ref 0.1–0.9)
MONOCYTES NFR BLD AUTO: 12.3 % (ref 5–12)
NEUTROPHILS NFR BLD AUTO: 2.58 10*3/MM3 (ref 1.7–7)
NEUTROPHILS NFR BLD AUTO: 44.1 % (ref 42.7–76)
NRBC BLD AUTO-RTO: 0 /100 WBC (ref 0–0.2)
PLATELET # BLD AUTO: 134 10*3/MM3 (ref 140–450)
PMV BLD AUTO: 11 FL (ref 6–12)
POTASSIUM SERPL-SCNC: 4.3 MMOL/L (ref 3.5–5.2)
PROT SERPL-MCNC: 7.3 G/DL (ref 6–8.5)
RBC # BLD AUTO: 3.76 10*6/MM3 (ref 3.77–5.28)
SODIUM SERPL-SCNC: 137 MMOL/L (ref 136–145)
TRIGL SERPL-MCNC: 151 MG/DL (ref 0–150)
TSH SERPL DL<=0.05 MIU/L-ACNC: 0.66 UIU/ML (ref 0.27–4.2)
VIT B12 BLD-MCNC: 619 PG/ML (ref 211–946)
VLDLC SERPL-MCNC: 26 MG/DL (ref 5–40)
WBC NRBC COR # BLD: 5.85 10*3/MM3 (ref 3.4–10.8)

## 2023-02-21 PROCEDURE — 80061 LIPID PANEL: CPT | Performed by: NURSE PRACTITIONER

## 2023-02-21 PROCEDURE — 83036 HEMOGLOBIN GLYCOSYLATED A1C: CPT | Performed by: NURSE PRACTITIONER

## 2023-02-21 PROCEDURE — 83735 ASSAY OF MAGNESIUM: CPT | Performed by: NURSE PRACTITIONER

## 2023-02-21 PROCEDURE — 85025 COMPLETE CBC W/AUTO DIFF WBC: CPT | Performed by: NURSE PRACTITIONER

## 2023-02-21 PROCEDURE — 82306 VITAMIN D 25 HYDROXY: CPT | Performed by: NURSE PRACTITIONER

## 2023-02-21 PROCEDURE — 80053 COMPREHEN METABOLIC PANEL: CPT | Performed by: NURSE PRACTITIONER

## 2023-02-21 PROCEDURE — 84443 ASSAY THYROID STIM HORMONE: CPT | Performed by: NURSE PRACTITIONER

## 2023-02-21 PROCEDURE — 82043 UR ALBUMIN QUANTITATIVE: CPT | Performed by: NURSE PRACTITIONER

## 2023-02-21 PROCEDURE — 82607 VITAMIN B-12: CPT | Performed by: NURSE PRACTITIONER

## 2023-02-27 ENCOUNTER — TELEPHONE (OUTPATIENT)
Dept: CARDIOLOGY | Facility: CLINIC | Age: 68
End: 2023-02-27

## 2023-02-27 NOTE — TELEPHONE ENCOUNTER
Caller: Ronna Wayne    Relationship: Self    Best call back number: [phone]    What is the best time to reach you: ANYTIME    What was the call regarding: PATIENT HAD A PACEMAKER PUT IN ON 01/09/23 AND WAS TOLD IF SHE DIDN'T RECEIVE HER BOX FOR HER PACEMAKER. SHE IS CALLING IN TO REPORT THAT SHE HAS NOT RECEIVED IT.     Do you require a callback: YES

## 2023-02-28 ENCOUNTER — OFFICE VISIT (OUTPATIENT)
Dept: FAMILY MEDICINE CLINIC | Facility: CLINIC | Age: 68
End: 2023-02-28
Payer: MEDICARE

## 2023-02-28 VITALS
HEART RATE: 66 BPM | RESPIRATION RATE: 14 BRPM | DIASTOLIC BLOOD PRESSURE: 86 MMHG | TEMPERATURE: 97 F | HEIGHT: 63 IN | BODY MASS INDEX: 49.96 KG/M2 | SYSTOLIC BLOOD PRESSURE: 130 MMHG | WEIGHT: 282 LBS | OXYGEN SATURATION: 95 %

## 2023-02-28 DIAGNOSIS — E78.5 DYSLIPIDEMIA: Chronic | ICD-10-CM

## 2023-02-28 DIAGNOSIS — I48.0 PAROXYSMAL ATRIAL FIBRILLATION: Chronic | ICD-10-CM

## 2023-02-28 DIAGNOSIS — Z86.010 PERSONAL HISTORY OF COLONIC POLYPS: ICD-10-CM

## 2023-02-28 DIAGNOSIS — E11.65 TYPE 2 DIABETES MELLITUS WITH HYPERGLYCEMIA, WITH LONG-TERM CURRENT USE OF INSULIN: Chronic | ICD-10-CM

## 2023-02-28 DIAGNOSIS — I47.1 SVT (SUPRAVENTRICULAR TACHYCARDIA): Chronic | ICD-10-CM

## 2023-02-28 DIAGNOSIS — G47.33 OBSTRUCTIVE SLEEP APNEA SYNDROME: ICD-10-CM

## 2023-02-28 DIAGNOSIS — I10 ESSENTIAL HYPERTENSION: Chronic | ICD-10-CM

## 2023-02-28 DIAGNOSIS — Z00.00 MEDICARE ANNUAL WELLNESS VISIT, SUBSEQUENT: Primary | ICD-10-CM

## 2023-02-28 DIAGNOSIS — E03.8 OTHER SPECIFIED HYPOTHYROIDISM: Chronic | ICD-10-CM

## 2023-02-28 DIAGNOSIS — K21.00 GASTROESOPHAGEAL REFLUX DISEASE WITH ESOPHAGITIS WITHOUT HEMORRHAGE: Chronic | ICD-10-CM

## 2023-02-28 DIAGNOSIS — Z79.4 TYPE 2 DIABETES MELLITUS WITH HYPERGLYCEMIA, WITH LONG-TERM CURRENT USE OF INSULIN: Chronic | ICD-10-CM

## 2023-02-28 DIAGNOSIS — E55.9 VITAMIN D DEFICIENCY: ICD-10-CM

## 2023-02-28 DIAGNOSIS — H40.10X0 OPEN-ANGLE GLAUCOMA OF BOTH EYES, UNSPECIFIED GLAUCOMA STAGE, UNSPECIFIED OPEN-ANGLE GLAUCOMA TYPE: Chronic | ICD-10-CM

## 2023-02-28 PROBLEM — F32.A DEPRESSION: Status: RESOLVED | Noted: 2022-07-01 | Resolved: 2023-02-28

## 2023-02-28 PROCEDURE — G0439 PPPS, SUBSEQ VISIT: HCPCS | Performed by: NURSE PRACTITIONER

## 2023-02-28 PROCEDURE — 1160F RVW MEDS BY RX/DR IN RCRD: CPT | Performed by: NURSE PRACTITIONER

## 2023-02-28 PROCEDURE — 1170F FXNL STATUS ASSESSED: CPT | Performed by: NURSE PRACTITIONER

## 2023-02-28 RX ORDER — INSULIN GLARGINE 300 U/ML
INJECTION, SOLUTION SUBCUTANEOUS
Qty: 12 ML | Refills: 0 | COMMUNITY
Start: 2023-02-28

## 2023-02-28 NOTE — PROGRESS NOTES
The ABCs of the Annual Wellness Visit  Subsequent Medicare Wellness Visit    Ronna Wayne is a 68 y.o. female who presents for a Subsequent Medicare Wellness Visit.    The following portions of the patient's history were reviewed and   updated as appropriate: allergies, current medications, past family history, past medical history, past social history, past surgical history and problem list.    Compared to one year ago, the patient feels her physical   health is worse.    Compared to one year ago, the patient feels her mental   health is the same.    Recent Hospitalizations:  This patient has had a [facility] admission record on file within the last 365 days.    Current Medical Providers:  Patient Care Team:  Mague Acosta APRN as PCP - General (Family Medicine)  Jason Teresa MD: Oncology  Adela Singh APRN: Cardiology  Israel Steele DO: Cardiac Electrophysiology   Lee Parrish MD: General Surgeon  Natasha Mason APRN: Pulmonology   Emmanuel Kraus APRN: Radiation Oncology   Outpatient Medications Prior to Visit   Medication Sig Dispense Refill   • acetaminophen (TYLENOL) 325 MG tablet Take 2 tablets by mouth Every 6 (Six) Hours As Needed for Mild Pain.     • amLODIPine (NORVASC) 5 MG tablet Take 1 tablet by mouth Daily. 30 tablet 1   • anastrozole (ARIMIDEX) 1 MG tablet Take 1 tablet by mouth Daily. 30 tablet 11   • apixaban (ELIQUIS) 5 MG tablet tablet Take 1 tablet by mouth 2 (Two) Times a Day. 60 tablet 6   • cholecalciferol (VITAMIN D3) 25 MCG (1000 UT) tablet Take 2 tablets by mouth Daily.     • empagliflozin (Jardiance) 25 MG tablet tablet Take 1 tablet by mouth Every Morning. 30 tablet 3   • insulin aspart (NovoLOG FlexPen) 100 UNIT/ML solution pen-injector sc pen Inject 0-9 Units under the skin into the appropriate area as directed 4 (Four) Times a Day Before Meals & at Bedtime. 15 mL 0   • levothyroxine (SYNTHROID, LEVOTHROID) 88 MCG tablet Take 1 tablet by mouth Daily.  "    • MAGnesium-Oxide 400 (240 Mg) MG tablet Take 1 tablet by mouth 2 (Two) Times a Day. 90 tablet 3   • metoprolol tartrate (LOPRESSOR) 25 MG tablet Take 1 tablet by mouth 2 (Two) Times a Day. 60 tablet 2   • rosuvastatin (CRESTOR) 20 MG tablet Take 1 tablet by mouth Daily. 30 tablet 4   • lisinopril (PRINIVIL,ZESTRIL) 10 MG tablet Take 1 tablet by mouth Daily. 90 tablet 3     No facility-administered medications prior to visit.     No opioid medication identified on active medication list. I have reviewed chart for other potential  high risk medication/s and harmful drug interactions in the elderly.    Aspirin is not on active medication list.  Aspirin use is contraindicated for this patient due to: current use of Eliquis.  .    Patient Active Problem List   Diagnosis   • Essential hypertension   • Dyslipidemia   • Type 2 diabetes mellitus with hyperglycemia, with long-term current use of insulin (HCC)   • Obstructive sleep apnea syndrome   • Glaucoma   • Anxiety   • GERD (gastroesophageal reflux disease)   • Morbid obesity   • Cerebrovascular accident (CVA) due to embolism (HCC)   • Vitamin D deficiency   • B12 deficiency   • Malignant neoplasm of upper-outer quadrant of right breast in female, estrogen receptor positive (HCC)   • Other specified hypothyroidism   • Personal history of colonic polyps   • Depression   • Paroxysmal SVT (supraventricular tachycardia) (HCC)   • Tachy-janie syndrome (HCC)   • Paroxysmal atrial fibrillation (HCC)     Advance Care Planning  Advance Directive is not on file.  ACP discussion was held with the patient during this visit. Patient does not have an advance directive, information provided.        Vitals:    02/28/23 1051   BP: 130/86   BP Location: Left arm   Patient Position: Sitting   Cuff Size: Adult   Pulse: 66   Resp: 14   Temp: 97 °F (36.1 °C)   TempSrc: Temporal   SpO2: 95%   Weight: 128 kg (282 lb)   Height: 160 cm (62.99\")     Estimated body mass index is 49.97 kg/m² as " "calculated from the following:    Height as of this encounter: 160 cm (62.99\").    Weight as of this encounter: 128 kg (282 lb).  Vision Screening    Right eye Left eye Both eyes   Without correction 20/50 20/50 20/50   With correction        Class 3 Severe Obesity (BMI >=40). Obesity-related health conditions include the following: obstructive sleep apnea, hypertension, diabetes mellitus, dyslipidemias and GERD. Obesity is increase . BMI  is above average; BMI management plan is completed. We discussed portion control and increasing exercise.    Does the patient have evidence of cognitive impairment?   No      Results for orders placed or performed in visit on 02/21/23   Comprehensive Metabolic Panel    Specimen: Arm, Right; Blood   Result Value Ref Range    Glucose 239 (H) 65 - 99 mg/dL    BUN 19 8 - 23 mg/dL    Creatinine 0.74 0.57 - 1.00 mg/dL    Sodium 137 136 - 145 mmol/L    Potassium 4.3 3.5 - 5.2 mmol/L    Chloride 104 98 - 107 mmol/L    CO2 24.3 22.0 - 29.0 mmol/L    Calcium 9.6 8.6 - 10.5 mg/dL    Total Protein 7.3 6.0 - 8.5 g/dL    Albumin 3.7 3.5 - 5.2 g/dL    ALT (SGPT) 23 1 - 33 U/L    AST (SGOT) 31 1 - 32 U/L    Alkaline Phosphatase 99 39 - 117 U/L    Total Bilirubin 0.6 0.0 - 1.2 mg/dL    Globulin 3.6 gm/dL    A/G Ratio 1.0 g/dL    BUN/Creatinine Ratio 25.7 (H) 7.0 - 25.0    Anion Gap 8.7 5.0 - 15.0 mmol/L    eGFR 88.3 >60.0 mL/min/1.73   Lipid Panel    Specimen: Arm, Right; Blood   Result Value Ref Range    Total Cholesterol 180 0 - 200 mg/dL    Triglycerides 151 (H) 0 - 150 mg/dL    HDL Cholesterol 70 (H) 40 - 60 mg/dL    LDL Cholesterol  84 0 - 100 mg/dL    VLDL Cholesterol 26 5 - 40 mg/dL    LDL/HDL Ratio 1.14    TSH    Specimen: Arm, Right; Blood   Result Value Ref Range    TSH 0.655 0.270 - 4.200 uIU/mL   Hemoglobin A1c    Specimen: Arm, Right; Blood   Result Value Ref Range    Hemoglobin A1C 9.50 (H) 4.80 - 5.60 %   Vitamin D,25-Hydroxy    Specimen: Arm, Right; Blood   Result Value Ref Range "    25 Hydroxy, Vitamin D 39.9 30.0 - 100.0 ng/ml   Vitamin B12    Specimen: Arm, Right; Blood   Result Value Ref Range    Vitamin B-12 619 211 - 946 pg/mL   MicroAlbumin, Urine, Random - Urine, Random Void    Specimen: Urine, Random Void   Result Value Ref Range    Microalbumin, Urine 1.9 mg/dL   Magnesium    Specimen: Arm, Right; Blood   Result Value Ref Range    Magnesium 1.8 1.6 - 2.4 mg/dL   CBC Auto Differential    Specimen: Arm, Right; Blood   Result Value Ref Range    WBC 5.85 3.40 - 10.80 10*3/mm3    RBC 3.76 (L) 3.77 - 5.28 10*6/mm3    Hemoglobin 13.0 12.0 - 15.9 g/dL    Hematocrit 38.2 34.0 - 46.6 %    .6 (H) 79.0 - 97.0 fL    MCH 34.6 (H) 26.6 - 33.0 pg    MCHC 34.0 31.5 - 35.7 g/dL    RDW 12.3 12.3 - 15.4 %    RDW-SD 46.5 37.0 - 54.0 fl    MPV 11.0 6.0 - 12.0 fL    Platelets 134 (L) 140 - 450 10*3/mm3    Neutrophil % 44.1 42.7 - 76.0 %    Lymphocyte % 41.2 19.6 - 45.3 %    Monocyte % 12.3 (H) 5.0 - 12.0 %    Eosinophil % 1.0 0.3 - 6.2 %    Basophil % 1.2 0.0 - 1.5 %    Immature Grans % 0.2 0.0 - 0.5 %    Neutrophils, Absolute 2.58 1.70 - 7.00 10*3/mm3    Lymphocytes, Absolute 2.41 0.70 - 3.10 10*3/mm3    Monocytes, Absolute 0.72 0.10 - 0.90 10*3/mm3    Eosinophils, Absolute 0.06 0.00 - 0.40 10*3/mm3    Basophils, Absolute 0.07 0.00 - 0.20 10*3/mm3    Immature Grans, Absolute 0.01 0.00 - 0.05 10*3/mm3    nRBC 0.0 0.0 - 0.2 /100 WBC          HEALTH RISK ASSESSMENT    Smoking Status:  Social History     Tobacco Use   Smoking Status Former   • Packs/day: 1.50   • Years: 8.00   • Pack years: 12.00   • Types: Cigarettes   • Quit date: 1976   • Years since quittin.1   • Passive exposure: Past   Smokeless Tobacco Never   Tobacco Comments    I smoked as a teen and stopped when my son was born     Alcohol Consumption:  Social History     Substance and Sexual Activity   Alcohol Use No    Comment: former social drinker not had anything in 25 + years     Fall Risk Screen:    STEADI Fall Risk  Assessment was completed, and patient is at LOW risk for falls.Assessment completed on:2/28/2023    Depression Screening:  PHQ-2/PHQ-9 Depression Screening 2/28/2023   Little Interest or Pleasure in Doing Things 0-->not at all   Feeling Down, Depressed or Hopeless 0-->not at all   PHQ-9: Brief Depression Severity Measure Score 0       Health Habits and Functional and Cognitive Screening:  Functional & Cognitive Status 2/28/2023   Do you have difficulty preparing food and eating? No   Do you have difficulty bathing yourself, getting dressed or grooming yourself? No   Do you have difficulty using the toilet? No   Do you have difficulty moving around from place to place? No   Do you have trouble with steps or getting out of a bed or a chair? Yes   Current Diet Unhealthy Diet   Dental Exam Not up to date   Eye Exam Not up to date   Exercise (times per week) 7 times per week   Current Exercises Include Walking   Do you need help using the phone?  No   Are you deaf or do you have serious difficulty hearing?  No   Do you need help with transportation? No   Do you need help shopping? No   Do you need help preparing meals?  No   Do you need help with housework?  No   Do you need help with laundry? No   Do you need help taking your medications? No   Do you need help managing money? No   Do you ever drive or ride in a car without wearing a seat belt? No   Have you felt unusual stress, anger or loneliness in the last month? No   Who do you live with? Spouse   If you need help, do you have trouble finding someone available to you? No   Have you been bothered in the last four weeks by sexual problems? No   Do you have difficulty concentrating, remembering or making decisions? Yes       Age-appropriate Screening Schedule:  Refer to the list below for future screening recommendations based on patient's age, sex and/or medical conditions. Orders for these recommended tests are listed in the plan section. The patient has been  provided with a written plan.    Health Maintenance   Topic Date Due   • TDAP/TD VACCINES (1 - Tdap) Never done   • Hepatitis B (1 of 3 - Risk 3-dose series) Never done   • ZOSTER VACCINE (2 of 2) 07/14/2017   • DIABETIC FOOT EXAM  09/03/2021   • COVID-19 Vaccine (4 - Booster for Pfizer series) 03/01/2022   • DIABETIC EYE EXAM  07/06/2022   • ANNUAL WELLNESS VISIT  11/30/2022   • COLORECTAL CANCER SCREENING  Had to be postponed due to Breast Surgery and Cardiac Issues TBS   • DXA SCAN  05/11/2023   • HEMOGLOBIN A1C  08/21/2023   • MAMMOGRAM  10/18/2023   • LIPID PANEL  02/21/2024   • URINE MICROALBUMIN  02/21/2024   • HEPATITIS C SCREENING  Completed   • INFLUENZA VACCINE  Completed   • Pneumococcal Vaccine 65+  Completed   • PAP SMEAR  Discontinued                CMS Preventative Services Quick Reference  Risk Factors Identified During Encounter:  Vision Screening Recommended  Cardiovascular Risk  Diabetes Not at Goal  BMI 50  Colon Cancer Screening   The above risks/problems have been discussed with the patient.  Pertinent information has been shared with the patient in the After Visit Summary.    Diagnoses and all orders for this visit:    1. Medicare annual wellness visit, subsequent (Primary)  Comments:  Findings and recommendations discussed discussed with Ronna    2. Type 2 diabetes mellitus with hyperglycemia, with long-term current use of insulin (HCC)  Comments:  Restart Toujeo at 25 units nightly.  Continue sliding scale per hospital discharge.  Continue Jardiance  Orders:  -     Insulin Glargine, 2 Unit Dial, (Toujeo Max SoloStar) 300 UNIT/ML solution pen-injector injection; Start at 25 units and may increase every 3 days if needed  Dispense: 12 mL; Refill: 0  -     empagliflozin (Jardiance) 10 MG tablet tablet; Take 2 tablets by mouth Every Morning.  Dispense: 28 tablet; Refill: 0  -     Comprehensive Metabolic Panel; Future  -     Lipid Panel; Future  -     TSH; Future  -     Hemoglobin A1c;  Future  -     Vitamin B12; Future    3. Dyslipidemia  Comments:  Lipid panel reviewed.  Continue rosuvastatin 20 mg  Orders:  -     Comprehensive Metabolic Panel; Future  -     Lipid Panel; Future  -     TSH; Future    4. Essential hypertension  Comments:  Controlled.  Continue amlodipine and metoprolol.  Could not tolerate lisinopril due to cough  Orders:  -     Comprehensive Metabolic Panel; Future  -     Lipid Panel; Future  -     TSH; Future    5. Paroxysmal atrial fibrillation (HCC)  Comments:  Continue Eliquis.    Orders:  -     Magnesium; Future    6. SVT (supraventricular tachycardia) (HCC)  Comments:  Continue metoprolol.  Appointment with Dr. Steele, cardiac electrophysiologist  Orders:  -     Magnesium; Future    7. Other specified hypothyroidism  Comments:  Continue levothyroxine 88 mcg    8. Open-angle glaucoma of both eyes, unspecified glaucoma stage, unspecified open-angle glaucoma type  Comments:  Follow-up with ophthalmologist    9. Gastroesophageal reflux disease with esophagitis without hemorrhage  Comments:  Stable with lifestyle modifications  Orders:  -     CBC & Differential; Future    10. Obstructive sleep apnea syndrome  Comments:  Pending sleep study.  Follow-up with pulmonology clinic    11. Personal history of colonic polyps  Comments:  Colonoscopy has been postponed due to breast cancer surgery and cardiac issues.  We will schedule when these have stabilized    12. Vitamin D deficiency  -     Vitamin D,25-Hydroxy; Future    Follow Up: In May  Next Medicare Wellness visit to be scheduled in 1 year.      Findings and recommendations discussed with Ronna. Reviewed test results. Lifestyle modifications reinforced including nutrition and activity recommendations.  Ronna will follow up in May sooner if problems/concerns occur.  An After Visit Summary and PPPS were made available to the patient.

## 2023-03-10 ENCOUNTER — TELEPHONE (OUTPATIENT)
Dept: FAMILY MEDICINE CLINIC | Facility: CLINIC | Age: 68
End: 2023-03-10

## 2023-03-10 DIAGNOSIS — Z79.4 TYPE 2 DIABETES MELLITUS WITH HYPERGLYCEMIA, WITH LONG-TERM CURRENT USE OF INSULIN: Primary | ICD-10-CM

## 2023-03-10 DIAGNOSIS — E11.65 TYPE 2 DIABETES MELLITUS WITH HYPERGLYCEMIA, WITH LONG-TERM CURRENT USE OF INSULIN: Primary | ICD-10-CM

## 2023-03-10 RX ORDER — SEMAGLUTIDE 1.34 MG/ML
0.25 INJECTION, SOLUTION SUBCUTANEOUS WEEKLY
Qty: 3 ML | Refills: 0 | Status: SHIPPED | OUTPATIENT
Start: 2023-03-10

## 2023-03-10 NOTE — TELEPHONE ENCOUNTER
Caller: Ronna Wayne    Relationship: Self    Best call back number: [phone]    What medications are you currently taking:   Current Outpatient Medications on File Prior to Visit   Medication Sig Dispense Refill   • acetaminophen (TYLENOL) 325 MG tablet Take 2 tablets by mouth Every 6 (Six) Hours As Needed for Mild Pain.     • amLODIPine (NORVASC) 5 MG tablet Take 1 tablet by mouth Daily. 30 tablet 1   • anastrozole (ARIMIDEX) 1 MG tablet Take 1 tablet by mouth Daily. 30 tablet 11   • apixaban (ELIQUIS) 5 MG tablet tablet Take 1 tablet by mouth 2 (Two) Times a Day. 60 tablet 6   • cholecalciferol (VITAMIN D3) 25 MCG (1000 UT) tablet Take 2 tablets by mouth Daily.     • empagliflozin (Jardiance) 10 MG tablet tablet Take 2 tablets by mouth Every Morning. 28 tablet 0   • empagliflozin (Jardiance) 25 MG tablet tablet Take 1 tablet by mouth Every Morning. 30 tablet 3   • insulin aspart (NovoLOG FlexPen) 100 UNIT/ML solution pen-injector sc pen Inject 0-9 Units under the skin into the appropriate area as directed 4 (Four) Times a Day Before Meals & at Bedtime. 15 mL 0   • Insulin Glargine, 2 Unit Dial, (Toujeo Max SoloStar) 300 UNIT/ML solution pen-injector injection Start at 25 units and may increase every 3 days if needed 12 mL 0   • levothyroxine (SYNTHROID, LEVOTHROID) 88 MCG tablet Take 1 tablet by mouth Daily.     • MAGnesium-Oxide 400 (240 Mg) MG tablet Take 1 tablet by mouth 2 (Two) Times a Day. 90 tablet 3   • metoprolol tartrate (LOPRESSOR) 25 MG tablet Take 1 tablet by mouth 2 (Two) Times a Day. 60 tablet 2   • rosuvastatin (CRESTOR) 20 MG tablet Take 1 tablet by mouth Daily. 30 tablet 4     No current facility-administered medications on file prior to visit.        Which medication are you concerned about:     insulin aspart (NovoLOG FlexPen) 100 UNIT/ML solution pen-injector sc pen    Insulin Glargine, 2 Unit Dial, (Toujeo Max SoloStar) 300 UNIT/ML solution pen-injector injection    What are  your concerns:      LAST NIGHT PATIENT TOOK NOVOLOG AT 6:00    WANTED TO GO TO BED AT 8:30 BUT THOUGHT IT WAS TOO CLOSE IN TIME TO TAKE TOUJEO    CAN SOMEONE CALL HER AND LET HER KNOW HOW MUCH TIME SHE HAS TO AIT UNTIL SHE TAKES THE OTHER MEDICATION?

## 2023-03-13 ENCOUNTER — TELEPHONE (OUTPATIENT)
Dept: FAMILY MEDICINE CLINIC | Facility: CLINIC | Age: 68
End: 2023-03-13
Payer: MEDICARE

## 2023-03-13 NOTE — TELEPHONE ENCOUNTER
Called patient and let her know that I need a copy of her proof of income for the ozempic patient assistance program. She said she would bring it by the office soon.

## 2023-03-27 RX ORDER — FUROSEMIDE 20 MG/1
20 TABLET ORAL DAILY
Qty: 3 TABLET | Refills: 0 | Status: SHIPPED | OUTPATIENT
Start: 2023-03-27 | End: 2023-03-30

## 2023-03-30 DIAGNOSIS — R60.0 LOWER EXTREMITY EDEMA: Primary | ICD-10-CM

## 2023-03-30 RX ORDER — FUROSEMIDE 20 MG/1
20 TABLET ORAL DAILY
Qty: 30 TABLET | Refills: 1 | Status: SHIPPED | OUTPATIENT
Start: 2023-03-30

## 2023-03-31 RX ORDER — LEVOTHYROXINE SODIUM 88 UG/1
TABLET ORAL
Qty: 90 TABLET | Refills: 0 | Status: SHIPPED | OUTPATIENT
Start: 2023-03-31

## 2023-04-13 ENCOUNTER — HOSPITAL ENCOUNTER (OUTPATIENT)
Dept: RADIATION ONCOLOGY | Facility: HOSPITAL | Age: 68
Setting detail: RADIATION/ONCOLOGY SERIES
End: 2023-04-13
Payer: MEDICARE

## 2023-04-14 ENCOUNTER — TELEPHONE (OUTPATIENT)
Dept: FAMILY MEDICINE CLINIC | Facility: CLINIC | Age: 68
End: 2023-04-14
Payer: MEDICARE

## 2023-04-14 DIAGNOSIS — I10 ESSENTIAL HYPERTENSION: ICD-10-CM

## 2023-04-14 RX ORDER — AMLODIPINE BESYLATE 5 MG/1
TABLET ORAL
Qty: 30 TABLET | Refills: 1 | Status: SHIPPED | OUTPATIENT
Start: 2023-04-14

## 2023-04-17 ENCOUNTER — CLINICAL SUPPORT NO REQUIREMENTS (OUTPATIENT)
Dept: CARDIOLOGY | Facility: CLINIC | Age: 68
End: 2023-04-17
Payer: MEDICARE

## 2023-04-17 DIAGNOSIS — I49.5 SSS (SICK SINUS SYNDROME): Primary | ICD-10-CM

## 2023-04-18 ENCOUNTER — OFFICE VISIT (OUTPATIENT)
Dept: RADIATION ONCOLOGY | Facility: HOSPITAL | Age: 68
End: 2023-04-18
Payer: MEDICARE

## 2023-04-18 VITALS
OXYGEN SATURATION: 95 % | SYSTOLIC BLOOD PRESSURE: 155 MMHG | TEMPERATURE: 96.9 F | HEART RATE: 73 BPM | DIASTOLIC BLOOD PRESSURE: 84 MMHG | RESPIRATION RATE: 18 BRPM

## 2023-04-18 DIAGNOSIS — Z17.0 MALIGNANT NEOPLASM OF UPPER-OUTER QUADRANT OF RIGHT BREAST IN FEMALE, ESTROGEN RECEPTOR POSITIVE: ICD-10-CM

## 2023-04-18 DIAGNOSIS — C50.411 MALIGNANT NEOPLASM OF UPPER-OUTER QUADRANT OF RIGHT BREAST IN FEMALE, ESTROGEN RECEPTOR POSITIVE: ICD-10-CM

## 2023-04-18 PROCEDURE — G0463 HOSPITAL OUTPT CLINIC VISIT: HCPCS | Performed by: RADIOLOGY

## 2023-04-18 NOTE — PROGRESS NOTES
Established Patient Visit      Patient: Ronna Wayne   YOB: 1955   Medical Record Number: [id number]   Date of Visit: April 18, 2023   Primary Diagnosis:  Cancer Staging     Diagnosis: Stage IIB (pT2 pN1a M0) of the right breast.                                             History of Present Illness:   Mrs. 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  The patient is status post breast conservation surgery.  She was noted to have a single 2.5 mm metastases with extra howard extension in 1 of 13 axillary lymph nodes.  The malignancy was positive for estrogen and progesterone receptors.  HER2 was not overexpressed.    Mrs. Wayne completed a course of postoperative right breast radiotherapy at this center on 11/30/2021.  The patient received 5000 cGy to the entire breast followed by a fractionated 1000 cGy boost to the tumor bed.    The patient returns today for follow-up evaluation.  Mrs. Wayne states that she is doing well.  She underwent drainage of a right breast seroma by Dr. Parrish in December 2023.  The patient is receiving endocrine therapy with anastrozole.  Mrs. Wayne denies detection of new breast masses and breast pain.  The patient states that she does not perform breast self exams on a regular basis.    (Old medical records were requested, reviewed, and summarized in the HPI)    Review of Systems       All other systems reviewed and are negative.    Past Medical History:   Diagnosis Date   • Anxiety    • B12 deficiency    • BPV (benign positional vertigo)    • Breast cancer 05/2021   • Diabetes mellitus    • Diarrhea    • Disease of thyroid gland    • Elevated cholesterol    • Fibromyalgia    • GERD (gastroesophageal reflux disease)    • Glaucoma    • Heart murmur    • Hyperlipidemia    • Hypertension    • Kidney disease    • Obesity    • Peptic ulceration    • PONV  (postoperative nausea and vomiting)    • Primary central sleep apnea    • Sleep apnea     c-pap   • Stroke    • Weakness of left arm         Past Surgical History:   Procedure Laterality Date   • ABDOMINAL SURGERY     • APPENDECTOMY     • BREAST ABSCESS INCISION AND DRAINAGE Right 07/07/2022    Procedure: BREAST ABSCESS INCISION AND DRAINAGE;  Surgeon: Lee Parrish MD;  Location: [facility] OR;  Service: General;  Laterality: Right;   • BREAST CYST ASPIRATION     • BREAST LUMPECTOMY WITH SENTINEL NODE BIOPSY Right 08/05/2021    Procedure: BREAST LUMPECTOMY WITH SENTINEL NODE BIOPSY;  Surgeon: Lee Parrish MD;  Location: [facility] OR;  Service: General;  Laterality: Right;   • CARDIAC ELECTROPHYSIOLOGY PROCEDURE N/A 01/09/2023    Procedure: Pacemaker DC new;  Surgeon: Bj Hyde MD;  Location: [facility] CATH INVASIVE LOCATION;  Service: Cardiology;  Laterality: N/A;   • CATARACT EXTRACTION Bilateral    • CHOLECYSTECTOMY     • COLONOSCOPY     • ENDOSCOPY     • HEMATOMA EVACUATION TRUNK Right 08/05/2021    Procedure: HEMATOMA EVACUATION TRUNK;  Surgeon: Lee Parrish MD;  Location: [facility] OR;  Service: General;  Laterality: Right;   • PACEMAKER IMPLANTATION     • URETHRA SURGERY        Family History   Problem Relation Age of Onset   • Thyroid disease Mother    • Diabetes Mother    • Hyperlipidemia Father    • Hypertension Father    • Heart attack Father    • Lung disease Father    • Hypertension Sister    • Breast cancer Sister 40   • Obesity Sister    • Hypertension Sister    • Hyperlipidemia Sister    • Cancer Sister         Breast cancer   • Hyperlipidemia Sister    • Hypertension Sister    • Arthritis Sister    • Obesity Sister    • Thyroid disease Sister    • Alcohol abuse Maternal Aunt    • Cancer Maternal Grandfather         Prostrate   • Stroke Paternal Grandmother    • Stroke Paternal Grandfather    • Hypertension Other         Social History     Socioeconomic History   • Marital status:     Tobacco Use   • Smoking status: Former     Packs/day: 1.50     Years: 8.00     Pack years: 12.00     Types: Cigarettes     Quit date: 1976     Years since quittin.3     Passive exposure: Past   • Smokeless tobacco: Never   • Tobacco comments:     I smoked as a teen and stopped when my son was born   Vaping Use   • Vaping Use: Never used   Substance and Sexual Activity   • Alcohol use: No     Comment: former social drinker not had anything in 25 + years   • Drug use: Never   • Sexual activity: Not Currently     Partners: Male     Birth control/protection: Abstinence, Post-menopausal, Vasectomy     The patient is a former tobacco user.     Allergies:  Codeine and Sulfa antibiotics   Prior to Admission medications    Medication Sig Start Date End Date Taking? Authorizing Provider   acetaminophen (TYLENOL) 325 MG tablet Take 2 tablets by mouth Every 6 (Six) Hours As Needed for Mild Pain.   Yes Jamal Villafana MD   amLODIPine (NORVASC) 5 MG tablet TAKE ONE TABLET BY MOUTH DAILY 23  Yes Brenda Singh APRN   anastrozole (ARIMIDEX) 1 MG tablet Take 1 tablet by mouth Daily. 23  Yes Virgen José MD   apixaban (ELIQUIS) 5 MG tablet tablet Take 1 tablet by mouth 2 (Two) Times a Day. 23  Yes Brenda Singh APRN   cholecalciferol (VITAMIN D3) 25 MCG (1000 UT) tablet Take 2 tablets by mouth Daily.   Yes Jamal Villafana MD   empagliflozin (Jardiance) 10 MG tablet tablet Take 2 tablets by mouth Every Morning. 23  Yes Mague Acosta APRN   empagliflozin (Jardiance) 25 MG tablet tablet Take 1 tablet by mouth Every Morning. 22  Yes Mague Acosta APRN   furosemide (LASIX) 20 MG tablet Take 1 tablet by mouth Daily. 3/30/23  Yes Brenda Singh APRN   insulin aspart (NovoLOG FlexPen) 100 UNIT/ML solution pen-injector sc pen Inject 0-9 Units under the skin into the appropriate area as directed 4 (Four) Times a Day Before Meals & at  Bedtime. 22  Yes Michel Lizama MD   Insulin Glargine, 2 Unit Dial, (Toujeo Max SoloStar) 300 UNIT/ML solution pen-injector injection Start at 25 units and may increase every 3 days if needed 23  Yes Mague Acosta APRN   levothyroxine (SYNTHROID, LEVOTHROID) 88 MCG tablet TAKE ONE TABLET BY MOUTH DAILY 3/31/23  Yes Mague Acosta APRN   MAGnesium-Oxide 400 (240 Mg) MG tablet Take 1 tablet by mouth 2 (Two) Times a Day. 22  Yes Afshan Queen,    metoprolol tartrate (LOPRESSOR) 25 MG tablet TAKE ONE TABLET BY MOUTH TWICE A DAY 23  Yes Brenda Singh APRN   rosuvastatin (CRESTOR) 20 MG tablet Take 1 tablet by mouth Daily. 10/28/22  Yes Brenda Singh APRN   Semaglutide,0.25 or 0.5MG/DOS, (Ozempic, 0.25 or 0.5 MG/DOSE,) 2 MG/1.5ML solution pen-injector Inject 0.25 mg under the skin into the appropriate area as directed 1 (One) Time Per Week. 3/10/23  Yes Mague Acosta APRN   metoprolol tartrate (LOPRESSOR) 25 MG tablet Take 1 tablet by mouth 2 (Two) Times a Day. 23  Brenda Singh APRN      Pain:(on a scale of 0-10)    Pain Score    23 1506   PainSc: 0-No pain        Ronna Wayne reports a pain score of 0.       Quality of Life:   KPS: 90  ECO    Physical Examination:  Vitals: Blood pressure 155/84, pulse 73, respirations 18, afebrile, pulse oximetry on room air 95%  Height:    Weight:   There is no height or weight on file to calculate BMI.    Physical Exam  Constitutional: The patient is a well-developed, well-nourished white female in no acute distress.  Alert and oriented ×3.  HEENT: Atraumatic. Normocephalic. No abnormalities noted.  Lymphatics: No cervical, supraclavicular, or axillary, or inguinal lymphadenopathy is palpated.  CV: Regular rate and rhythm.  No murmurs, rubs, or gallops are appreciated.  Respiratory: Lungs clear to auscultation.  Breath sounds equal bilaterally.  Breasts: Surgical  scars in the right breast and axilla are well healed, and there are no suspicious lesions or nodules palpated. The left breast is within normal limits, with no suspicious lesions or nodules palpated.  GI: Abdomen soft, nontender, nondistended, with no hepatosplenomegaly or masses palpated.  Extremities: No clubbing, cyanosis, or edema.  Neurologic: Cranial nerves II through XII are grossly intact, with no focal neurological deficits noted on exam.  Psychiatric: Alert and oriented x3. Normal affect, with no anxiety or depression noted.    Labs:   Lab Results   Component Value Date    GLUCOSE 239 (H) 02/21/2023    BUN 19 02/21/2023    CREATININE 0.74 02/21/2023    EGFR 88.3 02/21/2023    BCR 25.7 (H) 02/21/2023    K 4.3 02/21/2023    CO2 24.3 02/21/2023    CALCIUM 9.6 02/21/2023    ALBUMIN 3.7 02/21/2023    BILITOT 0.6 02/21/2023    AST 31 02/21/2023    ALT 23 02/21/2023      WBC   Date Value Ref Range Status   02/21/2023 5.85 3.40 - 10.80 10*3/mm3 Final     Hemoglobin   Date Value Ref Range Status   02/21/2023 13.0 12.0 - 15.9 g/dL Final     Hematocrit   Date Value Ref Range Status   02/21/2023 38.2 34.0 - 46.6 % Final     Platelets   Date Value Ref Range Status   02/21/2023 134 (L) 140 - 450 10*3/mm3 Final      ASSESSMENT:  Clinically no evidence of disease    PLAN:  I explained to Mrs. Wayne that her post surgical/post radiation therapy progress will be assessed by serial diagnostic mammograms and physician directed breast examinations.  The patient states that she will have bilateral mammograms in May 2023.  I have asked the patient to return for recheck by our service in 6 months.  The patient is seeing Dr. Teresa on a regular basis.  Patient was encouraged to do breast and regional lymph node self assessments.    Thank you for allowing our participation in this very pleasant lady's treatment.    Electronically signed by Danilo Barone MD 4/18/2023  15:25 EDT

## 2023-04-19 ENCOUNTER — OFFICE VISIT (OUTPATIENT)
Dept: SURGERY | Facility: CLINIC | Age: 68
End: 2023-04-19
Payer: MEDICARE

## 2023-04-19 VITALS — WEIGHT: 282 LBS | HEIGHT: 63 IN | BODY MASS INDEX: 49.96 KG/M2

## 2023-04-19 DIAGNOSIS — C50.411 MALIGNANT NEOPLASM OF UPPER-OUTER QUADRANT OF RIGHT BREAST IN FEMALE, ESTROGEN RECEPTOR POSITIVE: Primary | ICD-10-CM

## 2023-04-19 DIAGNOSIS — Z17.0 MALIGNANT NEOPLASM OF UPPER-OUTER QUADRANT OF RIGHT BREAST IN FEMALE, ESTROGEN RECEPTOR POSITIVE: Primary | ICD-10-CM

## 2023-04-19 PROCEDURE — 1160F RVW MEDS BY RX/DR IN RCRD: CPT | Performed by: SURGERY

## 2023-04-19 PROCEDURE — 1159F MED LIST DOCD IN RCRD: CPT | Performed by: SURGERY

## 2023-04-19 PROCEDURE — 99212 OFFICE O/P EST SF 10 MIN: CPT | Performed by: SURGERY

## 2023-04-19 NOTE — PROGRESS NOTES
Subjective   Ronna Wayne is a 68 y.o. female  is here today for follow-up.         Ronna Wayne is a 68 y.o. female here for follow up after seroma drainage from the right breast.  Wound completely healed..  Radiation changes greatly improved.  She has had a pacemaker placed since last visit.  No complaints reported.    Assessment     Diagnoses and all orders for this visit:    1. Malignant neoplasm of upper-outer quadrant of right breast in female, estrogen receptor positive (Primary)      Ronna Wayne is a 68 y.o. female doing well after drainage of seroma of the right breast.  The wound is healing well by secondary intent and the patient will follow-up in 3 months.  Recent mammogram with benign findings.  We will follow-up repeat mammogram.

## 2023-04-28 ENCOUNTER — OFFICE VISIT (OUTPATIENT)
Dept: CARDIOLOGY | Facility: CLINIC | Age: 68
End: 2023-04-28
Payer: MEDICARE

## 2023-04-28 VITALS
DIASTOLIC BLOOD PRESSURE: 78 MMHG | HEART RATE: 66 BPM | OXYGEN SATURATION: 94 % | SYSTOLIC BLOOD PRESSURE: 144 MMHG | WEIGHT: 275 LBS | BODY MASS INDEX: 48.73 KG/M2 | HEIGHT: 63 IN

## 2023-04-28 DIAGNOSIS — I10 ESSENTIAL HYPERTENSION: ICD-10-CM

## 2023-04-28 DIAGNOSIS — Z95.0 PRESENCE OF CARDIAC PACEMAKER: ICD-10-CM

## 2023-04-28 DIAGNOSIS — I48.0 PAROXYSMAL ATRIAL FIBRILLATION: ICD-10-CM

## 2023-04-28 DIAGNOSIS — I49.5 TACHY-BRADY SYNDROME: ICD-10-CM

## 2023-04-28 DIAGNOSIS — I49.5 SINUS NODE DYSFUNCTION: ICD-10-CM

## 2023-04-28 DIAGNOSIS — I47.1 SVT (SUPRAVENTRICULAR TACHYCARDIA): Primary | ICD-10-CM

## 2023-04-28 PROBLEM — I47.10 PAROXYSMAL SVT (SUPRAVENTRICULAR TACHYCARDIA): Status: RESOLVED | Noted: 2022-12-17 | Resolved: 2023-04-28

## 2023-04-28 NOTE — PROGRESS NOTES
Cardiac Electrophysiology Outpatient Consult Note            [facility]    Consult Note     Ronna Wayne  [id number]  04/28/2023  [phone]     Primary Care Physician: Mague Acosta APRN    Referred By: Brenda Singh*    Subjective     Chief Complaint:   Diagnoses and all orders for this visit:    1. SVT (supraventricular tachycardia) (Primary)    2. Paroxysmal atrial fibrillation    3. Essential hypertension    4. Tachy-janie syndrome    5. Presence of cardiac pacemaker    6. Sinus node dysfunction      Chief Complaint   Patient presents with   • supraventricular tachycardia        History of Present Illness:   Ronna Wayne is a 68 y.o. female who presents to my electrophysiology clinic for evaluation of palpitations.  Is had a single episode since receiving her pacemaker.  This correlates with the EKG documenting tachycardia in January of this year.    Has some arm pain.  Pacemaker site is finally stopped hurting.  She says receiving the pacemaker was extraordinarily painful.  She was awake through the whole thing and it was extremely painful she does not ever want to have anything like this again.  She is very concerned about having any kind of other procedures as she is worried about pain from these as well.    Regarding symptoms of palpitations recalls having this as far back as 2017.  Shortly after her first episode of palpitations she had a stroke which is ischemic.    She does not not know what the word atrial fibrillation means.  She has not heard this term before.  She does not know why she is taking anticoagulation.  She thinks that because of her pacemaker.    Apixaban is expensive for her.    Palpitations she describes a sudden onset rapid racing sensation.  Makes her feel short of breath and fatigued afterwards she feels tired for a while.  Past Medical History:   Past Medical History:   Diagnosis Date   • Anxiety     • B12 deficiency    • BPV (benign positional vertigo)    • Breast cancer 05/2021   • Diabetes mellitus    • Diarrhea    • Disease of thyroid gland    • Elevated cholesterol    • Fibrocystic breast    • Fibromyalgia    • GERD (gastroesophageal reflux disease)    • Glaucoma    • Heart murmur    • Hyperlipidemia    • Hypertension    • Kidney disease    • Obesity    • Peptic ulceration    • PONV (postoperative nausea and vomiting)    • Primary central sleep apnea    • Sleep apnea     c-pap   • Stroke    • Weakness of left arm        Past Surgical History:   Past Surgical History:   Procedure Laterality Date   • ABDOMINAL SURGERY     • APPENDECTOMY     • BREAST ABSCESS INCISION AND DRAINAGE Right 07/07/2022    Procedure: BREAST ABSCESS INCISION AND DRAINAGE;  Surgeon: Lee Parrish MD;  Location: [facility];  Service: General;  Laterality: Right;   • BREAST BIOPSY  May 2021   • BREAST CYST ASPIRATION     • BREAST LUMPECTOMY WITH SENTINEL NODE BIOPSY Right 08/05/2021    Procedure: BREAST LUMPECTOMY WITH SENTINEL NODE BIOPSY;  Surgeon: Lee Parrish MD;  Location: [facility] OR;  Service: General;  Laterality: Right;   • CARDIAC ELECTROPHYSIOLOGY PROCEDURE N/A 01/09/2023    Procedure: Pacemaker DC new;  Surgeon: Bj Hyde MD;  Location: [facility] CATH INVASIVE LOCATION;  Service: Cardiology;  Laterality: N/A;   • CATARACT EXTRACTION Bilateral    • CHOLECYSTECTOMY     • COLONOSCOPY     • ENDOSCOPY     • HEMATOMA EVACUATION TRUNK Right 08/05/2021    Procedure: HEMATOMA EVACUATION TRUNK;  Surgeon: Lee Parrish MD;  Location: [facility] OR;  Service: General;  Laterality: Right;   • PACEMAKER IMPLANTATION     • URETHRA SURGERY         Family History:   Family History   Problem Relation Age of Onset   • Thyroid disease Mother    • Diabetes Mother    • Hyperlipidemia Father    • Hypertension Father    • Heart attack Father    • Lung disease Father    • Hypertension Sister    • Breast cancer Sister 40   •  Obesity Sister    • Hypertension Sister    • Hyperlipidemia Sister    • Cancer Sister         Breast cancer   • Hyperlipidemia Sister    • Hypertension Sister    • Arthritis Sister    • Obesity Sister    • Thyroid disease Sister    • Alcohol abuse Maternal Aunt    • Cancer Maternal Grandfather         Prostrate   • Stroke Paternal Grandmother    • Stroke Paternal Grandfather    • Hypertension Other        Social History:   Social History     Socioeconomic History   • Marital status:    Tobacco Use   • Smoking status: Former     Packs/day: 1.50     Years: 8.00     Pack years: 12.00     Types: Cigarettes     Quit date: 1976     Years since quittin.3     Passive exposure: Past   • Smokeless tobacco: Never   • Tobacco comments:     I smoked as a teen and stopped when my son was born   Vaping Use   • Vaping Use: Never used   Substance and Sexual Activity   • Alcohol use: No     Comment: former social drinker not had anything in 25 + years   • Drug use: Never   • Sexual activity: Not Currently     Partners: Male     Birth control/protection: Abstinence, Post-menopausal, Vasectomy       Medications:     Current Outpatient Medications:   •  acetaminophen (TYLENOL) 325 MG tablet, Take 2 tablets by mouth Every 6 (Six) Hours As Needed for Mild Pain., Disp: , Rfl:   •  amLODIPine (NORVASC) 5 MG tablet, TAKE ONE TABLET BY MOUTH DAILY, Disp: 30 tablet, Rfl: 1  •  anastrozole (ARIMIDEX) 1 MG tablet, Take 1 tablet by mouth Daily., Disp: 30 tablet, Rfl: 11  •  apixaban (ELIQUIS) 5 MG tablet tablet, Take 1 tablet by mouth 2 (Two) Times a Day., Disp: 60 tablet, Rfl: 6  •  cholecalciferol (VITAMIN D3) 25 MCG (1000 UT) tablet, Take 2 tablets by mouth Daily., Disp: , Rfl:   •  empagliflozin (Jardiance) 10 MG tablet tablet, Take 2 tablets by mouth Every Morning., Disp: 28 tablet, Rfl: 0  •  empagliflozin (Jardiance) 25 MG tablet tablet, Take 1 tablet by mouth Every Morning., Disp: 30 tablet, Rfl: 3  •  furosemide (LASIX)  "20 MG tablet, Take 1 tablet by mouth Daily., Disp: 30 tablet, Rfl: 1  •  insulin aspart (NovoLOG FlexPen) 100 UNIT/ML solution pen-injector sc pen, Inject 0-9 Units under the skin into the appropriate area as directed 4 (Four) Times a Day Before Meals & at Bedtime., Disp: 15 mL, Rfl: 0  •  Insulin Glargine, 2 Unit Dial, (Toujeo Max SoloStar) 300 UNIT/ML solution pen-injector injection, Start at 25 units and may increase every 3 days if needed, Disp: 12 mL, Rfl: 0  •  levothyroxine (SYNTHROID, LEVOTHROID) 88 MCG tablet, TAKE ONE TABLET BY MOUTH DAILY, Disp: 90 tablet, Rfl: 0  •  MAGnesium-Oxide 400 (240 Mg) MG tablet, Take 1 tablet by mouth 2 (Two) Times a Day., Disp: 90 tablet, Rfl: 3  •  metoprolol tartrate (LOPRESSOR) 25 MG tablet, TAKE ONE TABLET BY MOUTH TWICE A DAY, Disp: 60 tablet, Rfl: 2  •  rosuvastatin (CRESTOR) 20 MG tablet, Take 1 tablet by mouth Daily., Disp: 30 tablet, Rfl: 4  •  Semaglutide,0.25 or 0.5MG/DOS, (Ozempic, 0.25 or 0.5 MG/DOSE,) 2 MG/1.5ML solution pen-injector, Inject 0.25 mg under the skin into the appropriate area as directed 1 (One) Time Per Week., Disp: 3 mL, Rfl: 0    Allergies:   Allergies   Allergen Reactions   • Codeine GI Intolerance     Increased heart rate     • Sulfa Antibiotics GI Intolerance     Heart rate increase        Objective   Vital Signs:   Vitals:    04/28/23 0915   BP: 144/78   BP Location: Left arm   Patient Position: Sitting   Pulse: 66   SpO2: 94%   Weight: 125 kg (275 lb)   Height: 160 cm (63\")       PHYSICAL EXAM  General appearance: Awake, alert, cooperative  Head: Normocephalic, without obvious abnormality, atraumatic  Eyes: Conjunctivae/corneas clear, EOMs intact  Neck: no adenopathy, no carotid bruit, no JVD and thyroid: not enlarged  Lungs: clear to auscultation bilaterally and no rhonchi or crackles\", ' symmetric  Heart: regular rate and rhythm, S1, S2 normal, no murmur, click, rub or gallop  Abdomen: Soft, non-tender, bowel sounds normal,  no " organomegaly  Extremities: extremities normal, atraumatic, no cyanosis or edema  Skin: Skin color, turgor normal, no rashes or lesions  Neurologic: Grossly normal     Lab Results   Component Value Date    GLUCOSE 239 (H) 02/21/2023    CALCIUM 9.6 02/21/2023     02/21/2023    K 4.3 02/21/2023    CO2 24.3 02/21/2023     02/21/2023    BUN 19 02/21/2023    CREATININE 0.74 02/21/2023    EGFRIFNONA 54 (L) 01/11/2022    BCR 25.7 (H) 02/21/2023    ANIONGAP 8.7 02/21/2023     Lab Results   Component Value Date    WBC 5.85 02/21/2023    HGB 13.0 02/21/2023    HCT 38.2 02/21/2023    .6 (H) 02/21/2023     (L) 02/21/2023     Lab Results   Component Value Date    INR 1.00 01/19/2023    INR 1.06 12/17/2022    INR 1.02 11/18/2021    PROTIME 13.4 01/19/2023    PROTIME 14.0 12/17/2022    PROTIME 13.8 11/18/2021     Lab Results   Component Value Date    TSH 0.655 02/21/2023       Cardiac Testing:      I personally viewed and interpreted the patient's EKG/Telemetry/lab data    Procedures    Tobacco Cessation: Cessation Counseling Provided   Obstructive Sleep Apnea Screening: N/A    Advance Care Planning   ACP discussion was declined by the patient. Patient does not have an advance directive, declines further assistance.       Assessment & Plan    Diagnoses and all orders for this visit:    1. SVT (supraventricular tachycardia) (Primary)    2. Paroxysmal atrial fibrillation    3. Essential hypertension    4. Tachy-janie syndrome    5. Presence of cardiac pacemaker    6. Sinus node dysfunction         Diagnosis Plan   1. SVT (supraventricular tachycardia)   documented narrow complex short RP tachycardia.  Documented January 19.    She has had occasional episodes similar to this in terms of symptoms as far back as 2017.    No recurrence of symptoms since then.  Metoprolol seems to be suppressing this.    Discussed with her options including continued pharmacotherapy with metoprolol perhaps at higher doses versus  alternatively catheter ablation of SVT.  SVT ablation in her would be high risk due to her BMI of 48.  The main principal issue would be sedation.  We would likely need a general anesthesia for this if this is the case.    At this time lets continue to monitor things.  She agrees.  She has further recurrent episodes that are symptomatic we will revisit catheter ablation with general anesthesia support.      2. Paroxysmal atrial fibrillation   per chart history.  On anticoagulation.  Diagnosed by her cardiologist Dr. Hyde.  We will continue to monitor.  Survivor of stroke.  Therefore [id number] is quite elevated.      3. Essential hypertension   some whitecoat hypertension today.      4. Tachy-janie syndrome   [employer] dual-chamber permanent pacemaker implanted January of this year.  Follow-up thus far by Dr. Hyde.  We will interrogate the device at her next visit.      5. Presence of cardiac pacemaker   device not interrogated.      6. Sinus node dysfunction   atrial pacing support.        Body mass index is 48.71 kg/m².    I spent 42 minutes in consultation with this patient which included more than 65% of this time in direct face-to-face counseling, physical examination and discussion of my assessment and findings and this shared decision making with the patient.  The remainder of the time not spent face-to-face was performing one, some or all of the following actions: preparing to see the patient (e.g. reviewing tests, prior clinicians' notes, etc), ordering medications, tests or procedures, coordination of care, discussion of the plan with other healthcare providers, documenting clinical information in epic as well as independently interpreting results and communication of these results to the patient family and/or caregiver(s).  Please note that this explicitly excludes time spent on other separate billable services such as performing procedures or test interpretation, when applicable.    Follow Up:        Thank you for allowing me to participate in the care of your patient. Please to not hesitate to contact me with additional questions or concerns.      Israel Steele, DO, FACC, RS  Cardiac Electrophysiologist  [facility] / [facility]

## 2023-05-15 ENCOUNTER — TELEPHONE (OUTPATIENT)
Dept: FAMILY MEDICINE CLINIC | Facility: CLINIC | Age: 68
End: 2023-05-15
Payer: MEDICARE

## 2023-05-16 ENCOUNTER — HOSPITAL ENCOUNTER (OUTPATIENT)
Dept: MAMMOGRAPHY | Facility: HOSPITAL | Age: 68
Discharge: HOME | End: 2023-05-16
Payer: MEDICARE

## 2023-05-16 ENCOUNTER — HOSPITAL ENCOUNTER (OUTPATIENT)
Dept: BONE DENSITY | Facility: HOSPITAL | Age: 68
Discharge: HOME | End: 2023-05-16
Payer: MEDICARE

## 2023-05-16 DIAGNOSIS — Z78.0 ASYMPTOMATIC MENOPAUSAL STATE: ICD-10-CM

## 2023-05-16 DIAGNOSIS — Z17.0 MALIGNANT NEOPLASM OF UPPER-OUTER QUADRANT OF RIGHT BREAST IN FEMALE, ESTROGEN RECEPTOR POSITIVE: ICD-10-CM

## 2023-05-16 DIAGNOSIS — C50.411 MALIGNANT NEOPLASM OF UPPER-OUTER QUADRANT OF RIGHT BREAST IN FEMALE, ESTROGEN RECEPTOR POSITIVE: ICD-10-CM

## 2023-05-16 PROCEDURE — 77080 DXA BONE DENSITY AXIAL: CPT

## 2023-05-16 PROCEDURE — G0279 TOMOSYNTHESIS, MAMMO: HCPCS | Performed by: RADIOLOGY

## 2023-05-16 PROCEDURE — 77066 DX MAMMO INCL CAD BI: CPT

## 2023-05-16 PROCEDURE — 77066 DX MAMMO INCL CAD BI: CPT | Performed by: RADIOLOGY

## 2023-05-16 PROCEDURE — G0279 TOMOSYNTHESIS, MAMMO: HCPCS

## 2023-05-18 ENCOUNTER — OFFICE VISIT (OUTPATIENT)
Dept: CARDIOLOGY | Facility: CLINIC | Age: 68
End: 2023-05-18
Payer: MEDICARE

## 2023-05-18 ENCOUNTER — TELEPHONE (OUTPATIENT)
Dept: PULMONOLOGY | Facility: CLINIC | Age: 68
End: 2023-05-18
Payer: MEDICARE

## 2023-05-18 VITALS
HEART RATE: 63 BPM | BODY MASS INDEX: 48.8 KG/M2 | SYSTOLIC BLOOD PRESSURE: 140 MMHG | HEIGHT: 63 IN | DIASTOLIC BLOOD PRESSURE: 72 MMHG | OXYGEN SATURATION: 96 % | WEIGHT: 275.4 LBS

## 2023-05-18 DIAGNOSIS — G47.33 OBSTRUCTIVE SLEEP APNEA SYNDROME: ICD-10-CM

## 2023-05-18 DIAGNOSIS — G47.33 OSA (OBSTRUCTIVE SLEEP APNEA): Primary | ICD-10-CM

## 2023-05-18 DIAGNOSIS — I48.0 PAROXYSMAL ATRIAL FIBRILLATION: ICD-10-CM

## 2023-05-18 DIAGNOSIS — I35.0 AORTIC STENOSIS, MILD: Primary | ICD-10-CM

## 2023-05-18 DIAGNOSIS — I10 ESSENTIAL HYPERTENSION: ICD-10-CM

## 2023-05-18 DIAGNOSIS — I47.1 SVT (SUPRAVENTRICULAR TACHYCARDIA): ICD-10-CM

## 2023-05-18 PROCEDURE — 1160F RVW MEDS BY RX/DR IN RCRD: CPT | Performed by: NURSE PRACTITIONER

## 2023-05-18 PROCEDURE — 3078F DIAST BP <80 MM HG: CPT | Performed by: NURSE PRACTITIONER

## 2023-05-18 PROCEDURE — 99214 OFFICE O/P EST MOD 30 MIN: CPT | Performed by: NURSE PRACTITIONER

## 2023-05-18 PROCEDURE — 3077F SYST BP >= 140 MM HG: CPT | Performed by: NURSE PRACTITIONER

## 2023-05-18 PROCEDURE — 1159F MED LIST DOCD IN RCRD: CPT | Performed by: NURSE PRACTITIONER

## 2023-05-18 NOTE — PROGRESS NOTES
Sleep study was reviewed.  She was noted to do the best in a setting of 10 cm H2O.  Placed an order for a new pap with this setting.

## 2023-05-18 NOTE — TELEPHONE ENCOUNTER
----- Message from BOSTON Melvin sent at 5/18/2023  1:03 PM EDT -----  Will you let her know that her sleep study showed that she did the best on a pressure of 10.  I put in an order for her to get a new cpap with a setting of 10.  ----- Message -----  From: John Tabares Incoming  Sent: 5/17/2023  11:14 AM EDT  To: BOSTON Melvin

## 2023-05-18 NOTE — PROGRESS NOTES
[facility]             BrendaBOSTON Mtz Sherelene S, APRN  Ronna Wayne  1955 05/18/2023    Patient Active Problem List   Diagnosis   • Frequent headaches   • Essential hypertension   • Dyslipidemia   • Type 2 diabetes mellitus with hyperglycemia, with long-term current use of insulin   • Obstructive sleep apnea syndrome   • Glaucoma   • GERD (gastroesophageal reflux disease)   • Morbid obesity   • Weakness of left arm   • Cerebrovascular accident (CVA) due to embolism   • SVT (supraventricular tachycardia)   • Vitamin D deficiency   • B12 deficiency   • Malignant neoplasm of upper-outer quadrant of right breast in female, estrogen receptor positive   • Other specified hypothyroidism   • Personal history of colonic polyps   • Tachy-janie syndrome   • Atrial flutter   • Paroxysmal atrial fibrillation   • Sinus node dysfunction   • Presence of cardiac pacemaker       Dear Mague Acosta APRN:    Subjective     Chief Complaint   Patient presents with   • Follow-up   • Med Management     Verbally reviewed medications with patient, patient is tolerating medications well.        HPI:     This is a 68 y.o. female with known past medical history of dyslipidemia, diabetes mellitus type 2, essential hypertension, obstructive sleep apnea, PSVT, history of CVA, breast cancer, obesity and paroxysmal atrial fibrillation/flutter.    Ronna Fletcherenter presents today for routine cardiology follow up.  Patient was recently seen by EP Dr. Steele.  It was discussed with her options including continued pharmacotherapy with metoprolol perhaps at higher doses versus alternatively catheter ablation for SVT.  They did decide to continue to monitor and if she has any further recurrent episodes they will revisit catheter ablation.  Patient states that since her last visit she she has been having some fatigue overall.  States when she does some work around  "her house and sometimes can feel her heart \"thumping \" and has to rest.  Does occasionally feel palpitations.  Does not do much physical activity however states she is currently taking an injection to help her lose weight.  Blood pressure controlled.  Recent pacemaker interrogation showed 2 episodes of SVT with 8 mode switches.    • Diagnostic Testing  1. Echocardiogram 5/2021: EF 51 to 55% with moderate aortic sclerosis  2. Nuclear stress test 5/2021: No evidence of ischemia  3. Echocardiogram 12/2022: EF 61 to 65% with mild aortic valve stenosis  4. Successful implantation of permanent dual chamber pacemaker with [employer] device on 1/5/2023 due to tachybradycardia syndrome     All other systems were reviewed and were negative.    Patient Active Problem List   Diagnosis   • Frequent headaches   • Essential hypertension   • Dyslipidemia   • Type 2 diabetes mellitus with hyperglycemia, with long-term current use of insulin   • Obstructive sleep apnea syndrome   • Glaucoma   • GERD (gastroesophageal reflux disease)   • Morbid obesity   • Weakness of left arm   • Cerebrovascular accident (CVA) due to embolism   • SVT (supraventricular tachycardia)   • Vitamin D deficiency   • B12 deficiency   • Malignant neoplasm of upper-outer quadrant of right breast in female, estrogen receptor positive   • Other specified hypothyroidism   • Personal history of colonic polyps   • Tachy-janie syndrome   • Atrial flutter   • Paroxysmal atrial fibrillation   • Sinus node dysfunction   • Presence of cardiac pacemaker       family history includes Alcohol abuse in her maternal aunt; Arthritis in her sister; Breast cancer (age of onset: 40) in her sister; Cancer in her maternal grandfather and sister; Diabetes in her mother; Heart attack in her father; Hyperlipidemia in her father, sister, and sister; Hypertension in her father, sister, sister, sister, and another family member; Lung disease in her father; Obesity in her sister " and sister; Stroke in her paternal grandfather and paternal grandmother; Thyroid disease in her mother and sister.     reports that she quit smoking about 47 years ago. Her smoking use included cigarettes. She has a 12.00 pack-year smoking history. She has been exposed to tobacco smoke. She has never used smokeless tobacco. She reports that she does not drink alcohol and does not use drugs.    Allergies   Allergen Reactions   • Codeine GI Intolerance     Increased heart rate     • Sulfa Antibiotics GI Intolerance     Heart rate increase          Current Outpatient Medications:   •  acetaminophen (TYLENOL) 325 MG tablet, Take 2 tablets by mouth Every 6 (Six) Hours As Needed for Mild Pain., Disp: , Rfl:   •  amLODIPine (NORVASC) 5 MG tablet, TAKE ONE TABLET BY MOUTH DAILY, Disp: 30 tablet, Rfl: 1  •  anastrozole (ARIMIDEX) 1 MG tablet, Take 1 tablet by mouth Daily., Disp: 30 tablet, Rfl: 11  •  apixaban (ELIQUIS) 5 MG tablet tablet, Take 1 tablet by mouth 2 (Two) Times a Day., Disp: 60 tablet, Rfl: 6  •  cholecalciferol (VITAMIN D3) 25 MCG (1000 UT) tablet, Take 2 tablets by mouth Daily., Disp: , Rfl:   •  empagliflozin (Jardiance) 25 MG tablet tablet, Take 1 tablet by mouth Every Morning., Disp: 30 tablet, Rfl: 3  •  furosemide (LASIX) 20 MG tablet, Take 1 tablet by mouth Daily., Disp: 30 tablet, Rfl: 1  •  insulin aspart (NovoLOG FlexPen) 100 UNIT/ML solution pen-injector sc pen, Inject 0-9 Units under the skin into the appropriate area as directed 4 (Four) Times a Day Before Meals & at Bedtime., Disp: 15 mL, Rfl: 0  •  Insulin Glargine, 2 Unit Dial, (Toujeo Max SoloStar) 300 UNIT/ML solution pen-injector injection, Start at 25 units and may increase every 3 days if needed, Disp: 12 mL, Rfl: 0  •  levothyroxine (SYNTHROID, LEVOTHROID) 88 MCG tablet, TAKE ONE TABLET BY MOUTH DAILY, Disp: 90 tablet, Rfl: 0  •  MAGnesium-Oxide 400 (240 Mg) MG tablet, Take 1 tablet by mouth 2 (Two) Times a Day., Disp: 90 tablet, Rfl:  3  •  metoprolol tartrate (LOPRESSOR) 25 MG tablet, TAKE ONE TABLET BY MOUTH TWICE A DAY, Disp: 60 tablet, Rfl: 2  •  rosuvastatin (CRESTOR) 20 MG tablet, Take 1 tablet by mouth Daily., Disp: 30 tablet, Rfl: 4  •  Semaglutide,0.25 or 0.5MG/DOS, (Ozempic, 0.25 or 0.5 MG/DOSE,) 2 MG/1.5ML solution pen-injector, Inject 0.25 mg under the skin into the appropriate area as directed 1 (One) Time Per Week., Disp: 3 mL, Rfl: 0  •  empagliflozin (Jardiance) 10 MG tablet tablet, Take 2 tablets by mouth Every Morning. (Patient not taking: Reported on 5/18/2023), Disp: 28 tablet, Rfl: 0      Physical Exam:  I have reviewed the patient's current vital signs as documented in the patient's EMR.   Vitals:    05/18/23 1435   BP: 140/72   Pulse: 63   SpO2: 96%     Body mass index is 48.78 kg/m².       05/18/23  1435   Weight: 125 kg (275 lb 6.4 oz)      Physical Exam  Constitutional:       General: She is not in acute distress.     Appearance: Normal appearance. She is well-developed and normal weight.   HENT:      Head: Normocephalic and atraumatic.   Eyes:      General: Lids are normal.      Conjunctiva/sclera: Conjunctivae normal.      Pupils: Pupils are equal, round, and reactive to light.   Neck:      Vascular: No carotid bruit or JVD.   Cardiovascular:      Rate and Rhythm: Normal rate and regular rhythm.      Pulses: Normal pulses.      Heart sounds: S1 normal and S2 normal. Murmur heard.   Pulmonary:      Effort: Pulmonary effort is normal. No respiratory distress.      Breath sounds: Normal breath sounds. No wheezing.   Abdominal:      General: Bowel sounds are normal. There is no distension.      Palpations: Abdomen is soft. There is no hepatomegaly or splenomegaly.      Tenderness: There is no abdominal tenderness.   Musculoskeletal:         General: No swelling. Normal range of motion.      Cervical back: Normal range of motion and neck supple.      Right lower leg: No edema.      Left lower leg: No edema.   Skin:      General: Skin is warm and dry.      Coloration: Skin is not jaundiced.      Findings: No rash.   Neurological:      General: No focal deficit present.      Mental Status: She is alert and oriented to person, place, and time. Mental status is at baseline.   Psychiatric:         Mood and Affect: Mood normal.         Speech: Speech normal.         Behavior: Behavior normal.         Thought Content: Thought content normal.         Judgment: Judgment normal.            DATA REVIEWED:     TTE/MAGI:  Results for orders placed during the hospital encounter of 12/17/22    Adult Transthoracic Echo Complete W/ Cont if Necessary Per Protocol    Interpretation Summary  •  Left ventricular systolic function is normal. Left ventricular ejection fraction appears to be 61 - 65%.  •  Left ventricular diastolic function is consistent with (grade II w/high LAP) pseudonormalization.  •  Left atrial volume is mildly increased.  •  The right atrial cavity is mildly  dilated.  •  Mild aortic valve stenosis is present, with an estimated aortic valve area of 1.8 cm² and the mean gradient of 19 mmHg.  •  Estimated right ventricular systolic pressure from tricuspid regurgitation is mildly elevated (35-45 mmHg).      Laboratory evaluations:    Lab Results   Component Value Date    GLUCOSE 239 (H) 02/21/2023    BUN 19 02/21/2023    CREATININE 0.74 02/21/2023    EGFRIFNONA 54 (L) 01/11/2022    BCR 25.7 (H) 02/21/2023    K 4.3 02/21/2023    CO2 24.3 02/21/2023    CALCIUM 9.6 02/21/2023    ALBUMIN 3.7 02/21/2023    LABIL2 1.0 (L) 05/28/2016    AST 31 02/21/2023    ALT 23 02/21/2023     Lab Results   Component Value Date    WBC 5.85 02/21/2023    HGB 13.0 02/21/2023    HCT 38.2 02/21/2023    .6 (H) 02/21/2023     (L) 02/21/2023     Lab Results   Component Value Date    CHOL 180 02/21/2023    CHLPL 171 01/04/2019    TRIG 151 (H) 02/21/2023    HDL 70 (H) 02/21/2023    LDL 84 02/21/2023     Lab Results   Component Value Date    TSH 0.655  02/21/2023     Lab Results   Component Value Date    HGBA1C 9.50 (H) 02/21/2023     Lab Results   Component Value Date    ALT 23 02/21/2023     Lab Results   Component Value Date    HGBA1C 9.50 (H) 02/21/2023    HGBA1C 7.80 (H) 10/21/2022    HGBA1C 8.80 (H) 03/08/2022     Lab Results   Component Value Date    MICROALBUR 1.9 02/21/2023    CREATININE 0.74 02/21/2023     No results found for: IRON, TIBC, FERRITIN  Lab Results   Component Value Date    INR 1.00 01/19/2023    INR 1.06 12/17/2022    INR 1.02 11/18/2021    PROTIME 13.4 01/19/2023    PROTIME 14.0 12/17/2022    PROTIME 13.8 11/18/2021           --------------------------------------------------------------------------------------------------------------------------    ASSESSMENT/PLAN:      Diagnosis Plan   1. Aortic stenosis, mild  Adult Transthoracic Echo Limited W/ Cont if Necessary Per Protocol      2. Essential hypertension        3. SVT (supraventricular tachycardia)        4. Paroxysmal atrial fibrillation        5. Obstructive sleep apnea syndrome            1. Essential hypertension  • Blood pressure well controlled.  Recent lab work showed stable kidney function.  Continue current management.    2. Paroxysmal atrial fibrillation  3. PSVT  • Patient followed up with EP who discussed continue with pharmacotherapy versus ablation and they have decided to continue with pharmacotherapy for now and consider ablation if symptoms worsen.  Recent pacemaker interrogation showed 2 episodes of SVT with 8 mode switches.  Overall patient symptoms are stable.  Unable to titrate up beta-blocker secondary to low baseline heart rate.  Continue with Eliquis for stroke prevention and metoprolol.    4.  Obstructive sleep apnea  · Compliant with CPAP.  Follows with pulmonology.    5.  Aortic stenosis  · Murmur heard on examination today.  Have ordered limited echocardiogram for accurate aortic valve area, gradients to assess further.      This document has been  @Electronically signed by BOSTON Jiménez, 05/18/23, 2:26 PM EDT.       Dictated Utilizing Dragon Dictation: Part of this note may be an electronic transcription/translation of spoken language to printed text using the Dragon Dictation System.    Follow-up appointment and medication changes provided in hand delivered After Visit Summary as well as reviewed in the room.

## 2023-05-19 DIAGNOSIS — R60.0 LOWER EXTREMITY EDEMA: ICD-10-CM

## 2023-05-19 RX ORDER — FUROSEMIDE 20 MG/1
TABLET ORAL
Qty: 30 TABLET | Refills: 1 | Status: SHIPPED | OUTPATIENT
Start: 2023-05-19

## 2023-06-09 ENCOUNTER — OFFICE VISIT (OUTPATIENT)
Dept: FAMILY MEDICINE CLINIC | Facility: CLINIC | Age: 68
End: 2023-06-09
Payer: MEDICARE

## 2023-06-09 DIAGNOSIS — K21.00 GASTROESOPHAGEAL REFLUX DISEASE WITH ESOPHAGITIS WITHOUT HEMORRHAGE: Chronic | ICD-10-CM

## 2023-06-09 DIAGNOSIS — E03.8 OTHER SPECIFIED HYPOTHYROIDISM: Chronic | ICD-10-CM

## 2023-06-09 DIAGNOSIS — Z79.4 TYPE 2 DIABETES MELLITUS WITH HYPERGLYCEMIA, WITH LONG-TERM CURRENT USE OF INSULIN: Primary | Chronic | ICD-10-CM

## 2023-06-09 DIAGNOSIS — I10 ESSENTIAL HYPERTENSION: Chronic | ICD-10-CM

## 2023-06-09 DIAGNOSIS — E78.5 DYSLIPIDEMIA: Chronic | ICD-10-CM

## 2023-06-09 DIAGNOSIS — E55.9 VITAMIN D DEFICIENCY: ICD-10-CM

## 2023-06-09 DIAGNOSIS — E11.65 TYPE 2 DIABETES MELLITUS WITH HYPERGLYCEMIA, WITH LONG-TERM CURRENT USE OF INSULIN: Primary | Chronic | ICD-10-CM

## 2023-06-09 DIAGNOSIS — I47.1 SVT (SUPRAVENTRICULAR TACHYCARDIA): Chronic | ICD-10-CM

## 2023-06-09 DIAGNOSIS — I48.0 PAROXYSMAL ATRIAL FIBRILLATION: Chronic | ICD-10-CM

## 2023-06-09 PROCEDURE — 85025 COMPLETE CBC W/AUTO DIFF WBC: CPT | Performed by: NURSE PRACTITIONER

## 2023-06-09 PROCEDURE — 3075F SYST BP GE 130 - 139MM HG: CPT | Performed by: NURSE PRACTITIONER

## 2023-06-09 PROCEDURE — 84443 ASSAY THYROID STIM HORMONE: CPT | Performed by: NURSE PRACTITIONER

## 2023-06-09 PROCEDURE — 3078F DIAST BP <80 MM HG: CPT | Performed by: NURSE PRACTITIONER

## 2023-06-09 PROCEDURE — 83036 HEMOGLOBIN GLYCOSYLATED A1C: CPT | Performed by: NURSE PRACTITIONER

## 2023-06-09 PROCEDURE — 80061 LIPID PANEL: CPT | Performed by: NURSE PRACTITIONER

## 2023-06-09 PROCEDURE — 83735 ASSAY OF MAGNESIUM: CPT | Performed by: NURSE PRACTITIONER

## 2023-06-09 PROCEDURE — 82607 VITAMIN B-12: CPT | Performed by: NURSE PRACTITIONER

## 2023-06-09 PROCEDURE — 82306 VITAMIN D 25 HYDROXY: CPT | Performed by: NURSE PRACTITIONER

## 2023-06-09 PROCEDURE — 1159F MED LIST DOCD IN RCRD: CPT | Performed by: NURSE PRACTITIONER

## 2023-06-09 PROCEDURE — 3046F HEMOGLOBIN A1C LEVEL >9.0%: CPT | Performed by: NURSE PRACTITIONER

## 2023-06-09 PROCEDURE — 1160F RVW MEDS BY RX/DR IN RCRD: CPT | Performed by: NURSE PRACTITIONER

## 2023-06-09 PROCEDURE — 36415 COLL VENOUS BLD VENIPUNCTURE: CPT | Performed by: NURSE PRACTITIONER

## 2023-06-09 PROCEDURE — 99214 OFFICE O/P EST MOD 30 MIN: CPT | Performed by: NURSE PRACTITIONER

## 2023-06-09 NOTE — PROGRESS NOTES
History of Present Illness  Ronna Wayne is a 68 y.o. female presents to the clinic pertaining to her DM, type 2, HTN, Dyslipidemia, GERD and vitamin deficiencies. In addition, she has been diagnosed and treated for a malignant neoplasm of her upper outer quadrant of right breast.    Diabetes  She has type 2 diabetes mellitus. The initial diagnosis of diabetes was made 2008. Diabetic complications include a CVA, nephropathy and PVD. Risk factors for coronary artery disease include dyslipidemia, family history, hypertension, obesity and sedentary lifestyle. Current diabetic treatment includes insulin injections and oral agents.  She is currently taking insulin pre-breakfast, pre-lunch and pre-dinner. Insulin injections are given by patient. Rotation sites for injection include the abdominal wall. She is following a diabetic diet. Meal planning includes ADA exchanges, avoidance of concentrated sweets and carbohydrate counting. She rarely participates in exercise. Blood glucose monitoring compliance is good.    Lab Results   Component Value Date    HGBA1C 8.80 (H) 03/08/2022     Lab Results   Component Value Date    HGBA1C 7.80 (H) 10/21/2022     Lab Results   Component Value Date    HGBA1C 10.10 (H) 06/11/2023     Hypertension   The current episode started more than 1 year ago. The problem is controlled. Associated symptoms include headaches, malaise/fatigue and peripheral edema. Risk factors for coronary artery disease include diabetes mellitus, dyslipidemia, obesity and post-menopausal state. Current antihypertension treatment includes angiotensin blockers.   Lab Results   Component Value Date    GLUCOSE 283 (H) 06/11/2023    BUN 16 06/11/2023    CREATININE 0.86 06/11/2023    EGFR 73.7 06/11/2023    BCR 18.6 06/11/2023    K 3.9 06/11/2023    CO2 23.4 06/11/2023    CALCIUM 9.8 06/11/2023    ALBUMIN 3.6 06/11/2023    BILITOT 0.7 06/11/2023    AST 32 06/11/2023    ALT 24 06/11/2023       Dyslipidemia   The  current episode started more than 1 year ago. Current antihyperlipidemic treatment includes statins. Risk factors for coronary artery disease include diabetes mellitus, dyslipidemia, hypertension, obesity and post-menopausal.   Lab Results   Component Value Date    CHOL 180 02/21/2023    CHOL 210 (H) 10/21/2022    CHOL 169 03/08/2022     Lab Results   Component Value Date    TRIG 206 (H) 06/09/2023    TRIG 151 (H) 02/21/2023    TRIG 200 (H) 10/21/2022     Lab Results   Component Value Date    HDL 50 06/09/2023    HDL 70 (H) 02/21/2023    HDL 51 10/21/2022     Lab Results   Component Value Date     (H) 06/09/2023    LDL 84 02/21/2023     (H) 10/21/2022     The following portions of the patient's history were reviewed and updated as appropriate: allergies, current medications, past family history, past medical history, past social history, past surgical history and problem list.    Review of Systems   Constitutional:  Positive for fatigue. Negative for activity change, appetite change, chills, fever and unexpected weight change.   Eyes:  Negative for visual disturbance.   Respiratory:  Negative for cough, shortness of breath and wheezing.    Cardiovascular:  Positive for leg swelling (Intermittent). Negative for chest pain and palpitations.   Gastrointestinal:  Positive for nausea (Intermittent). Negative for vomiting.   Endocrine: Negative for cold intolerance, heat intolerance, polydipsia, polyphagia and polyuria.   Musculoskeletal:  Positive for arthralgias.   Skin:  Negative for color change and rash.   Neurological:  Positive for numbness and headaches. Negative for dizziness, tremors, speech difficulty, weakness and light-headedness.   Hematological:  Negative for adenopathy.   Psychiatric/Behavioral:  Positive for dysphoric mood (Stable with Prozac). Negative for confusion and suicidal ideas. The patient is not nervous/anxious.    All other systems reviewed and are negative.    Vital signs:  BP  "134/70 (BP Location: Right arm, Patient Position: Sitting, Cuff Size: Adult)   Pulse 68   Temp 97.2 °F (36.2 °C) (Temporal)   Resp 14   Ht 160 cm (62.99\")   Wt 123 kg (271 lb 2.7 oz)   SpO2 96%   BMI 48.05 kg/m²     Physical Exam  Vitals and nursing note reviewed.   Constitutional:       General: She is not in acute distress.     Appearance: She is well-developed.      Comments: Gait slow and cautious   HENT:      Head: Normocephalic.      Nose: Nose normal.   Eyes:      General: No scleral icterus.        Right eye: No discharge.         Left eye: No discharge.      Conjunctiva/sclera: Conjunctivae normal.   Neck:      Thyroid: No thyromegaly.      Vascular: No JVD.   Cardiovascular:      Rate and Rhythm: Normal rate and regular rhythm.      Heart sounds: Murmur heard.     No friction rub.   Pulmonary:      Effort: Pulmonary effort is normal. No respiratory distress.      Breath sounds: Normal breath sounds. No decreased breath sounds, wheezing, rhonchi or rales.   Chest:   Breasts:     Left: No mass.   Abdominal:      General: Bowel sounds are normal. There is no distension.      Palpations: Abdomen is soft.      Tenderness: There is no abdominal tenderness. There is no guarding or rebound.   Musculoskeletal:         General: Tenderness (Generalized) present.      Cervical back: Neck supple.      Right lower leg: No edema.      Left lower leg: No edema.   Lymphadenopathy:      Cervical: No cervical adenopathy.   Skin:     General: Skin is warm and dry.      Capillary Refill: Capillary refill takes less than 2 seconds.      Findings: No erythema or rash.   Neurological:      Mental Status: She is alert and oriented to person, place, and time.   Psychiatric:         Mood and Affect: Mood and affect normal.         Speech: Speech normal.         Behavior: Behavior normal. Behavior is cooperative.         Thought Content: Thought content normal.         Judgment: Judgment normal.     Result Review :   "   Assessment & Plan     Diagnoses and all orders for this visit:    1. Type 2 diabetes mellitus with hyperglycemia, with long-term current use of insulin (Primary)  Comments:  ContinueToujeo nightly.  Continue sliding scale Continue Jardiance and Ozempic  Orders:  -     Cancel: Comprehensive Metabolic Panel  -     Lipid Panel  -     TSH  -     Hemoglobin A1c  -     Vitamin B12    2. Essential hypertension  Comments:  Controlled.  Continue amlodipine and metoprolol.  Could not tolerate lisinopril due to cough  Orders:  -     Cancel: Comprehensive Metabolic Panel  -     Lipid Panel  -     TSH    3. Dyslipidemia  Comments:  Continue rosuvastatin 20 mg  Orders:  -     Cancel: Comprehensive Metabolic Panel  -     Lipid Panel  -     TSH    4. Other specified hypothyroidism  Comments:  Continue levothyroxine 88 mcg    5. Gastroesophageal reflux disease with esophagitis without hemorrhage  Comments:  Stable with lifestyle modifications  Orders:  -     CBC & Differential    6. Paroxysmal atrial fibrillation  Comments:  Continue Eliquis.    Orders:  -     Magnesium    7. SVT (supraventricular tachycardia)  Comments:  Continue metoprolol.  Appointment with Dr. Steele, cardiac electrophysiologist  Orders:  -     Magnesium    8. Vitamin D deficiency  Comments:  Daily supplement vitamin D  Orders:  -     Vitamin D,25-Hydroxy    Follow Up in September    Findings and recommendations discussed with Ronna. Reviewed treatment options.  Lifestyle modifications reinforced including nutrition and activity recommendations.  Ronna will follow up in September sooner if problems/concerns occur.   Ronna was given instructions and counseling regarding her condition or for health maintenance advice. Please see specific information pulled into the AVS if appropriate    This document has been electronically signed by:

## 2023-06-10 LAB
25(OH)D3 SERPL-MCNC: 40 NG/ML (ref 30–100)
BASOPHILS # BLD AUTO: 0.05 10*3/MM3 (ref 0–0.2)
BASOPHILS NFR BLD AUTO: 0.8 % (ref 0–1.5)
CHOLEST SERPL-MCNC: 202 MG/DL (ref 0–200)
DEPRECATED RDW RBC AUTO: 47.8 FL (ref 37–54)
EOSINOPHIL # BLD AUTO: 0.05 10*3/MM3 (ref 0–0.4)
EOSINOPHIL NFR BLD AUTO: 0.8 % (ref 0.3–6.2)
ERYTHROCYTE [DISTWIDTH] IN BLOOD BY AUTOMATED COUNT: 13 % (ref 12.3–15.4)
HBA1C MFR BLD: 9.5 % (ref 4.8–5.6)
HCT VFR BLD AUTO: 41.9 % (ref 34–46.6)
HDLC SERPL-MCNC: 50 MG/DL (ref 40–60)
HGB BLD-MCNC: 14.2 G/DL (ref 12–15.9)
IMM GRANULOCYTES # BLD AUTO: 0.01 10*3/MM3 (ref 0–0.05)
IMM GRANULOCYTES NFR BLD AUTO: 0.2 % (ref 0–0.5)
LDLC SERPL CALC-MCNC: 116 MG/DL (ref 0–100)
LDLC/HDLC SERPL: 2.22 {RATIO}
LYMPHOCYTES # BLD AUTO: 2.02 10*3/MM3 (ref 0.7–3.1)
LYMPHOCYTES NFR BLD AUTO: 32.2 % (ref 19.6–45.3)
MAGNESIUM SERPL-MCNC: 1.9 MG/DL (ref 1.6–2.4)
MCH RBC QN AUTO: 34.1 PG (ref 26.6–33)
MCHC RBC AUTO-ENTMCNC: 33.9 G/DL (ref 31.5–35.7)
MCV RBC AUTO: 100.7 FL (ref 79–97)
MONOCYTES # BLD AUTO: 0.7 10*3/MM3 (ref 0.1–0.9)
MONOCYTES NFR BLD AUTO: 11.1 % (ref 5–12)
NEUTROPHILS NFR BLD AUTO: 3.45 10*3/MM3 (ref 1.7–7)
NEUTROPHILS NFR BLD AUTO: 54.9 % (ref 42.7–76)
NRBC BLD AUTO-RTO: 0 /100 WBC (ref 0–0.2)
PLATELET # BLD AUTO: 155 10*3/MM3 (ref 140–450)
PMV BLD AUTO: 11 FL (ref 6–12)
RBC # BLD AUTO: 4.16 10*6/MM3 (ref 3.77–5.28)
TRIGL SERPL-MCNC: 206 MG/DL (ref 0–150)
TSH SERPL DL<=0.05 MIU/L-ACNC: 0.82 UIU/ML (ref 0.27–4.2)
VIT B12 BLD-MCNC: 645 PG/ML (ref 211–946)
VLDLC SERPL-MCNC: 36 MG/DL (ref 5–40)
WBC NRBC COR # BLD: 6.28 10*3/MM3 (ref 3.4–10.8)

## 2023-06-11 ENCOUNTER — HOSPITAL ENCOUNTER (INPATIENT)
Facility: HOSPITAL | Age: 68
LOS: 1 days | Discharge: HOME | DRG: 281 | End: 2023-06-12
Attending: STUDENT IN AN ORGANIZED HEALTH CARE EDUCATION/TRAINING PROGRAM | Admitting: INTERNAL MEDICINE
Payer: MEDICARE

## 2023-06-11 ENCOUNTER — APPOINTMENT (OUTPATIENT)
Dept: CARDIOLOGY | Facility: HOSPITAL | Age: 68
DRG: 281 | End: 2023-06-11
Payer: MEDICARE

## 2023-06-11 ENCOUNTER — APPOINTMENT (OUTPATIENT)
Dept: GENERAL RADIOLOGY | Facility: HOSPITAL | Age: 68
DRG: 281 | End: 2023-06-11
Payer: MEDICARE

## 2023-06-11 ENCOUNTER — APPOINTMENT (OUTPATIENT)
Dept: CT IMAGING | Facility: HOSPITAL | Age: 68
DRG: 281 | End: 2023-06-11
Payer: MEDICARE

## 2023-06-11 ENCOUNTER — APPOINTMENT (OUTPATIENT)
Dept: NUCLEAR MEDICINE | Facility: HOSPITAL | Age: 68
DRG: 281 | End: 2023-06-11
Payer: MEDICARE

## 2023-06-11 DIAGNOSIS — R74.8 CARDIAC ENZYMES ELEVATED: ICD-10-CM

## 2023-06-11 DIAGNOSIS — I50.9 CONGESTIVE HEART FAILURE, UNSPECIFIED HF CHRONICITY, UNSPECIFIED HEART FAILURE TYPE: ICD-10-CM

## 2023-06-11 DIAGNOSIS — I47.1 SVT (SUPRAVENTRICULAR TACHYCARDIA): Primary | ICD-10-CM

## 2023-06-11 DIAGNOSIS — Z95.810 AICD (AUTOMATIC CARDIOVERTER/DEFIBRILLATOR) PRESENT: ICD-10-CM

## 2023-06-11 LAB
ALBUMIN SERPL-MCNC: 3.6 G/DL (ref 3.5–5.2)
ALBUMIN/GLOB SERPL: 0.9 G/DL
ALP SERPL-CCNC: 115 U/L (ref 39–117)
ALT SERPL W P-5'-P-CCNC: 24 U/L (ref 1–33)
ANION GAP SERPL CALCULATED.3IONS-SCNC: 10.6 MMOL/L (ref 5–15)
AST SERPL-CCNC: 32 U/L (ref 1–32)
BACTERIA UR QL AUTO: NORMAL /HPF
BASOPHILS # BLD AUTO: 0.07 10*3/MM3 (ref 0–0.2)
BASOPHILS NFR BLD AUTO: 0.9 % (ref 0–1.5)
BH CV ECHO MEAS - ACS: 2.1 CM
BH CV ECHO MEAS - AO MAX PG: 18.4 MMHG
BH CV ECHO MEAS - AO MEAN PG: 10 MMHG
BH CV ECHO MEAS - AO ROOT DIAM: 3 CM
BH CV ECHO MEAS - AO V2 MAX: 214.3 CM/SEC
BH CV ECHO MEAS - AO V2 VTI: 46.7 CM
BH CV ECHO MEAS - AVA(I,D): 2.11 CM2
BH CV ECHO MEAS - EDV(CUBED): 97.3 ML
BH CV ECHO MEAS - EDV(MOD-SP4): 54.2 ML
BH CV ECHO MEAS - EF(MOD-SP4): 59 %
BH CV ECHO MEAS - ESV(CUBED): 32.8 ML
BH CV ECHO MEAS - ESV(MOD-SP4): 22.2 ML
BH CV ECHO MEAS - FS: 30.4 %
BH CV ECHO MEAS - IVS/LVPW: 0.82 CM
BH CV ECHO MEAS - IVSD: 0.9 CM
BH CV ECHO MEAS - LA DIMENSION: 3.9 CM
BH CV ECHO MEAS - LAT PEAK E' VEL: 10.6 CM/SEC
BH CV ECHO MEAS - LV DIASTOLIC VOL/BSA (35-75): 24.5 CM2
BH CV ECHO MEAS - LV MASS(C)D: 158.8 GRAMS
BH CV ECHO MEAS - LV MAX PG: 4.5 MMHG
BH CV ECHO MEAS - LV MEAN PG: 2.5 MMHG
BH CV ECHO MEAS - LV SYSTOLIC VOL/BSA (12-30): 10 CM2
BH CV ECHO MEAS - LV V1 MAX: 105.3 CM/SEC
BH CV ECHO MEAS - LV V1 VTI: 23.7 CM
BH CV ECHO MEAS - LVIDD: 4.6 CM
BH CV ECHO MEAS - LVIDS: 3.2 CM
BH CV ECHO MEAS - LVOT AREA: 4.2 CM2
BH CV ECHO MEAS - LVOT DIAM: 2.3 CM
BH CV ECHO MEAS - LVPWD: 1.1 CM
BH CV ECHO MEAS - MED PEAK E' VEL: 7.3 CM/SEC
BH CV ECHO MEAS - MV A MAX VEL: 71.2 CM/SEC
BH CV ECHO MEAS - MV E MAX VEL: 83.7 CM/SEC
BH CV ECHO MEAS - MV E/A: 1.18
BH CV ECHO MEAS - PA ACC TIME: 0.13 SEC
BH CV ECHO MEAS - PI END-D VEL: 70.6 CM/SEC
BH CV ECHO MEAS - RAP SYSTOLE: 10 MMHG
BH CV ECHO MEAS - RVSP: 16.4 MMHG
BH CV ECHO MEAS - SI(MOD-SP4): 14.5 ML/M2
BH CV ECHO MEAS - SV(LVOT): 98.6 ML
BH CV ECHO MEAS - SV(MOD-SP4): 32 ML
BH CV ECHO MEAS - TAPSE (>1.6): 1.74 CM
BH CV ECHO MEAS - TR MAX PG: 6.4 MMHG
BH CV ECHO MEAS - TR MAX VEL: 126 CM/SEC
BH CV ECHO MEASUREMENTS AVERAGE E/E' RATIO: 9.35
BH CV NUCLEAR PRIOR STUDY: 3
BH CV REST NUCLEAR ISOTOPE DOSE: 8.6 MCI
BH CV STRESS NUCLEAR ISOTOPE DOSE: 27.8 MCI
BILIRUB SERPL-MCNC: 0.7 MG/DL (ref 0–1.2)
BILIRUB UR QL STRIP: NEGATIVE
BUN SERPL-MCNC: 16 MG/DL (ref 8–23)
BUN/CREAT SERPL: 18.6 (ref 7–25)
CALCIUM SPEC-SCNC: 9.8 MG/DL (ref 8.6–10.5)
CHLORIDE SERPL-SCNC: 98 MMOL/L (ref 98–107)
CLARITY UR: CLEAR
CO2 SERPL-SCNC: 23.4 MMOL/L (ref 22–29)
COLOR UR: YELLOW
CREAT SERPL-MCNC: 0.86 MG/DL (ref 0.57–1)
CRP SERPL-MCNC: 0.6 MG/DL (ref 0–0.5)
D-LACTATE SERPL-SCNC: 1.7 MMOL/L (ref 0.5–2)
DEPRECATED RDW RBC AUTO: 47.4 FL (ref 37–54)
EGFRCR SERPLBLD CKD-EPI 2021: 73.7 ML/MIN/1.73
EOSINOPHIL # BLD AUTO: 0.04 10*3/MM3 (ref 0–0.4)
EOSINOPHIL NFR BLD AUTO: 0.5 % (ref 0.3–6.2)
ERYTHROCYTE [DISTWIDTH] IN BLOOD BY AUTOMATED COUNT: 12.9 % (ref 12.3–15.4)
FLUAV RNA RESP QL NAA+PROBE: NOT DETECTED
FLUBV RNA ISLT QL NAA+PROBE: NOT DETECTED
GEN 5 2HR TROPONIN T REFLEX: 15 NG/L
GLOBULIN UR ELPH-MCNC: 4 GM/DL
GLUCOSE BLDC GLUCOMTR-MCNC: 157 MG/DL (ref 70–130)
GLUCOSE BLDC GLUCOMTR-MCNC: 191 MG/DL (ref 70–130)
GLUCOSE BLDC GLUCOMTR-MCNC: 244 MG/DL (ref 70–130)
GLUCOSE BLDC GLUCOMTR-MCNC: 251 MG/DL (ref 70–130)
GLUCOSE BLDC GLUCOMTR-MCNC: 261 MG/DL (ref 70–130)
GLUCOSE SERPL-MCNC: 283 MG/DL (ref 65–99)
GLUCOSE UR STRIP-MCNC: ABNORMAL MG/DL
HBA1C MFR BLD: 10.1 % (ref 4.8–5.6)
HCT VFR BLD AUTO: 42.7 % (ref 34–46.6)
HGB BLD-MCNC: 14.5 G/DL (ref 12–15.9)
HGB UR QL STRIP.AUTO: ABNORMAL
HOLD SPECIMEN: NORMAL
HOLD SPECIMEN: NORMAL
HYALINE CASTS UR QL AUTO: NORMAL /LPF
IMM GRANULOCYTES # BLD AUTO: 0.02 10*3/MM3 (ref 0–0.05)
IMM GRANULOCYTES NFR BLD AUTO: 0.3 % (ref 0–0.5)
INR PPP: 1.07 (ref 0.9–1.1)
KETONES UR QL STRIP: NEGATIVE
LEUKOCYTE ESTERASE UR QL STRIP.AUTO: NEGATIVE
LV EF NUC BP: 83 %
LYMPHOCYTES # BLD AUTO: 2.86 10*3/MM3 (ref 0.7–3.1)
LYMPHOCYTES NFR BLD AUTO: 37.1 % (ref 19.6–45.3)
MAGNESIUM SERPL-MCNC: 1.8 MG/DL (ref 1.6–2.4)
MAXIMAL PREDICTED HEART RATE: 152 BPM
MCH RBC QN AUTO: 34 PG (ref 26.6–33)
MCHC RBC AUTO-ENTMCNC: 34 G/DL (ref 31.5–35.7)
MCV RBC AUTO: 100.2 FL (ref 79–97)
MONOCYTES # BLD AUTO: 0.93 10*3/MM3 (ref 0.1–0.9)
MONOCYTES NFR BLD AUTO: 12.1 % (ref 5–12)
NEUTROPHILS NFR BLD AUTO: 3.79 10*3/MM3 (ref 1.7–7)
NEUTROPHILS NFR BLD AUTO: 49.1 % (ref 42.7–76)
NITRITE UR QL STRIP: NEGATIVE
NRBC BLD AUTO-RTO: 0 /100 WBC (ref 0–0.2)
NT-PROBNP SERPL-MCNC: 510.2 PG/ML (ref 0–900)
PH UR STRIP.AUTO: 6.5 [PH] (ref 5–8)
PLATELET # BLD AUTO: 145 10*3/MM3 (ref 140–450)
PMV BLD AUTO: 10.2 FL (ref 6–12)
POTASSIUM SERPL-SCNC: 3.9 MMOL/L (ref 3.5–5.2)
PROCALCITONIN SERPL-MCNC: 0.15 NG/ML (ref 0–0.25)
PROT SERPL-MCNC: 7.6 G/DL (ref 6–8.5)
PROT UR QL STRIP: NEGATIVE
PROTHROMBIN TIME: 14.4 SECONDS (ref 12.1–14.7)
QT INTERVAL: 282 MS
QT INTERVAL: 284 MS
QT INTERVAL: 372 MS
QTC INTERVAL: 434 MS
QTC INTERVAL: 450 MS
QTC INTERVAL: 474 MS
RBC # BLD AUTO: 4.26 10*6/MM3 (ref 3.77–5.28)
RBC # UR STRIP: NORMAL /HPF
REF LAB TEST METHOD: NORMAL
SARS-COV-2 RNA RESP QL NAA+PROBE: NOT DETECTED
SODIUM SERPL-SCNC: 132 MMOL/L (ref 136–145)
SP GR UR STRIP: >1.03 (ref 1–1.03)
SQUAMOUS #/AREA URNS HPF: NORMAL /HPF
STRESS TARGET HR: 129 BPM
TROPONIN T DELTA: 6 NG/L
TROPONIN T SERPL HS-MCNC: 9 NG/L
UROBILINOGEN UR QL STRIP: ABNORMAL
WBC # UR STRIP: NORMAL /HPF
WBC NRBC COR # BLD: 7.71 10*3/MM3 (ref 3.4–10.8)
WHOLE BLOOD HOLD COAG: NORMAL
WHOLE BLOOD HOLD SPECIMEN: NORMAL

## 2023-06-11 PROCEDURE — 25010000002 ADENOSINE PER 6 MG

## 2023-06-11 PROCEDURE — 85610 PROTHROMBIN TIME: CPT | Performed by: STUDENT IN AN ORGANIZED HEALTH CARE EDUCATION/TRAINING PROGRAM

## 2023-06-11 PROCEDURE — 83735 ASSAY OF MAGNESIUM: CPT | Performed by: HOSPITALIST

## 2023-06-11 PROCEDURE — 87636 SARSCOV2 & INF A&B AMP PRB: CPT | Performed by: INTERNAL MEDICINE

## 2023-06-11 PROCEDURE — 83605 ASSAY OF LACTIC ACID: CPT | Performed by: HOSPITALIST

## 2023-06-11 PROCEDURE — 36415 COLL VENOUS BLD VENIPUNCTURE: CPT

## 2023-06-11 PROCEDURE — 84145 PROCALCITONIN (PCT): CPT | Performed by: HOSPITALIST

## 2023-06-11 PROCEDURE — 85025 COMPLETE CBC W/AUTO DIFF WBC: CPT | Performed by: STUDENT IN AN ORGANIZED HEALTH CARE EDUCATION/TRAINING PROGRAM

## 2023-06-11 PROCEDURE — A9500 TC99M SESTAMIBI: HCPCS | Performed by: INTERNAL MEDICINE

## 2023-06-11 PROCEDURE — 63710000001 INSULIN GLARGINE PER 5 UNITS: Performed by: INTERNAL MEDICINE

## 2023-06-11 PROCEDURE — 82948 REAGENT STRIP/BLOOD GLUCOSE: CPT

## 2023-06-11 PROCEDURE — 93017 CV STRESS TEST TRACING ONLY: CPT

## 2023-06-11 PROCEDURE — 25510000001 IOPAMIDOL 61 % SOLUTION: Performed by: INTERNAL MEDICINE

## 2023-06-11 PROCEDURE — 99285 EMERGENCY DEPT VISIT HI MDM: CPT

## 2023-06-11 PROCEDURE — 71045 X-RAY EXAM CHEST 1 VIEW: CPT

## 2023-06-11 PROCEDURE — 0 TECHNETIUM SESTAMIBI: Performed by: INTERNAL MEDICINE

## 2023-06-11 PROCEDURE — 86140 C-REACTIVE PROTEIN: CPT | Performed by: HOSPITALIST

## 2023-06-11 PROCEDURE — 93306 TTE W/DOPPLER COMPLETE: CPT

## 2023-06-11 PROCEDURE — 78452 HT MUSCLE IMAGE SPECT MULT: CPT

## 2023-06-11 PROCEDURE — 93005 ELECTROCARDIOGRAM TRACING: CPT | Performed by: STUDENT IN AN ORGANIZED HEALTH CARE EDUCATION/TRAINING PROGRAM

## 2023-06-11 PROCEDURE — 63710000001 INSULIN LISPRO (HUMAN) PER 5 UNITS: Performed by: HOSPITALIST

## 2023-06-11 PROCEDURE — 80053 COMPREHEN METABOLIC PANEL: CPT | Performed by: STUDENT IN AN ORGANIZED HEALTH CARE EDUCATION/TRAINING PROGRAM

## 2023-06-11 PROCEDURE — 81001 URINALYSIS AUTO W/SCOPE: CPT | Performed by: STUDENT IN AN ORGANIZED HEALTH CARE EDUCATION/TRAINING PROGRAM

## 2023-06-11 PROCEDURE — 25010000002 REGADENOSON 0.4 MG/5ML SOLUTION: Performed by: INTERNAL MEDICINE

## 2023-06-11 PROCEDURE — 71275 CT ANGIOGRAPHY CHEST: CPT

## 2023-06-11 PROCEDURE — 84484 ASSAY OF TROPONIN QUANT: CPT | Performed by: STUDENT IN AN ORGANIZED HEALTH CARE EDUCATION/TRAINING PROGRAM

## 2023-06-11 PROCEDURE — 83036 HEMOGLOBIN GLYCOSYLATED A1C: CPT | Performed by: HOSPITALIST

## 2023-06-11 PROCEDURE — 83880 ASSAY OF NATRIURETIC PEPTIDE: CPT | Performed by: HOSPITALIST

## 2023-06-11 RX ORDER — FUROSEMIDE 20 MG/1
20 TABLET ORAL DAILY
COMMUNITY

## 2023-06-11 RX ORDER — LANOLIN ALCOHOL/MO/W.PET/CERES
400 CREAM (GRAM) TOPICAL 3 TIMES DAILY
Status: DISCONTINUED | OUTPATIENT
Start: 2023-06-11 | End: 2023-06-12 | Stop reason: HOSPADM

## 2023-06-11 RX ORDER — LEVOTHYROXINE SODIUM 88 UG/1
88 TABLET ORAL DAILY
COMMUNITY

## 2023-06-11 RX ORDER — INSULIN ASPART 100 [IU]/ML
INJECTION, SUSPENSION SUBCUTANEOUS
Status: ON HOLD | COMMUNITY
End: 2023-06-11

## 2023-06-11 RX ORDER — SODIUM CHLORIDE 0.9 % (FLUSH) 0.9 %
10 SYRINGE (ML) INJECTION AS NEEDED
Status: DISCONTINUED | OUTPATIENT
Start: 2023-06-11 | End: 2023-06-12 | Stop reason: HOSPADM

## 2023-06-11 RX ORDER — FUROSEMIDE 20 MG/1
20 TABLET ORAL DAILY
Status: DISCONTINUED | OUTPATIENT
Start: 2023-06-11 | End: 2023-06-12 | Stop reason: HOSPADM

## 2023-06-11 RX ORDER — ACETAMINOPHEN 325 MG/1
650 TABLET ORAL EVERY 6 HOURS PRN
Status: DISCONTINUED | OUTPATIENT
Start: 2023-06-11 | End: 2023-06-12 | Stop reason: HOSPADM

## 2023-06-11 RX ORDER — INSULIN LISPRO 100 [IU]/ML
2-9 INJECTION, SOLUTION INTRAVENOUS; SUBCUTANEOUS
Status: DISCONTINUED | OUTPATIENT
Start: 2023-06-11 | End: 2023-06-12 | Stop reason: HOSPADM

## 2023-06-11 RX ORDER — ADENOSINE 3 MG/ML
INJECTION, SOLUTION INTRAVENOUS
Status: COMPLETED
Start: 2023-06-11 | End: 2023-06-11

## 2023-06-11 RX ORDER — LANOLIN ALCOHOL/MO/W.PET/CERES
400 CREAM (GRAM) TOPICAL 3 TIMES DAILY
COMMUNITY

## 2023-06-11 RX ORDER — LISINOPRIL 10 MG/1
10 TABLET ORAL DAILY
Status: ON HOLD | COMMUNITY
End: 2023-06-11

## 2023-06-11 RX ORDER — LEVOTHYROXINE SODIUM 88 UG/1
88 TABLET ORAL DAILY
Status: DISCONTINUED | OUTPATIENT
Start: 2023-06-11 | End: 2023-06-12 | Stop reason: HOSPADM

## 2023-06-11 RX ORDER — SODIUM CHLORIDE 9 MG/ML
40 INJECTION, SOLUTION INTRAVENOUS AS NEEDED
Status: DISCONTINUED | OUTPATIENT
Start: 2023-06-11 | End: 2023-06-12 | Stop reason: HOSPADM

## 2023-06-11 RX ORDER — INSULIN GLARGINE 300 U/ML
28 INJECTION, SOLUTION SUBCUTANEOUS
COMMUNITY

## 2023-06-11 RX ORDER — AMLODIPINE BESYLATE 5 MG/1
5 TABLET ORAL DAILY
Status: CANCELLED | OUTPATIENT
Start: 2023-06-11

## 2023-06-11 RX ORDER — ANASTROZOLE 1 MG/1
1 TABLET ORAL DAILY
Status: DISCONTINUED | OUTPATIENT
Start: 2023-06-11 | End: 2023-06-12 | Stop reason: HOSPADM

## 2023-06-11 RX ORDER — BISACODYL 5 MG/1
5 TABLET, DELAYED RELEASE ORAL DAILY PRN
Status: DISCONTINUED | OUTPATIENT
Start: 2023-06-11 | End: 2023-06-12 | Stop reason: HOSPADM

## 2023-06-11 RX ORDER — GLUCAGON 1 MG/ML
1 KIT INJECTION
Status: DISCONTINUED | OUTPATIENT
Start: 2023-06-11 | End: 2023-06-12 | Stop reason: HOSPADM

## 2023-06-11 RX ORDER — ASPIRIN 81 MG/1
81 TABLET, CHEWABLE ORAL DAILY
Status: DISCONTINUED | OUTPATIENT
Start: 2023-06-11 | End: 2023-06-12 | Stop reason: HOSPADM

## 2023-06-11 RX ORDER — SODIUM CHLORIDE 0.9 % (FLUSH) 0.9 %
10 SYRINGE (ML) INJECTION EVERY 12 HOURS SCHEDULED
Status: DISCONTINUED | OUTPATIENT
Start: 2023-06-11 | End: 2023-06-12 | Stop reason: HOSPADM

## 2023-06-11 RX ORDER — METOPROLOL TARTRATE 5 MG/5ML
5 INJECTION INTRAVENOUS ONCE
Status: COMPLETED | OUTPATIENT
Start: 2023-06-11 | End: 2023-06-11

## 2023-06-11 RX ORDER — AMOXICILLIN 250 MG
2 CAPSULE ORAL 2 TIMES DAILY
Status: DISCONTINUED | OUTPATIENT
Start: 2023-06-11 | End: 2023-06-12 | Stop reason: HOSPADM

## 2023-06-11 RX ORDER — NICOTINE POLACRILEX 4 MG
15 LOZENGE BUCCAL
Status: DISCONTINUED | OUTPATIENT
Start: 2023-06-11 | End: 2023-06-12 | Stop reason: HOSPADM

## 2023-06-11 RX ORDER — NITROGLYCERIN 0.4 MG/1
0.4 TABLET SUBLINGUAL
Status: DISCONTINUED | OUTPATIENT
Start: 2023-06-11 | End: 2023-06-12 | Stop reason: HOSPADM

## 2023-06-11 RX ORDER — BISACODYL 10 MG
10 SUPPOSITORY, RECTAL RECTAL DAILY PRN
Status: DISCONTINUED | OUTPATIENT
Start: 2023-06-11 | End: 2023-06-12 | Stop reason: HOSPADM

## 2023-06-11 RX ORDER — OMEGA-3S/DHA/EPA/FISH OIL/D3 300MG-1000
2000 CAPSULE ORAL DAILY
Status: DISCONTINUED | OUTPATIENT
Start: 2023-06-11 | End: 2023-06-12 | Stop reason: HOSPADM

## 2023-06-11 RX ORDER — POLYETHYLENE GLYCOL 3350 17 G/17G
17 POWDER, FOR SOLUTION ORAL DAILY PRN
Status: DISCONTINUED | OUTPATIENT
Start: 2023-06-11 | End: 2023-06-12 | Stop reason: HOSPADM

## 2023-06-11 RX ORDER — DEXTROSE MONOHYDRATE 25 G/50ML
25 INJECTION, SOLUTION INTRAVENOUS
Status: DISCONTINUED | OUTPATIENT
Start: 2023-06-11 | End: 2023-06-12 | Stop reason: HOSPADM

## 2023-06-11 RX ORDER — ASPIRIN 81 MG/1
324 TABLET, CHEWABLE ORAL ONCE
Status: COMPLETED | OUTPATIENT
Start: 2023-06-11 | End: 2023-06-11

## 2023-06-11 RX ORDER — ROSUVASTATIN CALCIUM 20 MG/1
20 TABLET, COATED ORAL DAILY
Status: DISCONTINUED | OUTPATIENT
Start: 2023-06-11 | End: 2023-06-12 | Stop reason: HOSPADM

## 2023-06-11 RX ORDER — REGADENOSON 0.08 MG/ML
0.4 INJECTION, SOLUTION INTRAVENOUS
Status: COMPLETED | OUTPATIENT
Start: 2023-06-11 | End: 2023-06-11

## 2023-06-11 RX ADMIN — TECHNETIUM TC 99M SESTAMIBI 1 DOSE: 1 INJECTION INTRAVENOUS at 11:15

## 2023-06-11 RX ADMIN — REGADENOSON 0.4 MG: 0.08 INJECTION, SOLUTION INTRAVENOUS at 12:39

## 2023-06-11 RX ADMIN — FUROSEMIDE 20 MG: 20 TABLET ORAL at 15:11

## 2023-06-11 RX ADMIN — METOPROLOL TARTRATE 25 MG: 25 TABLET, FILM COATED ORAL at 11:22

## 2023-06-11 RX ADMIN — ROSUVASTATIN CALCIUM 20 MG: 20 TABLET, FILM COATED ORAL at 11:23

## 2023-06-11 RX ADMIN — Medication 2000 UNITS: at 15:10

## 2023-06-11 RX ADMIN — METOPROLOL TARTRATE 25 MG: 25 TABLET, FILM COATED ORAL at 20:52

## 2023-06-11 RX ADMIN — ADENOSINE: 3 INJECTION, SOLUTION INTRAVENOUS at 05:51

## 2023-06-11 RX ADMIN — ASPIRIN 324 MG: 81 TABLET, CHEWABLE ORAL at 03:28

## 2023-06-11 RX ADMIN — TECHNETIUM TC 99M SESTAMIBI 1 DOSE: 1 INJECTION INTRAVENOUS at 12:39

## 2023-06-11 RX ADMIN — INSULIN LISPRO 2 UNITS: 100 INJECTION, SOLUTION INTRAVENOUS; SUBCUTANEOUS at 11:23

## 2023-06-11 RX ADMIN — METOPROLOL TARTRATE 5 MG: 1 INJECTION, SOLUTION INTRAVENOUS at 04:02

## 2023-06-11 RX ADMIN — Medication 10 ML: at 11:23

## 2023-06-11 RX ADMIN — APIXABAN 5 MG: 5 TABLET, FILM COATED ORAL at 11:22

## 2023-06-11 RX ADMIN — MAGNESIUM GLUCONATE 500 MG ORAL TABLET 400 MG: 500 TABLET ORAL at 18:34

## 2023-06-11 RX ADMIN — APIXABAN 5 MG: 5 TABLET, FILM COATED ORAL at 20:52

## 2023-06-11 RX ADMIN — IOPAMIDOL 80 ML: 612 INJECTION, SOLUTION INTRAVENOUS at 08:47

## 2023-06-11 RX ADMIN — INSULIN LISPRO 2 UNITS: 100 INJECTION, SOLUTION INTRAVENOUS; SUBCUTANEOUS at 18:34

## 2023-06-11 RX ADMIN — DOCUSATE SODIUM 50 MG AND SENNOSIDES 8.6 MG 2 TABLET: 8.6; 5 TABLET, FILM COATED ORAL at 11:22

## 2023-06-11 RX ADMIN — INSULIN LISPRO 6 UNITS: 100 INJECTION, SOLUTION INTRAVENOUS; SUBCUTANEOUS at 07:40

## 2023-06-11 RX ADMIN — Medication 10 ML: at 20:55

## 2023-06-11 RX ADMIN — LEVOTHYROXINE SODIUM 88 MCG: 88 TABLET ORAL at 15:11

## 2023-06-11 RX ADMIN — ANASTROZOLE 1 MG: 1 TABLET, COATED ORAL at 15:11

## 2023-06-11 RX ADMIN — ACETAMINOPHEN 650 MG: 325 TABLET ORAL at 18:34

## 2023-06-11 RX ADMIN — ASPIRIN 81 MG: 81 TABLET, CHEWABLE ORAL at 08:47

## 2023-06-11 RX ADMIN — INSULIN GLARGINE 25 UNITS: 100 INJECTION, SOLUTION SUBCUTANEOUS at 20:52

## 2023-06-11 RX ADMIN — INSULIN LISPRO 4 UNITS: 100 INJECTION, SOLUTION INTRAVENOUS; SUBCUTANEOUS at 20:52

## 2023-06-11 NOTE — H&P
[facility] Medicine Services  History & Physical    Patient Identification:  Name:  Ronna Wayne  Age:  68 y.o.  Sex:  female  :  1955  MRN:  [id number]   Visit Number:  [id number]  Admit Date: 2023   Primary Care Physician:  Mague Acosta APRN    Subjective     Chief complaint: Rapid heart rate    History of presenting illness:      Rnona Wayne is a 68 y.o. female with past medical history significant for paroxysmal A-fib, IDDM 2, HTN, dyslipidemia, obstructive sleep apnea, Hx CVA, Hx ER positive right breast cancer, hypothyroidism, CKD, positional vertigo, tachybradycardia syndrome s/p PPM implantation    On exam patient is pleasant and cooperative resting in no acute distress. HR mid 60s during exam. She reports she woke from sleep around 1 am with a racing heart and squeezing chest pressure that radiated to her jaw and ear with associated shortness of breath. She states she has a history of multiple episodes of SVT and has required conversion with Adenosine several times in the past. Patient reports occurrences historically had occurred every 2-3 weeks though had not had an episode since January. She was seen recently for evaluation for possible ablation though as had not had episode since January it was deferred at that time. Patient reports chest pain and SOB resolved with resolution of SVT following cardioversion. Currently she states she feels okay, just fatigued. She denies recent illness. No abdominal pain N/V/D. Does have a history of a fib on eliquis last dose last PM. Also had pacemaker implanted 2023. Does have history of breast cancer as well. Has MEMO and reports compliance with CPAP    Upon arrival to the ED, vital signs were temp 98.6, heart rate 151, SPO2 95% on room air, respirations 20, /83.  Laboratory examination revealed initial HS troponin WNL though increased to 15 on repeat with significant delta of 6.   Glucose is elevated at 283 with A1c 10.10.  CRP is 0.60.  Urine notable for specific gravity greater than one 1.03 with 3+ glucose and trace blood.  CXR notes central pulmonary vascular congestion with small bands of scar versus atelectasis at the bases.  Initial EKG in the ED notes supraventricular tachycardia with rate 170.    Known Emergency Department medications received prior to my evaluation included adenosine x2, full dose aspirin, Lopressor 5 mg x 1.  Emergency Department Room location at the time of my evaluation was 110.     ---------------------------------------------------------------------------------------------------------------------   Review of Systems   Constitutional:  Positive for fatigue. Negative for chills and fever.   HENT:  Negative for congestion, rhinorrhea and sore throat.    Respiratory:  Positive for shortness of breath. Negative for cough.    Cardiovascular:  Positive for chest pain and palpitations.   Gastrointestinal:  Negative for abdominal pain, constipation, diarrhea and nausea.   Genitourinary:  Negative for difficulty urinating and dysuria.   Musculoskeletal:  Negative for arthralgias and myalgias.   Skin:  Negative for rash and wound.   Neurological:  Negative for dizziness, syncope and light-headedness.      ---------------------------------------------------------------------------------------------------------------------   Past Medical History:   Diagnosis Date    Anxiety     B12 deficiency     BPV (benign positional vertigo)     Breast cancer 05/2021    Diabetes mellitus     Diarrhea     Disease of thyroid gland     Elevated cholesterol     Fibrocystic breast     Fibromyalgia     GERD (gastroesophageal reflux disease)     Glaucoma     Heart murmur     Hyperlipidemia     Hypertension     Kidney disease     Obesity     Peptic ulceration     PONV (postoperative nausea and vomiting)     Primary central sleep apnea     Sleep apnea     c-pap    Stroke     Weakness of left arm       Past Surgical History:   Procedure Laterality Date    ABDOMINAL SURGERY      APPENDECTOMY      BREAST ABSCESS INCISION AND DRAINAGE Right 07/07/2022    Procedure: BREAST ABSCESS INCISION AND DRAINAGE;  Surgeon: Lee Parrish MD;  Location: [facility] OR;  Service: General;  Laterality: Right;    BREAST BIOPSY  May 2021    BREAST CYST ASPIRATION      BREAST LUMPECTOMY WITH SENTINEL NODE BIOPSY Right 08/05/2021    Procedure: BREAST LUMPECTOMY WITH SENTINEL NODE BIOPSY;  Surgeon: Lee Parrish MD;  Location: [facility] OR;  Service: General;  Laterality: Right;    CARDIAC ELECTROPHYSIOLOGY PROCEDURE N/A 01/09/2023    Procedure: Pacemaker DC new;  Surgeon: Bj Hyde MD;  Location: [facility] CATH INVASIVE LOCATION;  Service: Cardiology;  Laterality: N/A;    CATARACT EXTRACTION Bilateral     CHOLECYSTECTOMY      COLONOSCOPY      ENDOSCOPY      HEMATOMA EVACUATION TRUNK Right 08/05/2021    Procedure: HEMATOMA EVACUATION TRUNK;  Surgeon: Lee Parrish MD;  Location: [facility] OR;  Service: General;  Laterality: Right;    PACEMAKER IMPLANTATION      URETHRA SURGERY       Family History   Problem Relation Age of Onset    Thyroid disease Mother     Diabetes Mother     Hyperlipidemia Father     Hypertension Father     Heart attack Father     Lung disease Father     Hypertension Sister     Breast cancer Sister 40    Obesity Sister     Hypertension Sister     Hyperlipidemia Sister     Cancer Sister         Breast cancer    Hyperlipidemia Sister     Hypertension Sister     Arthritis Sister     Obesity Sister     Thyroid disease Sister     Alcohol abuse Maternal Aunt     Cancer Maternal Grandfather         Prostrate    Stroke Paternal Grandmother     Stroke Paternal Grandfather     Hypertension Other      Social History     Socioeconomic History    Marital status:    Tobacco Use    Smoking status: Former     Packs/day: 1.50     Years: 8.00     Pack years: 12.00     Types: Cigarettes     Quit date:  1976     Years since quittin.4     Passive exposure: Past    Smokeless tobacco: Never    Tobacco comments:     I smoked as a teen and stopped when my son was born   Vaping Use    Vaping Use: Never used   Substance and Sexual Activity    Alcohol use: No     Comment: former social drinker not had anything in 25 + years    Drug use: Never    Sexual activity: Not Currently     Partners: Male     Birth control/protection: Abstinence, Post-menopausal, Vasectomy     ---------------------------------------------------------------------------------------------------------------------   Allergies:  Codeine and Sulfa antibiotics  ---------------------------------------------------------------------------------------------------------------------   Home medications:    Medications below are reported home medications pulling from within the system; at this time, these medications have not been reconciled unless otherwise specified and are in the verification process for further verifcation as current home medications.  (Not in a hospital admission)      Hospital Scheduled Meds:  aspirin, 81 mg, Oral, Daily  insulin lispro, 2-9 Units, Subcutaneous, 4x Daily AC & at Bedtime           Current listed hospital scheduled medications may not yet reflect those currently placed in orders that are signed and held awaiting patient's arrival to floor.   ---------------------------------------------------------------------------------------------------------------------     Objective     Vital Signs:  Temp:  [98.6 °F (37 °C)] 98.6 °F (37 °C)  Heart Rate:  [] 69  Resp:  [18-20] 18  BP: (108-131)/() 131/75      23  0306   Weight: 123 kg (271 lb)     Body mass index is 48.01 kg/m².  ---------------------------------------------------------------------------------------------------------------------       Physical Exam  Vitals and nursing note reviewed.   Constitutional:       General: She is not in acute  distress.  HENT:      Head: Normocephalic and atraumatic.   Eyes:      Extraocular Movements: Extraocular movements intact.      Conjunctiva/sclera: Conjunctivae normal.   Cardiovascular:      Rate and Rhythm: Normal rate and regular rhythm.   Pulmonary:      Effort: Pulmonary effort is normal.      Breath sounds: Normal breath sounds.   Abdominal:      Palpations: Abdomen is soft.      Tenderness: There is no abdominal tenderness.   Musculoskeletal:      Right lower leg: No edema.      Left lower leg: No edema.   Skin:     General: Skin is warm and dry.   Neurological:      Mental Status: She is alert. Mental status is at baseline.   Psychiatric:         Mood and Affect: Mood normal.         Behavior: Behavior normal.             ---------------------------------------------------------------------------------------------------------------------  EKG:        I have personally looked at the EKG.  ---------------------------------------------------------------------------------------------------------------------   Results from last 7 days   Lab Units 06/11/23  0649 06/11/23  0532 06/11/23  0308 06/09/23  1512   CRP mg/dL  --  0.60*  --   --    LACTATE mmol/L 1.7  --   --   --    WBC 10*3/mm3  --   --  7.71 6.28   HEMOGLOBIN g/dL  --   --  14.5 14.2   HEMATOCRIT %  --   --  42.7 41.9   MCV fL  --   --  100.2* 100.7*   MCHC g/dL  --   --  34.0 33.9   PLATELETS 10*3/mm3  --   --  145 155   INR   --   --  1.07  --          Results from last 7 days   Lab Units 06/11/23  0532 06/11/23  0308 06/09/23  1512   SODIUM mmol/L  --  132*  --    POTASSIUM mmol/L  --  3.9  --    MAGNESIUM mg/dL 1.8  --  1.9   CHLORIDE mmol/L  --  98  --    CO2 mmol/L  --  23.4  --    BUN mg/dL  --  16  --    CREATININE mg/dL  --  0.86  --    CALCIUM mg/dL  --  9.8  --    GLUCOSE mg/dL  --  283*  --    ALBUMIN g/dL  --  3.6  --    BILIRUBIN mg/dL  --  0.7  --    ALK PHOS U/L  --  115  --    AST (SGOT) U/L  --  32  --    ALT (SGPT) U/L  --  24  --     Estimated Creatinine Clearance: 79.7 mL/min (by C-G formula based on SCr of 0.86 mg/dL).  No results found for: AMMONIA  Results from last 7 days   Lab Units 06/11/23  0532 06/11/23  0308   HSTROP T ng/L 15* 9     Results from last 7 days   Lab Units 06/11/23  0532   PROBNP pg/mL 510.2     Lab Results   Component Value Date    HGBA1C 10.10 (H) 06/11/2023     Lab Results   Component Value Date    TSH 0.816 06/09/2023    FREET4 1.19 11/18/2021     No results found for: PREGTESTUR, PREGSERUM, HCG, HCGQUANT  Pain Management Panel          Latest Ref Rng & Units 2/24/2021 10/27/2020   Pain Management Panel   Creatinine, Urine mg/dL 82.6  82.6  97.0  97.0      No results found for: BLOODCX  No results found for: URINECX  No results found for: WOUNDCX  No results found for: STOOLCX  Results from last 7 days   Lab Units 06/09/23  1512   CHOLESTEROL mg/dL 202*   TRIGLYCERIDES mg/dL 206*   HDL CHOL mg/dL 50   LDL CHOL mg/dL 116*     ---------------------------------------------------------------------------------------------------------------------  Imaging Results (Last 7 Days)       Procedure Component Value Units Date/Time    XR Chest 1 View [[id number]] Collected: 06/11/23 0516     Updated: 06/11/23 0519    Narrative:      Procedure: Portable chest x-ray examination performed on 06/11/2023.  Single view. Upright position.     HISTORY: Chest pain.     FINDINGS:     Heart size at the upper limits of normal.  Left-sided pacemaker in place with leads to the right atrium and right  ventricle.  Mild central pulmonary vascular congestion.  Small bands of scar versus atelectasis at the lung bases.  No pleural effusion or pneumothorax.  No free air in the upper abdomen.       Impression:         1.  Heart size at the upper limits of normal.  2.  Central pulmonary vascular congestion with small bands of scar  versus atelectasis at the lung bases.  3.  No edema or pleural effusion.  4.  Mild hypoinflated lungs.  5.  Left-sided  pacemaker in place.     This report was finalized on 6/11/2023 5:17 AM by Saman Vo MD.               Cultures:  No results found for: BLOODCX, URINECX, WOUNDCX, MRSACX, RESPCX, STOOLCX    Last echocardiogram:  Results for orders placed during the hospital encounter of 12/17/22    Adult Transthoracic Echo Complete W/ Cont if Necessary Per Protocol    Interpretation Summary    Left ventricular systolic function is normal. Left ventricular ejection fraction appears to be 61 - 65%.    Left ventricular diastolic function is consistent with (grade II w/high LAP) pseudonormalization.    Left atrial volume is mildly increased.    The right atrial cavity is mildly  dilated.    Mild aortic valve stenosis is present, with an estimated aortic valve area of 1.8 cm² and the mean gradient of 19 mmHg.    Estimated right ventricular systolic pressure from tricuspid regurgitation is mildly elevated (35-45 mmHg).          I have personally reviewed the above radiology images and read the final radiology report on 06/11/23  ---------------------------------------------------------------------------------------------------------------------  Assessment / Plan     Active Hospital Problems    Diagnosis  POA    **SVT (supraventricular tachycardia) [I47.1]  Yes       ASSESSMENT/PLAN:    Supraventricular tachycardia, POA  NSTEMI likely type II, 2/2 above  Patient presents with chest pain found to be in SVT on arrival.  Initial EKG with rate 170.  Rate did not improve with Lopressor 5 mg IV x1 though patient did convert with adenosine in the ED.  Currently in NSR. Did have chest pain associated which resolved with resolution of SVT. None currently. Troponin did rise from 9 to 15  Patient does have a history of SVT per her report as well as documented history of A-fib/flutter and tachybradycardia syndrome s/p pacemaker implantation.  Patient will be admitted to telemetry with continuous cardiac monitoring/pulse oximetry for further  work-up and management.  CT PE protocol is ordered and pending  We will continue daily aspirin as well as home Eliquis and beta-blocker  We will obtain an updated TTE  Consult cardiology for further assistance, appreciated.  Standing orders in place for recurrent chest pain  N.p.o. for now pending any further planned work-up/testing  Monitor I's and O's  Supportive care    Chronic:  Paroxysmal A-fib  IDDM 2  HTN  HLD  MEMO  Hx CVA  Hx ER positive breast cancer, right  Hypothyroidism  CKD  Positional vertigo  Tachybradycardia syndrome s/p pacemaker  Restart home meds as indicated pending med rec  Monitor vital signs per protocol  We will initiate low-dose SSI with Accu-Cheks daily  Hypoglycemia protocol in place  ----------  -DVT prophylaxis: Chronically anticoagulated with Eliquis  -Activity: Up with assistance  -Expected length of stay: INPATIENT status due to the need for care which can only be reasonably provided in an hospital setting such as aggressive/expedited ancillary services and/or consultation services, the necessity for IV medications, close physician monitoring and/or the possible need for procedures.  In such, I feel patient’s risk for adverse outcomes and need for care warrant INPATIENT evaluation and predict the patient’s care encounter to likely last beyond 2 midnights.   -Disposition pending course likely return home following clinical improvement/stabilization    High risk secondary to SVT with NSTEMI    Code Status and Medical Interventions:   Ordered at: 06/11/23 0633     Level Of Support Discussed With:    Patient     Code Status (Patient has no pulse and is not breathing):    CPR (Attempt to Resuscitate)     Medical Interventions (Patient has pulse or is breathing):    Full Support       Radu Ghosh PA-C   06/11/23  07:35 EDT

## 2023-06-11 NOTE — PLAN OF CARE
Goal Outcome Evaluation:      Patient had stress test this shift, see results. Denies CP and SOA. Lying in bed with no S&S of distress. No complaints at this time. Will continue to follow plan of care.

## 2023-06-11 NOTE — ED PROVIDER NOTES
Subjective   History of Present Illness  68-year-old with a history of SVT presents with chest pain that woke her up from sleep.  Patient states that she was feeling unwell today and predominantly had been laying around the house.    Patient follows with Dr. Hyde and states that she has a history of SVT requiring cardioversion with adenosine however she has not had an episode in quite some time.  Patient states that she has been taking all her medication as prescribed.       Review of Systems    Past Medical History:   Diagnosis Date    Anxiety     B12 deficiency     BPV (benign positional vertigo)     Breast cancer 05/2021    Diabetes mellitus     Diarrhea     Disease of thyroid gland     Elevated cholesterol     Fibrocystic breast     Fibromyalgia     GERD (gastroesophageal reflux disease)     Glaucoma     Heart murmur     Hyperlipidemia     Hypertension     Kidney disease     Obesity     Peptic ulceration     PONV (postoperative nausea and vomiting)     Primary central sleep apnea     Sleep apnea     c-pap    Stroke     Weakness of left arm        Allergies   Allergen Reactions    Codeine GI Intolerance     Increased heart rate      Sulfa Antibiotics GI Intolerance     Heart rate increase        Past Surgical History:   Procedure Laterality Date    ABDOMINAL SURGERY      APPENDECTOMY      BREAST ABSCESS INCISION AND DRAINAGE Right 07/07/2022    Procedure: BREAST ABSCESS INCISION AND DRAINAGE;  Surgeon: Lee Parrish MD;  Location: [facility] OR;  Service: General;  Laterality: Right;    BREAST BIOPSY  May 2021    BREAST CYST ASPIRATION      BREAST LUMPECTOMY WITH SENTINEL NODE BIOPSY Right 08/05/2021    Procedure: BREAST LUMPECTOMY WITH SENTINEL NODE BIOPSY;  Surgeon: Lee Parrish MD;  Location: [facility] OR;  Service: General;  Laterality: Right;    CARDIAC ELECTROPHYSIOLOGY PROCEDURE N/A 01/09/2023    Procedure: Pacemaker DC new;  Surgeon: Bj Hyde MD;  Location: [facility] CATH INVASIVE  LOCATION;  Service: Cardiology;  Laterality: N/A;    CATARACT EXTRACTION Bilateral     CHOLECYSTECTOMY      COLONOSCOPY      ENDOSCOPY      HEMATOMA EVACUATION TRUNK Right 2021    Procedure: HEMATOMA EVACUATION TRUNK;  Surgeon: Lee Parrish MD;  Location: [facility] OR;  Service: General;  Laterality: Right;    PACEMAKER IMPLANTATION      URETHRA SURGERY         Family History   Problem Relation Age of Onset    Thyroid disease Mother     Diabetes Mother     Hyperlipidemia Father     Hypertension Father     Heart attack Father     Lung disease Father     Hypertension Sister     Breast cancer Sister 40    Obesity Sister     Hypertension Sister     Hyperlipidemia Sister     Cancer Sister         Breast cancer    Hyperlipidemia Sister     Hypertension Sister     Arthritis Sister     Obesity Sister     Thyroid disease Sister     Alcohol abuse Maternal Aunt     Cancer Maternal Grandfather         Prostrate    Stroke Paternal Grandmother     Stroke Paternal Grandfather     Hypertension Other        Social History     Socioeconomic History    Marital status:    Tobacco Use    Smoking status: Former     Packs/day: 1.50     Years: 8.00     Pack years: 12.00     Types: Cigarettes     Quit date: 1976     Years since quittin.4     Passive exposure: Past    Smokeless tobacco: Never    Tobacco comments:     I smoked as a teen and stopped when my son was born   Vaping Use    Vaping Use: Never used   Substance and Sexual Activity    Alcohol use: No     Comment: former social drinker not had anything in 25 + years    Drug use: Never    Sexual activity: Not Currently     Partners: Male     Birth control/protection: Abstinence, Post-menopausal, Vasectomy           Objective   Physical Exam    Critical Care  Performed by: Bijal Quintana DO  Authorized by: Bijal Quintana DO     Critical care provider statement:     Critical care time (minutes):  40    Critical care time was exclusive of:  Separately billable  procedures and treating other patients and teaching time    Critical care was necessary to treat or prevent imminent or life-threatening deterioration of the following conditions:  Cardiac failure, metabolic crisis, endocrine crisis, CNS failure or compromise and circulatory failure    Critical care was time spent personally by me on the following activities:  Blood draw for specimens, development of treatment plan with patient or surrogate, evaluation of patient's response to treatment, examination of patient, obtaining history from patient or surrogate, interpretation of cardiac output measurements, ordering and performing treatments and interventions, ordering and review of laboratory studies, ordering and review of radiographic studies, pulse oximetry, re-evaluation of patient's condition, review of old charts and vascular access procedures    I assumed direction of critical care for this patient from another provider in my specialty: no    Chemical Cardioversion    Date/Time: 6/11/2023 5:51 AM  Performed by: Bijal Quintana DO  Authorized by: Bijal Quintana DO     Consent:     Consent obtained:  Verbal    Consent given by:  Patient    Risks discussed:  Death, induced arrhythmia and pain    Alternatives discussed:  Electrical cardioversion  Pre-procedure details:     Cardioversion basis:  Urgent    Rhythm:  Supraventricular tachycardia  Attempt one:     Cardioversion medication:  Adenosine 6mg      Medication outcome:  Conversion to normal sinus rhythm  Post-procedure details:     Patient status:  Awake    Patient tolerance of procedure:  Tolerated well, no immediate complications       Results for orders placed or performed during the hospital encounter of 06/11/23   COVID-19 and FLU A/B PCR - Swab, Nasopharynx    Specimen: Nasopharynx; Swab   Result Value Ref Range    COVID19 Not Detected Not Detected - Ref. Range    Influenza A PCR Not Detected Not Detected    Influenza B PCR Not Detected Not Detected    Comprehensive Metabolic Panel    Specimen: Arm, Left; Blood   Result Value Ref Range    Glucose 283 (H) 65 - 99 mg/dL    BUN 16 8 - 23 mg/dL    Creatinine 0.86 0.57 - 1.00 mg/dL    Sodium 132 (L) 136 - 145 mmol/L    Potassium 3.9 3.5 - 5.2 mmol/L    Chloride 98 98 - 107 mmol/L    CO2 23.4 22.0 - 29.0 mmol/L    Calcium 9.8 8.6 - 10.5 mg/dL    Total Protein 7.6 6.0 - 8.5 g/dL    Albumin 3.6 3.5 - 5.2 g/dL    ALT (SGPT) 24 1 - 33 U/L    AST (SGOT) 32 1 - 32 U/L    Alkaline Phosphatase 115 39 - 117 U/L    Total Bilirubin 0.7 0.0 - 1.2 mg/dL    Globulin 4.0 gm/dL    A/G Ratio 0.9 g/dL    BUN/Creatinine Ratio 18.6 7.0 - 25.0    Anion Gap 10.6 5.0 - 15.0 mmol/L    eGFR 73.7 >60.0 mL/min/1.73   High Sensitivity Troponin T    Specimen: Arm, Left; Blood   Result Value Ref Range    HS Troponin T 9 <10 ng/L   Protime-INR    Specimen: Arm, Left; Blood   Result Value Ref Range    Protime 14.4 12.1 - 14.7 Seconds    INR 1.07 0.90 - 1.10   CBC Auto Differential    Specimen: Arm, Left; Blood   Result Value Ref Range    WBC 7.71 3.40 - 10.80 10*3/mm3    RBC 4.26 3.77 - 5.28 10*6/mm3    Hemoglobin 14.5 12.0 - 15.9 g/dL    Hematocrit 42.7 34.0 - 46.6 %    .2 (H) 79.0 - 97.0 fL    MCH 34.0 (H) 26.6 - 33.0 pg    MCHC 34.0 31.5 - 35.7 g/dL    RDW 12.9 12.3 - 15.4 %    RDW-SD 47.4 37.0 - 54.0 fl    MPV 10.2 6.0 - 12.0 fL    Platelets 145 140 - 450 10*3/mm3    Neutrophil % 49.1 42.7 - 76.0 %    Lymphocyte % 37.1 19.6 - 45.3 %    Monocyte % 12.1 (H) 5.0 - 12.0 %    Eosinophil % 0.5 0.3 - 6.2 %    Basophil % 0.9 0.0 - 1.5 %    Immature Grans % 0.3 0.0 - 0.5 %    Neutrophils, Absolute 3.79 1.70 - 7.00 10*3/mm3    Lymphocytes, Absolute 2.86 0.70 - 3.10 10*3/mm3    Monocytes, Absolute 0.93 (H) 0.10 - 0.90 10*3/mm3    Eosinophils, Absolute 0.04 0.00 - 0.40 10*3/mm3    Basophils, Absolute 0.07 0.00 - 0.20 10*3/mm3    Immature Grans, Absolute 0.02 0.00 - 0.05 10*3/mm3    nRBC 0.0 0.0 - 0.2 /100 WBC   High Sensitivity Troponin T 2Hr     Specimen: Hand, Left; Blood   Result Value Ref Range    HS Troponin T 15 (H) <10 ng/L    Troponin T Delta 6 (C) >=-4 - <+4 ng/L   Urinalysis With Microscopic If Indicated (No Culture) - Urine, Clean Catch    Specimen: Urine, Clean Catch   Result Value Ref Range    Color, UA Yellow Yellow, Straw    Appearance, UA Clear Clear    pH, UA 6.5 5.0 - 8.0    Specific Gravity, UA >1.030 (H) 1.005 - 1.030    Glucose, UA >=1000 mg/dL (3+) (A) Negative    Ketones, UA Negative Negative    Bilirubin, UA Negative Negative    Blood, UA Trace (A) Negative    Protein, UA Negative Negative    Leuk Esterase, UA Negative Negative    Nitrite, UA Negative Negative    Urobilinogen, UA 0.2 E.U./dL 0.2 - 1.0 E.U./dL   Urinalysis, Microscopic Only - Urine, Clean Catch    Specimen: Urine, Clean Catch   Result Value Ref Range    RBC, UA 0-2 None Seen, 0-2 /HPF    WBC, UA 0-2 None Seen, 0-2 /HPF    Bacteria, UA None Seen None Seen /HPF    Squamous Epithelial Cells, UA None Seen None Seen, 0-2 /HPF    Hyaline Casts, UA None Seen None Seen /LPF    Methodology Automated Microscopy    C-reactive Protein    Specimen: Hand, Left; Blood   Result Value Ref Range    C-Reactive Protein 0.60 (H) 0.00 - 0.50 mg/dL   Procalcitonin    Specimen: Arm, Left; Blood   Result Value Ref Range    Procalcitonin 0.15 0.00 - 0.25 ng/mL   Lactic Acid, Plasma    Specimen: Arm, Left; Blood   Result Value Ref Range    Lactate 1.7 0.5 - 2.0 mmol/L   Hemoglobin A1c    Specimen: Arm, Left; Blood   Result Value Ref Range    Hemoglobin A1C 10.10 (H) 4.80 - 5.60 %   BNP    Specimen: Hand, Left; Blood   Result Value Ref Range    proBNP 510.2 0.0 - 900.0 pg/mL   Magnesium    Specimen: Hand, Left; Blood   Result Value Ref Range    Magnesium 1.8 1.6 - 2.4 mg/dL   Stress Test With Myocardial Perfusion One Day   Result Value Ref Range    Nuclear Prior Study 3.0     BH CV REST NUCLEAR ISOTOPE DOSE 8.6 mCi    BH CV STRESS NUCLEAR ISOTOPE DOSE 27.8 mCi    Nuc Stress EF 83 %   POC Glucose  Once    Specimen: Blood   Result Value Ref Range    Glucose 251 (H) 70 - 130 mg/dL   POC Glucose Once    Specimen: Blood   Result Value Ref Range    Glucose 261 (H) 70 - 130 mg/dL   POC Glucose Once    Specimen: Blood   Result Value Ref Range    Glucose 191 (H) 70 - 130 mg/dL   POC Glucose Once    Specimen: Blood   Result Value Ref Range    Glucose 157 (H) 70 - 130 mg/dL   POC Glucose Once    Specimen: Blood   Result Value Ref Range    Glucose 244 (H) 70 - 130 mg/dL   POC Glucose Once    Specimen: Blood   Result Value Ref Range    Glucose 155 (H) 70 - 130 mg/dL   POC Glucose Once    Specimen: Blood   Result Value Ref Range    Glucose 295 (H) 70 - 130 mg/dL   ECG 12 Lead Chest Pain   Result Value Ref Range    QT Interval 282 ms    QTC Interval 474 ms   ECG 12 Lead Rhythm Change   Result Value Ref Range    QT Interval 284 ms    QTC Interval 450 ms   ECG 12 Lead Rhythm Change   Result Value Ref Range    QT Interval 372 ms    QTC Interval 434 ms   Adult Transthoracic Echo Complete w/ Color, Spectral and Contrast if necessary per protocol   Result Value Ref Range    Target HR (85%) 129 bpm    Max. Pred. HR (100%) 152 bpm    LVIDd 4.6 cm    LVIDs 3.2 cm    IVSd 0.90 cm    LVPWd 1.10 cm    FS 30.4 %    IVS/LVPW 0.82 cm    ESV(cubed) 32.8 ml    LV Sys Vol (BSA corrected) 10.0 cm2    EDV(cubed) 97.3 ml    LV Bernabe Vol (BSA corrected) 24.5 cm2    LVOT area 4.2 cm2    LV mass(C)d 158.8 grams    LVOT diam 2.30 cm    EDV(MOD-sp4) 54.2 ml    ESV(MOD-sp4) 22.2 ml    SV(MOD-sp4) 32.0 ml    SI(MOD-sp4) 14.5 ml/m2    EF(MOD-sp4) 59.0 %    MV E max wilfredo 83.7 cm/sec    MV A max wilfredo 71.2 cm/sec    MV E/A 1.18     Med Peak E' Wilfredo 7.3 cm/sec    Lat Peak E' Wilfredo 10.6 cm/sec    Avg E/e' ratio 9.35     SV(LVOT) 98.6 ml    TAPSE (>1.6) 1.74 cm    LA dimension (2D)  3.9 cm    LV V1 max 105.3 cm/sec    LV V1 max PG 4.5 mmHg    LV V1 mean PG 2.5 mmHg    LV V1 VTI 23.7 cm    Ao pk wilfredo 214.3 cm/sec    Ao max PG 18.4 mmHg    Ao mean PG 10.0 mmHg    Ao  V2 VTI 46.7 cm    MORAIMA(I,D) 2.11 cm2    TR max patrick 126.0 cm/sec    TR max PG 6.4 mmHg    RVSP(TR) 16.4 mmHg    RAP systole 10.0 mmHg    PA acc time 0.13 sec    PI end-d patrick 70.6 cm/sec    Ao root diam 3.0 cm    ACS 2.10 cm   Green Top (Gel)   Result Value Ref Range    Extra Tube Hold for add-ons.    Lavender Top   Result Value Ref Range    Extra Tube hold for add-on    Gold Top - SST   Result Value Ref Range    Extra Tube Hold for add-ons.    Light Blue Top   Result Value Ref Range    Extra Tube Hold for add-ons.          ED Course  ED Course as of 06/15/23 1236   Sun Jun 11, 2023   0400 Patient presented to the ER with initial EKG confirming SVT however patient was hemodynamically stable.  Patient self converted out to sinus tachycardia.    Patient does take metoprolol at home.  Blood pressure stable to give metoprolol 5 mg IVP x1. [LK]   0400 ECG 12 Lead Chest Pain  SVT with generalized ST abnormalities.  No acute ST elevation.  Rate 170 VA 90   Electronically signed by Bijal Quintana DO, 06/11/23, 4:03 AM EDT.  ' [LK]   0544 ECG 12 Lead Rhythm Change  EKG #2 confirms supraventricular tachycardia rate 151 QRS 82 QTc 450 [LK]   0547  Repeat EKG confirms that patient is back in SVT will proceed with chemical cardioversion at bedside.   [LK]   0555 Patient was chemically cardioverted with 6 of adenosine at 5:51 AM.  Patient immediately converted to normal sinus rhythm with a rate of 82 bpm.     [LK]   0558 ECG 12 Lead Rhythm Change  EKG #2 at 555 post cardioversion  Normal sinus rhythm rate 82  QRS 80 QTc 434.  No acute ST elevation.  Electronically signed by Bijal Quintana DO, 06/11/23, 5:58 AM EDT.   [LK]      ED Course User Index  [MAGI] Bijal Quintana DO                                             Medical Decision Making  Problems Addressed:  AICD (automatic cardioverter/defibrillator) present: complicated acute illness or injury  Cardiac enzymes elevated: complicated acute illness or injury  Congestive  heart failure, unspecified HF chronicity, unspecified heart failure type: complicated acute illness or injury  SVT (supraventricular tachycardia): complicated acute illness or injury    Amount and/or Complexity of Data Reviewed  Labs: ordered.  Radiology: ordered.  ECG/medicine tests: ordered. Decision-making details documented in ED Course.    Risk  OTC drugs.  Prescription drug management.  Decision regarding hospitalization.        Final diagnoses:   SVT (supraventricular tachycardia)   AICD (automatic cardioverter/defibrillator) present   Congestive heart failure, unspecified HF chronicity, unspecified heart failure type   Cardiac enzymes elevated       ED Disposition  ED Disposition       ED Disposition   Decision to Admit    Condition   --    Comment   Level of Care: Telemetry [5]   Diagnosis: SVT (supraventricular tachycardia) [202906]   Admitting Physician: DIANA SAEED [[id number]]   Attending Physician: DIANA SAEED [[id number]]   Certification: I Certify That Inpatient Hospital Services Are Medically Necessary For Greater Than 2 Midnights                 Mague Acosta, APRN  [address] [facility] [address]  [phone]    Follow up in 1 week(s)  MARTÍN 15@ 11:30AM    Brenda Singh, APRN  [address] [place] [address]  [phone]    Follow up      Israel Steele, DO  [address] [facility]  [address]  [facility] [address]  [phone]    Follow up in 2 week(s)  JULY 3@ 2:45PM ([place])         Medication List        New Prescriptions      aspirin 81 MG chewable tablet  Chew & swallow 1 tablet Daily.               Where to Get Your Medications        These medications were sent to [facility]  [address]   [place] [address]      Hours: 8AM-6PM Mon-Fri Phone: [phone]   aspirin 81 MG chewable tablet            Bijal Quintana, DO  06/11/23 4073       Bijal Quintana, DO  06/15/23 [id number]

## 2023-06-11 NOTE — ED NOTES
RECEIVED CALL FROM CT, STATES IV IN LEFT ARM INFILTRATED PRIOR TO CT AND UNABLE TO USE LINE TO LEFT BREAST. PT TO BE BROUGHT BACK TO ROOM FOR IV PLACEMENT.

## 2023-06-11 NOTE — ED NOTES
Patient noted to be in SVT on zoll and bedside monitor. Provider @ bedside. Upon administration of medication patient noted to convert to normal sinus rhythm. Repeat EKG as ordered.

## 2023-06-11 NOTE — CONSULTS
[facility]  CONSULT  NOTE      Patient information:  Date of Admit: 6/11/2023  Date of Consult: 06/11/23  Hospitalist/Referring MD:Michel Lizama MD;   PCP: Mague Acosta APRN  MRN:  [id number]  Visit Number:  [id number]    LOS: 0  CODE STATUS:  Code Status and Medical Interventions:   Ordered at: 06/11/23 0633     Level Of Support Discussed With:    Patient     Code Status (Patient has no pulse and is not breathing):    CPR (Attempt to Resuscitate)     Medical Interventions (Patient has pulse or is breathing):    Full Support       PROBLEM LIST: Principal Problem:    SVT (supraventricular tachycardia)      Consults  09:39 EDT  6/11/2023    Assessment    Paroxysmal SVT patient converted to sinus rhythm  Paroxysmal atrial fibrillation with chads Vascor of 6 patient is anticoagulated  Episode of chest discomfort with the onset of SVT with slightly elevated troponin currently chest pain-free  Status post pacemaker placement for sinus pauses [employer] on January 2023  Essential hypertension  Diabetes mellitus type 2  Dyslipidemia  Obstructive sleep apnea on CPAP  History of CVA  Morbid obesity          Recommendations   1.  Patient currently is back in normal sinus rhythm discussed with her about possible ablation she will follow-up as an outpatient with electrophysiology service  2.  Her troponin slightly evaded chest pain she had a stress test back in 2021 negative for ischemia repeat the stress test for assessment  3.  For now continue anticoagulation for thromboembolic prophylaxis  4.  Blood pressures controlled continue current management  5.  Continue the CPAP        Reason for Cardiology consultation: PSVT     Subjective Data   ADMISSION INFORMATION:  Chief Complaint   Patient presents with    Rapid Heart Rate     History of Present Illness    Ronna Wayne is a 68 y.o. female with a past medical history significant for paroxysmal  atrial fibrillation, paroxysmal SVT, s/p Zoe Sci pacemaker 01/2023, diabetes mellitus, essential hypertension, HLD, MEMO, heart murmur, history of CVA, GERD, Vit D,  and B 12 Def.  Patient presented to [facility] emergency room (ER) on 6/11/2023 with complaints of chest pain that woke her up from her sleep. On presentation to the ER, her EKG revealed SVT with no ST elevation noted. Patient self converted to ST then she soon went back into SVT and she was chemically converted with Adenosine 6  mg at 5:51 AM and it was successful with SR 80s on post conversion EKG. Pro .2.    Cardiology has been consulted for further evaluation and management.     Primary Cardiologist has been Dr. Hyde/ [facility] and she was last seen in the office on 05/18/2023.     Seen by Dr. Morgan on 04/28/2023 also. F/U is planned for 12/01/2023.    Patient is in room 309 A and was examined by Dr. Andrade.  Patient is lying in bed resting quietly.  No acute distress noted at this time.  Head of bed is approximately 30 degrees.  Patient currently denies any chest pain, palpitations, or shortness of breath.  Patient admits that she does use her CPAP however she also states that she naps during the day as well.  She does follow-up with [facility] for  Pulmonology.  Patient has been n.p.o. since midnight and has agreed to do Dianne stress test today.    Known medications given enroute vis EMS and in the ER:         Cardiac risk factors:cerebral vascular disease, hypercholesterolemia, hypertension, and Obesity      Last Echo: Results for orders placed during the hospital encounter of 12/17/22    Adult Transthoracic Echo Complete W/ Cont if Necessary Per Protocol    Interpretation Summary    Left ventricular systolic function is normal. Left ventricular ejection fraction appears to be 61 - 65%.    Left ventricular diastolic function is consistent with (grade II w/high LAP) pseudonormalization.    Left atrial  volume is mildly increased.    The right atrial cavity is mildly  dilated.    Mild aortic valve stenosis is present, with an estimated aortic valve area of 1.8 cm² and the mean gradient of 19 mmHg.    Estimated right ventricular systolic pressure from tricuspid regurgitation is mildly elevated (35-45 mmHg).         Last Stress: Results for orders placed during the hospital encounter of 05/01/21    Stress Test With Myocardial Perfusion One Day    Interpretation Summary  · Left ventricular ejection fraction is hyperdynamic (Calculated EF > 70%). .  · Myocardial perfusion imaging indicates a normal myocardial perfusion study with no evidence of ischemia.  · Impressions are consistent with a low risk study.  · There is no prior study available for comparison.  · Findings consistent with a normal ECG stress test.        Last Cath:  None found in chart          PACEMAKER INFORMATION:   01/11/2023                            Past Medical History:   Diagnosis Date    Anxiety     B12 deficiency     BPV (benign positional vertigo)     Breast cancer 05/2021    Diabetes mellitus     Diarrhea     Disease of thyroid gland     Elevated cholesterol     Fibrocystic breast     Fibromyalgia     GERD (gastroesophageal reflux disease)     Glaucoma     Heart murmur     Hyperlipidemia     Hypertension     Kidney disease     Obesity     Peptic ulceration     PONV (postoperative nausea and vomiting)     Primary central sleep apnea     Sleep apnea     c-pap    Stroke     Weakness of left arm      Past Surgical History:   Procedure Laterality Date    ABDOMINAL SURGERY      APPENDECTOMY      BREAST ABSCESS INCISION AND DRAINAGE Right 07/07/2022    Procedure: BREAST ABSCESS INCISION AND DRAINAGE;  Surgeon: Lee Parrish MD;  Location: [facility];  Service: General;  Laterality: Right;    BREAST BIOPSY  May 2021    BREAST CYST ASPIRATION      BREAST LUMPECTOMY WITH SENTINEL NODE BIOPSY Right 08/05/2021    Procedure: BREAST LUMPECTOMY WITH  SENTINEL NODE BIOPSY;  Surgeon: Lee Parrish MD;  Location: [facility] OR;  Service: General;  Laterality: Right;    CARDIAC ELECTROPHYSIOLOGY PROCEDURE N/A 2023    Procedure: Pacemaker DC new;  Surgeon: Bj Hyde MD;  Location:  [facility];  Service: Cardiology;  Laterality: N/A;    CATARACT EXTRACTION Bilateral     CHOLECYSTECTOMY      COLONOSCOPY      ENDOSCOPY      HEMATOMA EVACUATION TRUNK Right 2021    Procedure: HEMATOMA EVACUATION TRUNK;  Surgeon: Lee Parrish MD;  Location:  [facility] OR;  Service: General;  Laterality: Right;    PACEMAKER IMPLANTATION      URETHRA SURGERY       Family History   Problem Relation Age of Onset    Thyroid disease Mother     Diabetes Mother     Hyperlipidemia Father     Hypertension Father     Heart attack Father     Lung disease Father     Hypertension Sister     Breast cancer Sister 40    Obesity Sister     Hypertension Sister     Hyperlipidemia Sister     Cancer Sister         Breast cancer    Hyperlipidemia Sister     Hypertension Sister     Arthritis Sister     Obesity Sister     Thyroid disease Sister     Alcohol abuse Maternal Aunt     Cancer Maternal Grandfather         Prostrate    Stroke Paternal Grandmother     Stroke Paternal Grandfather     Hypertension Other      Social History     Tobacco Use    Smoking status: Former     Packs/day: 1.50     Years: 8.00     Pack years: 12.00     Types: Cigarettes     Quit date: 1976     Years since quittin.4     Passive exposure: Past    Smokeless tobacco: Never    Tobacco comments:     I smoked as a teen and stopped when my son was born   Vaping Use    Vaping Use: Never used   Substance Use Topics    Alcohol use: No     Comment: former social drinker not had anything in 25 + years    Drug use: Never       Medications listed below are reported home medications pulling from within the system:  Medications Prior to Admission   Medication Sig Dispense Refill Last Dose     acetaminophen (TYLENOL) 325 MG tablet Take 2 tablets by mouth Every 6 (Six) Hours As Needed for Mild Pain.       amLODIPine (NORVASC) 5 MG tablet TAKE ONE TABLET BY MOUTH DAILY 30 tablet 1     anastrozole (ARIMIDEX) 1 MG tablet Take 1 tablet by mouth Daily. 30 tablet 11     apixaban (ELIQUIS) 5 MG tablet tablet Take 1 tablet by mouth 2 (Two) Times a Day. 60 tablet 6     cholecalciferol (VITAMIN D3) 25 MCG (1000 UT) tablet Take 2 tablets by mouth Daily.       empagliflozin (Jardiance) 10 MG tablet tablet Take 2 tablets by mouth Every Morning. 28 tablet 0     empagliflozin (Jardiance) 25 MG tablet tablet Take 1 tablet by mouth Every Morning. 30 tablet 3     furosemide (LASIX) 20 MG tablet TAKE ONE TABLET BY MOUTH DAILY 30 tablet 1     insulin aspart (NovoLOG FlexPen) 100 UNIT/ML solution pen-injector sc pen Inject 0-9 Units under the skin into the appropriate area as directed 4 (Four) Times a Day Before Meals & at Bedtime. 15 mL 0     Insulin Glargine, 2 Unit Dial, (Toujeo Max SoloStar) 300 UNIT/ML solution pen-injector injection Start at 25 units and may increase every 3 days if needed 12 mL 0     levothyroxine (SYNTHROID, LEVOTHROID) 88 MCG tablet TAKE ONE TABLET BY MOUTH DAILY 90 tablet 0     MAGnesium-Oxide 400 (240 Mg) MG tablet Take 1 tablet by mouth 2 (Two) Times a Day. 90 tablet 3     metoprolol tartrate (LOPRESSOR) 25 MG tablet TAKE ONE TABLET BY MOUTH TWICE A DAY 60 tablet 2     rosuvastatin (CRESTOR) 20 MG tablet Take 1 tablet by mouth Daily. 30 tablet 4     Semaglutide,0.25 or 0.5MG/DOS, (Ozempic, 0.25 or 0.5 MG/DOSE,) 2 MG/1.5ML solution pen-injector Inject 0.25 mg under the skin into the appropriate area as directed 1 (One) Time Per Week. 3 mL 0      Allergies:  Codeine and Sulfa antibiotics    Review of Systems   Constitutional:  Negative for activity change, appetite change and diaphoresis.   HENT:  Negative for facial swelling and trouble swallowing.    Eyes:  Negative for visual disturbance.    Respiratory:  Negative for shortness of breath.    Cardiovascular:  Positive for chest pain and palpitations. Negative for leg swelling.   Gastrointestinal:  Negative for blood in stool, nausea and vomiting.   Endocrine: Negative.    Genitourinary:  Negative for hematuria.   Musculoskeletal:  Negative for gait problem.   Skin:  Negative for color change.   Neurological:  Negative for dizziness, syncope, speech difficulty, weakness, light-headedness and headaches.   Psychiatric/Behavioral:  Negative for agitation and behavioral problems.      Objective Data      Vital Signs  Temp:  [98.6 °F (37 °C)] 98.6 °F (37 °C)  Heart Rate:  [] 64  Resp:  [18-20] 18  BP: (102-131)/() 102/58  Vital Signs (last 72 hrs)         06/08 0700  06/09 0659 06/09 0700  06/10 0659 06/10 0700  06/11 0659 06/11 0700  06/11 0939   Most Recent      Temp (°F)       98.6       98.6 (37) 06/11 0306    Heart Rate     73 -  152    64 -  69     64 06/11 0846    Resp       20      18     18 06/11 0846    BP     108/92 -  130/82    102/58 -  131/75     102/58 06/11 0846    SpO2 (%)     93 -  96    96 -  97     97 06/11 0846          Body mass index is 48.01 kg/m².  No intake or output data in the 24 hours ending 06/11/23 0939      Physical Exam  Constitutional:       Appearance: Normal appearance. She is obese.      Comments: BMI 48.01   HENT:      Head: Normocephalic and atraumatic.      Nose: Nose normal.      Mouth/Throat:      Mouth: Mucous membranes are moist.      Pharynx: Oropharynx is clear.   Eyes:      Conjunctiva/sclera: Conjunctivae normal.   Cardiovascular:      Rate and Rhythm: Normal rate and regular rhythm.      Pulses: Normal pulses.      Heart sounds: Normal heart sounds.   Pulmonary:      Effort: Pulmonary effort is normal.      Breath sounds: Normal breath sounds.   Abdominal:      General: Bowel sounds are normal.      Palpations: Abdomen is soft.   Musculoskeletal:         General: Normal range of motion.       Cervical back: Normal range of motion.   Skin:     General: Skin is warm and dry.   Neurological:      General: No focal deficit present.      Mental Status: She is alert and oriented to person, place, and time.   Psychiatric:         Mood and Affect: Mood normal.         Behavior: Behavior normal.     Results review   Results Review:    I have reviewed the patient's new clinical results.  Results from last 7 days   Lab Units 06/11/23  0532 06/11/23  0308   HSTROP T ng/L 15* 9     Results from last 7 days   Lab Units 06/11/23  0308 06/09/23  1512   WBC 10*3/mm3 7.71 6.28   HEMOGLOBIN g/dL 14.5 14.2   PLATELETS 10*3/mm3 145 155     Results from last 7 days   Lab Units 06/11/23  0308   SODIUM mmol/L 132*   POTASSIUM mmol/L 3.9   CHLORIDE mmol/L 98   CO2 mmol/L 23.4   BUN mg/dL 16   CREATININE mg/dL 0.86   CALCIUM mg/dL 9.8   GLUCOSE mg/dL 283*   ALT (SGPT) U/L 24   AST (SGOT) U/L 32     Lab Results   Component Value Date    INR 1.07 06/11/2023    INR 1.00 01/19/2023    INR 1.06 12/17/2022    INR 1.02 11/18/2021    INR 1.05 06/19/2021    INR 1.05 05/01/2021    INR 1.09 03/09/2021         Lab Results   Component Value Date    MG 1.8 06/11/2023    MG 1.9 06/09/2023    MG 1.8 02/21/2023     Lab Results   Component Value Date    TSH 0.816 06/09/2023      Total Cholesterol   Date Value Ref Range Status   06/09/2023 202 (H) 0 - 200 mg/dL Final     Triglycerides   Date Value Ref Range Status   06/09/2023 206 (H) 0 - 150 mg/dL Final     HDL Cholesterol   Date Value Ref Range Status   06/09/2023 50 40 - 60 mg/dL Final     LDL Cholesterol    Date Value Ref Range Status   06/09/2023 116 (H) 0 - 100 mg/dL Final     Lab Results   Component Value Date    PROBNP 510.2 06/11/2023    PROBNP 426.5 01/19/2023    PROBNP 6,200.0 (H) 12/24/2022     No results found.  No results found for: URICACID  Pain Management Panel          Latest Ref Rng & Units 2/24/2021 10/27/2020   Pain Management Panel   Creatinine, Urine mg/dL 82.6  82.6  97.0   97.0      Microbiology Results (last 10 days)       Procedure Component Value - Date/Time    COVID PRE-OP / PRE-PROCEDURE SCREENING ORDER (NO ISOLATION) - Swab, Nasopharynx [[id number]]  (Normal) Collected: 06/11/23 0744    Lab Status: Final result Specimen: Swab from Nasopharynx Updated: 06/11/23 0811    Narrative:      The following orders were created for panel order COVID PRE-OP / PRE-PROCEDURE SCREENING ORDER (NO ISOLATION) - Swab, Nasopharynx.  Procedure                               Abnormality         Status                     ---------                               -----------         ------                     COVID-19 and FLU A/B PCR...[[id number]]  Normal              Final result                 Please view results for these tests on the individual orders.    COVID-19 and FLU A/B PCR - Swab, Nasopharynx [[id number]]  (Normal) Collected: 06/11/23 0744    Lab Status: Final result Specimen: Swab from Nasopharynx Updated: 06/11/23 0811     COVID19 Not Detected     Influenza A PCR Not Detected     Influenza B PCR Not Detected    Narrative:      Fact sheet for providers: https://www.fda.gov/media/067176/download    Fact sheet for patients: https://www.fda.gov/media/276672/download    Test performed by PCR.           No results found for: BLOODCX        ECG  06/11/2023                    ECG/EMG Results (last 24 hours)       Procedure Component Value Units Date/Time    ECG 12 Lead Chest Pain [[id number]] Collected: 06/11/23 0230     Updated: 06/11/23 0324     QT Interval 282 ms      QTC Interval 474 ms     Narrative:      Test Reason : Chest Pain  Blood Pressure :   */*   mmHG  Vent. Rate : 170 BPM     Atrial Rate : 163 BPM     P-R Int :   * ms          QRS Dur :  90 ms      QT Int : 282 ms       P-R-T Axes :   *  17 188 degrees     QTc Int : 474 ms    ** Critical Test Result: High HR  Supraventricular tachycardia  Marked ST abnormality, possible inferior subendocardial injury  Abnormal ECG  When compared with  ECG of 19-JAN-2023 10:43,  Vent. rate has increased BY  86 BPM  ST now depressed in Inferior leads  ST now depressed in Lateral leads  T wave inversion now evident in Inferior leads  T wave inversion now evident in Lateral leads    Referred By:            Confirmed By:     ECG 12 Lead Rhythm Change [[id number]] Collected: 06/11/23 0544     Updated: 06/11/23 0545     QT Interval 284 ms      QTC Interval 450 ms     Narrative:      Test Reason : Rhythm Change  Blood Pressure :   */*   mmHG  Vent. Rate : 151 BPM     Atrial Rate :  82 BPM     P-R Int :   * ms          QRS Dur :  82 ms      QT Int : 284 ms       P-R-T Axes :   *  30 144 degrees     QTc Int : 450 ms    ** Critical Test Result: High HR  Supraventricular tachycardia  Nonspecific ST and T wave abnormality  Abnormal ECG  When compared with ECG of 11-JUN-2023 02:30, (Unconfirmed)  No significant change was found    Referred By:            Confirmed By:     ECG 12 Lead Rhythm Change [[id number]] Collected: 06/11/23 0555     Updated: 06/11/23 0632     QT Interval 372 ms      QTC Interval 434 ms     Narrative:      Test Reason : Rhythm Change  Blood Pressure :   */*   mmHG  Vent. Rate :  82 BPM     Atrial Rate :  82 BPM     P-R Int : 158 ms          QRS Dur :  80 ms      QT Int : 372 ms       P-R-T Axes :  45  32  65 degrees     QTc Int : 434 ms    Normal sinus rhythm  Low voltage QRS  Septal infarct , age undetermined  Abnormal ECG  When compared with ECG of 11-JUN-2023 05:44, (Unconfirmed)  Vent. rate has decreased BY  69 BPM  ST no longer depressed in Inferior leads  Non-specific change in ST segment in Lateral leads  T wave inversion no longer evident in Lateral leads    Referred By:            Confirmed By:             TELEMETRY:   SR 60's          RADIOLOGY STUDIES:  Imaging Results (Last 72 Hours)       Procedure Component Value Units Date/Time    CT Angiogram Chest Pulmonary Embolism [[id number]] Resulted: 06/11/23 0835     Updated: 06/11/23 0848     XR Chest 1 View [[id number]] Collected: 06/11/23 0516     Updated: 06/11/23 0519    Narrative:      Procedure: Portable chest x-ray examination performed on 06/11/2023.  Single view. Upright position.     HISTORY: Chest pain.     FINDINGS:     Heart size at the upper limits of normal.  Left-sided pacemaker in place with leads to the right atrium and right  ventricle.  Mild central pulmonary vascular congestion.  Small bands of scar versus atelectasis at the lung bases.  No pleural effusion or pneumothorax.  No free air in the upper abdomen.       Impression:         1.  Heart size at the upper limits of normal.  2.  Central pulmonary vascular congestion with small bands of scar  versus atelectasis at the lung bases.  3.  No edema or pleural effusion.  4.  Mild hypoinflated lungs.  5.  Left-sided pacemaker in place.     This report was finalized on 6/11/2023 5:17 AM by Saman Vo MD.               CURRENT MEDICATIONS:  Current list of medications may not reflect those currently placed in orders that are not signed or are being held.     apixaban, 5 mg, Oral, BID  aspirin, 81 mg, Oral, Daily  insulin lispro, 2-9 Units, Subcutaneous, 4x Daily AC & at Bedtime  metoprolol tartrate, 25 mg, Oral, BID  rosuvastatin, 20 mg, Oral, Daily  senna-docusate sodium, 2 tablet, Oral, BID  sodium chloride, 10 mL, Intravenous, Q12H           senna-docusate sodium **AND** polyethylene glycol **AND** bisacodyl **AND** bisacodyl    dextrose    dextrose    glucagon (human recombinant)    nitroglycerin    sodium chloride    sodium chloride    sodium chloride    Thank you very much for asking us to be involved in this patient's care.  We will follow along with you.    I have discussed the patients findings and my recommendations with patient.      BOSTON Cordova (Robin)   [facility] 09:39 EDT  6/11/2023    Electronically signed by BOSTON Chaudhry, 06/11/23, 11:59 AM EDT.   Electronically signed  by Branden Andrade MD, 06/11/23, 12:12 PM EDT.                      Please note that portions of this note were copied and has been reviewed and is accurate as of 6/11/2023 .      Please note that portions of this note were completed with a voice recognition program.

## 2023-06-12 ENCOUNTER — TELEPHONE (OUTPATIENT)
Dept: FAMILY MEDICINE CLINIC | Facility: CLINIC | Age: 68
End: 2023-06-12
Payer: MEDICARE

## 2023-06-12 ENCOUNTER — READMISSION MANAGEMENT (OUTPATIENT)
Dept: CALL CENTER | Facility: HOSPITAL | Age: 68
End: 2023-06-12
Payer: MEDICARE

## 2023-06-12 VITALS
SYSTOLIC BLOOD PRESSURE: 134 MMHG | TEMPERATURE: 97.2 F | HEART RATE: 68 BPM | HEIGHT: 63 IN | RESPIRATION RATE: 14 BRPM | DIASTOLIC BLOOD PRESSURE: 70 MMHG | OXYGEN SATURATION: 96 % | WEIGHT: 271.17 LBS | BODY MASS INDEX: 48.05 KG/M2

## 2023-06-12 VITALS
OXYGEN SATURATION: 96 % | BODY MASS INDEX: 48.99 KG/M2 | TEMPERATURE: 97.8 F | DIASTOLIC BLOOD PRESSURE: 86 MMHG | WEIGHT: 276.5 LBS | SYSTOLIC BLOOD PRESSURE: 159 MMHG | HEART RATE: 61 BPM | HEIGHT: 63 IN | RESPIRATION RATE: 18 BRPM

## 2023-06-12 LAB
GLUCOSE BLDC GLUCOMTR-MCNC: 155 MG/DL (ref 70–130)
GLUCOSE BLDC GLUCOMTR-MCNC: 295 MG/DL (ref 70–130)

## 2023-06-12 PROCEDURE — 63710000001 INSULIN LISPRO (HUMAN) PER 5 UNITS: Performed by: HOSPITALIST

## 2023-06-12 PROCEDURE — 82948 REAGENT STRIP/BLOOD GLUCOSE: CPT

## 2023-06-12 RX ORDER — ASPIRIN 81 MG/1
81 TABLET, CHEWABLE ORAL DAILY
Qty: 30 TABLET | Refills: 0 | Status: SHIPPED | OUTPATIENT
Start: 2023-06-13

## 2023-06-12 RX ADMIN — ANASTROZOLE 1 MG: 1 TABLET, COATED ORAL at 08:09

## 2023-06-12 RX ADMIN — ASPIRIN 81 MG: 81 TABLET, CHEWABLE ORAL at 08:08

## 2023-06-12 RX ADMIN — DOCUSATE SODIUM 50 MG AND SENNOSIDES 8.6 MG 2 TABLET: 8.6; 5 TABLET, FILM COATED ORAL at 08:07

## 2023-06-12 RX ADMIN — Medication 10 ML: at 08:08

## 2023-06-12 RX ADMIN — Medication 2000 UNITS: at 08:08

## 2023-06-12 RX ADMIN — METOPROLOL TARTRATE 25 MG: 25 TABLET, FILM COATED ORAL at 08:08

## 2023-06-12 RX ADMIN — APIXABAN 5 MG: 5 TABLET, FILM COATED ORAL at 08:08

## 2023-06-12 RX ADMIN — EMPAGLIFLOZIN 25 MG: 25 TABLET, FILM COATED ORAL at 08:08

## 2023-06-12 RX ADMIN — LEVOTHYROXINE SODIUM 88 MCG: 88 TABLET ORAL at 08:08

## 2023-06-12 RX ADMIN — INSULIN LISPRO 2 UNITS: 100 INJECTION, SOLUTION INTRAVENOUS; SUBCUTANEOUS at 08:08

## 2023-06-12 RX ADMIN — MAGNESIUM GLUCONATE 500 MG ORAL TABLET 400 MG: 500 TABLET ORAL at 08:08

## 2023-06-12 RX ADMIN — FUROSEMIDE 20 MG: 20 TABLET ORAL at 08:08

## 2023-06-12 RX ADMIN — ROSUVASTATIN CALCIUM 20 MG: 20 TABLET, FILM COATED ORAL at 08:08

## 2023-06-12 NOTE — PLAN OF CARE
Pt resting well in room. Took medications earlier with no issues. Sinus rhythm on the monitor. Room air. No complaints or symptoms of distress noted. Continue plan of care.

## 2023-06-12 NOTE — TELEPHONE ENCOUNTER
She is thinking Jardiance needs to be renewed.    Spoke with  Patient Assistance program and got a refill for her.

## 2023-06-12 NOTE — PROGRESS NOTES
"Enter Query Response Below      Query Response: As based on the echo results and clinical presentation her CHF will be HFpEF             If applicable, please update the problem list.   Patient: Ronna Wayne        : 1955  Account: [id number]           Admit Date:         How to Respond to this query:       a. Click New Note     b. Answer query within the yellow box.                c. Update the Problem List, if applicable.      If you have any questions about this query contact me at: [email or web address]       ,    The 23 Emergency Room provider noted \"Congestive heart failure, unspecified HF chronicity, unspecified heart failure type.\"  The home medication list includes Norvasc, Eliquis, Jardiance, Lasix, and Lopressor.    23 PROBNP 510.2     23 ECHO:  \"Left ventricular systolic function is normal.Left ventricular ejection fraction appears to be 61 - 65%.  Left ventricular diastolic function is consistent with (grade II w/high LAP) pseudonormalization.\"    After study, can the type of CHF be specified such as:    Chronic Diastolic CHF  Other ___________________.  Unable to further specify.     By submitting this query, we are merely seeking further clarification of documentation to accurately reflect all conditions that you are monitoring, evaluating, treating or that extend the hospitalization or utilize additional resources of care. Please utilize your independent clinical judgment when addressing the question(s) above.     This query and your response, once completed, will be entered into the legal medical record.    Sincerely,  Hazel HARMON, RN, CDIS  Clinical Documentation Improvement Program  [email or web address]  "

## 2023-06-12 NOTE — DISCHARGE SUMMARY
[facility] HOSPITALISTS DISCHARGE SUMMARY    Patient Identification:  Name:  Ronna Wayne  Age:  68 y.o.  Sex:  female  :  1955  MRN:  [id number]  Visit Number:  [id number]    Date of Admission: 2023  Date of Discharge:  2023     PCP: Mague Acosta APRN    DISCHARGE DIAGNOSIS  Supraventricular tachycardia with associated chest discomfort and Dyspnea - Resolved   NSTEMI type II, due to tachyarrhythmia - Improved   History Paroxysmal Atrial Fibrillation, on chronic anticoagulation   Insulin Dependent Diabetes Mellitus Type II  Hypertension  Dyslipidemia  Hypothyroid  Chronic Kidney Disease stage II-III  Obstructive Sleep Apnea  Positional Vertigo  History Tachy-Miguel A Syndrome status post pacemaker   History Cerebrovascular Accident  History Er + Breast cancer   Morbid Obesity by BMI     CONSULTS   Consults       Date and Time Order Name Status Description    2023  9:34 AM Inpatient Cardiology Consult            PROCEDURES PERFORMED  Stress Test    HOSPITAL COURSE  68F Morbid Obesity by BMI PMH Positional Vertigo, Obstructive Sleep Apnea, Hypothyroid, History Er + Breast cancer, Insulin Dependent Diabetes Mellitus Type II, History Cerebrovascular Accident, Chronic Kidney Disease stage II-III, Hypertension, Dyslipidemia, Tachy-Miguel A Syndrome status post pacemaker, Paroxysmal Atrial Fibrillation, History recurrent SVT, had been considered for ablation procedure as outpatient but hasn't had significant disease in some time, her primary Cardiologist, Dr. Bey per patient, in [place] had elected to hold on procedure at last appointment, on  patient presented to [facility] emergency room with complaints of rapid heart rate, found to be in SVT with rate up to 150's with associated chest discomfort and shortness of breath, in emergency room patient received adenosine x 2 and converted to normal sinus rhythm, she was admitted for observation and hasn't had  any further episodes since that time, workup on admission was negative for precipitating infections, Cardiology consulted and followed, stress test ordered an was read as low risk study, though did note fixed defect felt to be breast attenuation, formal echocardiogram without any significant abnormalities though did note diastolic dysfunction, mild left and right atrial dilation. Discussed with Cardiology this AM and recommended could be discharged.  Patient to continue home regimen.  Follow up PCP 1 week, follow up primary EP Cardiologist in [place] next available to be considered for ablation procedure.    Edited by: Michel Lizama MD at 6/12/2023 0858    VITAL SIGNS:  Temp:  [97.8 °F (36.6 °C)-98.1 °F (36.7 °C)] 97.8 °F (36.6 °C)  Heart Rate:  [61-72] 61  Resp:  [18] 18  BP: (101-159)/(53-86) 159/86  SpO2:  [96 %-98 %] 96 %  on   ;   Device (Oxygen Therapy): room air    Body mass index is 48.99 kg/m².  Wt Readings from Last 3 Encounters:   06/12/23 125 kg (276 lb 8 oz)   06/09/23 123 kg (271 lb)   05/18/23 125 kg (275 lb 6.4 oz)       PHYSICAL EXAM:  Constitutional:  Well-developed and well-nourished.  No acute distress.      HENT:  Head:  Normocephalic and atraumatic.  Mouth:  Moist mucous membranes.    Eyes:  Conjunctivae and EOM are normal. No scleral icterus.    Neck:  Neck supple.  No JVD present.    Cardiovascular:  Normal rate, regular rhythm and normal heart sounds with no murmur.  Pulmonary/Chest:  No respiratory distress, no wheezes, no crackles, on room air  Abdominal:  Soft. No distension and no tenderness.   Musculoskeletal:  No tenderness, and no deformity.  No red or swollen joints anywhere.    Neurological:  Alert and oriented to person, place, and time.  No cranial nerve deficit.    Skin:  Skin is warm and dry. No rash noted. No pallor.   Peripheral vascular:  No clubbing, no cyanosis, no edema.  Psychiatric: Appropriate mood and affect  Edited by: Michel Lizama MD at 6/12/2023  0842    DISCHARGE DISPOSITION   Stable    DISCHARGE MEDICATIONS:     Discharge Medications        New Medications        Instructions Start Date   aspirin 81 MG chewable tablet   Chew & swallow 1 tablet Daily.   Start Date: June 13, 2023            Continue These Medications        Instructions Start Date   acetaminophen 325 MG tablet  Commonly known as: TYLENOL   650 mg, Oral, Every 6 Hours PRN      amLODIPine 5 MG tablet  Commonly known as: NORVASC   TAKE ONE TABLET BY MOUTH DAILY      anastrozole 1 MG tablet  Commonly known as: ARIMIDEX   1 mg, Oral, Daily      apixaban 5 MG tablet tablet  Commonly known as: ELIQUIS   5 mg, Oral, 2 Times Daily      cholecalciferol 25 MCG (1000 UT) tablet  Commonly known as: VITAMIN D3   2,000 Units, Oral, Daily      empagliflozin 25 MG tablet tablet  Commonly known as: Jardiance   25 mg, Oral, Every Morning      furosemide 20 MG tablet  Commonly known as: LASIX   20 mg, Oral, Daily      insulin aspart 100 UNIT/ML solution pen-injector sc pen  Commonly known as: novoLOG FLEXPEN   0-9 Units, Subcutaneous, 3 Times Daily With Meals      levothyroxine 88 MCG tablet  Commonly known as: SYNTHROID, LEVOTHROID   88 mcg, Oral, Daily      Magnesium Oxide 400 (240 Mg) MG tablet   400 mg, Oral, 3 Times Daily      metoprolol tartrate 25 MG tablet  Commonly known as: LOPRESSOR   25 mg, Oral, 2 Times Daily      Ozempic (0.25 or 0.5 MG/DOSE) 2 MG/1.5ML solution pen-injector  Generic drug: Semaglutide(0.25 or 0.5MG/DOS)   0.25 mg, Subcutaneous, Weekly      rosuvastatin 20 MG tablet  Commonly known as: CRESTOR   20 mg, Oral, Daily      Toujeo Max SoloStar 300 UNIT/ML solution pen-injector injection  Generic drug: Insulin Glargine (2 Unit Dial)   28 Units, Subcutaneous, Every Night at Bedtime               Diet Instructions       Diet: Regular/House Diet; Regular Texture (IDDSI 7); Thin (IDDSI 0)      Discharge Diet: Regular/House Diet    Texture: Regular Texture (IDDSI 7)    Fluid Consistency: Thin  (IDDSI 0)          Activity Instructions       Activity as Tolerated            Additional Instructions for the Follow-ups that You Need to Schedule       Discharge Follow-up with PCP   As directed       Currently Documented PCP:    Mague Acosta APRN    PCP Phone Number:    [phone]     Follow Up Details: 1 week post hospital discharge         Discharge Follow-up with Specified Provider: Dr. Maida CHOWDHURY in [place], already follows, needs next available to discuss ablation for recurrent SVT   As directed      To: Dr. Maida CHOWDHURY in [place], already follows, needs next available to discuss ablation for recurrent SVT                Follow-up Information       Mague Aocsta APRN Follow up in 1 week(s).    Specialty: Family Medicine  Why: MARTÍN 15@ 11:30AM  Contact information:  [address] [facility] [address] [phone]               Brenda Singh APRN Follow up.    Specialty: Cardiology  Contact information:   [address] [facility] [address]  [phone]                            TEST  RESULTS PENDING AT DISCHARGE  None     CODE STATUS  Code Status and Medical Interventions:   Ordered at: 06/11/23 0633     Level Of Support Discussed With:    Patient     Code Status (Patient has no pulse and is not breathing):    CPR (Attempt to Resuscitate)     Medical Interventions (Patient has pulse or is breathing):    Full Support     Michel Lizama MD  [facility]  06/12/23  08:58 EDT    Please note that this discharge summary required more than 30 minutes to complete.

## 2023-06-12 NOTE — OUTREACH NOTE
Prep Survey      Flowsheet Row Responses   [facility] facility patient discharged from? [facility]   Is LACE score < 7 ? No   Eligibility [facility]   [facility]   Date of Admission 06/11/23   Date of Discharge 06/12/23   Discharge Disposition Home or Self Care   Discharge diagnosis SVT   Does the patient have one of the following disease processes/diagnoses(primary or secondary)? Other   Does the patient have Home health ordered? No   Is there a DME ordered? No   Prep survey completed? Yes            LYNNETTE DEMARCO - Registered Nurse

## 2023-06-12 NOTE — CASE MANAGEMENT/SOCIAL WORK
Discharge Planning Assessment  [facility]     Patient Name: Ronna Wayne  MRN: [id number]  Today's Date: 6/12/2023    Admit Date: 6/11/2023   Discharge Needs Assessment       Row Name 06/12/23 0959       Living Environment    People in Home spouse    Name(s) of People in Home Tristin    Current Living Arrangements home    Potentially Unsafe Housing Conditions none    Provides Primary Care For no one    Family Caregiver if Needed spouse    Family Caregiver Names Tristin    Quality of Family Relationships unable to assess    Able to Return to Prior Arrangements yes       Resource/Environmental Concerns    Resource/Environmental Concerns none    Transportation Concerns none       Transition Planning    Patient/Family Anticipates Transition to home with family    Patient/Family Anticipated Services at Transition none    Transportation Anticipated family or friend will provide       Discharge Needs Assessment    Readmission Within the Last 30 Days no previous admission in last 30 days    Equipment Currently Used at Home cpap  reports wearing nightly    Concerns to be Addressed no discharge needs identified;denies needs/concerns at this time    Anticipated Changes Related to Illness none    Equipment Needed After Discharge none              Discharge Plan       Row Name 06/12/23 1002       Plan    Plan Pt reports from home with spouse Tristin residing in [facility]; DME as follows, C-PAP; denies HH or need; sees Mague MEAD; utilizes [employer] in [place]; verified payor Medicare A&B; Tirstin to provide transport home once medically stable; physician discussed discharge home today.    Patient/Family in Agreement with Plan yes      Row Name 06/12/23 0911       Plan    Final Discharge Disposition Code 01 - home or self-care    Final Note Pt to be discharged home on this date.      Row Name 06/12/23 0759       Plan    Plan Comments Pt arrived to ED 6/11 with c/o chest pain that awoke pt from sleep; per cardiology  consult note, pt is NSR, f/u O/P with EP for ablation, repeat stress test, continue anticoagulation and thromboembolic prophylaxis; stress test completed 6/11 [facility] low risk study.             Expected Discharge Date and Time       Expected Discharge Date Expected Discharge Time    Jun 12, 2023        Lisette Villagomez

## 2023-06-12 NOTE — CASE MANAGEMENT/SOCIAL WORK
Discharge Planning Assessment   [facility]     Patient Name: Ronna Wayne  MRN: [id number]  Today's Date: 6/12/2023    Admit Date: 6/11/2023          Discharge Plan     Row Name 06/12/23 0911       Plan    Final Discharge Disposition Code 01 - home or self-care    Final Note Pt to be discharged home on this date.    Row Name 06/12/23 0759                   CECI Rangel

## 2023-06-13 ENCOUNTER — TRANSITIONAL CARE MANAGEMENT TELEPHONE ENCOUNTER (OUTPATIENT)
Dept: CALL CENTER | Facility: HOSPITAL | Age: 68
End: 2023-06-13
Payer: MEDICARE

## 2023-06-13 NOTE — OUTREACH NOTE
Call Center TCM Note      Flowsheet Row Responses   [facility] patient discharged from? [facility]   Does the patient have one of the following disease processes/diagnoses(primary or secondary)? Other   TCM attempt successful? Yes   Call start time 1304   Call end time 1308   Discharge diagnosis SVT   Person spoke with today (if not patient) and relationship patient   Meds reviewed with patient/caregiver? Yes   Is the patient having any side effects they believe may be caused by any medication additions or changes? No   Does the patient have all medications ordered at discharge? Yes   Is the patient taking all medications as directed (includes completed medication regime)? Yes   Comments Patient has a hospital followup on 6/15/2023 with Mague MEAD.   Does the patient have an appointment with their PCP within 7 days of discharge? Yes   Psychosocial issues? No   Did the patient receive a copy of their discharge instructions? Yes   Nursing interventions Reviewed instructions with patient   What is the patient's perception of their health status since discharge? Improving   Is the patient/caregiver able to teach back signs and symptoms related to disease process for when to call PCP? Yes   Is the patient/caregiver able to teach back signs and symptoms related to disease process for when to call 911? Yes   Is the patient/caregiver able to teach back the hierarchy of who to call/visit for symptoms/problems? PCP, Specialist, Home health nurse, Urgent Care, ED, 911 Yes   If the patient is a current smoker, are they able to teach back resources for cessation? Not a smoker   TCM call completed? Yes   Wrap up additional comments Patient is feeling better. No questions or concerns.   Call end time 1308   Would this patient benefit from a Referral to Amb Social Work? No   Is the patient interested in additional calls from an ambulatory ?  NOTE:  applies to high risk patients requiring additional  follow-up. No            Brady Maldonado RN    6/13/2023, 13:08 EDT

## 2023-06-15 ENCOUNTER — OFFICE VISIT (OUTPATIENT)
Dept: FAMILY MEDICINE CLINIC | Facility: CLINIC | Age: 68
End: 2023-06-15
Payer: MEDICARE

## 2023-06-15 DIAGNOSIS — I10 ESSENTIAL HYPERTENSION: Chronic | ICD-10-CM

## 2023-06-15 DIAGNOSIS — M25.50 ARTHRALGIA, UNSPECIFIED JOINT: Chronic | ICD-10-CM

## 2023-06-15 DIAGNOSIS — E03.8 OTHER SPECIFIED HYPOTHYROIDISM: Chronic | ICD-10-CM

## 2023-06-15 DIAGNOSIS — K21.00 GASTROESOPHAGEAL REFLUX DISEASE WITH ESOPHAGITIS WITHOUT HEMORRHAGE: Chronic | ICD-10-CM

## 2023-06-15 DIAGNOSIS — Z79.4 TYPE 2 DIABETES MELLITUS WITH HYPERGLYCEMIA, WITH LONG-TERM CURRENT USE OF INSULIN: Chronic | ICD-10-CM

## 2023-06-15 DIAGNOSIS — I47.1 SVT (SUPRAVENTRICULAR TACHYCARDIA): Chronic | ICD-10-CM

## 2023-06-15 DIAGNOSIS — I48.0 PAROXYSMAL ATRIAL FIBRILLATION: Chronic | ICD-10-CM

## 2023-06-15 DIAGNOSIS — R11.0 NAUSEA: ICD-10-CM

## 2023-06-15 DIAGNOSIS — E78.5 DYSLIPIDEMIA: Chronic | ICD-10-CM

## 2023-06-15 DIAGNOSIS — E11.65 TYPE 2 DIABETES MELLITUS WITH HYPERGLYCEMIA, WITH LONG-TERM CURRENT USE OF INSULIN: Chronic | ICD-10-CM

## 2023-06-15 DIAGNOSIS — Z09 HOSPITAL DISCHARGE FOLLOW-UP: Primary | ICD-10-CM

## 2023-06-15 RX ORDER — ONDANSETRON 4 MG/1
4 TABLET, ORALLY DISINTEGRATING ORAL EVERY 8 HOURS PRN
Qty: 20 TABLET | Refills: 1 | Status: SHIPPED | OUTPATIENT
Start: 2023-06-15

## 2023-06-15 RX ORDER — BLOOD-GLUCOSE SENSOR
1 EACH MISCELLANEOUS
Qty: 9 EACH | Refills: 3 | Status: SHIPPED | OUTPATIENT
Start: 2023-06-15

## 2023-06-15 RX ORDER — BLOOD-GLUCOSE,RECEIVER,CONT
1 EACH MISCELLANEOUS DAILY
Qty: 1 EACH | Refills: 0 | Status: SHIPPED | OUTPATIENT
Start: 2023-06-15

## 2023-06-15 NOTE — PROGRESS NOTES
History of Present Illness  Ronna Wayne is a 68 y.o. female who presents to the clinic pertaining to her recent hospitalization at [facility] from June/11/2023 through June 12, 2023.  Discharge diagnoses include :  Supraventricular tachycardia with associated chest discomfort and dyspnea-resolved  NSTEMI type II, due to tacky arrhythmia improved  Paroxysmal atrial fib, on chronic anticoagulation  Type 2 diabetes treated with insulin  Hypertension  Dyslipidemia  Hypothyroid  Chronic kidney disease stage II-III  Obstructive sleep apnea  History of tachybradycardia syndrome status post pacemaker  H/O ER+ Breast cancer   Hospital course:  Ronna presented on June 11 to [facility] emergency room with complaints of rapid heart rate, found to be in SVT with rate up to 150s with associated chest discomfort and shortness of breath.  In the emergency room, Ronna received adenosine x2 and converted to normal sinus rhythm.  She was admitted for observation and had no further episodes.  Work-up on admission was negative for precipitating infections.  Cardiology consulted and followed.  Stress test ordered and was read as low risk study.  Did note a fixed defect felt to be breast attenuation, formal echocardiogram without any significant abnormalities though did not diastolic dysfunction, mild left and right atrial dilation.  Discussed with cardiology and recommended she could be discharged on June 12.  She was to continue home regimen.  To follow-up with her primary EP cardiologist in [place] next available to be considered for ablation procedure.    Diabetes  She has type 2 diabetes mellitus. The initial diagnosis of diabetes was made 2008. Diabetic complications include a CVA, nephropathy and PVD. Risk factors for coronary artery disease include dyslipidemia, family history, hypertension, obesity and sedentary lifestyle. Current diabetic treatment includes insulin injections and oral  agents.  She is currently taking insulin pre-breakfast, pre-lunch and pre-dinner. Insulin injections are given by patient. Rotation sites for injection include the abdominal wall. She is following a diabetic diet. Meal planning includes ADA exchanges, avoidance of concentrated sweets and carbohydrate counting. She rarely participates in exercise. Blood glucose monitoring compliance is good.    Lab Results   Component Value Date    HGBA1C 8.80 (H) 03/08/2022     Lab Results   Component Value Date    HGBA1C 7.80 (H) 10/21/2022     Lab Results   Component Value Date    HGBA1C 10.10 (H) 06/11/2023     Hypertension   The current episode started more than 1 year ago. The problem is controlled. Associated symptoms include headaches, malaise/fatigue and peripheral edema. Risk factors for coronary artery disease include diabetes mellitus, dyslipidemia, obesity and post-menopausal state. Current antihypertension treatment includes angiotensin blockers.   Lab Results   Component Value Date    GLUCOSE 283 (H) 06/11/2023    BUN 16 06/11/2023    CREATININE 0.86 06/11/2023    EGFR 73.7 06/11/2023    BCR 18.6 06/11/2023    K 3.9 06/11/2023    CO2 23.4 06/11/2023    CALCIUM 9.8 06/11/2023    ALBUMIN 3.6 06/11/2023    BILITOT 0.7 06/11/2023    AST 32 06/11/2023    ALT 24 06/11/2023     Dyslipidemia   The current episode started more than 1 year ago. Current antihyperlipidemic treatment includes statins. Risk factors for coronary artery disease include diabetes mellitus, dyslipidemia, hypertension, obesity and post-menopausal.   Lab Results   Component Value Date    CHOL 180 02/21/2023    CHOL 210 (H) 10/21/2022    CHOL 169 03/08/2022     Lab Results   Component Value Date    TRIG 206 (H) 06/09/2023    TRIG 151 (H) 02/21/2023    TRIG 200 (H) 10/21/2022     Lab Results   Component Value Date    HDL 50 06/09/2023    HDL 70 (H) 02/21/2023    HDL 51 10/21/2022     Lab Results   Component Value Date     (H) 06/09/2023    LDL 84  "02/21/2023     (H) 10/21/2022     The following portions of the patient's history were reviewed and updated as appropriate: allergies, current medications, past family history, past medical history, past social history, past surgical history and problem list.    Review of Systems   Constitutional:  Positive for fatigue. Negative for activity change, appetite change, chills, fever and unexpected weight change.   Eyes:  Negative for visual disturbance.   Respiratory:  Negative for cough, shortness of breath and wheezing.    Cardiovascular:  Positive for leg swelling (Intermittent). Negative for chest pain and palpitations.   Gastrointestinal:  Positive for nausea (Intermittent). Negative for vomiting.   Endocrine: Negative for cold intolerance, heat intolerance, polydipsia, polyphagia and polyuria.   Musculoskeletal:  Positive for arthralgias.   Skin:  Negative for color change and rash.   Neurological:  Positive for dizziness, weakness, numbness and headaches. Negative for tremors, speech difficulty and light-headedness.   Hematological:  Negative for adenopathy.   Psychiatric/Behavioral:  Positive for dysphoric mood (Stable with Prozac). Negative for confusion and suicidal ideas. The patient is not nervous/anxious.    All other systems reviewed and are negative.    Vital signs:  /70 (BP Location: Left arm, Patient Position: Sitting, Cuff Size: Adult)   Pulse 74   Temp 97 °F (36.1 °C) (Temporal)   Resp 14   Ht 160 cm (62.99\")   Wt 123 kg (272 lb)   SpO2 95%   BMI 48.19 kg/m²     Physical Exam  Vitals and nursing note reviewed.   Constitutional:       General: She is not in acute distress.     Appearance: She is well-developed.      Comments: Gait slow and cautious   HENT:      Head: Normocephalic.      Nose: Nose normal.   Eyes:      General: No scleral icterus.        Right eye: No discharge.         Left eye: No discharge.      Conjunctiva/sclera: Conjunctivae normal.   Neck:      Thyroid: No " thyromegaly.      Vascular: No JVD.   Cardiovascular:      Rate and Rhythm: Normal rate and regular rhythm.      Heart sounds: Murmur heard.     No friction rub.   Pulmonary:      Effort: Pulmonary effort is normal. No respiratory distress.      Breath sounds: Normal breath sounds. No decreased breath sounds, wheezing, rhonchi or rales.   Chest:   Breasts:     Left: No mass.   Abdominal:      General: Bowel sounds are normal. There is no distension.      Palpations: Abdomen is soft.      Tenderness: There is no abdominal tenderness. There is no guarding or rebound.   Musculoskeletal:         General: Tenderness (Generalized) present.      Cervical back: Neck supple.      Right lower leg: No edema.      Left lower leg: No edema.   Lymphadenopathy:      Cervical: No cervical adenopathy.   Skin:     General: Skin is warm and dry.      Capillary Refill: Capillary refill takes less than 2 seconds.      Findings: No erythema or rash.   Neurological:      Mental Status: She is alert and oriented to person, place, and time.   Psychiatric:         Mood and Affect: Mood and affect normal.         Speech: Speech normal.         Behavior: Behavior normal. Behavior is cooperative.         Thought Content: Thought content normal.         Judgment: Judgment normal.     Result Review :Reviewed recent [facility] hospitalizations      Assessment & Plan     Diagnoses and all orders for this visit:    1. Hospital discharge follow-up (Primary)  Comments:  Recent [facility] hospitalization reviewed    2. Type 2 diabetes mellitus with hyperglycemia, with long-term current use of insulin  Comments:  Continue Toujeo,Novolog, Jardiance and Ozempic. DexCom 7 prescribed  Orders:  -     Continuous Blood Gluc Sensor (Dexcom G7 Sensor) misc; 1 Device 90 Minutes Prior to Surgery for 1 dose.  Dispense: 9 each; Refill: 3  -     Continuous Blood Gluc  (Dexcom G7 ) device; 1 Device Daily.  Dispense: 1 each; Refill: 0    3. Essential  hypertension  Comments:  Well controlled. Continue Amlodipine, Metoprolol and Furosemide    4. Dyslipidemia  Comments:  Continue Rosuvastatin    5. Other specified hypothyroidism  Comments:  Levothyroxie 88 mcg daily    6. SVT (supraventricular tachycardia)    7. Paroxysmal atrial fibrillation  Comments:  Continue Eliquis and f/u with EP    8. Gastroesophageal reflux disease with esophagitis without hemorrhage    9. Arthralgia, unspecified joint  -     Diclofenac Sodium (VOLTAREN) 1 % gel gel; Apply 4 g topically to the appropriate area as directed 3 (Three) Times a Day As Needed (joint pain).  Dispense: 350 g; Refill: 2    10. Nausea  -     ondansetron ODT (ZOFRAN-ODT) 4 MG disintegrating tablet; Place 1 tablet on the tongue Every 8 (Eight) Hours As Needed for Nausea or Vomiting.  Dispense: 20 tablet; Refill: 1    Follow Up In September   Findings and recommendations discussed with Ronna. Reviewed hospital records. Lifestyle modifications reinforced including nutrition and activity recommendations. Ronna will follow up in September sooner if problems/concerns occur.   Ronna was given instructions and counseling regarding her condition or for health maintenance advice. Please see specific information pulled into the AVS if appropriate    This document has been electronically signed by:

## 2023-06-17 VITALS
DIASTOLIC BLOOD PRESSURE: 70 MMHG | RESPIRATION RATE: 14 BRPM | OXYGEN SATURATION: 95 % | BODY MASS INDEX: 48.2 KG/M2 | HEART RATE: 74 BPM | WEIGHT: 272 LBS | HEIGHT: 63 IN | TEMPERATURE: 97 F | SYSTOLIC BLOOD PRESSURE: 130 MMHG

## 2023-07-03 PROBLEM — G45.9 TIA (TRANSIENT ISCHEMIC ATTACK): Status: ACTIVE | Noted: 2019-03-31

## 2023-07-03 PROBLEM — R06.09 DYSPNEA ON EXERTION: Status: ACTIVE | Noted: 2018-09-27

## 2023-07-03 PROBLEM — R01.1 SYSTOLIC MURMUR: Status: ACTIVE | Noted: 2018-09-27

## 2023-07-03 PROBLEM — I49.3 PVC (PREMATURE VENTRICULAR CONTRACTION): Status: ACTIVE | Noted: 2019-01-04

## 2023-07-03 PROBLEM — I49.5 SINUS NODE DYSFUNCTION: Status: RESOLVED | Noted: 2023-04-28 | Resolved: 2023-07-03

## 2023-07-03 PROBLEM — R07.89 ATYPICAL CHEST PAIN: Status: ACTIVE | Noted: 2018-09-27

## 2023-07-03 PROBLEM — I49.1 PAC (PREMATURE ATRIAL CONTRACTION): Status: ACTIVE | Noted: 2019-01-04

## 2023-07-03 PROBLEM — R00.2 PALPITATIONS: Status: ACTIVE | Noted: 2018-09-27

## 2023-07-03 PROBLEM — I49.5 SINUS NODE DYSFUNCTION: Status: ACTIVE | Noted: 2023-07-03

## 2023-07-03 PROBLEM — I35.0 NONRHEUMATIC AORTIC VALVE STENOSIS: Status: ACTIVE | Noted: 2019-01-04

## 2023-07-03 NOTE — H&P (VIEW-ONLY)
Cardiac Electrophysiology Outpatient Follow Up Note            [facility] at [facility]    Follow Up Office Visit      Ronna Wayne  [id number]  07/03/2023  [unfilled]  [unfilled]    Primary Care Physician: Mague Acosta APRN    Referred By: No ref. provider found    Subjective     Chief Complaint:   Diagnoses and all orders for this visit:    1. SVT (supraventricular tachycardia) (Primary)    2. Sinus node dysfunction    3. Paroxysmal atrial fibrillation    4. Presence of cardiac pacemaker      Chief Complaint   Patient presents with    (supraventricular tachycardia       History of Present Illness:   Ronna Wayne is a 68 y.o. female who presents to my electrophysiology clinic for follow up of recurrent sudden onset palpitations.  Episodes get faster and faster up to 180 bpm.  She feels terrible goes to the ER.  Pattern is repeated itself several times.  Adenosine given for termination of SVT and multiple occasions documented twelve-lead EKG of SVT.  Pacemaker implanted remotely for sinus node dysfunction also reveals documented short RP tachycardia and also rather convincingly evidence of adenosine terminating it with prolongation of the AV interval followed by AV block.      Past Medical History:   Past Medical History:   Diagnosis Date    Anxiety     B12 deficiency     BPV (benign positional vertigo)     Breast cancer 05/2021    Diabetes mellitus     Diarrhea     Disease of thyroid gland     Elevated cholesterol     Fibrocystic breast     Fibromyalgia     GERD (gastroesophageal reflux disease)     Glaucoma     Heart murmur     Hyperlipidemia     Hypertension     Kidney disease     Obesity     Peptic ulceration     PONV (postoperative nausea and vomiting)     Primary central sleep apnea     Sleep apnea     c-pap    Stroke     TIA (transient ischemic attack) 3/31/2019    Weakness of left arm        Past Surgical History:   Past Surgical  History:   Procedure Laterality Date    ABDOMINAL SURGERY      APPENDECTOMY      BREAST ABSCESS INCISION AND DRAINAGE Right 07/07/2022    Procedure: BREAST ABSCESS INCISION AND DRAINAGE;  Surgeon: Lee Parrish MD;  Location: [facility] OR;  Service: General;  Laterality: Right;    BREAST BIOPSY  May 2021    BREAST CYST ASPIRATION      BREAST LUMPECTOMY WITH SENTINEL NODE BIOPSY Right 08/05/2021    Procedure: BREAST LUMPECTOMY WITH SENTINEL NODE BIOPSY;  Surgeon: Lee Parrish MD;  Location: [facility] OR;  Service: General;  Laterality: Right;    CARDIAC ELECTROPHYSIOLOGY PROCEDURE N/A 01/09/2023    Procedure: Pacemaker DC new;  Surgeon: Bj Hyde MD;  Location: [facility] CATH INVASIVE LOCATION;  Service: Cardiology;  Laterality: N/A;    CATARACT EXTRACTION Bilateral     CHOLECYSTECTOMY      COLONOSCOPY      ENDOSCOPY      HEMATOMA EVACUATION TRUNK Right 08/05/2021    Procedure: HEMATOMA EVACUATION TRUNK;  Surgeon: Lee Parrish MD;  Location: [facility] OR;  Service: General;  Laterality: Right;    PACEMAKER IMPLANTATION      URETHRA SURGERY         Family History:   Family History   Problem Relation Age of Onset    Thyroid disease Mother     Diabetes Mother     Hyperlipidemia Father     Hypertension Father     Heart attack Father     Lung disease Father     Hypertension Sister     Breast cancer Sister 40    Obesity Sister     Hypertension Sister     Hyperlipidemia Sister     Cancer Sister         Breast cancer    Hyperlipidemia Sister     Hypertension Sister     Arthritis Sister     Obesity Sister     Thyroid disease Sister     Alcohol abuse Maternal Aunt     Cancer Maternal Grandfather         Prostrate    Stroke Paternal Grandmother     Stroke Paternal Grandfather     Hypertension Other        Social History:   Social History     Socioeconomic History    Marital status:    Tobacco Use    Smoking status: Former     Packs/day: 1.50     Years: 8.00     Pack years: 12.00     Types: Cigarettes      Quit date: 1976     Years since quittin.5     Passive exposure: Past    Smokeless tobacco: Never    Tobacco comments:     I smoked as a teen and stopped when my son was born   Vaping Use    Vaping Use: Never used   Substance and Sexual Activity    Alcohol use: No     Comment: former social drinker not had anything in 25 + years    Drug use: Never    Sexual activity: Not Currently     Partners: Male     Birth control/protection: Abstinence, Post-menopausal, Vasectomy       Medications:     Current Outpatient Medications:     acetaminophen (TYLENOL) 325 MG tablet, Take 2 tablets by mouth Every 6 (Six) Hours As Needed for Mild Pain., Disp: , Rfl:     amLODIPine (NORVASC) 5 MG tablet, TAKE ONE TABLET BY MOUTH DAILY, Disp: 30 tablet, Rfl: 1    anastrozole (ARIMIDEX) 1 MG tablet, Take 1 tablet by mouth Daily., Disp: 30 tablet, Rfl: 11    apixaban (ELIQUIS) 5 MG tablet tablet, Take 1 tablet by mouth 2 (Two) Times a Day., Disp: 60 tablet, Rfl: 6    aspirin 81 MG chewable tablet, Chew & swallow 1 tablet Daily., Disp: 30 tablet, Rfl: 0    cholecalciferol (VITAMIN D3) 25 MCG (1000 UT) tablet, Take 2 tablets by mouth Daily., Disp: , Rfl:     Continuous Blood Gluc  (Dexcom G7 ) device, 1 Device Daily., Disp: 1 each, Rfl: 0    Continuous Blood Gluc Sensor (Dexcom G7 Sensor) misc, 1 Device 90 Minutes Prior to Surgery for 1 dose., Disp: 9 each, Rfl: 3    Diclofenac Sodium (VOLTAREN) 1 % gel gel, Apply 4 g topically to the appropriate area as directed 3 (Three) Times a Day As Needed (joint pain)., Disp: 350 g, Rfl: 2    empagliflozin (Jardiance) 25 MG tablet tablet, Take 1 tablet by mouth Every Morning., Disp: 30 tablet, Rfl: 3    furosemide (LASIX) 20 MG tablet, Take 1 tablet by mouth Daily., Disp: , Rfl:     insulin aspart (novoLOG FLEXPEN) 100 UNIT/ML solution pen-injector sc pen, Inject 0-9 Units under the skin into the appropriate area as directed 3 (Three) Times a Day With Meals. Admelog Solostar  "sliding scale, Disp: , Rfl:     Insulin Glargine, 2 Unit Dial, (Toujeo Max SoloStar) 300 UNIT/ML solution pen-injector injection, Inject 28 Units under the skin into the appropriate area as directed every night at bedtime., Disp: , Rfl:     levothyroxine (SYNTHROID, LEVOTHROID) 88 MCG tablet, Take 1 tablet by mouth Daily., Disp: , Rfl:     Magnesium Oxide 400 (240 Mg) MG tablet, Take 2 tablets by mouth 2 (Two) Times a Day., Disp: 120 tablet, Rfl: 0    metoprolol tartrate (LOPRESSOR) 25 MG tablet, Take 1 tablet by mouth 2 (Two) Times a Day., Disp: , Rfl:     ondansetron ODT (ZOFRAN-ODT) 4 MG disintegrating tablet, Place 1 tablet on the tongue Every 8 (Eight) Hours As Needed for Nausea or Vomiting., Disp: 20 tablet, Rfl: 1    pantoprazole (PROTONIX) 40 MG EC tablet, Take 1 tablet by mouth As Needed., Disp: , Rfl:     rosuvastatin (CRESTOR) 20 MG tablet, Take 1 tablet by mouth Daily., Disp: 30 tablet, Rfl: 4    Semaglutide,0.25 or 0.5MG/DOS, (Ozempic, 0.25 or 0.5 MG/DOSE,) 2 MG/1.5ML solution pen-injector, Inject 0.25 mg under the skin into the appropriate area as directed 1 (One) Time Per Week., Disp: 3 mL, Rfl: 0    Allergies:   Allergies   Allergen Reactions    Codeine GI Intolerance     Increased heart rate      Sulfa Antibiotics GI Intolerance     Heart rate increase        Objective   Vital Signs:   Vitals:    07/03/23 1430   BP: 136/70   BP Location: Left arm   Patient Position: Sitting   Pulse: 76   SpO2: 97%   Weight: 122 kg (270 lb)   Height: 160 cm (63\")       PHYSICAL EXAM  General appearance: Awake, alert, cooperative  Head: Normocephalic, without obvious abnormality, atraumatic  Eyes: Conjunctivae/corneas clear, EOMs intact  Neck: no adenopathy, no carotid bruit, no JVD, and thyroid: not enlarged  Lungs: clear to auscultation bilaterally and no rhonchi or crackles\", ' symmetric  Heart: regular rate and rhythm, S1, S2 normal, no murmur, click, rub or gallop  Abdomen: Soft, non-tender, bowel sounds normal,  " no organomegaly  Extremities: extremities normal, atraumatic, no cyanosis or edema  Skin: Skin color, turgor normal, no rashes or lesions  Neurologic: Grossly normal     Lab Results   Component Value Date    GLUCOSE 315 (H) 06/24/2023    CALCIUM 9.4 06/24/2023     06/24/2023    K 4.0 06/24/2023    CO2 23.6 06/24/2023     06/24/2023    BUN 15 06/24/2023    CREATININE 0.87 06/24/2023    EGFRIFNONA 54 (L) 01/11/2022    BCR 17.2 06/24/2023    ANIONGAP 11.4 06/24/2023     Lab Results   Component Value Date    WBC 5.04 06/24/2023    HGB 14.1 06/24/2023    HCT 42.0 06/24/2023    MCV 99.8 (H) 06/24/2023     (L) 06/24/2023     Lab Results   Component Value Date    INR 1.07 06/11/2023    INR 1.00 01/19/2023    INR 1.06 12/17/2022    PROTIME 14.4 06/11/2023    PROTIME 13.4 01/19/2023    PROTIME 14.0 12/17/2022     Lab Results   Component Value Date    TSH 0.816 06/09/2023       Cardiac Testing:     I personally viewed and interpreted the patient's EKG/Telemetry/lab data    Procedures    Tobacco Cessation: N/A  Obstructive Sleep Apnea Screening: Completed    Advance Care Planning   ACP discussion was declined by the patient. Patient does not have an advance directive, declines further assistance.       Assessment & Plan    Diagnoses and all orders for this visit:    1. SVT (supraventricular tachycardia) (Primary)    2. Sinus node dysfunction    3. Paroxysmal atrial fibrillation    4. Presence of cardiac pacemaker         Diagnosis Plan   1. SVT (supraventricular tachycardia)  Highly symptomatic recurrent short RP tachycardia.  Multiple ER visits.    Permanent pacemaker in situ for sinus node dysfunction has documented this as well as termination with adenosine.    Once again I discussed with her the option of catheter ablation to illuminate her SVT.  She is interested this time.  We reviewed the risk-benefit profile in detail.  Quoted her a 1 to 2% chance significant procedure complication risk with increased  risk of vascular injury due to her BMI and body habitus.  Additionally she would need anesthesia support to tolerate the procedure.  She wishes to proceed.      2. Sinus node dysfunction  Atrial pacing support.      3. Paroxysmal atrial fibrillation  She understands that A-fib is not SVT.  The SVT ablation will not fix her A-fib.  She understands this.      4. Presence of cardiac pacemaker  Dual-chamber permanent pacemaker [employer] system.  Normal device function.        Body mass index is 47.83 kg/m².    I spent 40 minutes in consultation with this patient which included more than 65% of this time in direct face-to-face counseling, physical examination and discussion of my assessment and findings and this shared decision making with the patient.  The remainder of the time not spent face-to-face was performing one, some or all of the following actions: preparing to see the patient (e.g. reviewing tests, prior clinicians' notes, etc), ordering medications, tests or procedures, coordination of care, discussion of the plan with other healthcare providers, documenting clinical information in epic as well as independently interpreting results and communication of these results to the patient family and/or caregiver(s).  Please note that this explicitly excludes time spent on other separate billable services such as performing procedures or test interpretation, when applicable.      Follow Up:       Thank you for allowing me to participate in the care of your patient. Please to not hesitate to contact me with additional questions or concerns.      Israel Steele DO, FACC, RS  Cardiac Electrophysiologist  [facility] / [facility]

## 2023-07-21 ENCOUNTER — ANESTHESIA (OUTPATIENT)
Dept: CARDIOLOGY | Facility: HOSPITAL | Age: 68
End: 2023-07-21
Payer: MEDICARE

## 2023-07-21 ENCOUNTER — HOSPITAL ENCOUNTER (OUTPATIENT)
Facility: HOSPITAL | Age: 68
Discharge: HOME | End: 2023-07-21
Attending: INTERNAL MEDICINE | Admitting: INTERNAL MEDICINE
Payer: MEDICARE

## 2023-07-21 ENCOUNTER — ANESTHESIA EVENT (OUTPATIENT)
Dept: CARDIOLOGY | Facility: HOSPITAL | Age: 68
End: 2023-07-21
Payer: MEDICARE

## 2023-07-21 VITALS
BODY MASS INDEX: 48.2 KG/M2 | HEIGHT: 63 IN | OXYGEN SATURATION: 96 % | RESPIRATION RATE: 16 BRPM | HEART RATE: 70 BPM | TEMPERATURE: 98.2 F | SYSTOLIC BLOOD PRESSURE: 153 MMHG | DIASTOLIC BLOOD PRESSURE: 85 MMHG | WEIGHT: 272 LBS

## 2023-07-21 DIAGNOSIS — I48.92 ATRIAL FLUTTER, UNSPECIFIED TYPE: ICD-10-CM

## 2023-07-21 DIAGNOSIS — I47.1 SVT (SUPRAVENTRICULAR TACHYCARDIA): ICD-10-CM

## 2023-07-21 LAB
ANION GAP SERPL CALCULATED.3IONS-SCNC: 12 MMOL/L (ref 5–15)
BUN BLDA-MCNC: 10 MG/DL (ref 8–26)
BUN SERPL-MCNC: 11 MG/DL (ref 8–23)
BUN/CREAT SERPL: 15.5 (ref 7–25)
CA-I BLDA-SCNC: 1.18 MMOL/L (ref 1.2–1.32)
CALCIUM SPEC-SCNC: 9.4 MG/DL (ref 8.6–10.5)
CHLORIDE BLDA-SCNC: 101 MMOL/L (ref 98–109)
CHLORIDE SERPL-SCNC: 101 MMOL/L (ref 98–107)
CO2 BLDA-SCNC: 23 MMOL/L (ref 24–29)
CO2 SERPL-SCNC: 25 MMOL/L (ref 22–29)
CREAT BLDA-MCNC: 0.7 MG/DL (ref 0.6–1.3)
CREAT SERPL-MCNC: 0.71 MG/DL (ref 0.57–1)
DEPRECATED RDW RBC AUTO: 47.8 FL (ref 37–54)
EGFRCR SERPLBLD CKD-EPI 2021: 92.7 ML/MIN/1.73
EGFRCR SERPLBLD CKD-EPI 2021: 94.3 ML/MIN/1.73
ERYTHROCYTE [DISTWIDTH] IN BLOOD BY AUTOMATED COUNT: 13.2 % (ref 12.3–15.4)
GLUCOSE BLDC GLUCOMTR-MCNC: 162 MG/DL (ref 70–130)
GLUCOSE SERPL-MCNC: 161 MG/DL (ref 65–99)
HCT VFR BLD AUTO: 37.8 % (ref 34–46.6)
HCT VFR BLDA CALC: 38 % (ref 38–51)
HGB BLD-MCNC: 12.9 G/DL (ref 12–15.9)
HGB BLDA-MCNC: 12.9 G/DL (ref 12–17)
MCH RBC QN AUTO: 33.8 PG (ref 26.6–33)
MCHC RBC AUTO-ENTMCNC: 34.1 G/DL (ref 31.5–35.7)
MCV RBC AUTO: 99 FL (ref 79–97)
PLATELET # BLD AUTO: 132 10*3/MM3 (ref 140–450)
PMV BLD AUTO: 9.8 FL (ref 6–12)
POTASSIUM BLDA-SCNC: 3.6 MMOL/L (ref 3.5–4.9)
POTASSIUM SERPL-SCNC: 3.6 MMOL/L (ref 3.5–5.2)
QT INTERVAL: 380 MS
QTC INTERVAL: 430 MS
RBC # BLD AUTO: 3.82 10*6/MM3 (ref 3.77–5.28)
SODIUM BLD-SCNC: 140 MMOL/L (ref 138–146)
SODIUM SERPL-SCNC: 138 MMOL/L (ref 136–145)
WBC NRBC COR # BLD: 6.01 10*3/MM3 (ref 3.4–10.8)

## 2023-07-21 PROCEDURE — C1894 INTRO/SHEATH, NON-LASER: HCPCS | Performed by: INTERNAL MEDICINE

## 2023-07-21 PROCEDURE — 25010000002 SUGAMMADEX 200 MG/2ML SOLUTION: Performed by: NURSE ANESTHETIST, CERTIFIED REGISTERED

## 2023-07-21 PROCEDURE — 93623 PRGRMD STIMJ&PACG IV RX NFS: CPT | Performed by: INTERNAL MEDICINE

## 2023-07-21 PROCEDURE — C1730 CATH, EP, 19 OR FEW ELECT: HCPCS | Performed by: INTERNAL MEDICINE

## 2023-07-21 PROCEDURE — 80047 BASIC METABLC PNL IONIZED CA: CPT

## 2023-07-21 PROCEDURE — 85027 COMPLETE CBC AUTOMATED: CPT | Performed by: PHYSICIAN ASSISTANT

## 2023-07-21 PROCEDURE — 93005 ELECTROCARDIOGRAM TRACING: CPT | Performed by: INTERNAL MEDICINE

## 2023-07-21 PROCEDURE — 93286 PERI-PX EVAL PM/LDLS PM IP: CPT | Performed by: INTERNAL MEDICINE

## 2023-07-21 PROCEDURE — 85014 HEMATOCRIT: CPT

## 2023-07-21 PROCEDURE — C1732 CATH, EP, DIAG/ABL, 3D/VECT: HCPCS | Performed by: INTERNAL MEDICINE

## 2023-07-21 PROCEDURE — 25010000002 DEXAMETHASONE PER 1 MG

## 2023-07-21 PROCEDURE — 25010000002 SUCCINYLCHOLINE PER 20 MG

## 2023-07-21 PROCEDURE — 25010000002 PROPOFOL 10 MG/ML EMULSION

## 2023-07-21 PROCEDURE — 93653 COMPRE EP EVAL TX SVT: CPT | Performed by: INTERNAL MEDICINE

## 2023-07-21 PROCEDURE — 80048 BASIC METABOLIC PNL TOTAL CA: CPT | Performed by: PHYSICIAN ASSISTANT

## 2023-07-21 PROCEDURE — 25010000002 ONDANSETRON PER 1 MG

## 2023-07-21 PROCEDURE — 25010000002 PHENYLEPHRINE 10 MG/ML SOLUTION 1 ML VIAL: Performed by: NURSE ANESTHETIST, CERTIFIED REGISTERED

## 2023-07-21 RX ORDER — ONDANSETRON 2 MG/ML
INJECTION INTRAMUSCULAR; INTRAVENOUS AS NEEDED
Status: DISCONTINUED | OUTPATIENT
Start: 2023-07-21 | End: 2023-07-21 | Stop reason: SURG

## 2023-07-21 RX ORDER — FAMOTIDINE 10 MG/ML
20 INJECTION, SOLUTION INTRAVENOUS ONCE
Status: COMPLETED | OUTPATIENT
Start: 2023-07-21 | End: 2023-07-21

## 2023-07-21 RX ORDER — DEXAMETHASONE SODIUM PHOSPHATE 4 MG/ML
INJECTION, SOLUTION INTRA-ARTICULAR; INTRALESIONAL; INTRAMUSCULAR; INTRAVENOUS; SOFT TISSUE AS NEEDED
Status: DISCONTINUED | OUTPATIENT
Start: 2023-07-21 | End: 2023-07-21 | Stop reason: SURG

## 2023-07-21 RX ORDER — ONDANSETRON 2 MG/ML
4 INJECTION INTRAMUSCULAR; INTRAVENOUS ONCE AS NEEDED
Status: DISCONTINUED | OUTPATIENT
Start: 2023-07-21 | End: 2023-07-21 | Stop reason: HOSPADM

## 2023-07-21 RX ORDER — SODIUM CHLORIDE 0.9 % (FLUSH) 0.9 %
10 SYRINGE (ML) INJECTION EVERY 12 HOURS SCHEDULED
Status: DISCONTINUED | OUTPATIENT
Start: 2023-07-21 | End: 2023-07-21 | Stop reason: HOSPADM

## 2023-07-21 RX ORDER — SUCCINYLCHOLINE CHLORIDE 20 MG/ML
INJECTION INTRAMUSCULAR; INTRAVENOUS AS NEEDED
Status: DISCONTINUED | OUTPATIENT
Start: 2023-07-21 | End: 2023-07-21 | Stop reason: SURG

## 2023-07-21 RX ORDER — SODIUM CHLORIDE 9 MG/ML
40 INJECTION, SOLUTION INTRAVENOUS AS NEEDED
Status: DISCONTINUED | OUTPATIENT
Start: 2023-07-21 | End: 2023-07-21 | Stop reason: HOSPADM

## 2023-07-21 RX ORDER — EPHEDRINE SULFATE 50 MG/ML
5 INJECTION, SOLUTION INTRAVENOUS ONCE AS NEEDED
Status: DISCONTINUED | OUTPATIENT
Start: 2023-07-21 | End: 2023-07-21 | Stop reason: HOSPADM

## 2023-07-21 RX ORDER — ONDANSETRON 2 MG/ML
4 INJECTION INTRAMUSCULAR; INTRAVENOUS EVERY 6 HOURS PRN
Status: DISCONTINUED | OUTPATIENT
Start: 2023-07-21 | End: 2023-07-21 | Stop reason: HOSPADM

## 2023-07-21 RX ORDER — FENTANYL CITRATE 50 UG/ML
50 INJECTION, SOLUTION INTRAMUSCULAR; INTRAVENOUS
Status: DISCONTINUED | OUTPATIENT
Start: 2023-07-21 | End: 2023-07-21 | Stop reason: HOSPADM

## 2023-07-21 RX ORDER — LIDOCAINE HYDROCHLORIDE 10 MG/ML
0.5 INJECTION, SOLUTION EPIDURAL; INFILTRATION; INTRACAUDAL; PERINEURAL ONCE AS NEEDED
Status: COMPLETED | OUTPATIENT
Start: 2023-07-21 | End: 2023-07-21

## 2023-07-21 RX ORDER — SODIUM CHLORIDE, SODIUM LACTATE, POTASSIUM CHLORIDE, CALCIUM CHLORIDE 600; 310; 30; 20 MG/100ML; MG/100ML; MG/100ML; MG/100ML
100 INJECTION, SOLUTION INTRAVENOUS CONTINUOUS
Status: DISCONTINUED | OUTPATIENT
Start: 2023-07-21 | End: 2023-07-21 | Stop reason: HOSPADM

## 2023-07-21 RX ORDER — ROCURONIUM BROMIDE 10 MG/ML
INJECTION, SOLUTION INTRAVENOUS AS NEEDED
Status: DISCONTINUED | OUTPATIENT
Start: 2023-07-21 | End: 2023-07-21 | Stop reason: SURG

## 2023-07-21 RX ORDER — SODIUM CHLORIDE 9 MG/ML
INJECTION, SOLUTION INTRAVENOUS CONTINUOUS PRN
Status: DISCONTINUED | OUTPATIENT
Start: 2023-07-21 | End: 2023-07-21 | Stop reason: SURG

## 2023-07-21 RX ORDER — ACETAMINOPHEN 325 MG/1
650 TABLET ORAL EVERY 4 HOURS PRN
Status: DISCONTINUED | OUTPATIENT
Start: 2023-07-21 | End: 2023-07-21 | Stop reason: HOSPADM

## 2023-07-21 RX ORDER — FAMOTIDINE 20 MG/1
20 TABLET, FILM COATED ORAL ONCE
Status: DISCONTINUED | OUTPATIENT
Start: 2023-07-21 | End: 2023-07-21 | Stop reason: HOSPADM

## 2023-07-21 RX ORDER — PROPOFOL 10 MG/ML
VIAL (ML) INTRAVENOUS AS NEEDED
Status: DISCONTINUED | OUTPATIENT
Start: 2023-07-21 | End: 2023-07-21 | Stop reason: SURG

## 2023-07-21 RX ORDER — NALOXONE HCL 0.4 MG/ML
0.4 VIAL (ML) INJECTION AS NEEDED
Status: DISCONTINUED | OUTPATIENT
Start: 2023-07-21 | End: 2023-07-21 | Stop reason: HOSPADM

## 2023-07-21 RX ORDER — SODIUM CHLORIDE, SODIUM LACTATE, POTASSIUM CHLORIDE, CALCIUM CHLORIDE 600; 310; 30; 20 MG/100ML; MG/100ML; MG/100ML; MG/100ML
9 INJECTION, SOLUTION INTRAVENOUS CONTINUOUS
Status: DISCONTINUED | OUTPATIENT
Start: 2023-07-21 | End: 2023-07-21 | Stop reason: HOSPADM

## 2023-07-21 RX ORDER — ACETAMINOPHEN 325 MG/1
650 TABLET ORAL ONCE
Status: COMPLETED | OUTPATIENT
Start: 2023-07-21 | End: 2023-07-21

## 2023-07-21 RX ORDER — HYDROMORPHONE HYDROCHLORIDE 1 MG/ML
0.5 INJECTION, SOLUTION INTRAMUSCULAR; INTRAVENOUS; SUBCUTANEOUS
Status: DISCONTINUED | OUTPATIENT
Start: 2023-07-21 | End: 2023-07-21 | Stop reason: HOSPADM

## 2023-07-21 RX ORDER — NITROGLYCERIN 0.4 MG/1
0.4 TABLET SUBLINGUAL
Status: DISCONTINUED | OUTPATIENT
Start: 2023-07-21 | End: 2023-07-21 | Stop reason: HOSPADM

## 2023-07-21 RX ORDER — SODIUM CHLORIDE 0.9 % (FLUSH) 0.9 %
10 SYRINGE (ML) INJECTION AS NEEDED
Status: DISCONTINUED | OUTPATIENT
Start: 2023-07-21 | End: 2023-07-21 | Stop reason: HOSPADM

## 2023-07-21 RX ADMIN — ONDANSETRON 4 MG: 2 INJECTION INTRAMUSCULAR; INTRAVENOUS at 16:00

## 2023-07-21 RX ADMIN — SUGAMMADEX 200 MG: 100 INJECTION, SOLUTION INTRAVENOUS at 16:48

## 2023-07-21 RX ADMIN — ACETAMINOPHEN 650 MG: 325 TABLET ORAL at 19:26

## 2023-07-21 RX ADMIN — SUCCINYLCHOLINE CHLORIDE 140 MG: 20 INJECTION, SOLUTION INTRAMUSCULAR; INTRAVENOUS at 15:56

## 2023-07-21 RX ADMIN — DEXAMETHASONE SODIUM PHOSPHATE 4 MG: 4 INJECTION, SOLUTION INTRAMUSCULAR; INTRAVENOUS at 16:00

## 2023-07-21 RX ADMIN — FAMOTIDINE 20 MG: 10 INJECTION INTRAVENOUS at 13:12

## 2023-07-21 RX ADMIN — PROPOFOL 200 MG: 10 INJECTION, EMULSION INTRAVENOUS at 15:56

## 2023-07-21 RX ADMIN — ROCURONIUM BROMIDE 30 MG: 10 SOLUTION INTRAVENOUS at 16:01

## 2023-07-21 RX ADMIN — PHENYLEPHRINE HYDROCHLORIDE 0.5 MCG/KG/MIN: 10 INJECTION INTRAVENOUS at 16:10

## 2023-07-21 RX ADMIN — Medication 10 ML: at 13:13

## 2023-07-21 RX ADMIN — SODIUM CHLORIDE: 9 INJECTION, SOLUTION INTRAVENOUS at 15:45

## 2023-07-21 RX ADMIN — LIDOCAINE HYDROCHLORIDE 50 MG: 10 INJECTION, SOLUTION EPIDURAL; INFILTRATION; INTRACAUDAL; PERINEURAL at 15:56

## 2023-07-21 NOTE — OP NOTE
Cardiac Electrophysiology Procedure Note       [facility] at [facility]    CATHETER ABLATION OF SUPRAVENTRICULAR TACHYCARDIA    PROCEDURES PERFORMED:   Full diagnostic EP study  Drug infusion / programmed pacing  left atrial pacing and recording  3D electroanatomic mapping  SVT ablation    PREPROCEDURAL DIAGNOSES:    1.  SVT    POST PROCEDURE DIANGOSES:    1.  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  This discussion was framed by our lengthy conversations  (please see our detailed notes).  Patient verbalized strong understanding of this discussion and a strong desire to proceed with the procedure.  Please note that this detailed informed consent process utilized mutual and shared decision making process between all parties involved, principally the physician and patient, but also potentially with input from the patient's selected family and friends.    General anesthesia was selected for this patient    INDICATION FOR PROCEDURE:  Briefly, Ronna Wayne is a 68 y.o. year old female with a history of highly symptomatic recurrent SVT.  The patient has a history of sinus node function received remotely a dual-chamber permanent pacemaker for this indication.  She also has a known history of atrial fibrillation and is chronically anticoagulated for the primary prevention of stroke.  Patient understands that SVT is a different and distinct rhythm from her atrial fibrillation indeed to hurt feels completely different.  In our office we also reviewed that indeed this ablation procedure today would not limit her A-fib as ablations do not indiscriminately eliminate all arrhythmias rather they are focused procedures.  Her procedure today would target SVT.  She understands this.  She presented today with his elective outpatient  procedure..    PROCEDURE NARRATIVE:    The patient was brought to the EP laboratory in the post absorptive state. The patient was then prepared and draped in a routing sterile fashion.  Seldinger access was obtained at the right common femoral vein with three venipunctures.  J tip wires were advanced into the vascular space.  A short 6 Uzbek sheaths and an SRO sheath were placed into the right common femoral vein and the inferior vena cava / right atrium in an over the wire fashion.    CATHETERS USED FOR THE PROCEDURE:    Quadripolar electrophysiology catheters was placed to the right ventricular apex and the high rate atrium.    A crd 2 quadripolar electrophysiology catheter was placed to the compact AV node his bundle position.    An 8 Uzbek decapolar electrophysiology catheter was placed into the coronary sinus for left atrial pacing recording.    4 mm solid tip ablation catheter was used for pacing recording throughout all of the above structures and additionally also for ablation.    Adequate pacing thresholds were confirmed for all catheters.    Complete diagnostic EP study was performed at this point.  Data obtained from this is listed in the below table:     Initial Study    Isuprel Washout Study           drive train / burst extrastim    QRS 84    Rhythm          Atrial CL      R-R 1057    Ventricular CL      AH 98    AVBCL      HV 54    AVNERP       drive train / burst extrastim   Slow Pway ERP      Rhythm     Fast Pway ERP      Atrial CL     VABCL conc? /  Dec?      Ventricular CL     VAERP      AVBCL 330    AERP      AVNERP 600 260   VERP      Slow Pway ERP     AP antegrade ERP      Fast Pway ERP     AP retrograde ERP      VABCL conc? /  Dec? 290          VAERP 500 230  Final Study      AERP      drive train / burst extrastim    VERP     Rhythm      AP antegrade ERP     Atrial CL      AP retrograde ERP     Ventricular CL           AVBCL     Isuprel Dose =      AVNERP       drive train /  "burst extrastim   Slow Pway ERP      Rhythm     Fast Pway ERP      Atrial CL     VABCL conc? /  Dec?      Ventricular CL     VAERP      AVBCL     AERP      AVNERP     VERP      Slow Pway ERP     AP antegrade ERP      Fast Pway ERP     AP retrograde ERP      VABCL conc? /  Dec?           VAERP           AERP           VERP           AP antegrade ERP           AP retrograde ERP                         VT Induction EP study ([facility] Protocol)        No Isuprel     Site One (RV [place] /  RV Septum)      Drive Train V ERP    350     400     600 / 550         Site Two (RV Outflow Tract /  RV Septum)     Drive Train V ERP    350     400     600 / 550         Isuprel Dose =     Site One (RV [place] /  RV Septum)      Drive Train V ERP    350     400     600 / 550         Site Two (RV Outflow Tract /  RV Septum)     Drive Train V ERP    350     400     600 / 550        Please note we used the [employer] 3D electroanatomic mapping system to construct an electro-anatomic map of the tachycardia circuit right atrium right ventricle AV node position His bundle position triangle of Forde coronary sinus compact AV node and both right and left with extensions of the AV node slow pathway.    MANEUVERS FOR SVT DIAGNOSIS:    SVT was easily induced with a catheter induced PAC.  SVT was a narrow complex rhythm.  There was a one-to-one AV relationship.  The coronary sinus activation was concentric.  The tachycardia had a cycle length of 380 ms.  The VA interval at the septum was -10 ms and was entirely stable.    During tachycardia, I paced the ventricle at 20 milliseconds faster than the tachycardia cycle length.  During pacing, the atrium was accelerated to the pacing cycle length.  Upon cessation of pacing the tachycardia continued at its initial cycle length and a \"VAV\" response was noted.  Thus atrial tachycardia was excluded.    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      ABLATION PORTION OF THE " PROCEDURE:    We carefully mapped the electroanatomic slow pathway.  Excellent slow pathway potentials were noted just anterior and superior to the coronary sinus ostium.  Radiofrequency application was delivered up to 35 W power.  Excellent stable junctional ectopy was noted.  VA conduction was intact throughout.  Additional applications were given to consolidate.    Following ablation isoproterenol was initiated to test for other atrial and ventricular rhythms followed by extrastimulus and burst pacing from both atrium and ventricle.  The dose of isoproterenol was titrated to 5 mcg/min.  We are unable to induce any other rhythms.    Catheters and sheaths were then removed from the body.     Hemostasis was achieved with Vascade closure devices.  Bedrest expected for 2 hours.    [employer] dual-chamber permanent pacemaker was interrogated before and after the procedure.  Stable device function was noted.  The device was confirmed to be programmed DDDR with lower rate limit of 60 pulses per minute.    The patient transferred to recovery in stable condition.     COMPLICATIONS: none    EBL: minimal    KEY PROCEDURAL FINDINGS:    Normal AV node and His-Purkinje system function  No accessory pathway  Easily inducible SVT due to AV howard reentry  Noninducible for SVT after slow pathway catheter ablation    POST PROCEDURAL PLAN:    The patient will be observed with the usual recovery process  and then I anticipate that  the patient can likely be discharged home later today.  Medications were reconciled, and key changes in medications include: Reviewed  The patient will be seen at our office per routine follow up.  Home tonight after 8 PM.  If the patient and family would prefer she can stay.     Israel Steele, DO

## 2023-07-21 NOTE — ANESTHESIA PROCEDURE NOTES
Airway  Urgency: elective    Date/Time: 7/21/2023 3:57 PM  Airway not difficult    General Information and Staff    Patient location during procedure: OR  CRNA/CAA: Schirmer, Shelley P, CRNA    Indications and Patient Condition  Indications for airway management: airway protection    Preoxygenated: yes  MILS not maintained throughout  Mask difficulty assessment: 0 - not attempted    Final Airway Details  Final airway type: endotracheal airway      Successful airway: ETT  Cuffed: yes   Successful intubation technique: video laryngoscopy  Endotracheal tube insertion site: oral  Blade: Hamilton  Blade size: 3  ETT size (mm): 7.0  Cormack-Lehane Classification: grade I - full view of glottis  Placement verified by: chest auscultation and capnometry   Measured from: lips  ETT/EBT  to lips (cm): 21  Number of attempts at approach: 1  Assessment: lips, teeth, and gum same as pre-op and atraumatic intubation    Additional Comments  Negative epigastric sounds, Breath sound equal bilaterally with symmetric chest rise and fall

## 2023-07-21 NOTE — ANESTHESIA POSTPROCEDURE EVALUATION
Patient: Ronna Wayne    Procedure Summary       Date: 07/21/23 Room / Location: [facility] CATH[facility] / [facility] EP INVASIVE [facility]ATION    Anesthesia Start: 1545 Anesthesia Stop: 1705    Procedure: EP +/- RFA SVT, hold Eliquis 1 day, hold metoprolol 3 days Diagnosis:       SVT (supraventricular tachycardia)      (SVT)    Providers: Israel Steele DO Provider: Danilo Justice MD    Anesthesia Type: general ASA Status: 3            Anesthesia Type: general    Vitals  No vitals data found for the desired time range.          Post Anesthesia Care and Evaluation    Patient location during evaluation: PACU  Patient participation: complete - patient participated  Level of consciousness: awake and alert  Pain score: 0  Pain management: adequate    Airway patency: patent  Anesthetic complications: No anesthetic complications  PONV Status: none  Cardiovascular status: hemodynamically stable and acceptable  Respiratory status: nonlabored ventilation, acceptable and nasal cannula  Hydration status: acceptable

## 2023-07-21 NOTE — INTERVAL H&P NOTE
H&P reviewed. The patient was examined and there are no changes to the H&P.       EP Pre-Procedure Report  Cardiovascular Laboratory  [facility]    Patient:  Ronna Wayne  :  1955  PCP:  Mague Acosat APRN  PHONE:  [phone]    DATE: 2023      MEDICATIONS:  Prior to Admission medications    Medication Sig Start Date End Date Taking? Authorizing Provider   acetaminophen (TYLENOL) 325 MG tablet Take 2 tablets by mouth Every 6 (Six) Hours As Needed for Mild Pain.    Provider, MD Jamal   amLODIPine (NORVASC) 5 MG tablet TAKE ONE TABLET BY MOUTH DAILY 23   Brenda Singh APRN   anastrozole (ARIMIDEX) 1 MG tablet Take 1 tablet by mouth Daily. 23   Virgen José MD   apixaban (ELIQUIS) 5 MG tablet tablet Take 1 tablet by mouth 2 (Two) Times a Day. 23   Brenda Singh APRN   aspirin 81 MG chewable tablet Chew & swallow 1 tablet Daily. 23   Michel Lizama MD   cholecalciferol (VITAMIN D3) 25 MCG (1000 UT) tablet Take 2 tablets by mouth Daily.    Provider, MD Jamal   Continuous Blood Gluc  (Dexcom G7 ) device 1 Device Daily. 6/15/23   Mague Acosta APRN   Continuous Blood Gluc Sensor (Dexcom G7 Sensor) misc 1 Device 90 Minutes Prior to Surgery for 1 dose. 6/15/23   Mague Acosta APRN   Diclofenac Sodium (VOLTAREN) 1 % gel gel Apply 4 g topically to the appropriate area as directed 3 (Three) Times a Day As Needed (joint pain). 6/15/23   Mague Acosta APRN   empagliflozin (Jardiance) 25 MG tablet tablet Take 1 tablet by mouth Every Morning. 22   Mague Acosta APRN   furosemide (LASIX) 20 MG tablet TAKE 1 TABLET BY MOUTH DAILY 23   Brenda Singh APRN   insulin aspart (novoLOG FLEXPEN) 100 UNIT/ML solution pen-injector sc pen Inject 0-9 Units under the skin into the appropriate area as directed 3 (Three) Times a Day With Meals. Admelog Solostar sliding scale     ProviderJamal MD   Insulin Glargine, 2 Unit Dial, (Toujeo Max SoloStar) 300 UNIT/ML solution pen-injector injection Inject 28 Units under the skin into the appropriate area as directed every night at bedtime.    ProviderJamal MD   levothyroxine (SYNTHROID, LEVOTHROID) 88 MCG tablet TAKE ONE TABLET BY MOUTH DAILY 7/5/23   Mague Acosta APRN   Magnesium Oxide -Mg Supplement (MAGnesium-Oxide) 400 (240 Mg) MG tablet TAKE ONE TABLET BY MOUTH THREE TIMES A DAY 7/19/23   Mague Acosta APRN   metoprolol tartrate (LOPRESSOR) 25 MG tablet TAKE ONE TABLET BY MOUTH TWICE A DAY 7/17/23   Brenad Singh APRN   ondansetron ODT (ZOFRAN-ODT) 4 MG disintegrating tablet Place 1 tablet on the tongue Every 8 (Eight) Hours As Needed for Nausea or Vomiting. 6/15/23   Mague Acosta APRN   pantoprazole (PROTONIX) 40 MG EC tablet Take 1 tablet by mouth As Needed.    ProviderJamal MD   rosuvastatin (CRESTOR) 20 MG tablet Take 1 tablet by mouth Daily. 10/28/22   Brenda Singh APRN   Semaglutide,0.25 or 0.5MG/DOS, (Ozempic, 0.25 or 0.5 MG/DOSE,) 2 MG/1.5ML solution pen-injector Inject 0.25 mg under the skin into the appropriate area as directed 1 (One) Time Per Week. 3/10/23   Mague Acosta APRN       Past medical & surgical history, social and family history reviewed in the electronic medical record.    Physical Exam:    Vitals: There were no vitals filed for this visit. There were no vitals filed for this visit.There is no height or weight on file to calculate BMI.    GENERAL: No apparent distress.  No significant changes since last exam.  CHEST: Clear to auscultation bilaterally no stridor no wheeze.  CV: S1, S2, Regular without Murmurs, Rubs or Gallops  EXTREMITIES: No edema.                Estimated Creatinine Clearance: 78.4 mL/min (by C-G formula based on SCr of 0.87 mg/dL).    IMPRESSION:SVT      PLAN:  Procedure to perform: SVT ablation        Electronically  signed by MP Owens, 07/21/23, 11:23 AM EDT.

## 2023-07-21 NOTE — ANESTHESIA PREPROCEDURE EVALUATION
Anesthesia Evaluation     Patient summary reviewed and Nursing notes reviewed   history of anesthetic complications:   NPO Solid Status: > 8 hours  NPO Liquid Status: > 2 hours           Airway   Mallampati: III  TM distance: >3 FB  Neck ROM: limited  Possible difficult intubation  Dental      Pulmonary    (+) a smoker (remote) Former,shortness of breath, sleep apnea  (-) COPD, asthma, recent URI, no home oxygen  Cardiovascular     ECG reviewed  Patient on routine beta blocker    (+) pacemaker pacemaker, hypertensiondysrhythmias Paroxysmal Atrial Fib, hyperlipidemia  Valvular problems/murmurs: see ECHO.    ROS comment: ECG SR   ECHO 2023 EF.61 %.LVDD (grade II w/high LAP) pseudonormalization.  GXT 2023 LOW RISK  ·normal ECG stress test. Very mild fixed mid anterior wall defect,attenuation artifact         Neuro/Psych  (+) TIA, CVA (2017 speech and tremor Left hand transient), headaches, psychiatric history  GI/Hepatic/Renal/Endo    (+) obesity, morbid obesity, GERD, PUD, diabetes mellitus type 2 using insulin, thyroid problem   (-) no renal disease (creat <1)    Musculoskeletal     Abdominal    Substance History      OB/GYN          Other      history of cancer (breast  R lumpectomy and nodes)    ROS/Med Hx Other: Eliquis HCT 37   Ozempic 1 week ago       Phys Exam Other: Mac 3 grade IIa 2021 Outside hosp                Anesthesia Plan    ASA 3     general   Rapid sequence  (RSI Cp ETT  [place] vs true MAC )  intravenous induction     Anesthetic plan, risks, benefits, and alternatives have been provided, discussed and informed consent has been obtained with: patient.    Plan discussed with CRNA.      CODE STATUS:

## 2023-07-24 ENCOUNTER — CALL CENTER PROGRAMS (OUTPATIENT)
Dept: CALL CENTER | Facility: HOSPITAL | Age: 68
End: 2023-07-24
Payer: MEDICARE

## 2023-07-24 NOTE — OUTREACH NOTE
PCI/Device Survey      Flowsheet Row Responses   Facility patient discharged from? [facility]   Procedure date 07/21/23   Procedure (if device, specify in description) Ablation  [right fem site]   Performing MD Dr. Israel Steele   Attempt successful? Yes   Call start time 1036   Call end time 1041   Nursing interventions Patient education provided   Symptom comments sore in her chest   Is the patient taking prescribed medications: ASA  [Eliquis to resume today.]   Nursing intervention Reminded to continue to take prescribed medications, Nurse provided patient education   Does the patient have any of the following symptoms related to the cath/surgical site? --  [Right groin site, healing well. No issues.]   Nursing intervention Patient education provided   Does the patient have an appointment scheduled with the cardiologist? No   Appointment comments waiting on call back   If the patient is a current smoker, are they able to teach back resources for cessation? Not a smoker   Did the patient feel prepared to go home on the same day as the procedure? Yes   Is the patient satisfied with the same day discharge process? Yes   PCI/Device call completed Yes   Wrap up additional comments Patient is feeling better. No questions or concerns.            Estrellita CRUZ - Registered Nurse

## 2023-08-09 ENCOUNTER — OFFICE VISIT (OUTPATIENT)
Dept: ONCOLOGY | Facility: CLINIC | Age: 68
End: 2023-08-09
Payer: MEDICARE

## 2023-08-09 VITALS
OXYGEN SATURATION: 97 % | RESPIRATION RATE: 18 BRPM | WEIGHT: 274 LBS | DIASTOLIC BLOOD PRESSURE: 79 MMHG | BODY MASS INDEX: 48.55 KG/M2 | HEIGHT: 63 IN | TEMPERATURE: 97.3 F | SYSTOLIC BLOOD PRESSURE: 141 MMHG | HEART RATE: 64 BPM

## 2023-08-09 DIAGNOSIS — C50.411 MALIGNANT NEOPLASM OF UPPER-OUTER QUADRANT OF RIGHT BREAST IN FEMALE, ESTROGEN RECEPTOR POSITIVE: Primary | ICD-10-CM

## 2023-08-09 DIAGNOSIS — Z17.0 MALIGNANT NEOPLASM OF UPPER-OUTER QUADRANT OF RIGHT BREAST IN FEMALE, ESTROGEN RECEPTOR POSITIVE: Primary | ICD-10-CM

## 2023-08-09 PROCEDURE — 99214 OFFICE O/P EST MOD 30 MIN: CPT | Performed by: INTERNAL MEDICINE

## 2023-08-09 PROCEDURE — 3077F SYST BP >= 140 MM HG: CPT | Performed by: INTERNAL MEDICINE

## 2023-08-09 PROCEDURE — 1126F AMNT PAIN NOTED NONE PRSNT: CPT | Performed by: INTERNAL MEDICINE

## 2023-08-09 PROCEDURE — 3078F DIAST BP <80 MM HG: CPT | Performed by: INTERNAL MEDICINE

## 2023-08-09 NOTE — PROGRESS NOTES
Name:  Ronna Wayne  :  1955  Date:  2023     REFERRING PHYSICIAN  Lee Parrish MD    PRIMARY CARE PROVIDER  Mague Acosta APRN    REASON FOR FOLLOWUP  1. Malignant neoplasm of upper-outer quadrant of right breast in female, estrogen receptor positive      CHIEF COMPLAINT  Intermittent left-sided shoulder pains and chronic, mildly decreased ROM.    Dear Lee,    HISTORY OF PRESENT ILLNESS:   I saw Ms. Wayne in follow up today in our medical oncology clinic. As you are aware, she is a pleasant, 68 y.o., white female with a history of multiple medical problems, including hypertension, diabetes and sleep apnea who was diagnosed with an ER positive (95%), AZ positive (95%), HER2/abida negative (1+ by IHC), invasive, mammary carcinoma (with mixed ductal and lobular features) of the upper, outer quadrant of the right breast in late Spring 2021. She was referred to you, and you performed a successful, right-sided lumpectomy on 2021. A sentinel node was positive; however, an additional twelve, excised, right-sided, axillary lymph nodes were uninvolved (). Final, surgical staging was consistent with stage [id number] disease (IIB). She was subsequently referred to our clinic (and radiation oncology) for further evaluation and management. Adjuvant treatment with whole breast irradiation followed by (at least five years of) endocrine therapy were ultimately recommended. She completed a thirty-fraction course of the former by late 2021 and began the latter (daily Arimidex) by early 2021. She has been doing overall very well in routine followup, ever since then, with no evidence of residual/recurrent disease to date.    INTERIM HISTORY:  Ms. Wayne returns to clinic today for followup by herself. She began Arimidex in early 2021, has been on it for a total of well over one and one half (1.5) years now and is still tolerating this medication overall very  well, with no noticeable side effects. In July 2022, her right-sided breast seroma increased in size to the point that it ruptured; and she had to undergo an I and D on 07/07/2022. She has stopped following with the lymphedema clinic as she is currently still doing well performing routine exercises on her own. She had to have a pacemaker/defibrillator placed earlier this year and underwent a cardiac ablation (for chronic afib) last month. She has some longstanding, intermittent symptoms of pain and a decreased ROM in her left shoulder (the side opposite to her mastectomy related lymphedema issues). She otherwise has no new or specific complaints and continues to feel overall well (if chronically fatigued).    Past Medical History:   Diagnosis Date    Anxiety     B12 deficiency     BPV (benign positional vertigo)     Breast cancer 05/2021    Diabetes mellitus     Diarrhea     Disease of thyroid gland     Elevated cholesterol     Fibrocystic breast     Fibromyalgia     GERD (gastroesophageal reflux disease)     Glaucoma     Heart murmur     Hyperlipidemia     Hypertension     Kidney disease     Obesity     Peptic ulceration     PONV (postoperative nausea and vomiting)     Primary central sleep apnea     Sleep apnea     c-pap    Stroke     TIA (transient ischemic attack) 3/31/2019    Weakness of left arm        Past Surgical History:   Procedure Laterality Date    ABDOMINAL SURGERY      APPENDECTOMY      BREAST ABSCESS INCISION AND DRAINAGE Right 07/07/2022    Procedure: BREAST ABSCESS INCISION AND DRAINAGE;  Surgeon: Lee Parrish MD;  Location: [facility] OR;  Service: General;  Laterality: Right;    BREAST BIOPSY  May 2021    BREAST CYST ASPIRATION      BREAST LUMPECTOMY WITH SENTINEL NODE BIOPSY Right 08/05/2021    Procedure: BREAST LUMPECTOMY WITH SENTINEL NODE BIOPSY;  Surgeon: Lee Parrish MD;  Location: [facility] OR;  Service: General;  Laterality: Right;    CARDIAC ELECTROPHYSIOLOGY PROCEDURE N/A  2023    Procedure: Pacemaker DC new;  Surgeon: Bj Hyde MD;  Location:  [facility]ATH [facility]ASIVE [facility];  Service: Cardiology;  Laterality: N/A;    CARDIAC ELECTROPHYSIOLOGY PROCEDURE N/A 2023    Procedure: EP +/- RFA SVT, hold Eliquis 1 day, hold metoprolol 3 days;  Surgeon: Israel Barrientos DO;  Location:  [facility] INVASIVE LOCATION;  Service: Cardiovascular;  Laterality: N/A;    CARDIAC ELECTROPHYSIOLOGY STUDY AND ABLATION      2023 PER DR. BARRIENTOS    CATARACT EXTRACTION Bilateral     CHOLECYSTECTOMY      COLONOSCOPY      ENDOSCOPY      HEMATOMA EVACUATION TRUNK Right 2021    Procedure: HEMATOMA EVACUATION TRUNK;  Surgeon: Lee Parrish MD;  Location:  [facility] OR;  Service: General;  Laterality: Right;    PACEMAKER IMPLANTATION      URETHRA SURGERY         Social History     Socioeconomic History    Marital status:    Tobacco Use    Smoking status: Former     Packs/day: 1.50     Years: 8.00     Pack years: 12.00     Types: Cigarettes     Quit date: 1976     Years since quittin.6     Passive exposure: Past    Smokeless tobacco: Never    Tobacco comments:     I smoked as a teen and stopped when my son was born   Vaping Use    Vaping Use: Never used   Substance and Sexual Activity    Alcohol use: No     Comment: former social drinker not had anything in 25 + years    Drug use: Never    Sexual activity: Not Currently     Partners: Male     Birth control/protection: Abstinence, Post-menopausal, Vasectomy       Family History   Problem Relation Age of Onset    Thyroid disease Mother     Diabetes Mother     Hyperlipidemia Father     Hypertension Father     Heart attack Father     Lung disease Father     Hypertension Sister     Breast cancer Sister 40    Obesity Sister     Hypertension Sister     Hyperlipidemia Sister     Cancer Sister         Breast cancer    Hyperlipidemia Sister     Hypertension Sister     Arthritis Sister     Obesity Sister     Thyroid disease Sister      Alcohol abuse Maternal Aunt     Cancer Maternal Grandfather         Prostrate    Stroke Paternal Grandmother     Stroke Paternal Grandfather     Hypertension Other        Allergies   Allergen Reactions    Codeine GI Intolerance     Increased heart rate      Sulfa Antibiotics GI Intolerance     Heart rate increase        Current Outpatient Medications   Medication Sig Dispense Refill    acetaminophen (TYLENOL) 325 MG tablet Take 2 tablets by mouth Every 6 (Six) Hours As Needed for Mild Pain.      amLODIPine (NORVASC) 5 MG tablet TAKE ONE TABLET BY MOUTH DAILY 30 tablet 1    anastrozole (ARIMIDEX) 1 MG tablet Take 1 tablet by mouth Daily. 30 tablet 11    apixaban (ELIQUIS) 5 MG tablet tablet Take 1 tablet by mouth 2 (Two) Times a Day. 60 tablet 6    aspirin 81 MG chewable tablet Chew & swallow 1 tablet Daily. 30 tablet 0    cholecalciferol (VITAMIN D3) 25 MCG (1000 UT) tablet Take 2 tablets by mouth Daily.      Continuous Blood Gluc  (Dexcom G7 ) device 1 Device Daily. 1 each 0    Continuous Blood Gluc Sensor (Dexcom G7 Sensor) misc 1 Device 90 Minutes Prior to Surgery for 1 dose. 9 each 3    Diclofenac Sodium (VOLTAREN) 1 % gel gel Apply 4 g topically to the appropriate area as directed 3 (Three) Times a Day As Needed (joint pain). 350 g 2    empagliflozin (Jardiance) 25 MG tablet tablet Take 1 tablet by mouth Every Morning. 30 tablet 3    furosemide (LASIX) 20 MG tablet TAKE 1 TABLET BY MOUTH DAILY 30 tablet 2    insulin aspart (novoLOG FLEXPEN) 100 UNIT/ML solution pen-injector sc pen Inject 0-9 Units under the skin into the appropriate area as directed 3 (Three) Times a Day With Meals. Admelog Solostar sliding scale      Insulin Glargine, 2 Unit Dial, (Toujeo Max SoloStar) 300 UNIT/ML solution pen-injector injection Inject 28 Units under the skin into the appropriate area as directed every night at bedtime.      levothyroxine (SYNTHROID, LEVOTHROID) 88 MCG tablet TAKE ONE TABLET BY MOUTH DAILY 90  tablet 0    Magnesium Oxide -Mg Supplement (MAGnesium-Oxide) 400 (240 Mg) MG tablet TAKE ONE TABLET BY MOUTH THREE TIMES A DAY (Patient taking differently: 1 tablet 2 (Two) Times a Day.) 90 tablet 1    metoprolol tartrate (LOPRESSOR) 25 MG tablet TAKE ONE TABLET BY MOUTH TWICE A  tablet 1    ondansetron ODT (ZOFRAN-ODT) 4 MG disintegrating tablet Place 1 tablet on the tongue Every 8 (Eight) Hours As Needed for Nausea or Vomiting. 20 tablet 1    pantoprazole (PROTONIX) 40 MG EC tablet Take 1 tablet by mouth As Needed.      rosuvastatin (CRESTOR) 20 MG tablet Take 1 tablet by mouth Daily. 30 tablet 4    Semaglutide,0.25 or 0.5MG/DOS, (Ozempic, 0.25 or 0.5 MG/DOSE,) 2 MG/1.5ML solution pen-injector Inject 0.25 mg under the skin into the appropriate area as directed 1 (One) Time Per Week. 3 mL 0    vitamin E 100 UNIT capsule Take 1 capsule by mouth Daily.       No current facility-administered medications for this visit.     REVIEW OF SYSTEMS  CONSTITUTIONAL:  No fever, chills or night sweats. Chronic fatigue.  EYES:  No blurry vision, diplopia or other vision changes.  ENT:  No hearing loss, nosebleeds or sore throat.  CARDIOVASCULAR:  No palpitations, arrhythmia, syncopal episodes or edema.  PULMONARY:  No hemoptysis, wheezing, chronic cough or shortness of breath.  BREASTS: As per the HPI above.  GASTROINTESTINAL:  No nausea or vomiting.  No constipation or diarrhea. No abdominal pain.  GENITOURINARY:  No hematuria, kidney stones or frequent urination.  MUSCULOSKELETAL:  As per the HPI above.  INTEGUMENTARY: As per the HPI above.  ENDOCRINE:  No excessive thirst or hot flashes.  HEMATOLOGIC:  No history of free bleeding, spontaneous bleeding or clotting.  IMMUNOLOGIC:  No allergies or frequent infections.  NEUROLOGIC: No numbness, tingling, seizures or weakness.  PSYCHIATRIC:  No anxiety or depression.    PHYSICAL EXAMINATION  /79   Pulse 64   Temp 97.3 øF (36.3 øC) (Temporal)   Resp 18   Ht 160 cm  "(62.99\")   Wt 124 kg (274 lb)   SpO2 97%   BMI 48.55 kg/mý     Pain Score:  Pain Score    23 1442   PainSc: 0-No pain       PHQ-Score Total:  PHQ-9 Total Score:      ECO  GENERAL:  A well-developed, well-nourished, morbidly obese, white female in no acute distress.  HEENT:  Pupils equally round and reactive to light. Extraocular muscles intact.  CARDIOVASCULAR:  Regular rate and rhythm. No murmurs, gallops or rubs.  LUNGS:  Clear to auscultation bilaterally.  BREASTS: Deferred today. Status post right-sided lumpectomy in 2021.  ABDOMEN:  Soft, nontender, nondistended with positive bowel sounds.  EXTREMITIES:  No clubbing, cyanosis or edema bilaterally.  SKIN:  No rashes or petechiae.  NEURO:  Cranial nerves grossly intact. No focal deficits.  PSYCH:  Alert and oriented x3.    LABORATORY  Lab Results   Component Value Date    WBC 6.01 2023    HGB 12.9 2023    HCT 38 2023    MCV 99.0 (H) 2023     (L) 2023    NEUTROABS 2.98 2023       Lab Results   Component Value Date     2023    K 3.6 2023     2023    CO2 25.0 2023    BUN 11 2023    CREATININE 0.70 2023    GLUCOSE 161 (H) 2023    CALCIUM 9.4 2023    AST 34 (H) 2023    ALT 23 2023    ALKPHOS 99 2023    BILITOT 0.7 2023    PROTEINTOT 7.1 2023    ALBUMIN 3.3 (L) 2023     CBC (2023): WBCs: 6.01; HgB: 12.9; Hct: 12.9; platelets: 132  CBC (2023): WBCs: 7.86; HgB: 13.9; Hct: 41.4; platelets: 117  CBC (2022): WBCs: 7.11; HgB: 12.5; Hct: 37.3; platelets: 152  CBC (2021): WBCs: 5.36; HgB: 12.8; Hct: 39.1; platelets: 148  CBC (2021): WBCs: 6.63; HgB: 11.9; Hct: 36.1; platelets: 160  CBC (2021): WBCs: 9.36; HgB: 9.2; Hct: 29.1; platelets: 131    IMAGING  MRI breast bilateral with and without contrast (2021):  Impression:  1) Negative left breast MRI.  2) Biopsy proven malignancy in " the right 9:00 region measuring 3.7 cm. There are no additional worrisome findings in the right breast.    Bone densitometry (DEXA) scan (05/11/2021):  Impression: The BMD measured at the AP spine L1-L4 is 1.157 gm/cm2 with a T-score of -0.2. The patient is considered to be normal according to WHO criteria. Fracture risk is low.    Bilateral diagnostic mammogram with tomosynthesis (04/14/2022):  Impression:  1) Stable mammographic appearance of the left breast with no findings suspicious for malignancy.  2) Presumed postoperative and posttreatment related changes in the right breast. Recommend short interval followup.    Mammogram, diagnostic, right with tomosynthesis (10/18/2022):  Impression: Probably benign right mammographic findings. Recommend continued followup. Bi-Rads Category 3; Probably Benign.    Mammogram, diagnostic, bilateral with tomosynthesis (05/16/2023):  Impression: Stable mammographic appearance of both breasts including postoperative and posttreatment related changes being followed in the right breast. Bi-Rads Category 3; Probably Benign.    Bone densitometry (DEXA) scan (05/16/2023):  Impression: The BMD measured at the AP spine L1-L4 is 1.121 gm/cm2 with a T-score of -0.6. The patient is considered to be normal according to WHO criteria. Fracture risk is low.    PATHOLOGY  Breast, lumpectomy, right (08/05/2021):  Invasive mammary carcinoma with ductal and lobular features, margins uninvolved. Atypical, lobular hyperplasia with involvement of ducts. Dense stromal fibrosis. Greatest dimension of invasive focus: 31 mm. ER positive (95%), TX positive (95%), HER2 negative (1+ by IHC). [id number] (stage IIB).    [place] node, right #1 (08/05/2021):  Metastatic mammary carcinoma, 2.5 cmm extent, with extranodal extension (1/1).    Axillary contents, right (08/05/2021):  No malignancy identified in twelve axillary lymph nodes (0/12).    MammaPrint result (08/31/2021): Low risk.    IMPRESSION AND  "PLAN  Ms. Wayne is a 68 y.o., white female with:  Breast carcinoma: Diagnosed in late Spring 2021 and status post a right-sided lumpectomy with right axillary evaluation on 08/05/2021. The final, surgical pathology was consistent with stage IIB ([id number]), ER positive (95%), AR positive (95%), HER2 negative (1+ by IHC), invasive, mammary carcinoma (with ductal and lobular features) of the right breast. The surgical margins were clear; and, while one sentinel node was involved, an additional twelve lymph nodes were all negative for metastasis (1/13). I have had multiple, long discussions with the patient since the time of her initial consultation in our clinic (on 08/25/2021) regarding this diagnosis and its prognosis. In short, her surgery went extremely well; and her prognosis was/remains overall very good. That said, adjuvant treatment was/remains recommended in order to maximize her chances of cure. As MammaPrint testing on her tumor was consistent with \"low risk\" disease, chemotherapy was not indicated (as the potential risks would have drastically outweighed its very limited/nonexistant potential benefits in her case). As she had a lumpectomy, whole breast radiation was definitely indicated, and she completed a thirty fraction course of this therapy on 11/30/2021. As her tumor is very strongly ER/AR positive, endocrine therapy (with an aromatase inhibitor, given her postmenopausal status) was definitely indicated and strongly recommended as the final step in her adjuvant treatment. She was given a Rx for Arimidex 1 mg PO daily at that time. She has been on this therapy for a total of well over one and one half (1.5) years now and is still tolerating it overall well, without any noticeable side effects. We will proceed with this current treatment plan. Meanwhile, I have had multiple, long discussions with her regarding the current guidelines for the routine follow up of patients with a history of breast " cancer. We again discussed how, other than periodic clinic visits, continued, routine mammograms of remaining breast tissue (the most recent followup, bilateral exam, performed on 05/16/2023 and summarized above, remained Bi-Rads Category 3 due to stable, right-sided treatment related changes; a six month, followup right-sided exam will be ordered today and performed in ~mid-November 2023) and routine bone density monitoring (particularly since she is on an AI), there is no proven benefit to performing additional studies (including blood work, such as CBCs, LFTs or tumor markers, or imaging, such as CT, NM bone or PET scans) to identify metastatic recurrences before they become symptomatic (and are identified via the focused investigation of new symptoms), as patient outcomes are not improved by doing so (she should continue to get routine CBCs, etc. through her PCP's office as indicated, of course). We will see her back in our clinic in six months (~early February 2024) for another wellness visit.  Bone health: The results from the patient's most recent DEXA scan (performed on 05/16/2023) are summarized above. Her BMD remains WNL. Continue to monitor on Arimidex (we will repeat a DEXA scan in Spring 2025).  Lymphedema: Secondary to a combination of right-sided, axillary surgery and adjuvant, localized XRT. She is currently not following routinely with our lymphedema clinic as she continues to do well performing some routine exercises on her own. Continue to monitor.  Seroma: Now still resolved. Ongoing follow up per surgery.  The patient was in agreement with these plans.    It is a pleasure to participate in Ms. Wayne's care. Please do not hesitate to call with any questions or concerns that you may have.    A total of 30 minutes were spent coordinating this patient's care in clinic today; more than 50% of this time was face-to-face with the patient, reviewing her interim medical history, discussing the  results of May's repeat, bilateral mammogram and DEXA scan and counseling on the current treatment and followup plan. All questions were answered to her satisfaction.    FOLLOW UP  Continue Arimidex 1 mg PO daily. With surgery, as previously planned. Repeat a right-sided mammogram in mid-November (order placed today). Return to our clinic in 6 months (~early February 2024).            This document was electronically signed by LEYDI Teresa MD August 9, 2023 15:12 EDT      CC: MD Marbin Ramirez MD Sherelene S. Fortney, [place]

## 2023-08-10 ENCOUNTER — OFFICE VISIT (OUTPATIENT)
Dept: PULMONOLOGY | Facility: CLINIC | Age: 68
End: 2023-08-10
Payer: MEDICARE

## 2023-08-10 VITALS
HEIGHT: 64 IN | DIASTOLIC BLOOD PRESSURE: 88 MMHG | HEART RATE: 74 BPM | WEIGHT: 274 LBS | OXYGEN SATURATION: 97 % | TEMPERATURE: 96.9 F | BODY MASS INDEX: 46.78 KG/M2 | SYSTOLIC BLOOD PRESSURE: 132 MMHG

## 2023-08-10 DIAGNOSIS — E66.01 MORBID OBESITY WITH BMI OF 45.0-49.9, ADULT: ICD-10-CM

## 2023-08-10 DIAGNOSIS — G47.33 OSA (OBSTRUCTIVE SLEEP APNEA): Primary | ICD-10-CM

## 2023-08-10 PROCEDURE — 3075F SYST BP GE 130 - 139MM HG: CPT | Performed by: NURSE PRACTITIONER

## 2023-08-10 PROCEDURE — 99213 OFFICE O/P EST LOW 20 MIN: CPT | Performed by: NURSE PRACTITIONER

## 2023-08-10 PROCEDURE — 1160F RVW MEDS BY RX/DR IN RCRD: CPT | Performed by: NURSE PRACTITIONER

## 2023-08-10 PROCEDURE — 3079F DIAST BP 80-89 MM HG: CPT | Performed by: NURSE PRACTITIONER

## 2023-08-10 PROCEDURE — 1159F MED LIST DOCD IN RCRD: CPT | Performed by: NURSE PRACTITIONER

## 2023-08-10 RX ORDER — HYDROCHLOROTHIAZIDE 12.5 MG/1
CAPSULE, GELATIN COATED ORAL
COMMUNITY

## 2023-08-10 RX ORDER — LINAGLIPTIN AND METFORMIN HYDROCHLORIDE 2.5; 1 MG/1; MG/1
TABLET, FILM COATED ORAL 2 TIMES DAILY
COMMUNITY

## 2023-08-10 RX ORDER — DEXLANSOPRAZOLE 60 MG/1
CAPSULE, DELAYED RELEASE ORAL DAILY
COMMUNITY

## 2023-08-10 RX ORDER — VALSARTAN 160 MG/1
TABLET ORAL 2 TIMES DAILY
COMMUNITY

## 2023-08-10 NOTE — PROGRESS NOTES
"Chief Complaint  Sleep Apnea (Titration on 5/11)    Subjective        Ronna Wayne presents to [facility]  History of Present Illness    Ms. Wayne is a 68 year old female  with a medical history significant for anxiety, diabetes, hypothyroidism, fibromyalgia, GERD, glaucoma, hyperlipidemia, hypertension, kidney disease, stroke and sleep apnea.    She presents today for follow up on sleep apnea.  She underwent a titration study in May.  At this time order was placed for her to have a cpap with setting of 10 cm H2O.  She tells me that she feels that the new settings aren't providing enough pressure.      Objective   Vital Signs:  /88 (BP Location: Left arm, Patient Position: Sitting)   Pulse 74   Temp 96.9 øF (36.1 øC)   Ht 161.3 cm (63.5\")   Wt 124 kg (274 lb)   SpO2 97%   BMI 47.78 kg/mý   Estimated body mass index is 47.78 kg/mý as calculated from the following:    Height as of this encounter: 161.3 cm (63.5\").    Weight as of this encounter: 124 kg (274 lb).               Physical Exam     GENERAL APPEARANCE: Well developed, well nourished, alert and cooperative, and appears to be in no acute distress.    HEAD: normocephalic. Atraumatic.    EYES: PERRL, EOMI. Vision is grossly intact.    THROAT: Oral cavity and pharynx normal. No inflammation, swelling, exudate, or lesions.     NECK: Neck supple.  No thyromegaly.    CARDIAC: Normal S1 and S2. No S3, S4 or murmurs. Rhythm is regular.     RESPIRATORY:Bilateral air entry positive. Bilateral diminished breath sounds. No wheezing, crackles or rhonchi noted.    GI: Positive bowel sounds. Soft, nondistended, nontender.     MUSCULOSKELETAL: No significant deformity or joint abnormality. No edema. Peripheral pulses intact. No varicosities.    NEUROLOGICAL: Strength and sensation symmetric and intact throughout.     PSYCHIATRIC: The mental examination revealed the patient was oriented to person, " place, and time.     Result Review :  The following data was reviewed by: B[place] Melvin on 08/10/2023:  Common labs          6/11/2023    03:08 6/24/2023    09:11 7/21/2023    11:40 7/21/2023    12:26   Common Labs   Glucose 283  315  161     BUN 16  15  11     Creatinine 0.86  0.87  0.71  0.70    Sodium 132  137  138     Potassium 3.9  4.0  3.6     Chloride 98  102  101     Calcium 9.8  9.4  9.4     Albumin 3.6  3.3      Total Bilirubin 0.7  0.7      Alkaline Phosphatase 115  99      AST (SGOT) 32  34      ALT (SGPT) 24  23      WBC 7.71  5.04  6.01     Hemoglobin 14.5  14.1  12.9  12.9    Hematocrit 42.7  42.0  37.8  38    Platelets 145  132  132     Hemoglobin A1C 10.10                      Assessment and Plan   Diagnoses and all orders for this visit:    1. MEMO (obstructive sleep apnea) (Primary)  -     Miscellaneous DME    2. Morbid obesity with BMI of 45.0-49.9, adult         Cpap is currently set at 10 cm H2O.  She reports that she feels that it is not enough pressure.  Will place order to have settings adjust for patient comfort.     Patient was educated on positive airway pressure treatment.  As per CMS guidelines, more than 4 hours on 70% of observed nights is considered adherence. Patient was strongly encouraged to use CPAP as much as possible during sleep as more CPAP use is equal to more benefit. Use of heated humidification in positive airway pressure treatment to improve the adherence to the device.  In case of claustrophobia, we will provide the patient cognitive behavioral therapy and desensitization. Oral appliances use will be discussed with the patient in case of mild to moderate sleep apnea or if the patient with severe disease fail positive airway pressure treatment.       The patient was extensively educated on the consequences of untreated obstructive sleep apnea namely cardiovascular/metabolic disorder, neurocognitive deficit, daytime sleepiness, motor vehicle accidents,  depression, mood disorders and reduced quality of life.  At the end of conversation, the patient voices understanding of the disease process and treatment modality.  Patient also understands the risk of untreated obstructive sleep apnea and benefit benefits of the treatment.    Counseling time was greater than 10 minutes.      Follow Up   Return in about 4 weeks (around 9/7/2023).  Patient was given instructions and counseling regarding her condition or for health maintenance advice. Please see specific information pulled into the AVS if appropriate.

## 2023-08-11 ENCOUNTER — TELEPHONE (OUTPATIENT)
Dept: FAMILY MEDICINE CLINIC | Facility: CLINIC | Age: 68
End: 2023-08-11

## 2023-08-11 DIAGNOSIS — Z79.4 TYPE 2 DIABETES MELLITUS WITH HYPERGLYCEMIA, WITH LONG-TERM CURRENT USE OF INSULIN: Primary | ICD-10-CM

## 2023-08-11 DIAGNOSIS — E11.65 TYPE 2 DIABETES MELLITUS WITH HYPERGLYCEMIA, WITH LONG-TERM CURRENT USE OF INSULIN: Primary | ICD-10-CM

## 2023-08-11 RX ORDER — INSULIN GLARGINE 100 [IU]/ML
INJECTION, SOLUTION SUBCUTANEOUS
Qty: 20 ML | Refills: 12 | COMMUNITY
Start: 2023-08-11

## 2023-08-11 NOTE — TELEPHONE ENCOUNTER
Check with her and ask her if she would use some of the Lantus until we can get her through this. Explain she would need to use syringe

## 2023-08-11 NOTE — TELEPHONE ENCOUNTER
Caller: Ronna Wayne    Relationship to patient: Self    Best call back number: [phone]     PATIENT IS WANTING AIDEN TO CALL HER TAL TO DISCUSS DISCUSS HER MEDICATION.

## 2023-08-15 ENCOUNTER — TELEPHONE (OUTPATIENT)
Dept: FAMILY MEDICINE CLINIC | Facility: CLINIC | Age: 68
End: 2023-08-15
Payer: MEDICARE

## 2023-08-15 NOTE — TELEPHONE ENCOUNTER
----- Message from Kanchan Rodriguez, [place] [facility] sent at 8/14/2023  2:55 PM EDT -----  When you get a chance please call epi regarding her income papers she dropped off to you while back        It needs faxed to susu zapien at [facility]

## 2023-08-23 ENCOUNTER — TELEPHONE (OUTPATIENT)
Dept: FAMILY MEDICINE CLINIC | Facility: CLINIC | Age: 68
End: 2023-08-23
Payer: MEDICARE

## 2023-08-23 NOTE — TELEPHONE ENCOUNTER
Spoke with patient and let her know her Judson came in from the patient assistance program and she can come by the office and pick it up. Patient is in understanding.

## 2023-08-30 DIAGNOSIS — I10 ESSENTIAL HYPERTENSION: ICD-10-CM

## 2023-08-30 RX ORDER — AMLODIPINE BESYLATE 5 MG/1
TABLET ORAL
Qty: 30 TABLET | Refills: 1 | Status: SHIPPED | OUTPATIENT
Start: 2023-08-30

## 2023-09-06 LAB
QT INTERVAL: 380 MS
QTC INTERVAL: 430 MS

## 2023-09-22 ENCOUNTER — LAB (OUTPATIENT)
Dept: FAMILY MEDICINE CLINIC | Facility: CLINIC | Age: 68
End: 2023-09-22
Payer: MEDICARE

## 2023-09-22 DIAGNOSIS — Z79.4 TYPE 2 DIABETES MELLITUS WITH HYPERGLYCEMIA, WITH LONG-TERM CURRENT USE OF INSULIN: Primary | ICD-10-CM

## 2023-09-22 DIAGNOSIS — E78.5 DYSLIPIDEMIA: ICD-10-CM

## 2023-09-22 DIAGNOSIS — Z79.4 TYPE 2 DIABETES MELLITUS WITH HYPERGLYCEMIA, WITH LONG-TERM CURRENT USE OF INSULIN: ICD-10-CM

## 2023-09-22 DIAGNOSIS — E11.65 TYPE 2 DIABETES MELLITUS WITH HYPERGLYCEMIA, WITH LONG-TERM CURRENT USE OF INSULIN: ICD-10-CM

## 2023-09-22 DIAGNOSIS — E03.8 OTHER SPECIFIED HYPOTHYROIDISM: ICD-10-CM

## 2023-09-22 DIAGNOSIS — R06.09 DYSPNEA ON EXERTION: ICD-10-CM

## 2023-09-22 DIAGNOSIS — I47.1 SVT (SUPRAVENTRICULAR TACHYCARDIA): ICD-10-CM

## 2023-09-22 DIAGNOSIS — I10 ESSENTIAL HYPERTENSION: Primary | ICD-10-CM

## 2023-09-22 DIAGNOSIS — E55.9 VITAMIN D DEFICIENCY: ICD-10-CM

## 2023-09-22 DIAGNOSIS — E11.65 TYPE 2 DIABETES MELLITUS WITH HYPERGLYCEMIA, WITH LONG-TERM CURRENT USE OF INSULIN: Primary | ICD-10-CM

## 2023-09-22 LAB
25(OH)D3 SERPL-MCNC: 48.6 NG/ML (ref 30–100)
ALBUMIN SERPL-MCNC: 3.8 G/DL (ref 3.5–5.2)
ALBUMIN UR-MCNC: <1.2 MG/DL
ALBUMIN/GLOB SERPL: 1.1 G/DL
ALP SERPL-CCNC: 110 U/L (ref 39–117)
ALT SERPL W P-5'-P-CCNC: 17 U/L (ref 1–33)
ANION GAP SERPL CALCULATED.3IONS-SCNC: 12 MMOL/L (ref 5–15)
AST SERPL-CCNC: 30 U/L (ref 1–32)
BASOPHILS # BLD AUTO: 0.06 10*3/MM3 (ref 0–0.2)
BASOPHILS NFR BLD AUTO: 0.9 % (ref 0–1.5)
BILIRUB SERPL-MCNC: 0.8 MG/DL (ref 0–1.2)
BUN SERPL-MCNC: 14 MG/DL (ref 8–23)
BUN/CREAT SERPL: 16.3 (ref 7–25)
CALCIUM SPEC-SCNC: 9.8 MG/DL (ref 8.6–10.5)
CHLORIDE SERPL-SCNC: 101 MMOL/L (ref 98–107)
CHOLEST SERPL-MCNC: 210 MG/DL (ref 0–200)
CO2 SERPL-SCNC: 24 MMOL/L (ref 22–29)
CREAT SERPL-MCNC: 0.86 MG/DL (ref 0.57–1)
DEPRECATED RDW RBC AUTO: 49 FL (ref 37–54)
EGFRCR SERPLBLD CKD-EPI 2021: 73.7 ML/MIN/1.73
EOSINOPHIL # BLD AUTO: 0.07 10*3/MM3 (ref 0–0.4)
EOSINOPHIL NFR BLD AUTO: 1.1 % (ref 0.3–6.2)
ERYTHROCYTE [DISTWIDTH] IN BLOOD BY AUTOMATED COUNT: 13.1 % (ref 12.3–15.4)
GLOBULIN UR ELPH-MCNC: 3.5 GM/DL
GLUCOSE SERPL-MCNC: 245 MG/DL (ref 65–99)
HBA1C MFR BLD: 9.3 % (ref 4.8–5.6)
HCT VFR BLD AUTO: 41.7 % (ref 34–46.6)
HDLC SERPL-MCNC: 52 MG/DL (ref 40–60)
HGB BLD-MCNC: 14.3 G/DL (ref 12–15.9)
IMM GRANULOCYTES # BLD AUTO: 0.01 10*3/MM3 (ref 0–0.05)
IMM GRANULOCYTES NFR BLD AUTO: 0.2 % (ref 0–0.5)
LDLC SERPL CALC-MCNC: 127 MG/DL (ref 0–100)
LDLC/HDLC SERPL: 2.36 {RATIO}
LYMPHOCYTES # BLD AUTO: 2.64 10*3/MM3 (ref 0.7–3.1)
LYMPHOCYTES NFR BLD AUTO: 41.2 % (ref 19.6–45.3)
MAGNESIUM SERPL-MCNC: 1.9 MG/DL (ref 1.6–2.4)
MCH RBC QN AUTO: 34.9 PG (ref 26.6–33)
MCHC RBC AUTO-ENTMCNC: 34.3 G/DL (ref 31.5–35.7)
MCV RBC AUTO: 101.7 FL (ref 79–97)
MONOCYTES # BLD AUTO: 0.79 10*3/MM3 (ref 0.1–0.9)
MONOCYTES NFR BLD AUTO: 12.3 % (ref 5–12)
NEUTROPHILS NFR BLD AUTO: 2.84 10*3/MM3 (ref 1.7–7)
NEUTROPHILS NFR BLD AUTO: 44.3 % (ref 42.7–76)
NRBC BLD AUTO-RTO: 0 /100 WBC (ref 0–0.2)
NT-PROBNP SERPL-MCNC: 178 PG/ML (ref 0–900)
PLATELET # BLD AUTO: 142 10*3/MM3 (ref 140–450)
PMV BLD AUTO: 10.6 FL (ref 6–12)
POTASSIUM SERPL-SCNC: 4.2 MMOL/L (ref 3.5–5.2)
PROT SERPL-MCNC: 7.3 G/DL (ref 6–8.5)
RBC # BLD AUTO: 4.1 10*6/MM3 (ref 3.77–5.28)
SODIUM SERPL-SCNC: 137 MMOL/L (ref 136–145)
T4 FREE SERPL-MCNC: 1.5 NG/DL (ref 0.93–1.7)
TRIGL SERPL-MCNC: 176 MG/DL (ref 0–150)
TSH SERPL DL<=0.05 MIU/L-ACNC: 0.62 UIU/ML (ref 0.27–4.2)
VIT B12 BLD-MCNC: 485 PG/ML (ref 211–946)
VLDLC SERPL-MCNC: 31 MG/DL (ref 5–40)
WBC NRBC COR # BLD: 6.41 10*3/MM3 (ref 3.4–10.8)

## 2023-09-22 PROCEDURE — 82607 VITAMIN B-12: CPT | Performed by: NURSE PRACTITIONER

## 2023-09-22 PROCEDURE — 83880 ASSAY OF NATRIURETIC PEPTIDE: CPT | Performed by: NURSE PRACTITIONER

## 2023-09-22 PROCEDURE — 83735 ASSAY OF MAGNESIUM: CPT | Performed by: NURSE PRACTITIONER

## 2023-09-22 PROCEDURE — 82306 VITAMIN D 25 HYDROXY: CPT | Performed by: NURSE PRACTITIONER

## 2023-09-22 PROCEDURE — 80053 COMPREHEN METABOLIC PANEL: CPT | Performed by: NURSE PRACTITIONER

## 2023-09-22 PROCEDURE — 84439 ASSAY OF FREE THYROXINE: CPT | Performed by: NURSE PRACTITIONER

## 2023-09-22 PROCEDURE — 84443 ASSAY THYROID STIM HORMONE: CPT | Performed by: NURSE PRACTITIONER

## 2023-09-22 PROCEDURE — 82043 UR ALBUMIN QUANTITATIVE: CPT | Performed by: NURSE PRACTITIONER

## 2023-09-22 PROCEDURE — 85025 COMPLETE CBC W/AUTO DIFF WBC: CPT | Performed by: NURSE PRACTITIONER

## 2023-09-22 PROCEDURE — 83036 HEMOGLOBIN GLYCOSYLATED A1C: CPT | Performed by: NURSE PRACTITIONER

## 2023-09-22 PROCEDURE — 80061 LIPID PANEL: CPT | Performed by: NURSE PRACTITIONER

## 2023-09-29 ENCOUNTER — OFFICE VISIT (OUTPATIENT)
Dept: FAMILY MEDICINE CLINIC | Facility: CLINIC | Age: 68
End: 2023-09-29
Payer: MEDICARE

## 2023-09-29 VITALS
WEIGHT: 263 LBS | OXYGEN SATURATION: 95 % | HEIGHT: 64 IN | BODY MASS INDEX: 44.9 KG/M2 | TEMPERATURE: 97.7 F | SYSTOLIC BLOOD PRESSURE: 128 MMHG | HEART RATE: 72 BPM | DIASTOLIC BLOOD PRESSURE: 76 MMHG

## 2023-09-29 DIAGNOSIS — G47.33 OBSTRUCTIVE SLEEP APNEA SYNDROME: Chronic | ICD-10-CM

## 2023-09-29 DIAGNOSIS — E78.5 DYSLIPIDEMIA: ICD-10-CM

## 2023-09-29 DIAGNOSIS — E55.9 VITAMIN D DEFICIENCY: ICD-10-CM

## 2023-09-29 DIAGNOSIS — R06.09 DYSPNEA ON EXERTION: ICD-10-CM

## 2023-09-29 DIAGNOSIS — E11.65 TYPE 2 DIABETES MELLITUS WITH HYPERGLYCEMIA, WITH LONG-TERM CURRENT USE OF INSULIN: Primary | Chronic | ICD-10-CM

## 2023-09-29 DIAGNOSIS — I48.0 PAROXYSMAL ATRIAL FIBRILLATION: ICD-10-CM

## 2023-09-29 DIAGNOSIS — E83.42 HYPOMAGNESEMIA: ICD-10-CM

## 2023-09-29 DIAGNOSIS — R32 URINARY INCONTINENCE, UNSPECIFIED TYPE: ICD-10-CM

## 2023-09-29 DIAGNOSIS — Z79.4 TYPE 2 DIABETES MELLITUS WITH HYPERGLYCEMIA, WITH LONG-TERM CURRENT USE OF INSULIN: Primary | Chronic | ICD-10-CM

## 2023-09-29 DIAGNOSIS — I10 ESSENTIAL HYPERTENSION: Chronic | ICD-10-CM

## 2023-09-29 DIAGNOSIS — Z17.0 MALIGNANT NEOPLASM OF UPPER-OUTER QUADRANT OF RIGHT BREAST IN FEMALE, ESTROGEN RECEPTOR POSITIVE: ICD-10-CM

## 2023-09-29 DIAGNOSIS — C50.411 MALIGNANT NEOPLASM OF UPPER-OUTER QUADRANT OF RIGHT BREAST IN FEMALE, ESTROGEN RECEPTOR POSITIVE: ICD-10-CM

## 2023-09-29 PROCEDURE — 1160F RVW MEDS BY RX/DR IN RCRD: CPT | Performed by: NURSE PRACTITIONER

## 2023-09-29 PROCEDURE — 1159F MED LIST DOCD IN RCRD: CPT | Performed by: NURSE PRACTITIONER

## 2023-09-29 PROCEDURE — 99214 OFFICE O/P EST MOD 30 MIN: CPT | Performed by: NURSE PRACTITIONER

## 2023-09-29 PROCEDURE — 3074F SYST BP LT 130 MM HG: CPT | Performed by: NURSE PRACTITIONER

## 2023-09-29 PROCEDURE — 3046F HEMOGLOBIN A1C LEVEL >9.0%: CPT | Performed by: NURSE PRACTITIONER

## 2023-09-29 PROCEDURE — 3078F DIAST BP <80 MM HG: CPT | Performed by: NURSE PRACTITIONER

## 2023-09-29 NOTE — PROGRESS NOTES
History of Present Illness  Ronna Wayne is a 68 y.o. female who presents to the clinic pertaining to her DM, type 2, HTN, Dyslipidemia, GERD and vitamin deficiencies.  Ronna has cardiac issues which include atrial fibrillation, nonrheumatic aortic valve stenosis tacky-bradycardia syndrome, PVCs and SVT. She has been diagnosed and treated for a malignant neoplasm of her upper outer quadrant of right breast.  She is c/o urinary symptoms which she has had but is worsening.     Diabetes  She has type 2 diabetes mellitus. The initial diagnosis of diabetes was made in 2008. Diabetic complications include a CVA, nephropathy and PVD. Risk factors for coronary artery disease include dyslipidemia, family history, hypertension, obesity and sedentary lifestyle. Current diabetic treatment includes insulin injections and oral agents.  She is currently taking insulin pre-breakfast, pre-lunch and pre-dinner. Insulin injections are given by patient. Rotation sites for injection include the abdominal wall. She is following a diabetic diet. Meal planning includes ADA exchanges, avoidance of concentrated sweets and carbohydrate counting. She rarely participates in exercise. Blood glucose monitoring compliance is good since she has received her Dexcom.    Lab Results   Component Value Date    HGBA1C 10.10 (H) 06/11/2023     Lab Results   Component Value Date    HGBA1C 9.30 (H) 09/22/2023      Hypertension   The current episode started more than 1 year ago. The problem is controlled. Associated symptoms include headaches, malaise/fatigue and peripheral edema. Risk factors for coronary artery disease include diabetes mellitus, dyslipidemia, obesity and post-menopausal state. Current antihypertension treatment includes angiotensin blockers.   Lab Results   Component Value Date    GLUCOSE 245 (H) 09/22/2023    BUN 14 09/22/2023    CREATININE 0.86 09/22/2023    EGFR 73.7 09/22/2023    BCR 16.3 09/22/2023    K 4.2 09/22/2023     CO2 24.0 09/22/2023    CALCIUM 9.8 09/22/2023    ALBUMIN 3.8 09/22/2023    BILITOT 0.8 09/22/2023    AST 30 09/22/2023    ALT 17 09/22/2023      Dyslipidemia   The current episode started more than 1 year ago. Current antihyperlipidemic treatment includes statins. Risk factors for coronary artery disease include diabetes mellitus, dyslipidemia, hypertension, obesity and post-menopausal.   Lab Results   Component Value Date    CHOL 210 (H) 09/22/2023    CHOL 202 (H) 06/09/2023    CHOL 180 02/21/2023     Lab Results   Component Value Date    TRIG 176 (H) 09/22/2023    TRIG 206 (H) 06/09/2023    TRIG 151 (H) 02/21/2023     Lab Results   Component Value Date    HDL 52 09/22/2023    HDL 50 06/09/2023    HDL 70 (H) 02/21/2023     Lab Results   Component Value Date     (H) 09/22/2023     (H) 06/09/2023    LDL 84 02/21/2023      The following portions of the patient's history were reviewed and updated as appropriate: allergies, current medications, past family history, past medical history, past social history, past surgical history and problem list.    Review of Systems   Constitutional:  Positive for fatigue. Negative for activity change, appetite change, chills, fever and unexpected weight change.   Eyes:  Negative for visual disturbance.   Respiratory:  Negative for cough, shortness of breath and wheezing.    Cardiovascular:  Positive for leg swelling (Intermittent). Negative for chest pain and palpitations.   Gastrointestinal:  Positive for nausea (Intermittent). Negative for vomiting.   Endocrine: Negative for cold intolerance, heat intolerance, polydipsia, polyphagia and polyuria.   Genitourinary:  Positive for frequency and urgency. Negative for decreased urine volume.        Incontinence   Musculoskeletal:  Positive for arthralgias.   Skin:  Negative for color change and rash.   Neurological:  Positive for dizziness, weakness, numbness and headaches. Negative for tremors, speech difficulty and  "light-headedness.   Hematological:  Negative for adenopathy.   Psychiatric/Behavioral:  Positive for sleep disturbance (Hypersomnolence). Negative for confusion and suicidal ideas. The patient is not nervous/anxious.    All other systems reviewed and are negative.    Vital signs:   Vitals:    09/29/23 1427   BP: 128/76   BP Location: Left arm   Patient Position: Sitting   Cuff Size: Adult   Pulse: 72   Temp: 97.7 °F (36.5 °C)   TempSrc: Temporal   SpO2: 95%   Weight: 119 kg (263 lb)   Height: 161.3 cm (63.5\")      Physical Exam  Vitals and nursing note reviewed.   Constitutional:       General: She is not in acute distress.     Appearance: She is well-developed.      Comments: Gait slow and cautious   HENT:      Head: Normocephalic.      Nose: Nose normal.   Eyes:      General: No scleral icterus.        Right eye: No discharge.         Left eye: No discharge.      Conjunctiva/sclera: Conjunctivae normal.   Neck:      Thyroid: No thyromegaly.      Vascular: No JVD.   Cardiovascular:      Rate and Rhythm: Normal rate and regular rhythm.      Heart sounds: Murmur heard.     No friction rub.   Pulmonary:      Effort: Pulmonary effort is normal. No respiratory distress.      Breath sounds: Normal breath sounds. No decreased breath sounds, wheezing, rhonchi or rales.   Abdominal:      General: Bowel sounds are normal. There is no distension.      Palpations: Abdomen is soft.      Tenderness: There is no abdominal tenderness. There is no guarding or rebound.   Musculoskeletal:         General: Tenderness (Generalized) present.      Cervical back: Neck supple.      Right lower leg: No edema.      Left lower leg: No edema.   Lymphadenopathy:      Cervical: No cervical adenopathy.   Skin:     General: Skin is warm and dry.      Capillary Refill: Capillary refill takes less than 2 seconds.      Findings: No erythema or rash.   Neurological:      Mental Status: She is alert and oriented to person, place, and time. "   Psychiatric:         Mood and Affect: Mood and affect normal.         Speech: Speech normal.         Behavior: Behavior normal. Behavior is cooperative.         Thought Content: Thought content normal.         Judgment: Judgment normal.     Reviewed Results:   EP/CRM Study July 2023 per EP Cardiologist   Results for orders placed or performed in visit on 09/22/23   Magnesium    Specimen: Arm, Left; Blood   Result Value Ref Range    Magnesium 1.9 1.6 - 2.4 mg/dL   Comprehensive Metabolic Panel    Specimen: Arm, Left; Blood   Result Value Ref Range    Glucose 245 (H) 65 - 99 mg/dL    BUN 14 8 - 23 mg/dL    Creatinine 0.86 0.57 - 1.00 mg/dL    Sodium 137 136 - 145 mmol/L    Potassium 4.2 3.5 - 5.2 mmol/L    Chloride 101 98 - 107 mmol/L    CO2 24.0 22.0 - 29.0 mmol/L    Calcium 9.8 8.6 - 10.5 mg/dL    Total Protein 7.3 6.0 - 8.5 g/dL    Albumin 3.8 3.5 - 5.2 g/dL    ALT (SGPT) 17 1 - 33 U/L    AST (SGOT) 30 1 - 32 U/L    Alkaline Phosphatase 110 39 - 117 U/L    Total Bilirubin 0.8 0.0 - 1.2 mg/dL    Globulin 3.5 gm/dL    A/G Ratio 1.1 g/dL    BUN/Creatinine Ratio 16.3 7.0 - 25.0    Anion Gap 12.0 5.0 - 15.0 mmol/L    eGFR 73.7 >60.0 mL/min/1.73   Lipid Panel    Specimen: Arm, Left; Blood   Result Value Ref Range    Total Cholesterol 210 (H) 0 - 200 mg/dL    Triglycerides 176 (H) 0 - 150 mg/dL    HDL Cholesterol 52 40 - 60 mg/dL    LDL Cholesterol  127 (H) 0 - 100 mg/dL    VLDL Cholesterol 31 5 - 40 mg/dL    LDL/HDL Ratio 2.36    TSH    Specimen: Arm, Left; Blood   Result Value Ref Range    TSH 0.620 0.270 - 4.200 uIU/mL   Hemoglobin A1c    Specimen: Arm, Left; Blood   Result Value Ref Range    Hemoglobin A1C 9.30 (H) 4.80 - 5.60 %   Vitamin D,25-Hydroxy    Specimen: Arm, Left; Blood   Result Value Ref Range    25 Hydroxy, Vitamin D 48.6 30.0 - 100.0 ng/ml   T4, Free    Specimen: Arm, Left; Blood   Result Value Ref Range    Free T4 1.50 0.93 - 1.70 ng/dL   Vitamin B12    Specimen: Arm, Left; Blood   Result Value Ref  Range    Vitamin B-12 485 211 - 946 pg/mL   proBNP    Specimen: Arm, Left; Blood   Result Value Ref Range    proBNP 178.0 0.0 - 900.0 pg/mL   CBC Auto Differential    Specimen: Arm, Left; Blood   Result Value Ref Range    WBC 6.41 3.40 - 10.80 10*3/mm3    RBC 4.10 3.77 - 5.28 10*6/mm3    Hemoglobin 14.3 12.0 - 15.9 g/dL    Hematocrit 41.7 34.0 - 46.6 %    .7 (H) 79.0 - 97.0 fL    MCH 34.9 (H) 26.6 - 33.0 pg    MCHC 34.3 31.5 - 35.7 g/dL    RDW 13.1 12.3 - 15.4 %    RDW-SD 49.0 37.0 - 54.0 fl    MPV 10.6 6.0 - 12.0 fL    Platelets 142 140 - 450 10*3/mm3    Neutrophil % 44.3 42.7 - 76.0 %    Lymphocyte % 41.2 19.6 - 45.3 %    Monocyte % 12.3 (H) 5.0 - 12.0 %    Eosinophil % 1.1 0.3 - 6.2 %    Basophil % 0.9 0.0 - 1.5 %    Immature Grans % 0.2 0.0 - 0.5 %    Neutrophils, Absolute 2.84 1.70 - 7.00 10*3/mm3    Lymphocytes, Absolute 2.64 0.70 - 3.10 10*3/mm3    Monocytes, Absolute 0.79 0.10 - 0.90 10*3/mm3    Eosinophils, Absolute 0.07 0.00 - 0.40 10*3/mm3    Basophils, Absolute 0.06 0.00 - 0.20 10*3/mm3    Immature Grans, Absolute 0.01 0.00 - 0.05 10*3/mm3    nRBC 0.0 0.0 - 0.2 /100 WBC     Class 3 Severe Obesity (BMI >=40). Obesity-related health conditions include the following: obstructive sleep apnea, hypertension, coronary heart disease, diabetes mellitus, dyslipidemias, peripheral vascular disease, and osteoarthritis. Obesity is improving with lifestyle modifications. BMI is above average; BMI management plan is completed.  Provided information on portion control and increasing exercise.   Assessment & Plan     Diagnoses and all orders for this visit:    1. Type 2 diabetes mellitus with hyperglycemia, with long-term current use of insulin (Primary)  Comments:  Continue Toujeo, sliding scale with NovoLog, Jardiance, Jentadueto, and Ozempic  Orders:  -     Comprehensive Metabolic Panel; Future  -     Lipid Panel; Future  -     TSH; Future  -     Hemoglobin A1c; Future  -     Vitamin B12; Future  -      MicroAlbumin, Urine, Random - Urine, Clean Catch; Future    2. Essential hypertension  Comments:  Well-controlled with amlodipine, furosemide, and valsartan  Orders:  -     Comprehensive Metabolic Panel; Future  -     Lipid Panel; Future  -     TSH; Future    3. Dyslipidemia  Comments:  Continue rosuvastatin and consider regarding increasing to 40 mg  Orders:  -     Comprehensive Metabolic Panel; Future  -     Lipid Panel; Future  -     TSH; Future    4. Paroxysmal atrial fibrillation  Comments:  Continue Eliquis    5. Obstructive sleep apnea syndrome  Comments:  Stable    6. Malignant neoplasm of upper-outer quadrant of right breast in female, estrogen receptor positive  Comments:  Follow-up with hematology/oncology  Orders:  -     CBC & Differential; Future    7. Urinary incontinence, unspecified type  Comments:  Referral to urology for evaluation  Orders:  -     Ambulatory Referral to Urology    8. Vitamin D deficiency  -     Vitamin D,25-Hydroxy; Future    9. Hypomagnesemia  -     Magnesium; Future    10. Dyspnea on exertion  -     CBC & Differential; Future  -     proBNP; Future      Follow Up In January  Findings and recommendations discussed with Ronna. Reviewed test results. Lifestyle modifications reinforced including nutrition and activity recommendations.  Ronna will follow up in January sooner if problems/concerns occur.   She was given instructions and counseling regarding her condition or for health maintenance advice. Please see specific information pulled into the AVS if appropriate      This document has been electronically signed by:

## 2023-09-30 PROBLEM — R32 URINE INCONTINENCE: Status: ACTIVE | Noted: 2023-09-30

## 2023-10-04 RX ORDER — LEVOTHYROXINE SODIUM 88 UG/1
TABLET ORAL
Qty: 90 TABLET | Refills: 0 | Status: SHIPPED | OUTPATIENT
Start: 2023-10-04

## 2023-10-06 ENCOUNTER — TELEPHONE (OUTPATIENT)
Dept: FAMILY MEDICINE CLINIC | Facility: CLINIC | Age: 68
End: 2023-10-06
Payer: MEDICARE

## 2023-10-06 NOTE — TELEPHONE ENCOUNTER
Spoke with patient and let her know her Ozempic came in from the patient assistance program. She said she will come by the office on Monday and pick it up.

## 2023-10-10 ENCOUNTER — OFFICE VISIT (OUTPATIENT)
Dept: UROLOGY | Facility: CLINIC | Age: 68
End: 2023-10-10
Payer: MEDICARE

## 2023-10-10 ENCOUNTER — FLU SHOT (OUTPATIENT)
Dept: FAMILY MEDICINE CLINIC | Facility: CLINIC | Age: 68
End: 2023-10-10
Payer: MEDICARE

## 2023-10-10 VITALS
DIASTOLIC BLOOD PRESSURE: 77 MMHG | BODY MASS INDEX: 45.27 KG/M2 | HEART RATE: 80 BPM | HEIGHT: 64 IN | SYSTOLIC BLOOD PRESSURE: 178 MMHG | WEIGHT: 265.2 LBS

## 2023-10-10 DIAGNOSIS — B37.31 YEAST VAGINITIS: ICD-10-CM

## 2023-10-10 DIAGNOSIS — N32.81 DETRUSOR INSTABILITY OF BLADDER: ICD-10-CM

## 2023-10-10 DIAGNOSIS — Z23 NEED FOR INFLUENZA VACCINATION: Primary | ICD-10-CM

## 2023-10-10 DIAGNOSIS — R10.30 LOWER ABDOMINAL PAIN: ICD-10-CM

## 2023-10-10 DIAGNOSIS — B37.2 YEAST DERMATITIS: ICD-10-CM

## 2023-10-10 DIAGNOSIS — N32.81 OAB (OVERACTIVE BLADDER): ICD-10-CM

## 2023-10-10 DIAGNOSIS — R35.0 FREQUENCY OF MICTURITION: ICD-10-CM

## 2023-10-10 DIAGNOSIS — R32 URINARY INCONTINENCE, UNSPECIFIED TYPE: Primary | ICD-10-CM

## 2023-10-10 DIAGNOSIS — R33.9 INCOMPLETE EMPTYING OF BLADDER: ICD-10-CM

## 2023-10-10 LAB
BILIRUB BLD-MCNC: NEGATIVE MG/DL
CLARITY, POC: CLEAR
COLOR UR: YELLOW
EXPIRATION DATE: ABNORMAL
GLUCOSE UR STRIP-MCNC: ABNORMAL MG/DL
KETONES UR QL: NEGATIVE
LEUKOCYTE EST, POC: NEGATIVE
Lab: ABNORMAL
NITRITE UR-MCNC: NEGATIVE MG/ML
PH UR: 6 [PH] (ref 5–8)
PROT UR STRIP-MCNC: NEGATIVE MG/DL
RBC # UR STRIP: NEGATIVE /UL
SP GR UR: 1 (ref 1–1.03)
UROBILINOGEN UR QL: NORMAL

## 2023-10-10 PROCEDURE — 87086 URINE CULTURE/COLONY COUNT: CPT | Performed by: NURSE PRACTITIONER

## 2023-10-10 RX ORDER — NYSTATIN 100000 [USP'U]/G
POWDER TOPICAL 3 TIMES DAILY
Qty: 60 G | Refills: 6 | Status: SHIPPED | OUTPATIENT
Start: 2023-10-10

## 2023-10-10 RX ORDER — FLUCONAZOLE 150 MG/1
150 TABLET ORAL ONCE
Qty: 2 TABLET | Refills: 0 | Status: SHIPPED | OUTPATIENT
Start: 2023-10-10 | End: 2023-10-10

## 2023-10-10 RX ORDER — LISINOPRIL 10 MG/1
TABLET ORAL
COMMUNITY
Start: 2023-08-27

## 2023-10-10 RX ORDER — VIBEGRON 75 MG/1
1 TABLET, FILM COATED ORAL NIGHTLY
Qty: 30 TABLET | Refills: 3 | COMMUNITY
Start: 2023-10-10 | End: 2023-11-09

## 2023-10-10 RX ORDER — INSULIN ASPART 100 [IU]/ML
INJECTION, SUSPENSION SUBCUTANEOUS
COMMUNITY
Start: 2023-08-14

## 2023-10-10 NOTE — LETTER
October 11, 2023     BOSTON Joseph  [address] [facility] [address]    Patient: Ronna Wayne   YOB: 1955   Date of Visit: 10/10/2023       Dear BOSTON Joseph,    Thank you for referring Ronna Wayne to me for evaluation. Below is a copy of my consult note.    If you have questions, please do not hesitate to call me. I look forward to following Ronna along with you.         Sincerely,        Griselda Cheng-Akwa, APRN        CC: No Recipients    Chief Complaint  Urinary Incontinence (NEW PATIENT WITH OAB / DI WITH FREQ/INCONTINENCE)    Subjective         Ronna Wayne presents to [facility] for OAB/DI /FREQ WITH URINE INCONTINENCE  History of Present Illness    Mrs. Ronna Wayne is a pleasant 68-year-old female who presents to clinic today for evaluation as a new patient consult. Patient has been referred to clinic by PCP for concerns of urinary incontinence. On initial evaluation in clinic, she reports numerous other concerns including AN Overactive bladder/Detrusor instability complicated by bouts of urine frequency, urgency, and incontinence.     Patient says she goes to the restroom every 30 minutes and sometimes she is not able to make it on time with urine draining down her legs. She reports lower back pain, abdominal pain, bilateral flank pain, pelvic pressure and suprapubic discomfort. She denies any CVA tenderness. She does not have recurrent UTIs, denies any episodes of dysuria or burning with urination.  She reports urine urgency, and constant feeling of incomplete bladder emptying. HER Urinalysis today is completely negative for leukocyte esterase. It is negative for nitrite. It is negative for gross/microscopic hematuria. A PVR is 0 mL.     She complains of urinary incontinence ongoing for approximately 1 year. She urinates every 10 to 15 minutes. She feels full, but her bladder is not  emptying. She wears diapers and goes through 4 a day. She wakes up 3 times at night to urinate. She wears a CPAP machine. She has a history of UTIs. She has a rash between her legs and under her abdomen. She denies any hysterectomy. She feels pressure in her bladder. She denies any constipation. She has diarrhea occasionally. She was diagnosed with breast cancer in 05/2021. She had radiation for 6 weeks. She feels tired. She has 1 child.    THE Patient is currently being followed by Dr. ELY WITH hematology/oncology for breast cancer, consistent with stage [id number] disease (IIB)  She is taking Arimidex for maintenance therapy and is post radiation and chemotherapy in the past.  Patient is a 70, uncontrolled diabetic with a current A1c of 10.10 completed on 06/11 or 2023.  We discussed the impact of this on her bladder function, patient encouraged adequate diabetic control for better outcome.    She CURRENTLY takes 34 units of insulin in the morning. She takes NovoLog on a sliding scale. She takes Ozempic.    She denies any kidney stones. She denies any hematuria.    Her sister had breast cancer.    The rest of her PMHx as noted below.    IMPRESSION CT 03/09/2021:  1.  Advanced liver cirrhosis.  2.  Prominent perisplenic collateral vessels but no ascites identified. No splenomegaly.  3.  Decompressed colon accounts for mild wall thickening. No convincing  evidence of GI tract inflammation.  4.  Other nonacute and incidental findings as above.    Active Ambulatory Problems     Diagnosis Date Noted    Frequent headaches 05/27/2016    Essential hypertension 05/27/2016    Dyslipidemia 05/27/2016    Type 2 diabetes mellitus with hyperglycemia, with long-term current use of insulin 05/27/2016    Obstructive sleep apnea syndrome 05/27/2016    Glaucoma 05/27/2016    GERD (gastroesophageal reflux disease) 05/27/2016    Morbid obesity 05/27/2016    Yeast vaginitis 10/09/2017    Weakness of left arm 09/01/2020     Cerebrovascular accident (CVA) due to embolism 09/01/2020    SVT (supraventricular tachycardia) 05/02/2021    Vitamin D deficiency 05/09/2021    B12 deficiency     Malignant neoplasm of upper-outer quadrant of right breast in female, estrogen receptor positive 06/02/2021    Other specified hypothyroidism 07/01/2021    Personal history of colonic polyps 08/10/2021    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    Atrial flutter 01/04/2023    Paroxysmal atrial fibrillation 02/06/2023    Presence of cardiac pacemaker 04/28/2023    Atypical chest pain 09/27/2018    Dyspnea on exertion 09/27/2018    Hyperlipidemia 11/20/2015    Nonrheumatic aortic valve stenosis 01/04/2019    PAC (premature atrial contraction) 01/04/2019    Palpitations 09/27/2018    PVC (premature ventricular contraction) 01/04/2019    Systolic murmur 09/27/2018    TIA (transient ischemic attack) 03/31/2019    Sinus node dysfunction 07/03/2023    Urine incontinence 09/30/2023    OAB (overactive bladder) 10/10/2023    Frequency of micturition 10/10/2023    Incomplete emptying of bladder 10/10/2023    Yeast dermatitis 10/10/2023     Resolved Ambulatory Problems     Diagnosis Date Noted    Memory loss 05/27/2016    Anxiety 05/27/2016    Vertigo 05/27/2016    Depression 07/01/2022    Paroxysmal SVT (supraventricular tachycardia) 12/17/2022    Supraventricular tachycardia 12/24/2022    Sinus node dysfunction 04/28/2023     Past Medical History:   Diagnosis Date    BPV (benign positional vertigo)     Breast cancer 05/2021    Diabetes mellitus     Diarrhea     Disease of thyroid gland     Elevated cholesterol     Fibrocystic breast     Fibromyalgia     Heart murmur     Hypertension     Kidney disease     Peptic ulceration     PONV (postoperative nausea and vomiting)     Primary central sleep apnea     Sleep apnea     Stroke     Urinary incontinence     Vaginal infection       Objective  Vital Signs:   /77 (BP  "Location: Left arm, Patient Position: Sitting, Cuff Size: Adult)   Pulse 80   Ht 161.3 cm (63.5\")   Wt 120 kg (265 lb 3.2 oz)   BMI 46.24 kg/mý       ROS:   Review of Systems   Constitutional:  Positive for activity change, appetite change, fatigue and unexpected weight gain. Negative for chills, diaphoresis, fever and unexpected weight loss.   HENT:  Negative for congestion, ear discharge, ear pain, nosebleeds, rhinorrhea, sinus pressure and sore throat.    Eyes:  Negative for blurred vision, double vision, photophobia, pain, redness and visual disturbance.   Respiratory:  Negative for apnea, cough, chest tightness, shortness of breath, wheezing and stridor.    Cardiovascular:  Negative for chest pain and palpitations.   Gastrointestinal:  Positive for abdominal distention, abdominal pain and nausea. Negative for constipation, diarrhea and vomiting.   Endocrine: Negative for polydipsia, polyphagia and polyuria.   Genitourinary:  Positive for frequency, urgency and urinary incontinence. Negative for decreased urine volume, difficulty urinating, dyspareunia, dysuria, flank pain, genital sores, hematuria, pelvic pain, pelvic pressure and vaginal discharge.   Musculoskeletal:  Positive for back pain, gait problem, joint swelling and myalgias. Negative for arthralgias.   Skin:  Positive for dry skin and pallor. Negative for rash and wound.   Neurological:  Negative for dizziness, tremors, syncope, weakness, light-headedness, headache, memory problem and confusion.   Psychiatric/Behavioral:  Positive for sleep disturbance and stress. Negative for behavioral problems and decreased concentration.         Physical Exam  Constitutional:       General: She is in acute distress.      Appearance: She is well-developed. She is obese. She is ill-appearing.   HENT:      Head: Normocephalic and atraumatic.   Eyes:      Pupils: Pupils are equal, round, and reactive to light.   Neck:      Thyroid: No thyromegaly.      Trachea: No " tracheal deviation.   Cardiovascular:      Rate and Rhythm: Normal rate and regular rhythm.      Heart sounds: No murmur heard.  Pulmonary:      Effort: Pulmonary effort is normal. No respiratory distress.      Breath sounds: Normal breath sounds. No stridor. No wheezing.   Abdominal:      General: Bowel sounds are normal. There is distension.      Palpations: Abdomen is soft.      Tenderness: There is abdominal tenderness. There is guarding.   Genitourinary:     Labia:         Right: No tenderness.         Left: No tenderness.       Vagina: Normal. No vaginal discharge.      Comments: Urine frequency, urgency, incontinence, atrophic vaginitis,  yeast vaginitis/yeast dermatitis abdominal fold    Soft nontender abdomen with no organomegaly, rigidity, guarding or tenderness.  Normal vaginal orifice.  She has moderate leakage with Valsalva.  No significant perineal body abnormalities and a normal external anus.       Musculoskeletal:         General: Tenderness present. No deformity. Normal range of motion.      Cervical back: Normal range of motion.   Skin:     General: Skin is warm and dry.      Capillary Refill: Capillary refill takes less than 2 seconds.      Coloration: Skin is pale.      Findings: No erythema or rash.   Neurological:      Mental Status: She is alert and oriented to person, place, and time.      Cranial Nerves: No cranial nerve deficit.      Sensory: No sensory deficit.      Motor: Weakness present.      Coordination: Coordination normal.      Gait: Gait abnormal.   Psychiatric:         Behavior: Behavior normal.         Thought Content: Thought content normal.         Judgment: Judgment normal.        Result Review:     UA          12/24/2022    19:20 6/11/2023    05:32 10/10/2023    13:57   Urinalysis   Squamous Epithelial Cells, UA  None Seen     Specific [place], UA 1.029  >1.030     Ketones, UA Negative  Negative  Negative    Blood, UA Negative  Trace     Leukocytes, UA Negative  Negative   Negative    Nitrite, UA Negative  Negative     RBC, UA  0-2     WBC, UA  0-2     Bacteria, UA  None Seen       Urine Culture          10/10/2023    13:57   Urine Culture   Urine Culture No growth  P      Details         P Preliminary result                  Assessment and Plan    Problem List Items Addressed This Visit          Genitourinary and Reproductive     Yeast vaginitis    Urine incontinence - Primary    Relevant Medications    nystatin (MYCOSTATIN) 565423 UNIT/GM powder    Vibegron (Gemtesa) 75 MG tablet    OAB (overactive bladder)    Relevant Medications    Vibegron (Gemtesa) 75 MG tablet    Other Relevant Orders    CT Abdomen Pelvis Without Contrast    Frequency of micturition    Relevant Medications    nystatin (MYCOSTATIN) 055201 UNIT/GM powder    Vibegron (Gemtesa) 75 MG tablet    Other Relevant Orders    POC Urinalysis Dipstick, Automated (Completed)    Urine Culture - Urine, Urine, Random Void (Completed)    Incomplete emptying of bladder    Relevant Medications    Vibegron (Gemtesa) 75 MG tablet    Other Relevant Orders    Bladder Scan (Completed)       Skin    Yeast dermatitis    Relevant Medications    nystatin (MYCOSTATIN) 097605 UNIT/GM powder     Other Visit Diagnoses       Lower abdominal pain        Relevant Orders    CT Abdomen Pelvis Without Contrast                                                             ASSESSMENT  OAB/DI WITH Mixed urinary incontinence/lower abdominal pain/YEAST VAGINITIS:     Mrs. Ronna Wayne is a 68-year old very pleasant patient evaluated in clinic today with mixed urinary incontinence symptoms that have been ongoing, and now gradually becoming very bothersome to her.  She does not have recurrent UTIs, and denies any episodes of dysuria, burning on urination, or gross hematuria.  She however reports urinary frequency, urgency and incontinent episodes, complicated with nocturia using 4-5 pads daily.  Nevertheless, her urine dipstick today is  completely  negative for any infection, it is negative for gross and also negative for microscopic hematuria. HER  PVR is OML. We discussed treatable and non-treatable causes of both stress and urge urinary incontinence.     With regards to stress urinary incontinence we discussed its relationship to childbirth and pelvic health.  We discussed the grading of stress incontinence with trying to quantitate the number of pads used.  We talked about leaking urine with laughing, lifting, coughing, and sexual intercourse. Talked about the Urge component and the concept of mixed incontinence where upon the stress is treatable, but the urge may exist and that 50% of the time the urge will resolve with treatment of the stress incontinence.  We talked with the diagnostic workup including a postvoid residual urine, OR even a simple cystometrogram.  I discussed the findings that may be neurologically related including commonly seen with multiple sclerosis, Parkinson's disease, and stroke.  I talked about the various therapeutic options including anticholinergics, beta 3 agonists, and alpha blockade if there is a component of obstruction.  I discussed the side effects of anti-cholinergic including dry mouth, double vision.    WE DISCUSSED OAB/DI-Detrusor Instability to  which is an  irritative bladder symptomatology most likely related to factors such as intake of bladder irritants, postinfectious irritation, prolapse, with a very large differential diagnosis.  The mainstay of treatment has been tight cholinergics which basically caused the bladder to have decreased contractility.  We have discussed the side effects of these treatments including dry mouth, double vision, and increasing constipation.  She reports doing well  on oxybutynin for symptomatic relief.    Anticholinergic medication:  I talked about the various therapeutic options including anticholinergics, beta 3 agonists, and alpha blockade if there is a component of obstruction.  I discussed the side effects of anti-cholinergic including dry mouth, double vision. HOWEVER, we are recommending the use of an anticholinergic. We discussed the class of drugs.  We discussed the older drug such as oxybutynin with his increased spectrum of side effects such as dry mouth, dry eyes constipation versus the newer medications with lower side effects but more difficult to obtain via insurance and much more expensive finally the newest class of drugs which is Myrbetriq which has a very favorable side effect profile but unfortunately is prohibitively expensive and oftentimes requires precertification of which may be unsuccessful in   many other cases.    RECURRENT UTIs-RESOLVED, no positive documented urine cultures on file x1 year       PLAN  We REsent her urine for culture, due to concerns of frequency, urgency, pelvic pain and pressure.  I will call her with results if any positive bacterial growth.    Start GEMTESA 75 mg daily.-Samples given to try X 1 month    Discussed upper tract investigation, patient had a prior CT scan 2021 which was unremarkable with the source of her discomfort not noted on CT.    Discussed lower tract investigation,-DEFERRED WILL TRY MEDS FIRST    ALSO DISCUSSED [id number]. PT advised to call in 2 weeks if medication is not working to schedule    Discussed things she can do to help her urine frequency such as keeping the bladder diary with strict intake and output of what she eats or drinks, how often she urinates, how much she urinates.  She should follow a timed voiding bladder training program, and do [place] exercises to strengthen the muscles to help control urination.    We will follow-up in 4 weeks, review CT, evaluate medication effectiveness.    Patient is Agreeable  WITH plan of care.    Patient reports that she is not currently experiencing any symptoms of urinary incontinence.    RADIOLOGY (CT AND/OR KUB):    CT Abdomen and Pelvis: No results found for  this or any previous visit.     CT Stone Protocol: No results found for this or any previous visit.     KUB: No results found for this or any previous visit.       LABS (3 MONTHS):    Office Visit on 10/10/2023   Component Date Value Ref Range Status    Color 10/10/2023 Yellow  Yellow, Straw, Dark Yellow, [place] Final    Clarity, UA 10/10/2023 Clear  Clear Final    Specific Gravity  10/10/2023 1.005  1.005 - 1.030 Final    pH, Urine 10/10/2023 6.0  5.0 - 8.0 Final    Leukocytes 10/10/2023 Negative  Negative Final    Nitrite, UA 10/10/2023 Negative  Negative Final    Protein, POC 10/10/2023 Negative  Negative mg/dL Final    Glucose, UA 10/10/2023 3+ (A)  Negative mg/dL Final    Ketones, UA 10/10/2023 Negative  Negative Final    Urobilinogen, UA 10/10/2023 Normal  Normal, 0.2 E.U./dL Final    Bilirubin 10/10/2023 Negative  Negative Final    Blood, UA 10/10/2023 Negative  Negative Final    Lot Number 10/10/2023 [id number]   Final    Expiration Date 10/10/2023 10/24/25   Final    Urine Culture 10/10/2023 No growth   Preliminary   Orders Only on 09/22/2023   Component Date Value Ref Range Status    Magnesium 09/22/2023 1.9  1.6 - 2.4 mg/dL Final    Glucose 09/22/2023 245 (H)  65 - 99 mg/dL Final    BUN 09/22/2023 14  8 - 23 mg/dL Final    Creatinine 09/22/2023 0.86  0.57 - 1.00 mg/dL Final    Sodium 09/22/2023 137  136 - 145 mmol/L Final    Potassium 09/22/2023 4.2  3.5 - 5.2 mmol/L Final    Chloride 09/22/2023 101  98 - 107 mmol/L Final    CO2 09/22/2023 24.0  22.0 - 29.0 mmol/L Final    Calcium 09/22/2023 9.8  8.6 - 10.5 mg/dL Final    Total Protein 09/22/2023 7.3  6.0 - 8.5 g/dL Final    Albumin 09/22/2023 3.8  3.5 - 5.2 g/dL Final    ALT (SGPT) 09/22/2023 17  1 - 33 U/L Final    AST (SGOT) 09/22/2023 30  1 - 32 U/L Final    Alkaline Phosphatase 09/22/2023 110  39 - 117 U/L Final    Total Bilirubin 09/22/2023 0.8  0.0 - 1.2 mg/dL Final    Globulin 09/22/2023 3.5  gm/dL Final    A/G  Ratio 09/22/2023 1.1  g/dL Final    BUN/Creatinine Ratio 09/22/2023 16.3  7.0 - 25.0 Final    Anion Gap 09/22/2023 12.0  5.0 - 15.0 mmol/L Final    eGFR 09/22/2023 73.7  >60.0 mL/min/1.73 Final    Total Cholesterol 09/22/2023 210 (H)  0 - 200 mg/dL Final    Triglycerides 09/22/2023 176 (H)  0 - 150 mg/dL Final    HDL Cholesterol 09/22/2023 52  40 - 60 mg/dL Final    LDL Cholesterol  09/22/2023 127 (H)  0 - 100 mg/dL Final    VLDL Cholesterol 09/22/2023 31  5 - 40 mg/dL Final    LDL/HDL Ratio 09/22/2023 2.36   Final    TSH 09/22/2023 0.620  0.270 - 4.200 uIU/mL Final    Hemoglobin A1C 09/22/2023 9.30 (H)  4.80 - 5.60 % Final    25 Hydroxy, Vitamin D 09/22/2023 48.6  30.0 - 100.0 ng/ml Final    Free T4 09/22/2023 1.50  0.93 - 1.70 ng/dL Final    Vitamin B-12 09/22/2023 485  211 - 946 pg/mL Final    proBNP 09/22/2023 178.0  0.0 - 900.0 pg/mL Final    WBC 09/22/2023 6.41  3.40 - 10.80 10*3/mm3 Final    RBC 09/22/2023 4.10  3.77 - 5.28 10*6/mm3 Final    Hemoglobin 09/22/2023 14.3  12.0 - 15.9 g/dL Final    Hematocrit 09/22/2023 41.7  34.0 - 46.6 % Final    MCV 09/22/2023 101.7 (H)  79.0 - 97.0 fL Final    MCH 09/22/2023 34.9 (H)  26.6 - 33.0 pg Final    MCHC 09/22/2023 34.3  31.5 - 35.7 g/dL Final    RDW 09/22/2023 13.1  12.3 - 15.4 % Final    RDW-SD 09/22/2023 49.0  37.0 - 54.0 fl Final    MPV 09/22/2023 10.6  6.0 - 12.0 fL Final    Platelets 09/22/2023 142  140 - 450 10*3/mm3 Final    Neutrophil % 09/22/2023 44.3  42.7 - 76.0 % Final    Lymphocyte % 09/22/2023 41.2  19.6 - 45.3 % Final    Monocyte % 09/22/2023 12.3 (H)  5.0 - 12.0 % Final    Eosinophil % 09/22/2023 1.1  0.3 - 6.2 % Final    Basophil % 09/22/2023 0.9  0.0 - 1.5 % Final    Immature Grans % 09/22/2023 0.2  0.0 - 0.5 % Final    Neutrophils, Absolute 09/22/2023 2.84  1.70 - 7.00 10*3/mm3 Final    Lymphocytes, Absolute 09/22/2023 2.64  0.70 - 3.10 10*3/mm3 Final    Monocytes, Absolute 09/22/2023 0.79  0.10 - 0.90 10*3/mm3  Final    Eosinophils, Absolute 09/22/2023 0.07  0.00 - 0.40 10*3/mm3 Final    Basophils, Absolute 09/22/2023 0.06  0.00 - 0.20 10*3/mm3 Final    Immature Grans, Absolute 09/22/2023 0.01  0.00 - 0.05 10*3/mm3 Final    nRBC 09/22/2023 0.0  0.0 - 0.2 /100 WBC Final   Lab on 09/22/2023   Component Date Value Ref Range Status    Microalbumin, Urine 09/22/2023 <1.2  mg/dL Final   Admission on 07/21/2023, Discharged on 07/21/2023   Component Date Value Ref Range Status    WBC 07/21/2023 6.01  3.40 - 10.80 10*3/mm3 Final    RBC 07/21/2023 3.82  3.77 - 5.28 10*6/mm3 Final    Hemoglobin 07/21/2023 12.9  12.0 - 15.9 g/dL Final    Hematocrit 07/21/2023 37.8  34.0 - 46.6 % Final    MCV 07/21/2023 99.0 (H)  79.0 - 97.0 fL Final    MCH 07/21/2023 33.8 (H)  26.6 - 33.0 pg Final    MCHC 07/21/2023 34.1  31.5 - 35.7 g/dL Final    RDW 07/21/2023 13.2  12.3 - 15.4 % Final    RDW-SD 07/21/2023 47.8  37.0 - 54.0 fl Final    MPV 07/21/2023 9.8  6.0 - 12.0 fL Final    Platelets 07/21/2023 132 (L)  140 - 450 10*3/mm3 Final    Glucose 07/21/2023 161 (H)  65 - 99 mg/dL Final    BUN 07/21/2023 11  8 - 23 mg/dL Final    Creatinine 07/21/2023 0.71  0.57 - 1.00 mg/dL Final    Sodium 07/21/2023 138  136 - 145 mmol/L Final    Potassium 07/21/2023 3.6  3.5 - 5.2 mmol/L Final    Chloride 07/21/2023 101  98 - 107 mmol/L Final    CO2 07/21/2023 25.0  22.0 - 29.0 mmol/L Final    Calcium 07/21/2023 9.4  8.6 - 10.5 mg/dL Final    BUN/Creatinine Ratio 07/21/2023 15.5  7.0 - 25.0 Final    Anion Gap 07/21/2023 12.0  5.0 - 15.0 mmol/L Final    eGFR 07/21/2023 92.7  >60.0 mL/min/1.73 Final    Glucose 07/21/2023 162 (H)  70 - 130 mg/dL Final    BUN 07/21/2023 10  8 - 26 mg/dL Final    Creatinine 07/21/2023 0.70  0.60 - 1.30 mg/dL Final    Sodium 07/21/2023 140  138 - 146 mmol/L Final    POC Potassium 07/21/2023 3.6  3.5 - 4.9 mmol/L Final    Chloride 07/21/2023 101  98 - 109 mmol/L Final    Total CO2 07/21/2023 23 (L)  24 - 29  mmol/L Final    Hemoglobin 07/21/2023 12.9  12.0 - 17.0 g/dL Final    Serial Number: [id number]:  [id number]    Hematocrit 07/21/2023 38  38 - 51 % Final    Ionized Calcium 07/21/2023 1.18 (L)  1.20 - 1.32 mmol/L Final    eGFR 07/21/2023 94.3  >60.0 mL/min/1.73 Final    QT Interval 07/21/2023 380  ms Final    QTC Interval 07/21/2023 430  ms Final        Smoking Cessation Counseling:  Former smoker.  Patient does not currently use any tobacco products.     Assessment:  1. Overactive bladder.  2. Detrusor instability.  3. Urinary incontinence.    Plan:  - The patient will be prescribed Myrbetriq.  - Samples of Gemtesa will be given to the patient.  - The patient will follow up in 1 month.    Follow Up   Return in about 1 month (around 11/13/2023) for Next scheduled follow up, OAB/DETRUSSOR INSTABILITY WITH URINARY INCONTINENCE-EVAL GEMTESSA.    Patient was given instructions and counseling regarding her condition or for health maintenance advice. Please see specific information pulled into the AVS if appropriate.          This document has been electronically signed by Griselda Cheng-Akwa, APRN   October 11, 2023 18:21 EDT      Dictated Utilizing Dragon Dictation: Part of this note may be an electronic transcription/translation of spoken language to printed text using the Dragon Dictation System.     Transcribed from ambient dictation for Griselda Cheng-Akwa, APRN by Jaci Stanford.  10/10/23   14:54 EDT    Patient or patient representative verbalized consent to the visit recording.  I have personally performed the services described in this document as transcribed by the above individual, and it is both accurate and complete.

## 2023-10-10 NOTE — PROGRESS NOTES
Chief Complaint  Urinary Incontinence (NEW PATIENT WITH OAB / DI WITH FREQ/INCONTINENCE)    Subjective          Ronna Wayne presents to [facility] for OAB/DI /FREQ WITH URINE INCONTINENCE  History of Present Illness    Mrs. Ronna Wayne is a pleasant 68-year-old female who presents to clinic today for evaluation as a new patient consult. Patient has been referred to clinic by PCP for concerns of urinary incontinence. On initial evaluation in clinic, she reports numerous other concerns including AN Overactive bladder/Detrusor instability complicated by bouts of urine frequency, urgency, and incontinence.     Patient says she goes to the restroom every 30 minutes and sometimes she is not able to make it on time with urine draining down her legs. She reports lower back pain, abdominal pain, bilateral flank pain, pelvic pressure and suprapubic discomfort. She denies any CVA tenderness. She does not have recurrent UTIs, denies any episodes of dysuria or burning with urination.  She reports urine urgency, and constant feeling of incomplete bladder emptying. HER Urinalysis today is completely negative for leukocyte esterase. It is negative for nitrite. It is negative for gross/microscopic hematuria. A PVR is 0 mL.     She complains of urinary incontinence ongoing for approximately 1 year. She urinates every 10 to 15 minutes. She feels full, but her bladder is not emptying. She wears diapers and goes through 4 a day. She wakes up 3 times at night to urinate. She wears a CPAP machine. She has a history of UTIs. She has a rash between her legs and under her abdomen. She denies any hysterectomy. She feels pressure in her bladder. She denies any constipation. She has diarrhea occasionally. She was diagnosed with breast cancer in 05/2021. She had radiation for 6 weeks. She feels tired. She has 1 child.    THE Patient is currently being followed by Dr. LEY WITH  hematology/oncology for breast cancer, consistent with stage [id number] disease (IIB)  She is taking Arimidex for maintenance therapy and is post radiation and chemotherapy in the past.  Patient is a 70, uncontrolled diabetic with a current A1c of 10.10 completed on 06/11 or 2023.  We discussed the impact of this on her bladder function, patient encouraged adequate diabetic control for better outcome.    She CURRENTLY takes 34 units of insulin in the morning. She takes NovoLog on a sliding scale. She takes Ozempic.    She denies any kidney stones. She denies any hematuria.    Her sister had breast cancer.    The rest of her PMHx as noted below.    IMPRESSION CT 03/09/2021:  1.  Advanced liver cirrhosis.  2.  Prominent perisplenic collateral vessels but no ascites identified. No splenomegaly.  3.  Decompressed colon accounts for mild wall thickening. No convincing  evidence of GI tract inflammation.  4.  Other nonacute and incidental findings as above.    Active Ambulatory Problems     Diagnosis Date Noted    Frequent headaches 05/27/2016    Essential hypertension 05/27/2016    Dyslipidemia 05/27/2016    Type 2 diabetes mellitus with hyperglycemia, with long-term current use of insulin 05/27/2016    Obstructive sleep apnea syndrome 05/27/2016    Glaucoma 05/27/2016    GERD (gastroesophageal reflux disease) 05/27/2016    Morbid obesity 05/27/2016    Yeast vaginitis 10/09/2017    Weakness of left arm 09/01/2020    Cerebrovascular accident (CVA) due to embolism 09/01/2020    SVT (supraventricular tachycardia) 05/02/2021    Vitamin D deficiency 05/09/2021    B12 deficiency     Malignant neoplasm of upper-outer quadrant of right breast in female, estrogen receptor positive 06/02/2021    Other specified hypothyroidism 07/01/2021    Personal history of colonic polyps 08/10/2021    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    Atrial flutter 01/04/2023    Paroxysmal atrial fibrillation 02/06/2023    Presence of cardiac pacemaker  "04/28/2023    Atypical chest pain 09/27/2018    Dyspnea on exertion 09/27/2018    Hyperlipidemia 11/20/2015    Nonrheumatic aortic valve stenosis 01/04/2019    PAC (premature atrial contraction) 01/04/2019    Palpitations 09/27/2018    PVC (premature ventricular contraction) 01/04/2019    Systolic murmur 09/27/2018    TIA (transient ischemic attack) 03/31/2019    Sinus node dysfunction 07/03/2023    Urine incontinence 09/30/2023    OAB (overactive bladder) 10/10/2023    Frequency of micturition 10/10/2023    Incomplete emptying of bladder 10/10/2023    Yeast dermatitis 10/10/2023     Resolved Ambulatory Problems     Diagnosis Date Noted    Memory loss 05/27/2016    Anxiety 05/27/2016    Vertigo 05/27/2016    Depression 07/01/2022    Paroxysmal SVT (supraventricular tachycardia) 12/17/2022    Supraventricular tachycardia 12/24/2022    Sinus node dysfunction 04/28/2023     Past Medical History:   Diagnosis Date    BPV (benign positional vertigo)     Breast cancer 05/2021    Diabetes mellitus     Diarrhea     Disease of thyroid gland     Elevated cholesterol     Fibrocystic breast     Fibromyalgia     Heart murmur     Hypertension     Kidney disease     Peptic ulceration     PONV (postoperative nausea and vomiting)     Primary central sleep apnea     Sleep apnea     Stroke     Urinary incontinence     Vaginal infection       Objective   Vital Signs:   /77 (BP Location: Left arm, Patient Position: Sitting, Cuff Size: Adult)   Pulse 80   Ht 161.3 cm (63.5\")   Wt 120 kg (265 lb 3.2 oz)   BMI 46.24 kg/mý       ROS:   Review of Systems   Constitutional:  Positive for activity change, appetite change, fatigue and unexpected weight gain. Negative for chills, diaphoresis, fever and unexpected weight loss.   HENT:  Negative for congestion, ear discharge, ear pain, nosebleeds, rhinorrhea, sinus pressure and sore throat.    Eyes:  Negative for blurred vision, double vision, photophobia, pain, redness and visual " disturbance.   Respiratory:  Negative for apnea, cough, chest tightness, shortness of breath, wheezing and stridor.    Cardiovascular:  Negative for chest pain and palpitations.   Gastrointestinal:  Positive for abdominal distention, abdominal pain and nausea. Negative for constipation, diarrhea and vomiting.   Endocrine: Negative for polydipsia, polyphagia and polyuria.   Genitourinary:  Positive for frequency, urgency and urinary incontinence. Negative for decreased urine volume, difficulty urinating, dyspareunia, dysuria, flank pain, genital sores, hematuria, pelvic pain, pelvic pressure and vaginal discharge.   Musculoskeletal:  Positive for back pain, gait problem, joint swelling and myalgias. Negative for arthralgias.   Skin:  Positive for dry skin and pallor. Negative for rash and wound.   Neurological:  Negative for dizziness, tremors, syncope, weakness, light-headedness, headache, memory problem and confusion.   Psychiatric/Behavioral:  Positive for sleep disturbance and stress. Negative for behavioral problems and decreased concentration.         Physical Exam  Constitutional:       General: She is in acute distress.      Appearance: She is well-developed. She is obese. She is ill-appearing.   HENT:      Head: Normocephalic and atraumatic.   Eyes:      Pupils: Pupils are equal, round, and reactive to light.   Neck:      Thyroid: No thyromegaly.      Trachea: No tracheal deviation.   Cardiovascular:      Rate and Rhythm: Normal rate and regular rhythm.      Heart sounds: No murmur heard.  Pulmonary:      Effort: Pulmonary effort is normal. No respiratory distress.      Breath sounds: Normal breath sounds. No stridor. No wheezing.   Abdominal:      General: Bowel sounds are normal. There is distension.      Palpations: Abdomen is soft.      Tenderness: There is abdominal tenderness. There is guarding.   Genitourinary:     Labia:         Right: No tenderness.         Left: No tenderness.       Vagina: Normal.  No vaginal discharge.      Comments: Urine frequency, urgency, incontinence, atrophic vaginitis,  yeast vaginitis/yeast dermatitis abdominal fold    Soft nontender abdomen with no organomegaly, rigidity, guarding or tenderness.  Normal vaginal orifice.  She has moderate leakage with Valsalva.  No significant perineal body abnormalities and a normal external anus.       Musculoskeletal:         General: Tenderness present. No deformity. Normal range of motion.      Cervical back: Normal range of motion.   Skin:     General: Skin is warm and dry.      Capillary Refill: Capillary refill takes less than 2 seconds.      Coloration: Skin is pale.      Findings: No erythema or rash.   Neurological:      Mental Status: She is alert and oriented to person, place, and time.      Cranial Nerves: No cranial nerve deficit.      Sensory: No sensory deficit.      Motor: Weakness present.      Coordination: Coordination normal.      Gait: Gait abnormal.   Psychiatric:         Behavior: Behavior normal.         Thought Content: Thought content normal.         Judgment: Judgment normal.        Result Review :     UA          12/24/2022    19:20 6/11/2023    05:32 10/10/2023    13:57   Urinalysis   Squamous Epithelial Cells, UA  None Seen     Specific [place], UA 1.029  >1.030     Ketones, UA Negative  Negative  Negative    Blood, UA Negative  Trace     Leukocytes, UA Negative  Negative  Negative    Nitrite, UA Negative  Negative     RBC, UA  0-2     WBC, UA  0-2     Bacteria, UA  None Seen       Urine Culture          10/10/2023    13:57   Urine Culture   Urine Culture No growth  P      Details         P Preliminary result                  Assessment and Plan    Problem List Items Addressed This Visit          Genitourinary and Reproductive     Yeast vaginitis    Urine incontinence - Primary    Relevant Medications    nystatin (MYCOSTATIN) 948921 UNIT/GM powder    Vibegron (Gemtesa) 75 MG tablet    OAB (overactive bladder)     Relevant Medications    Vibegron (Gemtesa) 75 MG tablet    Other Relevant Orders    CT Abdomen Pelvis Without Contrast    Frequency of micturition    Relevant Medications    nystatin (MYCOSTATIN) 813457 UNIT/GM powder    Vibegron (Gemtesa) 75 MG tablet    Other Relevant Orders    POC Urinalysis Dipstick, Automated (Completed)    Urine Culture - Urine, Urine, Random Void (Completed)    Incomplete emptying of bladder    Relevant Medications    Vibegron (Gemtesa) 75 MG tablet    Other Relevant Orders    Bladder Scan (Completed)       Skin    Yeast dermatitis    Relevant Medications    nystatin (MYCOSTATIN) 039067 UNIT/GM powder     Other Visit Diagnoses       Lower abdominal pain        Relevant Orders    CT Abdomen Pelvis Without Contrast                                                             ASSESSMENT  OAB/DI WITH Mixed urinary incontinence/lower abdominal pain/YEAST VAGINITIS:     Mrs. Ronna Wayne is a 68-year old very pleasant patient evaluated in clinic today with mixed urinary incontinence symptoms that have been ongoing, and now gradually becoming very bothersome to her.  She does not have recurrent UTIs, and denies any episodes of dysuria, burning on urination, or gross hematuria.  She however reports urinary frequency, urgency and incontinent episodes, complicated with nocturia using 4-5 pads daily.  Nevertheless, her urine dipstick today is  completely negative for any infection, it is negative for gross and also negative for microscopic hematuria. HER  PVR is OML. We discussed treatable and non-treatable causes of both stress and urge urinary incontinence.     With regards to stress urinary incontinence we discussed its relationship to childbirth and pelvic health.  We discussed the grading of stress incontinence with trying to quantitate the number of pads used.  We talked about leaking urine with laughing, lifting, coughing, and sexual intercourse. Talked about the Urge component and the  concept of mixed incontinence where upon the stress is treatable, but the urge may exist and that 50% of the time the urge will resolve with treatment of the stress incontinence.  We talked with the diagnostic workup including a postvoid residual urine, OR even a simple cystometrogram.  I discussed the findings that may be neurologically related including commonly seen with multiple sclerosis, Parkinson's disease, and stroke.  I talked about the various therapeutic options including anticholinergics, beta 3 agonists, and alpha blockade if there is a component of obstruction.  I discussed the side effects of anti-cholinergic including dry mouth, double vision.    WE DISCUSSED OAB/DI-Detrusor Instability to  which is an  irritative bladder symptomatology most likely related to factors such as intake of bladder irritants, postinfectious irritation, prolapse, with a very large differential diagnosis.  The mainstay of treatment has been tight cholinergics which basically caused the bladder to have decreased contractility.  We have discussed the side effects of these treatments including dry mouth, double vision, and increasing constipation.  She reports doing well  on oxybutynin for symptomatic relief.    Anticholinergic medication:  I talked about the various therapeutic options including anticholinergics, beta 3 agonists, and alpha blockade if there is a component of obstruction. I discussed the side effects of anti-cholinergic including dry mouth, double vision. HOWEVER, we are recommending the use of an anticholinergic. We discussed the class of drugs.  We discussed the older drug such as oxybutynin with his increased spectrum of side effects such as dry mouth, dry eyes constipation versus the newer medications with lower side effects but more difficult to obtain via insurance and much more expensive finally the newest class of drugs which is Myrbetriq which has a very favorable side effect profile but unfortunately  is prohibitively expensive and oftentimes requires precertification of which may be unsuccessful in   many other cases.    RECURRENT UTIs-RESOLVED, no positive documented urine cultures on file x1 year       PLAN  We REsent her urine for culture, due to concerns of frequency, urgency, pelvic pain and pressure.  I will call her with results if any positive bacterial growth.    Start GEMTESA 75 mg daily.-Samples given to try X 1 month    Discussed upper tract investigation, patient had a prior CT scan 2021 which was unremarkable with the source of her discomfort not noted on CT.    Discussed lower tract investigation,-DEFERRED WILL TRY MEDS FIRST    ALSO DISCUSSED [id number]. PT advised to call in 2 weeks if medication is not working to schedule    Discussed things she can do to help her urine frequency such as keeping the bladder diary with strict intake and output of what she eats or drinks, how often she urinates, how much she urinates.  She should follow a timed voiding bladder training program, and do [place] exercises to strengthen the muscles to help control urination.    We will follow-up in 4 weeks, review CT, evaluate medication effectiveness.    Patient is Agreeable  WITH plan of care.    Patient reports that she is not currently experiencing any symptoms of urinary incontinence.    RADIOLOGY (CT AND/OR KUB):    CT Abdomen and Pelvis: No results found for this or any previous visit.     CT Stone Protocol: No results found for this or any previous visit.     KUB: No results found for this or any previous visit.       LABS (3 MONTHS):    Office Visit on 10/10/2023   Component Date Value Ref Range Status    Color 10/10/2023 Yellow  Yellow, Straw, Dark Yellow, Nichelle Final    Clarity, UA 10/10/2023 Clear  Clear Final    Specific Gravity  10/10/2023 1.005  1.005 - 1.030 Final    pH, Urine 10/10/2023 6.0  5.0 - 8.0 Final    Leukocytes 10/10/2023 Negative  Negative Final    Nitrite, UA 10/10/2023  Negative  Negative Final    Protein, POC 10/10/2023 Negative  Negative mg/dL Final    Glucose, UA 10/10/2023 3+ (A)  Negative mg/dL Final    Ketones, UA 10/10/2023 Negative  Negative Final    Urobilinogen, UA 10/10/2023 Normal  Normal, 0.2 E.U./dL Final    Bilirubin 10/10/2023 Negative  Negative Final    Blood, UA 10/10/2023 Negative  Negative Final    Lot Number 10/10/2023 [id number]   Final    Expiration Date 10/10/2023 10/24/25   Final    Urine Culture 10/10/2023 No growth   Preliminary   Orders Only on 09/22/2023   Component Date Value Ref Range Status    Magnesium 09/22/2023 1.9  1.6 - 2.4 mg/dL Final    Glucose 09/22/2023 245 (H)  65 - 99 mg/dL Final    BUN 09/22/2023 14  8 - 23 mg/dL Final    Creatinine 09/22/2023 0.86  0.57 - 1.00 mg/dL Final    Sodium 09/22/2023 137  136 - 145 mmol/L Final    Potassium 09/22/2023 4.2  3.5 - 5.2 mmol/L Final    Chloride 09/22/2023 101  98 - 107 mmol/L Final    CO2 09/22/2023 24.0  22.0 - 29.0 mmol/L Final    Calcium 09/22/2023 9.8  8.6 - 10.5 mg/dL Final    Total Protein 09/22/2023 7.3  6.0 - 8.5 g/dL Final    Albumin 09/22/2023 3.8  3.5 - 5.2 g/dL Final    ALT (SGPT) 09/22/2023 17  1 - 33 U/L Final    AST (SGOT) 09/22/2023 30  1 - 32 U/L Final    Alkaline Phosphatase 09/22/2023 110  39 - 117 U/L Final    Total Bilirubin 09/22/2023 0.8  0.0 - 1.2 mg/dL Final    Globulin 09/22/2023 3.5  gm/dL Final    A/G Ratio 09/22/2023 1.1  g/dL Final    BUN/Creatinine Ratio 09/22/2023 16.3  7.0 - 25.0 Final    Anion Gap 09/22/2023 12.0  5.0 - 15.0 mmol/L Final    eGFR 09/22/2023 73.7  >60.0 mL/min/1.73 Final    Total Cholesterol 09/22/2023 210 (H)  0 - 200 mg/dL Final    Triglycerides 09/22/2023 176 (H)  0 - 150 mg/dL Final    HDL Cholesterol 09/22/2023 52  40 - 60 mg/dL Final    LDL Cholesterol  09/22/2023 127 (H)  0 - 100 mg/dL Final    VLDL Cholesterol 09/22/2023 31  5 - 40 mg/dL Final    LDL/HDL Ratio 09/22/2023 2.36   Final    TSH 09/22/2023 0.620  0.270 - 4.200 uIU/mL Final     Hemoglobin A1C 09/22/2023 9.30 (H)  4.80 - 5.60 % Final    25 Hydroxy, Vitamin D 09/22/2023 48.6  30.0 - 100.0 ng/ml Final    Free T4 09/22/2023 1.50  0.93 - 1.70 ng/dL Final    Vitamin B-12 09/22/2023 485  211 - 946 pg/mL Final    proBNP 09/22/2023 178.0  0.0 - 900.0 pg/mL Final    WBC 09/22/2023 6.41  3.40 - 10.80 10*3/mm3 Final    RBC 09/22/2023 4.10  3.77 - 5.28 10*6/mm3 Final    Hemoglobin 09/22/2023 14.3  12.0 - 15.9 g/dL Final    Hematocrit 09/22/2023 41.7  34.0 - 46.6 % Final    MCV 09/22/2023 101.7 (H)  79.0 - 97.0 fL Final    MCH 09/22/2023 34.9 (H)  26.6 - 33.0 pg Final    MCHC 09/22/2023 34.3  31.5 - 35.7 g/dL Final    RDW 09/22/2023 13.1  12.3 - 15.4 % Final    RDW-SD 09/22/2023 49.0  37.0 - 54.0 fl Final    MPV 09/22/2023 10.6  6.0 - 12.0 fL Final    Platelets 09/22/2023 142  140 - 450 10*3/mm3 Final    Neutrophil % 09/22/2023 44.3  42.7 - 76.0 % Final    Lymphocyte % 09/22/2023 41.2  19.6 - 45.3 % Final    Monocyte % 09/22/2023 12.3 (H)  5.0 - 12.0 % Final    Eosinophil % 09/22/2023 1.1  0.3 - 6.2 % Final    Basophil % 09/22/2023 0.9  0.0 - 1.5 % Final    Immature Grans % 09/22/2023 0.2  0.0 - 0.5 % Final    Neutrophils, Absolute 09/22/2023 2.84  1.70 - 7.00 10*3/mm3 Final    Lymphocytes, Absolute 09/22/2023 2.64  0.70 - 3.10 10*3/mm3 Final    Monocytes, Absolute 09/22/2023 0.79  0.10 - 0.90 10*3/mm3 Final    Eosinophils, Absolute 09/22/2023 0.07  0.00 - 0.40 10*3/mm3 Final    Basophils, Absolute 09/22/2023 0.06  0.00 - 0.20 10*3/mm3 Final    Immature Grans, Absolute 09/22/2023 0.01  0.00 - 0.05 10*3/mm3 Final    nRBC 09/22/2023 0.0  0.0 - 0.2 /100 WBC Final   Lab on 09/22/2023   Component Date Value Ref Range Status    Microalbumin, Urine 09/22/2023 <1.2  mg/dL Final   Admission on 07/21/2023, Discharged on 07/21/2023   Component Date Value Ref Range Status    WBC 07/21/2023 6.01  3.40 - 10.80 10*3/mm3 Final    RBC 07/21/2023 3.82  3.77 - 5.28 10*6/mm3 Final    Hemoglobin 07/21/2023 12.9  12.0 - 15.9  g/dL Final    Hematocrit 07/21/2023 37.8  34.0 - 46.6 % Final    MCV 07/21/2023 99.0 (H)  79.0 - 97.0 fL Final    MCH 07/21/2023 33.8 (H)  26.6 - 33.0 pg Final    MCHC 07/21/2023 34.1  31.5 - 35.7 g/dL Final    RDW 07/21/2023 13.2  12.3 - 15.4 % Final    RDW-SD 07/21/2023 47.8  37.0 - 54.0 fl Final    MPV 07/21/2023 9.8  6.0 - 12.0 fL Final    Platelets 07/21/2023 132 (L)  140 - 450 10*3/mm3 Final    Glucose 07/21/2023 161 (H)  65 - 99 mg/dL Final    BUN 07/21/2023 11  8 - 23 mg/dL Final    Creatinine 07/21/2023 0.71  0.57 - 1.00 mg/dL Final    Sodium 07/21/2023 138  136 - 145 mmol/L Final    Potassium 07/21/2023 3.6  3.5 - 5.2 mmol/L Final    Chloride 07/21/2023 101  98 - 107 mmol/L Final    CO2 07/21/2023 25.0  22.0 - 29.0 mmol/L Final    Calcium 07/21/2023 9.4  8.6 - 10.5 mg/dL Final    BUN/Creatinine Ratio 07/21/2023 15.5  7.0 - 25.0 Final    Anion Gap 07/21/2023 12.0  5.0 - 15.0 mmol/L Final    eGFR 07/21/2023 92.7  >60.0 mL/min/1.73 Final    Glucose 07/21/2023 162 (H)  70 - 130 mg/dL Final    BUN 07/21/2023 10  8 - 26 mg/dL Final    Creatinine 07/21/2023 0.70  0.60 - 1.30 mg/dL Final    Sodium 07/21/2023 140  138 - 146 mmol/L Final    POC Potassium 07/21/2023 3.6  3.5 - 4.9 mmol/L Final    Chloride 07/21/2023 101  98 - 109 mmol/L Final    Total CO2 07/21/2023 23 (L)  24 - 29 mmol/L Final    Hemoglobin 07/21/2023 12.9  12.0 - 17.0 g/dL Final    Serial Number: [id number]:  [id number]    Hematocrit 07/21/2023 38  38 - 51 % Final    Ionized Calcium 07/21/2023 1.18 (L)  1.20 - 1.32 mmol/L Final    eGFR 07/21/2023 94.3  >60.0 mL/min/1.73 Final    QT Interval 07/21/2023 380  ms Final    QTC Interval 07/21/2023 430  ms Final        Smoking Cessation Counseling:  Former smoker.  Patient does not currently use any tobacco products.     Assessment:  1. Overactive bladder.  2. Detrusor instability.  3. Urinary incontinence.    Plan:  - The patient will be prescribed Myrbetriq.  - Samples of Gemtesa will be given to the  patient.  - The patient will follow up in 1 month.    Follow Up   Return in about 1 month (around 11/13/2023) for Next scheduled follow up, OAB/DETRUSSOR INSTABILITY WITH URINARY INCONTINENCE-RODRIGUEZ MASCORRO.    Patient was given instructions and counseling regarding her condition or for health maintenance advice. Please see specific information pulled into the AVS if appropriate.          This document has been electronically signed by Griselda Cheng-Akwa, APRN   October 11, 2023 18:21 EDT      Dictated Utilizing Dragon Dictation: Part of this note may be an electronic transcription/translation of spoken language to printed text using the Dragon Dictation System.     Transcribed from ambient dictation for Griselda Cheng-Akwa, APRN by Jaci Stanford.  10/10/23   14:54 EDT    Patient or patient representative verbalized consent to the visit recording.  I have personally performed the services described in this document as transcribed by the above individual, and it is both accurate and complete.

## 2023-10-11 ENCOUNTER — TELEPHONE (OUTPATIENT)
Dept: UROLOGY | Facility: CLINIC | Age: 68
End: 2023-10-11
Payer: MEDICARE

## 2023-10-11 NOTE — TELEPHONE ENCOUNTER
I called pt with negative urine culture results pt verbalized understanding.       ----- Message from Griselda Cheng-Akwa, APRN sent at 10/11/2023 12:50 PM EDT -----  Please kindly let patient know her urine culture results are negative for any bacterial growth at this time.  However she may drop off another urine sample if she feels symptomatic.  If not follow-up in clinic as discussed.    Urine Culture   No growth      Thank you

## 2023-10-12 LAB — BACTERIA SPEC AEROBE CULT: NO GROWTH

## 2023-10-16 RX ORDER — FUROSEMIDE 20 MG/1
20 TABLET ORAL DAILY
Qty: 90 TABLET | Refills: 0 | Status: SHIPPED | OUTPATIENT
Start: 2023-10-16

## 2023-10-19 NOTE — TELEPHONE ENCOUNTER
Spoke to patient, she had pacer interr 07/2023 at Dr. Steele office, and did receive her home monitor box.Will return 01/17/2024 for 6 mos pacer interr.

## 2023-10-20 RX ORDER — MAGNESIUM OXIDE TAB 400 MG (241.3 MG ELEMENTAL MG) 400 (241.3 MG) MG
TAB ORAL
Qty: 90 TABLET | Refills: 1 | Status: SHIPPED | OUTPATIENT
Start: 2023-10-20

## 2023-10-24 ENCOUNTER — TELEPHONE (OUTPATIENT)
Dept: FAMILY MEDICINE CLINIC | Facility: CLINIC | Age: 68
End: 2023-10-24
Payer: MEDICARE

## 2023-10-26 PROCEDURE — 93296 REM INTERROG EVL PM/IDS: CPT | Performed by: INTERNAL MEDICINE

## 2023-10-26 PROCEDURE — 93294 REM INTERROG EVL PM/LDLS PM: CPT | Performed by: INTERNAL MEDICINE

## 2023-10-27 ENCOUNTER — OFFICE VISIT (OUTPATIENT)
Dept: CARDIOLOGY | Facility: CLINIC | Age: 68
End: 2023-10-27
Payer: MEDICARE

## 2023-10-27 VITALS
DIASTOLIC BLOOD PRESSURE: 82 MMHG | OXYGEN SATURATION: 96 % | BODY MASS INDEX: 45.71 KG/M2 | HEART RATE: 73 BPM | SYSTOLIC BLOOD PRESSURE: 134 MMHG | WEIGHT: 258 LBS | HEIGHT: 63 IN

## 2023-10-27 DIAGNOSIS — I48.0 PAROXYSMAL ATRIAL FIBRILLATION: ICD-10-CM

## 2023-10-27 DIAGNOSIS — I47.10 SVT (SUPRAVENTRICULAR TACHYCARDIA): Primary | ICD-10-CM

## 2023-10-27 DIAGNOSIS — I10 ESSENTIAL HYPERTENSION: ICD-10-CM

## 2023-10-27 DIAGNOSIS — Z95.0 PRESENCE OF CARDIAC PACEMAKER: ICD-10-CM

## 2023-10-27 DIAGNOSIS — Z79.01 CHRONIC ANTICOAGULATION: ICD-10-CM

## 2023-10-27 DIAGNOSIS — I48.92 ATRIAL FLUTTER, UNSPECIFIED TYPE: ICD-10-CM

## 2023-10-27 DIAGNOSIS — I49.5 SINUS NODE DYSFUNCTION: ICD-10-CM

## 2023-10-27 PROBLEM — I49.3 PVC (PREMATURE VENTRICULAR CONTRACTION): Status: RESOLVED | Noted: 2019-01-04 | Resolved: 2023-10-27

## 2023-10-27 PROBLEM — B37.2 YEAST DERMATITIS: Status: RESOLVED | Noted: 2023-10-10 | Resolved: 2023-10-27

## 2023-10-27 PROBLEM — R06.09 DYSPNEA ON EXERTION: Status: RESOLVED | Noted: 2018-09-27 | Resolved: 2023-10-27

## 2023-10-27 PROBLEM — R07.89 ATYPICAL CHEST PAIN: Status: RESOLVED | Noted: 2018-09-27 | Resolved: 2023-10-27

## 2023-10-27 PROBLEM — B37.31 YEAST VAGINITIS: Status: RESOLVED | Noted: 2017-10-09 | Resolved: 2023-10-27

## 2023-10-27 RX ORDER — LOSARTAN POTASSIUM 25 MG/1
25 TABLET ORAL DAILY
Qty: 30 TABLET | Refills: 3 | Status: SHIPPED | OUTPATIENT
Start: 2023-10-27

## 2023-10-27 RX ORDER — FLUCONAZOLE 150 MG/1
150 TABLET ORAL ONCE
COMMUNITY
Start: 2023-10-10

## 2023-10-27 NOTE — PATIENT INSTRUCTIONS
Please check your blood pressure daily midday and record the results for the next 1-2 weeks.  Call our office and get the results to our nurse for possible further medicine adjustments.

## 2023-10-27 NOTE — PROGRESS NOTES
Encounter Date:10/27/2023      Patient ID: Ronna Wayne is a 68 y.o. female.    Mague Acosta APRN    Chief Complaint: Cheif Complaint EP: Atrial Fibrillation, SVT, and Sinus node dysfunction    PROBLEM LIST:  Patient Active Problem List    Diagnosis Date Noted    Sinus node dysfunction 07/03/2023     Priority: High    Paroxysmal atrial fibrillation 02/06/2023     Priority: High    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     Priority: High    Atrial flutter 01/04/2023     Priority: High    SVT (supraventricular tachycardia) 05/02/2021     Priority: High     Note Last Updated: 10/27/2023     EP study with catheter ablation of SVT due to AV node reentry, 7/21/2023      Presence of cardiac pacemaker 04/28/2023     Priority: Medium    Nonrheumatic aortic valve stenosis 01/04/2019     Priority: Medium    Chronic anticoagulation 10/27/2023     Priority: Low    Essential hypertension 05/27/2016     Priority: Low    Obstructive sleep apnea syndrome 05/27/2016     Priority: Low    Morbid obesity 05/27/2016     Priority: Low    OAB (overactive bladder) 10/10/2023    Incomplete emptying of bladder 10/10/2023    Urine incontinence 09/30/2023    Personal history of colonic polyps 08/10/2021    Other specified hypothyroidism 07/01/2021    Malignant neoplasm of upper-outer quadrant of right breast in female, estrogen receptor positive 06/02/2021    Vitamin D deficiency 05/09/2021    B12 deficiency     Weakness of left arm 09/01/2020     Note Last Updated: 9/1/2020     Due to previous cva      Cerebrovascular accident (CVA) due to embolism 09/01/2020    TIA (transient ischemic attack) 03/31/2019    Frequent headaches 05/27/2016    Type 2 diabetes mellitus with hyperglycemia, with long-term current use of insulin 05/27/2016    Glaucoma 05/27/2016    GERD (gastroesophageal reflux disease) 05/27/2016    Hyperlipidemia 11/20/2015               History of Present Illness  Patient presents today for follow-up with a  history of atrial fibrillation and flutter on long-term anticoagulation and SVT with recent SVT ablation.  She has sinus node dysfunction with a dual-chamber permanent pacemaker.  She returns today for initial EP follow-up after SVT ablation.  She has done very well.  She has had no awareness of palpitations, no dizziness no syncope.  She reports her blood pressure at home typically runs about 130 mmHg systolic on her current medical regimen.  She is on 2 diuretics for lower extremity edema which she states is mostly well controlled unless she is on her feet a lot.  She states compliance with current medical regimen reports no significant adverse side effects.  She was previously on lisinopril for renal protection which was discontinued secondary to cough and replaced with amlodipine 5 mg.    Allergies   Allergen Reactions    Codeine GI Intolerance     Increased heart rate      Sulfa Antibiotics GI Intolerance     Heart rate increase        Current Outpatient Medications   Medication Instructions    acetaminophen (TYLENOL) 650 mg, Oral, Every 6 Hours PRN    amLODIPine (NORVASC) 5 MG tablet TAKE 1 TABLET BY     anastrozole (ARIMIDEX) 1 mg, Oral, Daily    apixaban (ELIQUIS) 5 mg, Oral, 2 Times Daily    aspirin 81 MG chewable tablet Chew & swallow 1 tablet Daily.    cholecalciferol (VITAMIN D3) 2,000 Units, Oral, Daily    Continuous Blood Gluc  (Dexcom G7 ) device 1 Device, Does not apply, Daily    Continuous Blood Gluc Sensor (Dexcom G7 Sensor) misc 1 Device, Does not apply, 90 Minutes Pre-Op    dexlansoprazole (DEXILANT) 60 MG capsule Oral, Daily    Diclofenac Sodium (VOLTAREN) 4 g, Topical, 3 Times Daily PRN    empagliflozin (JARDIANCE) 25 mg, Oral, Every Morning    fluconazole (DIFLUCAN) 150 mg, Oral, Once    furosemide (LASIX) 20 mg, Oral, Daily    Gemtesa 75 mg, Oral, Nightly    hydroCHLOROthiazide (MICROZIDE) 12.5 MG capsule 1 po qhs    insulin aspart (NOVOLOG FLEXPEN) 0-9 Units, Subcutaneous, 3  "Times Daily With Meals, Admelog Solostar sliding scale    levothyroxine (SYNTHROID, LEVOTHROID) 88 MCG tablet TAKE 1 TABLET BY MOUTH DAILY    linaGLIPtin-metFORMIN HCl (Jentadueto) 2.5-1000 MG tablet Oral, 2 Times Daily         MAGnesium-Oxide 400 (240 Mg) MG tablet TAKE ONE TABLET BY MOUTH THREE TIMES A DAY    metoprolol tartrate (LOPRESSOR) 25 MG tablet TAKE ONE TABLET BY MOUTH TWICE A DAY    NovoLOG Mix 70/30 (70-30) 100 UNIT/ML injection     nystatin (MYCOSTATIN) 586651 UNIT/GM powder Topical, 3 Times Daily    ondansetron ODT (ZOFRAN-ODT) 4 mg, Translingual, Every 8 Hours PRN    Ozempic (0.25 or 0.5 MG/DOSE) 0.25 mg, Subcutaneous, Weekly    pantoprazole (PROTONIX) 40 mg, Oral, As Needed    rosuvastatin (CRESTOR) 20 mg, Oral, Daily    vitamin E 100 Units, Oral, Daily       .    Objective:     /82 (BP Location: Left arm, Patient Position: Sitting)   Pulse 73   Ht 160 cm (63\")   Wt 117 kg (258 lb)   SpO2 96%   BMI 45.70 kg/m²    Body mass index is 45.7 kg/m².     Constitutional:       Appearance: Well-developed.   Pulmonary:      Effort: Pulmonary effort is normal. No respiratory distress.      Breath sounds: Normal breath sounds. No wheezing. No rales.      Comments: Bases clear  Chest:      Chest wall: Not tender to palpatation.   Cardiovascular:      Normal rate. Regular rhythm.      Murmurs: There is no murmur.      No gallop.  No click. No rub.   Pulses:     Intact distal pulses.   Edema:     Peripheral edema present.     Pretibial: bilateral trace edema of the pretibial area.     Ankle: bilateral trace edema of the ankle.  Musculoskeletal: Normal range of motion.     Lab Review:     Lab Results   Component Value Date    GLUCOSE 245 (H) 09/22/2023    BUN 14 09/22/2023    CREATININE 0.86 09/22/2023    EGFR 73.7 09/22/2023    BCR 16.3 09/22/2023    K 4.2 09/22/2023    CO2 24.0 09/22/2023    CALCIUM 9.8 09/22/2023    ALBUMIN 3.8 09/22/2023    BILITOT 0.8 09/22/2023    AST 30 09/22/2023    ALT 17 " 09/22/2023     Lab Results   Component Value Date    WBC 6.41 09/22/2023    HGB 14.3 09/22/2023    HCT 41.7 09/22/2023    .7 (H) 09/22/2023     09/22/2023     Lab Results   Component Value Date    TSH 0.620 09/22/2023           Procedures               Assessment:      Diagnosis Plan   1. SVT (supraventricular tachycardia)  Status post ablation, no known recurrence      2. Sinus node dysfunction  Normal functioning dual-chamber permanent pacemaker with 4% right atrial pacing and 1% RV pacing.  Normal lead parameters.  8.5 years battery life.      3. Presence of cardiac pacemaker    This patient's Cardiac Implanted Electronic Device was manually interrogated and reprogrammed during the patient encounter today.  Iterative programming changes were manually made to determine the sensing threshold, pacing threshold, lead impedance as well as underlying cardiac rhythm.  These programming changes were not limited to but included some or all of the following when appropriate: pacing mode, programmed AV delays, blanking periods, and refractory periods.  Data obtained as a result of these manual programing changes informed the patient's CIED permanent programming.        4. Paroxysmal atrial fibrillation  3 mode switches noted on device interrogation for total 1 hour and 23 minutes.  Less than 1% A-fib burden      5. Essential hypertension  Well managed on her current medical regimen however, she is on multiple antihypertensives at low doses.  She is diabetic and should be on an ACE or ARB for renal protection.  Her last serum creatinine was within normal range.  Amlodipine is likely contributing to her lower extremity edema requiring 2 diuretics to manage.  She has a pacemaker in her metoprolol could be titrated up however since she is not on current renal protection I will start her on losartan 25 mg daily.  I have asked her to continue to check her blood pressure daily and call our office in 2 weeks for  possible up titration.      6. Chronic anticoagulation  Tolerating anticoagulation, continue Eliquis 5 mg twice daily        Plan:     Stable cardiac status.  Continue current medications.   in 6 months, sooner as needed.  Thank you for allowing us to participate in the care of your patient.     Electronically signed by MP Owens, 10/27/23, 3:59 PM EDT.

## 2023-11-01 ENCOUNTER — OFFICE VISIT (OUTPATIENT)
Dept: SURGERY | Facility: CLINIC | Age: 68
End: 2023-11-01
Payer: MEDICARE

## 2023-11-01 ENCOUNTER — HOSPITAL ENCOUNTER (OUTPATIENT)
Dept: CT IMAGING | Facility: HOSPITAL | Age: 68
Discharge: HOME | End: 2023-11-01
Admitting: NURSE PRACTITIONER
Payer: MEDICARE

## 2023-11-01 VITALS — BODY MASS INDEX: 45.71 KG/M2 | WEIGHT: 258 LBS | HEIGHT: 63 IN

## 2023-11-01 DIAGNOSIS — Z17.0 MALIGNANT NEOPLASM OF UPPER-OUTER QUADRANT OF RIGHT BREAST IN FEMALE, ESTROGEN RECEPTOR POSITIVE: Primary | ICD-10-CM

## 2023-11-01 DIAGNOSIS — C50.411 MALIGNANT NEOPLASM OF UPPER-OUTER QUADRANT OF RIGHT BREAST IN FEMALE, ESTROGEN RECEPTOR POSITIVE: Primary | ICD-10-CM

## 2023-11-01 PROCEDURE — 1159F MED LIST DOCD IN RCRD: CPT | Performed by: SURGERY

## 2023-11-01 PROCEDURE — 74176 CT ABD & PELVIS W/O CONTRAST: CPT

## 2023-11-01 PROCEDURE — 1160F RVW MEDS BY RX/DR IN RCRD: CPT | Performed by: SURGERY

## 2023-11-01 PROCEDURE — 99213 OFFICE O/P EST LOW 20 MIN: CPT | Performed by: SURGERY

## 2023-11-01 PROCEDURE — 74176 CT ABD & PELVIS W/O CONTRAST: CPT | Performed by: RADIOLOGY

## 2023-11-01 RX ORDER — AMLODIPINE BESYLATE 5 MG/1
5 TABLET ORAL DAILY
Qty: 30 TABLET | OUTPATIENT
Start: 2023-11-01

## 2023-11-01 NOTE — PROGRESS NOTES
Subjective   Ronna Wayne is a 68 y.o. female  is here today for follow-up.         Ronna Wayne is a 68 y.o. female here for follow up after seroma drainage from the right breast.  Wound completely healed..  Radiation changes greatly improved.  She has had a pacemaker placed.  No complaints reported.    Assessment     Diagnoses and all orders for this visit:    1. Malignant neoplasm of upper-outer quadrant of right breast in female, estrogen receptor positive (Primary)      Ronna Wayne is a 68 y.o. female doing well after drainage of seroma of the right breast.  The wound has healed well by secondary intent and the patient will follow-up in 3 months.

## 2023-11-13 ENCOUNTER — OFFICE VISIT (OUTPATIENT)
Dept: UROLOGY | Facility: CLINIC | Age: 68
End: 2023-11-13
Payer: MEDICARE

## 2023-11-13 VITALS
HEART RATE: 75 BPM | DIASTOLIC BLOOD PRESSURE: 73 MMHG | WEIGHT: 260.6 LBS | BODY MASS INDEX: 46.18 KG/M2 | HEIGHT: 63 IN | SYSTOLIC BLOOD PRESSURE: 108 MMHG

## 2023-11-13 DIAGNOSIS — R32 URINARY INCONTINENCE, UNSPECIFIED TYPE: Primary | ICD-10-CM

## 2023-11-13 DIAGNOSIS — N32.81 DETRUSOR INSTABILITY OF BLADDER: ICD-10-CM

## 2023-11-13 DIAGNOSIS — R35.0 FREQUENCY OF MICTURITION: ICD-10-CM

## 2023-11-13 DIAGNOSIS — N32.81 OAB (OVERACTIVE BLADDER): ICD-10-CM

## 2023-11-13 DIAGNOSIS — K59.01 SLOW TRANSIT CONSTIPATION: ICD-10-CM

## 2023-11-13 LAB
BILIRUB BLD-MCNC: NEGATIVE MG/DL
CLARITY, POC: CLEAR
COLOR UR: YELLOW
EXPIRATION DATE: ABNORMAL
GLUCOSE UR STRIP-MCNC: ABNORMAL MG/DL
KETONES UR QL: NEGATIVE
LEUKOCYTE EST, POC: NEGATIVE
Lab: ABNORMAL
NITRITE UR-MCNC: NEGATIVE MG/ML
PH UR: 7 [PH] (ref 5–8)
PROT UR STRIP-MCNC: NEGATIVE MG/DL
RBC # UR STRIP: NEGATIVE /UL
SP GR UR: 1 (ref 1–1.03)
UROBILINOGEN UR QL: NORMAL

## 2023-11-13 PROCEDURE — 87086 URINE CULTURE/COLONY COUNT: CPT | Performed by: NURSE PRACTITIONER

## 2023-11-13 RX ORDER — VIBEGRON 75 MG/1
1 TABLET, FILM COATED ORAL NIGHTLY
Qty: 30 TABLET | Refills: 3 | Status: SHIPPED | OUTPATIENT
Start: 2023-11-13 | End: 2023-12-13

## 2023-11-13 NOTE — PROGRESS NOTES
Chief Complaint  OAB/DETRUSSOR INSTABILITY WITH URINARY INCONTINENCE-EVAL GE (ONE MONTH FOLLOW UP-EVAL GEMTESSA)    Subjective          Ronna Wayne presents to [facility] for OAB/DI/UTIs  History of Present Illness    Mrs. Ronna Wayne is a pleasant 68-year-old female who returns to clinic today for evaluation. This is a 1-month follow-up with prior concerns of urinary incontinence. Initially evaluated in clinic on 10/10/2023, patient was in apparent discomfort. She did report overactive bladder/detrusor instability symptoms, complicated by bouts of urinary frequency, urgency, nocturia and incontinence.     Patient did report going to the bathroom every 30 minutes to an hour, worse at night to the point where she wasn't getting any sleep. She did report lower back pain, abdominal pain, bilateral flank pain, pelvic pressure, and suprapubic discomfort. She denied CVA tenderness. She wore multiple depends during the day.     Despite her incontinent episode, she did not have recurrent UTIs. The patient is diabetic with bouts of yeast vaginitis compromised by her current use of diapers with an A1c of 10.  BMI is greater than 46, so she also has yeast dermatitis in her lower abdominal folds.  We discussed better blood sugar controls, diet, and perineal hygiene and patient is agreeable plan.     She is also currently followed by Dr. Teresa with hematology oncology for breast cancer which was consistent with stage pT2 pN1a disease. She is currently taking Arimidex for maintenance therapy and is post radiation and chemotherapy in the past.    AS a plan of care, We had given her some Gemtesa 75 mg samples to try for urine frequency/incontinence. She returns to clinic today for reevaluation in no apparent discomfort. The patient is very pleased so far. She reports no longer wearing diapers and she would like to continue therapy.    OVERALL, The patient reports that  she is doing well and Gemtesa is working well for her. She has not worn a diaper since her last visit. She feels as though she has an infection. She has a headache and is constipated. She denies any fevers, chills, or abdominal pain. She has chronic back pain and burning with urination. She has yeast under her abdomen and between her legs. She is using nystatin powder.  However her urinalysis today is complete negative for any leukocyte esterase, it is negative for nitrites, it is negative for gross/microscopic hematuria.  PVR is 0 mL.      IMPRESSION CT 03/09/2021:  1.  Advanced liver cirrhosis.  2.  Prominent perisplenic collateral vessels but no ascites identified. No splenomegaly.  3.  Decompressed colon accounts for mild wall thickening. No convincing  evidence of GI tract inflammation.  4.  Other nonacute and incidental findings as above  Active Ambulatory Problems     Diagnosis Date Noted    Frequent headaches 05/27/2016    Essential hypertension 05/27/2016    Type 2 diabetes mellitus with hyperglycemia, with long-term current use of insulin 05/27/2016    Obstructive sleep apnea syndrome 05/27/2016    Glaucoma 05/27/2016    GERD (gastroesophageal reflux disease) 05/27/2016    Morbid obesity 05/27/2016    Weakness of left arm 09/01/2020    Cerebrovascular accident (CVA) due to embolism 09/01/2020    SVT (supraventricular tachycardia) 05/02/2021    Vitamin D deficiency 05/09/2021    B12 deficiency     Malignant neoplasm of upper-outer quadrant of right breast in female, estrogen receptor positive 06/02/2021    Other specified hypothyroidism 07/01/2021    Personal history of colonic polyps 08/10/2021    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    Atrial flutter 01/04/2023    Paroxysmal atrial fibrillation 02/06/2023    Presence of cardiac pacemaker 04/28/2023    Hyperlipidemia 11/20/2015    Nonrheumatic aortic valve stenosis 01/04/2019    TIA (transient ischemic attack) 03/31/2019    Sinus node dysfunction 07/03/2023     "Urine incontinence 09/30/2023    Detrusor instability of bladder 10/10/2023    Incomplete emptying of bladder 10/10/2023    Chronic anticoagulation 10/27/2023    Frequency of micturition 11/15/2023    Slow transit constipation 11/15/2023     Resolved Ambulatory Problems     Diagnosis Date Noted    Memory loss 05/27/2016    Anxiety 05/27/2016    Vertigo 05/27/2016    Yeast vaginitis 10/09/2017    Depression 07/01/2022    Paroxysmal SVT (supraventricular tachycardia) 12/17/2022    Supraventricular tachycardia 12/24/2022    Sinus node dysfunction 04/28/2023    Atypical chest pain 09/27/2018    Dyspnea on exertion 09/27/2018    PVC (premature ventricular contraction) 01/04/2019    Yeast dermatitis 10/10/2023     Past Medical History:   Diagnosis Date    BPV (benign positional vertigo)     Breast cancer 05/2021    Diabetes mellitus     Diarrhea     Disease of thyroid gland     Elevated cholesterol     Fibrocystic breast     Fibromyalgia     Heart murmur     Hypertension     Kidney disease     Peptic ulceration     PONV (postoperative nausea and vomiting)     Primary central sleep apnea     Sleep apnea     Stroke     Urinary incontinence     Vaginal infection       Objective   Vital Signs:   /73   Pulse 75   Ht 160 cm (62.99\")   Wt 118 kg (260 lb 9.6 oz)   BMI 46.17 kg/m²       ROS:   Review of Systems   Constitutional:  Positive for activity change, appetite change, fatigue and unexpected weight gain. Negative for chills, diaphoresis, fever and unexpected weight loss.   HENT:  Negative for congestion, ear discharge, ear pain, nosebleeds, rhinorrhea, sinus pressure and sore throat.    Eyes:  Negative for blurred vision, double vision, photophobia, pain, redness and visual disturbance.   Respiratory:  Negative for apnea, cough, chest tightness, shortness of breath, wheezing and stridor.    Cardiovascular:  Negative for chest pain and palpitations.   Gastrointestinal:  Positive for abdominal distention, abdominal " pain and nausea. Negative for constipation, diarrhea and vomiting.   Endocrine: Negative for polydipsia, polyphagia and polyuria.   Genitourinary:  Positive for flank pain, genital sores, pelvic pressure, urinary incontinence and vaginal discharge. Negative for decreased urine volume, difficulty urinating, dyspareunia, dysuria, frequency, hematuria, pelvic pain and urgency.   Musculoskeletal:  Positive for back pain, gait problem, joint swelling and myalgias. Negative for arthralgias.   Skin:  Positive for dry skin, pallor and rash. Negative for wound.   Neurological:  Negative for dizziness, tremors, syncope, weakness, light-headedness, headache, memory problem and confusion.   Psychiatric/Behavioral:  Positive for sleep disturbance and stress. Negative for behavioral problems and decreased concentration.         Physical Exam  Constitutional:       General: She is in acute distress.      Appearance: She is well-developed. She is obese. She is ill-appearing.   HENT:      Head: Normocephalic and atraumatic.   Eyes:      Pupils: Pupils are equal, round, and reactive to light.   Neck:      Thyroid: No thyromegaly.      Trachea: No tracheal deviation.   Cardiovascular:      Rate and Rhythm: Normal rate and regular rhythm.      Heart sounds: No murmur heard.  Pulmonary:      Effort: Pulmonary effort is normal. No respiratory distress.      Breath sounds: Normal breath sounds. No stridor. No wheezing.   Abdominal:      General: Bowel sounds are normal. There is distension.      Palpations: Abdomen is soft.      Tenderness: There is abdominal tenderness. There is guarding.   Genitourinary:     Labia:         Right: No tenderness.         Left: No tenderness.       Vagina: Normal. Vaginal discharge present.      Comments: YEAST VAGINITIS  Musculoskeletal:         General: Tenderness present. No deformity. Normal range of motion.      Cervical back: Normal range of motion.   Skin:     General: Skin is warm and dry.       Capillary Refill: Capillary refill takes less than 2 seconds.      Coloration: Skin is pale.      Findings: No erythema or rash.   Neurological:      Mental Status: She is alert and oriented to person, place, and time.      Cranial Nerves: No cranial nerve deficit.      Sensory: No sensory deficit.      Motor: Weakness present.      Coordination: Coordination normal.   Psychiatric:         Behavior: Behavior normal.         Thought Content: Thought content normal.         Judgment: Judgment normal.        Result Review :     UA          6/11/2023    05:32 10/10/2023    13:57 11/13/2023    14:40   Urinalysis   Squamous Epithelial Cells, UA None Seen      Specific Gravity, UA >1.030      Ketones, UA Negative  Negative  Negative    Blood, UA Trace      Leukocytes, UA Negative  Negative  Negative    Nitrite, UA Negative      RBC, UA 0-2      WBC, UA 0-2      Bacteria, UA None Seen        Urine Culture          10/10/2023    13:57 11/13/2023    14:40   Urine Culture   Urine Culture No growth  No growth           Assessment and Plan    Problem List Items Addressed This Visit          Gastrointestinal Abdominal     Slow transit constipation    Overview     - A prescription for Linzess 72 mg was sent to the patient's pharmacy. Samples provided.  - A prescription for MiraLAX was sent to the patient's pharmacy.         Relevant Medications    linaclotide (Linzess) 72 MCG capsule capsule       Genitourinary and Reproductive     Urine incontinence - Primary    Current Assessment & Plan     -A prescription for Gemtesa was sent to the patient's pharmacy.  - Urine will be sent for a culture. I will call the patient with results if any positive bacteria growth.  - She will follow up in 3 months. She may return sooner if need be.         Relevant Medications    Vibegron (Gemtesa) 75 MG tablet    Other Relevant Orders    POC Urinalysis Dipstick, Automated (Completed)    Urine Culture - Urine, Urine, Clean Catch (Completed)     Detrusor instability of bladder    Relevant Medications    Vibegron (Gemtesa) 75 MG tablet    Frequency of micturition    Relevant Medications    Vibegron (Gemtesa) 75 MG tablet                                             ASSESSMENT  OAB/DI WITH Mixed urinary incontinence/lower abdominal pain/YEAST VAGINITIS:      Mrs. Ronna Wayne is a 68-year old very pleasant patient evaluated in clinic today with mixed urinary incontinence symptoms that have been ongoing,  gradually becoming very bothersome to her and now significantly improved since starting therapy with Gemtesa.  She denies any urinary incontinent episodes today, patient denies wearing any more depends since starting therapy.  Overall she is very pleased.      She does not have recurrent UTIs, and denies any episodes of dysuria, however reports recent burning on urination, vaginal irritation consistent with yeast vaginitis.  She however denies any episodes of gross hematuria.  She denies urinary frequency, urgency and incontinent episodes, prevention in the past-resolved.  Her urine dipstick today is  completely negative for any infection, it is negative for gross and also negative for microscopic hematuria. HER  PVR is OML.     AGAIN, We discussed treatable and non-treatable causes of both stress and urge urinary incontinence. With regards to stress urinary incontinence we discussed its relationship to childbirth and pelvic health.  We discussed the grading of stress incontinence with trying to quantitate the number of pads used.  We talked about leaking urine with laughing, lifting, coughing, and sexual intercourse. Talked about the Urge component and the concept of mixed incontinence where upon the stress is treatable, but the urge may exist and that 50% of the time the urge will resolve with treatment of the stress incontinence.  We talked with the diagnostic workup including a postvoid residual urine, OR even a simple cystometrogram.  I  discussed the findings that may be neurologically related including commonly seen with multiple sclerosis, Parkinson's disease, and stroke.  I talked about the various therapeutic options including anticholinergics, beta 3 agonists, and alpha blockade if there is a component of obstruction.  I discussed the side effects of anti-cholinergic including dry mouth, double vision.     WE DISCUSSED OAB/DI-Detrusor Instability to  which is an  irritative bladder symptomatology most likely related to factors such as intake of bladder irritants, postinfectious irritation, prolapse, with a very large differential diagnosis.  The mainstay of treatment has been tight cholinergics which basically caused the bladder to have decreased contractility.  We have discussed the side effects of these treatments including dry mouth, double vision, and increasing constipation.  She reports doing well  on oxybutynin for symptomatic relief.     Anticholinergic medication:  I talked about the various therapeutic options including anticholinergics, beta 3 agonists, and alpha blockade if there is a component of obstruction. I discussed the side effects of anti-cholinergic including dry mouth, double vision. HOWEVER, we are recommending the use of an anticholinergic. We discussed the class of drugs.  We discussed the older drug such as oxybutynin with his increased spectrum of side effects such as dry mouth, dry eyes constipation versus the newer medications with lower side effects but more difficult to obtain via insurance and much more expensive finally the newest class of drugs which is Myrbetriq which has a very favorable side effect profile but unfortunately is prohibitively expensive and oftentimes requires precertification of which may be unsuccessful in many other cases.    IBS- Patient has significant irritable bowel syndrome, characterized by extreme amounts of constipation.  We spoke about the impact of this on bladder function.  We  spoke about  its relationship to recurrent urinary tract infections. We discussed the need for increasing p.o. fluid intake to at least 2 to 3 L of water daily, and discussed the physiology of colonic motility as well as use of MiraLAX as a bulk laxative versus the newer class of serotonin uptake blockers such as Linzess.  We stressed the need for a daily bowel movement and discussed the Nesbit stool scale at length.We also discussed a referral to the GI nurse practitioner    RECURRENT UTIs-RESOLVED, no positive documented urine cultures on file x1 year                                         PLAN  We REsent her urine for culture, due to concerns of frequency, urgency, pelvic pain and pressure.  I will call her with results if any positive bacterial growth.     CONTINUE GEMTESA 75 mg daily.-Samples given[facility]     Discussed upper tract investigation, patient had a prior CT scan 2021 which was unremarkable with the source of her discomfort not noted on CT.     Discussed lower tract investigation,-DEFERRED WILL CONTINUE MEDS FIRST     ALSO DISCUSSED [id number].DOING GREAT ON GEMTESSA    Discussed things she can do to help her urine frequency such as keeping the bladder diary with strict intake and output of what she eats or drinks, how often she urinates, how much she urinates.  She should follow a timed voiding bladder training program, and do Keagle exercises to strengthen the muscles to help control urination.    Discussed better diabetic control and diabetic management,    Linzess 72 mg given, discussed strict bowel control     Patient is Agreeable  WITH plan of care.    I will follow-up in clinic as discussed.    Patient reports that she is not currently experiencing any symptoms of urinary incontinence.    RADIOLOGY (CT AND/OR KUB):    CT Abdomen and Pelvis: No results found for this or any previous visit.     CT Stone Protocol: No results found for this or any  previous visit.     KUB: No results found for this or any previous visit.       LABS (3 MONTHS):    Office Visit on 11/13/2023   Component Date Value Ref Range Status    Color 11/13/2023 Yellow  Yellow, Straw, Dark Yellow, Nichelle Final    Clarity, UA 11/13/2023 Clear  Clear Final    Specific Gravity  11/13/2023 1.005  1.005 - 1.030 Final    pH, Urine 11/13/2023 7.0  5.0 - 8.0 Final    Leukocytes 11/13/2023 Negative  Negative Final    Nitrite, UA 11/13/2023 Negative  Negative Final    Protein, POC 11/13/2023 Negative  Negative mg/dL Final    Glucose, UA 11/13/2023 3+ (A)  Negative mg/dL Final    Ketones, UA 11/13/2023 Negative  Negative Final    Urobilinogen, UA 11/13/2023 Normal  Normal, 0.2 E.U./dL Final    Bilirubin 11/13/2023 Negative  Negative Final    Blood, UA 11/13/2023 Negative  Negative Final    Lot Number 11/13/2023 [id number]   Final    Expiration Date 11/13/2023 10/25/2024   Final    Urine Culture 11/13/2023 No growth   Final   Office Visit on 10/10/2023   Component Date Value Ref Range Status    Color 10/10/2023 Yellow  Yellow, Straw, Dark Yellow, Nichelle Final    Clarity, UA 10/10/2023 Clear  Clear Final    Specific Gravity  10/10/2023 1.005  1.005 - 1.030 Final    pH, Urine 10/10/2023 6.0  5.0 - 8.0 Final    Leukocytes 10/10/2023 Negative  Negative Final    Nitrite, UA 10/10/2023 Negative  Negative Final    Protein, POC 10/10/2023 Negative  Negative mg/dL Final    Glucose, UA 10/10/2023 3+ (A)  Negative mg/dL Final    Ketones, UA 10/10/2023 Negative  Negative Final    Urobilinogen, UA 10/10/2023 Normal  Normal, 0.2 E.U./dL Final    Bilirubin 10/10/2023 Negative  Negative Final    Blood, UA 10/10/2023 Negative  Negative Final    Lot Number 10/10/2023 [id number]   Final    Expiration Date 10/10/2023 10/24/25   Final    Urine Culture 10/10/2023 No growth   Final   Orders Only on 09/22/2023   Component Date Value Ref Range Status    Magnesium 09/22/2023 1.9  1.6 - 2.4 mg/dL Final    Glucose  09/22/2023 245 (H)  65 - 99 mg/dL Final    BUN 09/22/2023 14  8 - 23 mg/dL Final    Creatinine 09/22/2023 0.86  0.57 - 1.00 mg/dL Final    Sodium 09/22/2023 137  136 - 145 mmol/L Final    Potassium 09/22/2023 4.2  3.5 - 5.2 mmol/L Final    Chloride 09/22/2023 101  98 - 107 mmol/L Final    CO2 09/22/2023 24.0  22.0 - 29.0 mmol/L Final    Calcium 09/22/2023 9.8  8.6 - 10.5 mg/dL Final    Total Protein 09/22/2023 7.3  6.0 - 8.5 g/dL Final    Albumin 09/22/2023 3.8  3.5 - 5.2 g/dL Final    ALT (SGPT) 09/22/2023 17  1 - 33 U/L Final    AST (SGOT) 09/22/2023 30  1 - 32 U/L Final    Alkaline Phosphatase 09/22/2023 110  39 - 117 U/L Final    Total Bilirubin 09/22/2023 0.8  0.0 - 1.2 mg/dL Final    Globulin 09/22/2023 3.5  gm/dL Final    A/G Ratio 09/22/2023 1.1  g/dL Final    BUN/Creatinine Ratio 09/22/2023 16.3  7.0 - 25.0 Final    Anion Gap 09/22/2023 12.0  5.0 - 15.0 mmol/L Final    eGFR 09/22/2023 73.7  >60.0 mL/min/1.73 Final    Total Cholesterol 09/22/2023 210 (H)  0 - 200 mg/dL Final    Triglycerides 09/22/2023 176 (H)  0 - 150 mg/dL Final    HDL Cholesterol 09/22/2023 52  40 - 60 mg/dL Final    LDL Cholesterol  09/22/2023 127 (H)  0 - 100 mg/dL Final    VLDL Cholesterol 09/22/2023 31  5 - 40 mg/dL Final    LDL/HDL Ratio 09/22/2023 2.36   Final    TSH 09/22/2023 0.620  0.270 - 4.200 uIU/mL Final    Hemoglobin A1C 09/22/2023 9.30 (H)  4.80 - 5.60 % Final    25 Hydroxy, Vitamin D 09/22/2023 48.6  30.0 - 100.0 ng/ml Final    Free T4 09/22/2023 1.50  0.93 - 1.70 ng/dL Final    Vitamin B-12 09/22/2023 485  211 - 946 pg/mL Final    proBNP 09/22/2023 178.0  0.0 - 900.0 pg/mL Final    WBC 09/22/2023 6.41  3.40 - 10.80 10*3/mm3 Final    RBC 09/22/2023 4.10  3.77 - 5.28 10*6/mm3 Final    Hemoglobin 09/22/2023 14.3  12.0 - 15.9 g/dL Final    Hematocrit 09/22/2023 41.7  34.0 - 46.6 % Final    MCV 09/22/2023 101.7 (H)  79.0 - 97.0 fL Final    MCH 09/22/2023 34.9 (H)  26.6 - 33.0 pg Final    MCHC 09/22/2023 34.3  31.5 - 35.7 g/dL  Final    RDW 09/22/2023 13.1  12.3 - 15.4 % Final    RDW-SD 09/22/2023 49.0  37.0 - 54.0 fl Final    MPV 09/22/2023 10.6  6.0 - 12.0 fL Final    Platelets 09/22/2023 142  140 - 450 10*3/mm3 Final    Neutrophil % 09/22/2023 44.3  42.7 - 76.0 % Final    Lymphocyte % 09/22/2023 41.2  19.6 - 45.3 % Final    Monocyte % 09/22/2023 12.3 (H)  5.0 - 12.0 % Final    Eosinophil % 09/22/2023 1.1  0.3 - 6.2 % Final    Basophil % 09/22/2023 0.9  0.0 - 1.5 % Final    Immature Grans % 09/22/2023 0.2  0.0 - 0.5 % Final    Neutrophils, Absolute 09/22/2023 2.84  1.70 - 7.00 10*3/mm3 Final    Lymphocytes, Absolute 09/22/2023 2.64  0.70 - 3.10 10*3/mm3 Final    Monocytes, Absolute 09/22/2023 0.79  0.10 - 0.90 10*3/mm3 Final    Eosinophils, Absolute 09/22/2023 0.07  0.00 - 0.40 10*3/mm3 Final    Basophils, Absolute 09/22/2023 0.06  0.00 - 0.20 10*3/mm3 Final    Immature Grans, Absolute 09/22/2023 0.01  0.00 - 0.05 10*3/mm3 Final    nRBC 09/22/2023 0.0  0.0 - 0.2 /100 WBC Final   Lab on 09/22/2023   Component Date Value Ref Range Status    Microalbumin, Urine 09/22/2023 <1.2  mg/dL Final      Smoking Cessation Counseling:  Former smoker.  Patient does not currently use any tobacco products.     Follow Up   Return in about 3 months (around 2/13/2024) for Next scheduled follow up, RECURRENT UTI/DYSURIA/DETRUSSOR INSTABILITY/REFILLS GEMTESSA.    Patient was given instructions and counseling regarding her condition or for health maintenance advice. Please see specific information pulled into the AVS if appropriate.          This document has been electronically signed by Griselda Cheng-Akwa, APRN   November 15, 2023 23:12 EST      Dictated Utilizing Dragon Dictation: Part of this note may be an electronic transcription/translation of spoken language to printed text using the Dragon Dictation System.     Transcribed from ambient dictation for Griselda Cheng-Akwa, APRN by Dalila Nance.  11/13/23   15:41 EST    Patient or patient  representative verbalized consent to the visit recording.  I have personally performed the services described in this document as transcribed by the above individual, and it is both accurate and complete.

## 2023-11-13 NOTE — ASSESSMENT & PLAN NOTE
-A prescription for Gemtesa was sent to the patient's pharmacy.  - Urine will be sent for a culture. I will call the patient with results if any positive bacteria growth.  - She will follow up in 3 months. She may return sooner if need be.

## 2023-11-14 ENCOUNTER — TELEPHONE (OUTPATIENT)
Dept: FAMILY MEDICINE CLINIC | Facility: CLINIC | Age: 68
End: 2023-11-14

## 2023-11-14 LAB — BACTERIA SPEC AEROBE CULT: NO GROWTH

## 2023-11-14 NOTE — TELEPHONE ENCOUNTER
Caller: [facility]    Relationship to patient:     Best call back number: [phone]     LUDIN IS CALLING TO CHECK AND SEE IF THE PATIENT'S TOUJEO WAS RECEIVED BY THE OFFICE.

## 2023-11-15 ENCOUNTER — HOSPITAL ENCOUNTER (OUTPATIENT)
Dept: MAMMOGRAPHY | Facility: HOSPITAL | Age: 68
Discharge: HOME | End: 2023-11-15
Admitting: INTERNAL MEDICINE
Payer: MEDICARE

## 2023-11-15 DIAGNOSIS — Z17.0 MALIGNANT NEOPLASM OF UPPER-OUTER QUADRANT OF RIGHT BREAST IN FEMALE, ESTROGEN RECEPTOR POSITIVE: ICD-10-CM

## 2023-11-15 DIAGNOSIS — C50.411 MALIGNANT NEOPLASM OF UPPER-OUTER QUADRANT OF RIGHT BREAST IN FEMALE, ESTROGEN RECEPTOR POSITIVE: ICD-10-CM

## 2023-11-15 PROBLEM — R35.0 FREQUENCY OF MICTURITION: Status: ACTIVE | Noted: 2023-11-15

## 2023-11-15 PROBLEM — K59.01 SLOW TRANSIT CONSTIPATION: Status: ACTIVE | Noted: 2023-11-15

## 2023-11-15 PROCEDURE — G0279 TOMOSYNTHESIS, MAMMO: HCPCS

## 2023-11-15 PROCEDURE — 77065 DX MAMMO INCL CAD UNI: CPT

## 2023-11-16 ENCOUNTER — TELEPHONE (OUTPATIENT)
Dept: UROLOGY | Facility: CLINIC | Age: 68
End: 2023-11-16
Payer: MEDICARE

## 2023-11-16 NOTE — TELEPHONE ENCOUNTER
I called the pt and left a vm that her urine culture was negative at this time.    ----- Message from Griselda Cheng-Akwa, APRN sent at 11/15/2023  4:59 PM EST -----  Please can you let patient know her urine culture results are negative for any bacterial infection at this time.  However she may drop off another urine sample if she feels symptomatic.  If not follow-up in clinic as discussed.  Urine Culture   No growth    Thank you

## 2023-11-21 ENCOUNTER — TELEPHONE (OUTPATIENT)
Dept: FAMILY MEDICINE CLINIC | Facility: CLINIC | Age: 68
End: 2023-11-21
Payer: MEDICARE

## 2023-11-21 NOTE — TELEPHONE ENCOUNTER
Spoke with patient and she is in understanding.    ----- Message from BOSTON Joseph sent at 11/21/2023 12:46 PM EST -----  Regarding: Jury Excuse  Please let her know I wrote an excuse for her and she should be able to print from her home. If she has issues to let us know.  ----- Message -----  From: Bre Rodriguez MA  Sent: 11/21/2023  11:57 AM EST  To: BOSTON Joseph    She is wanting to know if you could write her out a jury duty excuse? She is needing it ASAP.

## 2023-11-21 NOTE — TELEPHONE ENCOUNTER
Called patient and let her know her Ozempic came in from the Patient Assistance Program and she can come by and pick it up anytime. She also wanted to know if [place] could write her out a jury duty excuse and I told her I would send her a message about it. Patient is in understanding.

## 2023-12-05 ENCOUNTER — TELEPHONE (OUTPATIENT)
Dept: FAMILY MEDICINE CLINIC | Facility: CLINIC | Age: 68
End: 2023-12-05

## 2023-12-05 DIAGNOSIS — Z79.4 TYPE 2 DIABETES MELLITUS WITH HYPERGLYCEMIA, WITH LONG-TERM CURRENT USE OF INSULIN: Primary | ICD-10-CM

## 2023-12-05 DIAGNOSIS — E11.65 TYPE 2 DIABETES MELLITUS WITH HYPERGLYCEMIA, WITH LONG-TERM CURRENT USE OF INSULIN: Primary | ICD-10-CM

## 2023-12-05 NOTE — TELEPHONE ENCOUNTER
Caller: [facility]    Relationship: Other    Best call back number: [phone]     Requested Prescriptions:   TREY FINE 32 GAUGE TIP PEN NEEDLES     Pharmacy where request should be sent: [facility] [id number] [place] [id number] [facility] - [phone]  - [phone] FX     Last office visit with prescribing clinician: 9/29/2023   Last telemedicine visit with prescribing clinician: Visit date not found   Next office visit with prescribing clinician: 1/19/2024     Additional details provided by patient:     Does the patient have less than a 3 day supply:  [x] Yes  [] No    Would you like a call back once the refill request has been completed: [] Yes [x] No    If the office needs to give you a call back, can they leave a voicemail: [] Yes [x] No    Cadance Dunaway, RegSched Rep   12/05/23 10:59 EST

## 2023-12-13 DIAGNOSIS — Z79.4 TYPE 2 DIABETES MELLITUS WITH HYPERGLYCEMIA, WITH LONG-TERM CURRENT USE OF INSULIN: ICD-10-CM

## 2023-12-13 DIAGNOSIS — E11.65 TYPE 2 DIABETES MELLITUS WITH HYPERGLYCEMIA, WITH LONG-TERM CURRENT USE OF INSULIN: ICD-10-CM

## 2023-12-13 NOTE — TELEPHONE ENCOUNTER
Caller: [facility]    Relationship: Other    Best call back number: [phone]     What is the best time to reach you: ANY    Who are you requesting to speak with (clinical staff, provider,  specific staff member): NURSE    Do you know the name of the person who called: LUDIN    What was the call regarding: PHARMACY STATES THEY RECEIVED 31 G NEEDLES, NOT THE 32 G.  PLEASE RESEND PRESCRIPTION FROM 12/5/23.    Is it okay if the provider responds through MyChart: CALL IF NEEDED   See message

## 2023-12-21 ENCOUNTER — TELEPHONE (OUTPATIENT)
Dept: FAMILY MEDICINE CLINIC | Facility: CLINIC | Age: 68
End: 2023-12-21
Payer: MEDICARE

## 2023-12-21 NOTE — TELEPHONE ENCOUNTER
Spoke with patient and asked her how she is using her novolog for her patient assistance application. She said she was doing 2-9 units per her sliding scale.

## 2023-12-29 NOTE — PROGRESS NOTES
Injection  Injection performed in left arm by Jackelyn Gerard MA. Patient tolerated the procedure well without complications.  12/14/21   Jackelyn Gerard MA     Medication is already refilled.

## 2024-01-02 RX ORDER — LEVOTHYROXINE SODIUM 88 UG/1
TABLET ORAL
Qty: 90 TABLET | Refills: 0 | Status: SHIPPED | OUTPATIENT
Start: 2024-01-02

## 2024-01-15 RX ORDER — FUROSEMIDE 20 MG/1
20 TABLET ORAL DAILY
Qty: 90 TABLET | Refills: 0 | Status: SHIPPED | OUTPATIENT
Start: 2024-01-15

## 2024-01-18 DIAGNOSIS — R11.0 NAUSEA: ICD-10-CM

## 2024-01-18 RX ORDER — ONDANSETRON 4 MG/1
TABLET, ORALLY DISINTEGRATING ORAL
Qty: 20 TABLET | Refills: 1 | Status: SHIPPED | OUTPATIENT
Start: 2024-01-18

## 2024-01-26 ENCOUNTER — TELEPHONE (OUTPATIENT)
Dept: FAMILY MEDICINE CLINIC | Facility: CLINIC | Age: 69
End: 2024-01-26
Payer: MEDICARE

## 2024-02-07 ENCOUNTER — OFFICE VISIT (OUTPATIENT)
Dept: SURGERY | Facility: CLINIC | Age: 69
End: 2024-02-07
Payer: MEDICARE

## 2024-02-07 VITALS — BODY MASS INDEX: 46.07 KG/M2 | HEIGHT: 63 IN | WEIGHT: 260 LBS

## 2024-02-07 DIAGNOSIS — Z17.0 MALIGNANT NEOPLASM OF UPPER-OUTER QUADRANT OF RIGHT BREAST IN FEMALE, ESTROGEN RECEPTOR POSITIVE: Primary | ICD-10-CM

## 2024-02-07 DIAGNOSIS — C50.411 MALIGNANT NEOPLASM OF UPPER-OUTER QUADRANT OF RIGHT BREAST IN FEMALE, ESTROGEN RECEPTOR POSITIVE: Primary | ICD-10-CM

## 2024-02-07 PROCEDURE — 1159F MED LIST DOCD IN RCRD: CPT | Performed by: SURGERY

## 2024-02-07 PROCEDURE — 1160F RVW MEDS BY RX/DR IN RCRD: CPT | Performed by: SURGERY

## 2024-02-07 PROCEDURE — 99212 OFFICE O/P EST SF 10 MIN: CPT | Performed by: SURGERY

## 2024-02-07 NOTE — PROGRESS NOTES
Subjective   Ronna Wayne is a 68 y.o. female  is here today for follow-up.         Ronna Wayne is a 68 y.o. female here for follow up after seroma drainage from the right breast.  Wound completely healed..  Radiation changes greatly improved.  She has had a pacemaker placed.  No complaints reported.  She does report pain in the lateral breast on the right and there is a skin lesion that is 1.2 cm in greatest dimension that has central scarring and is new from last visit at the site of her pain.    Assessment     Diagnoses and all orders for this visit:    1. Malignant neoplasm of upper-outer quadrant of right breast in female, estrogen receptor positive (Primary)      Ronna Wayne is a 68 y.o. female doing well after drainage of seroma of the right breast.  The wound has healed well by secondary intent.  The patient has new pain in the right lateral breast and there is a small 1.2 cm area of scarring appearance with contracture of the skin concerning for possible malignancy that would likely represent a primary skin cancer as opposed to recurrent breast cancer.  The patient will follow-up next week for excisional biopsy.  She will hold anticoagulation 48 hours prior to the procedure.

## 2024-02-09 ENCOUNTER — OFFICE VISIT (OUTPATIENT)
Dept: ONCOLOGY | Facility: CLINIC | Age: 69
End: 2024-02-09
Payer: MEDICARE

## 2024-02-09 VITALS
SYSTOLIC BLOOD PRESSURE: 158 MMHG | OXYGEN SATURATION: 96 % | HEIGHT: 63 IN | BODY MASS INDEX: 45.18 KG/M2 | HEART RATE: 76 BPM | TEMPERATURE: 96.9 F | WEIGHT: 255 LBS | DIASTOLIC BLOOD PRESSURE: 82 MMHG | RESPIRATION RATE: 18 BRPM

## 2024-02-09 DIAGNOSIS — Z17.0 MALIGNANT NEOPLASM OF UPPER-OUTER QUADRANT OF RIGHT BREAST IN FEMALE, ESTROGEN RECEPTOR POSITIVE: Primary | ICD-10-CM

## 2024-02-09 DIAGNOSIS — C50.411 MALIGNANT NEOPLASM OF UPPER-OUTER QUADRANT OF RIGHT BREAST IN FEMALE, ESTROGEN RECEPTOR POSITIVE: Primary | ICD-10-CM

## 2024-02-09 PROCEDURE — 3079F DIAST BP 80-89 MM HG: CPT | Performed by: INTERNAL MEDICINE

## 2024-02-09 PROCEDURE — 99214 OFFICE O/P EST MOD 30 MIN: CPT | Performed by: INTERNAL MEDICINE

## 2024-02-09 PROCEDURE — 3077F SYST BP >= 140 MM HG: CPT | Performed by: INTERNAL MEDICINE

## 2024-02-09 PROCEDURE — 1126F AMNT PAIN NOTED NONE PRSNT: CPT | Performed by: INTERNAL MEDICINE

## 2024-02-09 RX ORDER — ANASTROZOLE 1 MG/1
1 TABLET ORAL DAILY
Qty: 30 TABLET | Refills: 11 | Status: SHIPPED | OUTPATIENT
Start: 2024-02-09

## 2024-02-09 NOTE — PROGRESS NOTES
Name:  Ronna Wayne  :  1955  Date:  2024     REFERRING PHYSICIAN  Lee Parrish MD    PRIMARY CARE PROVIDER  Mague Acosta APRN    REASON FOR FOLLOWUP  1. Malignant neoplasm of upper-outer quadrant of right breast in female, estrogen receptor positive      CHIEF COMPLAINT  None.    Dear Lee,    HISTORY OF PRESENT ILLNESS:   I saw Ms. Wayne in follow up today in our medical oncology clinic. As you are aware, she is a pleasant, 69 y.o., white female with a history of multiple medical problems, including hypertension, diabetes and sleep apnea who was diagnosed with an ER positive (95%), LA positive (95%), HER2/abida negative (1+ by IHC), invasive, mammary carcinoma (with mixed ductal and lobular features) of the upper, outer quadrant of the right breast in late Spring 2021. She was referred to you, and you performed a successful, right-sided lumpectomy on 2021. A sentinel node was positive; however, an additional twelve, excised, right-sided, axillary lymph nodes were uninvolved (). Final, surgical staging was consistent with stage [id number] disease (IIB). She was subsequently referred to our clinic (and radiation oncology) for further evaluation and management. Adjuvant treatment with whole breast irradiation followed by (at least five years of) endocrine therapy were ultimately recommended. She completed a thirty-fraction course of the former by late 2021 and began the latter (daily Arimidex) by early 2021. She has been doing overall very well in routine followup, ever since then, with no evidence of residual/recurrent disease to date.    INTERIM HISTORY:  Ms. Wayne returns to clinic today for followup by herself. She began Arimidex in early 2021, has been on it for a total of over two (2) years now and is still tolerating this medication overall very well, with no noticeable side effects. In 2022, her right-sided breast seroma  "increased in size to the point that it ruptured; and she had to undergo an I and D on 07/07/2022. She has stopped following with the lymphedema clinic as she is currently still doing well performing routine exercises on her own. She had to have a pacemaker/defibrillator placed in early 2023 and underwent a cardiac ablation (for chronic afib) in late 2023. She has some longstanding, intermittent symptoms of pain and a decreased ROM in her left shoulder (the side opposite to her mastectomy related lymphedema issues); but these symptoms have recently been at least a little better. She is scheduled to return to her surgeon soon to have some \"skin spots\" in her right axilla removed (which her surgeon is pretty certain are benign). She otherwise has no new or specific complaints and continues to feel overall well.    Past Medical History:   Diagnosis Date    Anxiety     B12 deficiency     BPV (benign positional vertigo)     Breast cancer 05/2021    Diabetes mellitus     Diarrhea     Disease of thyroid gland     Elevated cholesterol     Fibrocystic breast     Fibromyalgia     GERD (gastroesophageal reflux disease)     Glaucoma     Heart murmur     Hyperlipidemia     Hypertension     Kidney disease     Obesity     Peptic ulceration     PONV (postoperative nausea and vomiting)     Primary central sleep apnea     Sleep apnea     c-pap    Stroke     TIA (transient ischemic attack) 03/31/2019    Urinary incontinence     Vaginal infection     Weakness of left arm        Past Surgical History:   Procedure Laterality Date    ABDOMINAL SURGERY      APPENDECTOMY      BREAST ABSCESS INCISION AND DRAINAGE Right 07/07/2022    Procedure: BREAST ABSCESS INCISION AND DRAINAGE;  Surgeon: Lee Parrish MD;  [facility];  Service: General;  Laterality: Right;    BREAST BIOPSY  May 2021    BREAST CYST ASPIRATION      BREAST LUMPECTOMY WITH SENTINEL NODE BIOPSY Right 08/05/2021    Procedure: BREAST LUMPECTOMY WITH SENTINEL NODE " BIOPSY;  Surgeon: Lee Parrish MD;  Location:  [facility];  Service: General;  Laterality: Right;    CARDIAC ELECTROPHYSIOLOGY PROCEDURE N/A 2023    Procedure: Pacemaker DC new;  Surgeon: Bj Hyde MD;  Location:  [facility];  Service: Cardiology;  Laterality: N/A;    CARDIAC ELECTROPHYSIOLOGY PROCEDURE N/A 2023    Procedure: EP +/- RFA SVT, hold Eliquis 1 day, hold metoprolol 3 days;  Surgeon: Israel Barrientos DO;  Location:  [facility];  Service: Cardiovascular;  Laterality: N/A;    CARDIAC ELECTROPHYSIOLOGY STUDY AND ABLATION      2023 PER DR. BARRIENTOS    CATARACT EXTRACTION Bilateral     CHOLECYSTECTOMY      COLONOSCOPY      ENDOSCOPY      HEMATOMA EVACUATION TRUNK Right 2021    Procedure: HEMATOMA EVACUATION TRUNK;  Surgeon: Lee Parrish MD;  Location:  [facility];  Service: General;  Laterality: Right;    PACEMAKER IMPLANTATION      URETHRA SURGERY         Social History     Socioeconomic History    Marital status:    Tobacco Use    Smoking status: Former     Packs/day: 1.50     Years: 3.00     Additional pack years: 0.00     Total pack years: 4.50     Types: Cigarettes     Quit date: 1976     Years since quittin.1     Passive exposure: Past    Smokeless tobacco: Never    Tobacco comments:     I smoked as a teen and stopped when my son was born   Vaping Use    Vaping Use: Never used   Substance and Sexual Activity    Alcohol use: No     Comment: former social drinker not had anything in 25 + years    Drug use: Never    Sexual activity: Not Currently     Partners: Male     Birth control/protection: Abstinence, Post-menopausal, Vasectomy       Family History   Problem Relation Age of Onset    Thyroid disease Mother     Diabetes Mother     Hyperlipidemia Father     Hypertension Father     Heart attack Father     Lung disease Father     Hypertension Sister     Breast cancer Sister 40    Obesity Sister     Hypertension Sister      Hyperlipidemia Sister     Cancer Sister         Breast cancer    Hyperlipidemia Sister     Hypertension Sister     Arthritis Sister     Obesity Sister     Thyroid disease Sister     Alcohol abuse Maternal Aunt     Cancer Maternal Grandfather         Prostrate    Stroke Paternal Grandmother     Stroke Paternal Grandfather     Hypertension Other        Allergies   Allergen Reactions    Codeine GI Intolerance     Increased heart rate      Sulfa Antibiotics GI Intolerance     Heart rate increase        Current Outpatient Medications   Medication Sig Dispense Refill    acetaminophen (TYLENOL) 325 MG tablet Take 2 tablets by mouth Every 6 (Six) Hours As Needed for Mild Pain.      anastrozole (ARIMIDEX) 1 MG tablet Take 1 tablet by mouth Daily. 30 tablet 11    apixaban (ELIQUIS) 5 MG tablet tablet Take 1 tablet by mouth 2 (Two) Times a Day. 60 tablet 6    aspirin 81 MG chewable tablet Chew & swallow 1 tablet Daily. 30 tablet 0    cholecalciferol (VITAMIN D3) 25 MCG (1000 UT) tablet Take 2 tablets by mouth Daily.      Continuous Blood Gluc  (Dexcom G7 ) device 1 Device Daily. 1 each 0    Continuous Blood Gluc Sensor (Dexcom G7 Sensor) misc 1 Device 90 Minutes Prior to Surgery for 1 dose. 9 each 3    dexlansoprazole (DEXILANT) 60 MG capsule Take  by mouth Daily.      Diclofenac Sodium (VOLTAREN) 1 % gel gel Apply 4 g topically to the appropriate area as directed 3 (Three) Times a Day As Needed (joint pain). 350 g 2    empagliflozin (Jardiance) 25 MG tablet tablet Take 1 tablet by mouth Every Morning. 30 tablet 3    fluconazole (DIFLUCAN) 150 MG tablet Take 1 tablet by mouth 1 (One) Time.      furosemide (LASIX) 20 MG tablet TAKE 1 TABLET BY MOUTH DAILY 90 tablet 0    hydroCHLOROthiazide (MICROZIDE) 12.5 MG capsule 1 po qhs      insulin aspart (novoLOG FLEXPEN) 100 UNIT/ML solution pen-injector sc pen Inject 0-9 Units under the skin into the appropriate area as directed 3 (Three) Times a Day With Meals.  Admelog Solostar sliding scale      Insulin Pen Needle 32G X 5 MM misc Use 1 each 3 (Three) Times a Day. 100 each 5    levothyroxine (SYNTHROID, LEVOTHROID) 88 MCG tablet TAKE 1 TABLET BY MOUTH DAILY 90 tablet 0    linaGLIPtin-metFORMIN HCl (Jentadueto) 2.5-1000 MG tablet Take  by mouth 2 (Two) Times a Day.      losartan (Cozaar) 25 MG tablet Take 1 tablet by mouth Daily. 30 tablet 3    MAGnesium-Oxide 400 (240 Mg) MG tablet TAKE ONE TABLET BY MOUTH THREE TIMES A DAY 90 tablet 1    metoprolol tartrate (LOPRESSOR) 25 MG tablet TAKE 1 TABLET BY MOUTH TWICE A  tablet 1    NovoLOG Mix 70/30 (70-30) 100 UNIT/ML injection       nystatin (MYCOSTATIN) 980514 UNIT/GM powder Apply  topically to the appropriate area as directed 3 (Three) Times a Day. 60 g 6    ondansetron ODT (ZOFRAN-ODT) 4 MG disintegrating tablet DISSOLVE ONE TABLET ON THE TONGUE EVERY 8 HOURS AS NEEDED FOR NAUSEA OR VOMITING 20 tablet 1    pantoprazole (PROTONIX) 40 MG EC tablet Take 1 tablet by mouth As Needed.      rosuvastatin (CRESTOR) 20 MG tablet Take 1 tablet by mouth Daily. 30 tablet 4    Semaglutide,0.25 or 0.5MG/DOS, (Ozempic, 0.25 or 0.5 MG/DOSE,) 2 MG/1.5ML solution pen-injector Inject 0.25 mg under the skin into the appropriate area as directed 1 (One) Time Per Week. 3 mL 0    vitamin E 100 UNIT capsule Take 1 capsule by mouth Daily.       No current facility-administered medications for this visit.     REVIEW OF SYSTEMS  CONSTITUTIONAL:  No fever, chills or night sweats. Chronic fatigue.  EYES:  No blurry vision, diplopia or other vision changes.  ENT:  No hearing loss, nosebleeds or sore throat.  CARDIOVASCULAR:  No palpitations, arrhythmia, syncopal episodes or edema.  PULMONARY:  No hemoptysis, wheezing, chronic cough or shortness of breath.  BREASTS: As per the HPI above.  GASTROINTESTINAL:  No nausea or vomiting. No constipation or diarrhea. No abdominal pain.  GENITOURINARY:  No hematuria, kidney stones or frequent  "urination.  MUSCULOSKELETAL:  As per the HPI above.  INTEGUMENTARY: As per the HPI above.  ENDOCRINE:  No excessive thirst or hot flashes.  HEMATOLOGIC:  No history of free bleeding, spontaneous bleeding or clotting.  IMMUNOLOGIC:  No allergies or frequent infections.  NEUROLOGIC: No numbness, tingling, seizures or weakness.  PSYCHIATRIC:  No anxiety or depression.    PHYSICAL EXAMINATION  /82   Pulse 76   Temp 96.9 °F (36.1 °C) (Temporal)   Resp 18   Ht 160 cm (62.99\")   Wt 116 kg (255 lb)   SpO2 96%   BMI 45.19 kg/m²     Pain Score:  Pain Score    24 1436   PainSc: 0-No pain     PHQ-Score Total:  PHQ-9 Total Score:      ECO  GENERAL:  A well-developed, well-nourished, morbidly obese, white female in no acute distress.  HEENT:  Pupils equally round and reactive to light. Extraocular muscles intact.  CARDIOVASCULAR:  Regular rate and rhythm. No murmurs, gallops or rubs.  LUNGS:  Clear to auscultation bilaterally.  BREASTS: Deferred today. Status post right-sided lumpectomy in 2021.  ABDOMEN:  Soft, nontender, nondistended with positive bowel sounds.  EXTREMITIES:  No clubbing, cyanosis or edema bilaterally.  SKIN:  No rashes or petechiae.  NEURO:  Cranial nerves grossly intact. No focal deficits.  PSYCH:  Alert and oriented x3.    The physical exam is unchanged from recent priors.    LABORATORY  Lab Results   Component Value Date    WBC 6.41 2023    HGB 14.3 2023    HCT 41.7 2023    .7 (H) 2023     2023    NEUTROABS 2.84 2023       Lab Results   Component Value Date     2023    K 4.2 2023     2023    CO2 24.0 2023    BUN 14 2023    CREATININE 0.86 2023    GLUCOSE 245 (H) 2023    CALCIUM 9.8 2023    AST 30 2023    ALT 17 2023    ALKPHOS 110 2023    BILITOT 0.8 2023    PROTEINTOT 7.3 2023    ALBUMIN 3.8 2023     CBC (2023): WBCs: 6.41; " HgB: 14.3; Hct: 41.7; platelets: 142  CBC (07/21/2023): WBCs: 6.01; HgB: 12.9; Hct: 12.9; platelets: 132  CBC (01/19/2023): WBCs: 7.86; HgB: 13.9; Hct: 41.4; platelets: 117  CBC (01/11/2022): WBCs: 7.11; HgB: 12.5; Hct: 37.3; platelets: 152  CBC (11/29/2021): WBCs: 5.36; HgB: 12.8; Hct: 39.1; platelets: 148  CBC (11/09/2021): WBCs: 6.63; HgB: 11.9; Hct: 36.1; platelets: 160  CBC (08/06/2021): WBCs: 9.36; HgB: 9.2; Hct: 29.1; platelets: 131    IMAGING  MRI breast bilateral with and without contrast (06/22/2021):  Impression:  1) Negative left breast MRI.  2) Biopsy proven malignancy in the right 9:00 region measuring 3.7 cm. There are no additional worrisome findings in the right breast.    Bone densitometry (DEXA) scan (05/11/2021):  Impression: The BMD measured at the AP spine L1-L4 is 1.157 gm/cm2 with a T-score of -0.2. The patient is considered to be normal according to WHO criteria. Fracture risk is low.    Bilateral diagnostic mammogram with tomosynthesis (04/14/2022):  Impression:  1) Stable mammographic appearance of the left breast with no findings suspicious for malignancy.  2) Presumed postoperative and posttreatment related changes in the right breast. Recommend short interval followup.    Mammogram, diagnostic, right with tomosynthesis (10/18/2022):  Impression: Probably benign right mammographic findings. Recommend continued followup. Bi-Rads Category 3; Probably Benign.    Mammogram, diagnostic, bilateral with tomosynthesis (05/16/2023):  Impression: Stable mammographic appearance of both breasts including postoperative and posttreatment related changes being followed in the right breast. Bi-Rads Category 3; Probably Benign.    Bone densitometry (DEXA) scan (05/16/2023):  Impression: The BMD measured at the AP spine L1-L4 is 1.121 gm/cm2 with a T-score of -0.6. The patient is considered to be normal according to WHO criteria. Fracture risk is low.    CT abdomen and pelvis without contrast (11/01/2023,  "compared to 03/09/2021):  Impression:  1) Cirrhotic appearance of the liver.  2) Other findings [are nonacute].    Right diagnostic mammogram with tomosynthesis (11/15/2023):  Impression: Probably benign right mammographic findings. Recommend continued followup. Bi-Rads Category 3; Probably Benign.    PATHOLOGY  Breast, lumpectomy, right (08/05/2021):  Invasive mammary carcinoma with ductal and lobular features, margins uninvolved. Atypical, lobular hyperplasia with involvement of ducts. Dense stromal fibrosis. Greatest dimension of invasive focus: 31 mm. ER positive (95%), MD positive (95%), HER2 negative (1+ by IHC). [id number] (stage IIB).    [place] node, right #1 (08/05/2021):  Metastatic mammary carcinoma, 2.5 cmm extent, with extranodal extension (1/1).    Axillary contents, right (08/05/2021):  No malignancy identified in twelve axillary lymph nodes (0/12).    MammaPrint result (08/31/2021): Low risk.    IMPRESSION AND PLAN  Ms. Wayne is a 69 y.o., white female with:  Breast carcinoma: Diagnosed in late Spring 2021 and status post a right-sided lumpectomy with right axillary evaluation on 08/05/2021. The final, surgical pathology was consistent with stage IIB ([id number]), ER positive (95%), MD positive (95%), HER2 negative (1+ by IHC), invasive, mammary carcinoma (with ductal and lobular features) of the right breast. The surgical margins were clear; and, while one sentinel node was involved, an additional twelve lymph nodes were all negative for metastasis (1/13). I have had multiple, long discussions with the patient since the time of her initial consultation in our clinic (on 08/25/2021) regarding this diagnosis and its prognosis. In short, her surgery went extremely well; and her prognosis was/remains overall very good. That said, adjuvant treatment was/remains recommended in order to maximize her chances of cure. As MammaPrint testing on her tumor was consistent with \"low risk\" disease, chemotherapy was " not indicated (as the potential risks would have drastically outweighed its very limited/nonexistant potential benefits in her case). As she had a lumpectomy, whole breast radiation was definitely indicated, and she completed a thirty fraction course of this therapy on 11/30/2021. As her tumor is very strongly ER/MA positive, endocrine therapy (with an aromatase inhibitor, given her postmenopausal status) was also definitely indicated and strongly recommended as the final step in her adjuvant treatment. She was given a Rx for Arimidex 1 mg PO daily at that time. She has been on this therapy for a total of over two (2) years now and is still tolerating it overall well, without any noticeable side effects. We will proceed with this current treatment plan (and refills of Arimidex were provided today). Meanwhile, I have had multiple, long discussions with her regarding the current guidelines for the routine follow up of patients with a history of breast cancer. We again discussed how, other than periodic clinic visits, continued, routine mammograms of remaining breast tissue (the most recent followup, right-sided exam, performed on 11/15/2023 and summarized above, remained Bi-Rads Category 3 due to stable, right-sided treatment related changes; a followup bilateral exam will be ordered today and performed this May) and routine bone density monitoring (particularly since she is on an AI), there is no proven benefit to performing additional studies (including blood work, such as CBCs, LFTs or tumor markers, or imaging, such as CT, NM bone or PET scans) to identify metastatic recurrences before they become symptomatic (and are identified via the focused investigation of new symptoms), as patient outcomes are not improved by doing so (she should continue to get routine CBCs, etc. through her PCP’s office as indicated, of course). We will see her back in our clinic in six months (~early August 2024) for another wellness  visit.  Bone health: The results from the patient's most recent DEXA scan (performed on 05/16/2023) are summarized above. Her BMD remains WNL. Continue to monitor on Arimidex. We will repeat a DEXA scan in May 2025.  Lymphedema: Secondary to a combination of right-sided, axillary surgery and adjuvant, localized XRT. She is currently not following routinely with our lymphedema clinic as she continues to do well performing some routine exercises on her own. Continue to monitor.  Seroma/skin tags: Ongoing follow up per surgery.  The patient was in agreement with these plans.    It is a pleasure to participate in Ms. Wayne's care. Please do not hesitate to call with any questions or concerns that you may have.    A total of 30 minutes were spent coordinating this patient’s care in clinic today; more than 50% of this time was face-to-face with the patient, reviewing her interim medical history, discussing the results of last November's repeat, right-sided mammogram and counseling on the current treatment and followup plan. All questions were answered to her satisfaction.    FOLLOW UP  Continue Arimidex 1 mg PO daily (refills provided today). With surgery, as previously planned. Repeat a bilateral mammogram this May (order placed today). Return to our clinic in 6 months (~early August 2024) for another wellness visit.            This document was electronically signed by LEYDI Teresa MD February 9, 2024 14:54 EST      CC: MD Marbin Ramirez MD Sherelene S. Fortney, APRN

## 2024-02-13 ENCOUNTER — PROCEDURE VISIT (OUTPATIENT)
Dept: SURGERY | Facility: CLINIC | Age: 69
End: 2024-02-13
Payer: MEDICARE

## 2024-02-13 VITALS — BODY MASS INDEX: 46.93 KG/M2 | HEIGHT: 62 IN | WEIGHT: 255 LBS

## 2024-02-13 DIAGNOSIS — L98.9 SKIN LESION: Primary | ICD-10-CM

## 2024-02-13 DIAGNOSIS — L98.8 SKIN LESION OF BREAST: ICD-10-CM

## 2024-02-13 DIAGNOSIS — L98.8 SKIN LESION OF BREAST: Primary | ICD-10-CM

## 2024-02-14 PROBLEM — L98.9 SKIN LESION: Status: ACTIVE | Noted: 2024-02-14

## 2024-02-14 LAB — REF LAB TEST METHOD: NORMAL

## 2024-02-26 RX ORDER — LOSARTAN POTASSIUM 25 MG/1
25 TABLET ORAL DAILY
Qty: 90 TABLET | Refills: 1 | Status: SHIPPED | OUTPATIENT
Start: 2024-02-26

## 2024-02-26 NOTE — TELEPHONE ENCOUNTER
Received a phone call from [facility] ([phone]) stating this patient has been requesting refills through their local [facility] for multiple days without getting a response. Refill sent in

## 2024-02-27 ENCOUNTER — OFFICE VISIT (OUTPATIENT)
Dept: SURGERY | Facility: CLINIC | Age: 69
End: 2024-02-27
Payer: MEDICARE

## 2024-02-27 ENCOUNTER — OFFICE VISIT (OUTPATIENT)
Dept: UROLOGY | Facility: CLINIC | Age: 69
End: 2024-02-27
Payer: MEDICARE

## 2024-02-27 VITALS
HEIGHT: 62 IN | SYSTOLIC BLOOD PRESSURE: 171 MMHG | WEIGHT: 251.6 LBS | DIASTOLIC BLOOD PRESSURE: 79 MMHG | BODY MASS INDEX: 46.3 KG/M2 | HEART RATE: 79 BPM

## 2024-02-27 VITALS — BODY MASS INDEX: 46.93 KG/M2 | WEIGHT: 255 LBS | HEIGHT: 62 IN

## 2024-02-27 DIAGNOSIS — R35.0 FREQUENCY OF MICTURITION: ICD-10-CM

## 2024-02-27 DIAGNOSIS — K59.04 CHRONIC IDIOPATHIC CONSTIPATION: ICD-10-CM

## 2024-02-27 DIAGNOSIS — L98.9 SKIN LESION: Primary | ICD-10-CM

## 2024-02-27 DIAGNOSIS — N32.81 DETRUSOR INSTABILITY OF BLADDER: Primary | ICD-10-CM

## 2024-02-27 DIAGNOSIS — Z87.440 HISTORY OF RECURRENT UTIS: ICD-10-CM

## 2024-02-27 DIAGNOSIS — N39.42 URINARY INCONTINENCE WITHOUT SENSORY AWARENESS: ICD-10-CM

## 2024-02-27 LAB
BILIRUB BLD-MCNC: NEGATIVE MG/DL
CLARITY, POC: CLEAR
COLOR UR: YELLOW
EXPIRATION DATE: ABNORMAL
GLUCOSE UR STRIP-MCNC: ABNORMAL MG/DL
KETONES UR QL: NEGATIVE
LEUKOCYTE EST, POC: NEGATIVE
Lab: ABNORMAL
NITRITE UR-MCNC: NEGATIVE MG/ML
PH UR: 6 [PH] (ref 5–8)
PROT UR STRIP-MCNC: NEGATIVE MG/DL
RBC # UR STRIP: NEGATIVE /UL
SP GR UR: 1.01 (ref 1–1.03)
UROBILINOGEN UR QL: NORMAL

## 2024-02-27 PROCEDURE — 99214 OFFICE O/P EST MOD 30 MIN: CPT | Performed by: NURSE PRACTITIONER

## 2024-02-27 PROCEDURE — 3078F DIAST BP <80 MM HG: CPT | Performed by: NURSE PRACTITIONER

## 2024-02-27 PROCEDURE — 1160F RVW MEDS BY RX/DR IN RCRD: CPT | Performed by: SURGERY

## 2024-02-27 PROCEDURE — 3077F SYST BP >= 140 MM HG: CPT | Performed by: NURSE PRACTITIONER

## 2024-02-27 PROCEDURE — 81003 URINALYSIS AUTO W/O SCOPE: CPT | Performed by: NURSE PRACTITIONER

## 2024-02-27 PROCEDURE — 1160F RVW MEDS BY RX/DR IN RCRD: CPT | Performed by: NURSE PRACTITIONER

## 2024-02-27 PROCEDURE — 99024 POSTOP FOLLOW-UP VISIT: CPT | Performed by: SURGERY

## 2024-02-27 PROCEDURE — 1159F MED LIST DOCD IN RCRD: CPT | Performed by: SURGERY

## 2024-02-27 PROCEDURE — 87086 URINE CULTURE/COLONY COUNT: CPT | Performed by: NURSE PRACTITIONER

## 2024-02-27 PROCEDURE — 1159F MED LIST DOCD IN RCRD: CPT | Performed by: NURSE PRACTITIONER

## 2024-02-27 RX ORDER — DOCUSATE SODIUM 100 MG/1
100 CAPSULE, LIQUID FILLED ORAL 2 TIMES DAILY
Qty: 60 CAPSULE | Refills: 1 | Status: SHIPPED | OUTPATIENT
Start: 2024-02-27

## 2024-02-27 RX ORDER — POLYETHYLENE GLYCOL 3350 17 G/17G
17 POWDER, FOR SOLUTION ORAL DAILY
Qty: 30 EACH | Refills: 3 | Status: SHIPPED | OUTPATIENT
Start: 2024-02-27

## 2024-02-27 RX ORDER — VIBEGRON 75 MG/1
1 TABLET, FILM COATED ORAL NIGHTLY
Qty: 30 TABLET | Refills: 6 | Status: SHIPPED | OUTPATIENT
Start: 2024-02-27 | End: 2024-03-28

## 2024-02-27 NOTE — PROGRESS NOTES
Subjective   Ronna Wayne is a 69 y.o. female  is here today for follow-up.         Ronna Wayne is a 69 y.o. female here for follow up after excision of skin lesion on the right breast due to concern for malignancy or other abnormality.  Final pathology is consistent with benign dermal scar.  No evidence of cancer recurrence or complication.  Incision well-healed    Physical Exam  Right breast incision well-healed            Assessment     Diagnoses and all orders for this visit:    1. Skin lesion (Primary)      Ronna Wayne is a 69 y.o. female with history of breast cancer who had a suspicious lesion on the skin of the right breast where previous cancer had been removed that was excised and found to be consistent with dermal scar and no evidence of malignancy.  Patient will resume normal breast cancer follow-up as scheduled.

## 2024-02-27 NOTE — PROGRESS NOTES
Chief Complaint  Urinary incontinence, unspecified type (3 MONTH FOLLOW UP FOR OAB/DI WITH FREQ/CHANTELLE)    Subjective          Ronna Duff presents to [facility] for OAB/DETRUSSOR INSTABILITY WITH URINARY INCONTINENCE-EVAL GEMTESA    History of Present Illness    MS RONNA DUFF is a pleasant 69-year-old female who returns to clinic today for evaluation. This is a 3-month follow-up with prior concerns of an overactive bladder/detrusor instability complicated by bouts of urinary incontinence, most of them without sensory awareness.     When initially evaluated in clinic on 10/10/2023, the patient was in apparent discomfort. She did have urinary frequency, urgency, nocturia, and reported recurrent UTIs. The patient did report going to the bathroom almost every 30 minutes to an hour, which was worse at night as she was unable to sleep. She did report lower back pain, abdominal pain, bilateral flank pain, pelvic pressure, suprapubic discomfort. Nevertheless, she denied any CVA tenderness.     She wore multiple Depends 5 to 6 during the day and at nighttime, averaging 10 per day. We  discussed anticholinergic trial WITH oral medications, and the patient was agreeable to plan. We also discussed lower tract investigation via urodynamics, which she deferred.     She was given samples of Gemtesa 75 mg, and on reevaluation, she had reported a remarkable improvement in her symptoms. She returns to the clinic today for reevaluation. Urinalysis is completely negative for leukocyte esterase. It is negative for nitrite. It is negative for gross/microscopic hematuria. Her PVR is 0 mL.     The patient is very pleased so far. She is agreeable to continuing medications, at which point we are going to send to her pharmacy and work on a prior authorization if indicated.  Her copay is 120 dollars.    She has lost 23 pounds. She is on an injection. Her bladder is doing well. She  sometimes does not get up at all at night to urinate. She has constipation. She tried the Linzess samples, but they were expensive. She has not tried MiraLAX.    Despite her PRIOR incontinent episode, she DOES not have recurrent UTIs. The patient is diabetic with bouts of yeast vaginitis compromised by her current use of diapers with an A1c of 10.  BMI is greater than 46, so she also has yeast dermatitis in her lower abdominal folds.  We discussed better blood sugar controls, diet, and perineal hygiene and patient is agreeable plan.     IMPRESSION CT 03/09/2021:  1.  Advanced liver cirrhosis.  2.  Prominent perisplenic collateral vessels but no ascites identified. No splenomegaly.  3.  Decompressed colon accounts for mild wall thickening. No convincing  evidence of GI tract inflammation.  4.  Other nonacute and incidental findings as above       Active Ambulatory Problems     Active Ambulatory Problems     Diagnosis Date Noted    Frequent headaches 05/27/2016    Essential hypertension 05/27/2016    Type 2 diabetes mellitus with hyperglycemia, with long-term current use of insulin 05/27/2016    Obstructive sleep apnea syndrome 05/27/2016    Glaucoma 05/27/2016    GERD (gastroesophageal reflux disease) 05/27/2016    Morbid obesity 05/27/2016    Weakness of left arm 09/01/2020    Cerebrovascular accident (CVA) due to embolism 09/01/2020    SVT (supraventricular tachycardia) 05/02/2021    Vitamin D deficiency 05/09/2021    B12 deficiency     Malignant neoplasm of upper-outer quadrant of right breast in female, estrogen receptor positive 06/02/2021    Other specified hypothyroidism 07/01/2021    Personal history of colonic polyps 08/10/2021    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    Atrial flutter 01/04/2023    Paroxysmal atrial fibrillation 02/06/2023    Presence of cardiac pacemaker 04/28/2023    Hyperlipidemia 11/20/2015    Nonrheumatic aortic valve stenosis 01/04/2019    TIA (transient ischemic attack) 03/31/2019    Sinus  "node dysfunction 07/03/2023    Urine incontinence 09/30/2023    Detrusor instability of bladder 10/10/2023    Incomplete emptying of bladder 10/10/2023    Chronic anticoagulation 10/27/2023    Frequency of micturition 11/15/2023    Slow transit constipation 11/15/2023    Skin lesion 02/14/2024    History of recurrent UTIs 02/27/2024     Resolved Ambulatory Problems     Diagnosis Date Noted    Memory loss 05/27/2016    Anxiety 05/27/2016    Vertigo 05/27/2016    Yeast vaginitis 10/09/2017    Depression 07/01/2022    Paroxysmal SVT (supraventricular tachycardia) 12/17/2022    Supraventricular tachycardia 12/24/2022    Sinus node dysfunction 04/28/2023    Atypical chest pain 09/27/2018    Dyspnea on exertion 09/27/2018    PVC (premature ventricular contraction) 01/04/2019    Yeast dermatitis 10/10/2023     Past Medical History:   Diagnosis Date    BPV (benign positional vertigo)     Breast cancer 05/2021    Diabetes mellitus     Diarrhea     Disease of thyroid gland     Elevated cholesterol     Fibrocystic breast     Fibromyalgia     Heart murmur     Hypertension     Kidney disease     Peptic ulceration     PONV (postoperative nausea and vomiting)     Primary central sleep apnea     Sleep apnea     Stroke     Urinary incontinence     Vaginal infection       Objective   Vital Signs:   /79 (BP Location: Left arm, Patient Position: Sitting, Cuff Size: Adult)   Pulse 79   Ht 157.5 cm (62.01\")   Wt 114 kg (251 lb 9.6 oz)   BMI 46.01 kg/m²       ROS:   Review of Systems   Constitutional:  Positive for activity change, appetite change, fatigue and unexpected weight gain. Negative for chills, diaphoresis, fever and unexpected weight loss.   HENT:  Negative for congestion, ear discharge, ear pain, nosebleeds, rhinorrhea, sinus pressure and sore throat.    Eyes:  Negative for blurred vision, double vision, photophobia, pain, redness and visual disturbance.   Respiratory:  Negative for apnea, cough, chest tightness, " shortness of breath, wheezing and stridor.    Cardiovascular:  Negative for chest pain and palpitations.   Gastrointestinal:  Positive for abdominal distention, abdominal pain and nausea. Negative for constipation, diarrhea and vomiting.   Endocrine: Negative for polydipsia, polyphagia and polyuria.   Genitourinary:  Positive for flank pain, genital sores, pelvic pressure, urinary incontinence and vaginal discharge. Negative for decreased urine volume, difficulty urinating, dyspareunia, dysuria, frequency, hematuria, pelvic pain and urgency.   Musculoskeletal:  Positive for back pain, gait problem, joint swelling and myalgias. Negative for arthralgias.   Skin:  Positive for dry skin, pallor and rash. Negative for wound.   Neurological:  Negative for dizziness, tremors, syncope, weakness, light-headedness, headache, memory problem and confusion.   Psychiatric/Behavioral:  Positive for sleep disturbance and stress. Negative for behavioral problems and decreased concentration.         Physical Exam  Nursing note reviewed.   Constitutional:       General: She is in acute distress.      Appearance: She is well-developed. She is obese.   HENT:      Head: Normocephalic and atraumatic.   Eyes:      Pupils: Pupils are equal, round, and reactive to light.   Neck:      Thyroid: No thyromegaly.      Trachea: No tracheal deviation.   Cardiovascular:      Rate and Rhythm: Normal rate and regular rhythm.      Heart sounds: No murmur heard.  Pulmonary:      Effort: Pulmonary effort is normal. No respiratory distress.      Breath sounds: Normal breath sounds. No stridor. No wheezing.   Abdominal:      General: Bowel sounds are normal. There is distension.      Palpations: Abdomen is soft.      Tenderness: There is no abdominal tenderness.   Genitourinary:     Labia:         Right: No tenderness.         Left: No tenderness.       Vagina: Normal. No vaginal discharge.   Musculoskeletal:         General: Tenderness present. No  deformity. Normal range of motion.      Cervical back: Normal range of motion.   Skin:     General: Skin is warm and dry.      Capillary Refill: Capillary refill takes less than 2 seconds.      Coloration: Skin is not pale.      Findings: No erythema or rash.   Neurological:      Mental Status: She is alert and oriented to person, place, and time.      Cranial Nerves: No cranial nerve deficit.      Sensory: No sensory deficit.      Motor: Weakness present.      Coordination: Coordination normal.   Psychiatric:         Behavior: Behavior normal.         Thought Content: Thought content normal.         Judgment: Judgment normal.        Result Review :     UA          10/10/2023    13:57 11/13/2023    14:40 2/27/2024    14:15   Urinalysis   Ketones, UA Negative  Negative  Negative    Leukocytes, UA Negative  Negative  Negative      Urine Culture          10/10/2023    13:57 11/13/2023    14:40 2/27/2024    14:14   Urine Culture   Urine Culture No growth  No growth  No growth  P      Details         P Preliminary result                  Assessment and Plan    Problem List Items Addressed This Visit          Genitourinary and Reproductive     Urine incontinence    Relevant Medications    Vibegron (Gemtesa) 75 MG tablet    Detrusor instability of bladder - Primary    Relevant Medications    Vibegron (Gemtesa) 75 MG tablet    Frequency of micturition    Relevant Medications    Vibegron (Gemtesa) 75 MG tablet    Other Relevant Orders    POC Urinalysis Dipstick, Automated (Completed)    Urine Culture - Urine, Urine, Random Void (Completed)    History of recurrent UTIs     Other Visit Diagnoses       Chronic idiopathic constipation        Relevant Medications    polyethylene glycol (MiraLax) 17 g packet    docusate sodium (Colace) 100 MG capsule    linaclotide (Linzess) 72 MCG capsule capsule                                                        ASSESSMENT  OAB/DI WITH Mixed urinary incontinence/lower abdominal pain/YEAST  VAGINITIS: IBS-C     Mrs. Ronna Wayne is a 69-year old very pleasant patient evaluated in clinic today with mixed urinary incontinence symptoms that have been ongoing,HAD  gradually become very bothersome to her and now significantly improved since starting therapy with Gemtesa.  She denies any urinary incontinent episodes today, patient denies wearing any more depends since starting therapy.  Overall she is very pleased.       She does not have recurrent UTIs, and denies any episodes of dysuria, however reports recent burning on urination, vaginal irritation consistent with yeast vaginitis.  She however denies any episodes of gross hematuria.  She denies urinary frequency, urgency and incontinent episodes, prevention in the past-resolved.  Her urine dipstick today is  completely negative for any infection, it is negative for gross and also negative for microscopic hematuria. HER  PVR is OML.      AGAIN, We discussed treatable and non-treatable causes of both stress and urge urinary incontinence. With regards to stress urinary incontinence we discussed its relationship to childbirth and pelvic health.  We discussed the grading of stress incontinence with trying to quantitate the number of pads used.  We talked about leaking urine with laughing, lifting, coughing, and sexual intercourse. Talked about the Urge component and the concept of mixed incontinence where upon the stress is treatable, but the urge may exist and that 50% of the time the urge will resolve with treatment of the stress incontinence.  We talked with the diagnostic workup including a postvoid residual urine, OR even a simple cystometrogram.  I discussed the findings that may be neurologically related including commonly seen with multiple sclerosis, Parkinson's disease, and stroke.  I talked about the various therapeutic options including anticholinergics, beta 3 agonists, and alpha blockade if there is a component of obstruction.  I  discussed the side effects of anti-cholinergic including dry mouth, double vision.     WE DISCUSSED OAB/DI-Detrusor Instability to  which is an  irritative bladder symptomatology most likely related to factors such as intake of bladder irritants, postinfectious irritation, prolapse, with a very large differential diagnosis.  The mainstay of treatment has been tight cholinergics which basically caused the bladder to have decreased contractility.  We have discussed the side effects of these treatments including dry mouth, double vision, and increasing constipation.  She reports doing well  on oxybutynin for symptomatic relief.     Anticholinergic medication:  I talked about the various therapeutic options including anticholinergics, beta 3 agonists, and alpha blockade if there is a component of obstruction. I discussed the side effects of anti-cholinergic including dry mouth, double vision. HOWEVER, we are recommending the use of an anticholinergic. We discussed the class of drugs.  We discussed the older drug such as oxybutynin with his increased spectrum of side effects such as dry mouth, dry eyes constipation versus the newer medications with lower side effects but more difficult to obtain via insurance and much more expensive finally the newest class of drugs which is Myrbetriq which has a very favorable side effect profile but unfortunately is prohibitively expensive and oftentimes requires precertification of which may be unsuccessful in many other cases.     IBS- Patient has significant irritable bowel syndrome, characterized by extreme amounts of constipation.  We spoke about the impact of this on bladder function.  We spoke about  its relationship to recurrent urinary tract infections. We discussed the need for increasing p.o. fluid intake to at least 2 to 3 L of water daily, and discussed the physiology of colonic motility as well as use of MiraLAX as a bulk laxative versus the newer class of serotonin  uptake blockers such as Linzess.  We stressed the need for a daily bowel movement and discussed the Lamont stool scale at length.We also discussed a referral to the GI nurse practitioner     RECURRENT UTIs-RESOLVED, No positive documented urine cultures on file x1 year                                         PLAN  We REsent her urine for culture, due to concerns of frequency, urgency, pelvic pain and pressure.  I will call her with results if any positive bacterial growth.     CONTINUE GEMTESA 75 mg daily.-Samples given/MEDS SENT TO [facility] APPROVED     Discussed upper tract investigation, patient had a prior CT scan 2021 which was unremarkable with the source of her discomfort not noted on CT.     Discussed lower tract investigation,-DEFERRED WILL CONTINUE MEDS      ALSO DISCUSSED [id number]-UULSIFCF.DOING GREAT ON GEMTESSA     Discussed things she can do to help her urine frequency such as keeping the bladder diary with strict intake and output of what she eats or drinks, how often she urinates, how much she urinates.  She should follow a timed voiding bladder training program, and do Keagle exercises to strengthen the muscles to help control urination.     Discussed better diabetic control and diabetic management,     Linzess 72 mg samples given, discussed strict bowel control    RECOMMEND DAILY MIRALAX     Patient is Agreeable  WITH plan of care.     I will follow-up in clinic as discussed.    1. Overactive bladder-DOING GREAT WITH GRMTESSA  Overall, the patient reports remarkable improvement in her symptoms.    IBS/C:  I gave her samples of Linzess. I will send some MiraLAX to her pharmacy and some docusate. Hopefully, her insurance will cover that.    Follow-up  The patient will follow up in 6 months, sooner if need be.    Patient reports that she is not currently experiencing any symptoms of urinary incontinence.    RADIOLOGY (CT AND/OR KUB):    CT Abdomen and Pelvis: No results found for this or  any previous visit.     CT Stone Protocol: No results found for this or any previous visit.     KUB: No results found for this or any previous visit.       LABS (3 MONTHS):    Office Visit on 2024   Component Date Value Ref Range Status    Color 2024 Yellow  Yellow, Straw, Dark Yellow, Nichelle Final    Clarity, UA 2024 Clear  Clear Final    Specific Gravity  2024 1.010  1.005 - 1.030 Final    pH, Urine 2024 6.0  5.0 - 8.0 Final    Leukocytes 2024 Negative  Negative Final    Nitrite, UA 2024 Negative  Negative Final    Protein, POC 2024 Negative  Negative mg/dL Final    Glucose, UA 2024 3+ (A)  Negative mg/dL Final    Ketones, UA 2024 Negative  Negative Final    Urobilinogen, UA 2024 Normal  Normal, 0.2 E.U./dL Final    Bilirubin 2024 Negative  Negative Final    Blood, UA 2024 Negative  Negative Final    Lot Number [id number]   Final    Expiration Date 2024 10/25/24   Final    Urine Culture 2024 No growth   Preliminary   Orders Only on 2024   Component Date Value Ref Range Status    Reference Lab Report 2024    Final                    Value:[facility]  [address] [place] [address]  Phone: [phone] or [phone]  Fax: [phone]  Rodrigo Suarez M.D., Medical Director    PATIENT NAME                                     LABORATORY NO.  HIREN MAYER.                         [id number]  [id number]                                 AGE                    SEX   SSN              CLIENT REF #  [facility]                    [id number]        1955      F     [id number]      [id number]    SPECIALISTS                                REQUESTING M.D.           ATTENDING MJesusD.         COPY TO.  1 DAV[address] JAMA [address] [place] [address], [place]  [place] [id number]                           DATE COLLECTED            DATE RECEIVED          DATE  REPORTED  02/13/2024 02/13/2024 02/14/2024    DIAGNOSIS:  SKIN, RIGHT BREAST:  Benign skin with dermal scar  No malignancy identified    CAM    CLINICAL HISTORY:  Skin lesion of breast,                           right    SPECIMENS RECEIVED:  SKIN, RIGHT BREAST    MICROSCOPIC DESCRIPTION:  Tissue blocks are prepared and slides are examined microscopically. See  diagnosis for details.    Professional interpretation rendered by Mila Morales M.D., DENNIS at  [facility], [address] [place] [address].    GROSS DESCRIPTION:  Labeled right breast is a 3.1 x 1.3 x 0.4 cm portion of tan wrinkled skin with no  visible lesion.  The surgical margin is inked blue, and the specimen is serially  sectioned and submitted entirely in 2 cassette.  BAW    REVIEWED, DIAGNOSED AND ELECTRONICALLY  SIGNED BY:    Mila Morales M.D., DENNIS  CPT CODES:  36898            Smoking Cessation Counseling:  Former smoker.  Patient does not currently use any tobacco products.     Follow Up   Return in about 6 months (around 8/27/2024) for Next scheduled follow up, RECURRENT UTI/DYSURIA/DETRUSSOR INSTABILI/URINE INCONTINENCE-GT ON GEMTESA.    Patient was given instructions and counseling regarding her condition or for health maintenance advice. Please see specific information pulled into the AVS if appropriate.          This document has been electronically signed by Griselda Cheng-Akwa, APRN   February 28, 2024 20:28 EST      Dictated Utilizing Dragon Dictation: Part of this note may be an electronic transcription/translation of spoken language to printed text using the Dragon Dictation System.    Transcribed from ambient dictation for Griselda Cheng-Akwa, APRN by Magdalena Servin.  02/27/24   15:06 EST    Patient or patient representative verbalized consent to the visit recording.  I have personally performed the services described in this document as transcribed by the above individual, and it is  both accurate and complete.

## 2024-02-28 ENCOUNTER — TELEPHONE (OUTPATIENT)
Dept: UROLOGY | Facility: CLINIC | Age: 69
End: 2024-02-28
Payer: MEDICARE

## 2024-02-28 NOTE — TELEPHONE ENCOUNTER
I called the pt letting her know that her urine culture came back with no infection and to follow up in the clinic as discussed. The pt verbalized understanding.

## 2024-02-28 NOTE — TELEPHONE ENCOUNTER
----- Message from Griselda Cheng-Akwa, APRN sent at 2/28/2024  1:08 PM EST -----  Please kindly let patient know her urine culture results are negative for any bacterial infection at this time.     Urine Culture - No growth     However she may drop off another urine sample anytime she feels symptomatic, if not follow-up in clinic as discussed.     Thank you

## 2024-02-29 LAB — BACTERIA SPEC AEROBE CULT: NO GROWTH

## 2024-03-06 ENCOUNTER — TELEPHONE (OUTPATIENT)
Dept: FAMILY MEDICINE CLINIC | Facility: CLINIC | Age: 69
End: 2024-03-06
Payer: MEDICARE

## 2024-03-06 NOTE — TELEPHONE ENCOUNTER
Spoke with patient and let her know her Toujeo came in from the patient assistance program and she can come by the office and pick it up. She said she will get it tomorrow.

## 2024-03-07 ENCOUNTER — LAB (OUTPATIENT)
Dept: FAMILY MEDICINE CLINIC | Facility: CLINIC | Age: 69
End: 2024-03-07
Payer: MEDICARE

## 2024-03-07 DIAGNOSIS — E83.42 HYPOMAGNESEMIA: ICD-10-CM

## 2024-03-07 DIAGNOSIS — E55.9 VITAMIN D DEFICIENCY: ICD-10-CM

## 2024-03-07 DIAGNOSIS — C50.411 MALIGNANT NEOPLASM OF UPPER-OUTER QUADRANT OF RIGHT BREAST IN FEMALE, ESTROGEN RECEPTOR POSITIVE: ICD-10-CM

## 2024-03-07 DIAGNOSIS — Z79.4 TYPE 2 DIABETES MELLITUS WITH HYPERGLYCEMIA, WITH LONG-TERM CURRENT USE OF INSULIN: Chronic | ICD-10-CM

## 2024-03-07 DIAGNOSIS — I10 ESSENTIAL HYPERTENSION: Chronic | ICD-10-CM

## 2024-03-07 DIAGNOSIS — E78.5 DYSLIPIDEMIA: ICD-10-CM

## 2024-03-07 DIAGNOSIS — R06.09 DYSPNEA ON EXERTION: ICD-10-CM

## 2024-03-07 DIAGNOSIS — E11.65 TYPE 2 DIABETES MELLITUS WITH HYPERGLYCEMIA, WITH LONG-TERM CURRENT USE OF INSULIN: Chronic | ICD-10-CM

## 2024-03-07 DIAGNOSIS — Z17.0 MALIGNANT NEOPLASM OF UPPER-OUTER QUADRANT OF RIGHT BREAST IN FEMALE, ESTROGEN RECEPTOR POSITIVE: ICD-10-CM

## 2024-03-07 PROCEDURE — 83880 ASSAY OF NATRIURETIC PEPTIDE: CPT | Performed by: NURSE PRACTITIONER

## 2024-03-07 PROCEDURE — 82043 UR ALBUMIN QUANTITATIVE: CPT | Performed by: NURSE PRACTITIONER

## 2024-03-07 PROCEDURE — 82306 VITAMIN D 25 HYDROXY: CPT | Performed by: NURSE PRACTITIONER

## 2024-03-07 PROCEDURE — 83735 ASSAY OF MAGNESIUM: CPT | Performed by: NURSE PRACTITIONER

## 2024-03-07 PROCEDURE — 82607 VITAMIN B-12: CPT | Performed by: NURSE PRACTITIONER

## 2024-03-07 PROCEDURE — 80053 COMPREHEN METABOLIC PANEL: CPT | Performed by: NURSE PRACTITIONER

## 2024-03-07 PROCEDURE — 84443 ASSAY THYROID STIM HORMONE: CPT | Performed by: NURSE PRACTITIONER

## 2024-03-07 PROCEDURE — 83036 HEMOGLOBIN GLYCOSYLATED A1C: CPT | Performed by: NURSE PRACTITIONER

## 2024-03-07 PROCEDURE — 36415 COLL VENOUS BLD VENIPUNCTURE: CPT

## 2024-03-07 PROCEDURE — 80061 LIPID PANEL: CPT | Performed by: NURSE PRACTITIONER

## 2024-03-07 PROCEDURE — 85025 COMPLETE CBC W/AUTO DIFF WBC: CPT | Performed by: NURSE PRACTITIONER

## 2024-03-08 LAB
25(OH)D3 SERPL-MCNC: 39.8 NG/ML (ref 30–100)
ALBUMIN SERPL-MCNC: 3.5 G/DL (ref 3.5–5.2)
ALBUMIN UR-MCNC: 1.8 MG/DL
ALBUMIN/GLOB SERPL: 0.9 G/DL
ALP SERPL-CCNC: 101 U/L (ref 39–117)
ALT SERPL W P-5'-P-CCNC: 17 U/L (ref 1–33)
ANION GAP SERPL CALCULATED.3IONS-SCNC: 13.9 MMOL/L (ref 5–15)
AST SERPL-CCNC: 30 U/L (ref 1–32)
BASOPHILS # BLD AUTO: 0.06 10*3/MM3 (ref 0–0.2)
BASOPHILS NFR BLD AUTO: 1 % (ref 0–1.5)
BILIRUB SERPL-MCNC: 0.6 MG/DL (ref 0–1.2)
BUN SERPL-MCNC: 10 MG/DL (ref 8–23)
BUN/CREAT SERPL: 11.9 (ref 7–25)
CALCIUM SPEC-SCNC: 9.3 MG/DL (ref 8.6–10.5)
CHLORIDE SERPL-SCNC: 102 MMOL/L (ref 98–107)
CHOLEST SERPL-MCNC: 228 MG/DL (ref 0–200)
CO2 SERPL-SCNC: 23.1 MMOL/L (ref 22–29)
CREAT SERPL-MCNC: 0.84 MG/DL (ref 0.57–1)
DEPRECATED RDW RBC AUTO: 50.4 FL (ref 37–54)
EGFRCR SERPLBLD CKD-EPI 2021: 75.3 ML/MIN/1.73
EOSINOPHIL # BLD AUTO: 0.06 10*3/MM3 (ref 0–0.4)
EOSINOPHIL NFR BLD AUTO: 1 % (ref 0.3–6.2)
ERYTHROCYTE [DISTWIDTH] IN BLOOD BY AUTOMATED COUNT: 13.2 % (ref 12.3–15.4)
GLOBULIN UR ELPH-MCNC: 3.7 GM/DL
GLUCOSE SERPL-MCNC: 227 MG/DL (ref 65–99)
HBA1C MFR BLD: 8.5 % (ref 4.8–5.6)
HCT VFR BLD AUTO: 43.3 % (ref 34–46.6)
HDLC SERPL-MCNC: 51 MG/DL (ref 40–60)
HGB BLD-MCNC: 13.9 G/DL (ref 12–15.9)
IMM GRANULOCYTES # BLD AUTO: 0.02 10*3/MM3 (ref 0–0.05)
IMM GRANULOCYTES NFR BLD AUTO: 0.3 % (ref 0–0.5)
LDLC SERPL CALC-MCNC: 145 MG/DL (ref 0–100)
LDLC/HDLC SERPL: 2.76 {RATIO}
LYMPHOCYTES # BLD AUTO: 2.32 10*3/MM3 (ref 0.7–3.1)
LYMPHOCYTES NFR BLD AUTO: 37.3 % (ref 19.6–45.3)
MAGNESIUM SERPL-MCNC: 1.6 MG/DL (ref 1.6–2.4)
MCH RBC QN AUTO: 33.5 PG (ref 26.6–33)
MCHC RBC AUTO-ENTMCNC: 32.1 G/DL (ref 31.5–35.7)
MCV RBC AUTO: 104.3 FL (ref 79–97)
MONOCYTES # BLD AUTO: 0.67 10*3/MM3 (ref 0.1–0.9)
MONOCYTES NFR BLD AUTO: 10.8 % (ref 5–12)
NEUTROPHILS NFR BLD AUTO: 3.09 10*3/MM3 (ref 1.7–7)
NEUTROPHILS NFR BLD AUTO: 49.6 % (ref 42.7–76)
NRBC BLD AUTO-RTO: 0 /100 WBC (ref 0–0.2)
NT-PROBNP SERPL-MCNC: 188 PG/ML (ref 0–900)
PLATELET # BLD AUTO: 168 10*3/MM3 (ref 140–450)
PMV BLD AUTO: 10.7 FL (ref 6–12)
POTASSIUM SERPL-SCNC: 4.2 MMOL/L (ref 3.5–5.2)
PROT SERPL-MCNC: 7.2 G/DL (ref 6–8.5)
RBC # BLD AUTO: 4.15 10*6/MM3 (ref 3.77–5.28)
SODIUM SERPL-SCNC: 139 MMOL/L (ref 136–145)
TRIGL SERPL-MCNC: 180 MG/DL (ref 0–150)
TSH SERPL DL<=0.05 MIU/L-ACNC: 1.6 UIU/ML (ref 0.27–4.2)
VIT B12 BLD-MCNC: 521 PG/ML (ref 211–946)
VLDLC SERPL-MCNC: 32 MG/DL (ref 5–40)
WBC NRBC COR # BLD AUTO: 6.22 10*3/MM3 (ref 3.4–10.8)

## 2024-03-12 ENCOUNTER — OFFICE VISIT (OUTPATIENT)
Dept: FAMILY MEDICINE CLINIC | Facility: CLINIC | Age: 69
End: 2024-03-12
Payer: MEDICARE

## 2024-03-12 VITALS
OXYGEN SATURATION: 96 % | HEIGHT: 62 IN | HEART RATE: 75 BPM | BODY MASS INDEX: 46.74 KG/M2 | DIASTOLIC BLOOD PRESSURE: 80 MMHG | WEIGHT: 254 LBS | SYSTOLIC BLOOD PRESSURE: 120 MMHG | TEMPERATURE: 96.8 F

## 2024-03-12 DIAGNOSIS — E78.2 MIXED HYPERLIPIDEMIA: Chronic | ICD-10-CM

## 2024-03-12 DIAGNOSIS — Z17.0 MALIGNANT NEOPLASM OF UPPER-OUTER QUADRANT OF RIGHT BREAST IN FEMALE, ESTROGEN RECEPTOR POSITIVE: Chronic | ICD-10-CM

## 2024-03-12 DIAGNOSIS — I48.0 PAROXYSMAL ATRIAL FIBRILLATION: Chronic | ICD-10-CM

## 2024-03-12 DIAGNOSIS — Z00.00 MEDICARE ANNUAL WELLNESS VISIT, SUBSEQUENT: Primary | ICD-10-CM

## 2024-03-12 DIAGNOSIS — I49.5 TACHY-BRADY SYNDROME: Chronic | ICD-10-CM

## 2024-03-12 DIAGNOSIS — E03.8 OTHER SPECIFIED HYPOTHYROIDISM: Chronic | ICD-10-CM

## 2024-03-12 DIAGNOSIS — Z12.11 COLON CANCER SCREENING: ICD-10-CM

## 2024-03-12 DIAGNOSIS — C50.411 MALIGNANT NEOPLASM OF UPPER-OUTER QUADRANT OF RIGHT BREAST IN FEMALE, ESTROGEN RECEPTOR POSITIVE: Chronic | ICD-10-CM

## 2024-03-12 DIAGNOSIS — E11.65 TYPE 2 DIABETES MELLITUS WITH HYPERGLYCEMIA, WITH LONG-TERM CURRENT USE OF INSULIN: Chronic | ICD-10-CM

## 2024-03-12 DIAGNOSIS — I10 ESSENTIAL HYPERTENSION: Chronic | ICD-10-CM

## 2024-03-12 DIAGNOSIS — Z79.4 TYPE 2 DIABETES MELLITUS WITH HYPERGLYCEMIA, WITH LONG-TERM CURRENT USE OF INSULIN: Chronic | ICD-10-CM

## 2024-03-12 DIAGNOSIS — I35.0 NONRHEUMATIC AORTIC VALVE STENOSIS: Chronic | ICD-10-CM

## 2024-03-12 DIAGNOSIS — N39.42 URINARY INCONTINENCE WITHOUT SENSORY AWARENESS: Chronic | ICD-10-CM

## 2024-03-12 DIAGNOSIS — E83.42 HYPOMAGNESEMIA: ICD-10-CM

## 2024-03-12 DIAGNOSIS — K21.00 GASTROESOPHAGEAL REFLUX DISEASE WITH ESOPHAGITIS WITHOUT HEMORRHAGE: Chronic | ICD-10-CM

## 2024-03-12 RX ORDER — LANOLIN ALCOHOL/MO/W.PET/CERES
1 CREAM (GRAM) TOPICAL 3 TIMES DAILY
Qty: 90 TABLET | Refills: 0 | Status: SHIPPED | OUTPATIENT
Start: 2024-03-12

## 2024-03-12 NOTE — PROGRESS NOTES
The ABCs of the Annual Wellness Visit  [facility]    Ronna Wayne is a 69 y.o. female who presents for a  Welcome to Medicare Visit.    The following portions of the patient's history were reviewed and   updated as appropriate: allergies, current medications, past family history, past medical history, past social history, past surgical history, and problem list.     Compared to one year ago, the patient feels her physical   health is better.    Compared to one year ago, the patient feels her mental   health is the same.    Recent Hospitalizations:  This patient has had a [facility] admission record on file within the last 365 days.    Current Medical Providers:  Patient Care Team:  Mague Acosta APRN as PCP - General (Family Medicine)  Bj Hyde MD as Consulting Physician (Cardiology)  Rosa Prather RN as Ambulatory  ([facility])    Outpatient Medications Prior to Visit   Medication Sig Dispense Refill    acetaminophen (TYLENOL) 325 MG tablet Take 2 tablets by mouth Every 6 (Six) Hours As Needed for Mild Pain.      anastrozole (ARIMIDEX) 1 MG tablet Take 1 tablet by mouth Daily. 30 tablet 11    apixaban (ELIQUIS) 5 MG tablet tablet Take 1 tablet by mouth 2 (Two) Times a Day. 60 tablet 6    aspirin 81 MG chewable tablet Chew & swallow 1 tablet Daily. 30 tablet 0    cholecalciferol (VITAMIN D3) 25 MCG (1000 UT) tablet Take 2 tablets by mouth Daily.      Continuous Blood Gluc  (Dexcom G7 ) device 1 Device Daily. 1 each 0    Continuous Blood Gluc Sensor (Dexcom G7 Sensor) misc 1 Device 90 Minutes Prior to Surgery for 1 dose. 9 each 3    dexlansoprazole (DEXILANT) 60 MG capsule Take  by mouth Daily.      Diclofenac Sodium (VOLTAREN) 1 % gel gel Apply 4 g topically to the appropriate area as directed 3 (Three) Times a Day As Needed (joint pain). 350 g 2    docusate sodium (Colace) 100 MG capsule Take 1 capsule by mouth 2 (Two) Times a  Day. 60 capsule 1    empagliflozin (Jardiance) 25 MG tablet tablet Take 1 tablet by mouth Every Morning. 30 tablet 3    fluconazole (DIFLUCAN) 150 MG tablet Take 1 tablet by mouth 1 (One) Time.      furosemide (LASIX) 20 MG tablet TAKE 1 TABLET BY MOUTH DAILY 90 tablet 0    insulin aspart (novoLOG FLEXPEN) 100 UNIT/ML solution pen-injector sc pen Inject 0-9 Units under the skin into the appropriate area as directed 3 (Three) Times a Day With Meals. Admelog Solostar sliding scale      Insulin Pen Needle 32G X 5 MM misc Use 1 each 3 (Three) Times a Day. 100 each 5    levothyroxine (SYNTHROID, LEVOTHROID) 88 MCG tablet TAKE 1 TABLET BY MOUTH DAILY 90 tablet 0    linaclotide (Linzess) 72 MCG capsule capsule Take 1 capsule by mouth Every Morning Before Breakfast. 30 capsule 0    linaGLIPtin-metFORMIN HCl (Jentadueto) 2.5-1000 MG tablet Take  by mouth 2 (Two) Times a Day.      losartan (Cozaar) 25 MG tablet Take 1 tablet by mouth Daily. 90 tablet 1    MAGnesium-Oxide 400 (240 Mg) MG tablet TAKE ONE TABLET BY MOUTH THREE TIMES A DAY 90 tablet 1    metoprolol tartrate (LOPRESSOR) 25 MG tablet TAKE 1 TABLET BY MOUTH TWICE A  tablet 1    nystatin (MYCOSTATIN) 162378 UNIT/GM powder Apply  topically to the appropriate area as directed 3 (Three) Times a Day. 60 g 6    ondansetron ODT (ZOFRAN-ODT) 4 MG disintegrating tablet DISSOLVE ONE TABLET ON THE TONGUE EVERY 8 HOURS AS NEEDED FOR NAUSEA OR VOMITING 20 tablet 1    polyethylene glycol (MiraLax) 17 g packet Take 17 g by mouth Daily. 30 each 3    rosuvastatin (CRESTOR) 20 MG tablet Take 1 tablet by mouth Daily. 30 tablet 4    Semaglutide,0.25 or 0.5MG/DOS, (Ozempic, 0.25 or 0.5 MG/DOSE,) 2 MG/1.5ML solution pen-injector Inject 0.25 mg under the skin into the appropriate area as directed 1 (One) Time Per Week. 3 mL 0    Vibegron (Gemtesa) 75 MG tablet Take 1 tablet by mouth Every Night for 30 days. 30 tablet 6    vitamin E 100 UNIT capsule Take 1 capsule by mouth Daily.    "    No facility-administered medications prior to visit.     No opioid medication identified on active medication list. I have reviewed chart for other potential  high risk medication/s and harmful drug interactions in the elderly.    Aspirin is on active medication list. Aspirin use is indicated based on review of current medical condition/s. Pros and cons of this therapy have been discussed today. Benefits of this medication outweigh potential harm.  Patient has been encouraged to continue taking this medication.  .    Patient Active Problem List   Diagnosis    Essential hypertension    Type 2 diabetes mellitus with hyperglycemia, with long-term current use of insulin    Obstructive sleep apnea syndrome    Glaucoma    GERD (gastroesophageal reflux disease)    Cerebrovascular accident (CVA) due to embolism    SVT (supraventricular tachycardia)    Vitamin D deficiency    B12 deficiency    Malignant neoplasm of upper-outer quadrant of right breast in female, estrogen receptor positive    Other specified hypothyroidism    Personal history of colonic polyps    Tachy-janie syndrome    Paroxysmal atrial fibrillation    Hyperlipidemia    Nonrheumatic aortic valve stenosis    TIA (transient ischemic attack)    Sinus node dysfunction    Detrusor instability of bladder    Chronic anticoagulation    Slow transit constipation     Advance Care Planning      Advance Directive is not on file.  ACP discussion was held with the patient during this visit. Patient does not have an advance directive, information provided.       Vitals:    03/12/24 1430   BP: 120/80   BP Location: Left arm   Patient Position: Sitting   Cuff Size: Adult   Pulse: 75   Temp: 96.8 °F (36 °C)   TempSrc: Temporal   SpO2: 96%   Weight: 115 kg (254 lb)   Height: 157.5 cm (62.01\")     Estimated body mass index is 46.45 kg/m² as calculated from the following:    Height as of this encounter: 157.5 cm (62.01\").    Weight as of this encounter: 115 kg (254 lb).   "   Does the patient have evidence of cognitive impairment?   Short term memory poor    Results for orders placed or performed in visit on 03/07/24   Comprehensive Metabolic Panel    Specimen: Arm, Right; Blood   Result Value Ref Range    Glucose 227 (H) 65 - 99 mg/dL    BUN 10 8 - 23 mg/dL    Creatinine 0.84 0.57 - 1.00 mg/dL    Sodium 139 136 - 145 mmol/L    Potassium 4.2 3.5 - 5.2 mmol/L    Chloride 102 98 - 107 mmol/L    CO2 23.1 22.0 - 29.0 mmol/L    Calcium 9.3 8.6 - 10.5 mg/dL    Total Protein 7.2 6.0 - 8.5 g/dL    Albumin 3.5 3.5 - 5.2 g/dL    ALT (SGPT) 17 1 - 33 U/L    AST (SGOT) 30 1 - 32 U/L    Alkaline Phosphatase 101 39 - 117 U/L    Total Bilirubin 0.6 0.0 - 1.2 mg/dL    Globulin 3.7 gm/dL    A/G Ratio 0.9 g/dL    BUN/Creatinine Ratio 11.9 7.0 - 25.0    Anion Gap 13.9 5.0 - 15.0 mmol/L    eGFR 75.3 >60.0 mL/min/1.73   Lipid Panel    Specimen: Arm, Right; Blood   Result Value Ref Range    Total Cholesterol 228 (H) 0 - 200 mg/dL    Triglycerides 180 (H) 0 - 150 mg/dL    HDL Cholesterol 51 40 - 60 mg/dL    LDL Cholesterol  145 (H) 0 - 100 mg/dL    VLDL Cholesterol 32 5 - 40 mg/dL    LDL/HDL Ratio 2.76    TSH    Specimen: Arm, Right; Blood   Result Value Ref Range    TSH 1.600 0.270 - 4.200 uIU/mL   Hemoglobin A1c    Specimen: Arm, Right; Blood   Result Value Ref Range    Hemoglobin A1C 8.50 (H) 4.80 - 5.60 %   Vitamin D,25-Hydroxy    Specimen: Arm, Right; Blood   Result Value Ref Range    25 Hydroxy, Vitamin D 39.8 30.0 - 100.0 ng/ml   Vitamin B12    Specimen: Arm, Right; Blood   Result Value Ref Range    Vitamin B-12 521 211 - 946 pg/mL   MicroAlbumin, Urine, Random - Urine, Clean Catch    Specimen: Urine, Clean Catch   Result Value Ref Range    Microalbumin, Urine 1.8 mg/dL   Magnesium    Specimen: Arm, Right; Blood   Result Value Ref Range    Magnesium 1.6 1.6 - 2.4 mg/dL   proBNP    Specimen: Arm, Right; Blood   Result Value Ref Range    proBNP 188.0 0.0 - 900.0 pg/mL   CBC Auto Differential     Specimen: Arm, Right; Blood   Result Value Ref Range    WBC 6.22 3.40 - 10.80 10*3/mm3    RBC 4.15 3.77 - 5.28 10*6/mm3    Hemoglobin 13.9 12.0 - 15.9 g/dL    Hematocrit 43.3 34.0 - 46.6 %    .3 (H) 79.0 - 97.0 fL    MCH 33.5 (H) 26.6 - 33.0 pg    MCHC 32.1 31.5 - 35.7 g/dL    RDW 13.2 12.3 - 15.4 %    RDW-SD 50.4 37.0 - 54.0 fl    MPV 10.7 6.0 - 12.0 fL    Platelets 168 140 - 450 10*3/mm3    Neutrophil % 49.6 42.7 - 76.0 %    Lymphocyte % 37.3 19.6 - 45.3 %    Monocyte % 10.8 5.0 - 12.0 %    Eosinophil % 1.0 0.3 - 6.2 %    Basophil % 1.0 0.0 - 1.5 %    Immature Grans % 0.3 0.0 - 0.5 %    Neutrophils, Absolute 3.09 1.70 - 7.00 10*3/mm3    Lymphocytes, Absolute 2.32 0.70 - 3.10 10*3/mm3    Monocytes, Absolute 0.67 0.10 - 0.90 10*3/mm3    Eosinophils, Absolute 0.06 0.00 - 0.40 10*3/mm3    Basophils, Absolute 0.06 0.00 - 0.20 10*3/mm3    Immature Grans, Absolute 0.02 0.00 - 0.05 10*3/mm3    nRBC 0.0 0.0 - 0.2 /100 WBC          HEALTH RISK ASSESSMENT    Smoking Status:  Social History     Tobacco Use   Smoking Status Former    Current packs/day: 0.00    Average packs/day: 1.5 packs/day for 3.0 years (4.5 ttl pk-yrs)    Types: Cigarettes    Start date: 1973    Quit date: 1976    Years since quittin.2    Passive exposure: Past   Smokeless Tobacco Never   Tobacco Comments    I smoked as a teen and stopped when my son was born     Alcohol Consumption:  Social History     Substance and Sexual Activity   Alcohol Use No    Comment: former social drinker not had anything in 25 + years     Fall Risk Screen:    STEADI Fall Risk Assessment was completed, and patient is at LOW risk for falls.Assessment completed on:3/12/2024    Depression Screen:       3/12/2024     2:26 PM   PHQ-2/PHQ-9 Depression Screening   Little Interest or Pleasure in Doing Things 0-->not at all   Feeling Down, Depressed or Hopeless 0-->not at all   PHQ-9: Brief Depression Severity Measure Score 0     Health Habits and Functional and  Cognitive Screening:      3/12/2024     2:30 PM   Functional & Cognitive Status   Do you have difficulty preparing food and eating? No   Do you have difficulty bathing yourself, getting dressed or grooming yourself? No   Do you have difficulty using the toilet? No   Do you have difficulty moving around from place to place? No   Do you have trouble with steps or getting out of a bed or a chair? Yes   Current Diet Unhealthy Diet   Dental Exam Not up to date   Eye Exam Not up to date   Exercise (times per week) 7 times per week   Current Exercises Include Walking   Do you need help using the phone?  No   Are you deaf or do you have serious difficulty hearing?  No   Do you need help to go to places out of walking distance? No   Do you need help shopping? No   Do you need help preparing meals?  No   Do you need help with housework?  No   Do you need help with laundry? No   Do you need help taking your medications? No   Do you need help managing money? No   Do you ever drive or ride in a car without wearing a seat belt? No   Have you felt unusual stress, anger or loneliness in the last month? No   Who do you live with? Spouse   If you need help, do you have trouble finding someone available to you? No   Have you been bothered in the last four weeks by sexual problems? No   Do you have difficulty concentrating, remembering or making decisions? Yes     Visual Acuity:    Vision Screening    Right eye Left eye Both eyes   Without correction 20/50 20/50 20/50   With correction          Age-appropriate Screening Schedule:  Refer to the list below for future screening recommendations based on patient's age, sex and/or medical conditions. Orders for these recommended tests are listed in the plan section. The patient has been provided with a written plan.    Health Maintenance   Topic Date Due    TDAP/TD VACCINES (1 - Tdap) Never done    Hepatitis B (1 of 3 - Risk 3-dose series) Never done    RSV Vaccine - Adults (1 - 1-dose 60+  series) Discussed     ZOSTER VACCINE (2 of 2) 07/14/2017    DIABETIC FOOT EXAM  TBS    DIABETIC EYE EXAM  TBS    COLORECTAL CANCER SCREENING  TBS    COVID-19 Vaccine (4 - 2023-24 season) 09/01/2023    ANNUAL WELLNESS VISIT  03/12/2025    HEMOGLOBIN A1C  09/07/2024    BMI FOLLOWUP  09/29/2024    MAMMOGRAM  11/15/2024    LIPID PANEL  03/07/2025    URINE MICROALBUMIN  03/07/2025    DXA SCAN  05/16/2025    HEPATITIS C SCREENING  Completed    INFLUENZA VACCINE  Completed    Pneumococcal Vaccine 65+  Completed    PAP SMEAR  Discontinued        CMS Preventative Services Quick Reference  Risk Factors Identified During Encounter    Inactivity/Sedentary:  Encouraged her to increase activity. Will consider Physical Therapy Referral   Urinary Incontinence: Continue medications and to follow Urology  Vision Screening Recommended  The above risks/problems have been discussed with the patient.  Pertinent information has been shared with the patient in the After Visit Summary.  Follow up plans and orders are seen below in the Assessment/Plan Section.    Diagnoses and all orders for this visit:    1. Medicare annual wellness visit, subsequent (Primary)  Comments:  Findings and recommendations discussed with Ronna.    2. Type 2 diabetes mellitus with hyperglycemia, with long-term current use of insulin  Comments:  Recent labs reviewed with noted improvement of her A1c.  Continue Jardiance, NovoLog, Jentadueto, NovoLog, Ozempic  Orders:  -     Comprehensive metabolic panel; Future    3. Essential hypertension  Comments:  Continue hydrochlorothiazide, losartan    4. Mixed hyperlipidemia  Comments:  Continue rosuvastatin    5. Gastroesophageal reflux disease with esophagitis without hemorrhage  Comments:  Continue Dexilant    6. Other specified hypothyroidism  Comments:  Continue levothyroxine 88 mcg    7. Tachy-janie syndrome  Comments:  Continue metoprolol    8. Paroxysmal atrial fibrillation  Comments:  Continue Eliquis    9.  Malignant neoplasm of upper-outer quadrant of right breast in female, estrogen receptor positive  Comments:  Continue to follow-up with oncology    10. Nonrheumatic aortic valve stenosis  Comments:  Continue to follow-up with cardiologist    11. Urinary incontinence without sensory awareness  Comments:  Continue Gemtesa    12. Hypomagnesemia  Comments:  Continue to monitor  Orders:  -     Magnesium Oxide -Mg Supplement (MAGnesium-Oxide) 400 (240 Mg) MG tablet; Take 1 tablet by mouth 3 (Three) Times a Day.  Dispense: 90 tablet; Refill: 0  -     Magnesium; Future    13. Colon cancer screening  Comments:  Referral to her cardiologist for colonoscopy placed  Orders:  -     Ambulatory Referral to Gastroenterology      Follow Up: In June  Initial Medicare Visit in one year    An After Visit Summary and PPPS were made available to the patient.

## 2024-03-12 NOTE — PROGRESS NOTES
History of Present Illness  Ronna Wayne is a 69 y.o. female who presents to the clinic pertaining to her DM, type 2, HTN, Dyslipidemia, GERD and vitamin deficiencies.  Ronna has cardiac issues which include atrial fibrillation, nonrheumatic aortic valve stenosis tacky-bradycardia syndrome, PVCs and SVT. She has been diagnosed and treated for a malignant neoplasm of her upper outer quadrant of right breast.      Diabetes  She has type 2 diabetes mellitus. The initial diagnosis of diabetes was made in 2008. Diabetic complications include a CVA, nephropathy and PVD. Risk factors for coronary artery disease include dyslipidemia, family history, hypertension, obesity and sedentary lifestyle. Current diabetic treatment includes insulin injections and oral agents.  She is currently taking insulin pre-breakfast, pre-lunch and pre-dinner. Insulin injections are given by patient. Rotation sites for injection include the abdominal wall. She is following a diabetic diet. Meal planning includes ADA exchanges, avoidance of concentrated sweets and carbohydrate counting. She rarely participates in exercise. Blood glucose monitoring compliance is good since she has received her Dexcom.    Lab Results   Component Value Date    HGBA1C 10.10 (H) 06/11/2023     Lab Results   Component Value Date    HGBA1C 9.30 (H) 09/22/2023      Lab Results   Component Value Date    HGBA1C 8.50 (H) 03/07/2024      Hypertension   The current episode started more than 1 year ago. The problem is controlled. Associated symptoms include headaches, malaise/fatigue and peripheral edema. Risk factors for coronary artery disease include diabetes mellitus, dyslipidemia, obesity and post-menopausal state. Current antihypertension treatment includes angiotensin blockers.   Lab Results   Component Value Date    GLUCOSE 227 (H) 03/07/2024    BUN 10 03/07/2024    CREATININE 0.84 03/07/2024    EGFR 75.3 03/07/2024    BCR 11.9 03/07/2024    K 4.2  03/07/2024    CO2 23.1 03/07/2024    CALCIUM 9.3 03/07/2024    ALBUMIN 3.5 03/07/2024    BILITOT 0.6 03/07/2024    AST 30 03/07/2024    ALT 17 03/07/2024       Dyslipidemia   The current episode started more than 1 year ago. Current antihyperlipidemic treatment includes statins. Risk factors for coronary artery disease include diabetes mellitus, dyslipidemia, hypertension, obesity and post-menopausal.   Lab Results   Component Value Date    CHOL 228 (H) 03/07/2024    CHOL 210 (H) 09/22/2023    CHOL 202 (H) 06/09/2023     Lab Results   Component Value Date    TRIG 180 (H) 03/07/2024    TRIG 176 (H) 09/22/2023    TRIG 206 (H) 06/09/2023     Lab Results   Component Value Date    HDL 51 03/07/2024    HDL 52 09/22/2023    HDL 50 06/09/2023     Lab Results   Component Value Date     (H) 03/07/2024     (H) 09/22/2023     (H) 06/09/2023        The following portions of the patient's history were reviewed and updated as appropriate: allergies, current medications, past family history, past medical history, past social history, past surgical history and problem list.    Review of Systems   Constitutional:  Positive for fatigue. Negative for activity change, appetite change, chills, fever and unexpected weight change.   Eyes:  Negative for visual disturbance.   Respiratory:  Negative for cough, shortness of breath and wheezing.    Cardiovascular:  Positive for leg swelling (Intermittent). Negative for chest pain and palpitations.   Gastrointestinal:  Positive for nausea (Intermittent). Negative for vomiting.   Endocrine: Negative for cold intolerance, heat intolerance, polydipsia, polyphagia and polyuria.   Musculoskeletal:  Positive for arthralgias.   Skin:  Negative for color change and rash.   Neurological:  Positive for dizziness, weakness, numbness and headaches. Negative for tremors, speech difficulty and light-headedness.   Hematological:  Negative for adenopathy.   Psychiatric/Behavioral:  Positive  "for sleep disturbance. Negative for confusion and suicidal ideas. The patient is not nervous/anxious.    All other systems reviewed and are negative.      Vital signs:  /80 (BP Location: Left arm, Patient Position: Sitting, Cuff Size: Adult)   Pulse 75   Temp 96.8 °F (36 °C) (Temporal)   Ht 157.5 cm (62.01\")   Wt 115 kg (254 lb)   SpO2 96%   BMI 46.45 kg/m²     Physical Exam  Vitals and nursing note reviewed.   Constitutional:       General: She is not in acute distress.     Appearance: She is well-developed.      Comments: Gait slow and cautious   HENT:      Head: Normocephalic.      Nose: Nose normal.   Eyes:      General: No scleral icterus.        Right eye: No discharge.         Left eye: No discharge.      Conjunctiva/sclera: Conjunctivae normal.   Neck:      Thyroid: No thyromegaly.      Vascular: No JVD.   Cardiovascular:      Rate and Rhythm: Normal rate and regular rhythm.      Heart sounds: Murmur heard.      No friction rub.   Pulmonary:      Effort: Pulmonary effort is normal. No respiratory distress.      Breath sounds: Normal breath sounds. No decreased breath sounds, wheezing, rhonchi or rales.   Abdominal:      General: Bowel sounds are normal. There is no distension.      Palpations: Abdomen is soft.      Tenderness: There is no abdominal tenderness. There is no guarding or rebound.   Musculoskeletal:         General: Tenderness (Generalized) present.      Cervical back: Neck supple.      Right lower leg: No edema.      Left lower leg: No edema.   Lymphadenopathy:      Cervical: No cervical adenopathy.   Skin:     General: Skin is warm and dry.      Capillary Refill: Capillary refill takes less than 2 seconds.      Findings: No erythema or rash.   Neurological:      Mental Status: She is alert and oriented to person, place, and time.   Psychiatric:         Mood and Affect: Mood and affect normal.         Speech: Speech normal.         Behavior: Behavior normal. Behavior is cooperative.  "        Thought Content: Thought content normal.         Judgment: Judgment normal.       Result Review :  Results for orders placed or performed in visit on 03/07/24   Comprehensive Metabolic Panel    Specimen: Arm, Right; Blood   Result Value Ref Range    Glucose 227 (H) 65 - 99 mg/dL    BUN 10 8 - 23 mg/dL    Creatinine 0.84 0.57 - 1.00 mg/dL    Sodium 139 136 - 145 mmol/L    Potassium 4.2 3.5 - 5.2 mmol/L    Chloride 102 98 - 107 mmol/L    CO2 23.1 22.0 - 29.0 mmol/L    Calcium 9.3 8.6 - 10.5 mg/dL    Total Protein 7.2 6.0 - 8.5 g/dL    Albumin 3.5 3.5 - 5.2 g/dL    ALT (SGPT) 17 1 - 33 U/L    AST (SGOT) 30 1 - 32 U/L    Alkaline Phosphatase 101 39 - 117 U/L    Total Bilirubin 0.6 0.0 - 1.2 mg/dL    Globulin 3.7 gm/dL    A/G Ratio 0.9 g/dL    BUN/Creatinine Ratio 11.9 7.0 - 25.0    Anion Gap 13.9 5.0 - 15.0 mmol/L    eGFR 75.3 >60.0 mL/min/1.73   Lipid Panel    Specimen: Arm, Right; Blood   Result Value Ref Range    Total Cholesterol 228 (H) 0 - 200 mg/dL    Triglycerides 180 (H) 0 - 150 mg/dL    HDL Cholesterol 51 40 - 60 mg/dL    LDL Cholesterol  145 (H) 0 - 100 mg/dL    VLDL Cholesterol 32 5 - 40 mg/dL    LDL/HDL Ratio 2.76    TSH    Specimen: Arm, Right; Blood   Result Value Ref Range    TSH 1.600 0.270 - 4.200 uIU/mL   Hemoglobin A1c    Specimen: Arm, Right; Blood   Result Value Ref Range    Hemoglobin A1C 8.50 (H) 4.80 - 5.60 %   Vitamin D,25-Hydroxy    Specimen: Arm, Right; Blood   Result Value Ref Range    25 Hydroxy, Vitamin D 39.8 30.0 - 100.0 ng/ml   Vitamin B12    Specimen: Arm, Right; Blood   Result Value Ref Range    Vitamin B-12 521 211 - 946 pg/mL   MicroAlbumin, Urine, Random - Urine, Clean Catch    Specimen: Urine, Clean Catch   Result Value Ref Range    Microalbumin, Urine 1.8 mg/dL   Magnesium    Specimen: Arm, Right; Blood   Result Value Ref Range    Magnesium 1.6 1.6 - 2.4 mg/dL   proBNP    Specimen: Arm, Right; Blood   Result Value Ref Range    proBNP 188.0 0.0 - 900.0 pg/mL   CBC Auto  "Differential    Specimen: Arm, Right; Blood   Result Value Ref Range    WBC 6.22 3.40 - 10.80 10*3/mm3    RBC 4.15 3.77 - 5.28 10*6/mm3    Hemoglobin 13.9 12.0 - 15.9 g/dL    Hematocrit 43.3 34.0 - 46.6 %    .3 (H) 79.0 - 97.0 fL    MCH 33.5 (H) 26.6 - 33.0 pg    MCHC 32.1 31.5 - 35.7 g/dL    RDW 13.2 12.3 - 15.4 %    RDW-SD 50.4 37.0 - 54.0 fl    MPV 10.7 6.0 - 12.0 fL    Platelets 168 140 - 450 10*3/mm3    Neutrophil % 49.6 42.7 - 76.0 %    Lymphocyte % 37.3 19.6 - 45.3 %    Monocyte % 10.8 5.0 - 12.0 %    Eosinophil % 1.0 0.3 - 6.2 %    Basophil % 1.0 0.0 - 1.5 %    Immature Grans % 0.3 0.0 - 0.5 %    Neutrophils, Absolute 3.09 1.70 - 7.00 10*3/mm3    Lymphocytes, Absolute 2.32 0.70 - 3.10 10*3/mm3    Monocytes, Absolute 0.67 0.10 - 0.90 10*3/mm3    Eosinophils, Absolute 0.06 0.00 - 0.40 10*3/mm3    Basophils, Absolute 0.06 0.00 - 0.20 10*3/mm3    Immature Grans, Absolute 0.02 0.00 - 0.05 10*3/mm3    nRBC 0.0 0.0 - 0.2 /100 WBC         Class 3 Severe Obesity (BMI >=40). Obesity-related health conditions include the following: hypertension, diabetes mellitus, dyslipidemias, GERD, and osteoarthritis. Obesity is {new/improving/stable/worsenin}. BMI is {BMI Plan:09249}. We discussed {obesity treatment:47248::\"portion control\",\"increasing exercise\"}.      Assessment & Plan     There are no diagnoses linked to this encounter.    {Time Spent (Optional):61137}  Follow Up    Findings and recommendations discussed with Ronna. Reviewed test results. Lifestyle modifications reinforced including nutrition and activity recommendations. Will follow up in months sooner if problems/concerns occur.Ronna was given instructions and counseling regarding her condition or for health maintenance advice. Please see specific information pulled into the AVS if appropriate.      This document has been electronically signed by:      "

## 2024-03-14 RX ORDER — INSULIN GLARGINE 300 U/ML
INJECTION, SOLUTION SUBCUTANEOUS
COMMUNITY

## 2024-03-18 ENCOUNTER — PATIENT OUTREACH (OUTPATIENT)
Dept: CASE MANAGEMENT | Facility: OTHER | Age: 69
End: 2024-03-18
Payer: MEDICARE

## 2024-03-18 NOTE — OUTREACH NOTE
AMBULATORY CASE MANAGEMENT NOTE    Name and Relationship of Patient/Support Person: Ronna Wayne - Self    Patient Outreach    RN-ACM outreach to patient proactively identified for HRCM.  Patient is agreeable to engagement and occasional outreach.     Initial Outreach for Episode  Purpose of outreach  Send SDOH & ACP Questionnaires  Initiate Assessment  Establish Maci of Outreach  Create Care Plan, Goals, Targets, and Education  Update episode status      Rosa DAVIS  Ambulatory Case Management    3/18/2024, 14:55 EDT

## 2024-04-01 RX ORDER — LEVOTHYROXINE SODIUM 88 UG/1
TABLET ORAL
Qty: 90 TABLET | Refills: 0 | Status: SHIPPED | OUTPATIENT
Start: 2024-04-01

## 2024-04-09 ENCOUNTER — OFFICE VISIT (OUTPATIENT)
Dept: CARDIOLOGY | Facility: CLINIC | Age: 69
End: 2024-04-09
Payer: MEDICARE

## 2024-04-09 ENCOUNTER — TELEPHONE (OUTPATIENT)
Dept: CARDIOLOGY | Facility: CLINIC | Age: 69
End: 2024-04-09
Payer: MEDICARE

## 2024-04-09 VITALS
OXYGEN SATURATION: 96 % | HEIGHT: 62 IN | SYSTOLIC BLOOD PRESSURE: 128 MMHG | WEIGHT: 253 LBS | HEART RATE: 70 BPM | BODY MASS INDEX: 46.56 KG/M2 | DIASTOLIC BLOOD PRESSURE: 70 MMHG

## 2024-04-09 DIAGNOSIS — I10 ESSENTIAL HYPERTENSION: ICD-10-CM

## 2024-04-09 DIAGNOSIS — I48.92 ATRIAL FLUTTER, UNSPECIFIED TYPE: ICD-10-CM

## 2024-04-09 DIAGNOSIS — E78.2 MIXED HYPERLIPIDEMIA: Primary | ICD-10-CM

## 2024-04-09 DIAGNOSIS — Z95.0 PRESENCE OF CARDIAC PACEMAKER: ICD-10-CM

## 2024-04-09 DIAGNOSIS — I48.0 PAROXYSMAL ATRIAL FIBRILLATION: ICD-10-CM

## 2024-04-09 PROCEDURE — 99214 OFFICE O/P EST MOD 30 MIN: CPT | Performed by: NURSE PRACTITIONER

## 2024-04-09 PROCEDURE — 3078F DIAST BP <80 MM HG: CPT | Performed by: NURSE PRACTITIONER

## 2024-04-09 PROCEDURE — 3074F SYST BP LT 130 MM HG: CPT | Performed by: NURSE PRACTITIONER

## 2024-04-09 PROCEDURE — 1159F MED LIST DOCD IN RCRD: CPT | Performed by: NURSE PRACTITIONER

## 2024-04-09 PROCEDURE — 1160F RVW MEDS BY RX/DR IN RCRD: CPT | Performed by: NURSE PRACTITIONER

## 2024-04-09 NOTE — TELEPHONE ENCOUNTER
Pt was seen in the office today and requested samples of Eliquis due to the cost being so expensive. We only had one box to provide her, so Brenda gave her that box and told her we would check on her PAP application. She started this with her PCP. On 3/1 they received a fax from [facility] stating that pt would need to meet 3% OOP. When pt was in the office today she was confused about how much she would have to pay. I called [employer] to confirm how much the pt needs to spend the amount is $761.33. I then called her pharmacy to see how much she has spent OOP from Jan 1st of this year, the amount is $121. I called the pt to explain all of this to her, I advised her that while the Eliquis is very expensive she would probably have to purchase at least 1 months supply of it to help her meet the OOP required for the PAP. Pt verbalized understanding and requested for Brenda to send her a months supply of Eliquis to her pharmacy. Provider was notified.

## 2024-04-09 NOTE — PROGRESS NOTES
[facility]             BrendaBOSTON Mtz Sherelene S, APRN  Ronna Wayne  1955 04/09/2024    Patient Active Problem List   Diagnosis    Frequent headaches    Essential hypertension    Type 2 diabetes mellitus with hyperglycemia, with long-term current use of insulin    Obstructive sleep apnea syndrome    Glaucoma    GERD (gastroesophageal reflux disease)    Morbid obesity    Weakness of left arm    Cerebrovascular accident (CVA) due to embolism    SVT (supraventricular tachycardia)    Vitamin D deficiency    B12 deficiency    Malignant neoplasm of upper-outer quadrant of right breast in female, estrogen receptor positive    Other specified hypothyroidism    Personal history of colonic polyps    Tachy-janie syndrome    Atrial flutter    Paroxysmal atrial fibrillation    Presence of cardiac pacemaker    Hyperlipidemia    Nonrheumatic aortic valve stenosis    TIA (transient ischemic attack)    Sinus node dysfunction    Urine incontinence    Detrusor instability of bladder    Incomplete emptying of bladder    Chronic anticoagulation    Frequency of micturition    Slow transit constipation    Skin lesion    History of recurrent UTIs       Mague Mace, BOSTON:    Subjective     Chief Complaint   Patient presents with    Follow-up     ROUTINE       HPI:     This is a 69 y.o. female with known past medical history of dyslipidemia, diabetes mellitus type 2, essential hypertension, obstructive sleep apnea, PSVT, history of CVA, breast cancer, obesity and paroxysmal atrial fibrillation/flutter.      Ronna Wayne presents today for routine cardiology follow up.  Patient states she has been doing overall well since her last visit Since her last visit patient underwent SVT ablation with Dr. Steele.  She has been doing well since the procedure.  Did follow-up with EP in October 2023 for which amlodipine was discontinued and she was  started on losartan 25 mg daily.  Blood pressure well-controlled.  Recent lab work stable.  Recent lab work did show elevated LDL in the 180s.  Patient has not been taking statin as she states it causes her muscle aches and pains.  Denies any SVT episodes since ablation    Diagnostic Testing  Echocardiogram 5/2021: EF 51 to 55% with moderate aortic sclerosis  Nuclear stress test 5/2021: No evidence of ischemia  Echocardiogram 12/2022: EF 61 to 65% with mild aortic valve stenosis  Successful implantation of permanent dual chamber pacemaker with [employer] device on 1/5/2023 due to tachybradycardia syndrome  EchoCardiogram 6/2023: 61 to 65%  Nuclear stress test 6/2023: Very mild fixed mid anterior wall defect, with normal wall motion, defect is consistent with breast attenuation artifact, no ischemia seen, TID 1.22.      All other systems were reviewed and were negative.    Patient Active Problem List   Diagnosis    Frequent headaches    Essential hypertension    Type 2 diabetes mellitus with hyperglycemia, with long-term current use of insulin    Obstructive sleep apnea syndrome    Glaucoma    GERD (gastroesophageal reflux disease)    Morbid obesity    Weakness of left arm    Cerebrovascular accident (CVA) due to embolism    SVT (supraventricular tachycardia)    Vitamin D deficiency    B12 deficiency    Malignant neoplasm of upper-outer quadrant of right breast in female, estrogen receptor positive    Other specified hypothyroidism    Personal history of colonic polyps    Tachy-janie syndrome    Atrial flutter    Paroxysmal atrial fibrillation    Presence of cardiac pacemaker    Hyperlipidemia    Nonrheumatic aortic valve stenosis    TIA (transient ischemic attack)    Sinus node dysfunction    Urine incontinence    Detrusor instability of bladder    Incomplete emptying of bladder    Chronic anticoagulation    Frequency of micturition    Slow transit constipation    Skin lesion    History of recurrent UTIs        family history includes Alcohol abuse in her maternal aunt; Arthritis in her sister; Breast cancer (age of onset: 40) in her sister; Cancer in her maternal grandfather and sister; Diabetes in her mother; Heart attack in her father; Hyperlipidemia in her father, sister, and sister; Hypertension in her father, sister, sister, sister, and another family member; Lung disease in her father; Obesity in her sister and sister; Stroke in her paternal grandfather and paternal grandmother; Thyroid disease in her mother and sister.     reports that she quit smoking about 48 years ago. Her smoking use included cigarettes. She started smoking about 51 years ago. She has a 4.5 pack-year smoking history. She has been exposed to tobacco smoke. She has never used smokeless tobacco. She reports that she does not drink alcohol and does not use drugs.    Allergies   Allergen Reactions    Codeine GI Intolerance     Increased heart rate      Sulfa Antibiotics GI Intolerance     Heart rate increase          Current Outpatient Medications:     acetaminophen (TYLENOL) 325 MG tablet, Take 2 tablets by mouth Every 6 (Six) Hours As Needed for Mild Pain., Disp: , Rfl:     anastrozole (ARIMIDEX) 1 MG tablet, Take 1 tablet by mouth Daily., Disp: 30 tablet, Rfl: 11    apixaban (ELIQUIS) 5 MG tablet tablet, Take 1 tablet by mouth 2 (Two) Times a Day., Disp: 60 tablet, Rfl: 6    aspirin 81 MG chewable tablet, Chew & swallow 1 tablet Daily., Disp: 30 tablet, Rfl: 0    cholecalciferol (VITAMIN D3) 25 MCG (1000 UT) tablet, Take 2 tablets by mouth Daily., Disp: , Rfl:     Continuous Blood Gluc  (Dexcom G7 ) device, 1 Device Daily., Disp: 1 each, Rfl: 0    Continuous Blood Gluc Sensor (Dexcom G7 Sensor) misc, 1 Device 90 Minutes Prior to Surgery for 1 dose., Disp: 9 each, Rfl: 3    dexlansoprazole (DEXILANT) 60 MG capsule, Take  by mouth Daily., Disp: , Rfl:     Diclofenac Sodium (VOLTAREN) 1 % gel gel, Apply 4 g topically to the  appropriate area as directed 3 (Three) Times a Day As Needed (joint pain)., Disp: 350 g, Rfl: 2    docusate sodium (Colace) 100 MG capsule, Take 1 capsule by mouth 2 (Two) Times a Day., Disp: 60 capsule, Rfl: 1    empagliflozin (Jardiance) 25 MG tablet tablet, Take 1 tablet by mouth Every Morning., Disp: 30 tablet, Rfl: 3    fluconazole (DIFLUCAN) 150 MG tablet, Take 1 tablet by mouth 1 (One) Time., Disp: , Rfl:     furosemide (LASIX) 20 MG tablet, TAKE 1 TABLET BY MOUTH DAILY, Disp: 90 tablet, Rfl: 0    hydroCHLOROthiazide (MICROZIDE) 12.5 MG capsule, 1 po qhs, Disp: , Rfl:     insulin aspart (novoLOG FLEXPEN) 100 UNIT/ML solution pen-injector sc pen, Inject 0-9 Units under the skin into the appropriate area as directed 3 (Three) Times a Day With Meals. Admelog Solostar sliding scale, Disp: , Rfl:     Insulin Glargine, 1 Unit Dial, (Toujeo SoloStar) 300 UNIT/ML solution pen-injector injection, Inject  under the skin into the appropriate area as directed., Disp: , Rfl:     Insulin Pen Needle 32G X 5 MM misc, Use 1 each 3 (Three) Times a Day., Disp: 100 each, Rfl: 5    levothyroxine (SYNTHROID, LEVOTHROID) 88 MCG tablet, TAKE 1 TABLET BY MOUTH DAILY, Disp: 90 tablet, Rfl: 0    linaclotide (Linzess) 72 MCG capsule capsule, Take 1 capsule by mouth Every Morning Before Breakfast., Disp: 30 capsule, Rfl: 0    linaGLIPtin-metFORMIN HCl (Jentadueto) 2.5-1000 MG tablet, Take  by mouth 2 (Two) Times a Day., Disp: , Rfl:     losartan (Cozaar) 25 MG tablet, Take 1 tablet by mouth Daily., Disp: 90 tablet, Rfl: 1    Magnesium Oxide -Mg Supplement (MAGnesium-Oxide) 400 (240 Mg) MG tablet, Take 1 tablet by mouth 3 (Three) Times a Day., Disp: 90 tablet, Rfl: 0    metoprolol tartrate (LOPRESSOR) 25 MG tablet, TAKE 1 TABLET BY MOUTH TWICE A DAY, Disp: 180 tablet, Rfl: 1    NovoLOG Mix 70/30 (70-30) 100 UNIT/ML injection, , Disp: , Rfl:     ondansetron ODT (ZOFRAN-ODT) 4 MG disintegrating tablet, DISSOLVE ONE TABLET ON THE TONGUE  EVERY 8 HOURS AS NEEDED FOR NAUSEA OR VOMITING, Disp: 20 tablet, Rfl: 1    pantoprazole (PROTONIX) 40 MG EC tablet, Take 1 tablet by mouth As Needed., Disp: , Rfl:     rosuvastatin (CRESTOR) 20 MG tablet, Take 1 tablet by mouth Daily., Disp: 30 tablet, Rfl: 4    Semaglutide,0.25 or 0.5MG/DOS, (Ozempic, 0.25 or 0.5 MG/DOSE,) 2 MG/1.5ML solution pen-injector, Inject 0.25 mg under the skin into the appropriate area as directed 1 (One) Time Per Week., Disp: 3 mL, Rfl: 0    vitamin E 100 UNIT capsule, Take 1 capsule by mouth Daily., Disp: , Rfl:     apixaban (ELIQUIS) 5 MG tablet tablet, Take 1 tablet by mouth 2 (Two) Times a Day., Disp: 14 tablet, Rfl: 0      Physical Exam:  I have reviewed the patient's current vital signs as documented in the patient's EMR.   Vitals:    04/09/24 1308   BP: 128/70   Pulse: 70   SpO2: 96%     Body mass index is 46.27 kg/m².       04/09/24  1308   Weight: 115 kg (253 lb)      Physical Exam  Constitutional:       General: She is not in acute distress.     Appearance: Normal appearance. She is well-developed and normal weight.   HENT:      Head: Normocephalic and atraumatic.   Eyes:      General: Lids are normal.      Conjunctiva/sclera: Conjunctivae normal.      Pupils: Pupils are equal, round, and reactive to light.   Neck:      Vascular: No carotid bruit or JVD.   Cardiovascular:      Rate and Rhythm: Normal rate and regular rhythm.      Pulses: Normal pulses.      Heart sounds: Normal heart sounds, S1 normal and S2 normal. No murmur heard.  Pulmonary:      Effort: Pulmonary effort is normal. No respiratory distress.      Breath sounds: Normal breath sounds. No wheezing.   Abdominal:      General: Bowel sounds are normal. There is no distension.      Palpations: Abdomen is soft. There is no hepatomegaly or splenomegaly.      Tenderness: There is no abdominal tenderness.   Musculoskeletal:         General: No swelling. Normal range of motion.      Cervical back: Normal range of motion  and neck supple.      Right lower leg: No edema.      Left lower leg: No edema.   Skin:     General: Skin is warm and dry.      Coloration: Skin is not jaundiced.      Findings: No rash.   Neurological:      General: No focal deficit present.      Mental Status: She is alert and oriented to person, place, and time. Mental status is at baseline.   Psychiatric:         Mood and Affect: Mood normal.         Speech: Speech normal.         Behavior: Behavior normal.         Thought Content: Thought content normal.         Judgment: Judgment normal.            DATA REVIEWED:     TTE/MAGI:  Results for orders placed during the hospital encounter of 06/11/23    Adult Transthoracic Echo Complete w/ Color, Spectral and Contrast if necessary per protocol    Interpretation Summary    Left ventricular systolic function is normal. Left ventricular ejection fraction appears to be 61 - 65%.    Left ventricular diastolic function is consistent with (grade II w/high LAP) pseudonormalization.    The left atrial cavity is mildly dilated.    The right atrial cavity is mildly  dilated.    Estimated right ventricular systolic pressure from tricuspid regurgitation is normal (<35 mmHg).      Laboratory evaluations:    Lab Results   Component Value Date    GLUCOSE 227 (H) 03/07/2024    BUN 10 03/07/2024    CREATININE 0.84 03/07/2024    EGFRIFNONA 54 (L) 01/11/2022    BCR 11.9 03/07/2024    K 4.2 03/07/2024    CO2 23.1 03/07/2024    CALCIUM 9.3 03/07/2024    ALBUMIN 3.5 03/07/2024    LABIL2 1.0 (L) 05/28/2016    AST 30 03/07/2024    ALT 17 03/07/2024     Lab Results   Component Value Date    WBC 6.22 03/07/2024    HGB 13.9 03/07/2024    HCT 43.3 03/07/2024    .3 (H) 03/07/2024     03/07/2024     Lab Results   Component Value Date    CHOL 228 (H) 03/07/2024    CHLPL 171 01/04/2019    TRIG 180 (H) 03/07/2024    HDL 51 03/07/2024     (H) 03/07/2024     Lab Results   Component Value Date    TSH 1.600 03/07/2024     Lab Results  "  Component Value Date    HGBA1C 8.50 (H) 03/07/2024     Lab Results   Component Value Date    ALT 17 03/07/2024     Lab Results   Component Value Date    HGBA1C 8.50 (H) 03/07/2024    HGBA1C 9.30 (H) 09/22/2023    HGBA1C 10.10 (H) 06/11/2023     Lab Results   Component Value Date    MICROALBUR 1.8 03/07/2024    CREATININE 0.84 03/07/2024     No results found for: \"IRON\", \"TIBC\", \"FERRITIN\"  Lab Results   Component Value Date    INR 1.07 06/11/2023    INR 1.00 01/19/2023    INR 1.06 12/17/2022    PROTIME 14.4 06/11/2023    PROTIME 13.4 01/19/2023    PROTIME 14.0 12/17/2022           --------------------------------------------------------------------------------------------------------------------------    ASSESSMENT/PLAN:      Diagnosis Plan   1. Mixed hyperlipidemia  Ambulatory Referral to Disease State Management      2. Atrial flutter, unspecified type        3. Essential hypertension        4. Paroxysmal atrial fibrillation        5. Presence of cardiac pacemaker            Atrial fibrillation  SVT  Currently normal sinus rhythm.  Had successful SVT ablation in 2023 with no further SVT events.  Continue with Eliquis for stroke prevention.  Patient states Eliquis is very expensive therefore we will help her with applying for patient assistance and supplying her with samples for now.    Permanent pacemaker implantation  Pacemaker interrogated in October 2023 by EP which showed normal function with 8/2-year battery life.  She would like to continue with interrogations through their office    4.  Hypertension  Well-controlled.  Recent lab work shows stable kidney function.  Continue current management.    5.  Hyperlipidemia  Patient has not been taking statin secondary to myalgias.  Recent lipid panel showed elevated LDL in the 180s.  Will refer her to lipid clinic for initiation of PCSK9.      This document has been @Electronically signed by BOSTON Jiménez, 04/09/24, 12:54 PM EDT.       Dictated Utilizing " Dragon Dictation: Part of this note may be an electronic transcription/translation of spoken language to printed text using the Dragon Dictation System.    Follow-up appointment and medication changes provided in hand delivered After Visit Summary as well as reviewed in the room.

## 2024-04-11 ENCOUNTER — SPECIALTY PHARMACY (OUTPATIENT)
Dept: PHARMACY | Facility: HOSPITAL | Age: 69
End: 2024-04-11
Payer: MEDICARE

## 2024-04-11 ENCOUNTER — DISEASE STATE MANAGEMENT VISIT (OUTPATIENT)
Dept: PHARMACY | Facility: HOSPITAL | Age: 69
End: 2024-04-11
Payer: MEDICARE

## 2024-04-11 DIAGNOSIS — E78.2 MODERATE MIXED HYPERLIPIDEMIA NOT REQUIRING STATIN THERAPY: Primary | ICD-10-CM

## 2024-04-11 RX ORDER — CHOLECALCIFEROL (VITAMIN D3) 125 MCG
5 CAPSULE ORAL NIGHTLY PRN
COMMUNITY

## 2024-04-11 NOTE — PROGRESS NOTES
[facility]  Lipid Management Program - PCSK9i       Ronna Wayne is a 69 y.o. female referred to the [facility] by Brenda Singh for clinical pharmacy and specialty pharmacy management of PCSK9i.  Ronna Wayne is  treated for clinical ASCVD (stroke 2017) and hyperlipidemia and currently takes nothing for her cholesterol.  In the past, Pt has tried fish oil, rosuvastatin, and atorvastatin. Statins were discontinued due to myalgias. The patient denies any allergies to latex.      Relevant Past Medical History and Comorbidities  Past Medical History:   Diagnosis Date    Anxiety     B12 deficiency     BPV (benign positional vertigo)     Breast cancer 2021    Diabetes mellitus     Diarrhea     Disease of thyroid gland     Elevated cholesterol     Fibrocystic breast     Fibromyalgia     GERD (gastroesophageal reflux disease)     Glaucoma     Heart murmur     Hyperlipidemia     Hypertension     Kidney disease     Obesity     Peptic ulceration     PONV (postoperative nausea and vomiting)     Primary central sleep apnea     Sleep apnea     c-pap    Stroke     TIA (transient ischemic attack) 2019    Urinary incontinence     Vaginal infection     Weakness of left arm      Social History     Socioeconomic History    Marital status:    Tobacco Use    Smoking status: Former     Current packs/day: 0.00     Average packs/day: 1.5 packs/day for 3.0 years (4.5 ttl pk-yrs)     Types: Cigarettes     Start date: 1973     Quit date: 1976     Years since quittin.3     Passive exposure: Past    Smokeless tobacco: Never    Tobacco comments:     I smoked as a teen and stopped when my son was born   Vaping Use    Vaping status: Never Used   Substance and Sexual Activity    Alcohol use: No     Comment: former social drinker not had anything in 25 + years    Drug use: Never    Sexual activity: Not Currently     Partners: Male     Birth control/protection:  Abstinence, Post-menopausal, Vasectomy       Allergies  Codeine and Sulfa antibiotics    Current Medication List    Current Outpatient Medications:     acetaminophen (TYLENOL) 325 MG tablet, Take 2 tablets by mouth Every 6 (Six) Hours As Needed for Mild Pain., Disp: , Rfl:     anastrozole (ARIMIDEX) 1 MG tablet, Take 1 tablet by mouth Daily., Disp: 30 tablet, Rfl: 11    apixaban (ELIQUIS) 5 MG tablet tablet, Take 1 tablet by mouth 2 (Two) Times a Day., Disp: 14 tablet, Rfl: 0    apixaban (ELIQUIS) 5 MG tablet tablet, Take 1 tablet by mouth 2 (Two) Times a Day., Disp: 60 tablet, Rfl: 6    aspirin 81 MG chewable tablet, Chew & swallow 1 tablet Daily., Disp: 30 tablet, Rfl: 0    cholecalciferol (VITAMIN D3) 25 MCG (1000 UT) tablet, Take 2 tablets by mouth Daily., Disp: , Rfl:     Continuous Blood Gluc  (Dexcom G7 ) device, 1 Device Daily., Disp: 1 each, Rfl: 0    Continuous Blood Gluc Sensor (Dexcom G7 Sensor) misc, 1 Device 90 Minutes Prior to Surgery for 1 dose., Disp: 9 each, Rfl: 3    dexlansoprazole (DEXILANT) 60 MG capsule, Take  by mouth Daily., Disp: , Rfl:     Diclofenac Sodium (VOLTAREN) 1 % gel gel, Apply 4 g topically to the appropriate area as directed 3 (Three) Times a Day As Needed (joint pain)., Disp: 350 g, Rfl: 2    docusate sodium (Colace) 100 MG capsule, Take 1 capsule by mouth 2 (Two) Times a Day., Disp: 60 capsule, Rfl: 1    empagliflozin (Jardiance) 25 MG tablet tablet, Take 1 tablet by mouth Every Morning., Disp: 30 tablet, Rfl: 3    fluconazole (DIFLUCAN) 150 MG tablet, Take 1 tablet by mouth 1 (One) Time., Disp: , Rfl:     furosemide (LASIX) 20 MG tablet, TAKE 1 TABLET BY MOUTH DAILY, Disp: 90 tablet, Rfl: 0    hydroCHLOROthiazide (MICROZIDE) 12.5 MG capsule, 1 po qhs, Disp: , Rfl:     insulin aspart (novoLOG FLEXPEN) 100 UNIT/ML solution pen-injector sc pen, Inject 0-9 Units under the skin into the appropriate area as directed 3 (Three) Times a Day With Meals. Admelog Solostar  sliding scale, Disp: , Rfl:     Insulin Glargine, 1 Unit Dial, (Toujeo SoloStar) 300 UNIT/ML solution pen-injector injection, Inject  under the skin into the appropriate area as directed., Disp: , Rfl:     Insulin Pen Needle 32G X 5 MM misc, Use 1 each 3 (Three) Times a Day., Disp: 100 each, Rfl: 5    levothyroxine (SYNTHROID, LEVOTHROID) 88 MCG tablet, TAKE 1 TABLET BY MOUTH DAILY, Disp: 90 tablet, Rfl: 0    linaclotide (Linzess) 72 MCG capsule capsule, Take 1 capsule by mouth Every Morning Before Breakfast., Disp: 30 capsule, Rfl: 0    linaGLIPtin-metFORMIN HCl (Jentadueto) 2.5-1000 MG tablet, Take  by mouth 2 (Two) Times a Day., Disp: , Rfl:     losartan (Cozaar) 25 MG tablet, Take 1 tablet by mouth Daily., Disp: 90 tablet, Rfl: 1    Magnesium Oxide -Mg Supplement (MAGnesium-Oxide) 400 (240 Mg) MG tablet, Take 1 tablet by mouth 3 (Three) Times a Day., Disp: 90 tablet, Rfl: 0    metoprolol tartrate (LOPRESSOR) 25 MG tablet, TAKE 1 TABLET BY MOUTH TWICE A DAY, Disp: 180 tablet, Rfl: 1    NovoLOG Mix 70/30 (70-30) 100 UNIT/ML injection, , Disp: , Rfl:     ondansetron ODT (ZOFRAN-ODT) 4 MG disintegrating tablet, DISSOLVE ONE TABLET ON THE TONGUE EVERY 8 HOURS AS NEEDED FOR NAUSEA OR VOMITING, Disp: 20 tablet, Rfl: 1    pantoprazole (PROTONIX) 40 MG EC tablet, Take 1 tablet by mouth As Needed., Disp: , Rfl:     rosuvastatin (CRESTOR) 20 MG tablet, Take 1 tablet by mouth Daily., Disp: 30 tablet, Rfl: 4    Semaglutide,0.25 or 0.5MG/DOS, (Ozempic, 0.25 or 0.5 MG/DOSE,) 2 MG/1.5ML solution pen-injector, Inject 0.25 mg under the skin into the appropriate area as directed 1 (One) Time Per Week., Disp: 3 mL, Rfl: 0    vitamin E 100 UNIT capsule, Take 1 capsule by mouth Daily., Disp: , Rfl:     Drug Interactions  NO significant drug interactions with Repatha.    Relevant Laboratory Values  Lab Results   Component Value Date    CHOL 228 (H) 03/07/2024    CHLPL 171 01/04/2019    TRIG 180 (H) 03/07/2024    HDL 51 03/07/2024      (H) 03/07/2024       Medication Assessment & Plan    Patient started today on Repatha Sureclick. Injection training and medication education provided.     Pt administered Repatha Sureclick on lower LEFT side of abdomen.    Patient will need lipid panel in 6 weeks, order placed.     Patient will continue regular follow-up with cardiology.     Will follow up with specialty pharmacy for next injection    Mailing Address:  [address] [place] [id number]    Will follow-up in 6 months, or sooner if needed.     Jackelyn Rae RPH  4/11/2024  13:08 EDT

## 2024-04-11 NOTE — PROGRESS NOTES
REPATHA® (evolocumab)  Medication Expectations   Why am I taking this medication? You are taking Repatha to lower your “bad” cholesterol (LDL-C). This medication can be used in adults with high blood cholesterol including primary hyperlipidemia and familial hypercholesterolemia.    What should I expect while on this medication? You should expect to see your cholesterol improve over time. Specifically, you should see your LDL-C decrease.    How does the medication work? Repatha works by blocking a protein called PCSK9 that contributes to high levels of bad cholesterol. It helps increase your liver's ability to remove bad cholesterol from your blood.     How long will I be on this medication for? The amount of time you will be on this medication will be determined by your doctor based on your cholesterol and/or your risk of having a cardiac event. You will most likely be on this medication or another cholesterol medication throughout your lifetime. Do not abruptly stop this medication without talking to your doctor first.    How do I take this medication? Take as directed on your prescription label. Repatha is injected under the skin (subcutaneously) of your stomach, thigh, or upper arm. This medication is usually given one or twice a month.   What are some possible side effects? The most common side effects of Repatha include redness, itching, swelling, or pain/tenderness at the injection site, symptoms of the common cold, flu or flu-like symptoms or back pain.    What happens if I miss a dose? If you miss a dose, take it as soon as you remember if it is within 7 days from the usual day of administration then resume your original schedule. If it is beyond 7 days and you use the gideon-2-week dose, skip the missed dose and resume your normal dosing schedule.If it is beyond 7 days and you use the once-monthly dose, inject the dose and start a new schedule based on that date.      Medication Safety   What are things I  should warn my doctor immediately about? Talk to your doctor if you are pregnant, planning to become pregnant, or breastfeeding. Stop the medication and tell your doctor or seek emergency medical help if you notice any signs/symptoms of an allergic reaction (severe rash, redness, hives, severe itching, trouble breathing, or swelling of the face, lips, or tongue). If you have a rubber or latex allergy, you should not use the Repatha SureClick® Autoinjector pen or the prefilled syringe, please notify your doctor or pharmacist.   What are things that I should be cautious of? Be cautious of any side effects from this medication. Talk to your doctor if any new ones develop or aren't getting better.   What are some medications that can interact with this one? There are no known significant drug interactions with Repatha. Always tell your doctor or pharmacist immediately if you start taking any new medications, including over-the-counter medications, vitamins, and herbal supplements.      Medication Storage/Handling   How should I handle this medication? Do not shake or expose the pens, cartridges, or syringes to extreme heat or direct sunlight. Keep this medication out of reach of pets/children. Allow medication to warm at room temperature prior to administration.   How does this medication need to be stored? Store unused pens, cartridges, or syringes in the refrigerator in the original cartons to protect from light. If needed, Repatha may be kept at room temperature in the original carton for up to 30 days. Do not freeze.    How should I dispose of this medication? All the Repatha devices are single-dose and should be discarded in a sharps container after use. If your doctor decides to stop this medication, take to your local police station for proper disposal. Some pharmacies also have take-back bins for medication drop-off.      Resources/Support   How can I remind myself to take this medication? You can download  reminder apps to help you manage your refills. You may also set an alarm on your phone to remind you to take your dose.    Is financial support available?  [employer] can provide co-pay cards if you have commercial insurance or patient assistance if you have Medicare or no insurance.    Which vaccines are recommended for me? Talk to your doctor about these vaccines: Flu, Coronavirus (COVID-19), Pneumococcal (pneumonia), Tdap, Hepatitis B, Zoster (shingles)

## 2024-04-11 NOTE — PROGRESS NOTES
[facility]  Lipid Management Program - PCSK9i       Ronna Wayne is a 69 y.o. female referred to the [facility] by Brenda Singh for clinical pharmacy and specialty pharmacy management of PCSK9i.  Ronna Wayne is  treated for clinical ASCVD (stroke 2017) and hyperlipidemia and currently takes nothing for her cholesterol.  In the past, Pt has tried fish oil, rosuvastatin, and atorvastatin. Statins were discontinued due to myalgias. The patient denies any allergies to latex.      Relevant Past Medical History and Comorbidities  Past Medical History:   Diagnosis Date    Anxiety     B12 deficiency     BPV (benign positional vertigo)     Breast cancer 2021    Diabetes mellitus     Diarrhea     Disease of thyroid gland     Elevated cholesterol     Fibrocystic breast     Fibromyalgia     GERD (gastroesophageal reflux disease)     Glaucoma     Heart murmur     Hyperlipidemia     Hypertension     Kidney disease     Obesity     Peptic ulceration     PONV (postoperative nausea and vomiting)     Primary central sleep apnea     Sleep apnea     c-pap    Stroke     TIA (transient ischemic attack) 2019    Urinary incontinence     Vaginal infection     Weakness of left arm      Social History     Socioeconomic History    Marital status:    Tobacco Use    Smoking status: Former     Current packs/day: 0.00     Average packs/day: 1.5 packs/day for 3.0 years (4.5 ttl pk-yrs)     Types: Cigarettes     Start date: 1973     Quit date: 1976     Years since quittin.3     Passive exposure: Past    Smokeless tobacco: Never    Tobacco comments:     I smoked as a teen and stopped when my son was born   Vaping Use    Vaping status: Never Used   Substance and Sexual Activity    Alcohol use: No     Comment: former social drinker not had anything in 25 + years    Drug use: Never    Sexual activity: Not Currently     Partners: Male     Birth control/protection:  Abstinence, Post-menopausal, Vasectomy       Allergies  Codeine and Sulfa antibiotics    Current Medication List    Current Outpatient Medications:     acetaminophen (TYLENOL) 325 MG tablet, Take 2 tablets by mouth Every 6 (Six) Hours As Needed for Mild Pain., Disp: , Rfl:     anastrozole (ARIMIDEX) 1 MG tablet, Take 1 tablet by mouth Daily., Disp: 30 tablet, Rfl: 11    apixaban (ELIQUIS) 5 MG tablet tablet, Take 1 tablet by mouth 2 (Two) Times a Day., Disp: 60 tablet, Rfl: 6    aspirin 81 MG chewable tablet, Chew & swallow 1 tablet Daily., Disp: 30 tablet, Rfl: 0    cholecalciferol (VITAMIN D3) 25 MCG (1000 UT) tablet, Take 2 tablets by mouth Daily., Disp: , Rfl:     Continuous Blood Gluc  (Dexcom G7 ) device, 1 Device Daily., Disp: 1 each, Rfl: 0    Continuous Blood Gluc Sensor (Dexcom G7 Sensor) misc, 1 Device 90 Minutes Prior to Surgery for 1 dose., Disp: 9 each, Rfl: 3    dexlansoprazole (DEXILANT) 60 MG capsule, Take  by mouth Daily., Disp: , Rfl:     Diclofenac Sodium (VOLTAREN) 1 % gel gel, Apply 4 g topically to the appropriate area as directed 3 (Three) Times a Day As Needed (joint pain)., Disp: 350 g, Rfl: 2    empagliflozin (Jardiance) 25 MG tablet tablet, Take 1 tablet by mouth Every Morning., Disp: 30 tablet, Rfl: 3    Evolocumab (REPATHA) solution auto-injector SureClick injection, Inject 1 mL under the skin into the appropriate area as directed Every 14 (Fourteen) Days., Disp: 2 mL, Rfl: 5    furosemide (LASIX) 20 MG tablet, TAKE 1 TABLET BY MOUTH DAILY, Disp: 90 tablet, Rfl: 0    insulin aspart (novoLOG FLEXPEN) 100 UNIT/ML solution pen-injector sc pen, Inject 0-9 Units under the skin into the appropriate area as directed 3 (Three) Times a Day With Meals. Admelog Solostar sliding scale, Disp: , Rfl:     Insulin Glargine, 1 Unit Dial, (Toujeo SoloStar) 300 UNIT/ML solution pen-injector injection, Inject 34 Units under the skin into the appropriate area as directed Every Morning.,  Disp: , Rfl:     Insulin Pen Needle 32G X 5 MM misc, Use 1 each 3 (Three) Times a Day., Disp: 100 each, Rfl: 5    levothyroxine (SYNTHROID, LEVOTHROID) 88 MCG tablet, TAKE 1 TABLET BY MOUTH DAILY, Disp: 90 tablet, Rfl: 0    linaclotide (Linzess) 72 MCG capsule capsule, Take 1 capsule by mouth Every Morning Before Breakfast., Disp: 30 capsule, Rfl: 0    linaGLIPtin-metFORMIN HCl (Jentadueto) 2.5-1000 MG tablet, Take  by mouth 2 (Two) Times a Day., Disp: , Rfl:     losartan (Cozaar) 25 MG tablet, Take 1 tablet by mouth Daily., Disp: 90 tablet, Rfl: 1    Magnesium Oxide -Mg Supplement (MAGnesium-Oxide) 400 (240 Mg) MG tablet, Take 1 tablet by mouth 3 (Three) Times a Day., Disp: 90 tablet, Rfl: 0    melatonin 5 MG tablet tablet, Take 1 tablet by mouth At Night As Needed., Disp: , Rfl:     metoprolol tartrate (LOPRESSOR) 25 MG tablet, TAKE 1 TABLET BY MOUTH TWICE A DAY, Disp: 180 tablet, Rfl: 1    ondansetron ODT (ZOFRAN-ODT) 4 MG disintegrating tablet, DISSOLVE ONE TABLET ON THE TONGUE EVERY 8 HOURS AS NEEDED FOR NAUSEA OR VOMITING, Disp: 20 tablet, Rfl: 1    Semaglutide,0.25 or 0.5MG/DOS, (Ozempic, 0.25 or 0.5 MG/DOSE,) 2 MG/1.5ML solution pen-injector, Inject 0.25 mg under the skin into the appropriate area as directed 1 (One) Time Per Week. (Patient taking differently: Inject 1 mg under the skin into the appropriate area as directed 1 (One) Time Per Week.), Disp: 3 mL, Rfl: 0    Vibegron (Gemtesa) 75 MG tablet, Take 1 tablet by mouth Every Night for 30 days., Disp: 30 tablet, Rfl: 6    vitamin E 100 UNIT capsule, Take 1 capsule by mouth Daily., Disp: , Rfl:     Drug Interactions  NO significant drug interactions with Repatha.    Relevant Laboratory Values  Lab Results   Component Value Date    CHOL 228 (H) 03/07/2024    CHLPL 171 01/04/2019    TRIG 180 (H) 03/07/2024    HDL 51 03/07/2024     (H) 03/07/2024       Medication Assessment & Plan    Patient started today on Repatha Sureclick. Injection training and  medication education provided.     Pt administered Repatha Sureclick on lower LEFT side of abdomen.    Patient will need lipid panel in 6 weeks, order placed.     Patient will continue regular follow-up with cardiology.     Will follow up with specialty pharmacy for next injection    Mailing Address:  [address] [place] [id number]    Will follow-up in 6 months, or sooner if needed.     Jackelyn Rae RPH  4/11/2024  17:19 EDT

## 2024-04-16 DIAGNOSIS — I48.92 ATRIAL FLUTTER, UNSPECIFIED TYPE: ICD-10-CM

## 2024-04-16 RX ORDER — FUROSEMIDE 20 MG/1
20 TABLET ORAL DAILY
Qty: 90 TABLET | Refills: 0 | Status: SHIPPED | OUTPATIENT
Start: 2024-04-16

## 2024-04-16 NOTE — TELEPHONE ENCOUNTER
Caller: HIREN         Callback number:  [phone]   Is it ok to leave a message: [x] Yes [] No    Requested medication for samples:  ELIQUIS    How much medication does the patient currently have left: 0    Who will be picking up the samples: SELF

## 2024-04-18 ENCOUNTER — LAB (OUTPATIENT)
Dept: FAMILY MEDICINE CLINIC | Facility: CLINIC | Age: 69
End: 2024-04-18
Payer: MEDICARE

## 2024-04-18 DIAGNOSIS — E11.65 TYPE 2 DIABETES MELLITUS WITH HYPERGLYCEMIA, WITH LONG-TERM CURRENT USE OF INSULIN: Chronic | ICD-10-CM

## 2024-04-18 DIAGNOSIS — E83.42 HYPOMAGNESEMIA: ICD-10-CM

## 2024-04-18 DIAGNOSIS — Z79.4 TYPE 2 DIABETES MELLITUS WITH HYPERGLYCEMIA, WITH LONG-TERM CURRENT USE OF INSULIN: Chronic | ICD-10-CM

## 2024-04-18 PROCEDURE — 36415 COLL VENOUS BLD VENIPUNCTURE: CPT | Performed by: NURSE PRACTITIONER

## 2024-04-18 PROCEDURE — 83735 ASSAY OF MAGNESIUM: CPT | Performed by: NURSE PRACTITIONER

## 2024-04-18 PROCEDURE — 80053 COMPREHEN METABOLIC PANEL: CPT | Performed by: NURSE PRACTITIONER

## 2024-04-19 LAB
ALBUMIN SERPL-MCNC: 3.7 G/DL (ref 3.5–5.2)
ALBUMIN/GLOB SERPL: 1.1 G/DL
ALP SERPL-CCNC: 92 U/L (ref 39–117)
ALT SERPL W P-5'-P-CCNC: 19 U/L (ref 1–33)
ANION GAP SERPL CALCULATED.3IONS-SCNC: 15.2 MMOL/L (ref 5–15)
AST SERPL-CCNC: 37 U/L (ref 1–32)
BILIRUB SERPL-MCNC: 0.8 MG/DL (ref 0–1.2)
BUN SERPL-MCNC: 13 MG/DL (ref 8–23)
BUN/CREAT SERPL: 14.1 (ref 7–25)
CALCIUM SPEC-SCNC: 9.2 MG/DL (ref 8.6–10.5)
CHLORIDE SERPL-SCNC: 100 MMOL/L (ref 98–107)
CO2 SERPL-SCNC: 21.8 MMOL/L (ref 22–29)
CREAT SERPL-MCNC: 0.92 MG/DL (ref 0.57–1)
EGFRCR SERPLBLD CKD-EPI 2021: 67.5 ML/MIN/1.73
GLOBULIN UR ELPH-MCNC: 3.5 GM/DL
GLUCOSE SERPL-MCNC: 213 MG/DL (ref 65–99)
MAGNESIUM SERPL-MCNC: 1.9 MG/DL (ref 1.6–2.4)
POTASSIUM SERPL-SCNC: 4.1 MMOL/L (ref 3.5–5.2)
PROT SERPL-MCNC: 7.2 G/DL (ref 6–8.5)
SODIUM SERPL-SCNC: 137 MMOL/L (ref 136–145)

## 2024-04-19 RX ORDER — LANOLIN ALCOHOL/MO/W.PET/CERES
1 CREAM (GRAM) TOPICAL 3 TIMES DAILY
Qty: 90 TABLET | Refills: 3 | Status: SHIPPED | OUTPATIENT
Start: 2024-04-19

## 2024-04-22 ENCOUNTER — PATIENT OUTREACH (OUTPATIENT)
Dept: CASE MANAGEMENT | Facility: OTHER | Age: 69
End: 2024-04-22
Payer: MEDICARE

## 2024-04-22 NOTE — OUTREACH NOTE
AMBULATORY CASE MANAGEMENT NOTE    Names and Relationships of Patient and/or Support Persons:   Outreach to: Ronna Wayne; Relationship: Self - [phone]    Patient Outreach    RN-ACM outreach with patient engaged in HR for education and support with complex care.    Engagement goals (30D)  Wellness & Disease Overview  Self-Management  When To Seek Medical Attention  Updated CP, Goals & Targets     Goals Addressed                   This Visit's Progress     Monitor and Manage My Blood Sugar   On track     Follow Up Date 04/22/2024      - check blood sugar at prescribed times      Why is this important?    Checking your blood sugar at home helps to keep it from getting very high or very low.   Writing the results in a diary or log helps the doctor know how to care for you.   Your blood sugar log should have the time, date and the results.   Also, write down the amount of insulin or other medicine that you take.   Other information, like what you ate, exercise done and how you were feeling, will also be helpful.       Notes:     Patient reported fbg averages of 160-170.       Obtain Eye Exam   On track     Follow Up Date 04/22/2024      - schedule appointment with eye doctor      Why is this important?    Eye check-ups are important when you have diabetes.   Vision loss can be prevented.      Notes:     Patient voiced awareness of benefits of yearly eye exam.       Perform Foot Care        Follow Up Date 04/22/2024      - not discussed during this outreach      Why is this important?    Good foot care is very important when you have diabetes.   There are many things you can do to keep your feet healthy and catch a problem early.      Notes:        Set My Target A1C   On track     Follow Up Date 04/22/2024      - set target A1C      Why is this important?    Your target A1C is decided together by you and your doctor.   It is based on several things like your age and other health issues.      Notes:      Patient voiced awareness that last A1C was above goal.  Patient reported efforts to improve overall blood glucose control.     A1C Last 3 Results          6/11/2023    03:08 9/22/2023    09:20 3/7/2024    09:46   HGBA1C Last 3 Results   Hemoglobin A1C 10.10  9.30  8.50                 Recently Modified Care Plans Updates made since 3/22/2024 12:00 AM       Diabetes Type 2 (Adult)           Problem Priority Last Modified     Glycemic Management (Diabetes, Type 2) --  3/19/2024 11:28 AM by Rosa Prather RN              Goal Recent Progress Last Modified     Glycemic Management Optimized --  3/19/2024 11:28 AM by Rosa Prather, KADEN                Task Due Date Last Modified     Alleviate Barriers to Glycemic Management --  4/22/2024 10:53 AM by Rosa Prather, KADEN     Care Management Activities:      - A1C testing facilitated  - blood glucose monitoring encouraged  - blood glucose readings reviewed  - mutual A1C goal set or reviewed                                Education Documentation  Healthy Food Choices, taught by Rosa Prather, RN at 4/22/2024 10:53 AM.  Learner: Patient  Readiness: Acceptance  Method: Explanation  Response: Verbalizes Understanding    Importance of Glycemic Management, taught by Rosa Prather, KADEN at 4/22/2024 10:53 AM.  Learner: Patient  Readiness: Acceptance  Method: Explanation  Response: Verbalizes Understanding    Blood Glucose Goal, taught by Rosa Prather, KADEN at 4/22/2024 10:53 AM.  Learner: Patient  Readiness: Acceptance  Method: Explanation  Response: Verbalizes Understanding        Future Appointments         Provider Department Center    5/21/2024 2:00 PM [facility] CARE MAMM 2 [facility] BREAST CARE COR    5/28/2024 11:00 AM Lee Parrish MD [facility] GENERAL SURGERY [facility]    6/7/2024 2:45 PM Israel Steele DO [facility] CARDIOLOGY [facility]    7/12/2024 1:45 PM Mague Acosta APRN [facility]  MEDICAL GROUP FAMILY MEDICINE [facility]    7/16/2024 1:15 PM Brenda Singh APRN [facility] CARDIOLOGY COR    8/9/2024 1:00 PM (Arrive by 12:30 PM) LEYDI Teresa MD [facility] HEMATOLOGY & ONCOLOGY COR    8/27/2024 2:00 PM Cheng-Akwa, Griselda, APRN [facility] GASTROENTEROLOGY & UROLOGY [facility]  Ambulatory Case Management    4/22/2024, 10:55 EDT

## 2024-04-30 ENCOUNTER — TELEPHONE (OUTPATIENT)
Dept: FAMILY MEDICINE CLINIC | Facility: CLINIC | Age: 69
End: 2024-04-30
Payer: MEDICARE

## 2024-04-30 NOTE — TELEPHONE ENCOUNTER
Spoke with patient and let her know that we received her Novolog from the patient assistance program. She said she will pick it up today.

## 2024-05-03 ENCOUNTER — SPECIALTY PHARMACY (OUTPATIENT)
Dept: PHARMACY | Facility: HOSPITAL | Age: 69
End: 2024-05-03
Payer: MEDICARE

## 2024-05-20 ENCOUNTER — LAB (OUTPATIENT)
Dept: LAB | Facility: HOSPITAL | Age: 69
End: 2024-05-20
Payer: MEDICARE

## 2024-05-20 DIAGNOSIS — E78.2 MODERATE MIXED HYPERLIPIDEMIA NOT REQUIRING STATIN THERAPY: ICD-10-CM

## 2024-05-20 LAB
CHOLEST SERPL-MCNC: 137 MG/DL (ref 0–200)
HDLC SERPL-MCNC: 63 MG/DL (ref 40–60)
LDLC SERPL CALC-MCNC: 51 MG/DL (ref 0–100)
LDLC/HDLC SERPL: 0.76 {RATIO}
TRIGL SERPL-MCNC: 132 MG/DL (ref 0–150)
VLDLC SERPL-MCNC: 23 MG/DL (ref 5–40)

## 2024-05-20 PROCEDURE — 36415 COLL VENOUS BLD VENIPUNCTURE: CPT

## 2024-05-20 PROCEDURE — 80061 LIPID PANEL: CPT

## 2024-06-03 ENCOUNTER — TELEPHONE (OUTPATIENT)
Dept: FAMILY MEDICINE CLINIC | Facility: CLINIC | Age: 69
End: 2024-06-03

## 2024-06-03 NOTE — TELEPHONE ENCOUNTER
Caller: [facility]    Relationship: Other    Best call back number: [phone]     What form or medical record are you requesting: VISIT NOTES FROM 03.12.24 SIGNED BY THE PROVIDER    Who is requesting this form or medical record from you: [facility]    How would you like to receive the form or medical records (pick-up, mail, fax): FAX    If fax, what is the fax number: [phone]    Timeframe paperwork needed: ASAP

## 2024-06-05 ENCOUNTER — HOSPITAL ENCOUNTER (OUTPATIENT)
Dept: MAMMOGRAPHY | Facility: HOSPITAL | Age: 69
Discharge: HOME | End: 2024-06-05
Payer: MEDICARE

## 2024-06-05 DIAGNOSIS — Z17.0 MALIGNANT NEOPLASM OF UPPER-OUTER QUADRANT OF RIGHT BREAST IN FEMALE, ESTROGEN RECEPTOR POSITIVE: ICD-10-CM

## 2024-06-05 DIAGNOSIS — C50.411 MALIGNANT NEOPLASM OF UPPER-OUTER QUADRANT OF RIGHT BREAST IN FEMALE, ESTROGEN RECEPTOR POSITIVE: ICD-10-CM

## 2024-06-05 PROCEDURE — G0279 TOMOSYNTHESIS, MAMMO: HCPCS

## 2024-06-05 PROCEDURE — 77066 DX MAMMO INCL CAD BI: CPT

## 2024-06-07 ENCOUNTER — OFFICE VISIT (OUTPATIENT)
Dept: CARDIOLOGY | Facility: CLINIC | Age: 69
End: 2024-06-07
Payer: MEDICARE

## 2024-06-07 ENCOUNTER — PATIENT OUTREACH (OUTPATIENT)
Dept: CASE MANAGEMENT | Facility: OTHER | Age: 69
End: 2024-06-07
Payer: MEDICARE

## 2024-06-07 VITALS
DIASTOLIC BLOOD PRESSURE: 82 MMHG | SYSTOLIC BLOOD PRESSURE: 136 MMHG | OXYGEN SATURATION: 96 % | HEIGHT: 63 IN | HEART RATE: 80 BPM | BODY MASS INDEX: 43.41 KG/M2 | WEIGHT: 245 LBS

## 2024-06-07 DIAGNOSIS — I48.0 PAROXYSMAL ATRIAL FIBRILLATION: ICD-10-CM

## 2024-06-07 DIAGNOSIS — I49.5 SINUS NODE DYSFUNCTION: ICD-10-CM

## 2024-06-07 DIAGNOSIS — Z95.0 PRESENCE OF CARDIAC PACEMAKER: ICD-10-CM

## 2024-06-07 DIAGNOSIS — I47.10 SVT (SUPRAVENTRICULAR TACHYCARDIA): Primary | ICD-10-CM

## 2024-06-07 DIAGNOSIS — I10 ESSENTIAL HYPERTENSION: ICD-10-CM

## 2024-06-07 DIAGNOSIS — G47.33 OBSTRUCTIVE SLEEP APNEA SYNDROME: ICD-10-CM

## 2024-06-07 DIAGNOSIS — Z79.01 CHRONIC ANTICOAGULATION: ICD-10-CM

## 2024-06-07 NOTE — LETTER
June 7, 2024     BOSTON Joseph  [address] [facility] [address] [facility] [address]    Patient: Ronna Wayne   YOB: 1955   Date of Visit: 6/7/2024     Dear BOSTON Joseph:       Thank you for referring Ronna Wayne to me for evaluation. Below are the relevant portions of my assessment and plan of care.    If you have questions, please do not hesitate to call me. I look forward to following Ronna along with you.         Sincerely,        Israel Steele,         CC: No Recipients    Jonathan Garcia PA  06/07/24 1511  Sign when Signing Visit          Encounter Date:06/07/2024      Patient ID: Ronna Wayne is a 69 y.o. female.    Mague Acosta APRN    Cheif Complaint EP: Atrial Fibrillation, Sinus node dysfunction, and Pacemaker check    PROBLEM LIST:  Patient Active Problem List    Diagnosis Date Noted   • Sinus node dysfunction 07/03/2023     Priority: High     Note Last Updated: 6/7/2024     [employer] dual-chamber permanent pacemaker     • Paroxysmal atrial fibrillation 02/06/2023     Priority: High   • 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     Priority: High   • Atrial flutter 01/04/2023     Priority: High   • SVT (supraventricular tachycardia) 05/02/2021     Priority: High     Note Last Updated: 10/27/2023     EP study with catheter ablation of SVT due to AV node reentry, 7/21/2023     • Presence of cardiac pacemaker 04/28/2023     Priority: Medium   • Nonrheumatic aortic valve stenosis 01/04/2019     Priority: Medium   • Chronic anticoagulation 10/27/2023     Priority: Low   • Essential hypertension 05/27/2016     Priority: Low   • Obstructive sleep apnea syndrome 05/27/2016     Priority: Low   • Morbid obesity 05/27/2016     Priority: Low   • History of recurrent UTIs 02/27/2024   • Skin lesion 02/14/2024   • Frequency of micturition 11/15/2023   • Slow transit constipation 11/15/2023     Note Last Updated: 11/15/2023     - A prescription for Linzess  72 mg was sent to the patient's pharmacy. Samples provided.  - A prescription for MiraLAX was sent to the patient's pharmacy.     • Detrusor instability of bladder 10/10/2023   • Incomplete emptying of bladder 10/10/2023   • Urine incontinence 09/30/2023   • Personal history of colonic polyps 08/10/2021   • Other specified hypothyroidism 07/01/2021   • Malignant neoplasm of upper-outer quadrant of right breast in female, estrogen receptor positive 06/02/2021   • Vitamin D deficiency 05/09/2021   • B12 deficiency    • Weakness of left arm 09/01/2020     Note Last Updated: 9/1/2020     Due to previous cva     • Cerebrovascular accident (CVA) due to embolism 09/01/2020   • TIA (transient ischemic attack) 03/31/2019   • Frequent headaches 05/27/2016   • Type 2 diabetes mellitus with hyperglycemia, with long-term current use of insulin 05/27/2016   • Glaucoma 05/27/2016   • GERD (gastroesophageal reflux disease) 05/27/2016   • Hyperlipidemia 11/20/2015               History of Present Illness  Patient presents today for follow-up with a history of     Allergies   Allergen Reactions   • Codeine GI Intolerance     Increased heart rate     • Sulfa Antibiotics GI Intolerance     Heart rate increase        Current Outpatient Medications   Medication Instructions   • acetaminophen (TYLENOL) 650 mg, Oral, Every 6 Hours PRN   • anastrozole (ARIMIDEX) 1 mg, Oral, Daily   • apixaban (ELIQUIS) 5 mg, Oral, 2 Times Daily   • aspirin 81 MG chewable tablet Chew & swallow 1 tablet Daily.   • cholecalciferol (VITAMIN D3) 2,000 Units, Oral, Daily   • Continuous Blood Gluc  (Dexcom G7 ) device 1 Device, Does not apply, Daily   • Continuous Blood Gluc Sensor (Dexcom G7 Sensor) misc 1 Device, Does not apply, 90 Minutes Pre-Op   • Diclofenac Sodium (VOLTAREN) 4 g, Topical, 3 Times Daily PRN   • empagliflozin (JARDIANCE) 25 mg, Oral, Every Morning   • furosemide (LASIX) 20 mg, Oral, Daily   • insulin aspart (NOVOLOG FLEXPEN)  "0-9 Units, Subcutaneous, 3 Times Daily With Meals, Admelog Solostar sliding scale   • Insulin Pen Needle 32G X 5 MM misc 1 each, Does not apply, 3 Times Daily   • levothyroxine (SYNTHROID, LEVOTHROID) 88 MCG tablet TAKE 1 TABLET BY MOUTH DAILY   • linaclotide (LINZESS) 72 mcg, Oral, Every Morning Before Breakfast   • linaGLIPtin-metFORMIN HCl (Jentadueto) 2.5-1000 MG tablet Oral, 2 Times Daily   • losartan (COZAAR) 25 mg, Oral, Daily   • Magnesium Oxide -Mg Supplement (MAGNESIUM-OXIDE) 400 mg, Oral, 3 Times Daily   • melatonin 5 mg, Oral, Nightly PRN   • metoprolol tartrate (LOPRESSOR) 25 mg, Oral, 2 Times Daily   • ondansetron ODT (ZOFRAN-ODT) 4 MG disintegrating tablet DISSOLVE ONE TABLET ON THE TONGUE EVERY 8 HOURS AS NEEDED FOR NAUSEA OR VOMITING   • Ozempic (0.25 or 0.5 MG/DOSE) 0.25 mg, Subcutaneous, Weekly   • Repatha SureClick 140 mg, Subcutaneous, Every 14 Days   • Toujeo SoloStar 34 Units, Subcutaneous, Every Morning   • vitamin E 100 Units, Oral, Daily       .    Objective:     /82 (BP Location: Left arm, Patient Position: Sitting, Cuff Size: Adult)   Pulse 80   Ht 160 cm (63\")   Wt 111 kg (245 lb)   SpO2 96%   BMI 43.40 kg/m²    Body mass index is 43.4 kg/m².     Vitals reviewed.   Constitutional:       Appearance: Well-developed.   Pulmonary:      Effort: Pulmonary effort is normal. No respiratory distress.      Breath sounds: Normal breath sounds. No wheezing. No rales.      Comments: Bases clear  Chest:      Chest wall: Not tender to palpatation.   Cardiovascular:      Normal rate. Regular rhythm.      Murmurs: There is no murmur.      No gallop.  No click. No rub.   Pulses:     Intact distal pulses.   Edema:     Peripheral edema absent.   Musculoskeletal: Normal range of motion.       Lab Review:     Lab Results   Component Value Date    GLUCOSE 213 (H) 04/18/2024    BUN 13 04/18/2024    CREATININE 0.92 04/18/2024    EGFR 67.5 04/18/2024    BCR 14.1 04/18/2024    K 4.1 04/18/2024    CO2 " 21.8 (L) 04/18/2024    CALCIUM 9.2 04/18/2024    ALBUMIN 3.7 04/18/2024    BILITOT 0.8 04/18/2024    AST 37 (H) 04/18/2024    ALT 19 04/18/2024     Lab Results   Component Value Date    WBC 6.22 03/07/2024    HGB 13.9 03/07/2024    HCT 43.3 03/07/2024    .3 (H) 03/07/2024     03/07/2024     Lab Results   Component Value Date    TSH 1.600 03/07/2024           Procedures               Assessment:      Diagnosis Plan   1. SVT (supraventricular tachycardia)  No known recurrence status post catheter ablation for AV node reentry      2. Sinus node dysfunction  Normal functioning dual-chamber permanent pacemaker with 2% right atrial and 4% RV pacing.  Normal lead parameters and 8+ years battery life remaining      3. Presence of cardiac pacemaker    This patient's Cardiac Implanted Electronic Device was manually interrogated and reprogrammed during the patient encounter today.  Iterative programming changes were manually made to determine the sensing threshold, pacing threshold, lead impedance as well as underlying cardiac rhythm.  These programming changes were not limited to but included some or all of the following when appropriate: pacing mode, programmed AV delays, blanking periods, and refractory periods.  Data obtained as a result of these manual programing changes informed the patient's CIED permanent programming.        4. Paroxysmal atrial fibrillation  Normal sinus rhythm on pacemaker interrogation, no events no mode switches      5. Essential hypertension  Well-managed on current medical regimen      6. Chronic anticoagulation  Well-tolerated.  Continue Eliquis 5 mg twice daily      7. Obstructive sleep apnea syndrome  CPAP compliant        Plan:     Advance Care Planning  ACP discussion was declined by the patient. Patient has an advance directive (not in EMR), copy requested.           Stable cardiac status.  Continue current medications.   in 6 months, sooner as needed.  Thank you for allowing  us to participate in the care of your patient.     Electronically signed by MP Owens, 06/07/24, 3:11 PM EDT.

## 2024-06-07 NOTE — OUTREACH NOTE
AMBULATORY CASE MANAGEMENT NOTE    Names and Relationships of Patient/Support Persons: Caller: Rosana Ronna Álvarez; Relationship: Self -     Patient Outreach      RN-ACM outreach with patient engaged in HRCM for education and assistance with disease management.    Patients last PCP office visit was: 03/12/24  Patient's last specialist visit was: 04/09/24 with Cardiology    Assessment:  New or worsening symptoms since last contact: none    Medication/treatment plan changes since the last contact: Patient completed her annual mammogram at [facility] on 06/05/24.     The patient expressed the following new concerns: No new concerns reported.    ED/UC visits, admissions,etc. since last contact: None    Recommendations/Goals addressed:     Goals Addressed                   This Visit's Progress     Monitor and Manage My Blood Sugar   On track     Patient indicated her Dexcom supplies have been delayed.  She is utilizing a traditional glucometer in the interim and finding that the readings are slightly higher than was averages with the Dexcom.  Per her report, fasting blood glucose levels are averaging 160 +/- with the manual testing.               Upcoming appointments:  Future Appointments         Provider Department Center    6/7/2024 2:45 PM Israel Steele DO [facility] CARDIOLOGY COR    6/12/2024 3:50 PM Lee Parrish MD [facility] GENERAL SURGERY [facility]    7/12/2024 1:45 PM Mague Acosta APRN [facility] FAMILY MEDICINE [place]    7/16/2024 1:15 PM Brenda Singh APRSABINE [facility] CARDIOLOGY COR    8/9/2024 1:00 PM (Arrive by 12:30 PM) LEYDI Teresa MD [facility] HEMATOLOGY & ONCOLOGY [facility]    8/27/2024 2:00 PM Cheng-Akwa, Griselda, APRN [facility] GASTROENTEROLOGY & UROLOGY [facility]            Last clinical bp readings:  BP Readings from Last 3 Encounters:    04/09/24 128/70   03/12/24 120/80   02/27/24 171/79       A1C if applicable:  A1C Last 3 Results          9/22/2023    09:20 3/7/2024    09:46   HGBA1C Last 3 Results   Hemoglobin A1C 9.30  8.50            Rosa DAVIS  Ambulatory Case Management    6/7/2024, 12:46 EDT

## 2024-06-07 NOTE — PROGRESS NOTES
Encounter Date:06/07/2024      Patient ID: Ronna Wayne is a 69 y.o. female.    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     Priority: High    Atrial flutter 01/04/2023     Priority: High    SVT (supraventricular tachycardia) 05/02/2021     Priority: High     Note Last Updated: 10/27/2023     EP study with catheter ablation of SVT due to AV node reentry, 7/21/2023      Presence of cardiac pacemaker 04/28/2023     Priority: Medium    Nonrheumatic aortic valve stenosis 01/04/2019     Priority: Medium    Chronic anticoagulation 10/27/2023     Priority: Low    Essential hypertension 05/27/2016     Priority: Low    Obstructive sleep apnea syndrome 05/27/2016     Priority: Low    Morbid obesity 05/27/2016     Priority: Low    History of recurrent UTIs 02/27/2024    Skin lesion 02/14/2024    Frequency of micturition 11/15/2023    Slow transit constipation 11/15/2023     Note Last Updated: 11/15/2023     - A prescription for Linzess 72 mg was sent to the patient's pharmacy. Samples provided.  - A prescription for MiraLAX was sent to the patient's pharmacy.      Detrusor instability of bladder 10/10/2023    Incomplete emptying of bladder 10/10/2023    Urine incontinence 09/30/2023    Personal history of colonic polyps 08/10/2021    Other specified hypothyroidism 07/01/2021    Malignant neoplasm of upper-outer quadrant of right breast in female, estrogen receptor positive 06/02/2021    Vitamin D deficiency 05/09/2021    B12 deficiency     Weakness of left arm 09/01/2020     Note Last Updated: 9/1/2020     Due to previous cva       Cerebrovascular accident (CVA) due to embolism 09/01/2020    TIA (transient ischemic attack) 03/31/2019    Frequent headaches 05/27/2016    Type 2 diabetes mellitus with hyperglycemia, with long-term current use of insulin 05/27/2016    Glaucoma 05/27/2016    GERD (gastroesophageal reflux disease) 05/27/2016    Hyperlipidemia 11/20/2015               History of Present Illness  Patient presents today for follow-up with a history of     Allergies   Allergen Reactions    Codeine GI Intolerance     Increased heart rate      Sulfa Antibiotics GI Intolerance     Heart rate increase        Current Outpatient Medications   Medication Instructions    acetaminophen (TYLENOL) 650 mg, Oral, Every 6 Hours PRN    anastrozole (ARIMIDEX) 1 mg, Oral, Daily    apixaban (ELIQUIS) 5 mg, Oral, 2 Times Daily    aspirin 81 MG chewable tablet Chew & swallow 1 tablet Daily.    cholecalciferol (VITAMIN D3) 2,000 Units, Oral, Daily    Continuous Blood Gluc  (Dexcom G7 ) device 1 Device, Does not apply, Daily    Continuous Blood Gluc Sensor (Dexcom G7 Sensor) misc 1 Device, Does not apply, 90 Minutes Pre-Op    Diclofenac Sodium (VOLTAREN) 4 g, Topical, 3 Times Daily PRN    empagliflozin (JARDIANCE) 25 mg, Oral, Every Morning    furosemide (LASIX) 20 mg, Oral, Daily    insulin aspart (NOVOLOG FLEXPEN) 0-9 Units, Subcutaneous, 3 Times Daily With Meals, Admelog Solostar sliding scale    Insulin Pen Needle 32G X 5 MM misc 1 each, Does not apply, 3 Times Daily    levothyroxine (SYNTHROID, LEVOTHROID) 88 MCG tablet TAKE 1 TABLET BY MOUTH DAILY    linaclotide (LINZESS) 72 mcg, Oral, Every Morning Before Breakfast    linaGLIPtin-metFORMIN HCl (Jentadueto) 2.5-1000 MG tablet Oral, 2 Times Daily    losartan (COZAAR) 25 mg, Oral, Daily    Magnesium Oxide -Mg Supplement (MAGNESIUM-OXIDE) 400 mg, Oral, 3 Times Daily    melatonin 5 mg, Oral, Nightly PRN    metoprolol tartrate (LOPRESSOR) 25 mg, Oral, 2 Times Daily    ondansetron ODT  "(ZOFRAN-ODT) 4 MG disintegrating tablet DISSOLVE ONE TABLET ON THE TONGUE EVERY 8 HOURS AS NEEDED FOR NAUSEA OR VOMITING    Ozempic (0.25 or 0.5 MG/DOSE) 0.25 mg, Subcutaneous, Weekly    Repatha SureClick 140 mg, Subcutaneous, Every 14 Days    Toujeo SoloStar 34 Units, Subcutaneous, Every Morning    vitamin E 100 Units, Oral, Daily       .    Objective:     /82 (BP Location: Left arm, Patient Position: Sitting, Cuff Size: Adult)   Pulse 80   Ht 160 cm (63\")   Wt 111 kg (245 lb)   SpO2 96%   BMI 43.40 kg/m²    Body mass index is 43.4 kg/m².     Vitals reviewed.   Constitutional:       Appearance: Well-developed.   Pulmonary:      Effort: Pulmonary effort is normal. No respiratory distress.      Breath sounds: Normal breath sounds. No wheezing. No rales.      Comments: Bases clear  Chest:      Chest wall: Not tender to palpatation.   Cardiovascular:      Normal rate. Regular rhythm.      Murmurs: There is no murmur.      No gallop.  No click. No rub.   Pulses:     Intact distal pulses.   Edema:     Peripheral edema absent.   Musculoskeletal: Normal range of motion.       Lab Review:     Lab Results   Component Value Date    GLUCOSE 213 (H) 04/18/2024    BUN 13 04/18/2024    CREATININE 0.92 04/18/2024    EGFR 67.5 04/18/2024    BCR 14.1 04/18/2024    K 4.1 04/18/2024    CO2 21.8 (L) 04/18/2024    CALCIUM 9.2 04/18/2024    ALBUMIN 3.7 04/18/2024    BILITOT 0.8 04/18/2024    AST 37 (H) 04/18/2024    ALT 19 04/18/2024     Lab Results   Component Value Date    WBC 6.22 03/07/2024    HGB 13.9 03/07/2024    HCT 43.3 03/07/2024    .3 (H) 03/07/2024     03/07/2024     Lab Results   Component Value Date    TSH 1.600 03/07/2024           Procedures               Assessment:      Diagnosis Plan   1. SVT (supraventricular tachycardia)  No known recurrence status post catheter ablation for AV node reentry      2. Sinus node dysfunction  Normal functioning dual-chamber permanent pacemaker with 2% right " atrial and 4% RV pacing.  Normal lead parameters and 8+ years battery life remaining      3. Presence of cardiac pacemaker    This patient's Cardiac Implanted Electronic Device was manually interrogated and reprogrammed during the patient encounter today.  Iterative programming changes were manually made to determine the sensing threshold, pacing threshold, lead impedance as well as underlying cardiac rhythm.  These programming changes were not limited to but included some or all of the following when appropriate: pacing mode, programmed AV delays, blanking periods, and refractory periods.  Data obtained as a result of these manual programing changes informed the patient's CIED permanent programming.        4. Paroxysmal atrial fibrillation  Normal sinus rhythm on pacemaker interrogation, no events no mode switches      5. Essential hypertension  Well-managed on current medical regimen      6. Chronic anticoagulation  Well-tolerated.  Continue Eliquis 5 mg twice daily      7. Obstructive sleep apnea syndrome  CPAP compliant        Plan:     Advance Care Planning   ACP discussion was declined by the patient. Patient has an advance directive (not in EMR), copy requested.           Stable cardiac status.  Continue current medications.   in 6 months, sooner as needed.  Thank you for allowing us to participate in the care of your patient.     Electronically signed by MP Owens, 06/07/24, 3:11 PM EDT.

## 2024-06-10 DIAGNOSIS — K21.00 GASTROESOPHAGEAL REFLUX DISEASE WITH ESOPHAGITIS WITHOUT HEMORRHAGE: Primary | ICD-10-CM

## 2024-06-10 RX ORDER — PANTOPRAZOLE SODIUM 40 MG/1
40 TABLET, DELAYED RELEASE ORAL DAILY
Qty: 30 TABLET | Refills: 2 | Status: SHIPPED | OUTPATIENT
Start: 2024-06-10

## 2024-06-11 NOTE — TELEPHONE ENCOUNTER
Spoke with patient and let her know that her Novolog came in from the patient assistance program and she can come by anytime and pick it up. Patient is in understanding.  
complains of pain/discomfort

## 2024-06-19 ENCOUNTER — OFFICE VISIT (OUTPATIENT)
Dept: SURGERY | Facility: CLINIC | Age: 69
End: 2024-06-19
Payer: MEDICARE

## 2024-06-19 VITALS — HEIGHT: 63 IN | WEIGHT: 245 LBS | BODY MASS INDEX: 43.41 KG/M2

## 2024-06-19 DIAGNOSIS — Z17.0 MALIGNANT NEOPLASM OF UPPER-OUTER QUADRANT OF RIGHT BREAST IN FEMALE, ESTROGEN RECEPTOR POSITIVE: Primary | ICD-10-CM

## 2024-06-19 DIAGNOSIS — C50.411 MALIGNANT NEOPLASM OF UPPER-OUTER QUADRANT OF RIGHT BREAST IN FEMALE, ESTROGEN RECEPTOR POSITIVE: Primary | ICD-10-CM

## 2024-06-19 PROCEDURE — 1159F MED LIST DOCD IN RCRD: CPT | Performed by: SURGERY

## 2024-06-19 PROCEDURE — 1160F RVW MEDS BY RX/DR IN RCRD: CPT | Performed by: SURGERY

## 2024-06-19 PROCEDURE — 99212 OFFICE O/P EST SF 10 MIN: CPT | Performed by: SURGERY

## 2024-06-19 NOTE — PROGRESS NOTES
Subjective   Ronna Wayne is a 68 y.o. female  is here today for follow-up.         Ronna Wayne is a 68 y.o. female here for follow up after seroma drainage from the right breast.  Wound completely healed..  Radiation changes greatly improved.  She has had a pacemaker placed.  No complaints reported.    Assessment     Diagnoses and all orders for this visit:    1. Malignant neoplasm of upper-outer quadrant of right breast in female, estrogen receptor positive (Primary)      Ronna Wayne is a 68 y.o. female doing well after drainage of seroma of the right breast.  The wound has healed well by secondary intent.  Follow-up in 6 months.

## 2024-06-20 ENCOUNTER — TELEPHONE (OUTPATIENT)
Dept: FAMILY MEDICINE CLINIC | Facility: CLINIC | Age: 69
End: 2024-06-20
Payer: MEDICARE

## 2024-06-20 NOTE — TELEPHONE ENCOUNTER
Spoke with patient and let her know her Toujeo has came in from the patient assistance program and she can come by and pick it up. Patient is in understanding.

## 2024-07-05 DIAGNOSIS — E83.42 HYPOMAGNESEMIA: ICD-10-CM

## 2024-07-05 DIAGNOSIS — E55.9 VITAMIN D DEFICIENCY: ICD-10-CM

## 2024-07-05 DIAGNOSIS — Z79.4 TYPE 2 DIABETES MELLITUS WITH HYPERGLYCEMIA, WITH LONG-TERM CURRENT USE OF INSULIN: Primary | ICD-10-CM

## 2024-07-05 DIAGNOSIS — E03.8 OTHER SPECIFIED HYPOTHYROIDISM: ICD-10-CM

## 2024-07-05 DIAGNOSIS — E11.65 TYPE 2 DIABETES MELLITUS WITH HYPERGLYCEMIA, WITH LONG-TERM CURRENT USE OF INSULIN: Primary | ICD-10-CM

## 2024-07-05 DIAGNOSIS — K21.00 GASTROESOPHAGEAL REFLUX DISEASE WITH ESOPHAGITIS WITHOUT HEMORRHAGE: ICD-10-CM

## 2024-07-11 DIAGNOSIS — E03.8 OTHER SPECIFIED HYPOTHYROIDISM: Primary | ICD-10-CM

## 2024-07-11 RX ORDER — LEVOTHYROXINE SODIUM 88 UG/1
TABLET ORAL
Qty: 90 TABLET | Refills: 0 | OUTPATIENT
Start: 2024-07-11

## 2024-07-11 RX ORDER — LEVOTHYROXINE SODIUM 88 UG/1
88 TABLET ORAL DAILY
Qty: 30 TABLET | Refills: 0 | Status: SHIPPED | OUTPATIENT
Start: 2024-07-11

## 2024-07-15 RX ORDER — FUROSEMIDE 20 MG/1
20 TABLET ORAL DAILY
Qty: 90 TABLET | Refills: 0 | Status: SHIPPED | OUTPATIENT
Start: 2024-07-15

## 2024-07-16 ENCOUNTER — LAB (OUTPATIENT)
Dept: FAMILY MEDICINE CLINIC | Facility: CLINIC | Age: 69
End: 2024-07-16
Payer: MEDICARE

## 2024-07-16 DIAGNOSIS — E11.65 TYPE 2 DIABETES MELLITUS WITH HYPERGLYCEMIA, WITH LONG-TERM CURRENT USE OF INSULIN: ICD-10-CM

## 2024-07-16 DIAGNOSIS — K21.00 GASTROESOPHAGEAL REFLUX DISEASE WITH ESOPHAGITIS WITHOUT HEMORRHAGE: ICD-10-CM

## 2024-07-16 DIAGNOSIS — E83.42 HYPOMAGNESEMIA: ICD-10-CM

## 2024-07-16 DIAGNOSIS — E03.8 OTHER SPECIFIED HYPOTHYROIDISM: ICD-10-CM

## 2024-07-16 DIAGNOSIS — Z79.4 TYPE 2 DIABETES MELLITUS WITH HYPERGLYCEMIA, WITH LONG-TERM CURRENT USE OF INSULIN: ICD-10-CM

## 2024-07-16 DIAGNOSIS — E55.9 VITAMIN D DEFICIENCY: ICD-10-CM

## 2024-07-16 PROCEDURE — 84439 ASSAY OF FREE THYROXINE: CPT | Performed by: NURSE PRACTITIONER

## 2024-07-16 PROCEDURE — 85025 COMPLETE CBC W/AUTO DIFF WBC: CPT | Performed by: NURSE PRACTITIONER

## 2024-07-16 PROCEDURE — 83735 ASSAY OF MAGNESIUM: CPT | Performed by: NURSE PRACTITIONER

## 2024-07-16 PROCEDURE — 82306 VITAMIN D 25 HYDROXY: CPT | Performed by: NURSE PRACTITIONER

## 2024-07-16 PROCEDURE — 84443 ASSAY THYROID STIM HORMONE: CPT | Performed by: NURSE PRACTITIONER

## 2024-07-16 PROCEDURE — 83036 HEMOGLOBIN GLYCOSYLATED A1C: CPT | Performed by: NURSE PRACTITIONER

## 2024-07-16 PROCEDURE — 80053 COMPREHEN METABOLIC PANEL: CPT | Performed by: NURSE PRACTITIONER

## 2024-07-16 PROCEDURE — 36415 COLL VENOUS BLD VENIPUNCTURE: CPT | Performed by: NURSE PRACTITIONER

## 2024-07-17 LAB
25(OH)D3 SERPL-MCNC: 68.4 NG/ML (ref 30–100)
ALBUMIN SERPL-MCNC: 3.6 G/DL (ref 3.5–5.2)
ALBUMIN/GLOB SERPL: 0.9 G/DL
ALP SERPL-CCNC: 91 U/L (ref 39–117)
ALT SERPL W P-5'-P-CCNC: 19 U/L (ref 1–33)
ANION GAP SERPL CALCULATED.3IONS-SCNC: 14.7 MMOL/L (ref 5–15)
AST SERPL-CCNC: 34 U/L (ref 1–32)
BASOPHILS # BLD AUTO: 0.04 10*3/MM3 (ref 0–0.2)
BASOPHILS NFR BLD AUTO: 0.8 % (ref 0–1.5)
BILIRUB SERPL-MCNC: 0.6 MG/DL (ref 0–1.2)
BUN SERPL-MCNC: 17 MG/DL (ref 8–23)
BUN/CREAT SERPL: 19.3 (ref 7–25)
CALCIUM SPEC-SCNC: 9.5 MG/DL (ref 8.6–10.5)
CHLORIDE SERPL-SCNC: 102 MMOL/L (ref 98–107)
CO2 SERPL-SCNC: 18.3 MMOL/L (ref 22–29)
CREAT SERPL-MCNC: 0.88 MG/DL (ref 0.57–1)
DEPRECATED RDW RBC AUTO: 49.1 FL (ref 37–54)
EGFRCR SERPLBLD CKD-EPI 2021: 71.2 ML/MIN/1.73
EOSINOPHIL # BLD AUTO: 0.05 10*3/MM3 (ref 0–0.4)
EOSINOPHIL NFR BLD AUTO: 1 % (ref 0.3–6.2)
ERYTHROCYTE [DISTWIDTH] IN BLOOD BY AUTOMATED COUNT: 13.1 % (ref 12.3–15.4)
GLOBULIN UR ELPH-MCNC: 3.8 GM/DL
GLUCOSE SERPL-MCNC: 162 MG/DL (ref 65–99)
HBA1C MFR BLD: 8.2 % (ref 4.8–5.6)
HCT VFR BLD AUTO: 41.1 % (ref 34–46.6)
HGB BLD-MCNC: 13.7 G/DL (ref 12–15.9)
IMM GRANULOCYTES # BLD AUTO: 0.02 10*3/MM3 (ref 0–0.05)
IMM GRANULOCYTES NFR BLD AUTO: 0.4 % (ref 0–0.5)
LYMPHOCYTES # BLD AUTO: 2.34 10*3/MM3 (ref 0.7–3.1)
LYMPHOCYTES NFR BLD AUTO: 44.5 % (ref 19.6–45.3)
MAGNESIUM SERPL-MCNC: 2.2 MG/DL (ref 1.6–2.4)
MCH RBC QN AUTO: 34.5 PG (ref 26.6–33)
MCHC RBC AUTO-ENTMCNC: 33.3 G/DL (ref 31.5–35.7)
MCV RBC AUTO: 103.5 FL (ref 79–97)
MONOCYTES # BLD AUTO: 0.59 10*3/MM3 (ref 0.1–0.9)
MONOCYTES NFR BLD AUTO: 11.2 % (ref 5–12)
NEUTROPHILS NFR BLD AUTO: 2.22 10*3/MM3 (ref 1.7–7)
NEUTROPHILS NFR BLD AUTO: 42.1 % (ref 42.7–76)
NRBC BLD AUTO-RTO: 0 /100 WBC (ref 0–0.2)
PLATELET # BLD AUTO: 156 10*3/MM3 (ref 140–450)
PMV BLD AUTO: 10.7 FL (ref 6–12)
POTASSIUM SERPL-SCNC: 4.4 MMOL/L (ref 3.5–5.2)
PROT SERPL-MCNC: 7.4 G/DL (ref 6–8.5)
RBC # BLD AUTO: 3.97 10*6/MM3 (ref 3.77–5.28)
SODIUM SERPL-SCNC: 135 MMOL/L (ref 136–145)
T4 FREE SERPL-MCNC: 1.36 NG/DL (ref 0.92–1.68)
TSH SERPL DL<=0.05 MIU/L-ACNC: 4.3 UIU/ML (ref 0.27–4.2)
WBC NRBC COR # BLD AUTO: 5.26 10*3/MM3 (ref 3.4–10.8)

## 2024-07-18 ENCOUNTER — PATIENT OUTREACH (OUTPATIENT)
Dept: CASE MANAGEMENT | Facility: OTHER | Age: 69
End: 2024-07-18
Payer: MEDICARE

## 2024-07-18 NOTE — OUTREACH NOTE
AMBULATORY CASE MANAGEMENT NOTE    Names and Relationships of Patient/Support Persons: Contact: Ronna Wayne; Relationship: Self -     Patient Outreach      RN-ACM outreach with patient engaged in HRCM for education and assistance with disease management.    Patients last PCP office visit was: Patient has 4MO scheduled for tomorrow, 07/19/24.  Date and time reviewed with patient.  Patient's last specialist visit was: 06/19/24 with Lee Parrish MD, General Surgery re: malignant neoplasm of right breast    Assessment:  New or worsening symptoms since last contact: none reported    Medication/treatment plan changes since the last contact:  Per notes, surgical wound has healed well by secondary intent and follow-up is scheduled for 6 months.     The patient expressed the following new concerns: none reported; Patient is receiving Toujeo through patient assistance program.    ED/UC visits, admissions,etc. since last contact: none    Recommendations/Goals addressed:     Goals Addressed                   This Visit's Progress     Monitor and Manage My Blood Sugar   On track     Patient indicated the delay in delivery of her Dexcom supplies has been resolved since our last conversation.       Set My Target A1C   On track     Follow Up Date 07/18/24     - set target A1C      Why is this important?    Your target A1C is decided together by you and your doctor.   It is based on several things like your age and other health issues.      Notes:   Patient recently completed labs in preparation for upcoming PCP visit.  A1C slightly decreased at 8.20                   Upcoming appointments:  Future Appointments         Provider Department Center    7/19/2024 10:45 AM Mague Acosta APRN [facility] FAMILY MEDICINE [place]    8/6/2024 2:00 PM Brenda Singh APRN [facility] CARDIOLOGY COR    8/9/2024 1:00 PM (Arrive by 12:30 PM) LEYDI Teresa MD [facility]  GROUP HEMATOLOGY & ONCOLOGY COR    8/27/2024 2:00 PM Cheng-Akwa, Griselda, APRN [facility] GASTROENTEROLOGY & UROLOGY [facility]    12/18/2024 1:10 PM Lee Parrish MD [facility] GENERAL SURGERY [facility]    8/1/2025 2:30 PM Israel Steele DO [facility] CARDIOLOGY COR            Last clinical bp readings:  BP Readings from Last 3 Encounters:   06/07/24 136/82   04/09/24 128/70   03/12/24 120/80       A1C if applicable:  A1C Last 3 Results          9/22/2023    09:20 3/7/2024    09:46 7/16/2024    10:29   HGBA1C Last 3 Results   Hemoglobin A1C 9.30  8.50  8.20          Rosa DAVIS  Ambulatory Case Management    7/18/2024, 15:16 EDT

## 2024-07-19 ENCOUNTER — OFFICE VISIT (OUTPATIENT)
Dept: FAMILY MEDICINE CLINIC | Facility: CLINIC | Age: 69
End: 2024-07-19
Payer: MEDICARE

## 2024-07-19 DIAGNOSIS — E78.2 MODERATE MIXED HYPERLIPIDEMIA NOT REQUIRING STATIN THERAPY: Chronic | ICD-10-CM

## 2024-07-19 DIAGNOSIS — K21.00 GASTROESOPHAGEAL REFLUX DISEASE WITH ESOPHAGITIS WITHOUT HEMORRHAGE: Chronic | ICD-10-CM

## 2024-07-19 DIAGNOSIS — E03.8 OTHER SPECIFIED HYPOTHYROIDISM: Chronic | ICD-10-CM

## 2024-07-19 DIAGNOSIS — E83.42 HYPOMAGNESEMIA: ICD-10-CM

## 2024-07-19 DIAGNOSIS — I10 ESSENTIAL HYPERTENSION: Chronic | ICD-10-CM

## 2024-07-19 DIAGNOSIS — Z97.8 USES SELF-APPLIED CONTINUOUS GLUCOSE MONITORING DEVICE: ICD-10-CM

## 2024-07-19 DIAGNOSIS — E11.65 TYPE 2 DIABETES MELLITUS WITH HYPERGLYCEMIA, WITH LONG-TERM CURRENT USE OF INSULIN: Primary | Chronic | ICD-10-CM

## 2024-07-19 DIAGNOSIS — Z79.4 TYPE 2 DIABETES MELLITUS WITH HYPERGLYCEMIA, WITH LONG-TERM CURRENT USE OF INSULIN: Primary | Chronic | ICD-10-CM

## 2024-07-19 DIAGNOSIS — E53.8 B12 DEFICIENCY: ICD-10-CM

## 2024-07-19 PROCEDURE — 1159F MED LIST DOCD IN RCRD: CPT | Performed by: NURSE PRACTITIONER

## 2024-07-19 PROCEDURE — 3052F HG A1C>EQUAL 8.0%<EQUAL 9.0%: CPT | Performed by: NURSE PRACTITIONER

## 2024-07-19 PROCEDURE — 1126F AMNT PAIN NOTED NONE PRSNT: CPT | Performed by: NURSE PRACTITIONER

## 2024-07-19 PROCEDURE — 99214 OFFICE O/P EST MOD 30 MIN: CPT | Performed by: NURSE PRACTITIONER

## 2024-07-19 PROCEDURE — 1160F RVW MEDS BY RX/DR IN RCRD: CPT | Performed by: NURSE PRACTITIONER

## 2024-07-19 RX ORDER — ACYCLOVIR 400 MG/1
1 TABLET ORAL
Qty: 9 EACH | Refills: 3 | Status: SHIPPED | OUTPATIENT
Start: 2024-07-19

## 2024-07-19 RX ORDER — ACYCLOVIR 400 MG/1
1 TABLET ORAL DAILY
Qty: 1 EACH | Refills: 0 | Status: SHIPPED | OUTPATIENT
Start: 2024-07-19

## 2024-07-19 NOTE — PROGRESS NOTES
History of Present Illness  Ronna Wayne is a 69 y.o. female who presents to the clinic today pertaining to her diabetes, type 2, hypertension, dyslipidemia, GERD and vitamin deficiencies.  Additional medical problems include A-fib, non-continuous rheumatic AVS, tachy-janie syndrome, PVCs and SVT.  She has been diagnosed and treated for malignant neoplasm of her upper outer quadrant of her right breast.  She did have recent labs which will be reviewed with her.     The following portions of the patient's history were reviewed and updated as appropriate: allergies, current medications, past family history, past medical history, past social history, past surgical history, and problem list.    Review of Systems  Refer to scanned document for information    Physical Exam  Refer to scanned document for information    Results for orders placed or performed in visit on 07/16/24   Comprehensive Metabolic Panel    Specimen: Arm, Right; Blood   Result Value Ref Range    Glucose 162 (H) 65 - 99 mg/dL    BUN 17 8 - 23 mg/dL    Creatinine 0.88 0.57 - 1.00 mg/dL    Sodium 135 (L) 136 - 145 mmol/L    Potassium 4.4 3.5 - 5.2 mmol/L    Chloride 102 98 - 107 mmol/L    CO2 18.3 (L) 22.0 - 29.0 mmol/L    Calcium 9.5 8.6 - 10.5 mg/dL    Total Protein 7.4 6.0 - 8.5 g/dL    Albumin 3.6 3.5 - 5.2 g/dL    ALT (SGPT) 19 1 - 33 U/L    AST (SGOT) 34 (H) 1 - 32 U/L    Alkaline Phosphatase 91 39 - 117 U/L    Total Bilirubin 0.6 0.0 - 1.2 mg/dL    Globulin 3.8 gm/dL    A/G Ratio 0.9 g/dL    BUN/Creatinine Ratio 19.3 7.0 - 25.0    Anion Gap 14.7 5.0 - 15.0 mmol/L    eGFR 71.2 >60.0 mL/min/1.73   TSH    Specimen: Arm, Right; Blood   Result Value Ref Range    TSH 4.300 (H) 0.270 - 4.200 uIU/mL   Hemoglobin A1c    Specimen: Arm, Right; Blood   Result Value Ref Range    Hemoglobin A1C 8.20 (H) 4.80 - 5.60 %   Vitamin D,25-Hydroxy    Specimen: Arm, Right; Blood   Result Value Ref Range    25 Hydroxy, Vitamin D 68.4 30.0 - 100.0 ng/ml    Magnesium    Specimen: Arm, Right; Blood   Result Value Ref Range    Magnesium 2.2 1.6 - 2.4 mg/dL   T4, Free    Specimen: Arm, Right; Blood   Result Value Ref Range    Free T4 1.36 0.92 - 1.68 ng/dL   CBC Auto Differential    Specimen: Arm, Right; Blood   Result Value Ref Range    WBC 5.26 3.40 - 10.80 10*3/mm3    RBC 3.97 3.77 - 5.28 10*6/mm3    Hemoglobin 13.7 12.0 - 15.9 g/dL    Hematocrit 41.1 34.0 - 46.6 %    .5 (H) 79.0 - 97.0 fL    MCH 34.5 (H) 26.6 - 33.0 pg    MCHC 33.3 31.5 - 35.7 g/dL    RDW 13.1 12.3 - 15.4 %    RDW-SD 49.1 37.0 - 54.0 fl    MPV 10.7 6.0 - 12.0 fL    Platelets 156 140 - 450 10*3/mm3    Neutrophil % 42.1 (L) 42.7 - 76.0 %    Lymphocyte % 44.5 19.6 - 45.3 %    Monocyte % 11.2 5.0 - 12.0 %    Eosinophil % 1.0 0.3 - 6.2 %    Basophil % 0.8 0.0 - 1.5 %    Immature Grans % 0.4 0.0 - 0.5 %    Neutrophils, Absolute 2.22 1.70 - 7.00 10*3/mm3    Lymphocytes, Absolute 2.34 0.70 - 3.10 10*3/mm3    Monocytes, Absolute 0.59 0.10 - 0.90 10*3/mm3    Eosinophils, Absolute 0.05 0.00 - 0.40 10*3/mm3    Basophils, Absolute 0.04 0.00 - 0.20 10*3/mm3    Immature Grans, Absolute 0.02 0.00 - 0.05 10*3/mm3    nRBC 0.0 0.0 - 0.2 /100 WBC       Assessment & Plan   Diagnoses and all orders for this visit:    1. Type 2 diabetes mellitus with hyperglycemia, with long-term current use of insulin (Primary)  Comments:  Reviewed recent labs with an improved A1c.  She will continue Jentadueto, Ozempic, Jardiance and Toujeo  Orders:  -     Comprehensive metabolic panel; Standing  -     Continuous Glucose  (Dexcom G7 ) device; Use 1 Device Daily.  Dispense: 1 each; Refill: 0  -     Continuous Glucose Sensor (Dexcom G7 Sensor) misc; Use 1 Device 90 Minutes Prior to Surgery for 1 dose.  Dispense: 9 each; Refill: 3    2. Uses self-applied continuous glucose monitoring device  Comments:  Currently uses a Dexcom 6 will prescribe a Dexcom 7 again    3. Essential hypertension  Comments:  Well-controlled.   Continue losartan and metoprolol    4. Moderate mixed hyperlipidemia not requiring statin therapy  Comments:  Continue Repatha which she is responding very well and tolerating    5. Other specified hypothyroidism  Comments:  Continue levothyroxine 88 mcg    6. Gastroesophageal reflux disease with esophagitis without hemorrhage  Comments:  Continue pantoprazole    7. Hypomagnesemia  Comments:  Continue magnesium 3 times a day.  Standing order for her to have magnesium checked every 4 weeks x 3  Orders:  -     Magnesium; Standing    8. B12 deficiency  Comments:  Continue daily supplement     Findings and recommendations discussed with Ronna. Reviewed test results. Lifestyle modifications reinforced including nutrition and activity recommendations.  She will follow-up in October sooner if problems/concerns occur.

## 2024-07-30 ENCOUNTER — SPECIALTY PHARMACY (OUTPATIENT)
Dept: PHARMACY | Facility: HOSPITAL | Age: 69
End: 2024-07-30
Payer: MEDICARE

## 2024-07-30 NOTE — PROGRESS NOTES
Specialty Pharmacy Refill Coordination Note     Ronna is a 69 y.o. female contacted today regarding refills of  Repatha SureClick specialty medication(s).    Reviewed and verified with patient:       Specialty medication(s) and dose(s) confirmed: yes    Refill Questions      Flowsheet Row Most Recent Value   Changes to allergies? No   Changes to medications? No   New conditions or infections since last clinic visit No   Unplanned office visit, urgent care, ED, or hospital admission in the last 4 weeks  No   How does patient/caregiver feel medication is working? Very good   Financial problems or insurance changes  No   Since the previous refill, were any specialty medication doses or scheduled injections missed or delayed?  No   Does this patient require a clinical escalation to a pharmacist? No            Delivery Questions      Flowsheet Row Most Recent Value   Copay verified? Yes   Copay amount 0   Copay form of payment No copayment ($0)                   Follow-up: 56 day(s)     Lesvia Reeves, Pharmacy Technician  Specialty Pharmacy Technician

## 2024-08-06 ENCOUNTER — TELEPHONE (OUTPATIENT)
Dept: FAMILY MEDICINE CLINIC | Facility: CLINIC | Age: 69
End: 2024-08-06
Payer: MEDICARE

## 2024-08-06 ENCOUNTER — OFFICE VISIT (OUTPATIENT)
Dept: CARDIOLOGY | Facility: CLINIC | Age: 69
End: 2024-08-06
Payer: MEDICARE

## 2024-08-06 VITALS
HEART RATE: 67 BPM | OXYGEN SATURATION: 95 % | WEIGHT: 245 LBS | BODY MASS INDEX: 43.41 KG/M2 | DIASTOLIC BLOOD PRESSURE: 68 MMHG | HEIGHT: 63 IN | SYSTOLIC BLOOD PRESSURE: 122 MMHG

## 2024-08-06 DIAGNOSIS — I10 ESSENTIAL HYPERTENSION: Primary | ICD-10-CM

## 2024-08-06 DIAGNOSIS — I48.0 PAROXYSMAL ATRIAL FIBRILLATION: ICD-10-CM

## 2024-08-06 DIAGNOSIS — Z95.0 PRESENCE OF CARDIAC PACEMAKER: ICD-10-CM

## 2024-08-06 DIAGNOSIS — I47.10 SVT (SUPRAVENTRICULAR TACHYCARDIA): ICD-10-CM

## 2024-08-06 DIAGNOSIS — R09.89 BILATERAL CAROTID BRUITS: ICD-10-CM

## 2024-08-06 PROCEDURE — 3078F DIAST BP <80 MM HG: CPT | Performed by: NURSE PRACTITIONER

## 2024-08-06 PROCEDURE — 1159F MED LIST DOCD IN RCRD: CPT | Performed by: NURSE PRACTITIONER

## 2024-08-06 PROCEDURE — 1160F RVW MEDS BY RX/DR IN RCRD: CPT | Performed by: NURSE PRACTITIONER

## 2024-08-06 PROCEDURE — 3074F SYST BP LT 130 MM HG: CPT | Performed by: NURSE PRACTITIONER

## 2024-08-06 PROCEDURE — 99214 OFFICE O/P EST MOD 30 MIN: CPT | Performed by: NURSE PRACTITIONER

## 2024-08-06 NOTE — TELEPHONE ENCOUNTER
Spoke with patient ane let her know her Novolog came in from the patient assistance program and she can come by and pick it up. Patient is in understanding.

## 2024-08-06 NOTE — PROGRESS NOTES
[facility]             [facility] BOSTON Singh Sherelene S, APRN  Ronna Wayne  1955 08/06/2024    Patient Active Problem List   Diagnosis    Frequent headaches    Essential hypertension    Type 2 diabetes mellitus with hyperglycemia, with long-term current use of insulin    Obstructive sleep apnea syndrome    Glaucoma    GERD (gastroesophageal reflux disease)    Morbid obesity    Weakness of left arm    Cerebrovascular accident (CVA) due to embolism    SVT (supraventricular tachycardia)    Vitamin D deficiency    B12 deficiency    Malignant neoplasm of upper-outer quadrant of right breast in female, estrogen receptor positive    Other specified hypothyroidism    Personal history of colonic polyps    Tachy-janie syndrome    Atrial flutter    Paroxysmal atrial fibrillation    Presence of cardiac pacemaker    Hyperlipidemia    Nonrheumatic aortic valve stenosis    TIA (transient ischemic attack)    Sinus node dysfunction    Urine incontinence    Detrusor instability of bladder    Incomplete emptying of bladder    Chronic anticoagulation    Frequency of micturition    Slow transit constipation    Skin lesion    History of recurrent UTIs    Uses self-applied continuous glucose monitoring device       Mague Mace APRN:    Subjective     Chief Complaint   Patient presents with    Follow-up     HPI:     This is a 69 y.o. female with known past medical history of dyslipidemia, diabetes mellitus type 2, essential hypertension, obstructive sleep apnea, PSVT, history of CVA, breast cancer, obesity and paroxysmal atrial fibrillation/flutter.      Ronna Wayne presents today for routine cardiology follow up.  Patient states she has been doing overall well since her last visit.  Has been having issues with dizziness recently.  Denies any aggravating factors.Patient was seen by EP in June 2020 for for which pacemaker was interrogated  showing normal function and 8 year battery life.  No changes were made.  Recent lab work stable.    Diagnostic Testing  Echocardiogram 5/2021: EF 51 to 55% with moderate aortic sclerosis  Nuclear stress test 5/2021: No evidence of ischemia  Echocardiogram 12/2022: EF 61 to 65% with mild aortic valve stenosis  Successful implantation of permanent dual chamber pacemaker with [employer] device on 1/5/2023 due to tachybradycardia syndrome  EchoCardiogram 6/2023: 61 to 65%  Nuclear stress test 6/2023: Very mild fixed mid anterior wall defect, with normal wall motion, defect is consistent with breast attenuation artifact, no ischemia seen, TID 1.22.      All other systems were reviewed and were negative.    Patient Active Problem List   Diagnosis    Frequent headaches    Essential hypertension    Type 2 diabetes mellitus with hyperglycemia, with long-term current use of insulin    Obstructive sleep apnea syndrome    Glaucoma    GERD (gastroesophageal reflux disease)    Morbid obesity    Weakness of left arm    Cerebrovascular accident (CVA) due to embolism    SVT (supraventricular tachycardia)    Vitamin D deficiency    B12 deficiency    Malignant neoplasm of upper-outer quadrant of right breast in female, estrogen receptor positive    Other specified hypothyroidism    Personal history of colonic polyps    Tachy-janie syndrome    Atrial flutter    Paroxysmal atrial fibrillation    Presence of cardiac pacemaker    Hyperlipidemia    Nonrheumatic aortic valve stenosis    TIA (transient ischemic attack)    Sinus node dysfunction    Urine incontinence    Detrusor instability of bladder    Incomplete emptying of bladder    Chronic anticoagulation    Frequency of micturition    Slow transit constipation    Skin lesion    History of recurrent UTIs    Uses self-applied continuous glucose monitoring device       family history includes Alcohol abuse in her maternal aunt; Arthritis in her sister; Breast cancer (age of onset:  40) in her sister; Cancer in her maternal grandfather and sister; Diabetes in her mother; Hearing loss in her father, maternal grandfather, and mother; Heart attack in her father; Hyperlipidemia in her father, sister, and sister; Hypertension in her father, sister, sister, sister, and another family member; Kidney disease in her mother; Lung disease in her father; Obesity in her sister and sister; Stroke in her paternal grandfather and paternal grandmother; Thyroid disease in her mother and sister.     reports that she quit smoking about 48 years ago. Her smoking use included cigarettes and cigars. She started smoking about 51 years ago. She has a 4.5 pack-year smoking history. She has been exposed to tobacco smoke. She has never used smokeless tobacco. She reports that she does not drink alcohol and does not use drugs.    Allergies   Allergen Reactions    Codeine GI Intolerance     Increased heart rate      Sulfa Antibiotics GI Intolerance     Heart rate increase          Current Outpatient Medications:     acetaminophen (TYLENOL) 325 MG tablet, Take 2 tablets by mouth Every 6 (Six) Hours As Needed for Mild Pain., Disp: , Rfl:     anastrozole (ARIMIDEX) 1 MG tablet, Take 1 tablet by mouth Daily., Disp: 30 tablet, Rfl: 11    apixaban (ELIQUIS) 5 MG tablet tablet, Take 1 tablet by mouth 2 (Two) Times a Day., Disp: 60 tablet, Rfl: 4    aspirin 81 MG chewable tablet, Chew & swallow 1 tablet Daily., Disp: 30 tablet, Rfl: 0    cholecalciferol (VITAMIN D3) 25 MCG (1000 UT) tablet, Take 2 tablets by mouth Daily., Disp: , Rfl:     Continuous Glucose  (Dexcom G7 ) device, Use 1 Device Daily., Disp: 1 each, Rfl: 0    Continuous Glucose Sensor (Dexcom G7 Sensor) misc, Use 1 Device 90 Minutes Prior to Surgery for 1 dose., Disp: 9 each, Rfl: 3    Diclofenac Sodium (VOLTAREN) 1 % gel gel, Apply 4 g topically to the appropriate area as directed 3 (Three) Times a Day As Needed (joint pain)., Disp: 350 g, Rfl: 2     empagliflozin (Jardiance) 25 MG tablet tablet, Take 1 tablet by mouth Every Morning., Disp: 30 tablet, Rfl: 3    Evolocumab (REPATHA) solution auto-injector SureClick injection, Inject 1 mL under the skin into the appropriate area as directed Every 14 (Fourteen) Days., Disp: 2 mL, Rfl: 5    furosemide (LASIX) 20 MG tablet, TAKE 1 TABLET BY MOUTH DAILY, Disp: 90 tablet, Rfl: 0    insulin aspart (novoLOG FLEXPEN) 100 UNIT/ML solution pen-injector sc pen, Inject 0-9 Units under the skin into the appropriate area as directed 3 (Three) Times a Day With Meals. Admelog Solostar sliding scale, Disp: , Rfl:     Insulin Glargine, 1 Unit Dial, (Toujeo SoloStar) 300 UNIT/ML solution pen-injector injection, Inject 34 Units under the skin into the appropriate area as directed Every Morning., Disp: , Rfl:     Insulin Pen Needle 32G X 5 MM misc, Use 1 each 3 (Three) Times a Day., Disp: 100 each, Rfl: 5    levothyroxine (SYNTHROID, LEVOTHROID) 88 MCG tablet, Take 1 tablet by mouth Daily., Disp: 30 tablet, Rfl: 0    linaclotide (Linzess) 72 MCG capsule capsule, Take 1 capsule by mouth Every Morning Before Breakfast., Disp: 30 capsule, Rfl: 0    losartan (Cozaar) 25 MG tablet, Take 1 tablet by mouth Daily., Disp: 90 tablet, Rfl: 1    Magnesium Oxide -Mg Supplement (MAGnesium-Oxide) 400 (240 Mg) MG tablet, Take 1 tablet by mouth 3 (Three) Times a Day., Disp: 90 tablet, Rfl: 3    melatonin 5 MG tablet tablet, Take 1 tablet by mouth At Night As Needed., Disp: , Rfl:     metoprolol tartrate (LOPRESSOR) 25 MG tablet, TAKE 1 TABLET BY MOUTH TWICE A DAY, Disp: 180 tablet, Rfl: 1    ondansetron ODT (ZOFRAN-ODT) 4 MG disintegrating tablet, DISSOLVE ONE TABLET ON THE TONGUE EVERY 8 HOURS AS NEEDED FOR NAUSEA OR VOMITING, Disp: 20 tablet, Rfl: 1    pantoprazole (PROTONIX) 40 MG EC tablet, Take 1 tablet by mouth Daily., Disp: 30 tablet, Rfl: 2    Semaglutide,0.25 or 0.5MG/DOS, (Ozempic, 0.25 or 0.5 MG/DOSE,) 2 MG/1.5ML solution pen-injector,  Inject 0.25 mg under the skin into the appropriate area as directed 1 (One) Time Per Week. (Patient taking differently: Inject 2 mg under the skin into the appropriate area as directed 1 (One) Time Per Week.), Disp: 3 mL, Rfl: 0    vitamin E 100 UNIT capsule, Take 1 capsule by mouth Daily., Disp: , Rfl:     linaGLIPtin-metFORMIN HCl (Jentadueto) 2.5-1000 MG tablet, Take  by mouth 2 (Two) Times a Day. (Patient not taking: Reported on 8/6/2024), Disp: , Rfl:       Physical Exam:  I have reviewed the patient's current vital signs as documented in the patient's EMR.   Vitals:    08/06/24 1402   BP: 122/68   Pulse: 67   SpO2: 95%     Body mass index is 43.41 kg/m².       08/06/24  1402   Weight: 111 kg (245 lb)      Physical Exam  Constitutional:       General: She is not in acute distress.     Appearance: Normal appearance. She is well-developed and normal weight.   HENT:      Head: Normocephalic and atraumatic.   Eyes:      General: Lids are normal.      Conjunctiva/sclera: Conjunctivae normal.      Pupils: Pupils are equal, round, and reactive to light.   Neck:      Vascular: Carotid bruit present. No JVD.   Cardiovascular:      Rate and Rhythm: Normal rate and regular rhythm.      Pulses: Normal pulses.      Heart sounds: S1 normal and S2 normal. Murmur heard.      Systolic murmur is present with a grade of 2/6.   Pulmonary:      Effort: Pulmonary effort is normal. No respiratory distress.      Breath sounds: Normal breath sounds. No wheezing.   Abdominal:      General: Bowel sounds are normal. There is no distension.      Palpations: Abdomen is soft. There is no hepatomegaly or splenomegaly.      Tenderness: There is no abdominal tenderness.   Musculoskeletal:         General: No swelling. Normal range of motion.      Cervical back: Normal range of motion and neck supple.      Right lower leg: No edema.      Left lower leg: No edema.   Skin:     General: Skin is warm and dry.      Coloration: Skin is not jaundiced.       Findings: No rash.   Neurological:      General: No focal deficit present.      Mental Status: She is alert and oriented to person, place, and time. Mental status is at baseline.   Psychiatric:         Mood and Affect: Mood normal.         Speech: Speech normal.         Behavior: Behavior normal.         Thought Content: Thought content normal.         Judgment: Judgment normal.            DATA REVIEWED:     TTE/MAGI:  Results for orders placed during the hospital encounter of 06/11/23    Adult Transthoracic Echo Complete w/ Color, Spectral and Contrast if necessary per protocol    Interpretation Summary    Left ventricular systolic function is normal. Left ventricular ejection fraction appears to be 61 - 65%.    Left ventricular diastolic function is consistent with (grade II w/high LAP) pseudonormalization.    The left atrial cavity is mildly dilated.    The right atrial cavity is mildly  dilated.    Estimated right ventricular systolic pressure from tricuspid regurgitation is normal (<35 mmHg).      Laboratory evaluations:    Lab Results   Component Value Date    GLUCOSE 162 (H) 07/16/2024    BUN 17 07/16/2024    CREATININE 0.88 07/16/2024    EGFRIFNONA 54 (L) 01/11/2022    BCR 19.3 07/16/2024    K 4.4 07/16/2024    CO2 18.3 (L) 07/16/2024    CALCIUM 9.5 07/16/2024    ALBUMIN 3.6 07/16/2024    LABIL2 1.0 (L) 05/28/2016    AST 34 (H) 07/16/2024    ALT 19 07/16/2024     Lab Results   Component Value Date    WBC 5.26 07/16/2024    HGB 13.7 07/16/2024    HCT 41.1 07/16/2024    .5 (H) 07/16/2024     07/16/2024     Lab Results   Component Value Date    CHOL 137 05/20/2024    CHLPL 171 01/04/2019    TRIG 132 05/20/2024    HDL 63 (H) 05/20/2024    LDL 51 05/20/2024     Lab Results   Component Value Date    TSH 4.300 (H) 07/16/2024     Lab Results   Component Value Date    HGBA1C 8.20 (H) 07/16/2024     Lab Results   Component Value Date    ALT 19 07/16/2024     Lab Results   Component Value Date     "HGBA1C 8.20 (H) 07/16/2024    HGBA1C 8.50 (H) 03/07/2024    HGBA1C 9.30 (H) 09/22/2023     Lab Results   Component Value Date    MICROALBUR 1.8 03/07/2024    CREATININE 0.88 07/16/2024     No results found for: \"IRON\", \"TIBC\", \"FERRITIN\"  Lab Results   Component Value Date    INR 1.07 06/11/2023    INR 1.00 01/19/2023    INR 1.06 12/17/2022    PROTIME 14.4 06/11/2023    PROTIME 13.4 01/19/2023    PROTIME 14.0 12/17/2022           --------------------------------------------------------------------------------------------------------------------------    ASSESSMENT/PLAN:      Diagnosis Plan   1. Essential hypertension        2. Paroxysmal atrial fibrillation        3. Presence of cardiac pacemaker        4. SVT (supraventricular tachycardia)        5. Bilateral carotid bruits  US Carotid Bilateral          Atrial fibrillation  SVT  Currently normal sinus rhythm.  Had successful SVT ablation in 2023 with no further SVT events.  Continue Eliquis for stroke prevention.    Permanent pacemaker implantation  Pacemaker interrogated recently in June 2023 which showed normal function with 8-year battery life.  No changes were made at last EP visit.     4.  Hypertension  Well-controlled.  Recent lab work shows stable kidney function.  Continue current management    5.  Hyperlipidemia  6.  Carotid bruit  Patient was initiated on Repatha at last visit and tolerating well.  Most recent LDL available for review today shows significant drop into the 50s.  Continue PCSK9.  Patient has been having dizziness.  Noted carotid bruit on examination today.  Obtain carotid ultrasound.      This document has been @Electronically signed by BOSTON Jiménez, 08/06/24, 12:18 PM EDT.       Dictated Utilizing Dragon Dictation: Part of this note may be an electronic transcription/translation of spoken language to printed text using the Dragon Dictation System.    Follow-up appointment and medication changes provided in hand delivered After " Visit Summary as well as reviewed in the room.

## 2024-08-08 DIAGNOSIS — E03.8 OTHER SPECIFIED HYPOTHYROIDISM: ICD-10-CM

## 2024-08-08 RX ORDER — LEVOTHYROXINE SODIUM 88 UG/1
88 TABLET ORAL DAILY
Qty: 90 TABLET | Refills: 0 | Status: SHIPPED | OUTPATIENT
Start: 2024-08-08

## 2024-08-09 ENCOUNTER — OFFICE VISIT (OUTPATIENT)
Dept: ONCOLOGY | Facility: CLINIC | Age: 69
End: 2024-08-09
Payer: MEDICARE

## 2024-08-09 VITALS
RESPIRATION RATE: 18 BRPM | DIASTOLIC BLOOD PRESSURE: 65 MMHG | WEIGHT: 243 LBS | HEART RATE: 71 BPM | OXYGEN SATURATION: 96 % | HEIGHT: 63 IN | BODY MASS INDEX: 43.05 KG/M2 | SYSTOLIC BLOOD PRESSURE: 134 MMHG | TEMPERATURE: 97.1 F

## 2024-08-09 DIAGNOSIS — C50.411 MALIGNANT NEOPLASM OF UPPER-OUTER QUADRANT OF RIGHT BREAST IN FEMALE, ESTROGEN RECEPTOR POSITIVE: Primary | ICD-10-CM

## 2024-08-09 DIAGNOSIS — Z17.0 MALIGNANT NEOPLASM OF UPPER-OUTER QUADRANT OF RIGHT BREAST IN FEMALE, ESTROGEN RECEPTOR POSITIVE: Primary | ICD-10-CM

## 2024-08-09 NOTE — PROGRESS NOTES
Name:  Ronna Wayne  :  1955  Date:  2024     REFERRING PHYSICIAN  Lee Parrish MD    PRIMARY CARE PROVIDER  Mague Acosta APRN    REASON FOR FOLLOWUP  1. Malignant neoplasm of upper-outer quadrant of right breast in female, estrogen receptor positive      CHIEF COMPLAINT  None.    Dear Lee,    HISTORY OF PRESENT ILLNESS:   I saw Ms. Wayne in follow up today in our medical oncology clinic. As you are aware, she is a pleasant, 69 y.o., white female with a history of multiple medical problems, including hypertension, diabetes and sleep apnea who was diagnosed with an ER positive (95%), NM positive (95%), HER2/abida negative (1+ by IHC), invasive, mammary carcinoma (with mixed ductal and lobular features) of the upper, outer quadrant of the right breast in late Spring 2021. She was referred to you, and you performed a successful, right-sided lumpectomy on 2021. A sentinel node was positive; however, an additional twelve, excised, right-sided, axillary lymph nodes were uninvolved (). Final, surgical staging was consistent with stage [id number] disease (IIB). She was subsequently referred to our clinic (and radiation oncology) for further evaluation and management. Adjuvant treatment with whole breast irradiation followed by (at least five years of) endocrine therapy were ultimately recommended. She completed a thirty-fraction course of the former by late 2021 and began the latter (daily Arimidex) by early 2021. She has been doing overall very well in routine followup, ever since then, with no evidence of residual/recurrent disease to date.    INTERIM HISTORY:  Ms. Wayne returns to clinic today for followup again by herself. She began Arimidex in early 2021, has been on it for a total of ~two and one half (2.5) years now ;and she is still tolerating this medication overall very well, with no noticeable side effects. In 2022, her right-sided  breast seroma increased in size to the point that it ruptured; and she had to undergo an I and D on 07/07/2022. She has stopped following with the lymphedema clinic as she is currently still doing well performing routine exercises on her own. She had to have a pacemaker/defibrillator placed in early 2023 and underwent a cardiac ablation (for chronic afib) in late 2023. She has some longstanding, intermittent symptoms of pain and a decreased ROM in her left shoulder (the side opposite to her mastectomy related lymphedema issues); but these symptoms have recently been at least a little better. She otherwise has no new or specific complaints and continues to feel overall well.    Past Medical History:   Diagnosis Date    Anxiety     B12 deficiency     BPV (benign positional vertigo)     Breast cancer 05/2021    Colon polyp     Diabetes mellitus     Diarrhea     Disease of thyroid gland     Elevated cholesterol     Fibrocystic breast     Fibromyalgia     GERD (gastroesophageal reflux disease)     Glaucoma     Heart murmur     Hyperlipidemia     Hypertension     Kidney disease     Obesity     Peptic ulceration     PONV (postoperative nausea and vomiting)     Primary central sleep apnea     Sleep apnea     c-pap    Stroke     TIA (transient ischemic attack) 03/31/2019    Urinary incontinence     Vaginal infection     Weakness of left arm        Past Surgical History:   Procedure Laterality Date    ABDOMINAL SURGERY      APPENDECTOMY      BREAST ABSCESS INCISION AND DRAINAGE Right 07/07/2022    Procedure: BREAST ABSCESS INCISION AND DRAINAGE;  Surgeon: Lee Parrish MD;  Location: [facility] OR;  Service: General;  Laterality: Right;    BREAST BIOPSY  May 2021    BREAST BIOPSY Right 2024    BREAST CYST ASPIRATION      BREAST LUMPECTOMY WITH SENTINEL NODE BIOPSY Right 08/05/2021    Procedure: BREAST LUMPECTOMY WITH SENTINEL NODE BIOPSY;  Surgeon: Lee Parrish MD;  Location: [facility] OR;  Service: General;   Laterality: Right;    CARDIAC ELECTROPHYSIOLOGY PROCEDURE N/A 2023    Procedure: Pacemaker DC new;  Surgeon: Bj Hyde MD;  Location:  [facility];  Service: Cardiology;  Laterality: N/A;    CARDIAC ELECTROPHYSIOLOGY PROCEDURE N/A 2023    Procedure: EP +/- RFA SVT, hold Eliquis 1 day, hold metoprolol 3 days;  Surgeon: Israel Barrientos DO;  Location:  [facility] INVASIVE LOCATION;  Service: Cardiovascular;  Laterality: N/A;    CARDIAC ELECTROPHYSIOLOGY STUDY AND ABLATION      2023 PER DR. BARRIENTOS    CATARACT EXTRACTION Bilateral     CHOLECYSTECTOMY      COLONOSCOPY      ENDOSCOPY      HEMATOMA EVACUATION TRUNK Right 2021    Procedure: HEMATOMA EVACUATION TRUNK;  Surgeon: Lee Parrish MD;  Location:  [facility] OR;  Service: General;  Laterality: Right;    PACEMAKER IMPLANTATION      URETHRA SURGERY         Social History     Socioeconomic History    Marital status:    Tobacco Use    Smoking status: Former     Current packs/day: 0.00     Average packs/day: 1.5 packs/day for 3.0 years (4.5 ttl pk-yrs)     Types: Cigarettes, Cigars     Start date: 1973     Quit date: 1976     Years since quittin.6     Passive exposure: Past    Smokeless tobacco: Never    Tobacco comments:     I smoked as a teen and stopped when my son was born   Vaping Use    Vaping status: Never Used   Substance and Sexual Activity    Alcohol use: No     Comment: former social drinker not had anything in 25 + years    Drug use: Never    Sexual activity: Not Currently     Partners: Male     Birth control/protection: Abstinence, Post-menopausal, Vasectomy       Family History   Problem Relation Age of Onset    Thyroid disease Mother     Diabetes Mother     Hearing loss Mother     Kidney disease Mother     Hyperlipidemia Father     Hypertension Father     Heart attack Father     Lung disease Father     Hearing loss Father     Hypertension Sister     Breast cancer Sister 40    Obesity  Sister     Hypertension Sister     Hyperlipidemia Sister     Cancer Sister         Breast cancer    Hyperlipidemia Sister     Hypertension Sister     Arthritis Sister     Obesity Sister     Thyroid disease Sister     Alcohol abuse Maternal Aunt     Cancer Maternal Grandfather         Prostrate    Hearing loss Maternal Grandfather     Stroke Paternal Grandmother     Stroke Paternal Grandfather     Hypertension Other        Allergies   Allergen Reactions    Codeine GI Intolerance     Increased heart rate      Sulfa Antibiotics GI Intolerance     Heart rate increase        Current Outpatient Medications   Medication Sig Dispense Refill    acetaminophen (TYLENOL) 325 MG tablet Take 2 tablets by mouth Every 6 (Six) Hours As Needed for Mild Pain.      anastrozole (ARIMIDEX) 1 MG tablet Take 1 tablet by mouth Daily. 30 tablet 11    apixaban (ELIQUIS) 5 MG tablet tablet Take 1 tablet by mouth 2 (Two) Times a Day. 60 tablet 4    aspirin 81 MG chewable tablet Chew & swallow 1 tablet Daily. 30 tablet 0    cholecalciferol (VITAMIN D3) 25 MCG (1000 UT) tablet Take 2 tablets by mouth Daily.      Continuous Glucose  (Dexcom G7 ) device Use 1 Device Daily. 1 each 0    Continuous Glucose Sensor (Dexcom G7 Sensor) misc Use 1 Device 90 Minutes Prior to Surgery for 1 dose. 9 each 3    Diclofenac Sodium (VOLTAREN) 1 % gel gel Apply 4 g topically to the appropriate area as directed 3 (Three) Times a Day As Needed (joint pain). 350 g 2    empagliflozin (Jardiance) 25 MG tablet tablet Take 1 tablet by mouth Every Morning. 30 tablet 3    Evolocumab (REPATHA) solution auto-injector SureClick injection Inject 1 mL under the skin into the appropriate area as directed Every 14 (Fourteen) Days. 2 mL 5    furosemide (LASIX) 20 MG tablet TAKE 1 TABLET BY MOUTH DAILY 90 tablet 0    insulin aspart (novoLOG FLEXPEN) 100 UNIT/ML solution pen-injector sc pen Inject 0-9 Units under the skin into the appropriate area as directed 3 (Three)  Times a Day With Meals. Admelog Solostar sliding scale      Insulin Glargine, 1 Unit Dial, (Toujeo SoloStar) 300 UNIT/ML solution pen-injector injection Inject 34 Units under the skin into the appropriate area as directed Every Morning.      Insulin Pen Needle 32G X 5 MM misc Use 1 each 3 (Three) Times a Day. 100 each 5    levothyroxine (SYNTHROID, LEVOTHROID) 88 MCG tablet TAKE 1 TABLET BY MOUTH DAILY 90 tablet 0    linaclotide (Linzess) 72 MCG capsule capsule Take 1 capsule by mouth Every Morning Before Breakfast. 30 capsule 0    linaGLIPtin-metFORMIN HCl (Jentadueto) 2.5-1000 MG tablet Take  by mouth 2 (Two) Times a Day.      losartan (Cozaar) 25 MG tablet Take 1 tablet by mouth Daily. 90 tablet 1    Magnesium Oxide -Mg Supplement (MAGnesium-Oxide) 400 (240 Mg) MG tablet Take 1 tablet by mouth 3 (Three) Times a Day. 90 tablet 3    melatonin 5 MG tablet tablet Take 1 tablet by mouth At Night As Needed.      metoprolol tartrate (LOPRESSOR) 25 MG tablet TAKE 1 TABLET BY MOUTH TWICE A  tablet 1    ondansetron ODT (ZOFRAN-ODT) 4 MG disintegrating tablet DISSOLVE ONE TABLET ON THE TONGUE EVERY 8 HOURS AS NEEDED FOR NAUSEA OR VOMITING 20 tablet 1    pantoprazole (PROTONIX) 40 MG EC tablet Take 1 tablet by mouth Daily. 30 tablet 2    Semaglutide,0.25 or 0.5MG/DOS, (Ozempic, 0.25 or 0.5 MG/DOSE,) 2 MG/1.5ML solution pen-injector Inject 0.25 mg under the skin into the appropriate area as directed 1 (One) Time Per Week. (Patient taking differently: Inject 2 mg under the skin into the appropriate area as directed 1 (One) Time Per Week.) 3 mL 0    vitamin E 100 UNIT capsule Take 1 capsule by mouth Daily.       No current facility-administered medications for this visit.     REVIEW OF SYSTEMS  CONSTITUTIONAL:  No fever, chills or night sweats. Chronic fatigue, recently stable.  EYES:  No blurry vision, diplopia or other vision changes.  ENT:  No hearing loss, nosebleeds or sore throat.  CARDIOVASCULAR:  No palpitations,  "arrhythmia, syncopal episodes or edema.  PULMONARY:  No hemoptysis, wheezing, chronic cough or shortness of breath.  BREASTS: As per the HPI above.  GASTROINTESTINAL:  No nausea or vomiting. No constipation or diarrhea. No abdominal pain.  GENITOURINARY:  No hematuria, kidney stones or frequent urination.  MUSCULOSKELETAL:  As per the HPI above.  INTEGUMENTARY: As per the HPI above.  ENDOCRINE:  No excessive thirst or hot flashes.  HEMATOLOGIC:  No history of free bleeding, spontaneous bleeding or clotting.  IMMUNOLOGIC:  No allergies or frequent infections.  NEUROLOGIC: No numbness, tingling, seizures or weakness.  PSYCHIATRIC:  No anxiety or depression.    PHYSICAL EXAMINATION  /65   Pulse 71   Temp 97.1 °F (36.2 °C) (Temporal)   Resp 18   Ht 160 cm (63\")   Wt 110 kg (243 lb)   SpO2 96%   BMI 43.05 kg/m²     Pain Score:  Pain Score    24 1240   PainSc: 0-No pain     PHQ-Score Total:  PHQ-9 Total Score:      ECO  GENERAL:  A well-developed, well-nourished, morbidly obese, white female in no acute distress.  HEENT:  Pupils equally round and reactive to light. Extraocular muscles intact.  CARDIOVASCULAR:  Regular rate and rhythm. No murmurs, gallops or rubs.  LUNGS:  Clear to auscultation bilaterally.  BREASTS: Deferred today. Status post right-sided lumpectomy in 2021.  ABDOMEN:  Soft, nontender, nondistended with positive bowel sounds.  EXTREMITIES:  No clubbing, cyanosis or edema bilaterally.  SKIN:  No rashes or petechiae.  NEURO:  Cranial nerves grossly intact. No focal deficits.  PSYCH:  Alert and oriented x3.    The physical exam is again unchanged from recent priors.    LABORATORY  Lab Results   Component Value Date    WBC 5.26 2024    HGB 13.7 2024    HCT 41.1 2024    .5 (H) 2024     2024    NEUTROABS 2.22 2024       Lab Results   Component Value Date     (L) 2024    K 4.4 2024     2024    CO2 18.3 " (L) 07/16/2024    BUN 17 07/16/2024    CREATININE 0.88 07/16/2024    GLUCOSE 162 (H) 07/16/2024    CALCIUM 9.5 07/16/2024    AST 34 (H) 07/16/2024    ALT 19 07/16/2024    ALKPHOS 91 07/16/2024    BILITOT 0.6 07/16/2024    PROTEINTOT 7.4 07/16/2024    ALBUMIN 3.6 07/16/2024     CBC (07/16/2024): WBCs: 5.26; HgB: 13.7; Hct: 41.1; platelets: 156  CBC (09/22/2023): WBCs: 6.41; HgB: 14.3; Hct: 41.7; platelets: 142  CBC (07/21/2023): WBCs: 6.01; HgB: 12.9; Hct: 12.9; platelets: 132  CBC (01/19/2023): WBCs: 7.86; HgB: 13.9; Hct: 41.4; platelets: 117  CBC (01/11/2022): WBCs: 7.11; HgB: 12.5; Hct: 37.3; platelets: 152  CBC (11/29/2021): WBCs: 5.36; HgB: 12.8; Hct: 39.1; platelets: 148  CBC (11/09/2021): WBCs: 6.63; HgB: 11.9; Hct: 36.1; platelets: 160  CBC (08/06/2021): WBCs: 9.36; HgB: 9.2; Hct: 29.1; platelets: 131    IMAGING  MRI breast bilateral with and without contrast (06/22/2021):  Impression:  1) Negative left breast MRI.  2) Biopsy proven malignancy in the right 9:00 region measuring 3.7 cm. There are no additional worrisome findings in the right breast.    Bone densitometry (DEXA) scan (05/11/2021):  Impression: The BMD measured at the AP spine L1-L4 is 1.157 gm/cm2 with a T-score of -0.2. The patient is considered to be normal according to WHO criteria. Fracture risk is low.    Bilateral diagnostic mammogram with tomosynthesis (04/14/2022):  Impression:  1) Stable mammographic appearance of the left breast with no findings suspicious for malignancy.  2) Presumed postoperative and posttreatment related changes in the right breast. Recommend short interval followup.    Mammogram, diagnostic, right with tomosynthesis (10/18/2022):  Impression: Probably benign right mammographic findings. Recommend continued followup. Bi-Rads Category 3; Probably Benign.    Mammogram, diagnostic, bilateral with tomosynthesis (05/16/2023):  Impression: Stable mammographic appearance of both breasts including postoperative and  posttreatment related changes being followed in the right breast. Bi-Rads Category 3; Probably Benign.    Bone densitometry (DEXA) scan (05/16/2023):  Impression: The BMD measured at the AP spine L1-L4 is 1.121 gm/cm2 with a T-score of -0.6. The patient is considered to be normal according to WHO criteria. Fracture risk is low.    CT abdomen and pelvis without contrast (11/01/2023, compared to 03/09/2021):  Impression:  1) Cirrhotic appearance of the liver.  2) Other findings [are nonacute].    Right diagnostic mammogram with tomosynthesis (11/15/2023):  Impression: Probably benign right mammographic findings. Recommend continued followup. Bi-Rads Category 3; Probably Benign.    Bilateral diagnostic mammogram with tomosynthesis (06/05/2024):  Impression: Benign bilateral mammographic findings. Bi-Rads Category 2; Benign.    PATHOLOGY  Breast, lumpectomy, right (08/05/2021):  Invasive mammary carcinoma with ductal and lobular features, margins uninvolved. Atypical, lobular hyperplasia with involvement of ducts. Dense stromal fibrosis. Greatest dimension of invasive focus: 31 mm. ER positive (95%), WV positive (95%), HER2 negative (1+ by IHC). [id number] (stage IIB).    [place] node, right #1 (08/05/2021):  Metastatic mammary carcinoma, 2.5 cmm extent, with extranodal extension (1/1).    Axillary contents, right (08/05/2021):  No malignancy identified in twelve axillary lymph nodes (0/12).    MammaPrint result (08/31/2021): Low risk.    IMPRESSION AND PLAN  Ms. Wayne is a 69 y.o., white female with:  Breast carcinoma: Diagnosed in late Spring 2021 and status post a right-sided lumpectomy with right axillary evaluation on 08/05/2021. The final, surgical pathology was consistent with stage IIB ([id number]), ER positive (95%), WV positive (95%), HER2 negative (1+ by IHC), invasive, mammary carcinoma (with ductal and lobular features) of the right breast. The surgical margins were clear; and, while one sentinel node was  "involved, an additional twelve lymph nodes were all negative for metastasis (1/13). I have had multiple, long discussions with the patient since the time of her initial consultation in our clinic (on 08/25/2021) regarding this diagnosis and its prognosis. In short, her surgery went extremely well; and her prognosis was/remains overall very good. That said, adjuvant treatment was/remains recommended in order to maximize her chances of cure. As MammaPrint testing on her tumor was consistent with \"low risk\" disease, chemotherapy was not indicated (as the potential risks would have drastically outweighed its very limited/nonexistant potential benefits in her case). As she had a lumpectomy, whole breast radiation was definitely indicated, and she completed a thirty fraction course of this therapy on 11/30/2021. As her tumor is very strongly ER/NV positive, endocrine therapy (with an aromatase inhibitor, given her postmenopausal status) was also definitely indicated and strongly recommended as the final step in her adjuvant treatment. She was given a Rx for Arimidex 1 mg PO daily at that time. She has been on this therapy for a total of two and one half (2.5) years now and is still tolerating it overall well, without any noticeable side effects. We will proceed with this current treatment plan. Meanwhile, I have had multiple, long discussions with her regarding the current guidelines for the routine follow up of patients with a history of breast cancer. We again discussed how, other than periodic clinic visits, continued, routine mammograms of remaining breast tissue (the most recent followup, bilateral exam, performed on 06/05/2024 and summarized above, was unremarkable; this exam will be repeated in early Summer 2025) and routine bone density monitoring (particularly since she is on an AI), there is no proven benefit to performing additional studies (including blood work, such as CBCs, LFTs or tumor markers, or imaging, " such as CT, NM bone or PET scans) to identify metastatic recurrences before they become symptomatic (and are identified via the focused investigation of new symptoms), as patient outcomes are not improved by doing so (she should continue to get routine CBCs, etc. through her PCP’s office as indicated, of course). We will see her back in our clinic in six months (~early February 2025) for another wellness visit.  Bone health: The results from the patient's most recent DEXA scan (performed on 05/16/2023) are summarized above. Her BMD remains WNL. Continue to monitor on Arimidex. We will repeat a DEXA scan in May 2025.  Lymphedema: Secondary to a combination of right-sided, axillary surgery and adjuvant, localized XRT. She is currently not following routinely with our lymphedema clinic as she continues to do well performing some routine exercises on her own. Continue to monitor.  Seroma/skin tags: Ongoing follow up per surgery.  The patient was in agreement with these plans.    It is a pleasure to participate in Ms. Wayne's care. Please do not hesitate to call with any questions or concerns that you may have.    A total of 30 minutes were spent coordinating this patient’s care in clinic today; more than 50% of this time was face-to-face with the patient, reviewing her interim medical history, discussing the results of June's repeat, bilateral mammogram and counseling on the current treatment and followup plan. All questions were answered to her satisfaction.    FOLLOW UP  Continue Arimidex 1 mg PO daily (refills provided today). With surgery, as previously planned. Return to our clinic in 6 months (~early February 2025) for another wellness visit.            This document was electronically signed by LEYDI Teresa MD August 9, 2024 12:51 EDT      CC: MD Marbin Ramirez MD Sherelene S. Fortney, [place]

## 2024-08-10 ENCOUNTER — HOSPITAL ENCOUNTER (EMERGENCY)
Facility: HOSPITAL | Age: 69
Discharge: HOME | End: 2024-08-10
Payer: MEDICARE

## 2024-08-10 ENCOUNTER — APPOINTMENT (OUTPATIENT)
Dept: GENERAL RADIOLOGY | Facility: HOSPITAL | Age: 69
End: 2024-08-10
Payer: MEDICARE

## 2024-08-10 ENCOUNTER — APPOINTMENT (OUTPATIENT)
Dept: CT IMAGING | Facility: HOSPITAL | Age: 69
End: 2024-08-10
Payer: MEDICARE

## 2024-08-10 VITALS
BODY MASS INDEX: 44.47 KG/M2 | SYSTOLIC BLOOD PRESSURE: 160 MMHG | TEMPERATURE: 98 F | HEART RATE: 73 BPM | WEIGHT: 251 LBS | HEIGHT: 63 IN | RESPIRATION RATE: 17 BRPM | DIASTOLIC BLOOD PRESSURE: 79 MMHG | OXYGEN SATURATION: 98 %

## 2024-08-10 DIAGNOSIS — S42.295A OTHER CLOSED NONDISPLACED FRACTURE OF PROXIMAL END OF LEFT HUMERUS, INITIAL ENCOUNTER: Primary | ICD-10-CM

## 2024-08-10 PROCEDURE — 73060 X-RAY EXAM OF HUMERUS: CPT | Performed by: RADIOLOGY

## 2024-08-10 PROCEDURE — 73060 X-RAY EXAM OF HUMERUS: CPT

## 2024-08-10 PROCEDURE — 73200 CT UPPER EXTREMITY W/O DYE: CPT

## 2024-08-10 PROCEDURE — 99284 EMERGENCY DEPT VISIT MOD MDM: CPT

## 2024-08-10 RX ORDER — OXYCODONE AND ACETAMINOPHEN 7.5; 325 MG/1; MG/1
1 TABLET ORAL ONCE
Status: COMPLETED | OUTPATIENT
Start: 2024-08-10 | End: 2024-08-10

## 2024-08-10 RX ORDER — HYDROCODONE BITARTRATE AND ACETAMINOPHEN 5; 325 MG/1; MG/1
1 TABLET ORAL EVERY 8 HOURS PRN
Qty: 9 TABLET | Refills: 0 | Status: SHIPPED | OUTPATIENT
Start: 2024-08-10

## 2024-08-10 RX ADMIN — OXYCODONE HYDROCHLORIDE AND ACETAMINOPHEN 1 TABLET: 7.5; 325 TABLET ORAL at 21:54

## 2024-08-11 PROCEDURE — 73200 CT UPPER EXTREMITY W/O DYE: CPT | Performed by: RADIOLOGY

## 2024-08-11 NOTE — ED PROVIDER NOTES
Subjective   History of Present Illness  Ronna is a 69-year-old female presents for evaluation for right arm injury after a fall.  She states she fell prior to arrival landing on her left upper extremity.  She does have a history of A-fib and TIA in which she takes Eliquis.  She has a history of a pacemaker as well.      Review of Systems   Musculoskeletal:  Positive for arthralgias.       Past Medical History:   Diagnosis Date    Anxiety     B12 deficiency     BPV (benign positional vertigo)     Breast cancer 05/2021    Colon polyp     Diabetes mellitus     Diarrhea     Disease of thyroid gland     Elevated cholesterol     Fibrocystic breast     Fibromyalgia     GERD (gastroesophageal reflux disease)     Glaucoma     Heart murmur     Hyperlipidemia     Hypertension     Kidney disease     Obesity     Peptic ulceration     PONV (postoperative nausea and vomiting)     Primary central sleep apnea     Sleep apnea     c-pap    Stroke     TIA (transient ischemic attack) 03/31/2019    Urinary incontinence     Vaginal infection     Weakness of left arm        Allergies   Allergen Reactions    Codeine GI Intolerance     Increased heart rate      Sulfa Antibiotics GI Intolerance     Heart rate increase        Past Surgical History:   Procedure Laterality Date    ABDOMINAL SURGERY      APPENDECTOMY      BREAST ABSCESS INCISION AND DRAINAGE Right 07/07/2022    Procedure: BREAST ABSCESS INCISION AND DRAINAGE;  Surgeon: Lee Parrish MD;  Location: [facility];  Service: General;  Laterality: Right;    BREAST BIOPSY  May 2021    BREAST BIOPSY Right 2024    BREAST CYST ASPIRATION      BREAST LUMPECTOMY WITH SENTINEL NODE BIOPSY Right 08/05/2021    Procedure: BREAST LUMPECTOMY WITH SENTINEL NODE BIOPSY;  Surgeon: Lee Parrish MD;  Location: [facility];  Service: General;  Laterality: Right;    CARDIAC ELECTROPHYSIOLOGY PROCEDURE N/A 01/09/2023    Procedure: Pacemaker DC new;  Surgeon: Bj Hyde MD;   Location:  [facility];  Service: Cardiology;  Laterality: N/A;    CARDIAC ELECTROPHYSIOLOGY PROCEDURE N/A 2023    Procedure: EP +/- RFA SVT, hold Eliquis 1 day, hold metoprolol 3 days;  Surgeon: Israel Barrientos DO;  Location:  [facility];  Service: Cardiovascular;  Laterality: N/A;    CARDIAC ELECTROPHYSIOLOGY STUDY AND ABLATION      2023 PER DR. BARRIENTOS    CATARACT EXTRACTION Bilateral     CHOLECYSTECTOMY      COLONOSCOPY      ENDOSCOPY      HEMATOMA EVACUATION TRUNK Right 2021    Procedure: HEMATOMA EVACUATION TRUNK;  Surgeon: Lee Parrish MD;  Location:  [facility];  Service: General;  Laterality: Right;    PACEMAKER IMPLANTATION      URETHRA SURGERY         Family History   Problem Relation Age of Onset    Thyroid disease Mother     Diabetes Mother     Hearing loss Mother     Kidney disease Mother     Hyperlipidemia Father     Hypertension Father     Heart attack Father     Lung disease Father     Hearing loss Father     Hypertension Sister     Breast cancer Sister 40    Obesity Sister     Hypertension Sister     Hyperlipidemia Sister     Cancer Sister         Breast cancer    Hyperlipidemia Sister     Hypertension Sister     Arthritis Sister     Obesity Sister     Thyroid disease Sister     Alcohol abuse Maternal Aunt     Cancer Maternal Grandfather         Prostrate    Hearing loss Maternal Grandfather     Stroke Paternal Grandmother     Stroke Paternal Grandfather     Hypertension Other        Social History     Socioeconomic History    Marital status:    Tobacco Use    Smoking status: Former     Current packs/day: 0.00     Average packs/day: 1.5 packs/day for 3.0 years (4.5 ttl pk-yrs)     Types: Cigarettes, Cigars     Start date: 1973     Quit date: 1976     Years since quittin.6     Passive exposure: Past    Smokeless tobacco: Never    Tobacco comments:     I smoked as a teen and stopped when my son was born   Vaping Use    Vaping  status: Never Used   Substance and Sexual Activity    Alcohol use: No     Comment: former social drinker not had anything in 25 + years    Drug use: Never    Sexual activity: Not Currently     Partners: Male     Birth control/protection: Abstinence, Post-menopausal, Vasectomy           Objective   Physical Exam  Constitutional:       Appearance: Normal appearance.   HENT:      Head: Normocephalic and atraumatic.      Right Ear: External ear normal.      Left Ear: External ear normal.   Eyes:      Conjunctiva/sclera: Conjunctivae normal.   Pulmonary:      Effort: Pulmonary effort is normal.   Abdominal:      General: Abdomen is flat.   Musculoskeletal:         General: Signs of injury (Left upper extremity) present.      Cervical back: Normal range of motion.   Skin:     General: Skin is warm and dry.      Capillary Refill: Capillary refill takes less than 2 seconds.   Neurological:      General: No focal deficit present.      Mental Status: She is alert and oriented to person, place, and time.   Psychiatric:         Mood and Affect: Mood normal.         Behavior: Behavior normal.         Procedures           ED Course  ED Course as of 08/10/24 2155   Sat Aug 10, 2024   2101 Narrative & Impression  PROCEDURE: X-ray examination of the left humerus performed on August 10,  2024. 2 views.     HISTORY: Fall. Arm injury. Pain.     COMPARISON: None.     FINDINGS:     Acute transverse fracture across the left humeral neck with associated  mild medial displacement of the distal fracture component.  Partial impaction of fracture components noted  No acute dislocation of the left humeral head  Intact left AC joint.  Preserved subacromial space  Left side pacemaker noted.     IMPRESSION:     1.  Acute transverse fracture across the left humeral neck with  associated mild medial displacement of the distal fracture component.  2.  Partial impaction of fracture components  3.  Medial displacement estimated at 6 mm  4.  No acute  dislocation of the left humeral head.     This report was finalized on 8/10/2024 9:01 PM by Saman Vo MD.   [CE]   2114 Discussed case with Dr. Mittal, suggested CT for surgical planning. [CE]      ED Course User Index  [CE] Richard Ivy APRN                                             Medical Decision Making  Ronna is a 69-year-old female presents for evaluation for right arm injury after a fall.  She states she fell prior to arrival landing on her left upper extremity.  She does have a history of A-fib and TIA in which she takes Eliquis.  She has a history of a pacemaker as well.    Problems Addressed:  Other closed nondisplaced fracture of proximal end of left humerus, initial encounter: complicated acute illness or injury    Amount and/or Complexity of Data Reviewed  Radiology: ordered.  Discussion of management or test interpretation with external provider(s): Discussed with Dr. Mittal.    Risk  Prescription drug management.  Risk Details: ED stay uneventful.  X-ray shows transverse fracture of humeral head.  Patient had CT prior to discharge for surgical planning.  Will follow-up with Ortho in office.  Will return to the emergency department for any new needs, concerns or changes arise.        Final diagnoses:   Other closed nondisplaced fracture of proximal end of left humerus, initial encounter       ED Disposition  ED Disposition       ED Disposition   Discharge    Condition   Stable    Comment   --               Mague Acosta, APRN  [address] [facility] [address] [phone]    In 3 days      Dennis Mittal MD  [address] [place] [address] [phone]    In 2 days           Medication List        New Prescriptions      HYDROcodone-acetaminophen 5-325 MG per tablet  Commonly known as: NORCO  Take 1 tablet by mouth Every 8 (Eight) Hours As Needed for Moderate Pain.            Changed      Ozempic (0.25 or 0.5 MG/DOSE) 2 MG/1.5ML solution pen-injector  Generic  drug: Semaglutide(0.25 or 0.5MG/DOS)  Inject 0.25 mg under the skin into the appropriate area as directed 1 (One) Time Per Week.  What changed: how much to take               Where to Get Your Medications        These medications were sent to [facility] [id number] - [place] - [address] [facility] [address] AT [address] [place] [address] - [phone]  - [phone] FX  [address] [place] [address]      Phone: [phone]   HYDROcodone-acetaminophen 5-325 MG per tablet            Richard Ivy, APRN  08/10/24 0030

## 2024-08-12 ENCOUNTER — TELEPHONE (OUTPATIENT)
Dept: CARDIOLOGY | Facility: CLINIC | Age: 69
End: 2024-08-12
Payer: MEDICARE

## 2024-08-13 NOTE — TELEPHONE ENCOUNTER
Left reverse total shoulder arthoplasty.     LS 8/6/24  F/up 2/6/25  Stress 6/11/23  Echo 6/11/23    Form filled out and placed on Brenda's desk.

## 2024-08-16 ENCOUNTER — TELEPHONE (OUTPATIENT)
Dept: CARDIOLOGY | Facility: CLINIC | Age: 69
End: 2024-08-16

## 2024-08-21 RX ORDER — LOSARTAN POTASSIUM 25 MG/1
25 TABLET ORAL DAILY
Qty: 90 TABLET | Refills: 2 | Status: SHIPPED | OUTPATIENT
Start: 2024-08-21

## 2024-08-22 ENCOUNTER — TELEPHONE (OUTPATIENT)
Dept: FAMILY MEDICINE CLINIC | Facility: CLINIC | Age: 69
End: 2024-08-22
Payer: MEDICARE

## 2024-08-22 NOTE — TELEPHONE ENCOUNTER
Left patient a voicemail letting her know that her Ozempic has came in from the patient assistance program and she can come by anytime before 5 to pick it up.

## 2024-08-26 DIAGNOSIS — E83.42 HYPOMAGNESEMIA: ICD-10-CM

## 2024-08-26 RX ORDER — LANOLIN ALCOHOL/MO/W.PET/CERES
1 CREAM (GRAM) TOPICAL 3 TIMES DAILY
Qty: 90 TABLET | Refills: 3 | Status: SHIPPED | OUTPATIENT
Start: 2024-08-26

## 2024-08-27 ENCOUNTER — PATIENT OUTREACH (OUTPATIENT)
Dept: CASE MANAGEMENT | Facility: OTHER | Age: 69
End: 2024-08-27
Payer: MEDICARE

## 2024-08-27 NOTE — OUTREACH NOTE
AMBULATORY CASE MANAGEMENT NOTE    Names and Relationships of Patient/Support Persons: Contact: Rosana, Ronna Álvarez; Relationship: Self -     Patient Outreach    Patient Name: Ronna Wayne   Encounter Date 08/27/2024      Program: HRCM Encounter Provider: TEODORA Ford Active  Type of Outreach:  Telephonic   PURPOSE OF OUTREACH:    RN-DEAN outreach with patient engaged in HRCM for education and assistance with disease management.    Patients last PCP office visit was: 07/19/24  Patient's last specialist visit was: 08/23/24 with [facility]    Assessment:  New or worsening symptoms since last contact:  Increase in pain due to surgical procedure    Medication/treatment plan changes since the last contact: Patient reports having undergone a complete left shoulder replacement on 08/23/24.  Surgical provider was Dennis Mittal with [facility].  Procedure at [facility].    The patient expressed the following new concerns: None; spouse is assisting and provides transportation as needed.  Patient has single point cane.  She continues to use the sling provided after surgery and has next Ortho appointment scheduled for stitch removal and wound evaluation.    ED/UC visits, admissions,etc. since last contact: 08/10/24 ED at [facility] after a fall.  Clinical impression noted as closed nondisplaced fracture of proximal end of left humerus.      Recommendations/Goals addressed:     Goals Addressed                   This Visit's Progress     Follow up with doctor(s)        Next Ortho appointment is set for 08/29/24 at [facility]              Upcoming appointments:  Future Appointments         Provider Department Center    10/30/2024 11:45 AM Mague Acosta APRN [facility] FAMILY MEDICINE [facility]    12/18/2024 1:10 PM Lee Parrish MD [facility] GENERAL SURGERY  [facility]    2/6/2025 2:00 PM Brenda Singh APRN [facility] CARDIOLOGY [facility]    2/10/2025 1:00 PM (Arrive by 12:30 PM) LEYDI Teresa MD [facility] HEMATOLOGY & ONCOLOGY [facility]    8/1/2025 2:30 PM Israel Steele DO [facility] CARDIOLOGY [facility]            Last clinical bp readings:  BP Readings from Last 3 Encounters:   08/10/24 160/79   08/09/24 134/65   08/06/24 122/68       A1C if applicable:  A1C Last 3 Results          9/22/2023    09:20 3/7/2024    09:46 7/16/2024    10:29   HGBA1C Last 3 Results   Hemoglobin A1C 9.30  8.50  8.20          Rosa DAVIS RN-[facility]  Ambulatory Case Management  [phone]    08/27/24   13:28 EDT

## 2024-09-03 ENCOUNTER — SPECIALTY PHARMACY (OUTPATIENT)
Dept: PHARMACY | Facility: HOSPITAL | Age: 69
End: 2024-09-03
Payer: MEDICARE

## 2024-09-03 RX ORDER — GABAPENTIN 300 MG/1
300 CAPSULE ORAL DAILY
COMMUNITY
Start: 2024-08-18 | End: 2025-08-18

## 2024-09-03 NOTE — PROGRESS NOTES
Medication Management Clinic/ Specialty Pharmacy Patient Management Program  Lipid Management Program - [facility] Clinical Reassessment   Ronna Wayne is a 69 y.o. female referred by their provider, Brenda Singh, to [facility] for Lipid Management.  A follow-up outreach was conducted, including assessment of therapy appropriateness and specialty medication education for Repatha. The patient was introduced to services offered by [facility], including: regular assessments, refill coordination, curbside pick-up or mail order delivery options, prior authorization maintenance, and financial assistance programs as applicable. The patient was also provided with contact information for the pharmacy team.     Ronna Wayne is  treated for clinical ASCVD (stroke 2017) and hyperlipidemia and currently takes nothing for her cholesterol.  In the past, Pt has tried fish oil, rosuvastatin, and atorvastatin. Statins were discontinued due to myalgias. The patient denies any allergies to latex.       Insurance Coverage & Financial Support  [facility]     Relevant Past Medical History and Comorbidities  Relevant medical history and concomitant health conditions were discussed with the patient. The patient's chart has been reviewed for relevant past medical history and comorbid conditions and updated as necessary.    Past Medical History:   Diagnosis Date    Anxiety     B12 deficiency     BPV (benign positional vertigo)     Breast cancer 05/2021    Colon polyp     Diabetes mellitus     Diarrhea     Disease of thyroid gland     Elevated cholesterol     Fibrocystic breast     Fibromyalgia     GERD (gastroesophageal reflux disease)     Glaucoma     Heart murmur     Hyperlipidemia     Hypertension     Kidney disease     Obesity     Peptic ulceration     PONV (postoperative nausea and vomiting)     Primary central sleep apnea      Sleep apnea     c-pap    Stroke     TIA (transient ischemic attack) 2019    Urinary incontinence     Vaginal infection     Weakness of left arm      Social History     Socioeconomic History    Marital status:    Tobacco Use    Smoking status: Former     Current packs/day: 0.00     Average packs/day: 1.5 packs/day for 3.0 years (4.5 ttl pk-yrs)     Types: Cigarettes, Cigars     Start date: 1973     Quit date: 1976     Years since quittin.7     Passive exposure: Past    Smokeless tobacco: Never    Tobacco comments:     I smoked as a teen and stopped when my son was born   Vaping Use    Vaping status: Never Used   Substance and Sexual Activity    Alcohol use: No     Comment: former social drinker not had anything in 25 + years    Drug use: Never    Sexual activity: Not Currently     Partners: Male     Birth control/protection: Abstinence, Post-menopausal, Vasectomy       Problem list reviewed by Sinai Iraheta, PharmD on 9/3/2024 at  3:03 PM    Allergies  Known allergies and reactions were discussed with the patient. The patient's chart has been reviewed for  allergy information and updated as necessary.   Allergies   Allergen Reactions    Codeine GI Intolerance     Increased heart rate      Sulfa Antibiotics GI Intolerance     Heart rate increase        Allergies reviewed by Sinai Iraheta, PharmD on 9/3/2024 at  2:38 PM    Relevant Laboratory Values  Relevant laboratory values were discussed with the patient. The following specialty medication dose adjustment(s) are recommended: See plan, if applicable   Lab Results   Component Value Date    CHOL 137 2024    CHLPL 171 2019    TRIG 132 2024    HDL 63 (H) 2024    LDL 51 2024       Current Medication List  This medication list has been reviewed with the patient and evaluated for any interactions or necessary modifications/recommendations, and updated to include all prescription medications, OTC  medications, and supplements the patient is currently taking.  This list reflects what is contained in the patient's profile, which has also been marked as reviewed to communicate to other providers it is the most up to date version of the patient's current medication therapy.     Current Outpatient Medications:     acetaminophen (TYLENOL) 325 MG tablet, Take 2 tablets by mouth Every 6 (Six) Hours As Needed for Mild Pain., Disp: , Rfl:     anastrozole (ARIMIDEX) 1 MG tablet, Take 1 tablet by mouth Daily., Disp: 30 tablet, Rfl: 11    apixaban (ELIQUIS) 5 MG tablet tablet, Take 1 tablet by mouth 2 (Two) Times a Day., Disp: 60 tablet, Rfl: 4    aspirin 81 MG chewable tablet, Chew & swallow 1 tablet Daily., Disp: 30 tablet, Rfl: 0    cholecalciferol (VITAMIN D3) 25 MCG (1000 UT) tablet, Take 2 tablets by mouth Daily., Disp: , Rfl:     Continuous Glucose  (Dexcom G7 ) device, Use 1 Device Daily., Disp: 1 each, Rfl: 0    Continuous Glucose Sensor (Dexcom G7 Sensor) misc, Use 1 Device 90 Minutes Prior to Surgery for 1 dose., Disp: 9 each, Rfl: 3    Diclofenac Sodium (VOLTAREN) 1 % gel gel, Apply 4 g topically to the appropriate area as directed 3 (Three) Times a Day As Needed (joint pain)., Disp: 350 g, Rfl: 2    empagliflozin (Jardiance) 25 MG tablet tablet, Take 1 tablet by mouth Every Morning., Disp: 30 tablet, Rfl: 3    Evolocumab (REPATHA) solution auto-injector SureClick injection, Inject 1 mL under the skin into the appropriate area as directed Every 14 (Fourteen) Days., Disp: 2 mL, Rfl: 5    furosemide (LASIX) 20 MG tablet, TAKE 1 TABLET BY MOUTH DAILY, Disp: 90 tablet, Rfl: 0    gabapentin (NEURONTIN) 300 MG capsule, Take 1 capsule by mouth Daily., Disp: , Rfl:     HYDROcodone-acetaminophen (NORCO) 5-325 MG per tablet, Take 1 tablet by mouth Every 8 (Eight) Hours As Needed for Moderate Pain., Disp: 9 tablet, Rfl: 0    insulin aspart (novoLOG FLEXPEN) 100 UNIT/ML solution pen-injector sc pen, Inject  0-9 Units under the skin into the appropriate area as directed 3 (Three) Times a Day With Meals. Admelog Solostar sliding scale, Disp: , Rfl:     Insulin Glargine, 1 Unit Dial, (Toujeo SoloStar) 300 UNIT/ML solution pen-injector injection, Inject 36 Units under the skin into the appropriate area as directed Every Morning., Disp: , Rfl:     Insulin Pen Needle 32G X 5 MM misc, Use 1 each 3 (Three) Times a Day., Disp: 100 each, Rfl: 5    levothyroxine (SYNTHROID, LEVOTHROID) 88 MCG tablet, TAKE 1 TABLET BY MOUTH DAILY, Disp: 90 tablet, Rfl: 0    linaclotide (Linzess) 72 MCG capsule capsule, Take 1 capsule by mouth Every Morning Before Breakfast., Disp: 30 capsule, Rfl: 0    linaGLIPtin-metFORMIN HCl (Jentadueto) 2.5-1000 MG tablet, Take  by mouth 2 (Two) Times a Day., Disp: , Rfl:     losartan (COZAAR) 25 MG tablet, TAKE 1 TABLET BY MOUTH DAILY, Disp: 90 tablet, Rfl: 2    Magnesium Oxide -Mg Supplement 400 (240 Mg) MG tablet, TAKE 1 TABLET BY MOUTH 3 TIMES A DAY, Disp: 90 tablet, Rfl: 3    melatonin 5 MG tablet tablet, Take 1 tablet by mouth At Night As Needed., Disp: , Rfl:     metoprolol tartrate (LOPRESSOR) 25 MG tablet, TAKE 1 TABLET BY MOUTH TWICE A DAY, Disp: 180 tablet, Rfl: 1    ondansetron ODT (ZOFRAN-ODT) 4 MG disintegrating tablet, DISSOLVE ONE TABLET ON THE TONGUE EVERY 8 HOURS AS NEEDED FOR NAUSEA OR VOMITING, Disp: 20 tablet, Rfl: 1    pantoprazole (PROTONIX) 40 MG EC tablet, Take 1 tablet by mouth Daily., Disp: 30 tablet, Rfl: 2    Semaglutide,0.25 or 0.5MG/DOS, (Ozempic, 0.25 or 0.5 MG/DOSE,) 2 MG/1.5ML solution pen-injector, Inject 0.25 mg under the skin into the appropriate area as directed 1 (One) Time Per Week. (Patient taking differently: Inject 2 mg under the skin into the appropriate area as directed 1 (One) Time Per Week.), Disp: 3 mL, Rfl: 0    vitamin E 100 UNIT capsule, Take 1 capsule by mouth Daily. (Patient not taking: Reported on 9/3/2024), Disp: , Rfl:   No current facility-administered  medications for this visit.    Medicines reviewed by Sinai Iraheta, PharmSAMINA on 9/3/2024 at  2:42 PM  Medicines reviewed by Sinai Iraheta, Jorden on 9/3/2024 at  2:43 PM    Drug Interactions  No significant drug interactions with Repatha    Goals of Therapy  Goals related to the patient's specialty therapy were discussed with the patient. The Patient Goals segment of this outreach has been reviewed and updated.     Goals Addressed Today        Specialty Pharmacy General Goal      LDL Reduction            LDL has been reduced from 145 to 51 in 3 months. This was reviewed with the patient. LDL at goal    Adverse Drug Reactions  Medication tolerability: Tolerating with no to minimal ADRs  Medication plan: Continue therapy with normal follow-up  Plan for ADR Management: Addressed in Plan, if applicable    Adherence, Self-Administration, and Current Therapy Problems  Adherence related to the patient's specialty therapy was discussed with the patient. The Adherence segment of this outreach has been reviewed and updated.     Adherence Questions  Linked Medication(s) Assessed: Evolocumab  On average, how many doses/injections does the patient miss per month?: 0  What are the identified reasons for non-adherence or missed doses? : no problems identified  What is the estimated medication adherence level?: %  Based on the patient/caregiver response and refill history, does this patient require an MTP to track adherence improvements?: no    Additional Barriers to Patient Self-Administration: Addressed in Plan, if applicable  Methods for Supporting Patient Self-Administration: Addressed in Plan, if applicable    Open Medication Therapy Problems  No medication therapy recommendations to display    Quality of Life Assessment   Quality of Life related to the patient's enrollment in the patient management program and services provided was discussed with the patient. The QOL segment of this outreach has been reviewed and  updated.  Quality of Life Improvement Scale: 10-Significantly better    Medication Assessment & Plan  Medication Therapy Changes: Patient Continues on Repatha 140 mg SC every 2 weeks.   Welcome information and patient satisfaction survey to be sent by specialty pharmacy team with patient's initial fill.  Related Plans, Therapy Recommendations, or Therapy Problems to Be Addressed: none  Patient will need lipid panel in 6 weeks, order placed.   Patient will continue regular follow-up with cardiology. Next appointment is 2/6/25  Patient will follow up with specialty pharmacy for next injection. Care Coordinator to set up future refill outreaches, coordinate prescription delivery, and escalate clinical questions to pharmacist.  Pharmacist to perform regular assessments no more than (6) months from the previous assessment. Will follow-up in 6 months, or sooner if needed.    Attestation  Therapeutic appropriateness: Appropriate   I attest the patient was actively involved in and has agreed to the above plan of care. If the prescribed therapy is at any point deemed not appropriate based on the current or future assessments, a consultation will be initiated with the patient's specialty care provider to determine the best course of action. The revised plan of therapy will be documented along with any required assessments and/or additional patient education provided.     Sinai Iraheta, PharmD  9/3/2024  15:06 EDT

## 2024-09-04 DIAGNOSIS — K21.00 GASTROESOPHAGEAL REFLUX DISEASE WITH ESOPHAGITIS WITHOUT HEMORRHAGE: ICD-10-CM

## 2024-09-04 RX ORDER — PANTOPRAZOLE SODIUM 40 MG/1
40 TABLET, DELAYED RELEASE ORAL DAILY
Qty: 90 TABLET | Refills: 0 | Status: SHIPPED | OUTPATIENT
Start: 2024-09-04

## 2024-09-12 NOTE — PROGRESS NOTES
LOS: 3 days     Name: Ronna Wayne  Age/Sex: 67 y.o. female  :  1955        PCP: Mague Acosta APRN  REF: No ref. provider found    Principal Problem:    Paroxysmal SVT (supraventricular tachycardia) (HCC)  Active Problems:    SVT (supraventricular tachycardia) (HCC)      Reason for follow-up: SVT and bradycardia     Subjective   HPI:  Subjective   Ronna Wayne is a 67 y.o. female with past medical history significant for paroxysmal SVT, diabetes mellitus, essential hypertension, obstructive sleep apnea, history of CVA.  Patient originally presented to [facility] emergency room on 2022 with complaints of shortness of breath and fast heart rate. She was found to be in SVT was gven 6 mg of IV adenosine which was successful in achieving sinus rhythm. Cardiology was consulted for further evaluation and management.     Interval History:   Patient has been seen and examined today.  Telemetry reveals SR 60's while in her room for examination. Patient is lying in bed resting quietly with head of bed at about 15 degrees. Male visitor is at bedside. Patient denies shortness of breath, chest pains, or palpitations. B/P 142/82 currently.  Oxygen saturation is at 96% on RA. Discussed with patient about avoiding any stimulants such as caffeine and the need to refer her to an EP for potential ablation in the outpatient setting and patient agrees to this plan at this time.    Vital Signs  Temp:  [98.4 °F (36.9 °C)-99 °F (37.2 °C)] 98.5 °F (36.9 °C)  Heart Rate:  [49-61] 59  Resp:  [-] 18  BP: ()/() 93/83     Vital Signs (last 72 hrs)        0700   0659  0700   0659  0700   0659  0700   1213   Most Recent      Temp (°F) 97.8 -  98.3    97.8 -  98.2    98.4 -  99      98.5     98.5 (36.9)  0800    Heart Rate 35 -  152    38 -  60    46 -  61    51 -  59     59  1202    Resp  -  14     -     20 18 12/20 1202    BP 91/47 -  159/93    98/55 -  168/93    95/82 -  170/104    93/83 -  143/81     93/83 12/20 1202    SpO2 (%) 91 -  99    90 -  96    91 -  99    95 -  98     96 12/20 1202        Body mass index is 48.54 kg/m².    Intake/Output Summary (Last 24 hours) at 12/20/2022 1213  Last data filed at 12/19/2022 1359  Gross per 24 hour   Intake --   Output 200 ml   Net -200 ml     Objective  Objective   I have seen and examined Ms. Wayne.  Physical Exam:      General Appearance:    Alert, cooperative, in no acute distress   Head:    Normocephalic, without obvious abnormality, atraumatic   Eyes:                          Conjunctivae and sclerae normal, no icterus, no pallor, corneas clear   Neck:   No adenopathy, supple, trachea midline, no thyromegaly, no carotid bruit, no JVD   Lungs:     Clear to auscultation, respirations regular, even and             unlabored    Heart:    Regular rhythm and normal rate, normal S1 and S2, + systolic murmur noted, no gallop, no rub, no click   Chest Wall:    No abnormalities observed   Abdomen:     Normal bowel sounds, no masses, no organomegaly, soft      non-tender, non-distended, no guarding, no rebound                tenderness   Extremities:   Moves all extremities well, no edema, no cyanosis, no            redness   Pulses:   Pulses palpable and equal bilaterally   Skin:   No bleeding, bruising or rash   Neurologic:   Alert and Oriented x 3, Speech Clear & comprehensive.       Results review   Results Review:   Results from last 7 days   Lab Units 12/19/22  1159 12/18/22  0353 12/17/22 2029 12/17/22  0147   WBC 10*3/mm3 6.97 7.64 8.59 5.86   HEMOGLOBIN g/dL 13.6 14.5 14.8 15.0   PLATELETS 10*3/mm3 111* 86* 136* 122*     Results from last 7 days   Lab Units 12/19/22  1159 12/18/22  0353 12/17/22 2029 12/17/22  0147   SODIUM mmol/L 135* 131* 130* 137   POTASSIUM mmol/L 4.1 4.0 3.5 3.8   CHLORIDE mmol/L 102 103 96* 98   CO2 mmol/L 21.9* 16.3* 20.1* 23.0    BUN mg/dL 22 26* 25* 29*   CREATININE mg/dL 0.98 0.91 0.96 0.97   CALCIUM mg/dL 9.3 9.1 9.6 9.7   GLUCOSE mg/dL 210* 253* 245* 286*   ALT (SGPT) U/L  --  20  --  27   AST (SGOT) U/L  --  29  --  39*     Results from last 7 days   Lab Units 22  0338 22  0147   TROPONIN T ng/mL 0.019 <0.010     Lab Results   Component Value Date    PROBNP 244.6 2021    PROBNP 297.5 2021    PROBNP 779.7 2021     Lab Results   Component Value Date    INR 1.06 2022    INR 1.02 2021    INR 1.05 2021    INR 1.05 2021    INR 1.09 2021     Lab Results   Component Value Date    MG 2.3 2022    MG 1.5 (L) 2022    MG 1.6 10/27/2022     Lab Results   Component Value Date    TSH 0.414 2022    CHLPL 226 (H) 2016    TRIG 200 (H) 10/21/2022    HDL 51 10/21/2022     (H) 10/21/2022      Imaging Results (Last 48 Hours)     ** No results found for the last 48 hours. **          ECHO:  Results for orders placed during the hospital encounter of 22    Adult Transthoracic Echo Complete W/ Cont if Necessary Per Protocol    Interpretation Summary  •  Left ventricular systolic function is normal. Left ventricular ejection fraction appears to be 61 - 65%.  •  Left ventricular diastolic function is consistent with (grade II w/high LAP) pseudonormalization.  •  Left atrial volume is mildly increased.  •  The right atrial cavity is mildly  dilated.  •  Mild aortic valve stenosis is present, with an estimated aortic valve area of 1.8 cm² and the mean gradient of 19 mmHg.  •  Estimated right ventricular systolic pressure from tricuspid regurgitation is mildly elevated (35-45 mmHg).      [id number]      Telemetry: SB/ SR 49 -60's     I reviewed the patient's new clinical results.    Medication Review:   anastrozole, 1 mg, Oral, Daily  aspirin, 81 mg, Oral, Daily  cholecalciferol, 1,000 Units, Oral, Daily  FLUoxetine, 20 mg, Oral, Daily  heparin (porcine), 5,000  Units, Subcutaneous, Q8H  Insulin Aspart, 0-9 Units, Subcutaneous, 4x Daily AC & at Bedtime  levothyroxine, 88 mcg, Oral, Daily  rosuvastatin, 20 mg, Oral, Daily  sodium chloride, 10 mL, Intravenous, Q12H         Assessment    Assessment:  1. Paroxysmal SVT.  2. Obstructive sleep apnea, on CPAP.  3. Essential hypertension, controlled.  4. Type 2 diabetes mellitus.    Plan   Recommendations:  1. This patient does not have any known coronary artery disease or anginal symptoms and no evidence of significant structural heart disease, will try flecainide for paroxysmal SVT.  Monitor QRS duration.  2. I have discussed with Ms. Wayne about referring to electrophysiologist as an outpatient for consideration of RF ablation of paroxysmal SVT.  She seems agreeable.    I discussed the patients findings and my recommendations with patient.    Electronically signed by BOSTON Chaudhry, 12/20/22, 12:28 PM EST.    Electronically signed by Bj Hyde MD, 12/20/22, 5:53 PM EST.        Please note that portions of this note were completed with a voice recognition program.   PAIN

## 2024-09-17 ENCOUNTER — OFFICE VISIT (OUTPATIENT)
Dept: FAMILY MEDICINE CLINIC | Facility: CLINIC | Age: 69
End: 2024-09-17
Payer: MEDICARE

## 2024-09-17 ENCOUNTER — TELEPHONE (OUTPATIENT)
Dept: FAMILY MEDICINE CLINIC | Facility: CLINIC | Age: 69
End: 2024-09-17

## 2024-09-17 DIAGNOSIS — I10 ESSENTIAL HYPERTENSION: Chronic | ICD-10-CM

## 2024-09-17 DIAGNOSIS — E83.42 HYPOMAGNESEMIA: ICD-10-CM

## 2024-09-17 DIAGNOSIS — R60.9 EDEMA, UNSPECIFIED TYPE: Primary | ICD-10-CM

## 2024-09-17 DIAGNOSIS — S42.292D: ICD-10-CM

## 2024-09-17 DIAGNOSIS — E11.65 TYPE 2 DIABETES MELLITUS WITH HYPERGLYCEMIA, WITH LONG-TERM CURRENT USE OF INSULIN: Chronic | ICD-10-CM

## 2024-09-17 DIAGNOSIS — I48.0 PAROXYSMAL ATRIAL FIBRILLATION: Chronic | ICD-10-CM

## 2024-09-17 DIAGNOSIS — Z79.4 TYPE 2 DIABETES MELLITUS WITH HYPERGLYCEMIA, WITH LONG-TERM CURRENT USE OF INSULIN: Chronic | ICD-10-CM

## 2024-09-17 DIAGNOSIS — E78.5 DYSLIPIDEMIA: Chronic | ICD-10-CM

## 2024-09-17 PROCEDURE — 83735 ASSAY OF MAGNESIUM: CPT | Performed by: NURSE PRACTITIONER

## 2024-09-17 PROCEDURE — 80053 COMPREHEN METABOLIC PANEL: CPT | Performed by: NURSE PRACTITIONER

## 2024-09-17 PROCEDURE — 36415 COLL VENOUS BLD VENIPUNCTURE: CPT | Performed by: NURSE PRACTITIONER

## 2024-09-17 PROCEDURE — G2211 COMPLEX E/M VISIT ADD ON: HCPCS | Performed by: NURSE PRACTITIONER

## 2024-09-17 PROCEDURE — 99214 OFFICE O/P EST MOD 30 MIN: CPT | Performed by: NURSE PRACTITIONER

## 2024-09-17 PROCEDURE — 1160F RVW MEDS BY RX/DR IN RCRD: CPT | Performed by: NURSE PRACTITIONER

## 2024-09-17 PROCEDURE — 1125F AMNT PAIN NOTED PAIN PRSNT: CPT | Performed by: NURSE PRACTITIONER

## 2024-09-17 PROCEDURE — 3079F DIAST BP 80-89 MM HG: CPT | Performed by: NURSE PRACTITIONER

## 2024-09-17 PROCEDURE — 3075F SYST BP GE 130 - 139MM HG: CPT | Performed by: NURSE PRACTITIONER

## 2024-09-17 PROCEDURE — 1159F MED LIST DOCD IN RCRD: CPT | Performed by: NURSE PRACTITIONER

## 2024-09-17 PROCEDURE — 3052F HG A1C>EQUAL 8.0%<EQUAL 9.0%: CPT | Performed by: NURSE PRACTITIONER

## 2024-09-17 RX ORDER — FUROSEMIDE 20 MG
40 TABLET ORAL DAILY
Qty: 60 TABLET | Refills: 0 | Status: SHIPPED | OUTPATIENT
Start: 2024-09-17

## 2024-09-18 VITALS
RESPIRATION RATE: 14 BRPM | WEIGHT: 261 LBS | HEIGHT: 63 IN | TEMPERATURE: 97.3 F | DIASTOLIC BLOOD PRESSURE: 80 MMHG | BODY MASS INDEX: 46.25 KG/M2 | SYSTOLIC BLOOD PRESSURE: 130 MMHG | OXYGEN SATURATION: 97 % | HEART RATE: 69 BPM

## 2024-09-18 DIAGNOSIS — E55.9 VITAMIN D DEFICIENCY: ICD-10-CM

## 2024-09-18 DIAGNOSIS — R60.9 EDEMA, UNSPECIFIED TYPE: ICD-10-CM

## 2024-09-18 DIAGNOSIS — R06.02 SHORTNESS OF BREATH: ICD-10-CM

## 2024-09-18 DIAGNOSIS — K21.00 GASTROESOPHAGEAL REFLUX DISEASE WITH ESOPHAGITIS WITHOUT HEMORRHAGE: ICD-10-CM

## 2024-09-18 DIAGNOSIS — E53.8 B12 DEFICIENCY: ICD-10-CM

## 2024-09-18 DIAGNOSIS — Z79.4 TYPE 2 DIABETES MELLITUS WITH HYPERGLYCEMIA, WITH LONG-TERM CURRENT USE OF INSULIN: Primary | ICD-10-CM

## 2024-09-18 DIAGNOSIS — E11.65 TYPE 2 DIABETES MELLITUS WITH HYPERGLYCEMIA, WITH LONG-TERM CURRENT USE OF INSULIN: Primary | ICD-10-CM

## 2024-09-18 DIAGNOSIS — E03.8 OTHER SPECIFIED HYPOTHYROIDISM: ICD-10-CM

## 2024-09-18 DIAGNOSIS — E83.42 HYPOMAGNESEMIA: ICD-10-CM

## 2024-09-18 PROBLEM — S42.292D: Status: ACTIVE | Noted: 2024-09-18

## 2024-09-18 LAB
ALBUMIN SERPL-MCNC: 3.4 G/DL (ref 3.5–5.2)
ALBUMIN/GLOB SERPL: 0.9 G/DL
ALP SERPL-CCNC: 133 U/L (ref 39–117)
ALT SERPL W P-5'-P-CCNC: 23 U/L (ref 1–33)
ANION GAP SERPL CALCULATED.3IONS-SCNC: 12 MMOL/L (ref 5–15)
AST SERPL-CCNC: 36 U/L (ref 1–32)
BILIRUB SERPL-MCNC: 0.6 MG/DL (ref 0–1.2)
BUN SERPL-MCNC: 21 MG/DL (ref 8–23)
BUN/CREAT SERPL: 22.8 (ref 7–25)
CALCIUM SPEC-SCNC: 9.7 MG/DL (ref 8.6–10.5)
CHLORIDE SERPL-SCNC: 99 MMOL/L (ref 98–107)
CO2 SERPL-SCNC: 24 MMOL/L (ref 22–29)
CREAT SERPL-MCNC: 0.92 MG/DL (ref 0.57–1)
EGFRCR SERPLBLD CKD-EPI 2021: 67.5 ML/MIN/1.73
GLOBULIN UR ELPH-MCNC: 3.6 GM/DL
GLUCOSE SERPL-MCNC: 299 MG/DL (ref 65–99)
MAGNESIUM SERPL-MCNC: 1.8 MG/DL (ref 1.6–2.4)
POTASSIUM SERPL-SCNC: 4.9 MMOL/L (ref 3.5–5.2)
PROT SERPL-MCNC: 7 G/DL (ref 6–8.5)
SODIUM SERPL-SCNC: 135 MMOL/L (ref 136–145)

## 2024-09-20 ENCOUNTER — TELEPHONE (OUTPATIENT)
Dept: FAMILY MEDICINE CLINIC | Facility: CLINIC | Age: 69
End: 2024-09-20
Payer: MEDICARE

## 2024-09-20 ENCOUNTER — SPECIALTY PHARMACY (OUTPATIENT)
Dept: CARDIOLOGY | Facility: HOSPITAL | Age: 69
End: 2024-09-20
Payer: MEDICARE

## 2024-09-25 ENCOUNTER — TELEPHONE (OUTPATIENT)
Dept: FAMILY MEDICINE CLINIC | Facility: CLINIC | Age: 69
End: 2024-09-25

## 2024-09-25 NOTE — TELEPHONE ENCOUNTER
Caller: Ronna Wayne    Relationship: Self    Best call back number:      [phone] (Mobile)     Equipment requested:     Continuous Glucose  (Dexcom G7 ) device       Continuous Glucose Sensor (Dexcom G7 Sensor) misc     Reason for the request:     PATIENT STATED PHARMACY REQUIRES A PRESCRIPTION FROM LORENA ZURITA IN ORDER TO TELL PATIENT THE PRICE FOR THE NEW DEXCOM G7    PREFERRED PHARMACY:    [facility]    TELEPHONE CONTACT:    [phone]    Prescribing Provider:     LORENA ZURITA

## 2024-09-26 DIAGNOSIS — E11.65 TYPE 2 DIABETES MELLITUS WITH HYPERGLYCEMIA, WITH LONG-TERM CURRENT USE OF INSULIN: Primary | Chronic | ICD-10-CM

## 2024-09-26 DIAGNOSIS — Z79.4 TYPE 2 DIABETES MELLITUS WITH HYPERGLYCEMIA, WITH LONG-TERM CURRENT USE OF INSULIN: Primary | Chronic | ICD-10-CM

## 2024-09-26 RX ORDER — ACYCLOVIR 400 MG/1
1 TABLET ORAL
Qty: 9 EACH | Refills: 3 | Status: SHIPPED | OUTPATIENT
Start: 2024-09-26

## 2024-09-26 RX ORDER — ACYCLOVIR 400 MG/1
1 TABLET ORAL DAILY
Qty: 1 EACH | Refills: 0 | Status: SHIPPED | OUTPATIENT
Start: 2024-09-26

## 2024-10-03 ENCOUNTER — TELEPHONE (OUTPATIENT)
Dept: FAMILY MEDICINE CLINIC | Facility: CLINIC | Age: 69
End: 2024-10-03
Payer: MEDICARE

## 2024-10-03 NOTE — TELEPHONE ENCOUNTER
Called patient and let her know that her Toujeo came in from the patient assistance program and she can come by and pick it up. Patient is in understanding.

## 2024-10-08 ENCOUNTER — PATIENT OUTREACH (OUTPATIENT)
Dept: CASE MANAGEMENT | Facility: OTHER | Age: 69
End: 2024-10-08
Payer: MEDICARE

## 2024-10-08 NOTE — OUTREACH NOTE
AMBULATORY CASE MANAGEMENT NOTE    Names and Relationships of Patient/Support Persons: Contact: Rosana Ronna Preeti; Relationship: Self -     Patient Outreach    RN-ACM outreach with patient engaged in HRCM.  Goals and targets addressed.  Patient discussed intent to begin physical therapy for her right shoulder/arm.  She injured the arm during a fall in August of this year.  PCP has provided a written referral for patient to present at the agency of her choosing.  Patient was also interested in upgrading her existing Dexcom 6 to a Dexcom 7.  The following are a sampling of providers per patient request.             ALTERNATE REGIONAL SUPPLIER OF DEXCOM AND SUPPLIES          Rosa DAVIS  Ambulatory Case Management  [phone]      10/8/2024, 14:51 EDT

## 2024-10-12 DIAGNOSIS — R60.9 EDEMA, UNSPECIFIED TYPE: ICD-10-CM

## 2024-10-15 RX ORDER — FUROSEMIDE 20 MG
20 TABLET ORAL DAILY
Qty: 90 TABLET | OUTPATIENT
Start: 2024-10-15

## 2024-10-16 DIAGNOSIS — R60.9 EDEMA, UNSPECIFIED TYPE: ICD-10-CM

## 2024-10-16 RX ORDER — FUROSEMIDE 20 MG
40 TABLET ORAL DAILY
Qty: 60 TABLET | Refills: 0 | Status: SHIPPED | OUTPATIENT
Start: 2024-10-16

## 2024-10-17 ENCOUNTER — LAB (OUTPATIENT)
Dept: FAMILY MEDICINE CLINIC | Facility: CLINIC | Age: 69
End: 2024-10-17
Payer: MEDICARE

## 2024-10-17 DIAGNOSIS — E03.8 OTHER SPECIFIED HYPOTHYROIDISM: ICD-10-CM

## 2024-10-17 DIAGNOSIS — E11.65 TYPE 2 DIABETES MELLITUS WITH HYPERGLYCEMIA, WITH LONG-TERM CURRENT USE OF INSULIN: ICD-10-CM

## 2024-10-17 DIAGNOSIS — R06.02 SHORTNESS OF BREATH: ICD-10-CM

## 2024-10-17 DIAGNOSIS — K21.00 GASTROESOPHAGEAL REFLUX DISEASE WITH ESOPHAGITIS WITHOUT HEMORRHAGE: ICD-10-CM

## 2024-10-17 DIAGNOSIS — E83.42 HYPOMAGNESEMIA: ICD-10-CM

## 2024-10-17 DIAGNOSIS — E55.9 VITAMIN D DEFICIENCY: ICD-10-CM

## 2024-10-17 DIAGNOSIS — Z79.4 TYPE 2 DIABETES MELLITUS WITH HYPERGLYCEMIA, WITH LONG-TERM CURRENT USE OF INSULIN: ICD-10-CM

## 2024-10-17 PROCEDURE — 83880 ASSAY OF NATRIURETIC PEPTIDE: CPT | Performed by: NURSE PRACTITIONER

## 2024-10-17 PROCEDURE — 80061 LIPID PANEL: CPT | Performed by: NURSE PRACTITIONER

## 2024-10-17 PROCEDURE — 82607 VITAMIN B-12: CPT | Performed by: NURSE PRACTITIONER

## 2024-10-17 PROCEDURE — 36415 COLL VENOUS BLD VENIPUNCTURE: CPT | Performed by: NURSE PRACTITIONER

## 2024-10-17 PROCEDURE — 84439 ASSAY OF FREE THYROXINE: CPT | Performed by: NURSE PRACTITIONER

## 2024-10-17 PROCEDURE — 83036 HEMOGLOBIN GLYCOSYLATED A1C: CPT | Performed by: NURSE PRACTITIONER

## 2024-10-17 PROCEDURE — 84443 ASSAY THYROID STIM HORMONE: CPT | Performed by: NURSE PRACTITIONER

## 2024-10-17 PROCEDURE — 85025 COMPLETE CBC W/AUTO DIFF WBC: CPT | Performed by: NURSE PRACTITIONER

## 2024-10-17 PROCEDURE — 83735 ASSAY OF MAGNESIUM: CPT | Performed by: NURSE PRACTITIONER

## 2024-10-17 PROCEDURE — 80053 COMPREHEN METABOLIC PANEL: CPT | Performed by: NURSE PRACTITIONER

## 2024-10-17 PROCEDURE — 82306 VITAMIN D 25 HYDROXY: CPT | Performed by: NURSE PRACTITIONER

## 2024-10-18 LAB
25(OH)D3 SERPL-MCNC: 56.9 NG/ML (ref 30–100)
ALBUMIN SERPL-MCNC: 3.5 G/DL (ref 3.5–5.2)
ALBUMIN/GLOB SERPL: 0.9 G/DL
ALP SERPL-CCNC: 141 U/L (ref 39–117)
ALT SERPL W P-5'-P-CCNC: 20 U/L (ref 1–33)
ANION GAP SERPL CALCULATED.3IONS-SCNC: 6.9 MMOL/L (ref 5–15)
AST SERPL-CCNC: 28 U/L (ref 1–32)
BASOPHILS # BLD AUTO: 0.07 10*3/MM3 (ref 0–0.2)
BASOPHILS NFR BLD AUTO: 1.2 % (ref 0–1.5)
BILIRUB SERPL-MCNC: 0.6 MG/DL (ref 0–1.2)
BUN SERPL-MCNC: 17 MG/DL (ref 8–23)
BUN/CREAT SERPL: 21.5 (ref 7–25)
CALCIUM SPEC-SCNC: 10 MG/DL (ref 8.6–10.5)
CHLORIDE SERPL-SCNC: 100 MMOL/L (ref 98–107)
CHOLEST SERPL-MCNC: 196 MG/DL (ref 0–200)
CO2 SERPL-SCNC: 23.1 MMOL/L (ref 22–29)
CREAT SERPL-MCNC: 0.79 MG/DL (ref 0.57–1)
DEPRECATED RDW RBC AUTO: 50.6 FL (ref 37–54)
EGFRCR SERPLBLD CKD-EPI 2021: 81.1 ML/MIN/1.73
EOSINOPHIL # BLD AUTO: 0.04 10*3/MM3 (ref 0–0.4)
EOSINOPHIL NFR BLD AUTO: 0.7 % (ref 0.3–6.2)
ERYTHROCYTE [DISTWIDTH] IN BLOOD BY AUTOMATED COUNT: 13.5 % (ref 12.3–15.4)
GLOBULIN UR ELPH-MCNC: 4.1 GM/DL
GLUCOSE SERPL-MCNC: 404 MG/DL (ref 65–99)
HBA1C MFR BLD: 10.9 % (ref 4.8–5.6)
HCT VFR BLD AUTO: 36 % (ref 34–46.6)
HDLC SERPL-MCNC: 53 MG/DL (ref 40–60)
HGB BLD-MCNC: 11.9 G/DL (ref 12–15.9)
IMM GRANULOCYTES # BLD AUTO: 0.01 10*3/MM3 (ref 0–0.05)
IMM GRANULOCYTES NFR BLD AUTO: 0.2 % (ref 0–0.5)
LDLC SERPL CALC-MCNC: 112 MG/DL (ref 0–100)
LDLC/HDLC SERPL: 2.03 {RATIO}
LYMPHOCYTES # BLD AUTO: 2 10*3/MM3 (ref 0.7–3.1)
LYMPHOCYTES NFR BLD AUTO: 34.8 % (ref 19.6–45.3)
MAGNESIUM SERPL-MCNC: 1.7 MG/DL (ref 1.6–2.4)
MCH RBC QN AUTO: 33.5 PG (ref 26.6–33)
MCHC RBC AUTO-ENTMCNC: 33.1 G/DL (ref 31.5–35.7)
MCV RBC AUTO: 101.4 FL (ref 79–97)
MONOCYTES # BLD AUTO: 0.7 10*3/MM3 (ref 0.1–0.9)
MONOCYTES NFR BLD AUTO: 12.2 % (ref 5–12)
NEUTROPHILS NFR BLD AUTO: 2.93 10*3/MM3 (ref 1.7–7)
NEUTROPHILS NFR BLD AUTO: 50.9 % (ref 42.7–76)
NRBC BLD AUTO-RTO: 0 /100 WBC (ref 0–0.2)
NT-PROBNP SERPL-MCNC: 221 PG/ML (ref 0–900)
PLATELET # BLD AUTO: 162 10*3/MM3 (ref 140–450)
PMV BLD AUTO: 11.1 FL (ref 6–12)
POTASSIUM SERPL-SCNC: 4.7 MMOL/L (ref 3.5–5.2)
PROT SERPL-MCNC: 7.6 G/DL (ref 6–8.5)
RBC # BLD AUTO: 3.55 10*6/MM3 (ref 3.77–5.28)
SODIUM SERPL-SCNC: 130 MMOL/L (ref 136–145)
T4 FREE SERPL-MCNC: 1.45 NG/DL (ref 0.92–1.68)
TRIGL SERPL-MCNC: 177 MG/DL (ref 0–150)
TSH SERPL DL<=0.05 MIU/L-ACNC: 1.08 UIU/ML (ref 0.27–4.2)
VIT B12 BLD-MCNC: 651 PG/ML (ref 211–946)
VLDLC SERPL-MCNC: 31 MG/DL (ref 5–40)
WBC NRBC COR # BLD AUTO: 5.75 10*3/MM3 (ref 3.4–10.8)

## 2024-10-18 NOTE — PROGRESS NOTES
Please let Ronna know her blood sugar was 404 yesterday. I noticed she cancelled for today. Please have her to reschedule so we can go over her labs. If a video visit would be better for her that would be fine.

## 2024-10-28 PROCEDURE — 93294 REM INTERROG EVL PM/LDLS PM: CPT | Performed by: INTERNAL MEDICINE

## 2024-10-28 PROCEDURE — 93296 REM INTERROG EVL PM/IDS: CPT | Performed by: INTERNAL MEDICINE

## 2024-10-30 ENCOUNTER — TELEPHONE (OUTPATIENT)
Dept: FAMILY MEDICINE CLINIC | Facility: CLINIC | Age: 69
End: 2024-10-30

## 2024-10-30 NOTE — TELEPHONE ENCOUNTER
Left patient a voicemail letting her know her Toujeo came in from the patient assistance program. I sent her a "[employer] message as well.

## 2024-11-03 DIAGNOSIS — E03.8 OTHER SPECIFIED HYPOTHYROIDISM: ICD-10-CM

## 2024-11-04 RX ORDER — LEVOTHYROXINE SODIUM 88 UG/1
88 TABLET ORAL DAILY
Qty: 90 TABLET | Refills: 0 | Status: SHIPPED | OUTPATIENT
Start: 2024-11-04

## 2024-11-07 DIAGNOSIS — E83.42 HYPOMAGNESEMIA: Primary | ICD-10-CM

## 2024-11-07 DIAGNOSIS — R79.9 ABNORMAL BLOOD CHEMISTRY: ICD-10-CM

## 2024-11-08 ENCOUNTER — HOSPITAL ENCOUNTER (OUTPATIENT)
Dept: ULTRASOUND IMAGING | Facility: HOSPITAL | Age: 69
Discharge: HOME | End: 2024-11-08
Payer: MEDICARE

## 2024-11-08 DIAGNOSIS — R09.89 BILATERAL CAROTID BRUITS: ICD-10-CM

## 2024-11-08 PROCEDURE — 93880 EXTRACRANIAL BILAT STUDY: CPT | Performed by: RADIOLOGY

## 2024-11-08 PROCEDURE — 93880 EXTRACRANIAL BILAT STUDY: CPT

## 2024-11-14 ENCOUNTER — TELEPHONE (OUTPATIENT)
Dept: FAMILY MEDICINE CLINIC | Facility: CLINIC | Age: 69
End: 2024-11-14
Payer: MEDICARE

## 2024-11-14 NOTE — TELEPHONE ENCOUNTER
Called patient to let her know we received her ozempic for the patient assistance program and that she could come by and pick it up.

## 2024-11-19 DIAGNOSIS — R60.9 EDEMA, UNSPECIFIED TYPE: ICD-10-CM

## 2024-11-21 ENCOUNTER — LAB (OUTPATIENT)
Dept: FAMILY MEDICINE CLINIC | Facility: CLINIC | Age: 69
End: 2024-11-21
Payer: MEDICARE

## 2024-11-21 DIAGNOSIS — R79.9 ABNORMAL BLOOD CHEMISTRY: ICD-10-CM

## 2024-11-21 DIAGNOSIS — E83.42 HYPOMAGNESEMIA: ICD-10-CM

## 2024-11-21 LAB
ALBUMIN SERPL-MCNC: 3.4 G/DL (ref 3.5–5.2)
ALBUMIN/GLOB SERPL: 0.9 G/DL
ALP SERPL-CCNC: 99 U/L (ref 39–117)
ALT SERPL W P-5'-P-CCNC: 17 U/L (ref 1–33)
ANION GAP SERPL CALCULATED.3IONS-SCNC: 13.4 MMOL/L (ref 5–15)
AST SERPL-CCNC: 35 U/L (ref 1–32)
BILIRUB SERPL-MCNC: 0.7 MG/DL (ref 0–1.2)
BUN SERPL-MCNC: 14 MG/DL (ref 8–23)
BUN/CREAT SERPL: 16.7 (ref 7–25)
CALCIUM SPEC-SCNC: 8.8 MG/DL (ref 8.6–10.5)
CHLORIDE SERPL-SCNC: 100 MMOL/L (ref 98–107)
CO2 SERPL-SCNC: 19.6 MMOL/L (ref 22–29)
CREAT SERPL-MCNC: 0.84 MG/DL (ref 0.57–1)
EGFRCR SERPLBLD CKD-EPI 2021: 75.3 ML/MIN/1.73
GLOBULIN UR ELPH-MCNC: 3.7 GM/DL
GLUCOSE SERPL-MCNC: 202 MG/DL (ref 65–99)
MAGNESIUM SERPL-MCNC: 1.3 MG/DL (ref 1.6–2.4)
POTASSIUM SERPL-SCNC: 4.3 MMOL/L (ref 3.5–5.2)
PROT SERPL-MCNC: 7.1 G/DL (ref 6–8.5)
SODIUM SERPL-SCNC: 133 MMOL/L (ref 136–145)

## 2024-11-21 PROCEDURE — 83735 ASSAY OF MAGNESIUM: CPT | Performed by: NURSE PRACTITIONER

## 2024-11-21 PROCEDURE — 36415 COLL VENOUS BLD VENIPUNCTURE: CPT

## 2024-11-21 PROCEDURE — 80053 COMPREHEN METABOLIC PANEL: CPT | Performed by: NURSE PRACTITIONER

## 2024-11-21 RX ORDER — FUROSEMIDE 20 MG/1
40 TABLET ORAL DAILY
Qty: 40 TABLET | Refills: 0 | Status: SHIPPED | OUTPATIENT
Start: 2024-11-21

## 2024-11-21 NOTE — PROGRESS NOTES
Please let Ronna know that her blood glucose has improved greatly and sodium has improved slightly.  Her magnesium is 1.3 so I would increase magnesium at least by 1 more tablet daily.  I will refill her Lasix but I would encourage her to use the lowest dose possible.  We probably will need to repeat her labs in a couple weeks which I will order

## 2024-12-02 DIAGNOSIS — K21.00 GASTROESOPHAGEAL REFLUX DISEASE WITH ESOPHAGITIS WITHOUT HEMORRHAGE: ICD-10-CM

## 2024-12-02 RX ORDER — PANTOPRAZOLE SODIUM 40 MG/1
40 TABLET, DELAYED RELEASE ORAL DAILY
Qty: 90 TABLET | Refills: 0 | Status: SHIPPED | OUTPATIENT
Start: 2024-12-02

## 2024-12-05 ENCOUNTER — SPECIALTY PHARMACY (OUTPATIENT)
Dept: PHARMACY | Facility: HOSPITAL | Age: 69
End: 2024-12-05
Payer: MEDICARE

## 2024-12-05 DIAGNOSIS — E78.2 MODERATE MIXED HYPERLIPIDEMIA NOT REQUIRING STATIN THERAPY: Primary | ICD-10-CM

## 2024-12-05 NOTE — PROGRESS NOTES
[facility] Patient Management Program  Lipid Management Program - PCSK9i Initial Assessment     Ronna Wayne is a 69 y.o. female referred by their provider, Brenda Singh, to the Hyperlipidemia Patient Management program offered by [facility] for Lipid Management.  An initial outreach was conducted, including assessment of therapy appropriateness and specialty medication education for Repatha. The patient was introduced to services offered by [facility], including: regular assessments, refill coordination, curbside pick-up or mail order delivery options, prior authorization maintenance, and financial assistance programs as applicable. The patient was also provided with contact information for the pharmacy team.     Ronna Wayne is treated for clinical ASCVD and hyperlipidemia and currently takes Repatha.  In the past, Pt has tried fish oil, rosuvastatin, and atorvastatin. Statins were discontinued due to myalgias. The patient denies any allergies to latex.     Patient reports tolerating Repatha well without any issues. She denies any side effects, missed doses, or difficulty administering medication.      Insurance Coverage & Financial Support  Taste Filter/ [employer]     Relevant Past Medical History and Comorbidities  Relevant medical history and concomitant health conditions were discussed with the patient. The patient's chart has been reviewed for relevant past medical history and comorbid conditions and updated as necessary.  Past Medical History:   Diagnosis Date    Anxiety     B12 deficiency     BPV (benign positional vertigo)     Breast cancer 05/2021    Colon polyp     Diabetes mellitus     Diarrhea     Disease of thyroid gland     Elevated cholesterol     Fibrocystic breast     Fibromyalgia     GERD (gastroesophageal reflux disease)     Glaucoma     Heart murmur      Hyperlipidemia     Hypertension     Kidney disease     Obesity     Peptic ulceration     PONV (postoperative nausea and vomiting)     Primary central sleep apnea     Sleep apnea     c-pap    Stroke     TIA (transient ischemic attack) 2019    Urinary incontinence     Vaginal infection     Weakness of left arm      Social History     Socioeconomic History    Marital status:    Tobacco Use    Smoking status: Former     Current packs/day: 0.00     Average packs/day: 1.5 packs/day for 3.0 years (4.5 ttl pk-yrs)     Types: Cigarettes, Cigars     Start date: 1973     Quit date: 1976     Years since quittin.9     Passive exposure: Past    Smokeless tobacco: Never    Tobacco comments:     I smoked as a teen and stopped when my son was born   Vaping Use    Vaping status: Never Used   Substance and Sexual Activity    Alcohol use: No     Comment: former social drinker not had anything in 25 + years    Drug use: Never    Sexual activity: Not Currently     Partners: Male     Birth control/protection: Abstinence, Post-menopausal, Vasectomy       Problem list reviewed by Nanci Light PharmD on 2024 at  1:09 PM    Allergies  Known allergies and reactions were discussed with the patient. The patient's chart has been reviewed for  allergy information and updated as necessary.   Allergies   Allergen Reactions    Codeine GI Intolerance     Increased heart rate      Sulfa Antibiotics GI Intolerance     Heart rate increase        Allergies reviewed by Nanci Light PharmD on 2024 at  1:08 PM    Relevant Laboratory Values  Relevant laboratory values were discussed with the patient. The following specialty medication dose adjustment(s) are recommended: See plan, if applicable   Lab Results   Component Value Date    CHOL 196 10/17/2024    CHLPL 171 2019    TRIG 177 (H) 10/17/2024    HDL 53 10/17/2024     (H) 10/17/2024       Current Medication List  This medication list has been reviewed  with the patient and evaluated for any interactions or necessary modifications/recommendations, and updated to include all prescription medications, OTC medications, and supplements the patient is currently taking.  This list reflects what is contained in the patient's profile, which has also been marked as reviewed to communicate to other providers it is the most up to date version of the patient's current medication therapy.     Current Outpatient Medications:     acetaminophen (TYLENOL) 325 MG tablet, Take 2 tablets by mouth Every 6 (Six) Hours As Needed for Mild Pain., Disp: , Rfl:     anastrozole (ARIMIDEX) 1 MG tablet, Take 1 tablet by mouth Daily., Disp: 30 tablet, Rfl: 11    apixaban (ELIQUIS) 5 MG tablet tablet, Take 1 tablet by mouth 2 (Two) Times a Day., Disp: 60 tablet, Rfl: 4    aspirin 81 MG chewable tablet, Chew & swallow 1 tablet Daily., Disp: 30 tablet, Rfl: 0    cholecalciferol (VITAMIN D3) 25 MCG (1000 UT) tablet, Take 2 tablets by mouth Daily., Disp: , Rfl:     Continuous Glucose  (Dexcom G7 ) device, Use 1 Device Daily., Disp: 1 each, Rfl: 0    Continuous Glucose  (Dexcom G7 ) device, Use 1 Device Daily., Disp: 1 each, Rfl: 0    Continuous Glucose Sensor (Dexcom G7 Sensor) misc, Use 1 Device 90 Minutes Prior to Surgery for 1 dose., Disp: 9 each, Rfl: 3    Continuous Glucose Sensor (Dexcom G7 Sensor) misc, Use 1 Device Every 10 (Ten) Days., Disp: 9 each, Rfl: 3    Diclofenac Sodium (VOLTAREN) 1 % gel gel, Apply 4 g topically to the appropriate area as directed 3 (Three) Times a Day As Needed (joint pain)., Disp: 350 g, Rfl: 2    empagliflozin (Jardiance) 25 MG tablet tablet, Take 1 tablet by mouth Every Morning., Disp: 30 tablet, Rfl: 3    Evolocumab (REPATHA) solution auto-injector SureClick injection, Inject 1 mL under the skin into the appropriate area as directed Every 14 (Fourteen) Days., Disp: 2 mL, Rfl: 5    furosemide (LASIX) 20 MG tablet, TAKE 2 TABLETS BY  MOUTH DAILY, Disp: 40 tablet, Rfl: 0    HYDROcodone-acetaminophen (NORCO) 5-325 MG per tablet, Take 1 tablet by mouth Every 8 (Eight) Hours As Needed for Moderate Pain., Disp: 9 tablet, Rfl: 0    insulin aspart (novoLOG FLEXPEN) 100 UNIT/ML solution pen-injector sc pen, Inject 0-9 Units under the skin into the appropriate area as directed 3 (Three) Times a Day With Meals. Admelog Solostar sliding scale, Disp: , Rfl:     Insulin Glargine, 1 Unit Dial, (Toujeo SoloStar) 300 UNIT/ML solution pen-injector injection, Inject 36 Units under the skin into the appropriate area as directed Every Morning., Disp: , Rfl:     Insulin Pen Needle 32G X 5 MM misc, Use 1 each 3 (Three) Times a Day., Disp: 100 each, Rfl: 5    levothyroxine (SYNTHROID, LEVOTHROID) 88 MCG tablet, TAKE 1 TABLET BY MOUTH DAILY, Disp: 90 tablet, Rfl: 0    linaclotide (Linzess) 72 MCG capsule capsule, Take 1 capsule by mouth Every Morning Before Breakfast., Disp: 30 capsule, Rfl: 0    linaGLIPtin-metFORMIN HCl (Jentadueto) 2.5-1000 MG tablet, Take  by mouth 2 (Two) Times a Day., Disp: , Rfl:     losartan (COZAAR) 25 MG tablet, TAKE 1 TABLET BY MOUTH DAILY, Disp: 90 tablet, Rfl: 2    Magnesium Oxide -Mg Supplement 400 (240 Mg) MG tablet, TAKE 1 TABLET BY MOUTH 3 TIMES A DAY, Disp: 90 tablet, Rfl: 3    melatonin 5 MG tablet tablet, Take 1 tablet by mouth At Night As Needed., Disp: , Rfl:     metoprolol tartrate (LOPRESSOR) 25 MG tablet, TAKE 1 TABLET BY MOUTH TWICE A DAY, Disp: 180 tablet, Rfl: 1    ondansetron ODT (ZOFRAN-ODT) 4 MG disintegrating tablet, DISSOLVE ONE TABLET ON THE TONGUE EVERY 8 HOURS AS NEEDED FOR NAUSEA OR VOMITING, Disp: 20 tablet, Rfl: 1    pantoprazole (PROTONIX) 40 MG EC tablet, TAKE 1 TABLET BY MOUTH DAILY, Disp: 90 tablet, Rfl: 0    Semaglutide,0.25 or 0.5MG/DOS, (Ozempic, 0.25 or 0.5 MG/DOSE,) 2 MG/1.5ML solution pen-injector, Inject 0.25 mg under the skin into the appropriate area as directed 1 (One) Time Per Week., Disp: 3 mL,  Rfl: 0    Medicines reviewed by Nanci Light, Jorden on 12/5/2024 at  1:09 PM    Drug Interactions  None with Repatha    Adherence and Self-Administration  Adherence related to the patient's specialty therapy was discussed with the patient. The Adherence segment of this outreach has been reviewed and updated.     Is there a concern with patient's ability to self administer the medication correctly and without issue?: No  Were any potential barriers to adherence identified during the initial assessment or patient education?: No  Are there any concerns regarding the patient's understanding of the importance of medication adherence?: No  Methods for Supporting Patient Adherence and/or Self-Administration: see plan, if applicable     Open Medication Therapy Problems  No medication therapy recommendations to display    Goals of Therapy  Goals related to the patient's specialty therapy were discussed with the patient. The Patient Goals segment of this outreach has been reviewed and updated.   Goals Addressed Today        Specialty Pharmacy General Goal      LDL less than 55              Medication Assessment & Plan  Medication Therapy Changes: Patient will continue Repatha 140 mg every 2 weeks.   Injection training and medication education provided.   Welcome information and patient satisfaction survey to be sent by specialty pharmacy team with patient's initial fill.  Related Plans, Therapy Recommendations, or Therapy Problems to Be Addressed: None  Most recent LDL was 112 on 10/17/24. Patient started Repatha on 4/11/24 and LDL was at goal (51) on 5/20/24. Spoke with Brenda Singh and she would like to repeat lipid panel. If LDL is not at goal, will look into other cholesterol injection options. Lipid panel ordered. Patient planning to have this done at [facility] prior to cardiology appointment on 2/6/24.  Patient will continue regular follow-up with cardiology.   Patient will follow up with specialty pharmacy mail-out  services for next injection. Care Coordinator to set up future refill outreaches, coordinate prescription delivery, and escalate clinical questions to pharmacist.  Pharmacist to perform regular assessments no more than (6) months from the previous assessment. Will follow-up in 6 months, or sooner if needed.    Initial Education Provided for Specialty Medication  The patient has been provided with the following education and any applicable administration techniques (i.e. self-injection) have been demonstrated for the therapies indicated. All questions and concerns have been addressed prior to the patient receiving the medication, and the patient has verbalized comprehension of the education and any materials provided. Additional patient education shall be provided and documented upon request by the patient, provider, or payer.    REPATHA® (evolocumab)  Medication Expectations   Why am I taking this medication? You are taking Repatha to lower your “bad” cholesterol (LDL-C). This medication can be used in adults with high blood cholesterol including primary hyperlipidemia and familial hypercholesterolemia.    What should I expect while on this medication? You should expect to see your cholesterol improve over time. Specifically, you should see your LDL-C decrease.    How does the medication work? Repatha works by blocking a protein called PCSK9 that contributes to high levels of bad cholesterol. It helps increase your liver's ability to remove bad cholesterol from your blood.     How long will I be on this medication for? The amount of time you will be on this medication will be determined by your doctor based on your cholesterol and/or your risk of having a cardiac event. You will most likely be on this medication or another cholesterol medication throughout your lifetime. Do not abruptly stop this medication without talking to your doctor first.    How do I take this medication? Take as directed on your prescription  label. Repatha is injected under the skin (subcutaneously) of your stomach, thigh, or upper arm. This medication is usually given one or twice a month.   What are some possible side effects? The most common side effects of Repatha include redness, itching, swelling, or pain/tenderness at the injection site, symptoms of the common cold, flu or flu-like symptoms or back pain.    What happens if I miss a dose? If you miss a dose, take it as soon as you remember if it is within 7 days from the usual day of administration then resume your original schedule. If it is beyond 7 days and you use the gideon-2-week dose, skip the missed dose and resume your normal dosing schedule.If it is beyond 7 days and you use the once-monthly dose, inject the dose and start a new schedule based on that date.      Medication Safety   What are things I should warn my doctor immediately about? Talk to your doctor if you are pregnant, planning to become pregnant, or breastfeeding. Stop the medication and tell your doctor or seek emergency medical help if you notice any signs/symptoms of an allergic reaction (severe rash, redness, hives, severe itching, trouble breathing, or swelling of the face, lips, or tongue). If you have a rubber or latex allergy, you should not use the Repatha SureClick® Autoinjector pen or the prefilled syringe, please notify your doctor or pharmacist.   What are things that I should be cautious of? Be cautious of any side effects from this medication. Talk to your doctor if any new ones develop or aren't getting better.   What are some medications that can interact with this one? There are no known significant drug interactions with Repatha. Always tell your doctor or pharmacist immediately if you start taking any new medications, including over-the-counter medications, vitamins, and herbal supplements.      Medication Storage/Handling   How should I handle this medication? Do not shake or expose the pens, cartridges, or  syringes to extreme heat or direct sunlight. Keep this medication out of reach of pets/children. Allow medication to warm at room temperature prior to administration.   How does this medication need to be stored? Store unused pens, cartridges, or syringes in the refrigerator in the original cartons to protect from light. If needed, Repatha may be kept at room temperature in the original carton for up to 30 days. Do not freeze.    How should I dispose of this medication? All the Repatha devices are single-dose and should be discarded in a sharps container after use. If your doctor decides to stop this medication, take to your local police station for proper disposal. Some pharmacies also have take-back bins for medication drop-off.      Resources/Support   How can I remind myself to take this medication? You can download reminder apps to help you manage your refills. You may also set an alarm on your phone to remind you to take your dose.    Is financial support available?  [employer] can provide co-pay cards if you have commercial insurance or patient assistance if you have Medicare or no insurance.    Which vaccines are recommended for me? Talk to your doctor about these vaccines: Flu, Coronavirus (COVID-19), Pneumococcal (pneumonia), Tdap, Hepatitis B, Zoster (shingles)         Attestation  Therapeutic appropriateness: Appropriate   I attest the patient was actively involved in and has agreed to the above plan of care. If the prescribed therapy is at any point deemed not appropriate based on the current or future assessments, a consultation will be initiated with the patient's specialty care provider to determine the best course of action. The revised plan of therapy will be documented along with any required assessments and/or additional patient education provided.     Nanci Light, PharmD  12/5/2024  13:12 EST

## 2024-12-09 DIAGNOSIS — R60.9 EDEMA, UNSPECIFIED TYPE: ICD-10-CM

## 2024-12-09 DIAGNOSIS — E83.42 HYPOMAGNESEMIA: Primary | ICD-10-CM

## 2024-12-09 DIAGNOSIS — E83.42 HYPOMAGNESEMIA: ICD-10-CM

## 2024-12-09 RX ORDER — LANOLIN ALCOHOL/MO/W.PET/CERES
1 CREAM (GRAM) TOPICAL 3 TIMES DAILY
Qty: 90 TABLET | Refills: 3 | Status: CANCELLED | OUTPATIENT
Start: 2024-12-09

## 2024-12-09 RX ORDER — FUROSEMIDE 20 MG/1
40 TABLET ORAL DAILY
Qty: 40 TABLET | Refills: 0 | OUTPATIENT
Start: 2024-12-09

## 2024-12-09 RX ORDER — LANOLIN ALCOHOL/MO/W.PET/CERES
1 CREAM (GRAM) TOPICAL 3 TIMES DAILY
Qty: 90 TABLET | Refills: 3 | Status: SHIPPED | OUTPATIENT
Start: 2024-12-09

## 2024-12-18 ENCOUNTER — OFFICE VISIT (OUTPATIENT)
Dept: SURGERY | Facility: CLINIC | Age: 69
End: 2024-12-18
Payer: MEDICARE

## 2024-12-18 VITALS — BODY MASS INDEX: 48.03 KG/M2 | HEIGHT: 62 IN | WEIGHT: 261 LBS

## 2024-12-18 DIAGNOSIS — Z17.0 MALIGNANT NEOPLASM OF UPPER-OUTER QUADRANT OF RIGHT BREAST IN FEMALE, ESTROGEN RECEPTOR POSITIVE: Primary | ICD-10-CM

## 2024-12-18 DIAGNOSIS — C50.411 MALIGNANT NEOPLASM OF UPPER-OUTER QUADRANT OF RIGHT BREAST IN FEMALE, ESTROGEN RECEPTOR POSITIVE: Primary | ICD-10-CM

## 2024-12-18 PROCEDURE — 1160F RVW MEDS BY RX/DR IN RCRD: CPT | Performed by: SURGERY

## 2024-12-18 PROCEDURE — 99212 OFFICE O/P EST SF 10 MIN: CPT | Performed by: SURGERY

## 2024-12-18 PROCEDURE — 1159F MED LIST DOCD IN RCRD: CPT | Performed by: SURGERY

## 2024-12-18 NOTE — PROGRESS NOTES
Subjective   Ronna Wayne is a 68 y.o. female  is here today for follow-up.         Ronna Wayne is a 68 y.o. female here for follow up after seroma drainage from the right breast.  Wound completely healed..  Radiation changes greatly improved.  She has had a pacemaker placed she has also undergone left shoulder surgery and is rehabbing from this..  No complaints reported.    Assessment     Diagnoses and all orders for this visit:    1. Malignant neoplasm of upper-outer quadrant of right breast in female, estrogen receptor positive (Primary)      Ronna Wayne is a 68 y.o. female doing well after drainage of seroma of the right breast.  The wound has healed well by secondary intent.  Follow-up in 6 months.

## 2024-12-19 ENCOUNTER — OFFICE VISIT (OUTPATIENT)
Dept: FAMILY MEDICINE CLINIC | Facility: CLINIC | Age: 69
End: 2024-12-19
Payer: MEDICARE

## 2024-12-19 VITALS
OXYGEN SATURATION: 99 % | RESPIRATION RATE: 14 BRPM | HEART RATE: 79 BPM | WEIGHT: 246 LBS | BODY MASS INDEX: 45.27 KG/M2 | HEIGHT: 62 IN | SYSTOLIC BLOOD PRESSURE: 120 MMHG | DIASTOLIC BLOOD PRESSURE: 60 MMHG | TEMPERATURE: 97.4 F

## 2024-12-19 DIAGNOSIS — I48.0 PAROXYSMAL ATRIAL FIBRILLATION: Chronic | ICD-10-CM

## 2024-12-19 DIAGNOSIS — E11.65 TYPE 2 DIABETES MELLITUS WITH HYPERGLYCEMIA, WITH LONG-TERM CURRENT USE OF INSULIN: Primary | Chronic | ICD-10-CM

## 2024-12-19 DIAGNOSIS — E78.2 MODERATE MIXED HYPERLIPIDEMIA NOT REQUIRING STATIN THERAPY: Chronic | ICD-10-CM

## 2024-12-19 DIAGNOSIS — Z23 ENCOUNTER FOR IMMUNIZATION: ICD-10-CM

## 2024-12-19 DIAGNOSIS — Z79.4 TYPE 2 DIABETES MELLITUS WITH HYPERGLYCEMIA, WITH LONG-TERM CURRENT USE OF INSULIN: Primary | Chronic | ICD-10-CM

## 2024-12-19 DIAGNOSIS — Z97.8 USES SELF-APPLIED CONTINUOUS GLUCOSE MONITORING DEVICE: ICD-10-CM

## 2024-12-19 DIAGNOSIS — R26.81 GAIT INSTABILITY: Chronic | ICD-10-CM

## 2024-12-19 DIAGNOSIS — Z17.0 MALIGNANT NEOPLASM OF UPPER-OUTER QUADRANT OF RIGHT BREAST IN FEMALE, ESTROGEN RECEPTOR POSITIVE: Chronic | ICD-10-CM

## 2024-12-19 DIAGNOSIS — C50.411 MALIGNANT NEOPLASM OF UPPER-OUTER QUADRANT OF RIGHT BREAST IN FEMALE, ESTROGEN RECEPTOR POSITIVE: Chronic | ICD-10-CM

## 2024-12-19 DIAGNOSIS — I10 ESSENTIAL HYPERTENSION: Chronic | ICD-10-CM

## 2024-12-19 DIAGNOSIS — E83.42 HYPOMAGNESEMIA: Chronic | ICD-10-CM

## 2024-12-19 DIAGNOSIS — I35.0 NONRHEUMATIC AORTIC VALVE STENOSIS: Chronic | ICD-10-CM

## 2024-12-19 DIAGNOSIS — M54.50 LUMBAR BACK PAIN: Chronic | ICD-10-CM

## 2024-12-19 DIAGNOSIS — E03.8 OTHER SPECIFIED HYPOTHYROIDISM: Chronic | ICD-10-CM

## 2024-12-19 NOTE — PROGRESS NOTES
History of Present Illness  Ronna Wayne is a 69 y.o. female who presents to [facility] pertaining to her DM, type 2, HTN, Dyslipidemia, GERD and vitamin deficiencies.  Ronna has cardiac issues which include atrial fibrillation, nonrheumatic aortic valve stenosis tacky-bradycardia syndrome, PVCs and SVT. She has been diagnosed and treated for a malignant neoplasm of her upper outer quadrant of right breast.      Diabetes  She has type 2 diabetes mellitus. The initial diagnosis of diabetes was made in 2008. Diabetic complications include a CVA, nephropathy and PVD. Risk factors for coronary artery disease include dyslipidemia, family history, hypertension, obesity and sedentary lifestyle. Current diabetic treatment includes insulin injections and oral agents.  She is currently taking insulin pre-breakfast, pre-lunch and pre-dinner. Insulin injections are given by patient. Rotation sites for injection include the abdominal wall. She is following a diabetic diet. Meal planning includes ADA exchanges, avoidance of concentrated sweets and carbohydrate counting. She rarely participates in exercise. Blood glucose monitoring compliance is good since she has received her Dexcom.    Lab Results   Component Value Date    HGBA1C 8.20 (H) 07/16/2024      Lab Results   Component Value Date    HGBA1C 10.90 (H) 10/17/2024      Hypertension   The current episode started more than 1 year ago. The problem is controlled. Associated symptoms include headaches, malaise/fatigue and peripheral edema. Risk factors for coronary artery disease include diabetes mellitus, dyslipidemia, obesity and post-menopausal state. Current antihypertension treatment includes angiotensin blockers.   Lab Results   Component Value Date    GLUCOSE 202 (H) 11/21/2024    BUN 14 11/21/2024    CREATININE 0.84 11/21/2024     (L) 11/21/2024    K 4.3 11/21/2024     11/21/2024    CALCIUM 8.8 11/21/2024     PROTEINTOT 7.1 11/21/2024    ALBUMIN 3.4 (L) 11/21/2024    ALT 17 11/21/2024    AST 35 (H) 11/21/2024    ALKPHOS 99 11/21/2024    BILITOT 0.7 11/21/2024    GLOB 3.7 11/21/2024    AGRATIO 0.9 11/21/2024    BCR 16.7 11/21/2024    ANIONGAP 13.4 11/21/2024    EGFR 75.3 11/21/2024         Dyslipidemia   The current episode started more than 1 year ago. Current antihyperlipidemic treatment includes Repatha. Risk factors for coronary artery disease include diabetes mellitus, dyslipidemia, hypertension, obesity and post-menopausal.   Lab Results   Component Value Date    CHOL 196 10/17/2024    CHOL 137 05/20/2024    CHOL 228 (H) 03/07/2024     Lab Results   Component Value Date    TRIG 177 (H) 10/17/2024    TRIG 132 05/20/2024    TRIG 180 (H) 03/07/2024     Lab Results   Component Value Date    HDL 53 10/17/2024    HDL 63 (H) 05/20/2024    HDL 51 03/07/2024     Lab Results   Component Value Date     (H) 10/17/2024    LDL 51 05/20/2024     (H) 03/07/2024      Shoulder Injury   On August 10, 2024, she presented to [facility] ED complaining of left arm pain which started after she fell at her home.  The x-rays revealed acute transverse fracture across the left humeral neck with associated mild medial displacement of the distal fracture component.Partial impaction of fracture components noted.  She did have a total left shoulder replacement.  She has been under the care of Dr. Mittal, orthopedic surgeon and now is having physical therapy [facility].    The following portions of the patient's history were reviewed and updated as appropriate: allergies, current medications, past family history, past medical history, past social history, past surgical history and problem list.    Review of Systems   Constitutional:  Positive for activity change (Improving) and fatigue (Improving). Negative for appetite change, chills and fever.   HENT:  Negative for congestion, sinus pressure and sore throat.    Eyes:   "Negative for visual disturbance.   Respiratory:  Negative for cough, shortness of breath and wheezing.    Cardiovascular:  Positive for leg swelling. Negative for chest pain and palpitations.   Gastrointestinal:  Negative for nausea and vomiting.   Musculoskeletal:  Positive for arthralgias, back pain, gait problem and joint swelling.   Skin:  Negative for color change and rash.   Neurological:  Positive for weakness. Negative for dizziness.   Hematological:  Negative for adenopathy.   Psychiatric/Behavioral:  Positive for sleep disturbance.      Vital signs:  /60 (BP Location: Left arm, Patient Position: Sitting, Cuff Size: Adult)   Pulse 79   Temp 97.4 °F (36.3 °C) (Temporal)   Resp 14   Ht 157.5 cm (62.01\")   Wt 112 kg (246 lb)   SpO2 99%   BMI 44.98 kg/m²     Physical Exam  Vitals and nursing note reviewed.   Constitutional:       General: She is not in acute distress.     Appearance: She is well-developed.   HENT:      Head: Normocephalic.      Nose: Nose normal.      Mouth/Throat:      Pharynx: No oropharyngeal exudate.   Eyes:      General:         Right eye: No discharge.         Left eye: No discharge.      Conjunctiva/sclera: Conjunctivae normal.   Cardiovascular:      Rate and Rhythm: Normal rate and regular rhythm.      Heart sounds: Murmur heard.      No friction rub.   Pulmonary:      Effort: Pulmonary effort is normal. No respiratory distress.      Breath sounds: Decreased breath sounds present. No wheezing, rhonchi or rales.   Musculoskeletal:         General: Tenderness present.      Left shoulder: Decreased range of motion. Decreased strength.      Cervical back: Neck supple.   Lymphadenopathy:      Cervical: No cervical adenopathy.   Skin:     General: Skin is warm and dry.      Capillary Refill: Capillary refill takes less than 2 seconds.   Neurological:      Mental Status: She is alert and oriented to person, place, and time.      Cranial Nerves: Cranial nerves 2-12 are intact. "   Psychiatric:         Mood and Affect: Mood and affect normal.         Speech: Speech normal.         Behavior: Behavior is cooperative.         Thought Content: Thought content normal.     Result Review : Cardiology and Ortho consult notes reviewed  CGM summary from 12/6 through 12/19/2024  Time in range  0% very low  0% low  18% in range  35% high  47% very high  Average blood glucose 248 mg/dL    Class 3 Severe Obesity (BMI >=40). Obesity-related health conditions include the following: hypertension, coronary heart disease, diabetes mellitus, dyslipidemias, GERD, and osteoarthritis. Obesity is improving with lifestyle modifications. BMI is above average; BMI management plan is completed Information on healthy weight added to patient's after visit summary.    Assessment & Plan     Diagnoses and all orders for this visit:    1. Type 2 diabetes mellitus with hyperglycemia, with long-term current use of insulin (Primary)  Comments:  She will increase her Toujeo to 38 units with a goal of am BS of less than 150 to 130 mg/dL.  She will increase her 3 to 4 units per dose    2. Uses self-applied continuous glucose monitoring device  Comments:  CGM summary reviewed with Ronna    3. Essential hypertension  Comments:  Well-controlled.  Continue losartan and metoprolol    4. Moderate mixed hyperlipidemia not requiring statin therapy  Comments:  Continue Repatha per lipid clinic management    5. Paroxysmal atrial fibrillation  Comments:  Continue apixaban    6. Other specified hypothyroidism  Comments:  Continue levothyroxine 88 mcg    7. Nonrheumatic aortic valve stenosis  Comments:  Continue to monitor    8. Malignant neoplasm of upper-outer quadrant of right breast in female, estrogen receptor positive  Comments:  Continue anastrozole and follow-up with oncology    9. Lumbar back pain  Comments:  Referral to physical therapy  Orders:  -     XR Spine Lumbar 2 or 3 View; Future  -     Ambulatory Referral to Physical  Therapy for Evaluation & Treatment    10. Gait instability  Comments:  Fall prevention reinforced.  Referral to physical therapy  Orders:  -     Ambulatory Referral to Physical Therapy for Evaluation & Treatment    11. Hypomagnesemia  Comments:  Increased her magnesium daily dose.  Will continue to monitor  Orders:  -     Magnesium; Future    12. Encounter for immunization  Comments:  Influenza vaccination given  Orders:  -     Fluzone High-Dose 65+yrs ([id number])    Follow Up In February  Findings and recommendations discussed with Ronna. Reviewed test results.  Recommended to increase Toujeo to 38 units and increase your sliding scale 3-4 units per dose. Lifestyle modifications reinforced including nutrition and activity recommendations.  She will follow up in February sooner if problems/concerns occur.  Ronna was given instructions and counseling regarding her condition or for health maintenance advice. Please see specific information pulled into the AVS if appropriate.    This document has been electronically signed by:

## 2024-12-26 ENCOUNTER — HOSPITAL ENCOUNTER (OUTPATIENT)
Dept: GENERAL RADIOLOGY | Facility: HOSPITAL | Age: 69
Discharge: HOME | End: 2024-12-26
Admitting: NURSE PRACTITIONER
Payer: MEDICARE

## 2024-12-26 DIAGNOSIS — M54.50 LUMBAR BACK PAIN: Chronic | ICD-10-CM

## 2024-12-26 PROCEDURE — 72100 X-RAY EXAM L-S SPINE 2/3 VWS: CPT

## 2025-01-07 DIAGNOSIS — E83.42 HYPOMAGNESEMIA: Primary | ICD-10-CM

## 2025-01-07 RX ORDER — LANOLIN ALCOHOL/MO/W.PET/CERES
2 CREAM (GRAM) TOPICAL 2 TIMES DAILY
Qty: 120 TABLET | Refills: 3 | Status: SHIPPED | OUTPATIENT
Start: 2025-01-07

## 2025-01-20 RX ORDER — METOPROLOL TARTRATE 25 MG/1
25 TABLET, FILM COATED ORAL 2 TIMES DAILY
Qty: 180 TABLET | Refills: 0 | Status: SHIPPED | OUTPATIENT
Start: 2025-01-20

## 2025-02-02 DIAGNOSIS — E03.8 OTHER SPECIFIED HYPOTHYROIDISM: ICD-10-CM

## 2025-02-03 RX ORDER — LEVOTHYROXINE SODIUM 88 UG/1
88 TABLET ORAL DAILY
Qty: 30 TABLET | Refills: 0 | Status: SHIPPED | OUTPATIENT
Start: 2025-02-03

## 2025-02-04 ENCOUNTER — LAB (OUTPATIENT)
Dept: LAB | Facility: HOSPITAL | Age: 70
End: 2025-02-04
Payer: MEDICARE

## 2025-02-04 LAB
CHOLEST SERPL-MCNC: 216 MG/DL (ref 0–200)
HDLC SERPL-MCNC: 57 MG/DL (ref 40–60)
LDLC SERPL CALC-MCNC: 136 MG/DL (ref 0–100)
LDLC/HDLC SERPL: 2.34 {RATIO}
TRIGL SERPL-MCNC: 128 MG/DL (ref 0–150)
VLDLC SERPL-MCNC: 23 MG/DL (ref 5–40)

## 2025-02-04 PROCEDURE — 80061 LIPID PANEL: CPT | Performed by: NURSE PRACTITIONER

## 2025-02-05 ENCOUNTER — SPECIALTY PHARMACY (OUTPATIENT)
Dept: PHARMACY | Facility: HOSPITAL | Age: 70
End: 2025-02-05
Payer: MEDICARE

## 2025-02-05 NOTE — PROGRESS NOTES
Specialty Pharmacy Patient Management Program  One-Time Clinical Outreach     Ronna Wayne is seen by an their provider for clinical ASCVD and hyperlipidemia and enrolled in the Lipid Patient Management program offered by [facility].      Patient is currently taking Repatha Sureclick 140 mg SC every 14 days, and her LDL is not at goal. LDL from most recent lipid panel 2/4/25 is 136 mg/dl, which has been steadily increasing.        Per secure chat with Brenda Singh, she would like the patient to try leqvio going forward. Brenda has a follow up cardiology appointment with Miss Wayne tomorrow, 2/6/25, and plans to discuss this with her. Lesvia, lipid pharmacy care coordinator, was notified today to please start the PA process for leqvio for Miss Wayne.    Sinai Iraheta, PharmD  Clinical Specialty Pharmacist  2/5/2025  13:17 EST

## 2025-02-06 ENCOUNTER — OFFICE VISIT (OUTPATIENT)
Dept: CARDIOLOGY | Facility: CLINIC | Age: 70
End: 2025-02-06
Payer: MEDICARE

## 2025-02-06 VITALS
HEIGHT: 62 IN | HEART RATE: 68 BPM | DIASTOLIC BLOOD PRESSURE: 66 MMHG | BODY MASS INDEX: 46.38 KG/M2 | WEIGHT: 252 LBS | SYSTOLIC BLOOD PRESSURE: 143 MMHG | OXYGEN SATURATION: 96 %

## 2025-02-06 DIAGNOSIS — R42 VERTIGO: ICD-10-CM

## 2025-02-06 DIAGNOSIS — I47.10 SVT (SUPRAVENTRICULAR TACHYCARDIA): ICD-10-CM

## 2025-02-06 DIAGNOSIS — Z95.0 PRESENCE OF CARDIAC PACEMAKER: Primary | ICD-10-CM

## 2025-02-06 DIAGNOSIS — E78.2 MIXED HYPERLIPIDEMIA: ICD-10-CM

## 2025-02-06 DIAGNOSIS — I48.0 PAROXYSMAL ATRIAL FIBRILLATION: ICD-10-CM

## 2025-02-06 DIAGNOSIS — I10 ESSENTIAL HYPERTENSION: ICD-10-CM

## 2025-02-06 RX ORDER — MECLIZINE HCL 12.5 MG 12.5 MG/1
12.5 TABLET ORAL 3 TIMES DAILY PRN
Qty: 60 TABLET | Refills: 2 | Status: SHIPPED | OUTPATIENT
Start: 2025-02-06

## 2025-02-06 NOTE — PROGRESS NOTES
[facility]             BrendaBOSTON Mtz Sherelene S, APRN  Ronna Wayne  1955 02/06/2025    Patient Active Problem List   Diagnosis    Frequent headaches    Essential hypertension    Type 2 diabetes mellitus with hyperglycemia, with long-term current use of insulin    Obstructive sleep apnea syndrome    Glaucoma    Vertigo    GERD (gastroesophageal reflux disease)    Morbid obesity    Weakness of left arm    Cerebrovascular accident (CVA) due to embolism    SVT (supraventricular tachycardia)    Vitamin D deficiency    B12 deficiency    Malignant neoplasm of upper-outer quadrant of right breast in female, estrogen receptor positive    Other specified hypothyroidism    Personal history of colonic polyps    Tachy-janie syndrome    Atrial flutter    Paroxysmal atrial fibrillation    Presence of cardiac pacemaker    Hyperlipidemia    Nonrheumatic aortic valve stenosis    TIA (transient ischemic attack)    Sinus node dysfunction    Urine incontinence    Detrusor instability of bladder    Incomplete emptying of bladder    Chronic anticoagulation    Frequency of micturition    Slow transit constipation    Skin lesion    History of recurrent UTIs    Uses self-applied continuous glucose monitoring device    Closed traumatic displaced fracture of anatomical neck of left humerus with routine healing       Mague Mace APRN:    Subjective     Chief Complaint   Patient presents with    Follow-up       HPI:     This is a 70 y.o. female with known past medical history of dyslipidemia, diabetes mellitus type 2, essential hypertension, obstructive sleep apnea, PSVT, history of CVA, pacemaker implantation, breast cancer, obesity and paroxysmal atrial fibrillation/flutter.     Ronna Wayne presents today for routine cardiology follow up.   History of Present Illness    She reports experiencing dizziness, particularly when transitioning  from a supine to an upright position or upon standing too quickly. She also describes a sensation of the room spinning when she first lies down, indicative of vertigo. She has not previously been diagnosed with vertigo and has not tried meclizine.    She is currently on Repatha injections for cholesterol management, which she administers herself. However, she missed her last dose due to forgetting to remove it from the refrigerator.  Lipid clinic is currently doing a PA to switch her to Leqvio to see if this will help with her LDL as it has slightly risen over the last 2 checks    She has been doing well from a cardiac perspective and has not had any episodes of SVT since her ablation in 2023. She is tolerating Eliquis well. She occasionally notices blood on her tongue in the morning, which is a ladarius color rather than bright red. She also experiences bleeding around her teeth, which she attributes to gum sensitivity or issues. She suspects that her CPAP machine may be contributing to her gum bleeding. Her pacemaker was last checked in August 2024 by Dr. Steele. She has not had any changes in her medications recently. She takes melatonin at night to help with sleep.    MEDICATIONS  Eliquis, aspirin, Lasix, losartan, metoprolol, Repatha, melatonin     Diagnostic Testing  Echocardiogram 5/2021: EF 51 to 55% with moderate aortic sclerosis  Nuclear stress test 5/2021: No evidence of ischemia  Echocardiogram 12/2022: EF 61 to 65% with mild aortic valve stenosis  Successful implantation of permanent dual chamber pacemaker with [employer] device on 1/5/2023 due to tachybradycardia syndrome  EchoCardiogram 6/2023: 61 to 65%  Nuclear stress test 6/2023: Very mild fixed mid anterior wall defect, with normal wall motion, defect is consistent with breast attenuation artifact, no ischemia seen, TID 1.22.      All other systems were reviewed and were negative.    Patient Active Problem List   Diagnosis    Frequent headaches     Essential hypertension    Type 2 diabetes mellitus with hyperglycemia, with long-term current use of insulin    Obstructive sleep apnea syndrome    Glaucoma    Vertigo    GERD (gastroesophageal reflux disease)    Morbid obesity    Weakness of left arm    Cerebrovascular accident (CVA) due to embolism    SVT (supraventricular tachycardia)    Vitamin D deficiency    B12 deficiency    Malignant neoplasm of upper-outer quadrant of right breast in female, estrogen receptor positive    Other specified hypothyroidism    Personal history of colonic polyps    Tachy-janie syndrome    Atrial flutter    Paroxysmal atrial fibrillation    Presence of cardiac pacemaker    Hyperlipidemia    Nonrheumatic aortic valve stenosis    TIA (transient ischemic attack)    Sinus node dysfunction    Urine incontinence    Detrusor instability of bladder    Incomplete emptying of bladder    Chronic anticoagulation    Frequency of micturition    Slow transit constipation    Skin lesion    History of recurrent UTIs    Uses self-applied continuous glucose monitoring device    Closed traumatic displaced fracture of anatomical neck of left humerus with routine healing       family history includes Alcohol abuse in her maternal aunt; Arthritis in her sister; Breast cancer (age of onset: 40) in her sister; Cancer in her maternal grandfather and sister; Diabetes in her mother; Hearing loss in her father, maternal grandfather, and mother; Heart attack in her father; Hyperlipidemia in her father, sister, and sister; Hypertension in her father, sister, sister, sister, and another family member; Kidney disease in her mother; Lung disease in her father; Obesity in her sister and sister; Stroke in her paternal grandfather and paternal grandmother; Thyroid disease in her mother and sister.     reports that she quit smoking about 49 years ago. Her smoking use included cigarettes and cigars. She started smoking about 52 years ago. She has a 4.5 pack-year  smoking history. She has been exposed to tobacco smoke. She has never used smokeless tobacco. She reports that she does not drink alcohol and does not use drugs.    Allergies   Allergen Reactions    Codeine GI Intolerance     Increased heart rate      Sulfa Antibiotics GI Intolerance     Heart rate increase          Current Outpatient Medications:     acetaminophen (TYLENOL) 325 MG tablet, Take 2 tablets by mouth Every 6 (Six) Hours As Needed for Mild Pain., Disp: , Rfl:     anastrozole (ARIMIDEX) 1 MG tablet, Take 1 tablet by mouth Daily., Disp: 30 tablet, Rfl: 11    apixaban (ELIQUIS) 5 MG tablet tablet, Take 1 tablet by mouth 2 (Two) Times a Day., Disp: 60 tablet, Rfl: 4    aspirin 81 MG chewable tablet, Chew & swallow 1 tablet Daily., Disp: 30 tablet, Rfl: 0    cholecalciferol (VITAMIN D3) 25 MCG (1000 UT) tablet, Take 2 tablets by mouth Daily., Disp: , Rfl:     Continuous Glucose  (Dexcom G7 ) device, Use 1 Device Daily., Disp: 1 each, Rfl: 0    Continuous Glucose  (Dexcom G7 ) device, Use 1 Device Daily., Disp: 1 each, Rfl: 0    Continuous Glucose Sensor (Dexcom G7 Sensor) misc, Use 1 Device 90 Minutes Prior to Surgery for 1 dose., Disp: 9 each, Rfl: 3    Continuous Glucose Sensor (Dexcom G7 Sensor) misc, Use 1 Device Every 10 (Ten) Days., Disp: 9 each, Rfl: 3    Diclofenac Sodium (VOLTAREN) 1 % gel gel, Apply 4 g topically to the appropriate area as directed 3 (Three) Times a Day As Needed (joint pain)., Disp: 350 g, Rfl: 2    empagliflozin (Jardiance) 25 MG tablet tablet, Take 1 tablet by mouth Every Morning., Disp: 30 tablet, Rfl: 3    furosemide (LASIX) 20 MG tablet, TAKE 2 TABLETS BY MOUTH DAILY, Disp: 40 tablet, Rfl: 0    HYDROcodone-acetaminophen (NORCO) 5-325 MG per tablet, Take 1 tablet by mouth Every 8 (Eight) Hours As Needed for Moderate Pain., Disp: 9 tablet, Rfl: 0    insulin aspart (novoLOG FLEXPEN) 100 UNIT/ML solution pen-injector sc pen, Inject 0-9 Units under  the skin into the appropriate area as directed 3 (Three) Times a Day With Meals. Admelog Solostar sliding scale, Disp: , Rfl:     Insulin Glargine, 1 Unit Dial, (Toujeo SoloStar) 300 UNIT/ML solution pen-injector injection, Inject 36 Units under the skin into the appropriate area as directed Every Morning., Disp: , Rfl:     Insulin Pen Needle 32G X 5 MM misc, Use 1 each 3 (Three) Times a Day., Disp: 100 each, Rfl: 5    levothyroxine (SYNTHROID, LEVOTHROID) 88 MCG tablet, TAKE 1 TABLET BY MOUTH DAILY, Disp: 30 tablet, Rfl: 0    linaclotide (Linzess) 72 MCG capsule capsule, Take 1 capsule by mouth Every Morning Before Breakfast., Disp: 30 capsule, Rfl: 0    linaGLIPtin-metFORMIN HCl (Jentadueto) 2.5-1000 MG tablet, Take  by mouth 2 (Two) Times a Day., Disp: , Rfl:     losartan (COZAAR) 25 MG tablet, TAKE 1 TABLET BY MOUTH DAILY, Disp: 90 tablet, Rfl: 2    Magnesium Oxide -Mg Supplement 400 (240 Mg) MG tablet, Take 2 tablets by mouth 2 (Two) Times a Day., Disp: 120 tablet, Rfl: 3    melatonin 5 MG tablet tablet, Take 1 tablet by mouth At Night As Needed., Disp: , Rfl:     metoprolol tartrate (LOPRESSOR) 25 MG tablet, TAKE 1 TABLET BY MOUTH TWICE A DAY, Disp: 180 tablet, Rfl: 0    ondansetron ODT (ZOFRAN-ODT) 4 MG disintegrating tablet, DISSOLVE ONE TABLET ON THE TONGUE EVERY 8 HOURS AS NEEDED FOR NAUSEA OR VOMITING, Disp: 20 tablet, Rfl: 1    pantoprazole (PROTONIX) 40 MG EC tablet, TAKE 1 TABLET BY MOUTH DAILY, Disp: 90 tablet, Rfl: 0    meclizine (ANTIVERT) 12.5 MG tablet, Take 1 tablet by mouth 3 (Three) Times a Day As Needed for Dizziness., Disp: 60 tablet, Rfl: 2      Physical Exam:  I have reviewed the patient's current vital signs as documented in the patient's EMR.   Vitals:    02/06/25 1404   BP: 143/66   Pulse: 68   SpO2: 96%     Body mass index is 46.08 kg/m².       02/06/25  1404   Weight: 114 kg (252 lb)      Physical Exam  Constitutional:       General: She is not in acute distress.     Appearance: Normal  appearance. She is well-developed and normal weight.   HENT:      Head: Normocephalic and atraumatic.   Eyes:      General: Lids are normal.      Conjunctiva/sclera: Conjunctivae normal.      Pupils: Pupils are equal, round, and reactive to light.   Neck:      Vascular: No carotid bruit or JVD.   Cardiovascular:      Rate and Rhythm: Normal rate and regular rhythm.      Pulses: Normal pulses.      Heart sounds: Normal heart sounds, S1 normal and S2 normal. No murmur heard.  Pulmonary:      Effort: Pulmonary effort is normal. No respiratory distress.      Breath sounds: Normal breath sounds. No wheezing.   Abdominal:      General: Bowel sounds are normal. There is no distension.      Palpations: Abdomen is soft. There is no hepatomegaly or splenomegaly.      Tenderness: There is no abdominal tenderness.   Musculoskeletal:         General: No swelling. Normal range of motion.      Cervical back: Normal range of motion and neck supple.      Right lower leg: No edema.      Left lower leg: No edema.   Skin:     General: Skin is warm and dry.      Coloration: Skin is not jaundiced.      Findings: No rash.   Neurological:      General: No focal deficit present.      Mental Status: She is alert and oriented to person, place, and time. Mental status is at baseline.   Psychiatric:         Mood and Affect: Mood normal.         Speech: Speech normal.         Behavior: Behavior normal.         Thought Content: Thought content normal.         Judgment: Judgment normal.            DATA REVIEWED:     TTE/MAGI:  Results for orders placed during the hospital encounter of 06/11/23    Adult Transthoracic Echo Complete w/ Color, Spectral and Contrast if necessary per protocol    Interpretation Summary    Left ventricular systolic function is normal. Left ventricular ejection fraction appears to be 61 - 65%.    Left ventricular diastolic function is consistent with (grade II w/high LAP) pseudonormalization.    The left atrial cavity is  "mildly dilated.    The right atrial cavity is mildly  dilated.    Estimated right ventricular systolic pressure from tricuspid regurgitation is normal (<35 mmHg).      Laboratory evaluations:    Lab Results   Component Value Date    GLUCOSE 202 (H) 11/21/2024    BUN 14 11/21/2024    CREATININE 0.84 11/21/2024    EGFRIFNONA 54 (L) 01/11/2022    BCR 16.7 11/21/2024    K 4.3 11/21/2024    CO2 19.6 (L) 11/21/2024    CALCIUM 8.8 11/21/2024    ALBUMIN 3.4 (L) 11/21/2024    LABIL2 1.0 (L) 05/28/2016    AST 35 (H) 11/21/2024    ALT 17 11/21/2024     Lab Results   Component Value Date    WBC 5.75 10/17/2024    HGB 11.9 (L) 10/17/2024    HCT 36.0 10/17/2024    .4 (H) 10/17/2024     10/17/2024     Lab Results   Component Value Date    CHOL 216 (H) 02/04/2025    CHLPL 171 01/04/2019    TRIG 128 02/04/2025    HDL 57 02/04/2025     (H) 02/04/2025     Lab Results   Component Value Date    TSH 1.080 10/17/2024     Lab Results   Component Value Date    HGBA1C 10.90 (H) 10/17/2024     Lab Results   Component Value Date    ALT 17 11/21/2024     Lab Results   Component Value Date    HGBA1C 10.90 (H) 10/17/2024    HGBA1C 8.20 (H) 07/16/2024    HGBA1C 8.50 (H) 03/07/2024     Lab Results   Component Value Date    MICROALBUR 1.8 03/07/2024    CREATININE 0.84 11/21/2024     No results found for: \"IRON\", \"TIBC\", \"FERRITIN\"  Lab Results   Component Value Date    INR 1.01 08/22/2024    INR 1.07 06/11/2023    INR 1.00 01/19/2023    PROTIME 10.9 08/22/2024    PROTIME 14.4 06/11/2023    PROTIME 13.4 01/19/2023      No components found for: \"ABSOLUTELUNG\"    --------------------------------------------------------------------------------------------------------------------------    ASSESSMENT/PLAN:      Diagnosis Plan   1. Presence of cardiac pacemaker        2. Paroxysmal atrial fibrillation        3. Essential hypertension        4. SVT (supraventricular tachycardia)        5. Mixed hyperlipidemia          Assessment & " Plan  1. Atrial fibrillation  2. SVT  Currently normal sinus rhythm.  Had successful SVT ablation in 2023 with no further SVT events.  Continue Eliquis for stroke prevention    3.  Dizziness  4.  Vertigo  Patient seems to be experiencing what is consistent with vertigo therefore we will place her on meclizine to see if this helps  Carotid ultrasound negative for stenosis    5.  Hypertension  Overall controlled.  Recent lab work stable.  Continue current management    6.  Hyperlipidemia  Patient was initiated on Repatha at last visit however recently had lipid panel which showed LDL trending upward.  Did speak to lipid clinic who will do PA for Leqvio we will switch her to this to see if we can get better control of LDL.    7.  Pacemaker implantation  Patient follows with EP for which recent remote monitoring was stable.  Advised her to follow-up.      This document has been @Electronically signed by BOSTON Jiménez, 02/06/25, 2:14 PM EST.       Dictated Utilizing Dragon Dictation: Part of this note may be an electronic transcription/translation of spoken language to printed text using the Dragon Dictation System.    Patient or patient representative verbalized consent for the use of Ambient Listening during the visit with  BOSTON Jiménez for chart documentation. 2/6/2025  14:47 EST    Follow-up appointment and medication changes provided in hand delivered After Visit Summary as well as reviewed in the room.

## 2025-02-10 ENCOUNTER — OFFICE VISIT (OUTPATIENT)
Dept: ONCOLOGY | Facility: CLINIC | Age: 70
End: 2025-02-10
Payer: MEDICARE

## 2025-02-10 VITALS
HEIGHT: 62 IN | SYSTOLIC BLOOD PRESSURE: 145 MMHG | OXYGEN SATURATION: 97 % | BODY MASS INDEX: 46.48 KG/M2 | DIASTOLIC BLOOD PRESSURE: 78 MMHG | WEIGHT: 252.6 LBS | RESPIRATION RATE: 20 BRPM | TEMPERATURE: 98 F | HEART RATE: 73 BPM

## 2025-02-10 DIAGNOSIS — C50.411 MALIGNANT NEOPLASM OF UPPER-OUTER QUADRANT OF RIGHT BREAST IN FEMALE, ESTROGEN RECEPTOR POSITIVE: Primary | ICD-10-CM

## 2025-02-10 DIAGNOSIS — Z17.0 MALIGNANT NEOPLASM OF UPPER-OUTER QUADRANT OF RIGHT BREAST IN FEMALE, ESTROGEN RECEPTOR POSITIVE: Primary | ICD-10-CM

## 2025-02-10 DIAGNOSIS — Z78.0 MENOPAUSE: ICD-10-CM

## 2025-02-10 NOTE — PROGRESS NOTES
Name:  Ronna Wayne  :  1955  Date:  2/10/2025     REFERRING PHYSICIAN  Lee Parrish MD    PRIMARY CARE PROVIDER  Mague Acosta APRN    REASON FOR FOLLOWUP  1. Malignant neoplasm of upper-outer quadrant of right breast in female, estrogen receptor positive      CHIEF COMPLAINT  Improving left-sided shoulder pain and ROM with ongoing PT.    Dear Lee,    HISTORY OF PRESENT ILLNESS:   I saw Ms. Wayne in follow up today in our medical oncology clinic. As you are aware, she is a pleasant, 70 y.o., white female with a history of multiple medical problems, including hypertension, diabetes and sleep apnea who was diagnosed with an ER positive (95%), NE positive (95%), HER2/abida negative (1+ by IHC), invasive, mammary carcinoma (with mixed ductal and lobular features) of the upper, outer quadrant of the right breast in late Spring 2021. She was referred to you, and you performed a successful, right-sided lumpectomy on 2021. A sentinel node was positive; however, an additional twelve, excised, right-sided, axillary lymph nodes were uninvolved (). Final, surgical staging was consistent with stage [id number] disease (IIB). She was subsequently referred to our clinic (and radiation oncology) for further evaluation and management. Adjuvant treatment with whole breast irradiation followed by (at least five years of) endocrine therapy were ultimately recommended. She completed a thirty-fraction course of the former by late 2021 and began the latter (daily Arimidex) by early 2021. She has been doing overall very well in routine followup, ever since then, with no evidence of residual/recurrent disease to date.    INTERIM HISTORY:  Ms. Wayne returns to clinic today for followup again by herself. She began Arimidex in early 2021, has been on it for a total of ~three plus (3+) years now ;and she is still tolerating this medication overall very well, with no  noticeable side effects. In July 2022, her right-sided breast seroma increased in size to the point that it ruptured; and she had to undergo an I and D on 07/07/2022. She has stopped following with the lymphedema clinic as she is currently still doing well performing routine exercises on her own. She had to have a pacemaker/defibrillator placed in early 2023 and underwent a cardiac ablation (for chronic afib) in late 2023. Unfortunately, shortly after we saw her last summer, she suffered a fall and broke her left shoulder. She underwent a successful replacement surgery at that time, and the pain and ROM in this joint has been slowly, but steadily, improving with ongoing physical therapy. She otherwise has no new or specific complaints and continues to feel overall well.    Past Medical History:   Diagnosis Date    Anxiety     B12 deficiency     BPV (benign positional vertigo)     Breast cancer 05/2021    Colon polyp     Diabetes mellitus     Diarrhea     Disease of thyroid gland     Elevated cholesterol     Fibrocystic breast     Fibromyalgia     GERD (gastroesophageal reflux disease)     Glaucoma     Heart murmur     Hyperlipidemia     Hypertension     Kidney disease     Obesity     Peptic ulceration     PONV (postoperative nausea and vomiting)     Primary central sleep apnea     Sleep apnea     c-pap    Stroke     TIA (transient ischemic attack) 03/31/2019    Urinary incontinence     Vaginal infection     Weakness of left arm        Past Surgical History:   Procedure Laterality Date    ABDOMINAL SURGERY      APPENDECTOMY      BREAST ABSCESS INCISION AND DRAINAGE Right 07/07/2022    Procedure: BREAST ABSCESS INCISION AND DRAINAGE;  Surgeon: Lee Parrish MD;  Location: [facility];  Service: General;  Laterality: Right;    BREAST BIOPSY  May 2021    BREAST BIOPSY Right 2024    BREAST CYST ASPIRATION      BREAST LUMPECTOMY WITH SENTINEL NODE BIOPSY Right 08/05/2021    Procedure: BREAST LUMPECTOMY WITH SENTINEL  NODE BIOPSY;  Surgeon: Lee Parrish MD;  Location:  [facility];  Service: General;  Laterality: Right;    CARDIAC ELECTROPHYSIOLOGY PROCEDURE N/A 2023    Procedure: Pacemaker DC new;  Surgeon: Bj Hyde MD;  Location:  [facility];  Service: Cardiology;  Laterality: N/A;    CARDIAC ELECTROPHYSIOLOGY PROCEDURE N/A 2023    Procedure: EP +/- RFA SVT, hold Eliquis 1 day, hold metoprolol 3 days;  Surgeon: Israel Barrientos DO;  Location:  [facility];  Service: Cardiovascular;  Laterality: N/A;    CARDIAC ELECTROPHYSIOLOGY STUDY AND ABLATION      2023 PER DR. BARRIENTOS    CATARACT EXTRACTION Bilateral     CHOLECYSTECTOMY      COLONOSCOPY      ENDOSCOPY      HEMATOMA EVACUATION TRUNK Right 2021    Procedure: HEMATOMA EVACUATION TRUNK;  Surgeon: Lee Parrish MD;  Location:  [facility];  Service: General;  Laterality: Right;    PACEMAKER IMPLANTATION      SHOULDER SURGERY Left     URETHRA SURGERY         Social History     Socioeconomic History    Marital status:    Tobacco Use    Smoking status: Former     Current packs/day: 0.00     Average packs/day: 1.5 packs/day for 3.0 years (4.5 ttl pk-yrs)     Types: Cigarettes, Cigars     Start date: 1973     Quit date: 1976     Years since quittin.1     Passive exposure: Past    Smokeless tobacco: Never    Tobacco comments:     I smoked as a teen and stopped when my son was born   Vaping Use    Vaping status: Never Used   Substance and Sexual Activity    Alcohol use: No     Comment: former social drinker not had anything in 25 + years    Drug use: Never    Sexual activity: Not Currently     Partners: Male     Birth control/protection: Abstinence, Post-menopausal, Vasectomy       Family History   Problem Relation Age of Onset    Thyroid disease Mother     Diabetes Mother     Hearing loss Mother     Kidney disease Mother     Hyperlipidemia Father     Hypertension Father     Heart attack Father      Lung disease Father     Hearing loss Father     Hypertension Sister     Breast cancer Sister 40    Obesity Sister     Hypertension Sister     Hyperlipidemia Sister     Cancer Sister         Breast cancer    Hyperlipidemia Sister     Hypertension Sister     Arthritis Sister     Obesity Sister     Thyroid disease Sister     Alcohol abuse Maternal Aunt     Cancer Maternal Grandfather         Prostrate    Hearing loss Maternal Grandfather     Stroke Paternal Grandmother     Stroke Paternal Grandfather     Hypertension Other        Allergies   Allergen Reactions    Codeine GI Intolerance     Increased heart rate      Sulfa Antibiotics GI Intolerance     Heart rate increase        Current Outpatient Medications   Medication Sig Dispense Refill    acetaminophen (TYLENOL) 325 MG tablet Take 2 tablets by mouth Every 6 (Six) Hours As Needed for Mild Pain.      anastrozole (ARIMIDEX) 1 MG tablet Take 1 tablet by mouth Daily. 30 tablet 11    apixaban (ELIQUIS) 5 MG tablet tablet Take 1 tablet by mouth 2 (Two) Times a Day. 60 tablet 4    aspirin 81 MG chewable tablet Chew & swallow 1 tablet Daily. 30 tablet 0    cholecalciferol (VITAMIN D3) 25 MCG (1000 UT) tablet Take 2 tablets by mouth Daily.      Continuous Glucose  (Dexcom G7 ) device Use 1 Device Daily. 1 each 0    Continuous Glucose  (Dexcom G7 ) device Use 1 Device Daily. 1 each 0    Continuous Glucose Sensor (Dexcom G7 Sensor) misc Use 1 Device 90 Minutes Prior to Surgery for 1 dose. 9 each 3    Continuous Glucose Sensor (Dexcom G7 Sensor) misc Use 1 Device Every 10 (Ten) Days. 9 each 3    Diclofenac Sodium (VOLTAREN) 1 % gel gel Apply 4 g topically to the appropriate area as directed 3 (Three) Times a Day As Needed (joint pain). 350 g 2    empagliflozin (Jardiance) 25 MG tablet tablet Take 1 tablet by mouth Every Morning. 30 tablet 3    furosemide (LASIX) 20 MG tablet TAKE 2 TABLETS BY MOUTH DAILY 40 tablet 0     HYDROcodone-acetaminophen (NORCO) 5-325 MG per tablet Take 1 tablet by mouth Every 8 (Eight) Hours As Needed for Moderate Pain. 9 tablet 0    insulin aspart (novoLOG FLEXPEN) 100 UNIT/ML solution pen-injector sc pen Inject 0-9 Units under the skin into the appropriate area as directed 3 (Three) Times a Day With Meals. Admelog Solostar sliding scale      Insulin Glargine, 1 Unit Dial, (Toujeo SoloStar) 300 UNIT/ML solution pen-injector injection Inject 36 Units under the skin into the appropriate area as directed Every Morning.      Insulin Pen Needle 32G X 5 MM misc Use 1 each 3 (Three) Times a Day. 100 each 5    levothyroxine (SYNTHROID, LEVOTHROID) 88 MCG tablet TAKE 1 TABLET BY MOUTH DAILY 30 tablet 0    linaclotide (Linzess) 72 MCG capsule capsule Take 1 capsule by mouth Every Morning Before Breakfast. 30 capsule 0    linaGLIPtin-metFORMIN HCl (Jentadueto) 2.5-1000 MG tablet Take  by mouth 2 (Two) Times a Day.      losartan (COZAAR) 25 MG tablet TAKE 1 TABLET BY MOUTH DAILY 90 tablet 2    Magnesium Oxide -Mg Supplement 400 (240 Mg) MG tablet Take 2 tablets by mouth 2 (Two) Times a Day. 120 tablet 3    meclizine (ANTIVERT) 12.5 MG tablet Take 1 tablet by mouth 3 (Three) Times a Day As Needed for Dizziness. 60 tablet 2    melatonin 5 MG tablet tablet Take 1 tablet by mouth At Night As Needed.      metoprolol tartrate (LOPRESSOR) 25 MG tablet TAKE 1 TABLET BY MOUTH TWICE A  tablet 0    ondansetron ODT (ZOFRAN-ODT) 4 MG disintegrating tablet DISSOLVE ONE TABLET ON THE TONGUE EVERY 8 HOURS AS NEEDED FOR NAUSEA OR VOMITING 20 tablet 1    pantoprazole (PROTONIX) 40 MG EC tablet TAKE 1 TABLET BY MOUTH DAILY 90 tablet 0     No current facility-administered medications for this visit.     REVIEW OF SYSTEMS  CONSTITUTIONAL:  No fever, chills or night sweats. Chronic fatigue, recently stable.  EYES:  No blurry vision, diplopia or other vision changes.  ENT:  No hearing loss, nosebleeds or sore  "throat.  CARDIOVASCULAR:  No palpitations, arrhythmia, syncopal episodes or edema.  PULMONARY:  No hemoptysis, wheezing, chronic cough or shortness of breath.  BREASTS: As per the HPI above.  GASTROINTESTINAL:  No nausea or vomiting. No constipation or diarrhea. No abdominal pain.  GENITOURINARY:  No hematuria, kidney stones or frequent urination.  MUSCULOSKELETAL:  As per the HPI above.  INTEGUMENTARY: As per the HPI above.  ENDOCRINE:  No excessive thirst or hot flashes.  HEMATOLOGIC:  No history of free bleeding, spontaneous bleeding or clotting.  IMMUNOLOGIC:  No allergies or frequent infections.  NEUROLOGIC: No numbness, tingling, seizures or weakness.  PSYCHIATRIC:  No anxiety or depression.    PHYSICAL EXAMINATION  /78   Pulse 73   Temp 98 °F (36.7 °C) (Temporal)   Resp 20   Ht 157.5 cm (62\")   Wt 115 kg (252 lb 9.6 oz)   SpO2 97%   BMI 46.20 kg/m²     Pain Score:  Pain Score    02/10/25 1259   PainSc: 0-No pain     PHQ-Score Total:  PHQ-9 Total Score:      ECO  GENERAL:  A well-developed, well-nourished, morbidly obese, white female in no acute distress.  HEENT:  Pupils equally round and reactive to light. Extraocular muscles intact.  CARDIOVASCULAR:  Regular rate and rhythm. No murmurs, gallops or rubs.  LUNGS:  Clear to auscultation bilaterally.  BREASTS: Deferred today. Status post right-sided lumpectomy in 2021.  ABDOMEN:  Soft, nontender, nondistended with positive bowel sounds.  EXTREMITIES:  No clubbing, cyanosis or edema bilaterally. Decreased (but improving) ROM in the left shoulder status post fracture and replacement surgery in late Summer 2024.  SKIN:  No rashes or petechiae.  NEURO:  Cranial nerves grossly intact. No focal deficits.  PSYCH:  Alert and oriented x3.    LABORATORY  Lab Results   Component Value Date    WBC 5.75 10/17/2024    HGB 11.9 (L) 10/17/2024    HCT 36.0 10/17/2024    .4 (H) 10/17/2024     10/17/2024    NEUTROABS 2.93 10/17/2024       Lab " Results   Component Value Date     (L) 11/21/2024    K 4.3 11/21/2024     11/21/2024    CO2 19.6 (L) 11/21/2024    BUN 14 11/21/2024    CREATININE 0.84 11/21/2024    GLUCOSE 202 (H) 11/21/2024    CALCIUM 8.8 11/21/2024    AST 35 (H) 11/21/2024    ALT 17 11/21/2024    ALKPHOS 99 11/21/2024    BILITOT 0.7 11/21/2024    PROTEINTOT 7.1 11/21/2024    ALBUMIN 3.4 (L) 11/21/2024     CBC (10/17/2024): WBCs: 5.75; HgB: 11.9; Hct: 36.0; platelets: 162; MCV: 101.4  CBC (07/16/2024): WBCs: 5.26; HgB: 13.7; Hct: 41.1; platelets: 156  CBC (09/22/2023): WBCs: 6.41; HgB: 14.3; Hct: 41.7; platelets: 142  CBC (07/21/2023): WBCs: 6.01; HgB: 12.9; Hct: 12.9; platelets: 132  CBC (01/19/2023): WBCs: 7.86; HgB: 13.9; Hct: 41.4; platelets: 117  CBC (01/11/2022): WBCs: 7.11; HgB: 12.5; Hct: 37.3; platelets: 152  CBC (11/29/2021): WBCs: 5.36; HgB: 12.8; Hct: 39.1; platelets: 148  CBC (11/09/2021): WBCs: 6.63; HgB: 11.9; Hct: 36.1; platelets: 160  CBC (08/06/2021): WBCs: 9.36; HgB: 9.2; Hct: 29.1; platelets: 131    IMAGING  MRI breast bilateral with and without contrast (06/22/2021):  Impression:  1) Negative left breast MRI.  2) Biopsy proven malignancy in the right 9:00 region measuring 3.7 cm. There are no additional worrisome findings in the right breast.    Bone densitometry (DEXA) scan (05/11/2021):  Impression: The BMD measured at the AP spine L1-L4 is 1.157 gm/cm2 with a T-score of -0.2. The patient is considered to be normal according to WHO criteria. Fracture risk is low.    Bilateral diagnostic mammogram with tomosynthesis (04/14/2022):  Impression:  1) Stable mammographic appearance of the left breast with no findings suspicious for malignancy.  2) Presumed postoperative and posttreatment related changes in the right breast. Recommend short interval followup.    Mammogram, diagnostic, right with tomosynthesis (10/18/2022):  Impression: Probably benign right mammographic findings. Recommend continued followup. Bi-Rads  Category 3; Probably Benign.    Mammogram, diagnostic, bilateral with tomosynthesis (05/16/2023):  Impression: Stable mammographic appearance of both breasts including postoperative and posttreatment related changes being followed in the right breast. Bi-Rads Category 3; Probably Benign.    Bone densitometry (DEXA) scan (05/16/2023):  Impression: The BMD measured at the AP spine L1-L4 is 1.121 gm/cm2 with a T-score of -0.6. The patient is considered to be normal according to WHO criteria. Fracture risk is low.    CT abdomen and pelvis without contrast (11/01/2023, compared to 03/09/2021):  Impression:  1) Cirrhotic appearance of the liver.  2) Other findings [are nonacute].    Right diagnostic mammogram with tomosynthesis (11/15/2023):  Impression: Probably benign right mammographic findings. Recommend continued followup. Bi-Rads Category 3; Probably Benign.    Bilateral diagnostic mammogram with tomosynthesis (06/05/2024):  Impression: Benign bilateral mammographic findings. Bi-Rads Category 2; Benign.    PATHOLOGY  Breast, lumpectomy, right (08/05/2021):  Invasive mammary carcinoma with ductal and lobular features, margins uninvolved. Atypical, lobular hyperplasia with involvement of ducts. Dense stromal fibrosis. Greatest dimension of invasive focus: 31 mm. ER positive (95%), AR positive (95%), HER2 negative (1+ by IHC). [id number] (stage IIB).    [place] node, right #1 (08/05/2021):  Metastatic mammary carcinoma, 2.5 cmm extent, with extranodal extension (1/1).    Axillary contents, right (08/05/2021):  No malignancy identified in twelve axillary lymph nodes (0/12).    MammaPrint result (08/31/2021): Low risk.    IMPRESSION AND PLAN  Ms. Wayne is a 70 y.o., white female with:  Breast carcinoma: Diagnosed in late Spring 2021 and status post a right-sided lumpectomy with right axillary evaluation on 08/05/2021. The final, surgical pathology was consistent with stage IIB ([id number]), ER positive (95%), AR positive  "(95%), HER2 negative (1+ by IHC), invasive, mammary carcinoma (with ductal and lobular features) of the right breast. The surgical margins were clear; and, while one sentinel node was involved, an additional twelve lymph nodes were all negative for metastasis (1/13). I have had multiple, long discussions with the patient since the time of her initial consultation in our clinic (on 08/25/2021) regarding this diagnosis and its prognosis. In short, her surgery went extremely well; and her prognosis was/remains overall very good. That said, adjuvant treatment was/remains recommended in order to maximize her chances of cure. As MammaPrint testing on her tumor was consistent with \"low risk\" disease, chemotherapy was not indicated (as the potential risks would have drastically outweighed its very limited/nonexistant potential benefits in her case). As she had a lumpectomy, whole breast radiation was definitely indicated, and she completed a thirty fraction course of this therapy on 11/30/2021. As her tumor is very strongly ER/[place] positive, endocrine therapy (with an aromatase inhibitor, given her postmenopausal status) was also definitely indicated and strongly recommended as the final step in her adjuvant treatment. She was given a Rx for Arimidex 1 mg PO daily at that time. She has been on this therapy for a total of three plus (3+) years now and is still tolerating it overall well, without any noticeable side effects. We will proceed with this current treatment plan. Meanwhile, I have had multiple, long discussions with her regarding the current guidelines for the routine follow up of patients with a history of breast cancer. We again discussed how, other than periodic clinic visits, continued, routine mammograms of remaining breast tissue (the most recent followup, bilateral exam, performed on 06/05/2024 and summarized above, was unremarkable; an order to repeat this exam this June was placed today) and routine bone " density monitoring (particularly since she is on an AI), there is no proven benefit to performing additional studies (including blood work, such as CBCs, LFTs or tumor markers, or imaging, such as CT, NM bone or PET scans) to identify metastatic recurrences before they become symptomatic (and are identified via the focused investigation of new symptoms), as patient outcomes are not improved by doing so (she should continue to get routine CBCs, etc. through her PCP’s office as indicated, of course). We will see her back in our clinic in six months (~mid-August) for another wellness visit.  Bone health: The results from the patient's most recent DEXA scan (performed on 05/16/2023) are summarized above. Her BMD remains WNL. Continue to monitor on Arimidex. An order to repeat a DEXA scan this May was placed today.  Lymphedema: Secondary to a combination of prior, right-sided, axillary surgery and adjuvant, localized XRT. She is currently not following routinely with our lymphedema clinic as she continues to do well performing some routine exercises on her own. Continue to monitor.  Seroma/skin tags: Ongoing follow up per surgery.  Left shoulder fracture/decreased ROM: Slowly, but steadily, improving with ongoing PT status post a fall in late Summer 2024. Ongoing management per orthopedic surgery.  The patient was in agreement with these plans.    It is a pleasure to participate in Ms. Wayne's care. Please do not hesitate to call with any questions or concerns that you may have.    A total of 30 minutes were spent coordinating this patient’s care in clinic today; more than 50% of this time was face-to-face with the patient, reviewing her interim medical history and counseling on the current treatment and followup plan. All questions were answered to her satisfaction.    FOLLOW UP  Continue Arimidex 1 mg PO daily. With surgery, as previously planned. Return to our clinic in 6 months (~mid-August) with a repeat DEXA  scan (performed in May) and repeat bilateral mammogram (performed in June) for another wellness visit.            This document was electronically signed by LEYDI Teresa MD February 10, 2025 13:09 EST      CC: MD Marbin Ramirez MD Sherelene S. Fortney, [place]

## 2025-02-20 DIAGNOSIS — R60.9 EDEMA, UNSPECIFIED TYPE: ICD-10-CM

## 2025-02-20 DIAGNOSIS — Z79.4 TYPE 2 DIABETES MELLITUS WITH HYPERGLYCEMIA, WITH LONG-TERM CURRENT USE OF INSULIN: Primary | ICD-10-CM

## 2025-02-20 DIAGNOSIS — I48.92 ATRIAL FLUTTER, UNSPECIFIED TYPE: ICD-10-CM

## 2025-02-20 DIAGNOSIS — I10 ESSENTIAL HYPERTENSION: Primary | ICD-10-CM

## 2025-02-20 DIAGNOSIS — E11.65 TYPE 2 DIABETES MELLITUS WITH HYPERGLYCEMIA, WITH LONG-TERM CURRENT USE OF INSULIN: Primary | ICD-10-CM

## 2025-02-20 RX ORDER — FUROSEMIDE 20 MG/1
40 TABLET ORAL DAILY
Qty: 7 TABLET | Refills: 0 | Status: SHIPPED | OUTPATIENT
Start: 2025-02-20

## 2025-02-20 RX ORDER — ANASTROZOLE 1 MG/1
1 TABLET ORAL DAILY
Qty: 30 TABLET | Refills: 11 | Status: SHIPPED | OUTPATIENT
Start: 2025-02-20

## 2025-02-20 RX ORDER — LOSARTAN POTASSIUM 25 MG/1
25 TABLET ORAL DAILY
Qty: 7 TABLET | Refills: 0 | Status: SHIPPED | OUTPATIENT
Start: 2025-02-20

## 2025-02-20 RX ORDER — METOPROLOL TARTRATE 25 MG/1
25 TABLET, FILM COATED ORAL 2 TIMES DAILY
Qty: 14 TABLET | Refills: 0 | Status: SHIPPED | OUTPATIENT
Start: 2025-02-20

## 2025-02-23 DIAGNOSIS — I10 ESSENTIAL HYPERTENSION: ICD-10-CM

## 2025-02-24 RX ORDER — METOPROLOL TARTRATE 25 MG/1
25 TABLET, FILM COATED ORAL 2 TIMES DAILY
Qty: 14 TABLET | Refills: 0 | OUTPATIENT
Start: 2025-02-24

## 2025-02-24 RX ORDER — LOSARTAN POTASSIUM 25 MG/1
25 TABLET ORAL DAILY
Qty: 7 TABLET | Refills: 0 | OUTPATIENT
Start: 2025-02-24

## 2025-02-28 ENCOUNTER — TELEMEDICINE (OUTPATIENT)
Dept: FAMILY MEDICINE CLINIC | Facility: CLINIC | Age: 70
End: 2025-02-28
Payer: MEDICARE

## 2025-02-28 VITALS — WEIGHT: 252 LBS | BODY MASS INDEX: 46.38 KG/M2 | HEIGHT: 62 IN

## 2025-02-28 DIAGNOSIS — E11.65 TYPE 2 DIABETES MELLITUS WITH HYPERGLYCEMIA, WITH LONG-TERM CURRENT USE OF INSULIN: Primary | Chronic | ICD-10-CM

## 2025-02-28 DIAGNOSIS — K21.9 GASTROESOPHAGEAL REFLUX DISEASE WITHOUT ESOPHAGITIS: Chronic | ICD-10-CM

## 2025-02-28 DIAGNOSIS — E55.9 VITAMIN D DEFICIENCY: Chronic | ICD-10-CM

## 2025-02-28 DIAGNOSIS — Z79.4 TYPE 2 DIABETES MELLITUS WITH HYPERGLYCEMIA, WITH LONG-TERM CURRENT USE OF INSULIN: Primary | Chronic | ICD-10-CM

## 2025-02-28 DIAGNOSIS — R06.02 SHORTNESS OF BREATH: ICD-10-CM

## 2025-02-28 DIAGNOSIS — I48.0 PAROXYSMAL ATRIAL FIBRILLATION: Chronic | ICD-10-CM

## 2025-02-28 DIAGNOSIS — Z17.0 MALIGNANT NEOPLASM OF UPPER-OUTER QUADRANT OF RIGHT BREAST IN FEMALE, ESTROGEN RECEPTOR POSITIVE: Chronic | ICD-10-CM

## 2025-02-28 DIAGNOSIS — E03.9 ACQUIRED HYPOTHYROIDISM: Chronic | ICD-10-CM

## 2025-02-28 DIAGNOSIS — I10 PRIMARY HYPERTENSION: Chronic | ICD-10-CM

## 2025-02-28 DIAGNOSIS — Z97.8 USES SELF-APPLIED CONTINUOUS GLUCOSE MONITORING DEVICE: ICD-10-CM

## 2025-02-28 DIAGNOSIS — E78.5 DYSLIPIDEMIA: Chronic | ICD-10-CM

## 2025-02-28 DIAGNOSIS — E53.8 B12 DEFICIENCY: Chronic | ICD-10-CM

## 2025-02-28 DIAGNOSIS — C50.411 MALIGNANT NEOPLASM OF UPPER-OUTER QUADRANT OF RIGHT BREAST IN FEMALE, ESTROGEN RECEPTOR POSITIVE: Chronic | ICD-10-CM

## 2025-02-28 NOTE — PROGRESS NOTES
You have chosen to receive care through a telehealth visit.  Do you consent to use a video/audio connection for your medical care today? Yes   History of Present Illness  Ronna Wayne is a 70 y.o. female who presents to [facility] via [facility]. She is at her home in [place], [place].  I am providing care while at [facility] in [place], [place].     Ronna has been diagnosed with DM, type 2, HTN, Dyslipidemia, GERD and vitamin deficiencies.  In addition, Ronna has cardiac issues which include atrial fibrillation, nonrheumatic aortic valve stenosis tacky-bradycardia syndrome, PVCs and SVT. She has been diagnosed and treated for a malignant neoplasm of her upper outer quadrant of right breast.      Diabetes  She has type 2 diabetes mellitus. The initial diagnosis of diabetes was made in 2008. Diabetic complications include a CVA, nephropathy and PVD. Risk factors for coronary artery disease include dyslipidemia, family history, hypertension, obesity and sedentary lifestyle. Current diabetic treatment includes insulin injections and oral agents.  She is currently taking insulin pre-breakfast, pre-lunch and pre-dinner. Insulin injections are given by patient. Rotation sites for injection include the abdominal wall. She is following a diabetic diet. Meal planning includes ADA exchanges, avoidance of concentrated sweets and carbohydrate counting. She rarely participates in exercise. Blood glucose monitoring compliance is good since she has received her Dexcom.    Lab Results   Component Value Date    HGBA1C 8.20 (H) 07/16/2024      Lab Results   Component Value Date    HGBA1C 10.90 (H) 10/17/2024      Hypertension   The current episode started more than 1 year ago. The problem is controlled. Associated symptoms include headaches, malaise/fatigue and peripheral edema. Risk factors for coronary artery disease include diabetes  mellitus, dyslipidemia, obesity and post-menopausal state. Current antihypertension treatment includes angiotensin blockers.   Lab Results   Component Value Date    GLUCOSE 202 (H) 11/21/2024    BUN 14 11/21/2024    CREATININE 0.84 11/21/2024     (L) 11/21/2024    K 4.3 11/21/2024     11/21/2024    CALCIUM 8.8 11/21/2024    PROTEINTOT 7.1 11/21/2024    ALBUMIN 3.4 (L) 11/21/2024    ALT 17 11/21/2024    AST 35 (H) 11/21/2024    ALKPHOS 99 11/21/2024    BILITOT 0.7 11/21/2024    GLOB 3.7 11/21/2024    AGRATIO 0.9 11/21/2024    BCR 16.7 11/21/2024    ANIONGAP 13.4 11/21/2024    EGFR 75.3 11/21/2024     Dyslipidemia   The current episode started more than 1 year ago. Current antihyperlipidemic treatment includes Repatha. Risk factors for coronary artery disease include diabetes mellitus, dyslipidemia, hypertension, obesity and post-menopausal.   Lab Results   Component Value Date    CHOL 196 10/17/2024    CHOL 137 05/20/2024    CHOL 228 (H) 03/07/2024     Lab Results   Component Value Date    TRIG 177 (H) 10/17/2024    TRIG 132 05/20/2024    TRIG 180 (H) 03/07/2024     Lab Results   Component Value Date    HDL 53 10/17/2024    HDL 63 (H) 05/20/2024    HDL 51 03/07/2024     Lab Results   Component Value Date     (H) 10/17/2024    LDL 51 05/20/2024     (H) 03/07/2024      Shoulder Injury   On August 10, 2024, she presented to [facility] ED complaining of left arm pain which started after she fell at her home.  The x-rays revealed acute transverse fracture across the left humeral neck with associated mild medial displacement of the distal fracture component.Partial impaction of fracture components noted.  She did have a total left shoulder replacement.  She has been under the care of Dr. Mittal, orthopedic surgeon.    The following portions of the patient's history were reviewed and updated as appropriate: allergies, current medications, past family history, past medical history, past social history,  "past surgical history and problem list.    Review of Systems   Constitutional:  Positive for activity change (Improving) and fatigue (Improving). Negative for appetite change, chills and fever.   HENT:  Negative for congestion, sinus pressure and sore throat.    Eyes:  Negative for visual disturbance.   Respiratory:  Negative for cough, shortness of breath and wheezing.    Cardiovascular:  Positive for leg swelling. Negative for chest pain and palpitations.   Gastrointestinal:  Negative for nausea and vomiting.   Musculoskeletal:  Positive for arthralgias, back pain, gait problem and joint swelling.   Skin:  Negative for color change and rash.   Neurological:  Positive for weakness. Negative for dizziness.   Hematological:  Negative for adenopathy.   Psychiatric/Behavioral:  Positive for sleep disturbance.      Vital signs:  Ht 157.5 cm (62.01\")   Wt 114 kg (252 lb)   BMI 46.08 kg/m²     Physical Exam  Constitutional:       General: She is not in acute distress.  HENT:      Head: Normocephalic.      Nose: Nose normal.      Mouth/Throat:      Mouth: Mucous membranes are moist.   Eyes:      General: No scleral icterus.        Right eye: No discharge.         Left eye: No discharge.      Conjunctiva/sclera: Conjunctivae normal.   Pulmonary:      Effort: Pulmonary effort is normal. No respiratory distress.   Musculoskeletal:      Cervical back: Neck supple.   Neurological:      Mental Status: She is alert.   Psychiatric:         Mood and Affect: Mood and affect normal.         Speech: Speech normal.         Behavior: Behavior is cooperative.     Result Review : Orthopedic and oncology consult notes    Assessment & Plan     Diagnoses and all orders for this visit:    1. Type 2 diabetes mellitus with hyperglycemia, with long-term current use of insulin (Primary)  Comments:  Continue Jardiance, Jentadueto, Toujeo and NovoLog.  Orders:  -     Hemoglobin A1c; Future  -     TSH; Future  -     Lipid Panel; Future  -     " Comprehensive Metabolic Panel    2. Uses self-applied continuous glucose monitoring device  Comments:  She has found her Dexcom 7 has been a tremendous help for her to improve glycemic control.    3. Primary hypertension  Comments:  Continue losartan, metoprolol  Orders:  -     Magnesium; Future    4. Dyslipidemia  Comments:  Lipid panel ordered.  And will consider statin therapy    5. Gastroesophageal reflux disease without esophagitis  Comments:  Continue pantoprazole  Orders:  -     Magnesium; Future  -     Vitamin B12; Future  -     CBC & Differential; Future    6. Paroxysmal atrial fibrillation  Comments:  Continue apixaban and metoprolol  Orders:  -     CBC & Differential; Future    7. Malignant neoplasm of upper-outer quadrant of right breast in female, estrogen receptor positive  Comments:  Continue to follow with Dr. Teresa.  She will continue Anasstrozole    8. B12 deficiency  Comments:  Will order vitamin B12 level to assess    9. Vitamin D deficiency  Comments:  Will order her vitamin D level to assess if she needs to continue vitamin D  Orders:  -     Vitamin D,25-Hydroxy; Future    10. Acquired hypothyroidism  Comments:  Continue current levothyroxine 88 mcg  Orders:  -     T4, Free; Future  -     TSH; Future    11. Shortness of breath  -     proBNP; Future    Follow Up In early June  Recommendations discussed with Ronna. Lifestyle modifications reinforced including nutrition and activity recommendations.  Ronna will follow up in June of sooner if problems/concerns occur.  Ronna was given instructions and counseling regarding her condition or for health maintenance advice. Please see specific information pulled into the AVS if appropriate.      This document has been electronically signed by:

## 2025-03-01 PROBLEM — E03.9 ACQUIRED HYPOTHYROIDISM: Status: ACTIVE | Noted: 2021-07-01

## 2025-03-04 ENCOUNTER — LAB (OUTPATIENT)
Dept: FAMILY MEDICINE CLINIC | Facility: CLINIC | Age: 70
End: 2025-03-04
Payer: MEDICARE

## 2025-03-04 ENCOUNTER — CLINICAL SUPPORT (OUTPATIENT)
Dept: FAMILY MEDICINE CLINIC | Facility: CLINIC | Age: 70
End: 2025-03-04
Payer: MEDICARE

## 2025-03-04 VITALS — SYSTOLIC BLOOD PRESSURE: 110 MMHG | DIASTOLIC BLOOD PRESSURE: 82 MMHG

## 2025-03-04 DIAGNOSIS — Z79.4 TYPE 2 DIABETES MELLITUS WITH HYPERGLYCEMIA, WITH LONG-TERM CURRENT USE OF INSULIN: Chronic | ICD-10-CM

## 2025-03-04 DIAGNOSIS — I49.5 TACHY-BRADY SYNDROME: Primary | ICD-10-CM

## 2025-03-04 DIAGNOSIS — I48.0 PAROXYSMAL ATRIAL FIBRILLATION: ICD-10-CM

## 2025-03-04 DIAGNOSIS — E03.9 ACQUIRED HYPOTHYROIDISM: Chronic | ICD-10-CM

## 2025-03-04 DIAGNOSIS — I10 PRIMARY HYPERTENSION: Chronic | ICD-10-CM

## 2025-03-04 DIAGNOSIS — R06.02 SHORTNESS OF BREATH: ICD-10-CM

## 2025-03-04 DIAGNOSIS — E03.8 OTHER SPECIFIED HYPOTHYROIDISM: ICD-10-CM

## 2025-03-04 DIAGNOSIS — E11.65 TYPE 2 DIABETES MELLITUS WITH HYPERGLYCEMIA, WITH LONG-TERM CURRENT USE OF INSULIN: ICD-10-CM

## 2025-03-04 DIAGNOSIS — E55.9 VITAMIN D DEFICIENCY: Chronic | ICD-10-CM

## 2025-03-04 DIAGNOSIS — E11.65 TYPE 2 DIABETES MELLITUS WITH HYPERGLYCEMIA, WITH LONG-TERM CURRENT USE OF INSULIN: Chronic | ICD-10-CM

## 2025-03-04 DIAGNOSIS — K21.00 GASTROESOPHAGEAL REFLUX DISEASE WITH ESOPHAGITIS WITHOUT HEMORRHAGE: ICD-10-CM

## 2025-03-04 DIAGNOSIS — Z79.4 TYPE 2 DIABETES MELLITUS WITH HYPERGLYCEMIA, WITH LONG-TERM CURRENT USE OF INSULIN: ICD-10-CM

## 2025-03-04 DIAGNOSIS — K21.9 GASTROESOPHAGEAL REFLUX DISEASE WITHOUT ESOPHAGITIS: ICD-10-CM

## 2025-03-04 LAB
ALBUMIN SERPL-MCNC: 3.4 G/DL (ref 3.5–5.2)
ALBUMIN/GLOB SERPL: 0.9 G/DL
ALP SERPL-CCNC: 90 U/L (ref 39–117)
ALT SERPL W P-5'-P-CCNC: 14 U/L (ref 1–33)
ANION GAP SERPL CALCULATED.3IONS-SCNC: 7.4 MMOL/L (ref 5–15)
AST SERPL-CCNC: 28 U/L (ref 1–32)
BASOPHILS # BLD AUTO: 0.06 10*3/MM3 (ref 0–0.2)
BASOPHILS NFR BLD AUTO: 1 % (ref 0–1.5)
BILIRUB SERPL-MCNC: 0.7 MG/DL (ref 0–1.2)
BUN SERPL-MCNC: 15 MG/DL (ref 8–23)
BUN/CREAT SERPL: 16.3 (ref 7–25)
CALCIUM SPEC-SCNC: 9.2 MG/DL (ref 8.6–10.5)
CHLORIDE SERPL-SCNC: 97 MMOL/L (ref 98–107)
CHOLEST SERPL-MCNC: 173 MG/DL (ref 0–200)
CO2 SERPL-SCNC: 27.6 MMOL/L (ref 22–29)
CREAT SERPL-MCNC: 0.92 MG/DL (ref 0.57–1)
DEPRECATED RDW RBC AUTO: 47.2 FL (ref 37–54)
EGFRCR SERPLBLD CKD-EPI 2021: 67.1 ML/MIN/1.73
EOSINOPHIL # BLD AUTO: 0.05 10*3/MM3 (ref 0–0.4)
EOSINOPHIL NFR BLD AUTO: 0.8 % (ref 0.3–6.2)
ERYTHROCYTE [DISTWIDTH] IN BLOOD BY AUTOMATED COUNT: 13 % (ref 12.3–15.4)
GLOBULIN UR ELPH-MCNC: 3.8 GM/DL
GLUCOSE SERPL-MCNC: 154 MG/DL (ref 65–99)
HBA1C MFR BLD: 7.6 % (ref 4.8–5.6)
HCT VFR BLD AUTO: 35.4 % (ref 34–46.6)
HDLC SERPL-MCNC: 49 MG/DL (ref 40–60)
HGB BLD-MCNC: 12.2 G/DL (ref 12–15.9)
IMM GRANULOCYTES # BLD AUTO: 0.02 10*3/MM3 (ref 0–0.05)
IMM GRANULOCYTES NFR BLD AUTO: 0.3 % (ref 0–0.5)
LDLC SERPL CALC-MCNC: 99 MG/DL (ref 0–100)
LDLC/HDLC SERPL: 1.94 {RATIO}
LYMPHOCYTES # BLD AUTO: 2.54 10*3/MM3 (ref 0.7–3.1)
LYMPHOCYTES NFR BLD AUTO: 43.1 % (ref 19.6–45.3)
MAGNESIUM SERPL-MCNC: 1.5 MG/DL (ref 1.6–2.4)
MCH RBC QN AUTO: 34.1 PG (ref 26.6–33)
MCHC RBC AUTO-ENTMCNC: 34.5 G/DL (ref 31.5–35.7)
MCV RBC AUTO: 98.9 FL (ref 79–97)
MONOCYTES # BLD AUTO: 0.57 10*3/MM3 (ref 0.1–0.9)
MONOCYTES NFR BLD AUTO: 9.7 % (ref 5–12)
NEUTROPHILS NFR BLD AUTO: 2.65 10*3/MM3 (ref 1.7–7)
NEUTROPHILS NFR BLD AUTO: 45.1 % (ref 42.7–76)
NRBC BLD AUTO-RTO: 0 /100 WBC (ref 0–0.2)
NT-PROBNP SERPL-MCNC: 225.8 PG/ML (ref 0–900)
PLATELET # BLD AUTO: 153 10*3/MM3 (ref 140–450)
PMV BLD AUTO: 10.2 FL (ref 6–12)
POTASSIUM SERPL-SCNC: 4.4 MMOL/L (ref 3.5–5.2)
PROT SERPL-MCNC: 7.2 G/DL (ref 6–8.5)
RBC # BLD AUTO: 3.58 10*6/MM3 (ref 3.77–5.28)
SODIUM SERPL-SCNC: 132 MMOL/L (ref 136–145)
T4 FREE SERPL-MCNC: 1.17 NG/DL (ref 0.92–1.68)
TRIGL SERPL-MCNC: 145 MG/DL (ref 0–150)
TSH SERPL DL<=0.05 MIU/L-ACNC: 2.06 UIU/ML (ref 0.27–4.2)
VLDLC SERPL-MCNC: 25 MG/DL (ref 5–40)
WBC NRBC COR # BLD AUTO: 5.89 10*3/MM3 (ref 3.4–10.8)

## 2025-03-04 PROCEDURE — 83036 HEMOGLOBIN GLYCOSYLATED A1C: CPT | Performed by: NURSE PRACTITIONER

## 2025-03-04 PROCEDURE — 84439 ASSAY OF FREE THYROXINE: CPT | Performed by: NURSE PRACTITIONER

## 2025-03-04 PROCEDURE — 82306 VITAMIN D 25 HYDROXY: CPT | Performed by: NURSE PRACTITIONER

## 2025-03-04 PROCEDURE — 82570 ASSAY OF URINE CREATININE: CPT | Performed by: NURSE PRACTITIONER

## 2025-03-04 PROCEDURE — 82607 VITAMIN B-12: CPT | Performed by: NURSE PRACTITIONER

## 2025-03-04 PROCEDURE — 83735 ASSAY OF MAGNESIUM: CPT | Performed by: NURSE PRACTITIONER

## 2025-03-04 PROCEDURE — 83880 ASSAY OF NATRIURETIC PEPTIDE: CPT | Performed by: NURSE PRACTITIONER

## 2025-03-04 PROCEDURE — 82043 UR ALBUMIN QUANTITATIVE: CPT | Performed by: NURSE PRACTITIONER

## 2025-03-04 PROCEDURE — 84443 ASSAY THYROID STIM HORMONE: CPT | Performed by: NURSE PRACTITIONER

## 2025-03-04 PROCEDURE — 36415 COLL VENOUS BLD VENIPUNCTURE: CPT

## 2025-03-04 PROCEDURE — 85025 COMPLETE CBC W/AUTO DIFF WBC: CPT | Performed by: NURSE PRACTITIONER

## 2025-03-04 PROCEDURE — 80053 COMPREHEN METABOLIC PANEL: CPT | Performed by: NURSE PRACTITIONER

## 2025-03-04 PROCEDURE — 80061 LIPID PANEL: CPT | Performed by: NURSE PRACTITIONER

## 2025-03-04 RX ORDER — LEVOTHYROXINE SODIUM 88 UG/1
88 TABLET ORAL DAILY
Qty: 90 TABLET | Refills: 0 | Status: SHIPPED | OUTPATIENT
Start: 2025-03-04

## 2025-03-04 RX ORDER — PANTOPRAZOLE SODIUM 40 MG/1
40 TABLET, DELAYED RELEASE ORAL DAILY
Qty: 90 TABLET | Refills: 0 | Status: SHIPPED | OUTPATIENT
Start: 2025-03-04

## 2025-03-05 DIAGNOSIS — I10 ESSENTIAL HYPERTENSION: Primary | ICD-10-CM

## 2025-03-05 LAB
25(OH)D3 SERPL-MCNC: 71.4 NG/ML (ref 30–100)
ALBUMIN UR-MCNC: 1.9 MG/DL
CREAT UR-MCNC: 173.2 MG/DL
MICROALBUMIN/CREAT UR: 11 MG/G (ref 0–29)
VIT B12 BLD-MCNC: 523 PG/ML (ref 211–946)

## 2025-03-21 ENCOUNTER — OFFICE VISIT (OUTPATIENT)
Dept: FAMILY MEDICINE CLINIC | Facility: CLINIC | Age: 70
End: 2025-03-21
Payer: MEDICARE

## 2025-03-21 VITALS
HEART RATE: 68 BPM | HEIGHT: 62 IN | WEIGHT: 253 LBS | TEMPERATURE: 96.7 F | SYSTOLIC BLOOD PRESSURE: 130 MMHG | DIASTOLIC BLOOD PRESSURE: 70 MMHG | OXYGEN SATURATION: 97 % | RESPIRATION RATE: 14 BRPM | BODY MASS INDEX: 46.56 KG/M2

## 2025-03-21 DIAGNOSIS — Z79.4 TYPE 2 DIABETES MELLITUS WITH HYPERGLYCEMIA, WITH LONG-TERM CURRENT USE OF INSULIN: Chronic | ICD-10-CM

## 2025-03-21 DIAGNOSIS — E11.65 TYPE 2 DIABETES MELLITUS WITH HYPERGLYCEMIA, WITH LONG-TERM CURRENT USE OF INSULIN: Chronic | ICD-10-CM

## 2025-03-21 DIAGNOSIS — Z97.8 USES SELF-APPLIED CONTINUOUS GLUCOSE MONITORING DEVICE: ICD-10-CM

## 2025-03-21 DIAGNOSIS — I10 ESSENTIAL HYPERTENSION: Chronic | ICD-10-CM

## 2025-03-21 DIAGNOSIS — R05.1 ACUTE COUGH: Primary | ICD-10-CM

## 2025-03-21 DIAGNOSIS — R53.83 FATIGUE, UNSPECIFIED TYPE: Chronic | ICD-10-CM

## 2025-03-21 DIAGNOSIS — J30.9 ALLERGIC RHINITIS, UNSPECIFIED SEASONALITY, UNSPECIFIED TRIGGER: ICD-10-CM

## 2025-03-21 DIAGNOSIS — M25.50 ARTHRALGIA, UNSPECIFIED JOINT: Chronic | ICD-10-CM

## 2025-03-21 LAB
ALBUMIN SERPL-MCNC: 3.4 G/DL (ref 3.5–5.2)
ALBUMIN/GLOB SERPL: 0.8 G/DL
ALP SERPL-CCNC: 128 U/L (ref 39–117)
ALT SERPL W P-5'-P-CCNC: 25 U/L (ref 1–33)
ANION GAP SERPL CALCULATED.3IONS-SCNC: 9.9 MMOL/L (ref 5–15)
AST SERPL-CCNC: 38 U/L (ref 1–32)
B PARAPERT DNA SPEC QL NAA+PROBE: NOT DETECTED
B PERT DNA SPEC QL NAA+PROBE: NOT DETECTED
BASOPHILS # BLD AUTO: 0.07 10*3/MM3 (ref 0–0.2)
BASOPHILS NFR BLD AUTO: 1.3 % (ref 0–1.5)
BILIRUB SERPL-MCNC: 0.7 MG/DL (ref 0–1.2)
BUN SERPL-MCNC: 17 MG/DL (ref 8–23)
BUN/CREAT SERPL: 18.7 (ref 7–25)
C PNEUM DNA NPH QL NAA+NON-PROBE: NOT DETECTED
CALCIUM SPEC-SCNC: 9.3 MG/DL (ref 8.6–10.5)
CHLORIDE SERPL-SCNC: 100 MMOL/L (ref 98–107)
CK SERPL-CCNC: 17 U/L (ref 20–180)
CO2 SERPL-SCNC: 25.1 MMOL/L (ref 22–29)
CREAT SERPL-MCNC: 0.91 MG/DL (ref 0.57–1)
CRP SERPL-MCNC: 0.52 MG/DL (ref 0–0.5)
DEPRECATED RDW RBC AUTO: 45.1 FL (ref 37–54)
EGFRCR SERPLBLD CKD-EPI 2021: 68 ML/MIN/1.73
EOSINOPHIL # BLD AUTO: 0.03 10*3/MM3 (ref 0–0.4)
EOSINOPHIL NFR BLD AUTO: 0.5 % (ref 0.3–6.2)
ERYTHROCYTE [DISTWIDTH] IN BLOOD BY AUTOMATED COUNT: 12.9 % (ref 12.3–15.4)
ERYTHROCYTE [SEDIMENTATION RATE] IN BLOOD: 5 MM/HR (ref 0–30)
FLUAV SUBTYP SPEC NAA+PROBE: NOT DETECTED
FLUBV RNA ISLT QL NAA+PROBE: NOT DETECTED
GLOBULIN UR ELPH-MCNC: 4.1 GM/DL
GLUCOSE SERPL-MCNC: 234 MG/DL (ref 65–99)
HADV DNA SPEC NAA+PROBE: NOT DETECTED
HCOV 229E RNA SPEC QL NAA+PROBE: NOT DETECTED
HCOV HKU1 RNA SPEC QL NAA+PROBE: NOT DETECTED
HCOV NL63 RNA SPEC QL NAA+PROBE: NOT DETECTED
HCOV OC43 RNA SPEC QL NAA+PROBE: NOT DETECTED
HCT VFR BLD AUTO: 34.2 % (ref 34–46.6)
HGB BLD-MCNC: 12.4 G/DL (ref 12–15.9)
HMPV RNA NPH QL NAA+NON-PROBE: NOT DETECTED
HPIV1 RNA ISLT QL NAA+PROBE: NOT DETECTED
HPIV2 RNA SPEC QL NAA+PROBE: NOT DETECTED
HPIV3 RNA NPH QL NAA+PROBE: NOT DETECTED
HPIV4 P GENE NPH QL NAA+PROBE: NOT DETECTED
IMM GRANULOCYTES # BLD AUTO: 0.01 10*3/MM3 (ref 0–0.05)
IMM GRANULOCYTES NFR BLD AUTO: 0.2 % (ref 0–0.5)
LYMPHOCYTES # BLD AUTO: 1.84 10*3/MM3 (ref 0.7–3.1)
LYMPHOCYTES NFR BLD AUTO: 33.5 % (ref 19.6–45.3)
M PNEUMO IGG SER IA-ACNC: NOT DETECTED
MAGNESIUM SERPL-MCNC: 1.5 MG/DL (ref 1.6–2.4)
MCH RBC QN AUTO: 34.6 PG (ref 26.6–33)
MCHC RBC AUTO-ENTMCNC: 36.3 G/DL (ref 31.5–35.7)
MCV RBC AUTO: 95.5 FL (ref 79–97)
MONOCYTES # BLD AUTO: 0.52 10*3/MM3 (ref 0.1–0.9)
MONOCYTES NFR BLD AUTO: 9.5 % (ref 5–12)
NEUTROPHILS NFR BLD AUTO: 3.02 10*3/MM3 (ref 1.7–7)
NEUTROPHILS NFR BLD AUTO: 55 % (ref 42.7–76)
NRBC BLD AUTO-RTO: 0 /100 WBC (ref 0–0.2)
PLATELET # BLD AUTO: 164 10*3/MM3 (ref 140–450)
PMV BLD AUTO: 10.4 FL (ref 6–12)
POTASSIUM SERPL-SCNC: 4.2 MMOL/L (ref 3.5–5.2)
PROT SERPL-MCNC: 7.5 G/DL (ref 6–8.5)
RBC # BLD AUTO: 3.58 10*6/MM3 (ref 3.77–5.28)
RHINOVIRUS RNA SPEC NAA+PROBE: NOT DETECTED
RSV RNA NPH QL NAA+NON-PROBE: NOT DETECTED
SARS-COV-2 RNA RESP QL NAA+PROBE: NOT DETECTED
SODIUM SERPL-SCNC: 135 MMOL/L (ref 136–145)
WBC NRBC COR # BLD AUTO: 5.49 10*3/MM3 (ref 3.4–10.8)

## 2025-03-21 PROCEDURE — 0202U NFCT DS 22 TRGT SARS-COV-2: CPT | Performed by: NURSE PRACTITIONER

## 2025-03-21 PROCEDURE — 80053 COMPREHEN METABOLIC PANEL: CPT | Performed by: NURSE PRACTITIONER

## 2025-03-21 PROCEDURE — 36415 COLL VENOUS BLD VENIPUNCTURE: CPT | Performed by: NURSE PRACTITIONER

## 2025-03-21 PROCEDURE — 82550 ASSAY OF CK (CPK): CPT | Performed by: NURSE PRACTITIONER

## 2025-03-21 PROCEDURE — 86140 C-REACTIVE PROTEIN: CPT | Performed by: NURSE PRACTITIONER

## 2025-03-21 PROCEDURE — 85025 COMPLETE CBC W/AUTO DIFF WBC: CPT | Performed by: NURSE PRACTITIONER

## 2025-03-21 PROCEDURE — 85652 RBC SED RATE AUTOMATED: CPT | Performed by: NURSE PRACTITIONER

## 2025-03-21 PROCEDURE — 83735 ASSAY OF MAGNESIUM: CPT | Performed by: NURSE PRACTITIONER

## 2025-03-21 RX ORDER — FLUTICASONE PROPIONATE 50 MCG
2 SPRAY, SUSPENSION (ML) NASAL DAILY
Qty: 16 G | Refills: 0 | Status: SHIPPED | OUTPATIENT
Start: 2025-03-21

## 2025-03-21 NOTE — PROGRESS NOTES
History of Present Illness  History of Present Illness -      Ronna Wayne is a 70 y.o. female who presents to [facility] today complaining of upper respiratory symptoms and extreme fatigue and joint pain/myalgia which started weeks ago.  She has been diagnosed with DM, type 2, HTN, Dyslipidemia, GERD and vitamin deficiencies.  In addition, Ronna has cardiac issues which include atrial fibrillation, nonrheumatic aortic valve stenosis tacky-bradycardia syndrome, PVCs and SVT. She has been diagnosed and treated for a malignant neoplasm of her upper outer quadrant of right breast.      Upper Respiratory Symptoms   The current episode started 1 to 4 weeks ago. The problem has been waxing and waning. There has been no fever. Associated symptoms include congestion, coughing, diarrhea, ear pain, headaches, joint pain, nausea and sinus pain. Pertinent negatives include no sore throat or wheezing. She has tried acetaminophen for the symptoms.     Diabetes  She has type 2 diabetes mellitus. The initial diagnosis of diabetes was made in 2008. Diabetic complications include a CVA, nephropathy and PVD. Risk factors for coronary artery disease include dyslipidemia, family history, hypertension, obesity and sedentary lifestyle. Current diabetic treatment includes insulin injections and oral agents.  She is currently taking insulin pre-breakfast, pre-lunch and pre-dinner. Insulin injections are given by patient. Rotation sites for injection include the abdominal wall. She is following a diabetic diet. Meal planning includes ADA exchanges, avoidance of concentrated sweets and carbohydrate counting. She rarely participates in exercise. Blood glucose monitoring compliance is good since she has received her Dexcom.    Lab Results   Component Value Date    HGBA1C 10.90 (H) 10/17/2024      Lab Results   Component Value Date    HGBA1C 7.60 (H) 03/04/2025      Hypertension   The  current episode started more than 1 year ago. The problem is controlled. Associated symptoms include headaches, malaise/fatigue and peripheral edema. Risk factors for coronary artery disease include diabetes mellitus, dyslipidemia, obesity and post-menopausal state. Current antihypertension treatment includes angiotensin blockers.   Lab Results   Component Value Date    GLUCOSE 234 (H) 03/21/2025    BUN 17 03/21/2025    CREATININE 0.91 03/21/2025     (L) 03/21/2025    K 4.2 03/21/2025     03/21/2025    CALCIUM 9.3 03/21/2025    PROTEINTOT 7.5 03/21/2025    ALBUMIN 3.4 (L) 03/21/2025    ALT 25 03/21/2025    AST 38 (H) 03/21/2025    ALKPHOS 128 (H) 03/21/2025    BILITOT 0.7 03/21/2025    GLOB 4.1 03/21/2025    AGRATIO 0.8 03/21/2025    BCR 18.7 03/21/2025    ANIONGAP 9.9 03/21/2025    EGFR 68.0 03/21/2025      Dyslipidemia   The current episode started more than 1 year ago. Current antihyperlipidemic treatment includes Repatha. Risk factors for coronary artery disease include diabetes mellitus, dyslipidemia, hypertension, obesity and post-menopausal.   Lab Results   Component Value Date    CHOL 173 03/04/2025    CHOL 216 (H) 02/04/2025    CHOL 196 10/17/2024     Lab Results   Component Value Date    TRIG 145 03/04/2025    TRIG 128 02/04/2025    TRIG 177 (H) 10/17/2024     Lab Results   Component Value Date    HDL 49 03/04/2025    HDL 57 02/04/2025    HDL 53 10/17/2024     Lab Results   Component Value Date    LDL 99 03/04/2025     (H) 02/04/2025     (H) 10/17/2024      Shoulder Injury   On August 10, 2024, she presented to [facility] ED complaining of left arm pain which started after she fell at her home.  The x-rays revealed acute transverse fracture across the left humeral neck with associated mild medial displacement of the distal fracture component.Partial impaction of fracture components noted.  She did have a total left shoulder replacement.  She has been under the care of Dr. Mittal,  "orthopedic surgeon. .     The following portions of the patient's history were reviewed and updated as appropriate: allergies, current medications, past family history, past medical history, past social history, past surgical history and problem list.    Review of Systems   Constitutional:  Positive for activity change, appetite change and fatigue. Negative for fever.   HENT:  Positive for congestion, ear pain and sinus pain. Negative for sore throat.    Eyes:  Negative for pain, discharge, redness and itching.   Respiratory:  Positive for cough. Negative for chest tightness and wheezing.    Gastrointestinal:  Positive for diarrhea and nausea.   Musculoskeletal:  Positive for arthralgias and myalgias.   Neurological:  Positive for weakness and headaches.     Vital signs:  /70 (BP Location: Left arm, Patient Position: Sitting, Cuff Size: Adult)   Pulse 68   Temp 96.7 °F (35.9 °C) (Temporal)   Resp 14   Ht 157.5 cm (62.01\")   Wt 115 kg (253 lb)   SpO2 97%   BMI 46.26 kg/m²     Physical Exam  Vitals and nursing note reviewed.   Constitutional:       General: She is not in acute distress.     Appearance: She is well-developed.   HENT:      Head: Normocephalic.      Nose: Congestion present.      Mouth/Throat:      Mouth: Mucous membranes are moist.      Pharynx: No oropharyngeal exudate or posterior oropharyngeal erythema.   Eyes:      General:         Right eye: No discharge.         Left eye: No discharge.      Conjunctiva/sclera: Conjunctivae normal.   Cardiovascular:      Rate and Rhythm: Normal rate and regular rhythm.      Heart sounds: Murmur heard.      No friction rub.   Pulmonary:      Effort: Pulmonary effort is normal. No respiratory distress.      Breath sounds: Decreased breath sounds present. No wheezing, rhonchi or rales.   Abdominal:      Palpations: Abdomen is soft.      Tenderness: There is no abdominal tenderness. There is no guarding.   Musculoskeletal:         General: Tenderness " (Generalized) present.      Left shoulder: Decreased range of motion. Decreased strength.      Cervical back: Neck supple.      Right lower leg: No edema.      Left lower leg: No edema.   Lymphadenopathy:      Cervical: No cervical adenopathy.   Skin:     General: Skin is warm and dry.      Capillary Refill: Capillary refill takes less than 2 seconds.   Neurological:      Mental Status: She is alert and oriented to person, place, and time.      Cranial Nerves: Cranial nerves 2-12 are intact.   Psychiatric:         Mood and Affect: Mood and affect normal.         Speech: Speech normal.         Behavior: Behavior is cooperative.         Thought Content: Thought content normal.       Result Review :   Results for orders placed or performed in visit on 03/21/25   Respiratory Panel PCR w/COVID-19(SARS-CoV-2) INA/SELVIN/REGGIE/PAD/COR/MARCELA In-House, NP Swab in UTM/VTM, 2 HR TAT - Swab, Nasopharynx    Collection Time: 03/21/25 10:58 AM    Specimen: Nasopharynx; Swab   Result Value Ref Range    ADENOVIRUS, PCR Not Detected Not Detected    Coronavirus 229E Not Detected Not Detected    Coronavirus HKU1 Not Detected Not Detected    Coronavirus NL63 Not Detected Not Detected    Coronavirus OC43 Not Detected Not Detected    COVID19 Not Detected Not Detected - Ref. Range    Human Metapneumovirus Not Detected Not Detected    Human Rhinovirus/Enterovirus Not Detected Not Detected    Influenza A PCR Not Detected Not Detected    Influenza B PCR Not Detected Not Detected    Parainfluenza Virus 1 Not Detected Not Detected    Parainfluenza Virus 2 Not Detected Not Detected    Parainfluenza Virus 3 Not Detected Not Detected    Parainfluenza Virus 4 Not Detected Not Detected    RSV, PCR Not Detected Not Detected    Bordetella pertussis pcr Not Detected Not Detected    Bordetella parapertussis PCR Not Detected Not Detected    Chlamydophila pneumoniae PCR Not Detected Not Detected    Mycoplasma pneumo by PCR Not Detected Not Detected   CK     Collection Time: 03/21/25 11:41 AM    Specimen: Blood   Result Value Ref Range    Creatine Kinase 17 (L) 20 - 180 U/L   Sedimentation Rate    Collection Time: 03/21/25 11:41 AM    Specimen: Blood   Result Value Ref Range    Sed Rate 5 0 - 30 mm/hr   C-reactive Protein    Collection Time: 03/21/25 11:41 AM    Specimen: Blood   Result Value Ref Range    C-Reactive Protein 0.52 (H) 0.00 - 0.50 mg/dL   Comprehensive metabolic panel    Collection Time: 03/21/25 11:41 AM    Specimen: Blood   Result Value Ref Range    Glucose 234 (H) 65 - 99 mg/dL    BUN 17 8 - 23 mg/dL    Creatinine 0.91 0.57 - 1.00 mg/dL    Sodium 135 (L) 136 - 145 mmol/L    Potassium 4.2 3.5 - 5.2 mmol/L    Chloride 100 98 - 107 mmol/L    CO2 25.1 22.0 - 29.0 mmol/L    Calcium 9.3 8.6 - 10.5 mg/dL    Total Protein 7.5 6.0 - 8.5 g/dL    Albumin 3.4 (L) 3.5 - 5.2 g/dL    ALT (SGPT) 25 1 - 33 U/L    AST (SGOT) 38 (H) 1 - 32 U/L    Alkaline Phosphatase 128 (H) 39 - 117 U/L    Total Bilirubin 0.7 0.0 - 1.2 mg/dL    Globulin 4.1 gm/dL    A/G Ratio 0.8 g/dL    BUN/Creatinine Ratio 18.7 7.0 - 25.0    Anion Gap 9.9 5.0 - 15.0 mmol/L    eGFR 68.0 >60.0 mL/min/1.73   Magnesium    Collection Time: 03/21/25 11:41 AM    Specimen: Blood   Result Value Ref Range    Magnesium 1.5 (L) 1.6 - 2.4 mg/dL   CBC Auto Differential    Collection Time: 03/21/25 11:41 AM    Specimen: Blood   Result Value Ref Range    WBC 5.49 3.40 - 10.80 10*3/mm3    RBC 3.58 (L) 3.77 - 5.28 10*6/mm3    Hemoglobin 12.4 12.0 - 15.9 g/dL    Hematocrit 34.2 34.0 - 46.6 %    MCV 95.5 79.0 - 97.0 fL    MCH 34.6 (H) 26.6 - 33.0 pg    MCHC 36.3 (H) 31.5 - 35.7 g/dL    RDW 12.9 12.3 - 15.4 %    RDW-SD 45.1 37.0 - 54.0 fl    MPV 10.4 6.0 - 12.0 fL    Platelets 164 140 - 450 10*3/mm3    Neutrophil % 55.0 42.7 - 76.0 %    Lymphocyte % 33.5 19.6 - 45.3 %    Monocyte % 9.5 5.0 - 12.0 %    Eosinophil % 0.5 0.3 - 6.2 %    Basophil % 1.3 0.0 - 1.5 %    Immature Grans % 0.2 0.0 - 0.5 %    Neutrophils, Absolute  3.02 1.70 - 7.00 10*3/mm3    Lymphocytes, Absolute 1.84 0.70 - 3.10 10*3/mm3    Monocytes, Absolute 0.52 0.10 - 0.90 10*3/mm3    Eosinophils, Absolute 0.03 0.00 - 0.40 10*3/mm3    Basophils, Absolute 0.07 0.00 - 0.20 10*3/mm3    Immature Grans, Absolute 0.01 0.00 - 0.05 10*3/mm3    nRBC 0.0 0.0 - 0.2 /100 WBC     *Note: Due to a large number of results and/or encounters for the requested time period, some results have not been displayed. A complete set of results can be found in Results Review.     Assessment & Plan     Diagnoses and all orders for this visit:    1. Acute cough (Primary)  Comments:  Respiratory panel ordered and reviewed  Orders:  -     Respiratory Panel PCR w/COVID-19(SARS-CoV-2) INA/SELVIN/REGGIE/PAD/COR/MARCELA In-House, NP Swab in UTM/VTM, 2 HR TAT - Swab, Nasopharynx; Future  -     Respiratory Panel PCR w/COVID-19(SARS-CoV-2) INA/SELVIN/REGGIE/PAD/COR/MARCELA In-House, NP Swab in UTM/VTM, 2 HR TAT - Swab, Nasopharynx    2. Allergic rhinitis, unspecified seasonality, unspecified trigger  Comments:  Fluticasone nasal spray prescribed.  Counseled regarding supportive care measures  Orders:  -     fluticasone (FLONASE) 50 MCG/ACT nasal spray; Administer 2 sprays into the nostril(s) as directed by provider Daily.  Dispense: 16 g; Refill: 0    3. Arthralgia, unspecified joint  Comments:  Labs ordered  Orders:  -     CK  -     Sedimentation Rate  -     C-reactive Protein    4. Fatigue, unspecified type  Comments:  Multifactorial labs ordered to evaluate  Orders:  -     Cancel: Comprehensive Metabolic Panel  -     CBC & Differential; Future  -     Cancel: Magnesium  -     CBC & Differential    5. Type 2 diabetes mellitus with hyperglycemia, with long-term current use of insulin  Comments:  Continue insulin regimen.  She has noticed an increase in post breakfast readings.  Encouraged her to either increase by 2 units or decrease portion size  Orders:  -     Respiratory Panel PCR w/COVID-19(SARS-CoV-2)  [facility]/SELVIN/REGGIE/PAD/COR/MARCELA In-House, NP Swab in UTM/VTM, 2 HR TAT - Swab, Nasopharynx; Future  -     Respiratory Panel PCR w/COVID-19(SARS-CoV-2) INA/SELVIN/REGGIE/PAD/COR/MARCELA In-House, NP Swab in UTM/VTM, 2 HR TAT - Swab, Nasopharynx    6. Uses self-applied continuous glucose monitoring device  Comments:  Reviewed PDM history with elevated blood sugars over the last week.  Continue to monitor    7. Essential hypertension  Comments:  Adequate control.  Continue losartan and metoprolol  Orders:  -     Comprehensive metabolic panel  -     Magnesium    Follow Up If symptoms worsen or do not improve    Findings and recommendations discussed with Ronna. Reviewed test results.  Counseled regarding supportive care measures.  Signs and symptoms of concern reviewed and if occur to seek further evaluation or symptoms worsen or do not improve.  Lifestyle modifications reinforced including nutrition and activity recommendations.   Ronna was given instructions and counseling regarding her condition or for health maintenance advice. Please see specific information pulled into the AVS if appropriate.    This document has been electronically signed by:

## 2025-03-26 DIAGNOSIS — R60.9 EDEMA, UNSPECIFIED TYPE: ICD-10-CM

## 2025-03-27 RX ORDER — FUROSEMIDE 20 MG/1
40 TABLET ORAL DAILY
Qty: 40 TABLET | Refills: 2 | Status: SHIPPED | OUTPATIENT
Start: 2025-03-27

## 2025-04-04 DIAGNOSIS — J01.01 ACUTE RECURRENT MAXILLARY SINUSITIS: Primary | ICD-10-CM

## 2025-04-04 RX ORDER — CEFDINIR 300 MG/1
300 CAPSULE ORAL 2 TIMES DAILY
Qty: 20 CAPSULE | Refills: 0 | Status: SHIPPED | OUTPATIENT
Start: 2025-04-04 | End: 2025-04-14

## 2025-04-04 RX ORDER — FLUCONAZOLE 150 MG/1
150 TABLET ORAL DAILY
Qty: 2 TABLET | Refills: 0 | Status: SHIPPED | OUTPATIENT
Start: 2025-04-04

## 2025-04-23 DIAGNOSIS — I10 ESSENTIAL HYPERTENSION: ICD-10-CM

## 2025-04-23 RX ORDER — METOPROLOL TARTRATE 25 MG/1
25 TABLET, FILM COATED ORAL 2 TIMES DAILY
Qty: 180 TABLET | Refills: 1 | Status: SHIPPED | OUTPATIENT
Start: 2025-04-23

## 2025-05-12 DIAGNOSIS — S42.292D: Primary | ICD-10-CM

## 2025-05-22 DIAGNOSIS — R11.0 NAUSEA: ICD-10-CM

## 2025-05-22 RX ORDER — ONDANSETRON 4 MG/1
TABLET, ORALLY DISINTEGRATING ORAL
Qty: 20 TABLET | Refills: 1 | Status: SHIPPED | OUTPATIENT
Start: 2025-05-22

## 2025-05-23 DIAGNOSIS — E55.9 VITAMIN D DEFICIENCY: ICD-10-CM

## 2025-05-23 DIAGNOSIS — E03.9 ACQUIRED HYPOTHYROIDISM: ICD-10-CM

## 2025-05-23 DIAGNOSIS — I10 ESSENTIAL HYPERTENSION: Primary | ICD-10-CM

## 2025-05-23 DIAGNOSIS — R53.83 FATIGUE, UNSPECIFIED TYPE: ICD-10-CM

## 2025-05-23 DIAGNOSIS — E78.5 DYSLIPIDEMIA: ICD-10-CM

## 2025-05-23 DIAGNOSIS — Z79.4 TYPE 2 DIABETES MELLITUS WITH HYPERGLYCEMIA, WITH LONG-TERM CURRENT USE OF INSULIN: ICD-10-CM

## 2025-05-23 DIAGNOSIS — M25.50 ARTHRALGIA, UNSPECIFIED JOINT: ICD-10-CM

## 2025-05-23 DIAGNOSIS — E11.65 TYPE 2 DIABETES MELLITUS WITH HYPERGLYCEMIA, WITH LONG-TERM CURRENT USE OF INSULIN: ICD-10-CM

## 2025-05-23 DIAGNOSIS — R06.02 SHORTNESS OF BREATH: ICD-10-CM

## 2025-05-23 DIAGNOSIS — E83.42 HYPOMAGNESEMIA: ICD-10-CM

## 2025-05-23 DIAGNOSIS — R60.9 EDEMA, UNSPECIFIED TYPE: ICD-10-CM

## 2025-05-24 DIAGNOSIS — E83.42 HYPOMAGNESEMIA: Primary | ICD-10-CM

## 2025-05-24 RX ORDER — LANOLIN ALCOHOL/MO/W.PET/CERES
2 CREAM (GRAM) TOPICAL 2 TIMES DAILY
Qty: 120 TABLET | Refills: 0 | Status: SHIPPED | OUTPATIENT
Start: 2025-05-24

## 2025-05-30 ENCOUNTER — HOSPITAL ENCOUNTER (OUTPATIENT)
Dept: BONE DENSITY | Facility: HOSPITAL | Age: 70
Discharge: HOME | End: 2025-05-30
Payer: MEDICARE

## 2025-05-30 DIAGNOSIS — Z78.0 MENOPAUSE: ICD-10-CM

## 2025-05-30 DIAGNOSIS — Z17.0 MALIGNANT NEOPLASM OF UPPER-OUTER QUADRANT OF RIGHT BREAST IN FEMALE, ESTROGEN RECEPTOR POSITIVE: ICD-10-CM

## 2025-05-30 DIAGNOSIS — C50.411 MALIGNANT NEOPLASM OF UPPER-OUTER QUADRANT OF RIGHT BREAST IN FEMALE, ESTROGEN RECEPTOR POSITIVE: ICD-10-CM

## 2025-06-01 DIAGNOSIS — K21.00 GASTROESOPHAGEAL REFLUX DISEASE WITH ESOPHAGITIS WITHOUT HEMORRHAGE: ICD-10-CM

## 2025-06-01 DIAGNOSIS — E03.8 OTHER SPECIFIED HYPOTHYROIDISM: ICD-10-CM

## 2025-06-02 RX ORDER — PANTOPRAZOLE SODIUM 40 MG/1
40 TABLET, DELAYED RELEASE ORAL DAILY
Qty: 90 TABLET | Refills: 0 | Status: SHIPPED | OUTPATIENT
Start: 2025-06-02

## 2025-06-02 RX ORDER — LEVOTHYROXINE SODIUM 88 UG/1
88 TABLET ORAL DAILY
Qty: 90 TABLET | Refills: 0 | Status: SHIPPED | OUTPATIENT
Start: 2025-06-02

## 2025-06-03 ENCOUNTER — LAB (OUTPATIENT)
Dept: LAB | Facility: HOSPITAL | Age: 70
End: 2025-06-03
Payer: MEDICARE

## 2025-06-03 ENCOUNTER — SPECIALTY PHARMACY (OUTPATIENT)
Dept: PHARMACY | Facility: HOSPITAL | Age: 70
End: 2025-06-03
Payer: MEDICARE

## 2025-06-03 DIAGNOSIS — R06.02 SHORTNESS OF BREATH: ICD-10-CM

## 2025-06-03 DIAGNOSIS — E03.9 ACQUIRED HYPOTHYROIDISM: ICD-10-CM

## 2025-06-03 DIAGNOSIS — E11.65 TYPE 2 DIABETES MELLITUS WITH HYPERGLYCEMIA, WITH LONG-TERM CURRENT USE OF INSULIN: ICD-10-CM

## 2025-06-03 DIAGNOSIS — M25.50 ARTHRALGIA, UNSPECIFIED JOINT: ICD-10-CM

## 2025-06-03 DIAGNOSIS — Z79.4 TYPE 2 DIABETES MELLITUS WITH HYPERGLYCEMIA, WITH LONG-TERM CURRENT USE OF INSULIN: ICD-10-CM

## 2025-06-03 DIAGNOSIS — I63.49 CEREBROVASCULAR ACCIDENT (CVA) DUE TO EMBOLISM OF OTHER CEREBRAL ARTERY: ICD-10-CM

## 2025-06-03 DIAGNOSIS — E55.9 VITAMIN D DEFICIENCY: ICD-10-CM

## 2025-06-03 DIAGNOSIS — R53.83 FATIGUE, UNSPECIFIED TYPE: ICD-10-CM

## 2025-06-03 DIAGNOSIS — E78.5 DYSLIPIDEMIA: ICD-10-CM

## 2025-06-03 DIAGNOSIS — E78.5 DYSLIPIDEMIA: Primary | ICD-10-CM

## 2025-06-03 DIAGNOSIS — E83.42 HYPOMAGNESEMIA: ICD-10-CM

## 2025-06-03 LAB
25(OH)D3 SERPL-MCNC: 53.9 NG/ML (ref 30–100)
ALBUMIN SERPL-MCNC: 3.5 G/DL (ref 3.5–5.2)
ALBUMIN/GLOB SERPL: 0.8 G/DL
ALP SERPL-CCNC: 113 U/L (ref 39–117)
ALT SERPL W P-5'-P-CCNC: 22 U/L (ref 1–33)
ANION GAP SERPL CALCULATED.3IONS-SCNC: 12 MMOL/L (ref 5–15)
AST SERPL-CCNC: 42 U/L (ref 1–32)
BASOPHILS # BLD AUTO: 0.09 10*3/MM3 (ref 0–0.2)
BASOPHILS NFR BLD AUTO: 1.3 % (ref 0–1.5)
BILIRUB SERPL-MCNC: 0.9 MG/DL (ref 0–1.2)
BUN SERPL-MCNC: 16 MG/DL (ref 8–23)
BUN/CREAT SERPL: 16.2 (ref 7–25)
CALCIUM SPEC-SCNC: 9.6 MG/DL (ref 8.6–10.5)
CHLORIDE SERPL-SCNC: 100 MMOL/L (ref 98–107)
CHOLEST SERPL-MCNC: 199 MG/DL (ref 0–200)
CO2 SERPL-SCNC: 23 MMOL/L (ref 22–29)
CREAT SERPL-MCNC: 0.99 MG/DL (ref 0.57–1)
DEPRECATED RDW RBC AUTO: 48.3 FL (ref 37–54)
EGFRCR SERPLBLD CKD-EPI 2021: 61.5 ML/MIN/1.73
EOSINOPHIL # BLD AUTO: 0.07 10*3/MM3 (ref 0–0.4)
EOSINOPHIL NFR BLD AUTO: 1 % (ref 0.3–6.2)
ERYTHROCYTE [DISTWIDTH] IN BLOOD BY AUTOMATED COUNT: 13 % (ref 12.3–15.4)
GLOBULIN UR ELPH-MCNC: 4.3 GM/DL
GLUCOSE SERPL-MCNC: 177 MG/DL (ref 65–99)
HBA1C MFR BLD: 8.7 % (ref 4.8–5.6)
HCT VFR BLD AUTO: 36.2 % (ref 34–46.6)
HDLC SERPL-MCNC: 49 MG/DL (ref 40–60)
HGB BLD-MCNC: 12.1 G/DL (ref 12–15.9)
IMM GRANULOCYTES # BLD AUTO: 0.02 10*3/MM3 (ref 0–0.05)
IMM GRANULOCYTES NFR BLD AUTO: 0.3 % (ref 0–0.5)
LDLC SERPL CALC-MCNC: 127 MG/DL (ref 0–100)
LDLC/HDLC SERPL: 2.54 {RATIO}
LYMPHOCYTES # BLD AUTO: 2.37 10*3/MM3 (ref 0.7–3.1)
LYMPHOCYTES NFR BLD AUTO: 35.1 % (ref 19.6–45.3)
MAGNESIUM SERPL-MCNC: 1.6 MG/DL (ref 1.6–2.4)
MCH RBC QN AUTO: 34.2 PG (ref 26.6–33)
MCHC RBC AUTO-ENTMCNC: 33.4 G/DL (ref 31.5–35.7)
MCV RBC AUTO: 102.3 FL (ref 79–97)
MONOCYTES # BLD AUTO: 0.79 10*3/MM3 (ref 0.1–0.9)
MONOCYTES NFR BLD AUTO: 11.7 % (ref 5–12)
NEUTROPHILS NFR BLD AUTO: 3.41 10*3/MM3 (ref 1.7–7)
NEUTROPHILS NFR BLD AUTO: 50.6 % (ref 42.7–76)
NRBC BLD AUTO-RTO: 0 /100 WBC (ref 0–0.2)
NT-PROBNP SERPL-MCNC: 402 PG/ML (ref 0–900)
PLATELET # BLD AUTO: 160 10*3/MM3 (ref 140–450)
PMV BLD AUTO: 10.6 FL (ref 6–12)
POTASSIUM SERPL-SCNC: 4.5 MMOL/L (ref 3.5–5.2)
PROT SERPL-MCNC: 7.8 G/DL (ref 6–8.5)
RBC # BLD AUTO: 3.54 10*6/MM3 (ref 3.77–5.28)
SODIUM SERPL-SCNC: 135 MMOL/L (ref 136–145)
T4 FREE SERPL-MCNC: 1.48 NG/DL (ref 0.92–1.68)
TRIGL SERPL-MCNC: 127 MG/DL (ref 0–150)
TSH SERPL DL<=0.05 MIU/L-ACNC: 1.25 UIU/ML (ref 0.27–4.2)
URATE SERPL-MCNC: 5.3 MG/DL (ref 2.4–5.7)
VIT B12 BLD-MCNC: 535 PG/ML (ref 211–946)
VLDLC SERPL-MCNC: 23 MG/DL (ref 5–40)
WBC NRBC COR # BLD AUTO: 6.75 10*3/MM3 (ref 3.4–10.8)

## 2025-06-03 PROCEDURE — 83735 ASSAY OF MAGNESIUM: CPT

## 2025-06-03 PROCEDURE — 80061 LIPID PANEL: CPT

## 2025-06-03 PROCEDURE — 36415 COLL VENOUS BLD VENIPUNCTURE: CPT

## 2025-06-03 PROCEDURE — 84439 ASSAY OF FREE THYROXINE: CPT

## 2025-06-03 PROCEDURE — 84443 ASSAY THYROID STIM HORMONE: CPT

## 2025-06-03 PROCEDURE — 82306 VITAMIN D 25 HYDROXY: CPT

## 2025-06-03 PROCEDURE — 83036 HEMOGLOBIN GLYCOSYLATED A1C: CPT

## 2025-06-03 PROCEDURE — 80053 COMPREHEN METABOLIC PANEL: CPT

## 2025-06-03 PROCEDURE — 85025 COMPLETE CBC W/AUTO DIFF WBC: CPT

## 2025-06-03 PROCEDURE — 83880 ASSAY OF NATRIURETIC PEPTIDE: CPT

## 2025-06-03 PROCEDURE — 82607 VITAMIN B-12: CPT

## 2025-06-03 PROCEDURE — 84550 ASSAY OF BLOOD/URIC ACID: CPT

## 2025-06-03 RX ORDER — INCLISIRAN 284 MG/1.5ML
1.5 INJECTION, SOLUTION SUBCUTANEOUS
Qty: 1.5 ML | Refills: 0 | Status: SHIPPED | OUTPATIENT
Start: 2025-06-03

## 2025-06-03 RX ORDER — SEMAGLUTIDE 1.34 MG/ML
1 INJECTION, SOLUTION SUBCUTANEOUS WEEKLY
COMMUNITY

## 2025-06-03 NOTE — PROGRESS NOTES
[facility]  Lipid Management Program - Leqvio (Inclisiran)     Ronna Wayne  is a 70 y.o. female referred by their provider, Brenda Singh, to the Hyperlipidemia Patient Management program offered by [facility] for Lipid Management.  Ronna Wayne is  treated for clinical ASCVD and hyperlipidemia and currently takes Repatha.  In the past, Pt has tried fish oil, rosuvastatin, and atorvastatin. Statins were discontinued due to myalgias. The patient denies any allergies to latex.     Patient reports her last repatha dose was 2025. Patient was not at goal on repatha injections and will be starting leqvio today       Initial Start Date of Leqvio: 6/3/2025  Initial LDL: 99    Drug Coverage   Medicare and lipid leroy    Relevant Past Medical History and Comorbidities  Past Medical History:   Diagnosis Date    Anxiety     B12 deficiency     BPV (benign positional vertigo)     Breast cancer 2021    Colon polyp     Diabetes mellitus     Diarrhea     Disease of thyroid gland     Elevated cholesterol     Fibrocystic breast     Fibromyalgia     GERD (gastroesophageal reflux disease)     Glaucoma     Heart murmur     Hyperlipidemia     Hypertension     Kidney disease     Obesity     Peptic ulceration     PONV (postoperative nausea and vomiting)     Primary central sleep apnea     Sleep apnea     c-pap    Stroke     TIA (transient ischemic attack) 2019    Urinary incontinence     Vaginal infection     Weakness of left arm      Social History     Socioeconomic History    Marital status:    Tobacco Use    Smoking status: Former     Current packs/day: 0.00     Average packs/day: 1.5 packs/day for 3.0 years (4.5 ttl pk-yrs)     Types: Cigarettes, Cigars     Start date: 1973     Quit date: 1976     Years since quittin.4     Passive exposure: Past    Smokeless tobacco: Never    Tobacco comments:     I smoked as a teen and stopped  when my son was born   Vaping Use    Vaping status: Never Used   Substance and Sexual Activity    Alcohol use: No     Comment: former social drinker not had anything in 25 + years    Drug use: Never    Sexual activity: Not Currently     Partners: Male     Birth control/protection: Abstinence, Post-menopausal, Vasectomy         Allergies  Known allergies and reactions were discussed with the patient. The patient's chart has been reviewed for  allergy information and updated as necessary.   Allergies   Allergen Reactions    Codeine GI Intolerance     Increased heart rate      Sulfa Antibiotics GI Intolerance     Heart rate increase        Relevant Laboratory Values  Lab Results   Component Value Date    CHOL 173 03/04/2025    CHLPL 171 01/04/2019    TRIG 145 03/04/2025    HDL 49 03/04/2025    LDL 99 03/04/2025       Current Medication List    Current Outpatient Medications:     acetaminophen (TYLENOL) 325 MG tablet, Take 2 tablets by mouth Every 6 (Six) Hours As Needed for Mild Pain., Disp: , Rfl:     anastrozole (ARIMIDEX) 1 MG tablet, Take 1 tablet by mouth Daily., Disp: 30 tablet, Rfl: 11    apixaban (ELIQUIS) 5 MG tablet tablet, Take 1 tablet by mouth 2 (Two) Times a Day., Disp: 14 tablet, Rfl: 0    aspirin 81 MG chewable tablet, Chew & swallow 1 tablet Daily., Disp: 30 tablet, Rfl: 0    Continuous Glucose  (Dexcom G7 ) device, Use 1 Device Daily., Disp: 1 each, Rfl: 0    Continuous Glucose  (Dexcom G7 ) device, Use 1 Device Daily., Disp: 1 each, Rfl: 0    Continuous Glucose Sensor (Dexcom G7 Sensor) misc, Use 1 Device 90 Minutes Prior to Surgery for 1 dose., Disp: 9 each, Rfl: 3    Continuous Glucose Sensor (Dexcom G7 Sensor) misc, Use 1 Device Every 10 (Ten) Days., Disp: 9 each, Rfl: 3    Diclofenac Sodium (VOLTAREN) 1 % gel gel, Apply 4 g topically to the appropriate area as directed 3 (Three) Times a Day As Needed (joint pain)., Disp: 350 g, Rfl: 2    fluticasone (FLONASE) 50  MCG/ACT nasal spray, Administer 2 sprays into the nostril(s) as directed by provider Daily., Disp: 16 g, Rfl: 0    furosemide (LASIX) 20 MG tablet, TAKE 2 TABLETS BY MOUTH DAILY (Patient taking differently: Take 1 tablet by mouth Daily.), Disp: 40 tablet, Rfl: 2    insulin aspart (novoLOG FLEXPEN) 100 UNIT/ML solution pen-injector sc pen, Inject 0-9 Units under the skin into the appropriate area as directed 3 (Three) Times a Day With Meals. Admelog Solostar sliding scale, Disp: , Rfl:     Insulin Glargine, 1 Unit Dial, (Toujeo SoloStar) 300 UNIT/ML solution pen-injector injection, Inject 40 Units under the skin into the appropriate area as directed Every Evening., Disp: , Rfl:     Insulin Pen Needle 32G X 5 MM misc, Use 1 each 3 (Three) Times a Day., Disp: 100 each, Rfl: 5    levothyroxine (SYNTHROID, LEVOTHROID) 88 MCG tablet, TAKE 1 TABLET BY MOUTH DAILY, Disp: 90 tablet, Rfl: 0    linaclotide (Linzess) 72 MCG capsule capsule, Take 1 capsule by mouth Every Morning Before Breakfast., Disp: 30 capsule, Rfl: 0    losartan (COZAAR) 25 MG tablet, Take 1 tablet by mouth Daily., Disp: 7 tablet, Rfl: 0    Magnesium Oxide -Mg Supplement 400 (240 Mg) MG tablet, Take 2 tablets by mouth 2 (Two) Times a Day., Disp: 120 tablet, Rfl: 0    meclizine (ANTIVERT) 12.5 MG tablet, Take 1 tablet by mouth 3 (Three) Times a Day As Needed for Dizziness., Disp: 60 tablet, Rfl: 2    melatonin 5 MG tablet tablet, Take 1 tablet by mouth At Night As Needed., Disp: , Rfl:     metoprolol tartrate (LOPRESSOR) 25 MG tablet, TAKE 1 TABLET BY MOUTH TWICE A DAY, Disp: 180 tablet, Rfl: 1    ondansetron ODT (ZOFRAN-ODT) 4 MG disintegrating tablet, DISSOLVE 1 TABLET BY MOUTH EVERY 8 HOURS AS NEEDED FOR NAUSEA AND/OR VOMITING, Disp: 20 tablet, Rfl: 1    pantoprazole (PROTONIX) 40 MG EC tablet, TAKE 1 TABLET BY MOUTH DAILY, Disp: 90 tablet, Rfl: 0    empagliflozin (Jardiance) 25 MG tablet tablet, Take 1 tablet by mouth Every Morning. (Patient not taking:  Reported on 6/3/2025), Disp: 30 tablet, Rfl: 3    Inclisiran Sodium (Leqvio) 284 MG/1.5ML solution prefilled syringe, Inject 1.5 mL under the skin into the appropriate area as directed Every 3 (Three) Months., Disp: 1.5 mL, Rfl: 0    linaGLIPtin-metFORMIN HCl (Jentadueto) 2.5-1000 MG tablet, Take  by mouth 2 (Two) Times a Day. (Patient not taking: Reported on 6/3/2025), Disp: , Rfl:     Semaglutide, 1 MG/DOSE, (Ozempic, 1 MG/DOSE,) 2 MG/1.5ML solution pen-injector, Inject 1 mg under the skin into the appropriate area as directed 1 (One) Time Per Week., Disp: , Rfl:     Medicines reviewed by Sinai Iraheta, PharmD on 6/3/2025 at 10:24 AM    Drug Interactions  None with leqvio    Goals of Therapy  LDL less than 55    6/3/25: switching to leqvio today. LDL 99 from labs 3/4/25    Medication Assessment & Plan  Patient will begin Leqvio 284mg SC once, 3 months later, then every 6 months.  Administered Leqvio 284mg SUBQ in back of the left arm.    1st Dose: 6/3/25  (Lot # [id number] Exp 6/30/2027)  Medication education provided, including:   Common Adverse Effects: Injection site reactions, arthralgias, & bronchitis.  Potential for allergic reaction.  Go to ED/call 911 with any S/Sx of allergic reaction.   Must be administered by a HCP.  If a dose is missed, please call to reschedule.   Expect LDL reduction, to help reduce cardiovascular risk.   At each visit, patient will need to stay a minimum of 15 min for monitoring   Lipid panel will be checked 4 to 12 weeks after starting therapy, order placed.   Patient will continue regular follow-up with cardiology  Will follow up in 3 months for next injection.     Sinai Iraheta PharmD  6/3/2025  10:29 EDT

## 2025-06-03 NOTE — PROGRESS NOTES
[facility]  Lipid Management Program - Leqvio (Inclisiran)     Ronna Wayne  is a 70 y.o. female referred by their provider, Brenda Singh, to the Hyperlipidemia Patient Management program offered by [facility] for Lipid Management.  Ronna Wayne is  treated for clinical ASCVD and hyperlipidemia and currently takes Repatha.  In the past, Pt has tried fish oil, rosuvastatin, and atorvastatin. Statins were discontinued due to myalgias. The patient denies any allergies to latex.     Patient reports her last repatha dose was 2025. Patient was not at goal on repatha injections and will be starting leqvio today       Initial Start Date of Leqvio: 6/3/2025  Initial LDL: 99    Drug Coverage   Medicare and lipid leroy    Relevant Past Medical History and Comorbidities  Past Medical History:   Diagnosis Date    Anxiety     B12 deficiency     BPV (benign positional vertigo)     Breast cancer 2021    Colon polyp     Diabetes mellitus     Diarrhea     Disease of thyroid gland     Elevated cholesterol     Fibrocystic breast     Fibromyalgia     GERD (gastroesophageal reflux disease)     Glaucoma     Heart murmur     Hyperlipidemia     Hypertension     Kidney disease     Obesity     Peptic ulceration     PONV (postoperative nausea and vomiting)     Primary central sleep apnea     Sleep apnea     c-pap    Stroke     TIA (transient ischemic attack) 2019    Urinary incontinence     Vaginal infection     Weakness of left arm      Social History     Socioeconomic History    Marital status:    Tobacco Use    Smoking status: Former     Current packs/day: 0.00     Average packs/day: 1.5 packs/day for 3.0 years (4.5 ttl pk-yrs)     Types: Cigarettes, Cigars     Start date: 1973     Quit date: 1976     Years since quittin.4     Passive exposure: Past    Smokeless tobacco: Never    Tobacco comments:     I smoked as a teen and stopped  when my son was born   Vaping Use    Vaping status: Never Used   Substance and Sexual Activity    Alcohol use: No     Comment: former social drinker not had anything in 25 + years    Drug use: Never    Sexual activity: Not Currently     Partners: Male     Birth control/protection: Abstinence, Post-menopausal, Vasectomy         Allergies  Known allergies and reactions were discussed with the patient. The patient's chart has been reviewed for  allergy information and updated as necessary.   Allergies   Allergen Reactions    Codeine GI Intolerance     Increased heart rate      Sulfa Antibiotics GI Intolerance     Heart rate increase        Relevant Laboratory Values  Lab Results   Component Value Date    CHOL 173 03/04/2025    CHLPL 171 01/04/2019    TRIG 145 03/04/2025    HDL 49 03/04/2025    LDL 99 03/04/2025       Current Medication List    Current Outpatient Medications:     acetaminophen (TYLENOL) 325 MG tablet, Take 2 tablets by mouth Every 6 (Six) Hours As Needed for Mild Pain., Disp: , Rfl:     anastrozole (ARIMIDEX) 1 MG tablet, Take 1 tablet by mouth Daily., Disp: 30 tablet, Rfl: 11    apixaban (ELIQUIS) 5 MG tablet tablet, Take 1 tablet by mouth 2 (Two) Times a Day., Disp: 14 tablet, Rfl: 0    aspirin 81 MG chewable tablet, Chew & swallow 1 tablet Daily., Disp: 30 tablet, Rfl: 0    Continuous Glucose  (Dexcom G7 ) device, Use 1 Device Daily., Disp: 1 each, Rfl: 0    Continuous Glucose  (Dexcom G7 ) device, Use 1 Device Daily., Disp: 1 each, Rfl: 0    Continuous Glucose Sensor (Dexcom G7 Sensor) misc, Use 1 Device 90 Minutes Prior to Surgery for 1 dose., Disp: 9 each, Rfl: 3    Continuous Glucose Sensor (Dexcom G7 Sensor) misc, Use 1 Device Every 10 (Ten) Days., Disp: 9 each, Rfl: 3    Diclofenac Sodium (VOLTAREN) 1 % gel gel, Apply 4 g topically to the appropriate area as directed 3 (Three) Times a Day As Needed (joint pain)., Disp: 350 g, Rfl: 2    empagliflozin (Jardiance)  25 MG tablet tablet, Take 1 tablet by mouth Every Morning. (Patient not taking: Reported on 6/3/2025), Disp: 30 tablet, Rfl: 3    fluticasone (FLONASE) 50 MCG/ACT nasal spray, Administer 2 sprays into the nostril(s) as directed by provider Daily., Disp: 16 g, Rfl: 0    furosemide (LASIX) 20 MG tablet, TAKE 2 TABLETS BY MOUTH DAILY (Patient taking differently: Take 1 tablet by mouth Daily.), Disp: 40 tablet, Rfl: 2    Inclisiran Sodium (Leqvio) 284 MG/1.5ML solution prefilled syringe, Inject 1.5 mL under the skin into the appropriate area as directed Every 3 (Three) Months., Disp: 1.5 mL, Rfl: 0    insulin aspart (novoLOG FLEXPEN) 100 UNIT/ML solution pen-injector sc pen, Inject 0-9 Units under the skin into the appropriate area as directed 3 (Three) Times a Day With Meals. Admelog Solostar sliding scale, Disp: , Rfl:     Insulin Glargine, 1 Unit Dial, (Toujeo SoloStar) 300 UNIT/ML solution pen-injector injection, Inject 40 Units under the skin into the appropriate area as directed Every Evening., Disp: , Rfl:     Insulin Pen Needle 32G X 5 MM misc, Use 1 each 3 (Three) Times a Day., Disp: 100 each, Rfl: 5    levothyroxine (SYNTHROID, LEVOTHROID) 88 MCG tablet, TAKE 1 TABLET BY MOUTH DAILY, Disp: 90 tablet, Rfl: 0    linaclotide (Linzess) 72 MCG capsule capsule, Take 1 capsule by mouth Every Morning Before Breakfast., Disp: 30 capsule, Rfl: 0    linaGLIPtin-metFORMIN HCl (Jentadueto) 2.5-1000 MG tablet, Take  by mouth 2 (Two) Times a Day. (Patient not taking: Reported on 6/3/2025), Disp: , Rfl:     losartan (COZAAR) 25 MG tablet, Take 1 tablet by mouth Daily., Disp: 7 tablet, Rfl: 0    Magnesium Oxide -Mg Supplement 400 (240 Mg) MG tablet, Take 2 tablets by mouth 2 (Two) Times a Day., Disp: 120 tablet, Rfl: 0    meclizine (ANTIVERT) 12.5 MG tablet, Take 1 tablet by mouth 3 (Three) Times a Day As Needed for Dizziness., Disp: 60 tablet, Rfl: 2    melatonin 5 MG tablet tablet, Take 1 tablet by mouth At Night As Needed.,  Disp: , Rfl:     metoprolol tartrate (LOPRESSOR) 25 MG tablet, TAKE 1 TABLET BY MOUTH TWICE A DAY, Disp: 180 tablet, Rfl: 1    ondansetron ODT (ZOFRAN-ODT) 4 MG disintegrating tablet, DISSOLVE 1 TABLET BY MOUTH EVERY 8 HOURS AS NEEDED FOR NAUSEA AND/OR VOMITING, Disp: 20 tablet, Rfl: 1    pantoprazole (PROTONIX) 40 MG EC tablet, TAKE 1 TABLET BY MOUTH DAILY, Disp: 90 tablet, Rfl: 0    Semaglutide, 1 MG/DOSE, (Ozempic, 1 MG/DOSE,) 2 MG/1.5ML solution pen-injector, Inject 1 mg under the skin into the appropriate area as directed 1 (One) Time Per Week., Disp: , Rfl:          Drug Interactions  None with leqvio    Goals of Therapy  LDL less than 55    6/3/25: switching to leqvio today. LDL 99 from labs 3/4/25    Medication Assessment & Plan  Patient will begin Leqvio 284mg SC once, 3 months later, then every 6 months.  Administered Leqvio 284mg SUBQ in back of the left arm.    1st Dose: 6/3/25  (Lot # [id number] Exp 6/30/2027)  Medication education provided, including:   Common Adverse Effects: Injection site reactions, arthralgias, & bronchitis.  Potential for allergic reaction.  Go to ED/call 911 with any S/Sx of allergic reaction.   Must be administered by a HCP.  If a dose is missed, please call to reschedule.   Expect LDL reduction, to help reduce cardiovascular risk.   At each visit, patient will need to stay a minimum of 15 min for monitoring   Lipid panel will be checked 4 to 12 weeks after starting therapy, order placed.   Patient will continue regular follow-up with cardiology  Will follow up in 3 months for next injection.     Sinai Iraheta, PharmD  6/3/2025  10:38 EDT

## 2025-06-06 ENCOUNTER — OFFICE VISIT (OUTPATIENT)
Dept: FAMILY MEDICINE CLINIC | Facility: CLINIC | Age: 70
End: 2025-06-06
Payer: MEDICARE

## 2025-06-06 VITALS
SYSTOLIC BLOOD PRESSURE: 120 MMHG | HEART RATE: 81 BPM | HEIGHT: 62 IN | WEIGHT: 264 LBS | BODY MASS INDEX: 48.58 KG/M2 | DIASTOLIC BLOOD PRESSURE: 80 MMHG | TEMPERATURE: 97 F | OXYGEN SATURATION: 97 % | RESPIRATION RATE: 14 BRPM

## 2025-06-06 DIAGNOSIS — Z17.0 MALIGNANT NEOPLASM OF UPPER-OUTER QUADRANT OF RIGHT BREAST IN FEMALE, ESTROGEN RECEPTOR POSITIVE: Chronic | ICD-10-CM

## 2025-06-06 DIAGNOSIS — N39.46 MIXED STRESS AND URGE URINARY INCONTINENCE: Chronic | ICD-10-CM

## 2025-06-06 DIAGNOSIS — C50.411 MALIGNANT NEOPLASM OF UPPER-OUTER QUADRANT OF RIGHT BREAST IN FEMALE, ESTROGEN RECEPTOR POSITIVE: Chronic | ICD-10-CM

## 2025-06-06 DIAGNOSIS — E83.42 HYPOMAGNESEMIA: ICD-10-CM

## 2025-06-06 DIAGNOSIS — I48.0 PAROXYSMAL ATRIAL FIBRILLATION: Chronic | ICD-10-CM

## 2025-06-06 DIAGNOSIS — Z97.8 USES SELF-APPLIED CONTINUOUS GLUCOSE MONITORING DEVICE: ICD-10-CM

## 2025-06-06 DIAGNOSIS — I10 ESSENTIAL HYPERTENSION: Chronic | ICD-10-CM

## 2025-06-06 DIAGNOSIS — E03.9 ACQUIRED HYPOTHYROIDISM: Chronic | ICD-10-CM

## 2025-06-06 DIAGNOSIS — Z00.00 MEDICARE ANNUAL WELLNESS VISIT, SUBSEQUENT: Primary | ICD-10-CM

## 2025-06-06 DIAGNOSIS — Z79.4 TYPE 2 DIABETES MELLITUS WITH HYPERGLYCEMIA, WITH LONG-TERM CURRENT USE OF INSULIN: Chronic | ICD-10-CM

## 2025-06-06 DIAGNOSIS — E78.5 DYSLIPIDEMIA: Chronic | ICD-10-CM

## 2025-06-06 DIAGNOSIS — E11.65 TYPE 2 DIABETES MELLITUS WITH HYPERGLYCEMIA, WITH LONG-TERM CURRENT USE OF INSULIN: Chronic | ICD-10-CM

## 2025-06-06 NOTE — PROGRESS NOTES
Medicare Wellness Visit    Ronna Wayne is a 70 y.o. patient who presents to [facility] today for a Medicare Wellness Visit.    The following portions of the patient's history were reviewed and   updated as appropriate: allergies, current medications, past family history, past medical history, past social history, past surgical history, and problem list.    Compared to one year ago, the patient's physical   health is the same.  Compared to one year ago, the patient's mental   health is the same.    Recent Hospitalizations:  She was admitted within the past 365 days at [facility].     Current Medical Providers:  Patient Care Team:  Mague Acosta APRN as PCP - General (Family Medicine)  Bj Hyde MD as Consulting Physician (Cardiology)  LEYDI Teresa MD as Consulting Physician (Hematology and Oncology)  Israel Steele DO as Consulting Physician (Cardiology)  Lee Parrish MD as Surgeon (General Surgery)  Brenda Singh APRN as Nurse Practitioner (Cardiology)    Outpatient Medications Prior to Visit   Medication Sig Dispense Refill    acetaminophen (TYLENOL) 325 MG tablet Take 2 tablets by mouth Every 6 (Six) Hours As Needed for Mild Pain.      anastrozole (ARIMIDEX) 1 MG tablet Take 1 tablet by mouth Daily. 30 tablet 11    apixaban (ELIQUIS) 5 MG tablet tablet Take 1 tablet by mouth 2 (Two) Times a Day. 14 tablet 0    aspirin 81 MG chewable tablet Chew & swallow 1 tablet Daily. 30 tablet 0    Continuous Glucose  (Dexcom G7 ) device Use 1 Device Daily. 1 each 0    Continuous Glucose  (Dexcom G7 ) device Use 1 Device Daily. 1 each 0    Continuous Glucose Sensor (Dexcom G7 Sensor) misc Use 1 Device 90 Minutes Prior to Surgery for 1 dose. 9 each 3    Continuous Glucose Sensor (Dexcom G7 Sensor) misc Use 1 Device Every 10 (Ten) Days. 9 each 3    Diclofenac Sodium (VOLTAREN) 1 % gel gel Apply  4 g topically to the appropriate area as directed 3 (Three) Times a Day As Needed (joint pain). 350 g 2    fluticasone (FLONASE) 50 MCG/ACT nasal spray Administer 2 sprays into the nostril(s) as directed by provider Daily. 16 g 0    furosemide (LASIX) 20 MG tablet TAKE 2 TABLETS BY MOUTH DAILY (Patient taking differently: Take 1 tablet by mouth Daily.) 40 tablet 2    Inclisiran Sodium (Leqvio) 284 MG/1.5ML solution prefilled syringe Inject 1.5 mL under the skin into the appropriate area as directed Every 3 (Three) Months. 1.5 mL 0    insulin aspart (novoLOG FLEXPEN) 100 UNIT/ML solution pen-injector sc pen Inject 0-9 Units under the skin into the appropriate area as directed 3 (Three) Times a Day With Meals. Admelog Solostar sliding scale      Insulin Glargine, 1 Unit Dial, (Toujeo SoloStar) 300 UNIT/ML solution pen-injector injection Inject 40 Units under the skin into the appropriate area as directed Every Evening.      Insulin Pen Needle 32G X 5 MM misc Use 1 each 3 (Three) Times a Day. 100 each 5    levothyroxine (SYNTHROID, LEVOTHROID) 88 MCG tablet TAKE 1 TABLET BY MOUTH DAILY 90 tablet 0    linaclotide (Linzess) 72 MCG capsule capsule Take 1 capsule by mouth Every Morning Before Breakfast. 30 capsule 0    losartan (COZAAR) 25 MG tablet Take 1 tablet by mouth Daily. 7 tablet 0    Magnesium Oxide -Mg Supplement 400 (240 Mg) MG tablet Take 2 tablets by mouth 2 (Two) Times a Day. 120 tablet 0    meclizine (ANTIVERT) 12.5 MG tablet Take 1 tablet by mouth 3 (Three) Times a Day As Needed for Dizziness. 60 tablet 2    melatonin 5 MG tablet tablet Take 1 tablet by mouth At Night As Needed.      metoprolol tartrate (LOPRESSOR) 25 MG tablet TAKE 1 TABLET BY MOUTH TWICE A  tablet 1    ondansetron ODT (ZOFRAN-ODT) 4 MG disintegrating tablet DISSOLVE 1 TABLET BY MOUTH EVERY 8 HOURS AS NEEDED FOR NAUSEA AND/OR VOMITING 20 tablet 1    pantoprazole (PROTONIX) 40 MG EC tablet TAKE 1 TABLET BY MOUTH DAILY 90 tablet 0     Semaglutide, 1 MG/DOSE, (Ozempic, 1 MG/DOSE,) 2 MG/1.5ML solution pen-injector Inject 1 mg under the skin into the appropriate area as directed 1 (One) Time Per Week.       No facility-administered medications prior to visit.     No opioid medication identified on active medication list. I have reviewed chart for other potential  high risk medication/s and harmful drug interactions in the elderly.    Aspirin is on active medication list. Aspirin use is indicated based on review of current medical condition/s. Pros and cons of this therapy have been discussed today. Benefits of this medication outweigh potential harm.  Patient has been encouraged to continue taking this medication.  .    Patient Active Problem List   Diagnosis    Frequent headaches    Essential hypertension    Type 2 diabetes mellitus with hyperglycemia, with long-term current use of insulin    Obstructive sleep apnea syndrome    Glaucoma    Vertigo    GERD (gastroesophageal reflux disease)    Morbid obesity    Weakness of left arm    Cerebrovascular accident (CVA) due to embolism    SVT (supraventricular tachycardia)    Vitamin D deficiency    B12 deficiency    Malignant neoplasm of upper-outer quadrant of right breast in female, estrogen receptor positive    Acquired hypothyroidism    Personal history of colonic polyps    Tachy-janie syndrome    Atrial flutter    Paroxysmal atrial fibrillation    Presence of cardiac pacemaker    Dyslipidemia    Nonrheumatic aortic valve stenosis    TIA (transient ischemic attack)    Sinus node dysfunction    Urine incontinence    Detrusor instability of bladder    Incomplete emptying of bladder    Chronic anticoagulation    Frequency of micturition    Slow transit constipation    Skin lesion    History of recurrent UTIs    Uses self-applied continuous glucose monitoring device    Closed traumatic displaced fracture of anatomical neck of left humerus with routine healing     Advance Care Planning Advance Directive  "is not on file.  ACP discussion was held with the patient during this visit. Patient does not have an advance directive, information provided.      Vitals:    25 1417   BP: 120/80   BP Location: Left arm   Patient Position: Sitting   Cuff Size: Adult   Pulse: 81   Resp: 14   Temp: 97 °F (36.1 °C)   TempSrc: Temporal   SpO2: 97%   Weight: 120 kg (264 lb)   Height: 157.5 cm (62.01\")   PainSc: 5      Estimated body mass index is 48.27 kg/m² as calculated from the following:    Height as of this encounter: 157.5 cm (62.01\").    Weight as of this encounter: 120 kg (264 lb).  Vision Screening    Right eye Left eye Both eyes   Without correction 20/50 20/50 20/50   With correction         Does the patient have evidence of cognitive impairment? At times  Lab Results   Component Value Date    TRIG 127 2025    HDL 49 2025     (H) 2025    VLDL 23 2025    HGBA1C 8.70 (H) 2025                                                                                Health  Risk Assessment    Smoking Status:  Social History     Tobacco Use   Smoking Status Former    Current packs/day: 0.00    Average packs/day: 1.5 packs/day for 3.0 years (4.5 ttl pk-yrs)    Types: Cigarettes, Cigars    Start date: 1973    Quit date: 1976    Years since quittin.4    Passive exposure: Past   Smokeless Tobacco Never   Tobacco Comments    I smoked as a teen and stopped when my son was born     Alcohol Consumption:  Social History     Substance and Sexual Activity   Alcohol Use No    Comment: former social drinker not had anything in 25 + years     Fall Risk Screen  STEADI Fall Risk Assessment was completed, and patient is at HIGH risk for falls. Assessment completed on:2025    Depression Screening  Little interest or pleasure in doing things? Not at all   Feeling down, depressed, or hopeless? Not at all   PHQ-2 Total Score 0      Health Habits and Functional and Cognitive Screenin/6/2025    " 2:30 PM   Functional & Cognitive Status   Do you have difficulty preparing food and eating? No    Do you have difficulty bathing yourself, getting dressed or grooming yourself? No    Do you have difficulty using the toilet? No    Do you have difficulty moving around from place to place? No    Do you have trouble with steps or getting out of a bed or a chair? Yes   Current Diet Unhealthy Diet   Dental Exam Not up to date   Eye Exam Not up to date   Exercise (times per week) 7 times per week   Current Exercises Include Walking   Do you need help using the phone?  No   Are you deaf or do you have serious difficulty hearing?  No   Do you need help to go to places out of walking distance? No   Do you need help shopping? No   Do you need help preparing meals?  No   Do you need help with housework?  No   Do you need help with laundry? No   Do you need help taking your medications? No   Do you need help managing money? No   Do you ever drive or ride in a car without wearing a seat belt? No   Have you felt unusual stress, anger or loneliness in the last month? No   Who do you live with? Spouse   If you need help, do you have trouble finding someone available to you? No   Have you been bothered in the last four weeks by sexual problems? No   Do you have difficulty concentrating, remembering or making decisions? Yes      Age-appropriate Screening Schedule:  Refer to the list below for future screening recommendations based on patient's age, sex and/or medical conditions. Orders for these recommended tests are listed in the plan section. The patient has been provided with a written plan.    Health Maintenance List  Health Maintenance   Topic Date Due    TDAP/TD VACCINES (1 - Tdap) Never done    Hepatitis B (1 of 3 - Risk 3-dose series) Never done    ZOSTER VACCINE (2 of 2) 07/14/2017    DIABETIC FOOT EXAM  TBS    DIABETIC EYE EXAM  TBS    COVID-19 Vaccine (4 - 2024-25 season) 09/01/2024    ANNUAL WELLNESS VISIT  06/07/2026     DXA SCAN  Pending    INFLUENZA VACCINE  07/01/2025    HEMOGLOBIN A1C  12/03/2025    URINE MICROALBUMIN-CREATININE RATIO (uACR)  03/04/2026    LIPID PANEL  06/03/2026    MAMMOGRAM  06/05/2026    COLORECTAL CANCER SCREENING  05/28/2029    HEPATITIS C SCREENING  Completed    Pneumococcal Vaccine 50+  Completed    PAP SMEAR  Discontinued                                                                                                                                                CMS Preventative Services Quick Reference  Risk Factors Identified During Encounter  Chronic Pain: Chronic Pain Educational material Discussed and Shared in After Visit Summary for Patient.  Fall Prevention: Fall Prevention material shared in After Visit Summary for Patient.  The above risks/problems have been discussed with the patient.    Pertinent information has been shared with the patient in the After Visit Summary.  An After Visit Summary and PPPS were made available to the patient.    Follow Up:  Next Medicare Wellness visit to be scheduled in 1 year.     HPI  History of Present Illness -      Ronna Wayne is a 70 y.o. female who is also being seen today pertaining to her DM, type 2, HTN, Dyslipidemia, GERD and vitamin deficiencies.  In addition, Ronna has cardiac issues which include atrial fibrillation, nonrheumatic aortic valve stenosis tacky-bradycardia syndrome, PVCs and SVT. She has been diagnosed and treated for a malignant neoplasm of her upper outer quadrant of right breast.      Diabetes  She has type 2 diabetes mellitus. The initial diagnosis of diabetes was made in 2008. Diabetic complications include a CVA, nephropathy and PVD. Risk factors for coronary artery disease include dyslipidemia, family history, hypertension, obesity and sedentary lifestyle. Current diabetic treatment includes insulin injections and oral agents.  She is currently taking insulin pre-breakfast, pre-lunch and pre-dinner. Insulin injections  are given by patient. Rotation sites for injection include the abdominal wall. She is following a diabetic diet. Meal planning includes ADA exchanges, avoidance of concentrated sweets and carbohydrate counting. She rarely participates in exercise. Blood glucose monitoring compliance is good since she has received her Dexcom.     Lab Results   Component Value Date    HGBA1C 7.60 (H) 03/04/2025      Lab Results   Component Value Date    HGBA1C 8.70 (H) 06/03/2025      Hypertension   The current episode started more than 1 year ago. The problem is controlled. Associated symptoms include headaches, malaise/fatigue and peripheral edema. Risk factors for coronary artery disease include diabetes mellitus, dyslipidemia, obesity and post-menopausal state. Current antihypertension treatment includes angiotensin blockers.   Lab Results   Component Value Date    GLUCOSE 177 (H) 06/03/2025    BUN 16.0 06/03/2025    CREATININE 0.99 06/03/2025     (L) 06/03/2025    K 4.5 06/03/2025     06/03/2025    CALCIUM 9.6 06/03/2025    PROTEINTOT 7.8 06/03/2025    ALBUMIN 3.5 06/03/2025    ALT 22 06/03/2025    AST 42 (H) 06/03/2025    ALKPHOS 113 06/03/2025    BILITOT 0.9 06/03/2025    GLOB 4.3 06/03/2025    AGRATIO 0.8 06/03/2025    BCR 16.2 06/03/2025    ANIONGAP 12.0 06/03/2025    EGFR 61.5 06/03/2025        Dyslipidemia   The current episode started more than 1 year ago. Current antihyperlipidemic treatment includes Repatha. Risk factors for coronary artery disease include diabetes mellitus, dyslipidemia, hypertension, obesity and post-menopausal.   Lab Results   Component Value Date    CHOL 199 06/03/2025    CHOL 173 03/04/2025    CHOL 216 (H) 02/04/2025     Lab Results   Component Value Date    TRIG 127 06/03/2025    TRIG 145 03/04/2025    TRIG 128 02/04/2025     Lab Results   Component Value Date    HDL 49 06/03/2025    HDL 49 03/04/2025    HDL 57 02/04/2025     Lab Results   Component Value Date     (H) 06/03/2025  "   LDL 99 03/04/2025     (H) 02/04/2025      Shoulder Injury   On August 10, 2024, she presented to [facility] ED complaining of left arm pain which started after she fell at her home.  The x-rays revealed acute transverse fracture across the left humeral neck with associated mild medial displacement of the distal fracture component.Partial impaction of fracture components noted.  She did have a total left shoulder replacement.  She has been under the care of Dr. Mittal, orthopedic surgeon. .     Vital Signs:  /80 (BP Location: Left arm, Patient Position: Sitting, Cuff Size: Adult)   Pulse 81   Temp 97 °F (36.1 °C) (Temporal)   Resp 14   Ht 157.5 cm (62.01\")   Wt 120 kg (264 lb)   SpO2 97%   BMI 48.27 kg/m²     Physical Exam  Vitals and nursing note reviewed.   Constitutional:       General: She is not in acute distress.     Appearance: She is well-developed.   HENT:      Head: Normocephalic.      Mouth/Throat:      Mouth: Mucous membranes are moist.      Pharynx: No oropharyngeal exudate or posterior oropharyngeal erythema.   Eyes:      General: No scleral icterus.        Right eye: No discharge.         Left eye: No discharge.      Conjunctiva/sclera: Conjunctivae normal.   Cardiovascular:      Rate and Rhythm: Normal rate and regular rhythm.      Heart sounds: Murmur heard.      No friction rub.   Pulmonary:      Effort: Pulmonary effort is normal. No respiratory distress.      Breath sounds: Decreased breath sounds present. No wheezing, rhonchi or rales.   Abdominal:      Palpations: Abdomen is soft.      Tenderness: There is no abdominal tenderness. There is no guarding.   Musculoskeletal:         General: Tenderness (Generalized) present.      Left shoulder: Decreased range of motion. Decreased strength.      Cervical back: Neck supple.      Right lower leg: No edema.      Left lower leg: No edema.   Lymphadenopathy:      Cervical: No cervical adenopathy.   Skin:     General: Skin is warm and dry. "      Capillary Refill: Capillary refill takes less than 2 seconds.   Neurological:      Mental Status: She is alert and oriented to person, place, and time.      Cranial Nerves: Cranial nerves 2-12 are intact.   Psychiatric:         Mood and Affect: Mood and affect normal.         Speech: Speech normal.         Behavior: Behavior is cooperative.         Thought Content: Thought content normal.       Results for orders placed or performed in visit on 06/03/25   Comprehensive Metabolic Panel    Collection Time: 06/03/25 11:15 AM    Specimen: Blood   Result Value Ref Range    Glucose 177 (H) 65 - 99 mg/dL    BUN 16.0 8.0 - 23.0 mg/dL    Creatinine 0.99 0.57 - 1.00 mg/dL    Sodium 135 (L) 136 - 145 mmol/L    Potassium 4.5 3.5 - 5.2 mmol/L    Chloride 100 98 - 107 mmol/L    CO2 23.0 22.0 - 29.0 mmol/L    Calcium 9.6 8.6 - 10.5 mg/dL    Total Protein 7.8 6.0 - 8.5 g/dL    Albumin 3.5 3.5 - 5.2 g/dL    ALT (SGPT) 22 1 - 33 U/L    AST (SGOT) 42 (H) 1 - 32 U/L    Alkaline Phosphatase 113 39 - 117 U/L    Total Bilirubin 0.9 0.0 - 1.2 mg/dL    Globulin 4.3 gm/dL    A/G Ratio 0.8 g/dL    BUN/Creatinine Ratio 16.2 7.0 - 25.0    Anion Gap 12.0 5.0 - 15.0 mmol/L    eGFR 61.5 >60.0 mL/min/1.73   Lipid Panel    Collection Time: 06/03/25 11:15 AM    Specimen: Blood   Result Value Ref Range    Total Cholesterol 199 0 - 200 mg/dL    Triglycerides 127 0 - 150 mg/dL    HDL Cholesterol 49 40 - 60 mg/dL    LDL Cholesterol  127 (H) 0 - 100 mg/dL    VLDL Cholesterol 23 5 - 40 mg/dL    LDL/HDL Ratio 2.54    TSH    Collection Time: 06/03/25 11:15 AM    Specimen: Blood   Result Value Ref Range    TSH 1.250 0.270 - 4.200 uIU/mL   Hemoglobin A1c    Collection Time: 06/03/25 11:15 AM    Specimen: Blood   Result Value Ref Range    Hemoglobin A1C 8.70 (H) 4.80 - 5.60 %   Vitamin D,25-Hydroxy    Collection Time: 06/03/25 11:15 AM    Specimen: Blood   Result Value Ref Range    25 Hydroxy, Vitamin D 53.9 30.0 - 100.0 ng/ml   Uric Acid    Collection  Time: 06/03/25 11:15 AM    Specimen: Blood   Result Value Ref Range    Uric Acid 5.3 2.4 - 5.7 mg/dL   T4, Free    Collection Time: 06/03/25 11:15 AM    Specimen: Blood   Result Value Ref Range    Free T4 1.48 0.92 - 1.68 ng/dL   Vitamin B12    Collection Time: 06/03/25 11:15 AM    Specimen: Blood   Result Value Ref Range    Vitamin B-12 535 211 - 946 pg/mL   Magnesium    Collection Time: 06/03/25 11:15 AM    Specimen: Blood   Result Value Ref Range    Magnesium 1.6 1.6 - 2.4 mg/dL   proBNP    Collection Time: 06/03/25 11:15 AM    Specimen: Blood   Result Value Ref Range    proBNP 402.0 0.0 - 900.0 pg/mL   CBC Auto Differential    Collection Time: 06/03/25 11:15 AM    Specimen: Blood   Result Value Ref Range    WBC 6.75 3.40 - 10.80 10*3/mm3    RBC 3.54 (L) 3.77 - 5.28 10*6/mm3    Hemoglobin 12.1 12.0 - 15.9 g/dL    Hematocrit 36.2 34.0 - 46.6 %    .3 (H) 79.0 - 97.0 fL    MCH 34.2 (H) 26.6 - 33.0 pg    MCHC 33.4 31.5 - 35.7 g/dL    RDW 13.0 12.3 - 15.4 %    RDW-SD 48.3 37.0 - 54.0 fl    MPV 10.6 6.0 - 12.0 fL    Platelets 160 140 - 450 10*3/mm3    Neutrophil % 50.6 42.7 - 76.0 %    Lymphocyte % 35.1 19.6 - 45.3 %    Monocyte % 11.7 5.0 - 12.0 %    Eosinophil % 1.0 0.3 - 6.2 %    Basophil % 1.3 0.0 - 1.5 %    Immature Grans % 0.3 0.0 - 0.5 %    Neutrophils, Absolute 3.41 1.70 - 7.00 10*3/mm3    Lymphocytes, Absolute 2.37 0.70 - 3.10 10*3/mm3    Monocytes, Absolute 0.79 0.10 - 0.90 10*3/mm3    Eosinophils, Absolute 0.07 0.00 - 0.40 10*3/mm3    Basophils, Absolute 0.09 0.00 - 0.20 10*3/mm3    Immature Grans, Absolute 0.02 0.00 - 0.05 10*3/mm3    nRBC 0.0 0.0 - 0.2 /100 WBC     *Note: Due to a large number of results and/or encounters for the requested time period, some results have not been displayed. A complete set of results can be found in Results Review.   CGM upload from May 23 through June 5, 2025  Time in range  0% very low  0% low  9% in range  24% high  67% very high  Average blood glucose 289  mg/dL    Assessment and Plan   Diagnoses and all orders for this visit:    1. Medicare annual wellness visit, subsequent (Primary)  Comments:  Recommendations discussed with Ronna    2. Type 2 diabetes mellitus with hyperglycemia, with long-term current use of insulin  Comments:  Continue Toujeo and NovoLog.  She will increase her sliding scale by 5 additional units.  Continue Jentadueto  Orders:  -     Comprehensive Metabolic Panel; Standing    3. Uses self-applied continuous glucose monitoring device  Comments:  Continue Dexcom    4. Essential hypertension  Comments:  Well-controlled Losartan and furosemide    5. Dyslipidemia  Comments:  Has started Leqvio per lipid clinic    6. Paroxysmal atrial fibrillation  Comments:  Continue apixaban and metoprolol    7. Acquired hypothyroidism  Comments:  Continue levothyroxine    8. Mixed stress and urge urinary incontinence  Comments:  Collaborated with Tawanda Lynch PA-C from urology regarding options due to Vibegron not feasible financially  Orders:  -     Mirabegron ER (Myrbetriq) 50 MG tablet sustained-release 24 hour 24 hr tablet; Take 50 mg by mouth Every Night.  Dispense: 30 tablet; Refill: 0    9. Malignant neoplasm of upper-outer quadrant of right breast in female, estrogen receptor positive  Comments:  Continue to follow with oncology and is soon to be celebrating her fifth year remission    10. Hypomagnesemia  -     Magnesium; Standing     Follow Up  In September  Ronna was given instructions and counseling regarding her condition or for health maintenance advice.  Ozempic patient assistance requested.  Please see specific information pulled into the AVS if appropriate.

## 2025-06-08 RX ORDER — MIRABEGRON 50 MG/1
50 TABLET, FILM COATED, EXTENDED RELEASE ORAL NIGHTLY
Qty: 30 TABLET | Refills: 0 | Status: SHIPPED | OUTPATIENT
Start: 2025-06-08

## 2025-06-10 ENCOUNTER — TELEPHONE (OUTPATIENT)
Dept: FAMILY MEDICINE CLINIC | Facility: CLINIC | Age: 70
End: 2025-06-10

## 2025-06-10 NOTE — TELEPHONE ENCOUNTER
Caller: BENITO WITH [employer]    Relationship: Other    Best call back number: [phone]     What is the best time to reach you: 8:00 AM - 8:00 PM EST    Who are you requesting to speak with (clinical staff, provider,  specific staff member): CLINICAL    What was the call regarding: THE PROVIDER REQUESTED OZEMPIC 2 MG, BUT OF QTY 12. THEY NEED FOR THE QUANTITY TO EQUAL TO 4. IN THE PLACE OF QTY = 12, IT SHOULD SAY QTY = TO 4 BOXES.

## 2025-06-17 ENCOUNTER — OFFICE VISIT (OUTPATIENT)
Dept: PULMONOLOGY | Facility: CLINIC | Age: 70
End: 2025-06-17
Payer: MEDICARE

## 2025-06-17 VITALS
SYSTOLIC BLOOD PRESSURE: 140 MMHG | RESPIRATION RATE: 18 BRPM | OXYGEN SATURATION: 97 % | HEART RATE: 65 BPM | BODY MASS INDEX: 48.07 KG/M2 | WEIGHT: 261.2 LBS | HEIGHT: 62 IN | DIASTOLIC BLOOD PRESSURE: 82 MMHG

## 2025-06-17 DIAGNOSIS — G47.33 OSA (OBSTRUCTIVE SLEEP APNEA): Primary | ICD-10-CM

## 2025-06-17 NOTE — PROGRESS NOTES
"Chief Complaint  Sleep Apnea    Subjective          Ronna Wayne presents to [facility] for   History of Present Illness      Ms. Wayne is a 70 year old female with a medical history significant for anxiety, diabetes, fibromyalgia, GERD, glaucoma, hyperlipidemia, sleep apnea, and stroke.    She presents today for follow up on sleep apnea. She reports that she wakes up throughout the night feeling as if she is smothering. She reports feeling that her CPAP is not working like it should be.  She reports that she has a lot of daytime fatigue and always feels sleepy and unrested.        Objective   Vital Signs:   /82 (BP Location: Right arm, Patient Position: Sitting, Cuff Size: Adult)   Pulse 65   Resp 18   Ht 157.5 cm (62.01\")   Wt 118 kg (261 lb 3.2 oz)   SpO2 97%   BMI 47.76 kg/m²         Physical Exam    GENERAL APPEARANCE: Well developed, well nourished, alert and cooperative, and appears to be in no acute distress.    HEAD: normocephalic. Atraumatic.    EYES: PERRL, EOMI. Vision is grossly intact.    THROAT: Oral cavity and pharynx normal. No inflammation, swelling, exudate, or lesions.     NECK: Neck supple.  No thyromegaly.    CARDIAC: Normal S1 and S2. No S3, S4 or murmurs. Rhythm is regular.     RESPIRATORY:Bilateral air entry positive.  No wheezing, crackles or rhonchi noted.    GI: Positive bowel sounds. Soft, nondistended, nontender.     MUSCULOSKELETAL: No significant deformity or joint abnormality. No edema. Peripheral pulses intact. No varicosities.    NEUROLOGICAL: Strength and sensation symmetric and intact throughout.     PSYCHIATRIC: The mental examination revealed the patient was oriented to person, place, and time.       Estimated body mass index is 47.76 kg/m² as calculated from the following:    Height as of this encounter: 157.5 cm (62.01\").    Weight as of this encounter: 118 kg (261 lb 3.2 oz).        Result Review " ":   The following data was reviewed by: BOSTON Melvin on 06/17/2025:  Common labs          3/4/2025    11:10 3/21/2025    11:41 6/3/2025    11:15   Common Labs   Glucose 154  234  177    BUN 15  17  16.0    Creatinine 0.92  0.91  0.99    Sodium 132  135  135    Potassium 4.4  4.2  4.5    Chloride 97  100  100    Calcium 9.2  9.3  9.6    Albumin 3.4  3.4  3.5    Total Bilirubin 0.7  0.7  0.9    Alkaline Phosphatase 90  128  113    AST (SGOT) 28  38  42    ALT (SGPT) 14  25  22    WBC 5.89  5.49  6.75    Hemoglobin 12.2  12.4  12.1    Hematocrit 35.4  34.2  36.2    Platelets 153  164  160    Total Cholesterol 173   199    Triglycerides 145   127    HDL Cholesterol 49   49    LDL Cholesterol  99   127    Hemoglobin A1C 7.60   8.70    Microalbumin, Urine 1.9      Uric Acid   5.3           PFT:NA    Low dose lung cancer screening:NA    Previous chest imaging:NA    Alpha-1 antitrypsin screening:NA    STOP-Bang Score:   NA  [place] Sleepiness Scale:   NA      ABG:    pH No results found for: \"PHART\"   pO2 No results found for: \"PO2ART\"   pCO2 No results found for: \"ZKO2ANG\"   HCO3 No results found for: \"SCA2RMH\"                      Assessment and Plan    Problem List Items Addressed This Visit    None  Visit Diagnoses         MEMO (obstructive sleep apnea)    -  Primary    Relevant Orders    Polysomnography 4 or More Parameters With CPAP            Ronna Wayne  reports that she quit smoking about 49 years ago. Her smoking use included cigarettes. She started smoking about 52 years ago. She has a 4.5 pack-year smoking history. She has been exposed to tobacco smoke. She has never used smokeless tobacco.     She reports that she is using CPAP nightly but feels that it is not working like it should.  Her cpap is currently set at 10 cm H2O.  Ordered titration study to assess the need for bipap or setting changes.     Patient was educated on positive airway pressure treatment.  As per CMS guidelines, " more than 4 hours on 70% of observed nights is considered adherence. Patient was strongly encouraged to use CPAP as much as possible during sleep as more CPAP use is equal to more benefit. Use of heated humidification in positive airway pressure treatment to improve the adherence to the device.  In case of claustrophobia, we will provide the patient cognitive behavioral therapy and desensitization. Oral appliances use will be discussed with the patient in case of mild to moderate sleep apnea or if the patient with severe disease fail positive airway pressure treatment.       The patient was extensively educated on the consequences of untreated obstructive sleep apnea namely cardiovascular/metabolic disorder, neurocognitive deficit, daytime sleepiness, motor vehicle accidents, depression, mood disorders and reduced quality of life.  At the end of conversation, the patient voices understanding of the disease process and treatment modality.  Patient also understands the risk of untreated obstructive sleep apnea and benefit benefits of the treatment.    Counseling time was greater than 10 minutes.        Follow Up   Return in about 3 months (around 9/17/2025).  Patient was given instructions and counseling regarding her condition or for health maintenance advice. Please see specific information pulled into the AVS if appropriate.

## 2025-06-18 ENCOUNTER — OFFICE VISIT (OUTPATIENT)
Dept: SURGERY | Facility: CLINIC | Age: 70
End: 2025-06-18
Payer: MEDICARE

## 2025-06-18 VITALS — HEIGHT: 62 IN | BODY MASS INDEX: 48.03 KG/M2 | WEIGHT: 261 LBS

## 2025-06-18 DIAGNOSIS — C50.411 MALIGNANT NEOPLASM OF UPPER-OUTER QUADRANT OF RIGHT BREAST IN FEMALE, ESTROGEN RECEPTOR POSITIVE: Primary | ICD-10-CM

## 2025-06-18 DIAGNOSIS — Z17.0 MALIGNANT NEOPLASM OF UPPER-OUTER QUADRANT OF RIGHT BREAST IN FEMALE, ESTROGEN RECEPTOR POSITIVE: Primary | ICD-10-CM

## 2025-06-18 PROCEDURE — 1160F RVW MEDS BY RX/DR IN RCRD: CPT | Performed by: SURGERY

## 2025-06-18 PROCEDURE — 1159F MED LIST DOCD IN RCRD: CPT | Performed by: SURGERY

## 2025-06-18 PROCEDURE — 99213 OFFICE O/P EST LOW 20 MIN: CPT | Performed by: SURGERY

## 2025-06-20 NOTE — PROGRESS NOTES
Subjective   Ronna Wayne is a 68 y.o. female  is here today for follow-up.         Ronna Wayne is a 68 y.o. female here for follow up after seroma drainage from the right breast following lumpectomy for right breast cancer.  Wound completely healed.  Radiation changes greatly improved.  She has had a pacemaker placed she has also undergone left shoulder surgery and is rehabbing from this..  No complaints reported.        Assessment     Diagnoses and all orders for this visit:    1. Malignant neoplasm of upper-outer quadrant of right breast in female, estrogen receptor positive (Primary)      Ronna Wayne is a 68 y.o. female doing well after drainage of seroma of the right breast.  The wound has healed well by secondary intent.  Follow-up in 6 months.

## 2025-06-27 DIAGNOSIS — E83.42 HYPOMAGNESEMIA: ICD-10-CM

## 2025-06-27 RX ORDER — LANOLIN ALCOHOL/MO/W.PET/CERES
2 CREAM (GRAM) TOPICAL 2 TIMES DAILY
Qty: 120 TABLET | Refills: 2 | Status: SHIPPED | OUTPATIENT
Start: 2025-06-27

## 2025-07-01 ENCOUNTER — TELEPHONE (OUTPATIENT)
Dept: CARDIOLOGY | Facility: CLINIC | Age: 70
End: 2025-07-01
Payer: MEDICARE

## 2025-07-01 ENCOUNTER — TELEPHONE (OUTPATIENT)
Dept: FAMILY MEDICINE CLINIC | Facility: CLINIC | Age: 70
End: 2025-07-01
Payer: MEDICARE

## 2025-07-01 NOTE — TELEPHONE ENCOUNTER
Spoke with patient and let her know that her Ozempic came in from the patient assistance program and she can come by and pick it up. Patient is in understanding.

## 2025-07-05 DIAGNOSIS — N39.46 MIXED STRESS AND URGE URINARY INCONTINENCE: Chronic | ICD-10-CM

## 2025-07-07 RX ORDER — MIRABEGRON 50 MG/1
50 TABLET, FILM COATED, EXTENDED RELEASE ORAL NIGHTLY
Qty: 90 TABLET | Refills: 0 | Status: SHIPPED | OUTPATIENT
Start: 2025-07-07

## 2025-07-08 ENCOUNTER — PRIOR AUTHORIZATION (OUTPATIENT)
Dept: FAMILY MEDICINE CLINIC | Facility: CLINIC | Age: 70
End: 2025-07-08
Payer: MEDICARE

## 2025-07-14 DIAGNOSIS — R60.9 EDEMA, UNSPECIFIED TYPE: ICD-10-CM

## 2025-07-14 RX ORDER — FUROSEMIDE 20 MG/1
40 TABLET ORAL DAILY
Qty: 40 TABLET | Refills: 2 | Status: SHIPPED | OUTPATIENT
Start: 2025-07-14

## 2025-07-28 LAB
MC_CV_MDC_IDC_RATE_1: 160
MC_CV_MDC_IDC_ZONE_ID: 1
MDC_IDC_MSMT_BATTERY_REMAINING_LONGEVITY: 90 MO
MDC_IDC_MSMT_BATTERY_REMAINING_PERCENTAGE: 100 %
MDC_IDC_MSMT_BATTERY_STATUS: NORMAL
MDC_IDC_MSMT_LEADCHNL_RA_DTM: NORMAL
MDC_IDC_MSMT_LEADCHNL_RA_IMPEDANCE_VALUE: 705
MDC_IDC_MSMT_LEADCHNL_RA_PACING_THRESHOLD_AMPLITUDE: 0.7
MDC_IDC_MSMT_LEADCHNL_RA_PACING_THRESHOLD_POLARITY: NORMAL
MDC_IDC_MSMT_LEADCHNL_RA_PACING_THRESHOLD_PULSEWIDTH: 0.4
MDC_IDC_MSMT_LEADCHNL_RA_SENSING_INTR_AMPL: 3.2
MDC_IDC_MSMT_LEADCHNL_RV_DTM: NORMAL
MDC_IDC_MSMT_LEADCHNL_RV_IMPEDANCE_VALUE: 679
MDC_IDC_MSMT_LEADCHNL_RV_PACING_THRESHOLD_AMPLITUDE: 1.4
MDC_IDC_MSMT_LEADCHNL_RV_PACING_THRESHOLD_POLARITY: NORMAL
MDC_IDC_MSMT_LEADCHNL_RV_PACING_THRESHOLD_PULSEWIDTH: 0.4
MDC_IDC_MSMT_LEADCHNL_RV_SENSING_INTR_AMPL: 13.1
MDC_IDC_PG_IMPLANT_DTM: NORMAL
MDC_IDC_PG_MFG: NORMAL
MDC_IDC_PG_MODEL: NORMAL
MDC_IDC_PG_SERIAL: NORMAL
MDC_IDC_PG_TYPE: NORMAL
MDC_IDC_SESS_DTM: NORMAL
MDC_IDC_SESS_TYPE: NORMAL
MDC_IDC_SET_BRADY_AT_MODE_SWITCH_RATE: 170
MDC_IDC_SET_BRADY_LOWRATE: 60
MDC_IDC_SET_BRADY_MAX_TRACKING_RATE: 130
MDC_IDC_SET_BRADY_MODE: NORMAL
MDC_IDC_SET_BRADY_PAV_DELAY: 200
MDC_IDC_SET_BRADY_SAV_DELAY: 185
MDC_IDC_SET_LEADCHNL_RA_PACING_AMPLITUDE: 2
MDC_IDC_SET_LEADCHNL_RA_PACING_POLARITY: NORMAL
MDC_IDC_SET_LEADCHNL_RA_PACING_PULSEWIDTH: 0.4
MDC_IDC_SET_LEADCHNL_RA_SENSING_POLARITY: NORMAL
MDC_IDC_SET_LEADCHNL_RA_SENSING_SENSITIVITY: 0.25
MDC_IDC_SET_LEADCHNL_RV_PACING_AMPLITUDE: 2
MDC_IDC_SET_LEADCHNL_RV_PACING_POLARITY: NORMAL
MDC_IDC_SET_LEADCHNL_RV_PACING_PULSEWIDTH: 0.4
MDC_IDC_SET_LEADCHNL_RV_SENSING_POLARITY: NORMAL
MDC_IDC_SET_LEADCHNL_RV_SENSING_SENSITIVITY: 0.6
MDC_IDC_SET_ZONE_STATUS: NORMAL
MDC_IDC_SET_ZONE_TYPE: NORMAL
MDC_IDC_STAT_AT_BURDEN_PERCENT: 1
MDC_IDC_STAT_BRADY_RA_PERCENT_PACED: 5
MDC_IDC_STAT_BRADY_RV_PERCENT_PACED: 2

## 2025-07-30 NOTE — ASSESSMENT & PLAN NOTE
Chronically anticoagulated with Eliquis 5mg PO BID  I did give the patient samples for the next 2 months.  I have reached out to our nurse in the office to assist Ms. Wayne with financial assistance forms.

## 2025-07-30 NOTE — PROGRESS NOTES
Cardiac Electrophysiology Outpatient Follow Up Note            [facility]    Follow Up Office Visit      Ronna Wayne  [id number]  08/01/2025  [unfilled]  [unfilled]    Primary Care Physician: Mague Acosta APRN    Referred By: No ref. 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 female who presents to my electrophysiology clinic for follow up of paroxysmal atrial fibrillation, SVT.  She underwent catheter based ablation for AV node reentry tachycardia. She has a [facility] dual chamber permanent pacemaker in situ. She is chronically maintained on Eliquis 5mg PO BID.    The patient has not been taking her Eliquis for approximately 2 to 3 months as she was part of financial assistance program that requires yearly applications.  The year ended and the patient needs assistance in completing this forms.  She is unable to afford Eliquis otherwise.  She also states that she has not been taking her losartan for the last month or 2 as she has been having difficulties getting this refilled per her primary provider as she reports.    Since the patients last visit the patient has had exertional fatigue.   She has a hard time cooking meals as she can not stand that long.   She has a hard time sleeping at night, but cat naps during the day.  She has had headache.  These headaches have been since the patient has been off of her antihypertensives.  She states that that she has had increased edema to her bilateral  lower extremities and just plain out as felt lousy since stopping her losartan.  The patient denies chest pain, chest pressure, chest heaviness, palpitations, shortness of breath, syncope, presyncope, visual changes, paresthesias.         Past Medical History:   Diagnosis Date    Anxiety     B12 deficiency     BPV (benign positional vertigo)     Breast cancer 05/2021    Colon polyp     Diabetes mellitus     Diarrhea     Disease of thyroid gland     Elevated cholesterol     Fibrocystic breast     Fibromyalgia     GERD (gastroesophageal reflux disease)     Glaucoma     Headache     Heart murmur     HL (hearing loss)     Hyperlipidemia     Hypertension     Kidney disease     Obesity     Peptic ulceration     PONV (postoperative nausea and vomiting)     Primary central sleep apnea     Renal insufficiency     Sleep apnea     c-pap    Stroke     TIA (transient ischemic attack) 03/31/2019    Urinary incontinence     Vaginal infection     Weakness of left arm        Past Surgical History:   Procedure Laterality Date    ABDOMINAL SURGERY      APPENDECTOMY      BREAST ABSCESS INCISION AND DRAINAGE Right 07/07/2022    Procedure: BREAST ABSCESS INCISION AND DRAINAGE;  Surgeon: Lee Parrish MD;  Location: [facility];  Service: General;  Laterality: Right;    BREAST BIOPSY  May 2021    BREAST BIOPSY Right 2024    BREAST CYST ASPIRATION      BREAST LUMPECTOMY WITH SENTINEL NODE BIOPSY Right 08/05/2021    Procedure: BREAST LUMPECTOMY WITH SENTINEL NODE BIOPSY;  Surgeon: Lee Parrish MD;  Location: [facility] OR;  Service: General;  Laterality: Right;    CARDIAC ABLATION  04/2025    @    CARDIAC ELECTROPHYSIOLOGY PROCEDURE N/A 01/09/2023    Procedure: Pacemaker DC new;  Surgeon: Bj Hyde MD;  Location: [facility] CATH INVASIVE LOCATION;  Service: Cardiology;  Laterality: N/A;    CARDIAC ELECTROPHYSIOLOGY PROCEDURE N/A 07/21/2023    Procedure: EP +/- RFA SVT, hold Eliquis 1 day, hold metoprolol 3 days;  Surgeon:  Israel Barrientos, DO;  Location:  [facility] EP INVASIVE LOCATION;  Service: Cardiovascular;  Laterality: N/A;    CARDIAC ELECTROPHYSIOLOGY STUDY AND ABLATION      07/21/2023 PER DR. BARRIENTOS    CATARACT EXTRACTION Bilateral     CHOLECYSTECTOMY      COLONOSCOPY      ENDOSCOPY      HEMATOMA EVACUATION TRUNK Right 08/05/2021    Procedure: HEMATOMA EVACUATION TRUNK;  Surgeon: Lee Parrish MD;  Location:  [facility] OR;  Service: General;  Laterality: Right;    INSERT / REPLACE / REMOVE PACEMAKER      PACEMAKER IMPLANTATION      SHOULDER SURGERY Left     UPPER GASTROINTESTINAL ENDOSCOPY      URETHRA SURGERY         Family History   Problem Relation Age of Onset    Thyroid disease Mother     Diabetes Mother     Hearing loss Mother     Kidney disease Mother         Diabetic related    Hypertension Mother     Hyperlipidemia Father     Hypertension Father     Heart attack Father     Lung disease Father     Hearing loss Father     Heart disease Father     Hypertension Sister     Breast cancer Sister 40    Obesity Sister     Hypertension Sister     Hyperlipidemia Sister     Cancer Sister         Breast cancer    Hyperlipidemia Sister     Hypertension Sister     Arthritis Sister     Obesity Sister     Thyroid disease Sister     Alcohol abuse Maternal Aunt     Cancer Maternal Grandfather         Prostrate    Hearing loss Maternal Grandfather     Arthritis Maternal Grandfather     Asthma Maternal Grandfather     Stroke Maternal Grandfather     Stroke Paternal Grandmother     Cancer Paternal Grandmother         Female cancer not sure what    Heart disease Paternal Grandmother     Stroke Paternal Grandfather     Arthritis Paternal Grandfather     Hearing loss Paternal Grandfather     Hypertension Other     Heart disease Maternal Grandmother     Hypertension Sister     Cancer Maternal Aunt         Liver cancer       Social History     Socioeconomic History    Marital status:    Tobacco Use    Smoking status: Former     Current  packs/day: 0.00     Average packs/day: 1.5 packs/day for 3.0 years (4.5 ttl pk-yrs)     Types: Cigarettes     Start date: 1973     Quit date: 1976     Years since quittin.6     Passive exposure: Past    Smokeless tobacco: Never    Tobacco comments:     I smoked as a teen and stopped when my son was born   Vaping Use    Vaping status: Never Used   Substance and Sexual Activity    Alcohol use: No     Comment: former social drinker not had anything in 25 + years    Drug use: Never    Sexual activity: Not Currently     Partners: Male     Birth control/protection: Abstinence, Post-menopausal, Vasectomy         Current Outpatient Medications:     acetaminophen (TYLENOL) 325 MG tablet, Take 2 tablets by mouth Every 6 (Six) Hours As Needed for Mild Pain., Disp: , Rfl:     anastrozole (ARIMIDEX) 1 MG tablet, Take 1 tablet by mouth Daily., Disp: 30 tablet, Rfl: 11    apixaban (ELIQUIS) 5 MG tablet tablet, Take 1 tablet by mouth 2 (Two) Times a Day., Disp: 14 tablet, Rfl: 0    aspirin 81 MG chewable tablet, Chew & swallow 1 tablet Daily., Disp: 30 tablet, Rfl: 0    Continuous Glucose  (Dexcom G7 ) device, Use 1 Device Daily., Disp: 1 each, Rfl: 0    Continuous Glucose  (Dexcom G7 ) device, Use 1 Device Daily., Disp: 1 each, Rfl: 0    Continuous Glucose Sensor (Dexcom G7 Sensor) misc, Use 1 Device 90 Minutes Prior to Surgery for 1 dose., Disp: 9 each, Rfl: 3    Continuous Glucose Sensor (Dexcom G7 Sensor) misc, Use 1 Device Every 10 (Ten) Days., Disp: 9 each, Rfl: 3    Diclofenac Sodium (VOLTAREN) 1 % gel gel, Apply 4 g topically to the appropriate area as directed 3 (Three) Times a Day As Needed (joint pain)., Disp: 350 g, Rfl: 2    empagliflozin (Jardiance) 25 MG tablet tablet, Take 1 tablet by mouth Every Morning., Disp: 30 tablet, Rfl: 3    fluticasone (FLONASE) 50 MCG/ACT nasal spray, Administer 2 sprays into the nostril(s) as directed by provider Daily., Disp: 16 g, Rfl: 0     furosemide (LASIX) 20 MG tablet, Take 2 tablets by mouth Daily., Disp: 40 tablet, Rfl: 2    Inclisiran Sodium (Leqvio) 284 MG/1.5ML solution prefilled syringe, Inject 1.5 mL under the skin into the appropriate area as directed Every 3 (Three) Months., Disp: 1.5 mL, Rfl: 0    insulin aspart (novoLOG FLEXPEN) 100 UNIT/ML solution pen-injector sc pen, Inject 0-9 Units under the skin into the appropriate area as directed 3 (Three) Times a Day With Meals. Admelog Solostar sliding scale, Disp: , Rfl:     Insulin Glargine, 1 Unit Dial, (Toujeo SoloStar) 300 UNIT/ML solution pen-injector injection, Inject 40 Units under the skin into the appropriate area as directed Every Evening., Disp: , Rfl:     Insulin Pen Needle 32G X 5 MM misc, Use 1 each 3 (Three) Times a Day., Disp: 100 each, Rfl: 5    levothyroxine (SYNTHROID, LEVOTHROID) 88 MCG tablet, TAKE 1 TABLET BY MOUTH DAILY, Disp: 90 tablet, Rfl: 0    linaclotide (Linzess) 72 MCG capsule capsule, Take 1 capsule by mouth Every Morning Before Breakfast., Disp: 30 capsule, Rfl: 0    linaGLIPtin-metFORMIN HCl (Jentadueto) 2.5-1000 MG tablet, Take  by mouth 2 (Two) Times a Day., Disp: , Rfl:     losartan (COZAAR) 25 MG tablet, Take 1 tablet by mouth Daily., Disp: 7 tablet, Rfl: 0    Magnesium Oxide -Mg Supplement 400 (240 Mg) MG tablet, TAKE 2 TABLETS BY MOUTH TWICE A DAY, Disp: 120 tablet, Rfl: 2    meclizine (ANTIVERT) 12.5 MG tablet, Take 1 tablet by mouth 3 (Three) Times a Day As Needed for Dizziness., Disp: 60 tablet, Rfl: 2    melatonin 5 MG tablet tablet, Take 1 tablet by mouth At Night As Needed., Disp: , Rfl:     metoprolol tartrate (LOPRESSOR) 25 MG tablet, TAKE 1 TABLET BY MOUTH TWICE A DAY, Disp: 180 tablet, Rfl: 1    Mirabegron ER (Myrbetriq) 50 MG tablet sustained-release 24 hour 24 hr tablet, TAKE 1 TABLET BY MOUTH ONCE NIGHTLY, Disp: 90 tablet, Rfl: 0    ondansetron ODT (ZOFRAN-ODT) 4 MG disintegrating tablet, DISSOLVE 1 TABLET BY MOUTH EVERY 8 HOURS AS NEEDED  FOR NAUSEA AND/OR VOMITING, Disp: 20 tablet, Rfl: 1    pantoprazole (PROTONIX) 40 MG EC tablet, TAKE 1 TABLET BY MOUTH DAILY, Disp: 90 tablet, Rfl: 0    Semaglutide, 1 MG/DOSE, (Ozempic, 1 MG/DOSE,) 2 MG/1.5ML solution pen-injector, Inject 1 mg under the skin into the appropriate area as directed 1 (One) Time Per Week., Disp: , Rfl:     Allergies   Allergen Reactions    Codeine GI Intolerance     Increased heart rate      Sulfa Antibiotics GI Intolerance     Heart rate increase        Objective   There were no vitals filed for this visit.  There is no height or weight on file to calculate BMI.    Constitutional:       Appearance: Normal and healthy appearance. Not in distress.   Eyes:      Pupils: Pupils are equal, round, and reactive to light.   HENT:      Head: Normocephalic and atraumatic.   Pulmonary:      Effort: Pulmonary effort is normal.      Breath sounds: Normal breath sounds.   Cardiovascular:      PMI at left midclavicular line. Normal rate. Regular rhythm. Normal S1. Normal S2.       Murmurs: There is a systolic murmur.      No gallop.  No click. No rub.   Pulses:     Intact distal pulses.   Edema:     Peripheral edema absent.   Abdominal:      General: Abdomen is flat. Bowel sounds are normal. There is no distension.      Palpations: Abdomen is soft.   Skin:     General: Skin is warm and dry.   Neurological:      General: No focal deficit present.      Mental Status: Alert, oriented to person, place, and time and oriented to person, place and time.   Psychiatric:         Attention and Perception: Attention normal.         Mood and Affect: Mood normal.         Behavior: Behavior is cooperative.         Lab Results   Component Value Date    GLUCOSE 177 (H) 06/03/2025    CALCIUM 9.6 06/03/2025     (L) 06/03/2025    K 4.5 06/03/2025    CO2 23.0 06/03/2025     06/03/2025    BUN 16.0 06/03/2025    CREATININE 0.99 06/03/2025    EGFRIFNONA 54 (L) 01/11/2022    BCR 16.2 06/03/2025    ANIONGAP 12.0  06/03/2025     Lab Results   Component Value Date    WBC 6.75 06/03/2025    HGB 12.1 06/03/2025    HCT 36.2 06/03/2025    .3 (H) 06/03/2025     06/03/2025     Lab Results   Component Value Date    INR 1.01 08/22/2024    INR 1.07 06/11/2023    INR 1.00 01/19/2023    PROTIME 10.9 08/22/2024    PROTIME 14.4 06/11/2023    PROTIME 13.4 01/19/2023     Lab Results   Component Value Date    TSH 1.250 06/03/2025       Cardiac Testing:     I personally viewed and interpreted the patient's EKG/Telemetry/lab data.    Procedures    Tobacco Cessation: N/A  Obstructive Sleep Apnea Screening: Completed    Advance Care Planning   ACP discussion was held with the patient during this visit. Patient does not have an advance directive, declines further assistance.       Assessment & Plan      Assessment & Plan  Paroxysmal SVT (supraventricular tachycardia)  No new episodes       Paroxysmal atrial fibrillation  Chronically anticoagulated with Eliquis 5mg PO BID  I did give the patient samples for the next 2 months.  I have reached out to our nurse in the office to assist Ms. Wayne with financial assistance forms.         Sinus node dysfunction  DEVICE INTERROGATION device interrogation performed on 7/31/2025 notes a normal functioning device, 7.5-year battery life, no significant changes.         Obstructive sleep apnea syndrome  CPAP compliant       Essential hypertension  Today's blood pressure in the office 178/97.  She has been out of her losartan for approximately 2 months.  I did call in a refill for this.  She states she does have a way to keep track of her blood pressure at home.  She is going to go straight to the pharmacy pick her medication up and take it.  I instructed her to keep a log at home should she know that her blood pressure did not go down after taking her medication she should notify our office or primary cardiologist so that we can further titrate her antihypertensives.  We did discuss strokelike  symptoms including visual changes numbness tingling speech disturbance.  We also spoke about headaches should she continue to have any or all of these she should report to the closest emergency department in the setting of accelerated hypertension.    Orders:    losartan (COZAAR) 25 MG tablet; Take 1 tablet by mouth Daily.          Follow Up:   No follow-ups on file.    Thank you for allowing me to participate in the care of your patient. Please to not hesitate to contact me with additional questions or concerns.      BOSTON Naranjo  [facility] / [facility]

## 2025-07-30 NOTE — ASSESSMENT & PLAN NOTE
DEVICE INTERROGATION device interrogation performed on 7/31/2025 notes a normal functioning device, 7.5-year battery life, no significant changes.

## 2025-08-01 ENCOUNTER — OFFICE VISIT (OUTPATIENT)
Dept: CARDIOLOGY | Facility: CLINIC | Age: 70
End: 2025-08-01
Payer: MEDICARE

## 2025-08-01 VITALS
HEART RATE: 70 BPM | DIASTOLIC BLOOD PRESSURE: 97 MMHG | BODY MASS INDEX: 48.55 KG/M2 | OXYGEN SATURATION: 95 % | WEIGHT: 274 LBS | SYSTOLIC BLOOD PRESSURE: 178 MMHG | HEIGHT: 63 IN

## 2025-08-01 DIAGNOSIS — I47.10 PAROXYSMAL SVT (SUPRAVENTRICULAR TACHYCARDIA): Primary | ICD-10-CM

## 2025-08-01 DIAGNOSIS — I48.0 PAROXYSMAL ATRIAL FIBRILLATION: ICD-10-CM

## 2025-08-01 DIAGNOSIS — G47.33 OBSTRUCTIVE SLEEP APNEA SYNDROME: ICD-10-CM

## 2025-08-01 DIAGNOSIS — I49.5 SINUS NODE DYSFUNCTION: ICD-10-CM

## 2025-08-01 DIAGNOSIS — I10 ESSENTIAL HYPERTENSION: ICD-10-CM

## 2025-08-01 RX ORDER — LOSARTAN POTASSIUM 25 MG/1
25 TABLET ORAL DAILY
Qty: 90 TABLET | Refills: 3 | Status: SHIPPED | OUTPATIENT
Start: 2025-08-01

## 2025-08-01 NOTE — ASSESSMENT & PLAN NOTE
Today's blood pressure in the office 178/97.  She has been out of her losartan for approximately 2 months.  I did call in a refill for this.  She states she does have a way to keep track of her blood pressure at home.  She is going to go straight to the pharmacy pick her medication up and take it.  I instructed her to keep a log at home should she know that her blood pressure did not go down after taking her medication she should notify our office or primary cardiologist so that we can further titrate her antihypertensives.  We did discuss strokelike symptoms including visual changes numbness tingling speech disturbance.  We also spoke about headaches should she continue to have any or all of these she should report to the closest emergency department in the setting of accelerated hypertension.    Orders:    losartan (COZAAR) 25 MG tablet; Take 1 tablet by mouth Daily.

## 2025-08-13 ENCOUNTER — HOSPITAL ENCOUNTER (OUTPATIENT)
Dept: MAMMOGRAPHY | Facility: HOSPITAL | Age: 70
Discharge: HOME | End: 2025-08-13
Admitting: INTERNAL MEDICINE
Payer: MEDICARE

## 2025-08-13 DIAGNOSIS — Z12.31 VISIT FOR SCREENING MAMMOGRAM: ICD-10-CM

## 2025-08-13 DIAGNOSIS — Z17.0 MALIGNANT NEOPLASM OF UPPER-OUTER QUADRANT OF RIGHT BREAST IN FEMALE, ESTROGEN RECEPTOR POSITIVE: ICD-10-CM

## 2025-08-13 DIAGNOSIS — C50.411 MALIGNANT NEOPLASM OF UPPER-OUTER QUADRANT OF RIGHT BREAST IN FEMALE, ESTROGEN RECEPTOR POSITIVE: ICD-10-CM

## 2025-08-13 PROCEDURE — 77067 SCR MAMMO BI INCL CAD: CPT

## 2025-08-13 PROCEDURE — 77063 BREAST TOMOSYNTHESIS BI: CPT

## 2025-08-15 DIAGNOSIS — E03.9 ACQUIRED HYPOTHYROIDISM: ICD-10-CM

## 2025-08-15 DIAGNOSIS — E78.5 DYSLIPIDEMIA: ICD-10-CM

## 2025-08-15 DIAGNOSIS — K21.00 GASTROESOPHAGEAL REFLUX DISEASE WITH ESOPHAGITIS WITHOUT HEMORRHAGE: ICD-10-CM

## 2025-08-15 DIAGNOSIS — I10 ESSENTIAL HYPERTENSION: Primary | ICD-10-CM

## 2025-08-15 DIAGNOSIS — I48.0 PAROXYSMAL ATRIAL FIBRILLATION: ICD-10-CM

## 2025-08-15 DIAGNOSIS — Z79.4 TYPE 2 DIABETES MELLITUS WITH HYPERGLYCEMIA, WITH LONG-TERM CURRENT USE OF INSULIN: ICD-10-CM

## 2025-08-15 DIAGNOSIS — R06.02 SHORTNESS OF BREATH: ICD-10-CM

## 2025-08-15 DIAGNOSIS — E53.8 B12 DEFICIENCY: ICD-10-CM

## 2025-08-15 DIAGNOSIS — E55.9 VITAMIN D DEFICIENCY: ICD-10-CM

## 2025-08-15 DIAGNOSIS — E83.42 HYPOMAGNESEMIA: ICD-10-CM

## 2025-08-15 DIAGNOSIS — E11.65 TYPE 2 DIABETES MELLITUS WITH HYPERGLYCEMIA, WITH LONG-TERM CURRENT USE OF INSULIN: ICD-10-CM

## 2025-08-22 ENCOUNTER — LAB (OUTPATIENT)
Dept: LAB | Facility: HOSPITAL | Age: 70
End: 2025-08-22
Payer: MEDICARE

## 2025-08-26 ENCOUNTER — SPECIALTY PHARMACY (OUTPATIENT)
Dept: PHARMACY | Facility: HOSPITAL | Age: 70
End: 2025-08-26
Payer: MEDICARE

## 2025-08-27 ENCOUNTER — SPECIALTY PHARMACY (OUTPATIENT)
Dept: PHARMACY | Facility: HOSPITAL | Age: 70
End: 2025-08-27
Payer: MEDICARE

## 2025-08-27 RX ORDER — CHOLECALCIFEROL (VITAMIN D3) 25 MCG
1000 TABLET ORAL WEEKLY
COMMUNITY

## 2025-08-28 ENCOUNTER — PRIOR AUTHORIZATION (OUTPATIENT)
Dept: FAMILY MEDICINE CLINIC | Facility: CLINIC | Age: 70
End: 2025-08-28
Payer: MEDICARE

## 2025-08-28 ENCOUNTER — OFFICE VISIT (OUTPATIENT)
Dept: FAMILY MEDICINE CLINIC | Facility: CLINIC | Age: 70
End: 2025-08-28
Payer: MEDICARE

## 2025-08-28 VITALS
HEIGHT: 63 IN | BODY MASS INDEX: 45.89 KG/M2 | HEART RATE: 64 BPM | WEIGHT: 259 LBS | RESPIRATION RATE: 14 BRPM | TEMPERATURE: 96.8 F | DIASTOLIC BLOOD PRESSURE: 80 MMHG | OXYGEN SATURATION: 96 % | SYSTOLIC BLOOD PRESSURE: 110 MMHG

## 2025-08-28 DIAGNOSIS — K92.1 MELENA: ICD-10-CM

## 2025-08-28 DIAGNOSIS — Z97.8 USES SELF-APPLIED CONTINUOUS GLUCOSE MONITORING DEVICE: ICD-10-CM

## 2025-08-28 DIAGNOSIS — R10.11 RUQ ABDOMINAL PAIN: Primary | ICD-10-CM

## 2025-08-28 DIAGNOSIS — Z79.4 TYPE 2 DIABETES MELLITUS WITH HYPERGLYCEMIA, WITH LONG-TERM CURRENT USE OF INSULIN: Chronic | ICD-10-CM

## 2025-08-28 DIAGNOSIS — I10 ESSENTIAL HYPERTENSION: Chronic | ICD-10-CM

## 2025-08-28 DIAGNOSIS — E78.5 DYSLIPIDEMIA: Chronic | ICD-10-CM

## 2025-08-28 DIAGNOSIS — I48.0 PAROXYSMAL ATRIAL FIBRILLATION: Chronic | ICD-10-CM

## 2025-08-28 DIAGNOSIS — E11.65 TYPE 2 DIABETES MELLITUS WITH HYPERGLYCEMIA, WITH LONG-TERM CURRENT USE OF INSULIN: Chronic | ICD-10-CM

## 2025-08-28 LAB
ALBUMIN SERPL-MCNC: 3.6 G/DL (ref 3.5–5.2)
ALBUMIN/GLOB SERPL: 0.9 G/DL
ALP SERPL-CCNC: 116 U/L (ref 39–117)
ALT SERPL W P-5'-P-CCNC: 23 U/L (ref 1–33)
AMYLASE SERPL-CCNC: 53 U/L (ref 28–100)
ANION GAP SERPL CALCULATED.3IONS-SCNC: 12.5 MMOL/L (ref 5–15)
AST SERPL-CCNC: 46 U/L (ref 1–32)
BASOPHILS # BLD AUTO: 0.06 10*3/MM3 (ref 0–0.2)
BASOPHILS NFR BLD AUTO: 0.9 % (ref 0–1.5)
BILIRUB SERPL-MCNC: 0.7 MG/DL (ref 0–1.2)
BUN SERPL-MCNC: 17.2 MG/DL (ref 8–23)
BUN/CREAT SERPL: 16.5 (ref 7–25)
CALCIUM SPEC-SCNC: 9.8 MG/DL (ref 8.6–10.5)
CHLORIDE SERPL-SCNC: 100 MMOL/L (ref 98–107)
CO2 SERPL-SCNC: 21.5 MMOL/L (ref 22–29)
CREAT SERPL-MCNC: 1.04 MG/DL (ref 0.57–1)
DEPRECATED RDW RBC AUTO: 47.5 FL (ref 37–54)
EGFRCR SERPLBLD CKD-EPI 2021: 57.9 ML/MIN/1.73
EOSINOPHIL # BLD AUTO: 0.08 10*3/MM3 (ref 0–0.4)
EOSINOPHIL NFR BLD AUTO: 1.2 % (ref 0.3–6.2)
ERYTHROCYTE [DISTWIDTH] IN BLOOD BY AUTOMATED COUNT: 13.5 % (ref 12.3–15.4)
GLOBULIN UR ELPH-MCNC: 3.9 GM/DL
GLUCOSE SERPL-MCNC: 180 MG/DL (ref 65–99)
HCT VFR BLD AUTO: 32.2 % (ref 34–46.6)
HGB BLD-MCNC: 11.3 G/DL (ref 12–15.9)
IMM GRANULOCYTES # BLD AUTO: 0.02 10*3/MM3 (ref 0–0.05)
IMM GRANULOCYTES NFR BLD AUTO: 0.3 % (ref 0–0.5)
LIPASE SERPL-CCNC: 67 U/L (ref 13–60)
LYMPHOCYTES # BLD AUTO: 2.24 10*3/MM3 (ref 0.7–3.1)
LYMPHOCYTES NFR BLD AUTO: 32.5 % (ref 19.6–45.3)
MCH RBC QN AUTO: 33.5 PG (ref 26.6–33)
MCHC RBC AUTO-ENTMCNC: 35.1 G/DL (ref 31.5–35.7)
MCV RBC AUTO: 95.5 FL (ref 79–97)
MONOCYTES # BLD AUTO: 0.86 10*3/MM3 (ref 0.1–0.9)
MONOCYTES NFR BLD AUTO: 12.5 % (ref 5–12)
NEUTROPHILS NFR BLD AUTO: 3.63 10*3/MM3 (ref 1.7–7)
NEUTROPHILS NFR BLD AUTO: 52.6 % (ref 42.7–76)
NRBC BLD AUTO-RTO: 0 /100 WBC (ref 0–0.2)
PLATELET # BLD AUTO: 161 10*3/MM3 (ref 140–450)
PMV BLD AUTO: 10.1 FL (ref 6–12)
POTASSIUM SERPL-SCNC: 4.9 MMOL/L (ref 3.5–5.2)
PROT SERPL-MCNC: 7.5 G/DL (ref 6–8.5)
RBC # BLD AUTO: 3.37 10*6/MM3 (ref 3.77–5.28)
SODIUM SERPL-SCNC: 134 MMOL/L (ref 136–145)
WBC NRBC COR # BLD AUTO: 6.89 10*3/MM3 (ref 3.4–10.8)

## 2025-08-28 PROCEDURE — 85025 COMPLETE CBC W/AUTO DIFF WBC: CPT | Performed by: NURSE PRACTITIONER

## 2025-08-28 PROCEDURE — 80053 COMPREHEN METABOLIC PANEL: CPT | Performed by: NURSE PRACTITIONER

## 2025-08-28 PROCEDURE — 83690 ASSAY OF LIPASE: CPT | Performed by: NURSE PRACTITIONER

## 2025-08-28 PROCEDURE — 82150 ASSAY OF AMYLASE: CPT | Performed by: NURSE PRACTITIONER

## 2025-08-28 RX ORDER — GLUCAGON INJECTION, SOLUTION 1 MG/.2ML
1 INJECTION, SOLUTION SUBCUTANEOUS AS NEEDED
Qty: 0.4 ML | Refills: 3 | Status: SHIPPED | OUTPATIENT
Start: 2025-08-28

## 2025-08-29 ENCOUNTER — LAB (OUTPATIENT)
Dept: FAMILY MEDICINE CLINIC | Facility: CLINIC | Age: 70
End: 2025-08-29
Payer: MEDICARE

## 2025-08-29 DIAGNOSIS — E03.8 OTHER SPECIFIED HYPOTHYROIDISM: ICD-10-CM

## 2025-08-29 DIAGNOSIS — K92.1 BLOOD IN STOOL: Primary | ICD-10-CM

## 2025-08-29 DIAGNOSIS — K21.00 GASTROESOPHAGEAL REFLUX DISEASE WITH ESOPHAGITIS WITHOUT HEMORRHAGE: ICD-10-CM

## 2025-08-29 LAB — HEMOCCULT STL QL: POSITIVE

## 2025-08-29 PROCEDURE — 82270 OCCULT BLOOD FECES: CPT | Performed by: NURSE PRACTITIONER

## 2025-08-29 RX ORDER — LEVOTHYROXINE SODIUM 88 UG/1
88 TABLET ORAL DAILY
Qty: 90 TABLET | Refills: 0 | Status: SHIPPED | OUTPATIENT
Start: 2025-08-29

## 2025-08-29 RX ORDER — PANTOPRAZOLE SODIUM 40 MG/1
40 TABLET, DELAYED RELEASE ORAL DAILY
Qty: 90 TABLET | Refills: 0 | Status: SHIPPED | OUTPATIENT
Start: 2025-08-29

## (undated) DEVICE — CVR PROB 96IN LF STRL

## (undated) DEVICE — ST INF PRI SMRTSTE 20DRP 2VLV 24ML 117

## (undated) DEVICE — ADULT DISPOSABLE SINGLE-PATIENT USE PULSE OXIMETER SENSOR: Brand: NONIN

## (undated) DEVICE — INTRO SHEATH ENGAGE W/50 GW .038 8F12

## (undated) DEVICE — PENCL ES MEGADINE EZ/CLEAN BUTN W/HOLSTR 10FT

## (undated) DEVICE — ST EXT IV SMRTSTE 2VLV FIX M LL 6ML 41

## (undated) DEVICE — TRY SKINPREP PVP SCRB W PAINT

## (undated) DEVICE — INTRO SHEATH ART/FEM ENGAGE .038 6F12CM

## (undated) DEVICE — APPL CHLORAPREP HI/LITE 26ML ORNG

## (undated) DEVICE — BNDG ELAS CO-FLEX SLF ADHR 4IN5YD LF STRL

## (undated) DEVICE — GLV SURG PREMIERPRO MIC LTX PF SZ8 BRN

## (undated) DEVICE — INTRO SHEATH FASTCATH SWARTZ SR0 .032IN 8F 63CM

## (undated) DEVICE — CATH EP SUPREME QUADPOLAR CRD2 5F 5MM 120CM

## (undated) DEVICE — PATIENT RETURN ELECTRODE, SINGLE-USE, CONTACT QUALITY MONITORING, ADULT, WITH 9FT CORD, FOR PATIENTS WEIGING OVER 33LBS. (15KG): Brand: MEGADYNE

## (undated) DEVICE — ABLATION CATHETER: Brand: INTELLANAV™ ST

## (undated) DEVICE — SET PRIMARY GRVTY 10DP MALE LL 104IN

## (undated) DEVICE — DRSNG PAD ABD 8X10IN STRL

## (undated) DEVICE — BANDAGE,GAUZE,BULKEE II,2.25"X3YD,STRL: Brand: MEDLINE

## (undated) DEVICE — SOL NACL 0.9PCT 1000ML

## (undated) DEVICE — PK BASIC 70

## (undated) DEVICE — DRAPE,UTILTY,TAPE,15X26, 4EA/PK: Brand: MEDLINE

## (undated) DEVICE — LEX ELECTRO PHYSIOLOGY: Brand: MEDLINE INDUSTRIES, INC.

## (undated) DEVICE — SUT MNCRYL 4/0 PS2 18 IN

## (undated) DEVICE — ADHS SKIN PREMIERPRO EXOFIN TOPICAL HI/VISC .5ML

## (undated) DEVICE — HOLDER: Brand: DEROYAL

## (undated) DEVICE — SPNG GZ WOVN 4X4IN 12PLY 10/BX STRL

## (undated) DEVICE — GLV SURG SENSICARE W/ALOE PF LF 7.5 STRL

## (undated) DEVICE — DRN WND HUBLSS FLUT FULL PERF SIL10MM

## (undated) DEVICE — LOCATION REFERENCE PATCH KIT: Brand: RHYTHMIA™ MAPPING SYSTEM

## (undated) DEVICE — PROXIMATE RH ROTATING HEAD SKIN STAPLERS (35 WIDE) CONTAINS 35 STAINLESS STEEL STAPLES: Brand: PROXIMATE

## (undated) DEVICE — DRP SURG UTIL W/TPE 2/LAYR 15X26IN DISP

## (undated) DEVICE — DRSNG SURESITE WNDW 4X4.5

## (undated) DEVICE — INTENDED FOR TISSUE SEPARATION, AND OTHER PROCEDURES THAT REQUIRE A SHARP SURGICAL BLADE TO PUNCTURE OR CUT.: Brand: BARD-PARKER ® STAINLESS STEEL BLADES

## (undated) DEVICE — DRN PENRS RO SILCNE 1/4X12IN LF STRL

## (undated) DEVICE — SUT SILK 0 TIES SA86G

## (undated) DEVICE — DBD-DRAPE,LAP,CHOLE,W/TROUGHS,STERILE: Brand: MEDLINE

## (undated) DEVICE — DIAGNOSTIC ELECTRODE CATHETER: Brand: WOVEN

## (undated) DEVICE — LN FLTR ORL/NASL MICROSTREAM NONINTUB A/LNG

## (undated) DEVICE — JACKSON-PRATT 100CC BULB RESERVOIR: Brand: CARDINAL HEALTH

## (undated) DEVICE — ADULT, W/LG. BACK PAD, RADIOTRANSPARENT ELEMENT AND LEAD WIRE COMPATIBLE W/: Brand: DEFIBRILLATION ELECTRODES

## (undated) DEVICE — COR PACEMAKER: Brand: MEDLINE INDUSTRIES, INC.

## (undated) DEVICE — Device

## (undated) DEVICE — CANN NASL CO2 DIVIDED A/

## (undated) DEVICE — STCKNT IMPERV 9X36IN STRL

## (undated) DEVICE — ELECTRD RETRN/GRND MEGADYNE SGL/PLT W/CORD 9FT DISP

## (undated) DEVICE — SPNG LAP PREWSH SFTPK 18X18IN STRL PK/5

## (undated) DEVICE — DECANT BG O JET

## (undated) DEVICE — SUT VIC 3/0 SH 27IN J416H

## (undated) DEVICE — TBG PENCL TELESCP MEGADYNE SMOKE EVAC 10FT

## (undated) DEVICE — WRP SURG BRA VELCRO/FRNT 44/46IN 3XL LF

## (undated) DEVICE — Device: Brand: WEBSTER CS

## (undated) DEVICE — DRSNG SURESITE123 4X4.8IN

## (undated) DEVICE — SUT SILK 3/0 SH 30IN K832H

## (undated) DEVICE — SUT SILK 2/0 TIES 30IN SA85H

## (undated) DEVICE — GLV SURG PREMIERPRO MIC LTX PF SZ7.5 BRN